# Patient Record
Sex: FEMALE | Race: WHITE | NOT HISPANIC OR LATINO | Employment: OTHER | URBAN - METROPOLITAN AREA
[De-identification: names, ages, dates, MRNs, and addresses within clinical notes are randomized per-mention and may not be internally consistent; named-entity substitution may affect disease eponyms.]

---

## 2017-02-03 ENCOUNTER — PATIENT MESSAGE (OUTPATIENT)
Dept: FAMILY MEDICINE | Facility: CLINIC | Age: 72
End: 2017-02-03

## 2017-02-03 DIAGNOSIS — D50.9 IRON DEFICIENCY ANEMIA, UNSPECIFIED IRON DEFICIENCY ANEMIA TYPE: ICD-10-CM

## 2017-02-03 DIAGNOSIS — I10 ESSENTIAL HYPERTENSION: Primary | ICD-10-CM

## 2017-02-06 ENCOUNTER — LAB VISIT (OUTPATIENT)
Dept: LAB | Facility: HOSPITAL | Age: 72
End: 2017-02-06
Attending: FAMILY MEDICINE
Payer: MEDICARE

## 2017-02-06 ENCOUNTER — OFFICE VISIT (OUTPATIENT)
Dept: FAMILY MEDICINE | Facility: CLINIC | Age: 72
End: 2017-02-06
Payer: MEDICARE

## 2017-02-06 VITALS
WEIGHT: 154.31 LBS | TEMPERATURE: 99 F | DIASTOLIC BLOOD PRESSURE: 110 MMHG | HEIGHT: 60 IN | HEART RATE: 63 BPM | BODY MASS INDEX: 30.29 KG/M2 | SYSTOLIC BLOOD PRESSURE: 201 MMHG

## 2017-02-06 DIAGNOSIS — I10 ESSENTIAL HYPERTENSION: Primary | ICD-10-CM

## 2017-02-06 DIAGNOSIS — I10 ESSENTIAL HYPERTENSION: ICD-10-CM

## 2017-02-06 LAB
ANION GAP SERPL CALC-SCNC: 7 MMOL/L
BUN SERPL-MCNC: 13 MG/DL
CALCIUM SERPL-MCNC: 9.5 MG/DL
CHLORIDE SERPL-SCNC: 97 MMOL/L
CO2 SERPL-SCNC: 31 MMOL/L
CORTIS SERPL-MCNC: 11 UG/DL
CREAT SERPL-MCNC: 0.8 MG/DL
EST. GFR  (AFRICAN AMERICAN): >60 ML/MIN/1.73 M^2
EST. GFR  (NON AFRICAN AMERICAN): >60 ML/MIN/1.73 M^2
GLUCOSE SERPL-MCNC: 79 MG/DL
POTASSIUM SERPL-SCNC: 3.8 MMOL/L
SODIUM SERPL-SCNC: 135 MMOL/L

## 2017-02-06 PROCEDURE — 1157F ADVNC CARE PLAN IN RCRD: CPT | Mod: ,,, | Performed by: FAMILY MEDICINE

## 2017-02-06 PROCEDURE — 82533 TOTAL CORTISOL: CPT

## 2017-02-06 PROCEDURE — 1126F AMNT PAIN NOTED NONE PRSNT: CPT | Mod: ,,, | Performed by: FAMILY MEDICINE

## 2017-02-06 PROCEDURE — 93010 ELECTROCARDIOGRAM REPORT: CPT | Mod: ,,, | Performed by: INTERNAL MEDICINE

## 2017-02-06 PROCEDURE — 99999 PR PBB SHADOW E&M-EST. PATIENT-LVL III: CPT | Mod: PBBFAC,,, | Performed by: FAMILY MEDICINE

## 2017-02-06 PROCEDURE — 99214 OFFICE O/P EST MOD 30 MIN: CPT | Mod: S$PBB,,, | Performed by: FAMILY MEDICINE

## 2017-02-06 PROCEDURE — 82088 ASSAY OF ALDOSTERONE: CPT

## 2017-02-06 PROCEDURE — 1159F MED LIST DOCD IN RCRD: CPT | Mod: ,,, | Performed by: FAMILY MEDICINE

## 2017-02-06 PROCEDURE — 3078F DIAST BP <80 MM HG: CPT | Mod: ,,, | Performed by: FAMILY MEDICINE

## 2017-02-06 PROCEDURE — 3077F SYST BP >= 140 MM HG: CPT | Mod: ,,, | Performed by: FAMILY MEDICINE

## 2017-02-06 PROCEDURE — 93005 ELECTROCARDIOGRAM TRACING: CPT | Mod: PBBFAC,PO | Performed by: FAMILY MEDICINE

## 2017-02-06 PROCEDURE — 36415 COLL VENOUS BLD VENIPUNCTURE: CPT | Mod: PO

## 2017-02-06 PROCEDURE — 80048 BASIC METABOLIC PNL TOTAL CA: CPT

## 2017-02-06 PROCEDURE — 99213 OFFICE O/P EST LOW 20 MIN: CPT | Mod: PBBFAC,PO | Performed by: FAMILY MEDICINE

## 2017-02-06 RX ORDER — VALSARTAN AND HYDROCHLOROTHIAZIDE 320; 25 MG/1; MG/1
1 TABLET, FILM COATED ORAL DAILY
Qty: 30 TABLET | Refills: 11 | Status: SHIPPED | OUTPATIENT
Start: 2017-02-06 | End: 2017-03-13 | Stop reason: SDUPTHER

## 2017-02-06 RX ORDER — AMLODIPINE BESYLATE 5 MG/1
5 TABLET ORAL DAILY
Qty: 30 TABLET | Refills: 11 | Status: SHIPPED | OUTPATIENT
Start: 2017-02-06 | End: 2017-03-13 | Stop reason: SDUPTHER

## 2017-02-06 NOTE — PROGRESS NOTES
Subjective:       Patient ID: Magdalena Mane is a 71 y.o. female.    Chief Complaint: Hypertension (Follow up) and Blood Pressure Check    HPI Comments: Disclaimer: This note has been generated using voice-recognition software. There may be typographical errors that have been missed during proof-reading    Patient is 71-year-old presents today for persistently elevated blood pressure readings at home.  According to the patient, blood pressure monitoring has shown systolics in the 160s to 180s, with diastolics usually ranging from 80-90.  She has been under excessive family stress at home recently.  She denies any headaches, chest discomfort or dyspnea on exertion, she has some occasional ankle edema.    Hypertension   Pertinent negatives include no chest pain, headaches, palpitations or shortness of breath.     Review of Systems   Constitutional: Negative for activity change, fatigue and unexpected weight change.   Respiratory: Negative for cough, chest tightness and shortness of breath.    Cardiovascular: Positive for leg swelling. Negative for chest pain and palpitations.   Neurological: Negative for dizziness, weakness, light-headedness and headaches.       Objective:      Physical Exam   Constitutional: She is oriented to person, place, and time. She appears well-developed and well-nourished. No distress.   Neck: Normal range of motion. No JVD present. No thyromegaly present.   Cardiovascular: Normal rate, regular rhythm and intact distal pulses.    No murmur heard.  Pulmonary/Chest: Effort normal and breath sounds normal. She has no wheezes. She has no rales.   Musculoskeletal: She exhibits no edema.   Lymphadenopathy:     She has no cervical adenopathy.   Neurological: She is alert and oriented to person, place, and time. She has normal reflexes.       Assessment:       1. Essential hypertension        Plan:       1.  Stop Lisinopril, HCT  2.  Diovan/HCT, Amlodipine  3.  EKG, cortisol, aldosterone level,  renovascular US  4.  Consult Digital Medicine  5.  F/u 2 weeks

## 2017-02-06 NOTE — MR AVS SNAPSHOT
[facility]  [address] [facility], [address]  [facility] [address]  Phone: [phone]  Fax: [phone]                  Magdalena Mane   2017 9:40 AM   Office Visit    Description:  Female : 1945   Provider:  Aaron Cardenas MD   Department:  [facility]           Reason for Visit     Hypertension     Blood Pressure Check           Diagnoses this Visit        Comments    Essential hypertension    -  Primary            To Do List           Future Appointments        Provider Department Dept Phone    3/28/2017 8:30 AM LAB, [place] - Laboratory [phone]    4/3/2017 9:00 AM Lucie Valdivia MD [facility] [phone]      Goals (5 Years of Data)     None      Follow-Up and Disposition     Return in about 2 weeks (around 2017).       These Medications        Disp Refills Start End    valsartan-hydrochlorothiazide (DIOVAN-HCT) 320-25 mg per tablet 30 tablet 11 2017    Take 1 tablet by mouth once daily. - Oral    [facility] [id number] [address] AT [facility] Ph #: [phone]       amlodipine (NORVASC) 5 MG tablet 30 tablet 11 2017 3/8/2017    Take 1 tablet (5 mg total) by mouth once daily. - Oral    Pharmacy: [facility] [id number] [facility] [place] - [address] [place] [address] AT [facility] Ph #: [phone]         [facility] On Call     [facility] On Call Nurse Care Line -  Assistance  Registered nurses in the [facility] On Call Center provide clinical advisement, health education, appointment booking, and other advisory services.  Call for this free service at [phone].             Medications           Message regarding Medications     Verify the changes and/or additions to your medication regime listed below are the same as discussed with your clinician today.  If any of these changes or additions are  incorrect, please notify your healthcare provider.        START taking these NEW medications        Refills    valsartan-hydrochlorothiazide (DIOVAN-HCT) 320-25 mg per tablet 11    Sig: Take 1 tablet by mouth once daily.    Class: Normal    Route: Oral    amlodipine (NORVASC) 5 MG tablet 11    Sig: Take 1 tablet (5 mg total) by mouth once daily.    Class: Normal    Route: Oral      STOP taking these medications     lisinopril-hydrochlorothiazide (PRINZIDE,ZESTORETIC) 20-25 mg Tab Take 1 tablet by mouth once daily.    lisinopril 10 MG tablet Take 1 tablet (10 mg total) by mouth once daily.           Verify that the below list of medications is an accurate representation of the medications you are currently taking.  If none reported, the list may be blank. If incorrect, please contact your healthcare provider. Carry this list with you in case of emergency.           Current Medications     atenolol (TENORMIN) 25 MG tablet Take 1 tablet (25 mg total) by mouth once daily.    conjugated estrogens (PREMARIN) vaginal cream Place 0.5 g vaginally once daily.    diphenhydrAMINE (BENADRYL) 25 mg capsule Take 25 mg by mouth nightly as needed for Itching.     ferrous sulfate 325 mg (65 mg iron) TbEC Take 325 mg by mouth every morning.     omega-3 fatty acids (FISH OIL) 500 mg Cap Take 1 capsule by mouth nightly. 1 Capsule Oral Every evening    tramadol (ULTRAM) 50 mg tablet TAKE 1/2 TABLET BY MOUTH NIGHTLY AS NEEDED FOR PAIN    amlodipine (NORVASC) 5 MG tablet Take 1 tablet (5 mg total) by mouth once daily.    triamcinolone acetonide 0.1% (KENALOG) 0.1 % cream Apply topically 2 (two) times daily as needed. Avoid face and groin.    valsartan-hydrochlorothiazide (DIOVAN-HCT) 320-25 mg per tablet Take 1 tablet by mouth once daily.           Clinical Reference Information           Your Vitals Were     BP Pulse Temp Height Weight BMI    201/110 (BP Location: Left arm, Patient Position: Sitting, BP Method: Automatic) 63 98.5 °F (36.9  °C) (Oral) 5' (1.524 m) 70 kg (154 lb 5.2 oz) 30.14 kg/m2      Blood Pressure          Most Recent Value    BP  (!)  201/110      Allergies as of 2/6/2017     No Known Allergies      Immunizations Administered on Date of Encounter - 2/6/2017     None      Orders Placed During Today's Visit      Normal Orders This Visit    Assign HDMP Onboarding Questionnaire Series     Hypertension Digital Medicine (HDMP)  Enrollment Order     Future Labs/Procedures Expected by Expires    Aldosterone  2/6/2017 4/7/2018    Basic metabolic panel  2/6/2017 2/6/2018    Cortisol  2/6/2017 4/7/2018    Cardiology Lab US Renal Artery Scan Bilateral  As directed 2/6/2018    EKG 12-lead  As directed 2/6/2018      Hypertension Digital Medicine Program Information              As discussed, you could benefit from enrolling in the Hypertension Digital Medicine Program. The goal of the program is to help you effectively manage your high blood pressure through an appropriate balance of medication and lifestyle changes, all from the comfort of your own home. Effectively managed blood pressure reduces your risk of having a heart attack or a stroke in the future, so you can enjoy a long and healthy life. We want to make blood pressure control your goal.        What is the Hypertension Digital Medicine Program?  Finding the right balance in managing high blood pressure is different for every person, and we will tailor our treatment approach based on your individual needs using the most current evidence-based guidelines.  As a participant, you will be able to send home blood pressure readings, on your schedule, directly into your medical record at [facility]. I, along with a team of pharmacists, will monitor this data, help you adjust your medication(s) and/or make lifestyle recommendations to better manage your hypertension.       What are the requirements of the program?   Participating in the program is as easy as 1 - 2 - 3.   1. Smartphone - You must  "have your own smartphone to participate (either an iPhone or an Android phone such as Moneysoft, retsCloud, HTC, RingCentral, Click Bus, or Matter.io).    2. [facility] account - [facility] offers a great way to connect through the online patient portal, [facility], which is free and provides you access to your [facility] medical record.  3. Digital blood pressure cuff - Using this blood pressure cuff that hooks up to your smartphone, you will be able to send in your home blood pressure readings to the Hypertension Digital Medicine Team. We have made arrangements to offer blood pressure cuffs at a discount for our programs participants.           What can I do to get started?   Complete the Hypertension Digital Medicine Patient Consent questionnaire already available in your [facility] account. To access and complete this questionnaire, either  - Use the [facility] website on a computer and select My Medical Record, then Questionnaires.  - Use the [email or web address] josiane on your smartphone and select "Questionnaires".    Once you have given consent, additional onboarding questionnaires will be assigned to you to complete prior to starting the program. You must return to the "Questionnaires" page of your [facility] account to start the additional questionnaires.     How do I purchase a digital blood pressure cuff and obtain a discount?  [facility] has negotiated a discounted price from industry leading healthcare technology companies. Patients using an Apple iPhone can purchase an iHealth Ease Blood Pressure cuff for $33 (originally $39.99). While supplies last, Android users can purchase the [employer] Blood Pressure Monitor for $45 (originally $129.95).  Purchase locations will be sent once all onboarding questionnaires have been completed (see above).    If you have any questions regarding this program or would like more information, please visit our Hypertension Digital Medicine website ([email or web address]) or call Digital " Medicine Patient Support at [phone].          Language Assistance Services     ATTENTION: Language assistance services are available, free of charge. Please call [phone].      ATENCIÓN: Si habla babar, tiene a henderson disposición servicios gratuitos de asistencia lingüística. Llame al [phone].     CHÚ Ý: N?u b?n nói Ti?ng Vi?t, có các d?ch v? h? tr? ngôn ng? mi?n phí dành cho b?n. G?i s? [phone].         [facility] complies with applicable Federal civil rights laws and does not discriminate on the basis of race, color, national origin, age, disability, or sex.

## 2017-02-08 ENCOUNTER — PATIENT MESSAGE (OUTPATIENT)
Dept: ADMINISTRATIVE | Facility: OTHER | Age: 72
End: 2017-02-08

## 2017-02-08 LAB — ALDOST SERPL-MCNC: 9.9 NG/DL

## 2017-02-09 ENCOUNTER — PATIENT MESSAGE (OUTPATIENT)
Dept: ADMINISTRATIVE | Facility: OTHER | Age: 72
End: 2017-02-09

## 2017-02-09 DIAGNOSIS — M17.0 PRIMARY OSTEOARTHRITIS OF BOTH KNEES: ICD-10-CM

## 2017-02-13 RX ORDER — TRAMADOL HYDROCHLORIDE 50 MG/1
TABLET ORAL
Qty: 30 TABLET | Refills: 0 | Status: SHIPPED | OUTPATIENT
Start: 2017-02-13 | End: 2017-03-13 | Stop reason: SDUPTHER

## 2017-02-15 ENCOUNTER — PATIENT OUTREACH (OUTPATIENT)
Dept: OTHER | Facility: OTHER | Age: 72
End: 2017-02-15
Payer: MEDICARE

## 2017-02-15 NOTE — PROGRESS NOTES
Last 5 Patient Entered Readings                                                               Current 30 Day Average: 139/73     Recent Readings 2/13/2017 2/11/2017 2/10/2017 2/9/2017 2/9/2017    Systolic BP (mmHg) 118 171 115 141 163    Diastolic BP (mmHg) 65 88 64 69 82    Pulse 60 55 67 58 62      Initial introduction completed with the pt and the role of the health  was explained.   We discussed the following information:  Exercise - Pt has bad osteoarthritis and has had two hip replacements. Her knees also give her trouble. She does walk very frequently but is interested in joining [employer] for more exercise to prevent further weight gain. Pt states she will be calling them soon to start the enrollment process.    Diet - Ms. Mane states she already eats mostly fresh, low sodium foods, mostly vegetarian diet. She states her weakness is snacking on olives daily and eating wraps w/olive spread. We set a goal to decrease the amount and frequency of the olives as they are very high in sodium. She uses only salt-free seasonings.   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 Per pt Chart, she reports great deal of family stress. She has a medication dispenser and also keeps extra doses of her medication in each car just incase she occasionally forgets to take at home.     Resources on diet and exercise were sent.     Pt aware that I am not available for emergencies and to call 911 or [facility] on call if one arises.  Pt aware of the importance of medication adherence.  Pt aware of the importance of diet and exercise.  Pt aware that his sodium intake should be no more than 2000mg per day.  Pt aware that the recommended physical activity each week should be about 30 minutes per day at least 5 times per week.   Pt aware of the importance of taking BP readings at least weekly if not more and during  different times each day.  Pt aware that the health  can be used as a resource for lifestyle modifications to help reduce or maintain a healthy BP

## 2017-02-16 ENCOUNTER — CLINICAL SUPPORT (OUTPATIENT)
Dept: CARDIOLOGY | Facility: CLINIC | Age: 72
End: 2017-02-16
Payer: MEDICARE

## 2017-02-16 DIAGNOSIS — I10 ESSENTIAL HYPERTENSION: ICD-10-CM

## 2017-02-16 PROCEDURE — 93975 VASCULAR STUDY: CPT | Mod: PBBFAC | Performed by: INTERNAL MEDICINE

## 2017-02-20 ENCOUNTER — OFFICE VISIT (OUTPATIENT)
Dept: FAMILY MEDICINE | Facility: CLINIC | Age: 72
End: 2017-02-20
Payer: MEDICARE

## 2017-02-20 VITALS
TEMPERATURE: 98 F | SYSTOLIC BLOOD PRESSURE: 136 MMHG | DIASTOLIC BLOOD PRESSURE: 70 MMHG | HEIGHT: 60 IN | WEIGHT: 154.31 LBS | BODY MASS INDEX: 30.29 KG/M2

## 2017-02-20 DIAGNOSIS — I10 ESSENTIAL HYPERTENSION: ICD-10-CM

## 2017-02-20 DIAGNOSIS — Z00.00 ROUTINE GENERAL MEDICAL EXAMINATION AT A HEALTH CARE FACILITY: Primary | ICD-10-CM

## 2017-02-20 DIAGNOSIS — M81.0 OSTEOPOROSIS: ICD-10-CM

## 2017-02-20 DIAGNOSIS — Z12.31 SCREENING MAMMOGRAM FOR HIGH-RISK PATIENT: ICD-10-CM

## 2017-02-20 DIAGNOSIS — Z13.9 SCREENING: ICD-10-CM

## 2017-02-20 PROCEDURE — 99214 OFFICE O/P EST MOD 30 MIN: CPT | Mod: S$PBB,,, | Performed by: FAMILY MEDICINE

## 2017-02-20 PROCEDURE — 1157F ADVNC CARE PLAN IN RCRD: CPT | Mod: ,,, | Performed by: FAMILY MEDICINE

## 2017-02-20 PROCEDURE — 3078F DIAST BP <80 MM HG: CPT | Mod: ,,, | Performed by: FAMILY MEDICINE

## 2017-02-20 PROCEDURE — 99213 OFFICE O/P EST LOW 20 MIN: CPT | Mod: PBBFAC,PO | Performed by: FAMILY MEDICINE

## 2017-02-20 PROCEDURE — 3075F SYST BP GE 130 - 139MM HG: CPT | Mod: ,,, | Performed by: FAMILY MEDICINE

## 2017-02-20 PROCEDURE — 1125F AMNT PAIN NOTED PAIN PRSNT: CPT | Mod: ,,, | Performed by: FAMILY MEDICINE

## 2017-02-20 PROCEDURE — 1159F MED LIST DOCD IN RCRD: CPT | Mod: ,,, | Performed by: FAMILY MEDICINE

## 2017-02-20 PROCEDURE — 99999 PR PBB SHADOW E&M-EST. PATIENT-LVL III: CPT | Mod: PBBFAC,,, | Performed by: FAMILY MEDICINE

## 2017-02-20 NOTE — MR AVS SNAPSHOT
[place]  [address] [facility], [address]  [facility] [address]  Phone: [phone]  Fax: [phone]                  Magdalena Mane   2017 10:40 AM   Office Visit    Description:  Female : 1945   Provider:  Aaron Cardenas MD   Department:  [place]           Reason for Visit     Hypertension           Diagnoses this Visit        Comments    Routine general medical examination at a health care facility    -  Primary     Essential hypertension         Screening mammogram for high-risk patient         Screening         Osteoporosis                To Do List           Future Appointments        Provider Department Dept Phone    3/28/2017 8:30 AM LAB, [place] - Laboratory [phone]    4/3/2017 9:00 AM Lucie Valdivia MD [place] [phone]      Goals (5 Years of Data)              Today    17    Blood Pressure <= 140/90   146/62  132/70  125/61    Notes - Note created  2/15/2017 10:02 AM by Eli SANDHU    It is important to consistently maintain a controlled blood pressure.      Exercise at least 150 minutes per week.           Notes - Note created  2/15/2017 10:27 AM by Eli SANDHU    Continue walking a least 30 minutes x 5 days week and look into [employer].       Maintain a low sodium diet           Notes - Note created  2/15/2017 10:27 AM by Eli SANDHU    Limit to 2,000 mg/day total.      Reads food labels           Notes - Note created  2/15/2017 10:28 AM by Eli SANDHU    Aim for <140 mg sodium/serving size.       Take at least one BP reading per week at various times of the day           Weight (lb) < 0   70 kg (154 lb 5.2 oz)          Follow-Up and Disposition     Return in about 3 months (around 2017).      [facility] On Call     [facility] On Call Nurse Care Line -  Assistance  Registered nurses in the [facility] On Call Center provide clinical  advisement, health education, appointment booking, and other advisory services.  Call for this free service at [phone].             Medications           Message regarding Medications     Verify the changes and/or additions to your medication regime listed below are the same as discussed with your clinician today.  If any of these changes or additions are incorrect, please notify your healthcare provider.             Verify that the below list of medications is an accurate representation of the medications you are currently taking.  If none reported, the list may be blank. If incorrect, please contact your healthcare provider. Carry this list with you in case of emergency.           Current Medications     amlodipine (NORVASC) 5 MG tablet Take 1 tablet (5 mg total) by mouth once daily.    atenolol (TENORMIN) 25 MG tablet Take 1 tablet (25 mg total) by mouth once daily.    conjugated estrogens (PREMARIN) vaginal cream Place 0.5 g vaginally once daily.    diphenhydrAMINE (BENADRYL) 25 mg capsule Take 25 mg by mouth nightly as needed for Itching.     ferrous sulfate 325 mg (65 mg iron) TbEC Take 325 mg by mouth every morning.     omega-3 fatty acids (FISH OIL) 500 mg Cap Take 1 capsule by mouth nightly. 1 Capsule Oral Every evening    tramadol (ULTRAM) 50 mg tablet TAKE 1/2 TABLET BY MOUTH EVERY NIGHT AT BEDTIME AS NEEDED FOR PAIN    valsartan-hydrochlorothiazide (DIOVAN-HCT) 320-25 mg per tablet Take 1 tablet by mouth once daily.    triamcinolone acetonide 0.1% (KENALOG) 0.1 % cream Apply topically 2 (two) times daily as needed. Avoid face and groin.           Clinical Reference Information           Your Vitals Were     BP Temp Height Weight BMI    146/62 98 °F (36.7 °C) 5' (1.524 m) 70 kg (154 lb 5.2 oz) 30.14 kg/m2      Blood Pressure          Most Recent Value    BP  (!)  146/62      Allergies as of 2/20/2017     No Known Allergies      Immunizations Administered on Date of Encounter - 2/20/2017     None       Orders Placed During Today's Visit     Future Labs/Procedures Expected by Expires    CBC auto differential  2/20/2017 5/21/2017    DXA Bone Density Spine And Hip_Axial Skeleton  2/20/2017 5/21/2017    Lipid panel  2/20/2017 4/21/2018    Mammo Digital Screening Bilat with CAD  2/20/2017 5/21/2017    TSH  2/20/2017 4/21/2018      Language Assistance Services     ATTENTION: Language assistance services are available, free of charge. Please call [phone].      ATENCIÓN: Si habla español, tiene a henderson disposición servicios gratuitos de asistencia lingüística. Llame al [phone].     CHÚ Ý: N?u b?n nói Ti?ng Vi?t, có các d?ch v? h? tr? ngôn ng? mi?n phí dành cho b?n. G?i s? [phone].         [facility] complies with applicable Federal civil rights laws and does not discriminate on the basis of race, color, national origin, age, disability, or sex.

## 2017-02-20 NOTE — PROGRESS NOTES
Subjective:       Patient ID: Magdalena Mane is a 71 y.o. female.    Chief Complaint: Hypertension (2 week follow up)    HPI Comments: Disclaimer: This note has been generated using voice-recognition software. There may be typographical errors that have been missed during proof-reading    70 yo presents for follow up of hypertension.  Doing well on present medications without side effects.  Has noted gradual lowering of her blood pressure    Hypertension   Pertinent negatives include no chest pain, headaches, palpitations or shortness of breath.     Review of Systems   Constitutional: Negative for activity change, fatigue and unexpected weight change.   Respiratory: Negative for chest tightness and shortness of breath.    Cardiovascular: Negative for chest pain, palpitations and leg swelling.   Neurological: Negative for dizziness, weakness, light-headedness and headaches.       Objective:      Physical Exam   Constitutional: She is oriented to person, place, and time. She appears well-developed and well-nourished. No distress.   Neck: Normal range of motion. No JVD present. No thyromegaly present.   Cardiovascular: Normal rate and regular rhythm.    No murmur heard.  Pulmonary/Chest: Effort normal and breath sounds normal. She has no wheezes. She has no rales.   Musculoskeletal: She exhibits no edema.   Neurological: She is alert and oriented to person, place, and time. She has normal reflexes.       Assessment:       Hypertension, improved control   Plan:       1.  Continue present medications  2. F/u 3 months for physical exam

## 2017-02-21 ENCOUNTER — PATIENT OUTREACH (OUTPATIENT)
Dept: OTHER | Facility: OTHER | Age: 72
End: 2017-02-21
Payer: MEDICARE

## 2017-02-21 NOTE — PROGRESS NOTES
Last 5 Patient Entered Readings                                                               Current 30 Day Average: 130/68     Recent Readings 2/20/2017 2/19/2017 2/16/2017 2/15/2017 2/13/2017    Systolic BP (mmHg) 100 132 125 132 118    Diastolic BP (mmHg) 60 70 61 68 65    Pulse 62 61 56 59 60        Hypertension Medications             amlodipine (NORVASC) 5 MG tablet Take 1 tablet (5 mg total) by mouth once daily.    atenolol (TENORMIN) 25 MG tablet Take 1 tablet (25 mg total) by mouth once daily.    valsartan-hydrochlorothiazide (DIOVAN-HCT) 320-25 mg per tablet Take 1 tablet by mouth once daily.        Called patient to introduce her into the HDM (hypertension digital medicine) clinic. LVM.

## 2017-02-21 NOTE — LETTER
"   Lidia Roberts, PharmD  [address] [place] [address]     Dear Magdalena Mane,    Welcome to the [facility] Hypertension Digital Medicine Program!         My name is Lidia Roberts and I am your dedicated clinical pharmacist.  As an expert in medication management, I will help ensure that the medications you are taking continue to provide you with the intended benefits.      This is Eli Shah and she will be your health  for the duration of the program.  Her job is to help you identify lifestyle changes to improve your blood pressure control.  You will talk about nutrition, exercise, and other ways that you may be able to adjust your current habits to better your health. Together, we will work to improve your overall health and encourage you to meet your goals for a healthier lifestyle.    What we expect from YOU:    You will need to take blood pressure readings multiple times a week and no less than one reading per week.   It is important that you take your measurements at different times during the day, when possible.     What you should expect from your Digital Medicine Care Team:   We will provide you with education about high blood pressure, including lifestyle changes that could help you to control your blood pressure.   We will review your weekly readings and provide you with monthly blood pressure progress reports after you have been in the program for more than 30 days.   We will send monthly progress reports on your blood pressure control to your physician so they can follow along with your progress as well.    You will be able to reach me by phone at [phone] or through your [facility] account by clicking "Send a message to your doctor's office" on the home screen then selecting my name in the "To the office of:" field.     I look forward to working with you to achieve your blood pressure goals!    Sincerely,    Lidia Roberts, PharmCIRA  Your personal Clinical " Pharmacist    Please visit [email or web address] to learn more about high blood pressure and what you can do lower your blood pressure.                                                                                         Magdalena Mane  [address] [address]

## 2017-03-02 NOTE — PROGRESS NOTES
Last 5 Patient Entered Readings                                                               Current 30 Day Average: 125/66     Recent Readings 2/28/2017 2/25/2017 2/23/2017 2/20/2017 2/19/2017    Systolic BP (mmHg) 127 141 110 100 132    Diastolic BP (mmHg) 66 78 58 60 70    Pulse 63 60 58 62 61        Hypertension Medications             amlodipine (NORVASC) 5 MG tablet Take 1 tablet (5 mg total) by mouth once daily.-qam, changing to qhs    atenolol (TENORMIN) 25 MG tablet Take 1 tablet (25 mg total) by mouth once daily. -qam    valsartan-hydrochlorothiazide (DIOVAN-HCT) 320-25 mg per tablet Take 1 tablet by mouth once daily. -qam        Called patient to introduce her into the HDM (hypertension digital medicine) clinic. LVM, 2nd attempt, will also message the patient in [facility]. WCB in 1 week.     Patient called back.     Verified the following information with the patient:  Drug allergies  Medication adherence- Patient states she is adherent.       Screening questionnaires:  Depression- not indicated      Sleep apnea- not indicated      Explained that we expect her to obtain several blood pressures/week at random times of day.       Explained that our goal is to get her BP to consistently below 140/90mmHg.       Patient and I agreed that the patient will take her BP daily to every other day at varying times of the day.       I will plan to follow-up with the patient in 2 weeks.      Emailed patient link to [facility] HTN webpage as well as my direct phone number in case she has in questions.

## 2017-03-13 DIAGNOSIS — M17.0 PRIMARY OSTEOARTHRITIS OF BOTH KNEES: ICD-10-CM

## 2017-03-13 RX ORDER — ATENOLOL 25 MG/1
25 TABLET ORAL DAILY
Qty: 90 TABLET | Refills: 3 | Status: SHIPPED | OUTPATIENT
Start: 2017-03-13 | End: 2018-03-29 | Stop reason: SDUPTHER

## 2017-03-13 RX ORDER — TRAMADOL HYDROCHLORIDE 50 MG/1
TABLET ORAL
Qty: 30 TABLET | Refills: 0 | Status: SHIPPED | OUTPATIENT
Start: 2017-03-13 | End: 2017-05-10 | Stop reason: SDUPTHER

## 2017-03-13 RX ORDER — VALSARTAN AND HYDROCHLOROTHIAZIDE 320; 25 MG/1; MG/1
1 TABLET, FILM COATED ORAL DAILY
Qty: 90 TABLET | Refills: 3 | Status: SHIPPED | OUTPATIENT
Start: 2017-03-13 | End: 2018-03-29 | Stop reason: SDUPTHER

## 2017-03-13 RX ORDER — AMLODIPINE BESYLATE 5 MG/1
5 TABLET ORAL DAILY
Qty: 90 TABLET | Refills: 3 | Status: SHIPPED | OUTPATIENT
Start: 2017-03-13 | End: 2018-03-29 | Stop reason: SDUPTHER

## 2017-03-15 ENCOUNTER — PATIENT OUTREACH (OUTPATIENT)
Dept: OTHER | Facility: OTHER | Age: 72
End: 2017-03-15
Payer: MEDICARE

## 2017-03-15 NOTE — PROGRESS NOTES
Last 5 Patient Entered Readings                                                               Current 30 Day Average: 129/67     Recent Readings 3/9/2017 3/7/2017 3/3/2017 2/28/2017 2/25/2017    Systolic BP (mmHg) 136 143 133 127 141    Diastolic BP (mmHg) 73 72 75 66 78    Pulse 60 54 63 63 60        Follow up with Ms. Magdalena OLAMIDE Alhaji completed. Patient is maintaining a low sodium diet and states that since last encounter she has totally eliminated items such as olives from her diet d/t sodium content. She also joined [facility] and is excited to start using their pool classes. She has been very busy w/social and family obligations lately but will take a reading soon. Patient did not have any further questions or concerns. I will follow up in a few weeks to see how she is doing and progressing.

## 2017-03-16 ENCOUNTER — PATIENT OUTREACH (OUTPATIENT)
Dept: OTHER | Facility: OTHER | Age: 72
End: 2017-03-16
Payer: MEDICARE

## 2017-03-16 NOTE — PROGRESS NOTES
Last 5 Patient Entered Readings                                                               Current 30 Day Average: 131/68     Recent Readings 3/9/2017 3/7/2017 3/3/2017 2/28/2017 2/25/2017    Systolic BP (mmHg) 136 143 133 127 141    Diastolic BP (mmHg) 73 72 75 66 78    Pulse 60 54 63 63 60        Hypertension Medications             amlodipine (NORVASC) 5 MG tablet Take 1 tablet (5 mg total) by mouth once daily.    atenolol (TENORMIN) 25 MG tablet Take 1 tablet (25 mg total) by mouth once daily.    valsartan-hydrochlorothiazide (DIOVAN-HCT) 320-25 mg per tablet Take 1 tablet by mouth once daily.        Plan:   Called patient to follow up. Per current 30 day average, BP is well controlled. Patient denies having questions or concerns. Will continue to monitor. WCB in 3 months, sooner if BP begins to trend up or down.

## 2017-03-23 ENCOUNTER — HOSPITAL ENCOUNTER (OUTPATIENT)
Dept: RADIOLOGY | Facility: HOSPITAL | Age: 72
Discharge: HOME | End: 2017-03-23
Attending: FAMILY MEDICINE
Payer: MEDICARE

## 2017-03-23 DIAGNOSIS — Z12.31 SCREENING MAMMOGRAM FOR HIGH-RISK PATIENT: ICD-10-CM

## 2017-03-23 PROCEDURE — 77063 BREAST TOMOSYNTHESIS BI: CPT | Mod: 26,,, | Performed by: RADIOLOGY

## 2017-03-23 PROCEDURE — 77067 SCR MAMMO BI INCL CAD: CPT | Mod: TC

## 2017-03-23 PROCEDURE — 77067 SCR MAMMO BI INCL CAD: CPT | Mod: 26,,, | Performed by: RADIOLOGY

## 2017-04-12 ENCOUNTER — PATIENT MESSAGE (OUTPATIENT)
Dept: FAMILY MEDICINE | Facility: CLINIC | Age: 72
End: 2017-04-12

## 2017-04-12 ENCOUNTER — PATIENT OUTREACH (OUTPATIENT)
Dept: OTHER | Facility: OTHER | Age: 72
End: 2017-04-12
Payer: MEDICARE

## 2017-04-12 NOTE — PROGRESS NOTES
Last 5 Patient Entered Readings                                                               Current 30 Day Average: 125/68     Recent Readings 4/4/2017 4/3/2017 3/31/2017 3/28/2017 3/17/2017    Systolic BP (mmHg) 129 116 141 131 112    Diastolic BP (mmHg) 75 64 70 71 63    Pulse 61 55 55 62 59      Called pt to follow up and inquire about lack of readings, LVM requesting call back.

## 2017-04-19 NOTE — PROGRESS NOTES
Last 5 Patient Entered Readings                                                               Current 30 Day Average: 126/69     Recent Readings 4/13/2017 4/12/2017 4/4/2017 4/3/2017 3/31/2017    Systolic BP (mmHg) 113 131 129 116 141    Diastolic BP (mmHg) 66 71 75 64 70    Pulse 56 63 61 55 55        Ms. Mane resumed regular readings and is well controlled w/30 day BP average of 126/69. Called pt to follow up, LVM requesting call back with any questions or concerns. Will also check in again in a few weeks.

## 2017-05-08 ENCOUNTER — OFFICE VISIT (OUTPATIENT)
Dept: INTERNAL MEDICINE | Facility: CLINIC | Age: 72
End: 2017-05-08
Payer: MEDICARE

## 2017-05-08 ENCOUNTER — LAB VISIT (OUTPATIENT)
Dept: LAB | Facility: HOSPITAL | Age: 72
End: 2017-05-08
Attending: INTERNAL MEDICINE
Payer: MEDICARE

## 2017-05-08 VITALS
BODY MASS INDEX: 28.28 KG/M2 | OXYGEN SATURATION: 98 % | DIASTOLIC BLOOD PRESSURE: 60 MMHG | WEIGHT: 153.69 LBS | HEART RATE: 60 BPM | SYSTOLIC BLOOD PRESSURE: 120 MMHG | HEIGHT: 62 IN | RESPIRATION RATE: 15 BRPM | TEMPERATURE: 99 F

## 2017-05-08 DIAGNOSIS — E66.3 OVERWEIGHT: ICD-10-CM

## 2017-05-08 DIAGNOSIS — I10 ESSENTIAL HYPERTENSION: ICD-10-CM

## 2017-05-08 DIAGNOSIS — D50.9 IRON DEFICIENCY ANEMIA, UNSPECIFIED IRON DEFICIENCY ANEMIA TYPE: ICD-10-CM

## 2017-05-08 DIAGNOSIS — N95.2 VAGINAL ATROPHY: ICD-10-CM

## 2017-05-08 DIAGNOSIS — R60.9 EDEMA, UNSPECIFIED TYPE: ICD-10-CM

## 2017-05-08 DIAGNOSIS — Z00.00 ENCOUNTER FOR PREVENTIVE HEALTH EXAMINATION: ICD-10-CM

## 2017-05-08 DIAGNOSIS — M15.9 PRIMARY OSTEOARTHRITIS INVOLVING MULTIPLE JOINTS: Primary | ICD-10-CM

## 2017-05-08 LAB
BASOPHILS # BLD AUTO: 0.07 K/UL
BASOPHILS NFR BLD: 1.1 %
CHOLEST/HDLC SERPL: 2.9 {RATIO}
DIFFERENTIAL METHOD: NORMAL
EOSINOPHIL # BLD AUTO: 0.4 K/UL
EOSINOPHIL NFR BLD: 5.9 %
ERYTHROCYTE [DISTWIDTH] IN BLOOD BY AUTOMATED COUNT: 13 %
HCT VFR BLD AUTO: 37.4 %
HDL/CHOLESTEROL RATIO: 34.8 %
HDLC SERPL-MCNC: 201 MG/DL
HDLC SERPL-MCNC: 70 MG/DL
HGB BLD-MCNC: 12.3 G/DL
IRON SERPL-MCNC: 109 UG/DL
LDLC SERPL CALC-MCNC: 111.8 MG/DL
LYMPHOCYTES # BLD AUTO: 1.3 K/UL
LYMPHOCYTES NFR BLD: 20.2 %
MCH RBC QN AUTO: 30.6 PG
MCHC RBC AUTO-ENTMCNC: 32.9 %
MCV RBC AUTO: 93 FL
MONOCYTES # BLD AUTO: 0.9 K/UL
MONOCYTES NFR BLD: 13.5 %
NEUTROPHILS # BLD AUTO: 3.8 K/UL
NEUTROPHILS NFR BLD: 59.1 %
NONHDLC SERPL-MCNC: 131 MG/DL
PLATELET # BLD AUTO: 332 K/UL
PMV BLD AUTO: 10.6 FL
RBC # BLD AUTO: 4.02 M/UL
SATURATED IRON: 38 %
TOTAL IRON BINDING CAPACITY: 290 UG/DL
TRANSFERRIN SERPL-MCNC: 196 MG/DL
TRIGL SERPL-MCNC: 96 MG/DL
TSH SERPL DL<=0.005 MIU/L-ACNC: 2.74 UIU/ML
WBC # BLD AUTO: 6.39 K/UL

## 2017-05-08 PROCEDURE — G0438 PPPS, INITIAL VISIT: HCPCS | Mod: ,,, | Performed by: NURSE PRACTITIONER

## 2017-05-08 PROCEDURE — 99214 OFFICE O/P EST MOD 30 MIN: CPT | Mod: 25,PBBFAC,PO | Performed by: NURSE PRACTITIONER

## 2017-05-08 PROCEDURE — 99999 PR PBB SHADOW E&M-EST. PATIENT-LVL IV: CPT | Mod: 25,PBBFAC,, | Performed by: NURSE PRACTITIONER

## 2017-05-08 NOTE — PATIENT INSTRUCTIONS
Counseling and Referral of Other Preventative  (Italic type indicates deductible and co-insurance are waived)    Patient Name: Magdalena Mane  Today's Date: 5/8/2017      SERVICE LIMITATIONS RECOMMENDATION    Vaccines    · Pneumococcal (once after 65)    · Influenza (annually)    · Hepatitis B (if medium/high risk)    · Prevnar 13      Hepatitis B medium/high risk factors:       - End-stage renal disease       - Hemophiliacs who received Factor VII or         IX concentrates       - Clients of institutions for the mentally             retarded       - Persons who live in the same house as          a HepB carrier       - Homosexual men       - Illicit injectable drug abusers     Pneumococcal: current     Influenza: current     Hepatitis B: current     Prevnar 13: current    Mammogram (biennial age 50-74)  Annually (age 40 or over)  last done on 3/23/17    Pap (up to age 70 and after 70 if unknown history or abnormal study last 10 years)         discuss with PCP    Colorectal cancer screening (to age 75)    · Fecal occult blood test (annual)  · Flexible sigmoidoscopy (5y)  · Screening colonoscopy (10y)  · Barium enema   due    Diabetes self-management training (no USPSTF recommendations)  Requires referral by treating physician for patient with diabetes or renal disease. 10 hours of initial DSMT sessions of no less than 30 minutes each in a continuous 12-month period. 2 hours of follow-up DSMT in subsequent years.  N/A    Bone mass measurements (age 65 & older, biennial)  Requires diagnosis related to osteoporosis or estrogen deficiency. Biennial benefit unless patient has history of long-term glucocorticoid  done today-results pending    Glaucoma screening (no USPSTF recommendation)  Diabetes mellitus, family history   , age 50 or over    American, age 65 or over  current    Medical nutrition therapy for diabetes or renal disease (no recommended schedule)  Requires referral by treating  physician for patient with diabetes or renal disease or kidney transplant within the past 3 years.  Can be provided in same year as diabetes self-management training (DSMT), and CMS recommends medical nutrition therapy take place after DSMT. Up to 3 hours for initial year and 2 hours in subsequent years.  N/A    Cardiovascular screening blood tests (every 5 years)  · Fasting lipid panel  Order as a panel if possible  current    Diabetes screening tests (at least every 3 years, Medicare covers annually or at 6-month intervals for prediabetic patients)  · Fasting blood sugar (FBS) or glucose tolerance test (GTT)  Patient must be diagnosed with one of the following:       - Hypertension       - Dyslipidemia       - Obesity (BMI 30kg/m2)       - Previous elevated impaired FBS or GTT       ... or any two of the following:       - Overweight (BMI 25 but <30)       - Family history of diabetes       - Age 65 or older       - History of gestational diabetes or birth of baby weighing more than 9 pounds  current    Abdominal aortic aneurysm screening (once)  · Sonogram   Limited to patients who meet one of the following criteria:       - Men who are 65-75 years old and have smoked more than 100 cigarette in their lifetime       - Anyone with a family history of abdominal aortic aneurysm       - Anyone recommended for screening by the USPSTF  N/A    HIV screening (annually for increased risk patients)  · HIV-1 and HIV-2 by EIA, or SAV, rapid antibody test or oral mucosa transudate  Patients must be at increased risk for HIV infection per USPSTF guidelines or pregnant. Tests covered annually for patient at increased risk or as requested by the patient. Pregnant patients may receive up to 3 tests during pregnancy.  Risks discussed, screening is declined    Smoking cessation counseling (up to 8 sessions per year)  Patients must be asymptomatic of tobacco-related conditions to receive as a preventative service.  N/A     Subsequent annual wellness visit  At least 12 months since last AWV  Return in one year     The following information is provided to all patients.  This information is to help you find resources for any of the problems found today that may be affecting your health:                Living healthy guide: www.[facility].[place]      Understanding Diabetes: www.diabetes.org      Eating healthy: www.cdc.gov/healthyweight      CDC home safety checklist: www.cdc.gov/steadi/patient.html      Agency on Aging: www.goea.[place]      Alcoholics anonymous (AA): www.aa.org      Physical Activity: www.daria.nih.gov/ea8wrlc      Tobacco use: www.quitwithusla.org

## 2017-05-08 NOTE — MR AVS SNAPSHOT
[place] [facility]   [facility]  [place] [address]  Phone: [phone]  Fax: [phone]                  Magdalena Mane   2017 10:30 AM   Office Visit    Description:  Female : 1945   Provider:  [facility], MET 1   Department:  [place] [facility]           Reason for Visit     Medicare AWV Follow Up           Diagnoses this Visit        Comments    Primary osteoarthritis involving multiple joints    -  Primary     Edema, unspecified type         Essential hypertension         Iron deficiency anemia, unspecified iron deficiency anemia type         Vaginal atrophy         Overweight         Encounter for preventive health examination                To Do List           Future Appointments        Provider Department Dept Phone    2017 10:30 AM [facility], MET 1 [place] [facility] [phone]    5/10/2017 10:40 AM Aaron Cardenas MD [facility] [phone]      Goals (5 Years of Data)              Today    17    Blood Pressure <= 140/90   120/60  120/60  120/60    Notes - Note created  2/15/2017 10:02 AM by Eli SANDHU    It is important to consistently maintain a controlled blood pressure.      Exercise at least 150 minutes per week.           Notes - Note edited  3/15/2017  9:46 AM by Eli SANDHU    Aim for at least  30 minutes 5 days a week of aquatic exercise or walking.       Maintain a low sodium diet           Notes - Note created  2/15/2017 10:27 AM by Eli SANDHU    Limit to 2,000 mg/day total.      Reads food labels           Notes - Note created  2/15/2017 10:28 AM by Eli SANDHU    Aim for <140 mg sodium/serving size.       Take at least one BP reading per week at various times of the day           Weight (lb) < 66.2 kg (146 lb)   69.7 kg (153 lb 10.6 oz)        Notes - Note created  2017 10:28 AM by Eli SANDHU    Decrease weight by 5% to improve cardiovascular outcomes.         [facility]     [facility] Care Line - 24/7 Assistance  Unless otherwise directed by your provider, please contact [facility], our nurse care line that is available for 24/7 assistance.     Registered nurses in the [facility] provide: appointment scheduling, clinical advisement, health education, and other advisory services.  Call: [phone] (toll free)               Medications           Message regarding Medications     Verify the changes and/or additions to your medication regime listed below are the same as discussed with your clinician today.  If any of these changes or additions are incorrect, please notify your healthcare provider.             Verify that the below list of medications is an accurate representation of the medications you are currently taking.  If none reported, the list may be blank. If incorrect, please contact your healthcare provider. Carry this list with you in case of emergency.           Current Medications     amlodipine (NORVASC) 5 MG tablet Take 1 tablet (5 mg total) by mouth once daily.    atenolol (TENORMIN) 25 MG tablet Take 1 tablet (25 mg total) by mouth once daily.    conjugated estrogens (PREMARIN) vaginal cream Place 0.5 g vaginally once daily.    diphenhydrAMINE (BENADRYL) 25 mg capsule Take 25 mg by mouth nightly as needed for Itching.     ferrous sulfate 325 mg (65 mg iron) TbEC Take 325 mg by mouth every morning.     omega-3 fatty acids (FISH OIL) 500 mg Cap Take 1 capsule by mouth nightly. 1 Capsule Oral Every evening    tramadol (ULTRAM) 50 mg tablet TAKE 1/2 TABLET BY MOUTH EVERY NIGHT AT BEDTIME AS NEEDED FOR PAIN    triamcinolone acetonide 0.1% (KENALOG) 0.1 % cream Apply topically 2 (two) times daily as needed. Avoid face and groin.    valsartan-hydrochlorothiazide (DIOVAN-HCT) 320-25 mg per tablet Take 1 tablet by mouth once daily.           Clinical Reference Information           Your Vitals Were     BP Pulse Temp Resp Height  "Weight    120/60 60 98.7 °F (37.1 °C) (Oral) 15 5' 1.5" (1.562 m) 69.7 kg (153 lb 10.6 oz)    SpO2 BMI             98% 28.56 kg/m2         Blood Pressure          Most Recent Value    BP  120/60      Allergies as of 5/8/2017     No Known Allergies      Immunizations Administered on Date of Encounter - 5/8/2017     None      Instructions      Counseling and Referral of Other Preventative  (Italic type indicates deductible and co-insurance are waived)    Patient Name: Magdalena Mane  Today's Date: 5/8/2017      SERVICE LIMITATIONS RECOMMENDATION    Vaccines    · Pneumococcal (once after 65)    · Influenza (annually)    · Hepatitis B (if medium/high risk)    · Prevnar 13      Hepatitis B medium/high risk factors:       - End-stage renal disease       - Hemophiliacs who received Factor VII or         IX concentrates       - Clients of institutions for the mentally             retarded       - Persons who live in the same house as          a HepB carrier       - Homosexual men       - Illicit injectable drug abusers     Pneumococcal: current     Influenza: current     Hepatitis B: current     Prevnar 13: current    Mammogram (biennial age 50-74)  Annually (age 40 or over)  last done on 3/23/17    Pap (up to age 70 and after 70 if unknown history or abnormal study last 10 years)         discuss with PCP    Colorectal cancer screening (to age 75)    · Fecal occult blood test (annual)  · Flexible sigmoidoscopy (5y)  · Screening colonoscopy (10y)  · Barium enema   due    Diabetes self-management training (no USPSTF recommendations)  Requires referral by treating physician for patient with diabetes or renal disease. 10 hours of initial DSMT sessions of no less than 30 minutes each in a continuous 12-month period. 2 hours of follow-up DSMT in subsequent years.  N/A    Bone mass measurements (age 65 & older, biennial)  Requires diagnosis related to osteoporosis or estrogen deficiency. Biennial benefit unless patient has history " of long-term glucocorticoid  done today-results pending    Glaucoma screening (no USPSTF recommendation)  Diabetes mellitus, family history   , age 50 or over    American, age 65 or over  current    Medical nutrition therapy for diabetes or renal disease (no recommended schedule)  Requires referral by treating physician for patient with diabetes or renal disease or kidney transplant within the past 3 years.  Can be provided in same year as diabetes self-management training (DSMT), and CMS recommends medical nutrition therapy take place after DSMT. Up to 3 hours for initial year and 2 hours in subsequent years.  N/A    Cardiovascular screening blood tests (every 5 years)  · Fasting lipid panel  Order as a panel if possible  current    Diabetes screening tests (at least every 3 years, Medicare covers annually or at 6-month intervals for prediabetic patients)  · Fasting blood sugar (FBS) or glucose tolerance test (GTT)  Patient must be diagnosed with one of the following:       - Hypertension       - Dyslipidemia       - Obesity (BMI 30kg/m2)       - Previous elevated impaired FBS or GTT       ... or any two of the following:       - Overweight (BMI 25 but <30)       - Family history of diabetes       - Age 65 or older       - History of gestational diabetes or birth of baby weighing more than 9 pounds  current    Abdominal aortic aneurysm screening (once)  · Sonogram   Limited to patients who meet one of the following criteria:       - Men who are 65-75 years old and have smoked more than 100 cigarette in their lifetime       - Anyone with a family history of abdominal aortic aneurysm       - Anyone recommended for screening by the USPSTF  N/A    HIV screening (annually for increased risk patients)  · HIV-1 and HIV-2 by EIA, or SAV, rapid antibody test or oral mucosa transudate  Patients must be at increased risk for HIV infection per USPSTF guidelines or pregnant. Tests covered annually for  patient at increased risk or as requested by the patient. Pregnant patients may receive up to 3 tests during pregnancy.  Risks discussed, screening is declined    Smoking cessation counseling (up to 8 sessions per year)  Patients must be asymptomatic of tobacco-related conditions to receive as a preventative service.  N/A    Subsequent annual wellness visit  At least 12 months since last AWV  Return in one year     The following information is provided to all patients.  This information is to help you find resources for any of the problems found today that may be affecting your health:                Living healthy guide: www.[facility].[place].[facility]      Understanding Diabetes: www.diabetes.org      Eating healthy: www.cdc.gov/healthyweight      [facility][note 3] home safety checklist: www.cdc.gov/steadi/patient.html      Agency on Aging: www.goea.[place].[facility]      Alcoholics anonymous (AA): www.aa.org      Physical Activity: www.daria.nih.gov/pb5filj      Tobacco use: www.quitwithusla.org          Language Assistance Services     ATTENTION: Language assistance services are available, free of charge. Please call [phone].      ATENCIÓN: Si lynnla babar, tiene a henderson disposición servicios gratuitos de asistencia lingüística. Llame al [phone].     CHÚ Ý: N?u b?n nói Ti?ng Vi?t, có các d?ch v? h? tr? ngôn ng? mi?n phí dành cho b?n. G?i s? [phone].         [facility] complies with applicable Federal civil rights laws and does not discriminate on the basis of race, color, national origin, age, disability, or sex.

## 2017-05-08 NOTE — Clinical Note
Primary Care Providers: Aaron Cardenas MD, MD (General)  Your patient was seen today for a HRA visit. Gap(s) in care (HEDIS gaps) have been identified during this visit that require additional testing and possible follow up.  Colonoscopy Pap DXA-had today   No orders of the defined types were placed in this encounter.   . I have included a copy of my visit note; please review the note and feel free to contact me with any questions.   Thank you for allowing me to participate in the care of your patients. Elvia Benitez NP

## 2017-05-08 NOTE — PROGRESS NOTES
"Magadlena Mane presented for  Medicare AW today. The following components were reviewed and updated:    · Medical history  · Family History  · Social history  · Allergies and Current Medications  · Health Risk Assessment  · Health Maintenance  · Care Team    **See Completed Assessments for Annual Wellness visit with in the encounter summary    The following assessments were completed:  · Depression Screening  · Cognitive function Screening  · Timed Get Up Test  · Whisper Test    Vitals:    05/08/17 0957   BP: 120/60   Pulse: 60   Resp: 15   Temp: 98.7 °F (37.1 °C)   TempSrc: Oral   SpO2: 98%   Weight: 69.7 kg (153 lb 10.6 oz)   Height: 5' 1.5" (1.562 m)     Body mass index is 28.56 kg/(m^2).   ]        Diagnoses and health risks identified today and associated recommendations/orders:  1. Primary osteoarthritis involving multiple joints  Stable- followed by PCP    2. Edema, unspecified type  Stable- followed by PCP    3. Essential hypertension  Stable- followed by digital medicine/PCP    4. Iron deficiency anemia, unspecified iron deficiency anemia type  Stable- followed by PCP    5. Vaginal atrophy  Stable- followed by PCP    6. Overweight  CHronic with no recent weight loss.Reviewed current BMI & ideal BMI range. Encouraged weight loss,  diet and exercise. Followed by PCP.    7. Encounter for preventive health examination  Assessments completed  Preventative health recommendations reviewed         Provided Magdalena with a 5-10 year written screening schedule and personal prevention plan. Recommendations were developed using the USPSTF age appropriate recommendations. Education, counseling, and referrals were provided as needed.  After Visit Summary printed and given to patient which includes a list of additional screenings\tests needed. She will discuss need for colonoscopy & pap smear with PCP- recommended . Dental exam every 6 mos- current. Active person. Caregiver.     No Follow-up on file.      Elvia LEAL" Emmanuel, NP

## 2017-05-10 ENCOUNTER — OFFICE VISIT (OUTPATIENT)
Dept: FAMILY MEDICINE | Facility: CLINIC | Age: 72
End: 2017-05-10
Payer: MEDICARE

## 2017-05-10 VITALS
WEIGHT: 155 LBS | DIASTOLIC BLOOD PRESSURE: 66 MMHG | HEART RATE: 60 BPM | BODY MASS INDEX: 30.43 KG/M2 | HEIGHT: 60 IN | TEMPERATURE: 98 F | SYSTOLIC BLOOD PRESSURE: 134 MMHG

## 2017-05-10 DIAGNOSIS — D50.9 IRON DEFICIENCY ANEMIA, UNSPECIFIED IRON DEFICIENCY ANEMIA TYPE: Primary | ICD-10-CM

## 2017-05-10 DIAGNOSIS — B35.1 TINEA UNGUIUM: ICD-10-CM

## 2017-05-10 DIAGNOSIS — M17.0 PRIMARY OSTEOARTHRITIS OF BOTH KNEES: ICD-10-CM

## 2017-05-10 DIAGNOSIS — B07.0 PLANTAR WART: ICD-10-CM

## 2017-05-10 DIAGNOSIS — I10 ESSENTIAL HYPERTENSION: ICD-10-CM

## 2017-05-10 PROCEDURE — 99214 OFFICE O/P EST MOD 30 MIN: CPT | Mod: S$PBB,,, | Performed by: FAMILY MEDICINE

## 2017-05-10 PROCEDURE — 99213 OFFICE O/P EST LOW 20 MIN: CPT | Mod: PBBFAC,PO | Performed by: FAMILY MEDICINE

## 2017-05-10 PROCEDURE — 99999 PR PBB SHADOW E&M-EST. PATIENT-LVL III: CPT | Mod: PBBFAC,,, | Performed by: FAMILY MEDICINE

## 2017-05-10 RX ORDER — TRAMADOL HYDROCHLORIDE 50 MG/1
TABLET ORAL
Qty: 30 TABLET | Refills: 2 | Status: SHIPPED | OUTPATIENT
Start: 2017-05-10 | End: 2017-12-10 | Stop reason: SDUPTHER

## 2017-05-10 NOTE — PROGRESS NOTES
Subjective:       Patient ID: Magdalena Mane is a 72 y.o. female.    Chief Complaint: Hypertension (3 month follow up)    HPI Comments: Disclaimer: This note has been generated using voice-recognition software. There may be typographical errors that have been missed during proof-reading    71 yo presents for follow up of hypertension, doing well on present medications.  Actively followed by Digital Medicine dept, with good range of BPS  Pt also has prior history of Iron def anemia, need f/u colonoscopy    Hypertension   Pertinent negatives include no chest pain, headaches, palpitations or shortness of breath.     Review of Systems   Constitutional: Negative for activity change, fatigue and unexpected weight change.   Respiratory: Negative for cough, chest tightness and shortness of breath.    Cardiovascular: Positive for leg swelling. Negative for chest pain and palpitations.   Skin:        Recurrent pain over first toenails, also lesion over plantar aspect foot   Neurological: Negative for dizziness, weakness, light-headedness and headaches.       Objective:      Physical Exam   Constitutional: She appears well-developed and well-nourished. No distress.   Neck: Normal range of motion. No JVD present. No thyromegaly present.   Cardiovascular: Normal rate and regular rhythm.    No murmur heard.  Pulmonary/Chest: Effort normal and breath sounds normal. She has no wheezes. She has no rales.   Musculoskeletal: She exhibits edema.   Trace edema left lower extremity   Lymphadenopathy:     She has no cervical adenopathy.   Skin:   Hyperkeratotic tinea unguium involving first toenails  Plantar wart       Assessment:         1.  Hypertension  2.  Iron def anemia  3.  Tinea unguium  4.  Plantar wart  Plan:       1.  Continue present medications  2.  Colonoscopy  3.  Consult Podiatry  4.  F/u 6 months

## 2017-05-10 NOTE — MR AVS SNAPSHOT
[facility]  [address], [address]  [facility] [address]  Phone: [phone]  Fax: [phone]                  Magdalena Mane   5/10/2017 10:40 AM   Office Visit    Description:  Female : 1945   Provider:  Aaron Cardenas MD   Department:  [facility]           Reason for Visit     Hypertension           Diagnoses this Visit        Comments    Iron deficiency anemia, unspecified iron deficiency anemia type    -  Primary     Primary osteoarthritis of both knees         Tinea unguium         Plantar wart                To Do List           Goals (5 Years of Data)              Today    17    Blood Pressure <= 140/90   134/66  134/66  134/66    Notes - Note created  2/15/2017 10:02 AM by Eli SANDHU    It is important to consistently maintain a controlled blood pressure.      Exercise at least 150 minutes per week.           Notes - Note edited  3/15/2017  9:46 AM by Eli SANDHU    Aim for at least  30 minutes 5 days a week of aquatic exercise or walking.       Maintain a low sodium diet           Notes - Note created  2/15/2017 10:27 AM by Eli SANDHU    Limit to 2,000 mg/day total.      Reads food labels           Notes - Note created  2/15/2017 10:28 AM by Eli SANDHU    Aim for <140 mg sodium/serving size.       Take at least one BP reading per week at various times of the day           Weight (lb) < 66.2 kg (146 lb)   70.3 kg (154 lb 15.7 oz)  69.7 kg (153 lb 10.6 oz)      Notes - Note created  2017 10:28 AM by Eli SANDHU    Decrease weight by 5% to improve cardiovascular outcomes.        Follow-Up and Disposition     Return in about 6 months (around 11/10/2017).       These Medications        Disp Refills Start End    tramadol (ULTRAM) 50 mg tablet 30 tablet 2 5/10/2017     TAKE 1/2 TABLET BY MOUTH EVERY NIGHT AT BEDTIME AS NEEDED FOR PAIN    Pharmacy: [facility] - [place]  [address] #: [phone]         [facility] On Call     [facility] Nurse Care Line - 24/7 Assistance  Unless otherwise directed by your provider, please contact [facility], our nurse care line that is available for 24/7 assistance.     Registered nurses in the [facility] provide: appointment scheduling, clinical advisement, health education, and other advisory services.  Call: [phone] (toll free)               Medications           Message regarding Medications     Verify the changes and/or additions to your medication regime listed below are the same as discussed with your clinician today.  If any of these changes or additions are incorrect, please notify your healthcare provider.             Verify that the below list of medications is an accurate representation of the medications you are currently taking.  If none reported, the list may be blank. If incorrect, please contact your healthcare provider. Carry this list with you in case of emergency.           Current Medications     amlodipine (NORVASC) 5 MG tablet Take 1 tablet (5 mg total) by mouth once daily.    atenolol (TENORMIN) 25 MG tablet Take 1 tablet (25 mg total) by mouth once daily.    conjugated estrogens (PREMARIN) vaginal cream Place 0.5 g vaginally once daily.    diphenhydrAMINE (BENADRYL) 25 mg capsule Take 25 mg by mouth nightly as needed for Itching.     ferrous sulfate 325 mg (65 mg iron) TbEC Take 325 mg by mouth every morning.     omega-3 fatty acids (FISH OIL) 500 mg Cap Take 1 capsule by mouth nightly. 1 Capsule Oral Every evening    tramadol (ULTRAM) 50 mg tablet TAKE 1/2 TABLET BY MOUTH EVERY NIGHT AT BEDTIME AS NEEDED FOR PAIN    valsartan-hydrochlorothiazide (DIOVAN-HCT) 320-25 mg per tablet Take 1 tablet by mouth once daily.    triamcinolone acetonide 0.1% (KENALOG) 0.1 % cream Apply topically 2 (two) times daily as needed. Avoid face and groin.           Clinical Reference Information            Your Vitals Were     BP Pulse Temp Height Weight BMI    134/66 (BP Location: Left arm, Patient Position: Sitting, BP Method: Manual) 60 97.7 °F (36.5 °C) (Oral) 5' (1.524 m) 70.3 kg (154 lb 15.7 oz) 30.27 kg/m2      Blood Pressure          Most Recent Value    BP  134/66      Allergies as of 5/10/2017     No Known Allergies      Immunizations Administered on Date of Encounter - 5/10/2017     None      Orders Placed During Today's Visit      Normal Orders This Visit    Ambulatory consult to Podiatry     Case request GI: COLONOSCOPY       Language Assistance Services     ATTENTION: Language assistance services are available, free of charge. Please call [phone].      ATENCIÓN: Si habla babar, tiene a henderson disposición servicios gratuitos de asistencia lingüística. Llame al [phone].     CHÚ Ý: N?u b?n nói Ti?ng Vi?t, có các d?ch v? h? tr? ngôn ng? mi?n phí dành cho b?n. G?i s? [phone].         [facility] complies with applicable Federal civil rights laws and does not discriminate on the basis of race, color, national origin, age, disability, or sex.

## 2017-05-16 ENCOUNTER — OFFICE VISIT (OUTPATIENT)
Dept: PODIATRY | Facility: CLINIC | Age: 72
End: 2017-05-16
Payer: MEDICARE

## 2017-05-16 VITALS
HEIGHT: 60 IN | BODY MASS INDEX: 30.23 KG/M2 | DIASTOLIC BLOOD PRESSURE: 70 MMHG | SYSTOLIC BLOOD PRESSURE: 157 MMHG | HEART RATE: 58 BPM | WEIGHT: 154 LBS

## 2017-05-16 DIAGNOSIS — L85.1 PLANTAR POROKERATOSIS, ACQUIRED: Primary | ICD-10-CM

## 2017-05-16 DIAGNOSIS — B35.1 DERMATOPHYTOSIS OF NAIL: ICD-10-CM

## 2017-05-16 PROCEDURE — 99999 PR PBB SHADOW E&M-EST. PATIENT-LVL III: CPT | Mod: PBBFAC,,, | Performed by: PODIATRIST

## 2017-05-16 PROCEDURE — 99213 OFFICE O/P EST LOW 20 MIN: CPT | Mod: PBBFAC,PO | Performed by: PODIATRIST

## 2017-05-16 PROCEDURE — 99204 OFFICE O/P NEW MOD 45 MIN: CPT | Mod: S$PBB,,, | Performed by: PODIATRIST

## 2017-05-16 NOTE — LETTER
May 16, 2017      Aaron Cardenas MD  [address]  [address]  [facility] [address]           [place] - Podiatry  [address] [address]  Phone: [phone]          Patient: Magdalena Mane   MR Number: [id number]   YOB: 1945   Date of Visit: 5/16/2017       Dear Dr. Aaron Cardenas:    Thank you for referring Magdalena Mane to me for evaluation. Attached you will find relevant portions of my assessment and plan of care.    If you have questions, please do not hesitate to call me. I look forward to following Magdalena Mane along with you.    Sincerely,    Maggie Mckeon DPM    Enclosure  CC:  No Recipients    If you would like to receive this communication electronically, please contact [email or web address] or [phone] to request more information on [employer] access.    For providers and/or their staff who would like to refer a patient to [facility], please contact us through our one-stop-shop provider referral line, [facility], at [phone].    If you feel you have received this communication in error or would no longer like to receive these types of communications, please e-mail [email or web address]

## 2017-05-16 NOTE — PROGRESS NOTES
CC:    ?plantar wart      HPI:   Magdalena Mane is a 72 y.o. female who presents to clinic with complaints of a painful skin lesion on the bottom of the left  foot.   Patient reports pain has been present for a few months.   Treatment has included moisturizing the feet at home.  Denies trauma to the feet.   Pain is better when in shoe with memory foam.   She also complains of thick bilateral hallux toenails       PMH:  Patient Active Problem List   Diagnosis    DJD (degenerative joint disease)    Essential hypertension    Edema    Iron deficiency anemia    Vaginal atrophy    Overweight         Current Outpatient Prescriptions on File Prior to Visit   Medication Sig Dispense Refill    amlodipine (NORVASC) 5 MG tablet Take 1 tablet (5 mg total) by mouth once daily. 90 tablet 3    atenolol (TENORMIN) 25 MG tablet Take 1 tablet (25 mg total) by mouth once daily. 90 tablet 3    conjugated estrogens (PREMARIN) vaginal cream Place 0.5 g vaginally once daily. (Patient taking differently: Place 0.5 g vaginally daily as needed. ) 30 g 0    diphenhydrAMINE (BENADRYL) 25 mg capsule Take 25 mg by mouth nightly as needed for Itching.       ferrous sulfate 325 mg (65 mg iron) TbEC Take 325 mg by mouth every morning.       omega-3 fatty acids (FISH OIL) 500 mg Cap Take 1 capsule by mouth nightly. 1 Capsule Oral Every evening      tramadol (ULTRAM) 50 mg tablet TAKE 1/2 TABLET BY MOUTH EVERY NIGHT AT BEDTIME AS NEEDED FOR PAIN 30 tablet 2    valsartan-hydrochlorothiazide (DIOVAN-HCT) 320-25 mg per tablet Take 1 tablet by mouth once daily. 90 tablet 3    triamcinolone acetonide 0.1% (KENALOG) 0.1 % cream Apply topically 2 (two) times daily as needed. Avoid face and groin. 80 g 3     No current facility-administered medications on file prior to visit.          ALLG:  Review of patient's allergies indicates:  No Known Allergies            ROS:  General ROS: negative for - chills, fatigue or fever  Cardiovascular ROS:  no chest pain or dyspnea on exertion  Musculoskeletal ROS: negative for - joint pain or joint stiffness   Skin: Negative for rash, negative for nail or hair changes. Positive for  Corn/callus on the foot.       EXAM:  Vitals:    05/16/17 0930   BP: (!) 157/70   Pulse: (!) 58   Weight: 69.9 kg (154 lb)   Height: 5' (1.524 m)        General: alert and orient and in no apparent distress.      Bilateral foot:  Vasc: Palpable pedal pulses, feet appropriately warm to touch. Capillary refill time within normal limits.   Neuro: Epicritic sensation intact.  No focal deficits. No Tinels.   MSK:  Thickened bilateral hallux toenails.  No paronychia.   Semi-flexible hammertoes 2-5 bilateral   Derm:   Nucleated hyperkeratosis sub 2nd metatarsal head of the left foot.   Does not appear verrucous, no pinpoint bleeding, no petechiae.   No other open lesions, macerations, or rashes noted.           ASSESSMENT / PLAN:    I counseled the patient on her conditions, their implications and medical management.        Plantar porokeratosis, acquired   - pumice stone filing   - moisturize feet daily   - never walk barefoot.  Soft insoles in shoes.      Dermatophytosis of nail   - Consider Kerasal Nail.  Available OTC.      Call or return to clinic prn if these symptoms worsen or fail to improve as anticipated.

## 2017-05-18 ENCOUNTER — PATIENT OUTREACH (OUTPATIENT)
Dept: OTHER | Facility: OTHER | Age: 72
End: 2017-05-18
Payer: MEDICARE

## 2017-05-18 NOTE — PROGRESS NOTES
Last 5 Patient Entered Readings                                                               Current 30 Day Average: 131/66     Recent Readings 5/14/2017 4/30/2017 4/27/2017 4/24/2017 4/13/2017    Systolic BP (mmHg) 112 131 141 141 113    Diastolic BP (mmHg) 58 66 66 75 66    Pulse 61 62 60 56 56      Called pt to follow up and discuss how she has been doing. LVM requesting call back. Will otherwise check in again in a few weeks.

## 2017-06-08 DIAGNOSIS — D50.9 IRON DEFICIENCY ANEMIA, UNSPECIFIED IRON DEFICIENCY ANEMIA TYPE: Primary | ICD-10-CM

## 2017-06-08 RX ORDER — POLYETHYLENE GLYCOL 3350, SODIUM SULFATE ANHYDROUS, SODIUM BICARBONATE, SODIUM CHLORIDE, POTASSIUM CHLORIDE 236; 22.74; 6.74; 5.86; 2.97 G/4L; G/4L; G/4L; G/4L; G/4L
4 POWDER, FOR SOLUTION ORAL ONCE
Qty: 4000 ML | Refills: 0 | Status: SHIPPED | OUTPATIENT
Start: 2017-06-08 | End: 2017-06-08

## 2017-06-08 NOTE — PROGRESS NOTES
Last 5 Patient Entered Readings                                                               Current 30 Day Average: 133/68     Recent Readings 5/30/2017 5/23/2017 5/23/2017 5/14/2017 4/30/2017    Systolic BP (mmHg) 133 136 152 112 131    Diastolic BP (mmHg) 68 79 77 58 66    Pulse 62 57 58 61 62      Attempted to reach Ms. Mane again to follow up w/how she is doing, LVM. Will also send [employer] message requesting call back.

## 2017-06-08 NOTE — PROGRESS NOTES
"Last 5 Patient Entered Readings                                                               Current 30 Day Average: 133/68     Recent Readings 5/30/2017 5/23/2017 5/23/2017 5/14/2017 4/30/2017    Systolic BP (mmHg) 133 136 152 112 131    Diastolic BP (mmHg) 68 79 77 58 66    Pulse 62 57 58 61 62        Follow up with MsAnastasiya Magdalena QUIÑONEZ Alhaji completed. Patient states she has gotten much better about salt intake and food tastes too salty to her now when she does go out. She admits she needs to exercise more and we set a goal of more movement and 3 mini walks throughout the day to also help w/edema. We also discussed increasing her water intake. However she mostly relates spikes in BP to grief/stress related to her daughter. We discussed self care techniques and she finds comfort in attending [employer] and [employer] regularly. States they eat later in the evening during summer so she is trying to "play' w/what time is best in the evening to take her amlodipine.  Patient did not have any further questions or concerns. I will follow up in a few weeks to see how she is doing and progressing.      "

## 2017-06-16 ENCOUNTER — PATIENT OUTREACH (OUTPATIENT)
Dept: OTHER | Facility: OTHER | Age: 72
End: 2017-06-16

## 2017-06-16 NOTE — PROGRESS NOTES
Last 5 Patient Entered Readings                                                               Current 30 Day Average: 137/71     Recent Readings 6/11/2017 6/11/2017 6/11/2017 5/30/2017 5/23/2017    Systolic BP (mmHg) 125 125 148 133 136    Diastolic BP (mmHg) 67 67 74 68 79    Pulse 56 56 56 62 57        Hypertension Medications             amlodipine (NORVASC) 5 MG tablet Take 1 tablet (5 mg total) by mouth once daily.    atenolol (TENORMIN) 25 MG tablet Take 1 tablet (25 mg total) by mouth once daily.    valsartan-hydrochlorothiazide (DIOVAN-HCT) 320-25 mg per tablet Take 1 tablet by mouth once daily.        Plan:   6/16- Called patient to follow up. Per current 30 day average, BP is well controlled. LVM.  Will continue to monitor. WCB in 2 weeks.     6/30- Plan:   Called patient to follow up. Per current 30 day average, BP is well controlled. LVM, 2nd attempt.   Will continue to monitor. WCB in 4 months, sooner if BP begins to trend up or down.       
dyspnea and orthopnea at baseline

## 2017-06-28 ENCOUNTER — HOSPITAL ENCOUNTER (OUTPATIENT)
Facility: HOSPITAL | Age: 72
Discharge: HOME | End: 2017-06-28
Attending: INTERNAL MEDICINE | Admitting: INTERNAL MEDICINE
Payer: MEDICARE

## 2017-06-28 ENCOUNTER — ANESTHESIA EVENT (OUTPATIENT)
Dept: ENDOSCOPY | Facility: HOSPITAL | Age: 72
End: 2017-06-28
Payer: MEDICARE

## 2017-06-28 ENCOUNTER — SURGERY (OUTPATIENT)
Age: 72
End: 2017-06-28

## 2017-06-28 ENCOUNTER — ANESTHESIA (OUTPATIENT)
Dept: ENDOSCOPY | Facility: HOSPITAL | Age: 72
End: 2017-06-28
Payer: MEDICARE

## 2017-06-28 VITALS
RESPIRATION RATE: 23 BRPM | DIASTOLIC BLOOD PRESSURE: 69 MMHG | BODY MASS INDEX: 28.32 KG/M2 | HEIGHT: 61 IN | SYSTOLIC BLOOD PRESSURE: 155 MMHG | WEIGHT: 150 LBS | TEMPERATURE: 98 F | OXYGEN SATURATION: 96 % | HEART RATE: 68 BPM | RESPIRATION RATE: 18 BRPM

## 2017-06-28 DIAGNOSIS — Z12.11 COLON CANCER SCREENING: ICD-10-CM

## 2017-06-28 DIAGNOSIS — D50.9 IRON DEFICIENCY ANEMIA, UNSPECIFIED IRON DEFICIENCY ANEMIA TYPE: Primary | ICD-10-CM

## 2017-06-28 PROCEDURE — G0121 COLON CA SCRN NOT HI RSK IND: HCPCS | Performed by: INTERNAL MEDICINE

## 2017-06-28 PROCEDURE — 25000003 PHARM REV CODE 250: Performed by: NURSE ANESTHETIST, CERTIFIED REGISTERED

## 2017-06-28 PROCEDURE — D9220A PRA ANESTHESIA: Mod: 33,ANES,, | Performed by: ANESTHESIOLOGY

## 2017-06-28 PROCEDURE — 37000009 HC ANESTHESIA EA ADD 15 MINS: Performed by: INTERNAL MEDICINE

## 2017-06-28 PROCEDURE — 63600175 PHARM REV CODE 636 W HCPCS: Performed by: NURSE ANESTHETIST, CERTIFIED REGISTERED

## 2017-06-28 PROCEDURE — G0121 COLON CA SCRN NOT HI RSK IND: HCPCS | Mod: ,,, | Performed by: INTERNAL MEDICINE

## 2017-06-28 PROCEDURE — 37000008 HC ANESTHESIA 1ST 15 MINUTES: Performed by: INTERNAL MEDICINE

## 2017-06-28 PROCEDURE — 25000003 PHARM REV CODE 250: Performed by: INTERNAL MEDICINE

## 2017-06-28 PROCEDURE — D9220A PRA ANESTHESIA: Mod: 33,CRNA,, | Performed by: NURSE ANESTHETIST, CERTIFIED REGISTERED

## 2017-06-28 RX ORDER — SODIUM CHLORIDE 9 MG/ML
INJECTION, SOLUTION INTRAVENOUS CONTINUOUS
Status: DISCONTINUED | OUTPATIENT
Start: 2017-06-28 | End: 2017-06-28 | Stop reason: HOSPADM

## 2017-06-28 RX ORDER — PROPOFOL 10 MG/ML
VIAL (ML) INTRAVENOUS CONTINUOUS PRN
Status: DISCONTINUED | OUTPATIENT
Start: 2017-06-28 | End: 2017-06-28

## 2017-06-28 RX ORDER — LIDOCAINE HCL/PF 100 MG/5ML
SYRINGE (ML) INTRAVENOUS
Status: DISCONTINUED | OUTPATIENT
Start: 2017-06-28 | End: 2017-06-28

## 2017-06-28 RX ORDER — PROPOFOL 10 MG/ML
VIAL (ML) INTRAVENOUS
Status: DISCONTINUED | OUTPATIENT
Start: 2017-06-28 | End: 2017-06-28

## 2017-06-28 RX ADMIN — LIDOCAINE HYDROCHLORIDE 100 MG: 20 INJECTION, SOLUTION INTRAVENOUS at 02:06

## 2017-06-28 RX ADMIN — PROPOFOL 30 MG: 10 INJECTION, EMULSION INTRAVENOUS at 02:06

## 2017-06-28 RX ADMIN — PROPOFOL 150 MCG/KG/MIN: 10 INJECTION, EMULSION INTRAVENOUS at 02:06

## 2017-06-28 RX ADMIN — PROPOFOL 50 MG: 10 INJECTION, EMULSION INTRAVENOUS at 02:06

## 2017-06-28 RX ADMIN — SODIUM CHLORIDE: 0.9 INJECTION, SOLUTION INTRAVENOUS at 01:06

## 2017-06-28 NOTE — ANESTHESIA PREPROCEDURE EVALUATION
06/28/2017  Magdalena Mane is a 72 y.o., female.    Anesthesia Evaluation         Review of Systems  Anesthesia Hx:  No problems with previous Anesthesia   Social:  Non-Smoker, Social Alcohol Use    Cardiovascular:   Exercise tolerance: good Hypertension  Denies Angina.    Pulmonary:   Denies Shortness of breath.        Physical Exam  General:  Well nourished    Airway/Jaw/Neck:  Airway Findings: Mouth Opening: Normal Tongue: Normal  General Airway Assessment: Adult  Mallampati: II  TM Distance: Normal, at least 6 cm  Jaw/Neck Findings:     Neck ROM: Normal ROM      Dental:  Dental Findings: In tact   Chest/Lungs:  Chest/Lungs Findings: Clear to auscultation, Normal Respiratory Rate     Heart/Vascular:  Heart Findings: Rate: Normal  Rhythm: Regular Rhythm  Sounds: Normal        Mental Status:  Mental Status Findings:  Cooperative, Alert and Oriented         Anesthesia Plan  Type of Anesthesia, risks & benefits discussed:  Anesthesia Type:  general  Patient's Preference:   Intra-op Monitoring Plan: standard ASA monitors  Intra-op Monitoring Plan Comments:   Post Op Pain Control Plan:   Post Op Pain Control Plan Comments:   Induction:   IV  Beta Blocker:  Patient is not currently on a Beta-Blocker (No further documentation required).       Informed Consent: Patient understands risks and agrees with Anesthesia plan.  Questions answered. Anesthesia consent signed with patient.  ASA Score: 2     Day of Surgery Review of History & Physical:            Ready For Surgery From Anesthesia Perspective.

## 2017-06-28 NOTE — PATIENT INSTRUCTIONS
Discharge Summary/Instructions after an Endoscopic Procedure  Patient Name: Magdalena Mane  Patient MRN: [id number]  Patient YOB: 1945  Wednesday, June 28, 2017  Markel Montemayor MD  RESTRICTIONS ON ACTIVITY:  - DO NOT drive a car, operate machinery, make legal/financial decisions, or   drink alcohol until the day after the procedure.    - The following day: return to full activity including work, except no heavy   lifting, straining or running for 3 days if polyps were removed.  - Diet: Eat and drink normally unless instructed otherwise.  TREATMENT FOR COMMON SIDE EFFECTS:  - Mild abdominal pain, bloating or excessive gas: rest, eat lightly and use   a heating pad.  - Sore Throat - treat with throat lozenges. Gargle with warm salt water.  SYMPTOMS TO WATCH FOR AND REPORT TO YOUR PHYSICIAN:  1. Severe abdominal pain or bloating.  2. Pain in chest.  3. Chills or fever occurring within 24 hours after a procedure.  4. A large amount of rectal bleeding, which would show as bright red,   maroon, or black stools. (A small amount of blood from the rectum is not   serious, especially if hemorrhoids are present.)  5. Because air was used during the procedure, expelling large amounts of air   from your rectum or belching is normal.  6. If a bowel prep was taken, you may not have a bowel movement for 1-3   days.  This is normal.  7. Go directly to the emergency room if you notice any of the following:   Chills and/or fever over 101 F   Persistent vomiting   Severe abdominal pain, other than gas cramps   Severe chest pain   Black, tarry stools   Any bleeding - exceeding one tablespoon  Your doctor recommends these additional instructions:  If any biopsies were performed, my office will call you in 5 to 6 business   days with any results.  You have a contact number available for emergencies.  The signs and symptoms   of potential delayed complications were discussed with you.  You may return   to normal activities tomorrow.   Written discharge instructions were   provided to you.   You are being discharged to home.   Your physician has recommended a repeat colonoscopy in 10 years for   screening purposes.   The findings and recommendations were discussed with your designated   responsible adult.  For questions, problems or results please call your physician - Markel Montemayor MD at Work:  [phone].  [facility], EMERGENCY ROOM PHONE NUMBER: [phone]  IF A COMPLICATION OR EMERGENCY SITUATION ARISES AND YOU ARE UNABLE TO REACH   YOUR PHYSICIAN - GO TO THE EMERGENCY ROOM.  Markel Montemayor MD  6/28/2017 2:21:15 PM  This report has been verified and signed electronically.

## 2017-06-28 NOTE — TRANSFER OF CARE
"Anesthesia Transfer of Care Note    Patient: Magdalena Mane    Procedure(s) Performed: Procedure(s) (LRB):  COLONOSCOPY (N/A)    Patient location: PACU    Anesthesia Type: general    Transport from OR: Transported from OR on room air with adequate spontaneous ventilation    Post pain: adequate analgesia    Post assessment: no apparent anesthetic complications and tolerated procedure well    Post vital signs: stable    Level of consciousness: sedated and responds to stimulation    Nausea/Vomiting: no nausea/vomiting    Complications: none    Transfer of care protocol was followed      Last vitals:   Visit Vitals  BP (!) 156/70   Pulse (!) 55   Temp 36.7 °C (98 °F)   Resp 15   Ht 5' 1" (1.549 m)   Wt 68 kg (150 lb)   SpO2 99%   Breastfeeding? No   BMI 28.34 kg/m²     "

## 2017-06-28 NOTE — H&P
Short Stay Endoscopy History and Physical    PCP - Aaron Cardenas MD    Procedure - Colonoscopy  ASA - per anesthesia  Mallampati - per anesthesia  History of Anesthesia problems - no  Family history Anesthesia problems - no   Plan of anesthesia - General    HPI:  This is a 72 y.o. female here for evaluation of : asymptomatic screening exam      ROS:  Constitutional: No fevers, chills, No weight loss  CV: No chest pain  Pulm: No cough, No shortness of breath  GI: see HPI  Derm: No rash    Medical History:  has a past medical history of Anemia; DJD (degenerative joint disease) (12/12/2012); Hypertension; Obesity (BMI 30-39.9) (11/27/2015); and Vaginal atrophy (4/1/2016).    Surgical History:  has a past surgical history that includes Colonoscopy (2012); Hip surgery (05/05/2014); Tonsillectomy; and Joint replacement.    Family History: family history includes Arthritis in her mother; Breast cancer in her mother; Heart disease in her father; No Known Problems in her brother; Scoliosis in her father; Tuberculosis in her father.. Otherwise no colon cancer, inflammatory bowel disease, or GI malignancies.    Social History:  reports that she has never smoked. She has never used smokeless tobacco. She reports that she drinks about 1.2 oz of alcohol per week . She reports that she does not use drugs.    Review of patient's allergies indicates:  No Known Allergies    Medications:   Prescriptions Prior to Admission   Medication Sig Dispense Refill Last Dose    atenolol (TENORMIN) 25 MG tablet Take 1 tablet (25 mg total) by mouth once daily. 90 tablet 3 6/28/2017 at 0615    conjugated estrogens (PREMARIN) vaginal cream Place 0.5 g vaginally once daily. (Patient taking differently: Place 0.5 g vaginally daily as needed. ) 30 g 0 6/27/2017 at Unknown time    diphenhydrAMINE (BENADRYL) 25 mg capsule Take 25 mg by mouth nightly as needed for Itching.    Past Month at Unknown time    ferrous sulfate 325 mg (65 mg iron) TbEC  Take 325 mg by mouth every morning.    Past Week at Unknown time    omega-3 fatty acids (FISH OIL) 500 mg Cap Take 1 capsule by mouth nightly. 1 Capsule Oral Every evening   Past Week at Unknown time    tramadol (ULTRAM) 50 mg tablet TAKE 1/2 TABLET BY MOUTH EVERY NIGHT AT BEDTIME AS NEEDED FOR PAIN 30 tablet 2 Past Week at Unknown time    valsartan-hydrochlorothiazide (DIOVAN-HCT) 320-25 mg per tablet Take 1 tablet by mouth once daily. 90 tablet 3 6/28/2017 at 0615    amlodipine (NORVASC) 5 MG tablet Take 1 tablet (5 mg total) by mouth once daily. 90 tablet 3 Taking    triamcinolone acetonide 0.1% (KENALOG) 0.1 % cream Apply topically 2 (two) times daily as needed. Avoid face and groin. 80 g 3 Taking         Physical Exam:    Vital Signs:   Vitals:    06/28/17 1337   BP: (!) 156/70   Pulse: (!) 55   Resp: 15   Temp: 98 °F (36.7 °C)       General Appearance: Well appearing in no acute distress  Eyes:    No scleral icterus  ENT: Neck supple, Lips, mucosa, and tongue normal; teeth and gums normal  Lungs: CTA bilaterally  Heart:  S1, S2 normal, no murmurs heard  Abdomen: Soft, non tender, non distended with positive bowel sounds. No hepatosplenomegaly, ascites, or mass.  Extremities: 2+ pulses, no clubbing, cyanosis or edema  Skin: No rash      Labs:  Lab Results   Component Value Date    WBC 6.39 05/08/2017    HGB 12.3 05/08/2017    HCT 37.4 05/08/2017     05/08/2017    CHOL 201 (H) 05/08/2017    TRIG 96 05/08/2017    HDL 70 05/08/2017    ALT 16 03/22/2016    AST 18 03/22/2016     (L) 02/06/2017    K 3.8 02/06/2017    CL 97 02/06/2017    CREATININE 0.8 02/06/2017    BUN 13 02/06/2017    CO2 31 (H) 02/06/2017    TSH 2.739 05/08/2017    INR 0.9 08/26/2009       I have explained the risks and benefits of endoscopy procedures to the patient including but not limited to bleeding, perforation, infection, and death.      Markel Montemayor MD

## 2017-06-29 ENCOUNTER — PATIENT OUTREACH (OUTPATIENT)
Dept: OTHER | Facility: OTHER | Age: 72
End: 2017-06-29

## 2017-06-29 NOTE — PROGRESS NOTES
Last 5 Patient Entered Readings                                                               Current 30 Day Average: 133/68     Recent Readings 6/11/2017 6/11/2017 6/11/2017 5/30/2017 5/23/2017    Systolic BP (mmHg) 125 125 148 133 136    Diastolic BP (mmHg) 67 67 74 68 79    Pulse 56 56 56 62 57        Follow up with Ms. Magdalenachristos Mane completed. Patient is maintaining a low sodium diet and has bee a lot more active lately. She states she had been out of town and then forgot to take readings. She will resume weekly readings today. Patient did not have any further questions or concerns. I will follow up in a few weeks to see how she is doing and progressing.

## 2017-06-29 NOTE — ANESTHESIA POSTPROCEDURE EVALUATION
"Anesthesia Post Evaluation    Patient: Magdalena Mane    Procedure(s) Performed: Procedure(s) (LRB):  COLONOSCOPY (N/A)    Final Anesthesia Type: general  Patient location during evaluation: PACU  Patient participation: Yes- Able to Participate  Level of consciousness: awake and alert  Post-procedure vital signs: reviewed and stable  Pain management: adequate  Airway patency: patent  PONV status at discharge: No PONV  Anesthetic complications: no      Cardiovascular status: hemodynamically stable and blood pressure returned to baseline  Respiratory status: unassisted and spontaneous ventilation  Hydration status: euvolemic  Follow-up not needed.        Visit Vitals  BP (!) 155/69 (BP Location: Left arm, Patient Position: Lying, BP Method: Automatic)   Pulse 68   Temp 36.6 °C (97.8 °F) (Axillary)   Resp 18   Ht 5' 1" (1.549 m)   Wt 68 kg (150 lb)   SpO2 96%   Breastfeeding? No   BMI 28.34 kg/m²       Pain/Cristobal Score: Pain Assessment Performed: Yes (6/28/2017  2:47 PM)  Presence of Pain: denies (6/28/2017  2:47 PM)      "

## 2017-07-05 ENCOUNTER — TELEPHONE (OUTPATIENT)
Dept: ENDOSCOPY | Facility: HOSPITAL | Age: 72
End: 2017-07-05

## 2017-07-27 ENCOUNTER — PATIENT OUTREACH (OUTPATIENT)
Dept: OTHER | Facility: OTHER | Age: 72
End: 2017-07-27

## 2017-07-27 NOTE — LETTER
Eli Shah  [address] [place] [address]     Dear Magdalena,    Thank you for enrolling in the [facility]. To participate in our program, we ask that you submit a blood pressure reading at least once weekly through your [facility] Account and maintain regular contact with your Care Team.  We have not received any data or heard from you in some time.     The Digital Medicine Care Team has attempted to reach you on multiple occasions to determine if you would like to continue participating in the program. While we encourage you to continue participating fully, we understand that circumstances may change.      To continue participating in the program, please contact me at [phone]. If we do not hear back, you will be un-enrolled and your physician will be notified of your decision.    If you have submitted blood pressure readings during the past 25 days and believe you are receiving this letter in error, please call the Digital Medicine Patient Support Line at [phone] for troubleshooting.      We look forward to hearing from you soon.    Sincerely,     Eli Shah  [facility]                                                                                                                         Magdalena Mane  [address] [facility] [address]

## 2017-07-27 NOTE — PROGRESS NOTES
Last 5 Patient Entered Redings Current 30 Day Average: 130/68     Recent Readings 7/10/2017 7/1/2017 6/11/2017 6/11/2017 6/11/2017    Systolic BP (mmHg) 134 126 125 125 148    Diastolic BP (mmHg) 70 65 67 67 74    Pulse 58 60 56 56 56        Called pt to follow up and inquire about lack of readings, LVM requesting call back.

## 2017-08-01 ENCOUNTER — PATIENT OUTREACH (OUTPATIENT)
Dept: OTHER | Facility: OTHER | Age: 72
End: 2017-08-01

## 2017-08-01 NOTE — PROGRESS NOTES
Last 5 Patient Entered Redings Current 30 Day Average: 134/70     Recent Readings 7/10/2017 7/1/2017 6/11/2017 6/11/2017 6/11/2017    Systolic BP (mmHg) 134 126 125 125 148    Diastolic BP (mmHg) 70 65 67 67 74    Pulse 58 60 56 56 56        Hypertension Medications             amlodipine (NORVASC) 5 MG tablet Take 1 tablet (5 mg total) by mouth once daily.    atenolol (TENORMIN) 25 MG tablet Take 1 tablet (25 mg total) by mouth once daily.    valsartan-hydrochlorothiazide (DIOVAN-HCT) 320-25 mg per tablet Take 1 tablet by mouth once daily.        8/1- Messaging patient regarding lack of readings.

## 2017-08-04 NOTE — PROGRESS NOTES
Last 5 Patient Entered Redings Current 30 Day Average: 134/70     Recent Readings 7/10/2017 7/1/2017 6/11/2017 6/11/2017 6/11/2017    Systolic BP (mmHg) 134 126 125 125 148    Diastolic BP (mmHg) 70 65 67 67 74    Pulse 58 60 56 56 56        Called pt again to follow up and inquire about lack of readings, LVM requesting call back. She will be sent non-compliance letter today and if no response in 2 weeks, she will be removed from program.

## 2017-08-15 NOTE — PROGRESS NOTES
Last 5 Patient Entered Redings Current 30 Day Average: 110/59     Recent Readings 8/6/2017 7/10/2017 7/1/2017 6/11/2017 6/11/2017    Systolic BP (mmHg) 110 134 126 125 125    Diastolic BP (mmHg) 59 70 65 67 67    Pulse 62 58 60 56 56        Hypertension Medications             amlodipine (NORVASC) 5 MG tablet Take 1 tablet (5 mg total) by mouth once daily.    atenolol (TENORMIN) 25 MG tablet Take 1 tablet (25 mg total) by mouth once daily.    valsartan-hydrochlorothiazide (DIOVAN-HCT) 320-25 mg per tablet Take 1 tablet by mouth once daily.        Plan:   Called patient to follow up.  Patient explains she has been out of town recently, which is why there has been a lack of readings.   Per current 30 day average, BP is well controlled. Patient denies having questions or concerns. Will continue to monitor. WCB in 4 months, sooner if BP begins to trend up or down.

## 2017-08-21 ENCOUNTER — PATIENT OUTREACH (OUTPATIENT)
Dept: OTHER | Facility: OTHER | Age: 72
End: 2017-08-21

## 2017-08-21 NOTE — PROGRESS NOTES
Last 5 Patient Entered Redings Current 30 Day Average: 112/64     Recent Readings 8/15/2017 8/6/2017 7/10/2017 7/1/2017 6/11/2017    Systolic BP (mmHg) 114 110 134 126 125    Diastolic BP (mmHg) 68 59 70 65 67    Pulse 62 62 58 60 56        Ms. Mane has resumed taking readings for the program and is well controlled w/BP average of 112/64. She spoke w/her PharmD this month and explained she had been out of town. LVM requesting call back to discuss how she is doing. Will also check in again in a few weeks.

## 2017-09-14 ENCOUNTER — PATIENT OUTREACH (OUTPATIENT)
Dept: OTHER | Facility: OTHER | Age: 72
End: 2017-09-14

## 2017-09-14 NOTE — PROGRESS NOTES
Last 5 Patient Entered Redings Current 30 Day Average: 114/61     Recent Readings 8/24/2017 8/15/2017 8/6/2017 7/10/2017 7/1/2017    Systolic BP (mmHg) 113 114 110 134 126    Diastolic BP (mmHg) 54 68 59 70 65    Pulse 52 62 62 58 60        Messaging patient regarding lack of readings.

## 2017-09-21 ENCOUNTER — PATIENT OUTREACH (OUTPATIENT)
Dept: OTHER | Facility: OTHER | Age: 72
End: 2017-09-21

## 2017-09-21 NOTE — PROGRESS NOTES
Last 5 Patient Entered Redings Current 30 Day Average: 113/54     Recent Readings 8/24/2017 8/15/2017 8/6/2017 7/10/2017 7/1/2017    Systolic BP (mmHg) 113 114 110 134 126    Diastolic BP (mmHg) 54 68 59 70 65    Pulse 52 62 62 58 60          Hypertension Digital Medicine Program (HDMP): Health  Follow Up    Lifestyle Modifications:    1.Low sodium diet: yes    2.Physical activity: yes     3.Hypotension/Hypertension symptoms: no   Frequency/Alleviating factors/Precipitating factors, etc.     4.Patient has been compliant with the medication regimen.     Follow up with Ms. Magdalena Mane completed. She explained that she has not taken readings in a long because she had out of town guests and got very busy. She states she wants to remain in program and will be resuming regular BP readings today. No further questions or concerns. I will follow up in a few weeks to assess progress.

## 2017-10-20 ENCOUNTER — PATIENT OUTREACH (OUTPATIENT)
Dept: OTHER | Facility: OTHER | Age: 72
End: 2017-10-20

## 2017-10-20 NOTE — PROGRESS NOTES
Last 5 Patient Entered Redings Current 30 Day Average: 110/61     Recent Readings 10/8/2017 9/29/2017 9/21/2017 8/24/2017 8/15/2017    Systolic BP (mmHg) 116 111 104 113 114    Diastolic BP (mmHg) 58 62 64 54 68    Pulse 61 59 58 52 62        Hypertension Digital Medicine Program (HDMP): Health  Follow Up    Lifestyle Modifications:    1.Low sodium diet: yes - she states she continues to be careful of sodium content but dined out a couple of days while being out of town and states the meals had more sodium than she would normally consume. She is coming home today and will take a reading then.     2.Physical activity: yes - still making sure to fit in activity each day.     3.Hypotension/Hypertension symptoms: no   Frequency/Alleviating factors/Precipitating factors, etc.     4.Patient has been compliant with the medication regimen.     Follow up with Ms. Magdalena Mane completed. No further questions or concerns. I will follow up in a few weeks to assess progress.

## 2017-11-22 ENCOUNTER — TELEPHONE (OUTPATIENT)
Dept: FAMILY MEDICINE | Facility: CLINIC | Age: 72
End: 2017-11-22

## 2017-11-22 ENCOUNTER — CLINICAL SUPPORT (OUTPATIENT)
Dept: FAMILY MEDICINE | Facility: CLINIC | Age: 72
End: 2017-11-22
Payer: MEDICARE

## 2017-11-22 DIAGNOSIS — Z23 NEED FOR PROPHYLACTIC VACCINATION AND INOCULATION AGAINST INFLUENZA: Primary | ICD-10-CM

## 2017-11-22 PROCEDURE — G0008 ADMIN INFLUENZA VIRUS VAC: HCPCS | Mod: PBBFAC,PO

## 2017-11-22 NOTE — TELEPHONE ENCOUNTER
----- Message from Letty Lal sent at 11/22/2017  9:55 AM CST -----  Contact: self/[phone]  Patient called in regards needing to talk to Dr Area nurse about if patient can get her flu shot today around 2:20 pm. Please call and advise.       Thank you!!!

## 2017-11-22 NOTE — PROGRESS NOTES
Flu (High dose) vaccine given as ordered. Two patient identifiers used, allergies reviewed, & vaccine verified. Administered to left deltoid. Pt tolerated injection well; no swelling, redness, or bruising noted at injection site. Pt advised to remain in clinic 15 mins following injection for observation.

## 2017-11-22 NOTE — TELEPHONE ENCOUNTER
Message left on voicemail informing patient that she will placed on the nurse scheduled for her flu vaccine as requested.

## 2017-11-29 ENCOUNTER — PATIENT MESSAGE (OUTPATIENT)
Dept: FAMILY MEDICINE | Facility: CLINIC | Age: 72
End: 2017-11-29

## 2017-11-29 ENCOUNTER — PATIENT MESSAGE (OUTPATIENT)
Dept: ADMINISTRATIVE | Facility: OTHER | Age: 72
End: 2017-11-29

## 2017-12-10 DIAGNOSIS — M17.0 PRIMARY OSTEOARTHRITIS OF BOTH KNEES: ICD-10-CM

## 2017-12-11 RX ORDER — TRAMADOL HYDROCHLORIDE 50 MG/1
TABLET ORAL
Qty: 30 TABLET | Refills: 2 | Status: SHIPPED | OUTPATIENT
Start: 2017-12-11 | End: 2018-05-29 | Stop reason: SDUPTHER

## 2017-12-13 ENCOUNTER — PATIENT OUTREACH (OUTPATIENT)
Dept: OTHER | Facility: OTHER | Age: 72
End: 2017-12-13

## 2017-12-13 NOTE — PROGRESS NOTES
"Last 5 Patient Entered Readings Current 30 Day Average: 119/63     Recent Readings 11/25/2017 11/14/2017 11/2/2017 10/22/2017 10/22/2017    Systolic BP (mmHg) 114 123 133 132 142    Diastolic BP (mmHg) 63 63 66 68 75    Pulse 70 61 60 62 61        Hypertension Digital Medicine Program (HDMP): Health  Follow Up    Lifestyle Modifications:    1.Low sodium diet: yes - states she has been very mindful around "holiday food" this month  And has to make a loot of separate dishes for herself since she is dairy-free. She states she just makes sure her meals are also low in sodium while she's at it.     2.Physical activity: yes - Walking consistently w/grandson for exercise, walking sometimes at the mall at night as well.     3.Hypotension/Hypertension symptoms: no   Frequency/Alleviating factors/Precipitating factors, etc.     4.Patient has not been any compliant with the medication regimen. She states she has forgotten to take BP and because schedule has been so hectic lately (caring for mother in law w/dementia in their home now), she also admits she forgot to take her evening meds the last 2  Days and sometimes take her morning meds later in the day. We discussed strategies to help her get back in the routine and setting reminders for herself about both her monitoring and her medication schedule. She states she will take a reading later today and hang sticky notes reminding her to take meds everyday.     Follow up with Ms. Magdalena Mane completed. No further questions or concerns. I will follow up in a few weeks to assess progress.         "

## 2018-01-25 ENCOUNTER — PATIENT OUTREACH (OUTPATIENT)
Dept: OTHER | Facility: OTHER | Age: 73
End: 2018-01-25

## 2018-01-25 NOTE — PROGRESS NOTES
Last 5 Patient Entered Readings                                      Current 30 Day Average: 131/73     Recent Readings 1/21/2018 1/13/2018 1/8/2018 1/1/2018 1/1/2018    SBP (mmHg) 136 133 125 128 140    DBP (mmHg) 72 82 71 68 80    Pulse 56 68 66 61 64          Hypertension Digital Medicine Program (HDMP): Health  Follow Up    Lifestyle Modifications:    1.Low sodium diet: yes : She stated that she does not add salt to meals but realizes that there is a good amount of salt in foods she gets from the grocery (but she does try to avoid the highly salted foods). Her and I reviewed the guidelines for sodium intake and she expressed understanding. She will continue working on this.     2.Physical activity: Deferred    3.Hypotension/Hypertension symptoms: no   Frequency/Alleviating factors/Precipitating factors, etc.     4.Patient has been compliant with the medication regimen.     She stated that she is tolerating her BP medication well and has noticed a less swelling which she is pleased about.  She informed me that she will be going out of town from Feb 23-March 5 and then April 3-17 and requested to go on hiatus during those times.   She is also working on getting her 94 year old mother in law enrolled in our program.     Follow up with Anastasiya Magdalena QUIÑONEZ Alhaji completed. No further questions or concerns. I will follow up in a few weeks to assess progress.

## 2018-01-29 ENCOUNTER — PATIENT OUTREACH (OUTPATIENT)
Dept: OTHER | Facility: OTHER | Age: 73
End: 2018-01-29

## 2018-01-29 NOTE — PROGRESS NOTES
Last 5 Patient Entered Readings                                      Current 30 Day Average: 131/73     Recent Readings 1/21/2018 1/13/2018 1/8/2018 1/1/2018 1/1/2018    SBP (mmHg) 136 133 125 128 140    DBP (mmHg) 72 82 71 68 80    Pulse 56 68 66 61 64        Hypertension Medications             amlodipine (NORVASC) 5 MG tablet Take 1 tablet (5 mg total) by mouth once daily.    atenolol (TENORMIN) 25 MG tablet Take 1 tablet (25 mg total) by mouth once daily.    valsartan-hydrochlorothiazide (DIOVAN-HCT) 320-25 mg per tablet Take 1 tablet by mouth once daily.        Assessment/ Plan:   Called patient to follow up.   Per newly released 2017 ACC/ AHA HTN guidelines (goal of BP < 130/80), current 30-day average is well controlled.  Discussed new goals with patient.   Patient attributes arthritis pain to higher readings.   Patient denies having questions or concerns. Instructed patient to call if she has any questions or concerns, patient confirms understanding.   Will continue to monitor. WCB in 6 weeks.

## 2018-02-21 ENCOUNTER — TELEPHONE (OUTPATIENT)
Dept: FAMILY MEDICINE | Facility: CLINIC | Age: 73
End: 2018-02-21

## 2018-02-21 ENCOUNTER — PATIENT OUTREACH (OUTPATIENT)
Dept: OTHER | Facility: OTHER | Age: 73
End: 2018-02-21

## 2018-02-21 ENCOUNTER — OFFICE VISIT (OUTPATIENT)
Dept: FAMILY MEDICINE | Facility: CLINIC | Age: 73
End: 2018-02-21
Payer: MEDICARE

## 2018-02-21 VITALS
HEART RATE: 68 BPM | TEMPERATURE: 99 F | HEIGHT: 60 IN | DIASTOLIC BLOOD PRESSURE: 68 MMHG | BODY MASS INDEX: 29.64 KG/M2 | SYSTOLIC BLOOD PRESSURE: 122 MMHG | WEIGHT: 151 LBS

## 2018-02-21 DIAGNOSIS — I10 ESSENTIAL HYPERTENSION: ICD-10-CM

## 2018-02-21 DIAGNOSIS — Z00.00 ANNUAL PHYSICAL EXAM: Primary | ICD-10-CM

## 2018-02-21 DIAGNOSIS — R30.0 DYSURIA: Primary | ICD-10-CM

## 2018-02-21 DIAGNOSIS — N95.2 VAGINAL ATROPHY: ICD-10-CM

## 2018-02-21 LAB
BILIRUB SERPL-MCNC: NEGATIVE MG/DL
BLOOD URINE, POC: 50
COLOR, POC UA: NORMAL
GLUCOSE UR QL STRIP: NORMAL
KETONES UR QL STRIP: NEGATIVE
LEUKOCYTE ESTERASE URINE, POC: NORMAL
NITRITE, POC UA: POSITIVE
PH, POC UA: 7
PROTEIN, POC: NEGATIVE
SPECIFIC GRAVITY, POC UA: 1.01
UROBILINOGEN, POC UA: NORMAL

## 2018-02-21 PROCEDURE — 99999 PR PBB SHADOW E&M-EST. PATIENT-LVL III: CPT | Mod: PBBFAC,,, | Performed by: FAMILY MEDICINE

## 2018-02-21 PROCEDURE — 1126F AMNT PAIN NOTED NONE PRSNT: CPT | Mod: ,,, | Performed by: FAMILY MEDICINE

## 2018-02-21 PROCEDURE — 99213 OFFICE O/P EST LOW 20 MIN: CPT | Mod: S$PBB,,, | Performed by: FAMILY MEDICINE

## 2018-02-21 PROCEDURE — 99213 OFFICE O/P EST LOW 20 MIN: CPT | Mod: PBBFAC,PO | Performed by: FAMILY MEDICINE

## 2018-02-21 PROCEDURE — 1159F MED LIST DOCD IN RCRD: CPT | Mod: ,,, | Performed by: FAMILY MEDICINE

## 2018-02-21 PROCEDURE — 87086 URINE CULTURE/COLONY COUNT: CPT

## 2018-02-21 PROCEDURE — 81002 URINALYSIS NONAUTO W/O SCOPE: CPT | Mod: PBBFAC,PO | Performed by: FAMILY MEDICINE

## 2018-02-21 RX ORDER — FLUCONAZOLE 150 MG/1
150 TABLET ORAL ONCE
Qty: 1 TABLET | Refills: 0 | Status: SHIPPED | OUTPATIENT
Start: 2018-02-21 | End: 2018-02-21

## 2018-02-21 RX ORDER — CIPROFLOXACIN 250 MG/1
250 TABLET, FILM COATED ORAL 2 TIMES DAILY
Qty: 6 TABLET | Refills: 0 | Status: SHIPPED | OUTPATIENT
Start: 2018-02-21 | End: 2018-02-24

## 2018-02-21 NOTE — PROGRESS NOTES
Subjective:      Patient ID: Magdalena Mane is a 72 y.o. female.    Chief Complaint: Urinary Urgency; Urinary Frequency; and Back Pain    Presents today with 2d days of symptoms with urinary pressure, burning, frequency, urgency. Denies blood in urine. Did not take antibiotics recently.  Denies any fever chills nausea or back pain. She has had UTI in the past. She uses premarin vaginal only prior to intercourse      Current Outpatient Prescriptions on File Prior to Visit   Medication Sig    amlodipine (NORVASC) 5 MG tablet Take 1 tablet (5 mg total) by mouth once daily.    atenolol (TENORMIN) 25 MG tablet Take 1 tablet (25 mg total) by mouth once daily.    conjugated estrogens (PREMARIN) vaginal cream Place 0.5 g vaginally once daily. (Patient taking differently: Place 0.5 g vaginally daily as needed. )    diphenhydrAMINE (BENADRYL) 25 mg capsule Take 25 mg by mouth nightly as needed for Itching.     triamcinolone acetonide 0.1% (KENALOG) 0.1 % cream Apply topically 2 (two) times daily as needed. Avoid face and groin.    ferrous sulfate 325 mg (65 mg iron) TbEC Take 325 mg by mouth every morning.     omega-3 fatty acids (FISH OIL) 500 mg Cap Take 1 capsule by mouth nightly. 1 Capsule Oral Every evening    traMADol (ULTRAM) 50 mg tablet TAKE ONE-HALF TABLET BY MOUTH AT BEDTIME AS NEEDED FOR PAIN    valsartan-hydrochlorothiazide (DIOVAN-HCT) 320-25 mg per tablet Take 1 tablet by mouth once daily.     No current facility-administered medications on file prior to visit.      Past Medical History:   Diagnosis Date    Anemia     DJD (degenerative joint disease) 12/12/2012    Hypertension     Obesity (BMI 30-39.9) 11/27/2015    Vaginal atrophy 4/1/2016     Past Surgical History:   Procedure Laterality Date    COLONOSCOPY  2012    normal    COLONOSCOPY N/A 6/28/2017    Procedure: COLONOSCOPY;  Surgeon: Markel Montemayor MD;  Location: [address];  Service: Endoscopy;  Laterality: N/A;    HIP SURGERY   "05/05/2014    bilateral    JOINT REPLACEMENT      bilateral    TONSILLECTOMY       Social History     Social History Narrative        Nurse    Likes to garden     Family History   Problem Relation Age of Onset    Breast cancer Mother     Arthritis Mother     Scoliosis Father     Heart disease Father     Tuberculosis Father       1 lung from collapse lung    No Known Problems Brother      Vitals:    02/21/18 1053   BP: 122/68   Pulse: 68   Temp: 98.6 °F (37 °C)   TempSrc: Oral   Weight: 68.5 kg (151 lb 0.2 oz)   Height: 5' (1.524 m)   PainSc: 0-No pain     Objective:   Physical Exam   Abdominal: Soft. Bowel sounds are normal. She exhibits no mass. There is no tenderness. There is no rebound, no guarding and no CVA tenderness.     URINALYSIS -     tr Leukocytes  tr Nitrite  No Protein  No Ketones  50 Blood    Assessment:     1. Dysuria    2. Vaginal atrophy      Plan:         Urine culture    Continue vaginal estrogen cream    Patient Instructions       Do not wear thongs because these push the "bugs" around the rectum to the entrance of your bladder    Drink 6-8 glasses of water per day - until your urine is clear    Avoid caffeine- including chocolate , stimulants, sudafed, antihistamines (Claritin, Zyrtec, Allegra), and irritants    Empty bladder every 3-4 hours    Urinate before and after intercourse    If you wear pads, change them every 3-4 hours to keep the area dry    Follow up  if symptoms worsen or persist                                "

## 2018-02-21 NOTE — PROGRESS NOTES
Last 5 Patient Entered Readings                                      Current 30 Day Average: 117/67     Recent Readings 2/13/2018 2/5/2018 1/29/2018 1/21/2018 1/13/2018    SBP (mmHg) 109 124 119 136 133    DBP (mmHg) 58 71 72 72 82    Pulse 63 58 65 56 68          Hypertension Digital Medicine (HDMP) Health  Follow Up    LVM to follow up with Mrs. Magdalena Mane.    Per 30 day average, blood pressure is well controlled 117/67 mmHg. Encouraged adherence to low sodium diet and physical activity guidelines. Advised patient to call or message with questions or concerns.     [She informed me that she will be going out of town from Feb 23-March 5 and then April 3-17 and requested to go on hiatus during those times.]

## 2018-02-21 NOTE — TELEPHONE ENCOUNTER
----- Message from Gabi Infante sent at 2/21/2018 11:28 AM CST -----  Doctor appointment and lab have been scheduled.  Please link lab orders to the lab appointment.  Date of doctor appointment:    Physical or EP:  5/29  Date of lab appointment:  5/22

## 2018-02-22 LAB — BACTERIA UR CULT: NORMAL

## 2018-03-12 ENCOUNTER — PATIENT OUTREACH (OUTPATIENT)
Dept: OTHER | Facility: OTHER | Age: 73
End: 2018-03-12

## 2018-03-12 NOTE — PROGRESS NOTES
Last 5 Patient Entered Readings                                      Current 30 Day Average: 107/61     Recent Readings 3/7/2018 2/13/2018 2/5/2018 1/29/2018 1/21/2018    SBP (mmHg) 104 109 124 119 136    DBP (mmHg) 63 58 71 72 72    Pulse 73 63 58 65 56        Hypertension Medications             amlodipine (NORVASC) 5 MG tablet Take 1 tablet (5 mg total) by mouth once daily.    atenolol (TENORMIN) 25 MG tablet Take 1 tablet (25 mg total) by mouth once daily.    valsartan-hydrochlorothiazide (DIOVAN-HCT) 320-25 mg per tablet Take 1 tablet by mouth once daily.        Assessment/ Plan:   Called patient to follow up.   Per newly released 2017 ACC/ AHA HTN guidelines (goal of BP < 130/80), current 30-day average is well controlled.    Requested patient take more frequent readings, patient confirms understanding.   Patient denies having questions or concerns. Instructed patient to call if she has any questions or concerns, patient confirms understanding.   Will continue to monitor. WCB in 3 months, sooner if BP begins to trend up or down.

## 2018-03-29 RX ORDER — AMLODIPINE BESYLATE 5 MG/1
TABLET ORAL
Qty: 90 TABLET | Refills: 3 | Status: SHIPPED | OUTPATIENT
Start: 2018-03-29 | End: 2018-05-29 | Stop reason: SDUPTHER

## 2018-03-29 RX ORDER — ATENOLOL 25 MG/1
TABLET ORAL
Qty: 90 TABLET | Refills: 3 | Status: SHIPPED | OUTPATIENT
Start: 2018-03-29 | End: 2018-05-29 | Stop reason: SDUPTHER

## 2018-03-29 RX ORDER — VALSARTAN AND HYDROCHLOROTHIAZIDE 320; 25 MG/1; MG/1
TABLET, FILM COATED ORAL
Qty: 90 TABLET | Refills: 3 | Status: SHIPPED | OUTPATIENT
Start: 2018-03-29 | End: 2018-05-29 | Stop reason: SDUPTHER

## 2018-04-24 ENCOUNTER — PATIENT OUTREACH (OUTPATIENT)
Dept: OTHER | Facility: OTHER | Age: 73
End: 2018-04-24

## 2018-04-24 NOTE — PROGRESS NOTES
Last 5 Patient Entered Readings                                      Current 30 Day Average: 114/62     Recent Readings 4/19/2018 3/29/2018 3/20/2018 3/20/2018 3/14/2018    SBP (mmHg) 112 116 123 130 125    DBP (mmHg) 59 65 73 69 65    Pulse 64 66 60 66 59        Hypertension Digital Medicine Program (HDMP): Health  Follow Up    Lifestyle Modifications:    1.Low sodium diet: Patient typically watches her sodium intake but admits to eating more salt while on vacation. She is thankful it did not cause her BP to go up too much and she feels like she is getting back on track.     2.Physical activity: Deferred    3.Hypotension/Hypertension symptoms: no   Frequency/Alleviating factors/Precipitating factors, etc.     4.Patient has been compliant with the medication regimen.     Patient was out of town for a few weeks and has started taking her BP readings again. I requested that she take about 2 readings each week.     Follow up with Mrs. Magdalena Mane completed. No further questions or concerns. I will follow up in a few weeks to assess progress.

## 2018-05-22 ENCOUNTER — PATIENT OUTREACH (OUTPATIENT)
Dept: OTHER | Facility: OTHER | Age: 73
End: 2018-05-22

## 2018-05-22 NOTE — PROGRESS NOTES
Last 5 Patient Entered Readings                                      Current 30 Day Average: 121/64     Recent Readings 5/21/2018 5/12/2018 5/4/2018 4/25/2018 4/25/2018    SBP (mmHg) 135 124 103 120 141    DBP (mmHg) 69 64 56 68 76    Pulse 55 59 64 61 62            Digital Medicine: Health  Follow Up    Left voicemail to follow up with Mrs. Magdalena Mane.  Current BP average 121/64 mmHg is at goal, <130/80.

## 2018-05-23 ENCOUNTER — LAB VISIT (OUTPATIENT)
Dept: LAB | Facility: HOSPITAL | Age: 73
End: 2018-05-23
Attending: FAMILY MEDICINE
Payer: MEDICARE

## 2018-05-23 DIAGNOSIS — I10 ESSENTIAL HYPERTENSION: ICD-10-CM

## 2018-05-23 DIAGNOSIS — Z00.00 ANNUAL PHYSICAL EXAM: ICD-10-CM

## 2018-05-23 LAB
ALBUMIN SERPL BCP-MCNC: 3.6 G/DL
ALP SERPL-CCNC: 82 U/L
ALT SERPL W/O P-5'-P-CCNC: 13 U/L
ANION GAP SERPL CALC-SCNC: 7 MMOL/L
AST SERPL-CCNC: 21 U/L
BASOPHILS # BLD AUTO: 0.06 K/UL
BASOPHILS NFR BLD: 1 %
BILIRUB SERPL-MCNC: 0.7 MG/DL
BUN SERPL-MCNC: 12 MG/DL
CALCIUM SERPL-MCNC: 9.2 MG/DL
CHLORIDE SERPL-SCNC: 95 MMOL/L
CHOLEST SERPL-MCNC: 186 MG/DL
CHOLEST/HDLC SERPL: 2.9 {RATIO}
CO2 SERPL-SCNC: 28 MMOL/L
CREAT SERPL-MCNC: 0.8 MG/DL
DIFFERENTIAL METHOD: ABNORMAL
EOSINOPHIL # BLD AUTO: 0.1 K/UL
EOSINOPHIL NFR BLD: 2.2 %
ERYTHROCYTE [DISTWIDTH] IN BLOOD BY AUTOMATED COUNT: 12.6 %
EST. GFR  (AFRICAN AMERICAN): >60 ML/MIN/1.73 M^2
EST. GFR  (NON AFRICAN AMERICAN): >60 ML/MIN/1.73 M^2
GLUCOSE SERPL-MCNC: 98 MG/DL
HCT VFR BLD AUTO: 35.1 %
HDLC SERPL-MCNC: 64 MG/DL
HDLC SERPL: 34.4 %
HGB BLD-MCNC: 11.7 G/DL
IMM GRANULOCYTES # BLD AUTO: 0.02 K/UL
IMM GRANULOCYTES NFR BLD AUTO: 0.3 %
LDLC SERPL CALC-MCNC: 107 MG/DL
LYMPHOCYTES # BLD AUTO: 1.1 K/UL
LYMPHOCYTES NFR BLD: 19.3 %
MCH RBC QN AUTO: 30.7 PG
MCHC RBC AUTO-ENTMCNC: 33.3 G/DL
MCV RBC AUTO: 92 FL
MONOCYTES # BLD AUTO: 0.8 K/UL
MONOCYTES NFR BLD: 14.3 %
NEUTROPHILS # BLD AUTO: 3.6 K/UL
NEUTROPHILS NFR BLD: 62.9 %
NONHDLC SERPL-MCNC: 122 MG/DL
NRBC BLD-RTO: 0 /100 WBC
PLATELET # BLD AUTO: 296 K/UL
PMV BLD AUTO: 10.9 FL
POTASSIUM SERPL-SCNC: 3.7 MMOL/L
PROT SERPL-MCNC: 6.6 G/DL
RBC # BLD AUTO: 3.81 M/UL
SODIUM SERPL-SCNC: 130 MMOL/L
TRIGL SERPL-MCNC: 75 MG/DL
TSH SERPL DL<=0.005 MIU/L-ACNC: 2.41 UIU/ML
WBC # BLD AUTO: 5.79 K/UL

## 2018-05-23 PROCEDURE — 80053 COMPREHEN METABOLIC PANEL: CPT

## 2018-05-23 PROCEDURE — 85025 COMPLETE CBC W/AUTO DIFF WBC: CPT

## 2018-05-23 PROCEDURE — 80061 LIPID PANEL: CPT

## 2018-05-23 PROCEDURE — 84443 ASSAY THYROID STIM HORMONE: CPT

## 2018-05-23 PROCEDURE — 36415 COLL VENOUS BLD VENIPUNCTURE: CPT | Mod: PO

## 2018-05-29 ENCOUNTER — OFFICE VISIT (OUTPATIENT)
Dept: FAMILY MEDICINE | Facility: CLINIC | Age: 73
End: 2018-05-29
Payer: MEDICARE

## 2018-05-29 VITALS
DIASTOLIC BLOOD PRESSURE: 68 MMHG | HEART RATE: 58 BPM | TEMPERATURE: 98 F | HEIGHT: 60 IN | BODY MASS INDEX: 29.61 KG/M2 | WEIGHT: 150.81 LBS | SYSTOLIC BLOOD PRESSURE: 140 MMHG

## 2018-05-29 DIAGNOSIS — I10 ESSENTIAL HYPERTENSION: ICD-10-CM

## 2018-05-29 DIAGNOSIS — M67.439 GANGLION OF WRIST, UNSPECIFIED LATERALITY: ICD-10-CM

## 2018-05-29 DIAGNOSIS — D50.9 IRON DEFICIENCY ANEMIA, UNSPECIFIED IRON DEFICIENCY ANEMIA TYPE: ICD-10-CM

## 2018-05-29 DIAGNOSIS — M17.0 PRIMARY OSTEOARTHRITIS OF BOTH KNEES: ICD-10-CM

## 2018-05-29 DIAGNOSIS — Z00.00 ROUTINE GENERAL MEDICAL EXAMINATION AT A HEALTH CARE FACILITY: ICD-10-CM

## 2018-05-29 DIAGNOSIS — Z12.39 SCREENING BREAST EXAMINATION: Primary | ICD-10-CM

## 2018-05-29 PROCEDURE — 99214 OFFICE O/P EST MOD 30 MIN: CPT | Mod: PBBFAC,PO | Performed by: FAMILY MEDICINE

## 2018-05-29 PROCEDURE — 99214 OFFICE O/P EST MOD 30 MIN: CPT | Mod: S$PBB,,, | Performed by: FAMILY MEDICINE

## 2018-05-29 PROCEDURE — 99999 PR PBB SHADOW E&M-EST. PATIENT-LVL IV: CPT | Mod: PBBFAC,,, | Performed by: FAMILY MEDICINE

## 2018-05-29 RX ORDER — TRIAMCINOLONE ACETONIDE 1 MG/G
CREAM TOPICAL 2 TIMES DAILY PRN
Qty: 80 G | Refills: 3 | Status: SHIPPED | OUTPATIENT
Start: 2018-05-29 | End: 2020-10-15 | Stop reason: SDUPTHER

## 2018-05-29 RX ORDER — ATENOLOL 25 MG/1
TABLET ORAL
Qty: 90 TABLET | Refills: 3 | Status: SHIPPED | OUTPATIENT
Start: 2018-05-29 | End: 2019-05-07 | Stop reason: SDUPTHER

## 2018-05-29 RX ORDER — AMLODIPINE BESYLATE 5 MG/1
TABLET ORAL
Qty: 90 TABLET | Refills: 3 | Status: SHIPPED | OUTPATIENT
Start: 2018-05-29 | End: 2019-05-07 | Stop reason: SDUPTHER

## 2018-05-29 RX ORDER — VALSARTAN AND HYDROCHLOROTHIAZIDE 320; 25 MG/1; MG/1
1 TABLET, FILM COATED ORAL DAILY
Qty: 90 TABLET | Refills: 3 | Status: SHIPPED | OUTPATIENT
Start: 2018-05-29 | End: 2018-08-01 | Stop reason: ALTCHOICE

## 2018-05-29 RX ORDER — TRAMADOL HYDROCHLORIDE 50 MG/1
TABLET ORAL
Qty: 30 TABLET | Refills: 2 | Status: SHIPPED | OUTPATIENT
Start: 2018-05-29 | End: 2018-12-26 | Stop reason: SDUPTHER

## 2018-05-29 NOTE — PROGRESS NOTES
Subjective:       Patient ID: Magdalena Mane is a 73 y.o. female.    Chief Complaint: Annual Exam    Disclaimer: This note has been generated using voice-recognition software. There may be typographical errors that have been missed during proof-reading    Pt presents for routine exam, last exam one year ago  Immunizations:  Needs Shingrix  Colonoscopy:2017      Review of Systems   Constitutional: Negative.  Negative for activity change, appetite change, chills, diaphoresis, fatigue, fever and unexpected weight change.   HENT: Negative.  Negative for congestion, ear pain, hearing loss, rhinorrhea, sinus pressure, sore throat, tinnitus, trouble swallowing and voice change.    Eyes: Negative.  Negative for photophobia, pain and visual disturbance.   Respiratory: Negative.  Negative for cough, chest tightness and shortness of breath.    Cardiovascular: Negative.  Negative for chest pain, palpitations and leg swelling.   Gastrointestinal: Negative.  Negative for abdominal distention, abdominal pain, blood in stool, constipation, diarrhea, nausea and vomiting.   Genitourinary: Negative.  Negative for difficulty urinating, dysuria, frequency, hematuria, vaginal bleeding and vaginal discharge.   Musculoskeletal: Negative.  Negative for arthralgias, back pain, joint swelling, neck pain and neck stiffness.        Noted painful swelling over base of left wrist x several months, gradual increase in size  Recurrent swelling over left popliteal space   Skin: Negative for color change, pallor and rash.   Neurological: Negative.  Negative for dizziness, tremors, speech difficulty, weakness, light-headedness, numbness and headaches.   Hematological: Negative for adenopathy. Does not bruise/bleed easily.   Psychiatric/Behavioral: Negative for agitation, confusion, dysphoric mood, hallucinations and sleep disturbance. The patient is not nervous/anxious.        Objective:      Physical Exam   Constitutional: She is oriented to  person, place, and time. She appears well-developed and well-nourished.   HENT:   Right Ear: Tympanic membrane and external ear normal.   Left Ear: Tympanic membrane and external ear normal.   Nose: Nose normal.   Mouth/Throat: Oropharynx is clear and moist.   Eyes: Conjunctivae and EOM are normal. Pupils are equal, round, and reactive to light.   Neck: Normal range of motion. Neck supple. No tracheal deviation present. No thyromegaly present.   Cardiovascular: Normal rate, regular rhythm, normal heart sounds and intact distal pulses.    Pulmonary/Chest: Effort normal. She has no wheezes. She has no rales. She exhibits no tenderness.   Abdominal: Soft. Bowel sounds are normal. She exhibits no distension and no mass. There is no rebound.   Musculoskeletal: Normal range of motion. She exhibits no edema or tenderness.   Exam of left wrist shows synovial cyst about 2cm diameter base of left thumb  Left knee:  Palpable Baker's cyst, non painful   Lymphadenopathy:     She has no cervical adenopathy.   Neurological: She is alert and oriented to person, place, and time. She has normal reflexes. No cranial nerve deficit. Coordination normal.   Skin: Skin is warm and dry. No rash noted. No erythema.   Psychiatric: She has a normal mood and affect. Her behavior is normal.       Assessment:         1.  Physical exam  2.  Hypertension 3.  Chronic iron def anemia  4.  Synovial cyst  5.  Baker's cyst  Plan:       1.  Labs reviewed with pt  2.  Continue present medications  3.  Mammogram  4.  shingrix  5.  Consult Orthopedics

## 2018-05-29 NOTE — MEDICAL/APP STUDENT
Subjective:       Patient ID: Magdalena Mane is a 73 y.o. female.    Chief Complaint: Annual Exam    Pmhx of DJD, htn, bilateral lower leg edema, iron deficiency anemia who presents for annual exam. Patient has not had a shingles vaccine, most recent mammogram was march 2017.     Patient is complaining of a lump on the dorsomedial portion of her left wrist that she first noticed about 6 months ago and has been progressively growing. She says it is also causing her some weakness and discomfort 3/10 when she uses her wrist to support herself. She had a similar lesion on her R hand ring finger over the dorsal PIP.    The patient also complains of an intermittent achy pain on the back of her left knee that she hasn't had for about a month but has her concerned. She associates the pain with increased swelling and finds that once her regular edema has subsided the pain subsides.       Review of Systems   Constitutional: Negative for fatigue and fever.   HENT: Negative for sore throat.    Eyes: Negative for visual disturbance.   Respiratory: Negative for cough and shortness of breath.    Cardiovascular: Positive for leg swelling. Negative for chest pain.   Gastrointestinal: Negative for abdominal pain, constipation, diarrhea and vomiting.   Genitourinary: Negative for dysuria and frequency.   Neurological: Negative for syncope.       Objective:      Physical Exam   HENT:   Mouth/Throat: Oropharynx is clear and moist.   Neck: Normal range of motion.   Cardiovascular: Normal rate, regular rhythm and normal heart sounds.  Exam reveals no gallop and no friction rub.    Pulmonary/Chest: Effort normal and breath sounds normal. She has no rales. She exhibits no tenderness.   Abdominal: Soft. Bowel sounds are normal. There is no tenderness.   Musculoskeletal: She exhibits edema (Bilateral in both legs from ankles to knees).   Swelling on popliteal aspect of L knee consistent with baker's cyst    1 cm mobile mass on dorsomedial  aspect of L wrist       Neurological: She displays normal reflexes.   Skin: Skin is warm and dry. She is not diaphoretic.       Assessment:       1. Screening breast examination    2. Primary osteoarthritis of both knees    3. Ganglion of wrist, unspecified laterality        Plan:             Annual Physical   - Slightly hyponatremic (130) but asymptomatic, discussed causes and advised increased sodium  intake   - Discussed lab results (CMP, CBC, Lipid panel, TSH)   - Due for shingles vaccination, patient will get the vaccination through pharmacy  Screening breast examination    - Discussed various guidelines, annual vs biannual   - Given patient's personal history, she preferred having annual mammograms  Baker's Cyst   - Pain in L knee likely caused by baker cyst, advised patient nothing to be done at this time if not  currently bothering her   - Warned of possible complications  Ganglion of wrist, unspecified laterality    - Discussed with patient the likely cause of her wrist lesion and referred her to orthopedics for  removal

## 2018-06-04 ENCOUNTER — PATIENT OUTREACH (OUTPATIENT)
Dept: OTHER | Facility: OTHER | Age: 73
End: 2018-06-04

## 2018-06-04 NOTE — PROGRESS NOTES
Last 5 Patient Entered Readings                                      Current 30 Day Average: 115/61     Recent Readings 6/2/2018 5/28/2018 5/21/2018 5/12/2018 5/4/2018    SBP (mmHg) 110 104 135 124 103    DBP (mmHg) 60 57 69 64 56    Pulse 65 67 55 59 64        Hypertension Medications             amLODIPine (NORVASC) 5 MG tablet TAKE 1 TABLET ONE TIME DAILY    atenolol (TENORMIN) 25 MG tablet TAKE 1 TABLET ONE TIME DAILY    valsartan-hydrochlorothiazide (DIOVAN-HCT) 320-25 mg per tablet Take 1 tablet by mouth once daily.        Assessment/ Plan:   Called patient to follow up.   Per newly released 2017 ACC/ AHA HTN guidelines (goal of BP < 130/80), current 30-day average is well controlled.   Patient denies having questions or concerns. Instructed patient to call if she has any questions or concerns, patient confirms understanding.   Will continue to monitor. WCB in 3 months, sooner if BP begins to trend up or down.

## 2018-06-12 ENCOUNTER — HOSPITAL ENCOUNTER (OUTPATIENT)
Dept: RADIOLOGY | Facility: HOSPITAL | Age: 73
Discharge: HOME | End: 2018-06-12
Attending: FAMILY MEDICINE
Payer: MEDICARE

## 2018-06-12 DIAGNOSIS — Z12.39 SCREENING BREAST EXAMINATION: ICD-10-CM

## 2018-06-12 PROCEDURE — 77067 SCR MAMMO BI INCL CAD: CPT | Mod: TC

## 2018-06-12 PROCEDURE — 77063 BREAST TOMOSYNTHESIS BI: CPT | Mod: 26,,, | Performed by: RADIOLOGY

## 2018-06-12 PROCEDURE — 77067 SCR MAMMO BI INCL CAD: CPT | Mod: 26,,, | Performed by: RADIOLOGY

## 2018-06-18 ENCOUNTER — HOSPITAL ENCOUNTER (OUTPATIENT)
Dept: RADIOLOGY | Facility: HOSPITAL | Age: 73
Discharge: HOME | End: 2018-06-18
Attending: ORTHOPAEDIC SURGERY
Payer: MEDICARE

## 2018-06-18 ENCOUNTER — TELEPHONE (OUTPATIENT)
Dept: ORTHOPEDICS | Facility: CLINIC | Age: 73
End: 2018-06-18

## 2018-06-18 DIAGNOSIS — M25.532 LEFT WRIST PAIN: Primary | ICD-10-CM

## 2018-06-18 DIAGNOSIS — M25.532 LEFT WRIST PAIN: ICD-10-CM

## 2018-06-18 PROCEDURE — 73110 X-RAY EXAM OF WRIST: CPT | Mod: 26,LT,, | Performed by: RADIOLOGY

## 2018-06-18 PROCEDURE — 73110 X-RAY EXAM OF WRIST: CPT | Mod: TC,PO,LT

## 2018-06-18 NOTE — TELEPHONE ENCOUNTER
Magdalena Mane reminded of appointment on 6/19/18 with Dr. KIAN Moore w/time and location. Notified of need for xray before OV w/date, time, and location of appts.    Xray scheduled for today per pt request to get it done in [place] today.

## 2018-06-18 NOTE — TELEPHONE ENCOUNTER
VM left to notify pt to call office to notify of which wrist for xray to be ordered and scheduled before OV.

## 2018-06-19 ENCOUNTER — OFFICE VISIT (OUTPATIENT)
Dept: ORTHOPEDICS | Facility: CLINIC | Age: 73
End: 2018-06-19
Payer: MEDICARE

## 2018-06-19 VITALS
DIASTOLIC BLOOD PRESSURE: 78 MMHG | SYSTOLIC BLOOD PRESSURE: 148 MMHG | HEART RATE: 56 BPM | BODY MASS INDEX: 29.45 KG/M2 | HEIGHT: 60 IN | WEIGHT: 150 LBS

## 2018-06-19 DIAGNOSIS — M67.432 GANGLION CYST OF VOLAR ASPECT OF LEFT WRIST: Primary | ICD-10-CM

## 2018-06-19 PROCEDURE — 99999 PR PBB SHADOW E&M-EST. PATIENT-LVL III: CPT | Mod: PBBFAC,,, | Performed by: ORTHOPAEDIC SURGERY

## 2018-06-19 PROCEDURE — 99214 OFFICE O/P EST MOD 30 MIN: CPT | Mod: S$PBB,,, | Performed by: ORTHOPAEDIC SURGERY

## 2018-06-19 PROCEDURE — 99213 OFFICE O/P EST LOW 20 MIN: CPT | Mod: PBBFAC | Performed by: ORTHOPAEDIC SURGERY

## 2018-06-19 RX ORDER — ACETAMINOPHEN 500 MG
500 TABLET ORAL 2 TIMES DAILY
COMMUNITY

## 2018-06-19 NOTE — PROGRESS NOTES
Subjective:      Patient ID: Magdalena Mane is a 73 y.o. female.    Chief Complaint: Pain of the Left Wrist      HPI  Magdalena Mane is a right hand dominant 73 y.o. female presenting today for left wrist ganglion cyst. Onset several months ago. It is located over the radial volar wrist. It has gradually increased in size. It becomes painful, described as aching pain, when using the wrist. It bothers her when carrying things or using the hand to get up from a chair. She chronically takes Tylenol and Ultram for arthritic pain. She denies numbness or tingling.  Pt has hx of ganglionectomy of the right ring finger by Dr. Moore 9/2016, this is doing well.          Review of patient's allergies indicates:   Allergen Reactions    Prednisone      ELEVATED B/P FOR SEVERAL DAYS/WENT TO ER         Current Outpatient Prescriptions   Medication Sig Dispense Refill    acetaminophen (TYLENOL) 500 MG tablet Take 500 mg by mouth 2 (two) times daily.      amLODIPine (NORVASC) 5 MG tablet TAKE 1 TABLET ONE TIME DAILY 90 tablet 3    atenolol (TENORMIN) 25 MG tablet TAKE 1 TABLET ONE TIME DAILY 90 tablet 3    conjugated estrogens (PREMARIN) vaginal cream Place 0.5 g vaginally once daily. 30 g 6    diphenhydrAMINE (BENADRYL) 25 mg capsule Take 25 mg by mouth nightly as needed for Itching.       traMADol (ULTRAM) 50 mg tablet TAKE ONE-HALF TABLET BY MOUTH AT BEDTIME AS NEEDED FOR PAIN 30 tablet 2    valsartan-hydrochlorothiazide (DIOVAN-HCT) 320-25 mg per tablet Take 1 tablet by mouth once daily. 90 tablet 3    triamcinolone acetonide 0.1% (KENALOG) 0.1 % cream Apply topically 2 (two) times daily as needed. Avoid face and groin. 80 g 3     No current facility-administered medications for this visit.        Past Medical History:   Diagnosis Date    Anemia     DJD (degenerative joint disease) 12/12/2012    Hypertension     Obesity (BMI 30-39.9) 11/27/2015    Vaginal atrophy 4/1/2016       Past Surgical History:    Procedure Laterality Date    BREAST BIOPSY Left 1999    Excisional bx, benign cyst    COLONOSCOPY  2012    normal    COLONOSCOPY N/A 6/28/2017    Procedure: COLONOSCOPY;  Surgeon: Markel Montemayor MD;  [facility]: [facility] ([address] [place] [address]);  Service: Endoscopy;  Laterality: N/A;    HIP SURGERY  05/05/2014    bilateral    JOINT REPLACEMENT      bilateral    TONSILLECTOMY         Review of Systems:  Constitutional: Negative for chills and fever.   Respiratory: Negative for cough and shortness of breath.    Gastrointestinal: Negative for nausea and vomiting.   Skin: Negative for rash.   Neurological: Negative for dizziness and headaches.   Psychiatric/Behavioral: Negative for depression.   MSK as in HPI       OBJECTIVE:     PHYSICAL EXAM:  BP (!) 148/78 (BP Location: Right arm, Patient Position: Sitting, BP Method: Large (Automatic))   Pulse (!) 56   Ht 5' (1.524 m)   Wt 68 kg (150 lb)   BMI 29.29 kg/m²     GEN:  NAD, well-developed, well-groomed.  NEURO: Awake, alert, and oriented. Normal attention and concentration.    PSYCH: Normal mood and affect. Behavior is normal.  HEENT: No cervical lymphadenopathy noted.  CARDIOVASCULAR: Radial pulses 2+ bilaterally. No LE edema noted.  PULMONARY: Breath sounds normal. No respiratory distress.  SKIN: Intact, no rashes.      MSK:   LUE:  Good active ROM of the wrist and fingers. There is a about 2 cm mass over the radial volar wrist, appearance c/w ganglion cyst. It is slightly firm, mobile. Non-ttp. AIN/PIN/Radial/Median/Ulnar Nerves assessed in isolation without deficit. Radial & Ulnar arteries palpated 2+. Capillary Refill <3s. Negative tinels and durkans.      RADIOGRAPHS:  Xray left wrist 6/18/18  FINDINGS:  Bones are fairly well mineralized.  There is narrowing at at the navicula and trapezium joint.  Mild degenerative change at the 1st carpometacarpal joint.  No fracture.    Comments: I have personally reviewed the imaging and I agree with the above radiologist's  report.    ASSESSMENT/PLAN:       ICD-10-CM ICD-9-CM   1. Ganglion cyst of volar aspect of left wrist M67.432 727.41        Plan:   -Treatment options discussed including conservative vs surgical options. Pt wishes to proceed with ganglion cyst excision of the left wrist. Consents reviewed and signed in clinic today. All questions answered.   -RTC post op         The patient indicates understanding of these issues and agrees to the plan.    Amanda Ashton PA-C  [facility]   [facility]  [place], [place]

## 2018-06-19 NOTE — LETTER
June 19, 2018      Aaron Cardenas MD  [address] [facility]  [address]  [facility] [address]           [facility]  [address]  [facility] [address]  Phone: [phone]          Patient: Magdalena Mane   MR Number: [id number]   YOB: 1945   Date of Visit: 6/19/2018       Dear Dr. Aaron Cardenas:    Thank you for referring Magdalena Mane to me for evaluation. Attached you will find relevant portions of my assessment and plan of care.    If you have questions, please do not hesitate to call me. I look forward to following Magdalena Mane along with you.    Sincerely,    Mary Moore MD    Enclosure  CC:  No Recipients    If you would like to receive this communication electronically, please contact [email or web address] or [phone] to request more information on [employer] access.    For providers and/or their staff who would like to refer a patient to [facility], please contact us through our one-stop-shop provider referral line, [facility], at [phone].    If you feel you have received this communication in error or would no longer like to receive these types of communications, please e-mail [email or web address]

## 2018-06-19 NOTE — PROGRESS NOTES
I have personally taken the history and examined the patient. I agree with the Hand Surgery PA's note. The plan will be surgical excision of left volar wrist mass. Risks and benefits were discussed at length with this patient for conservative treatment (watching) versus surgical excision. The patient understands the only true way for a diagnosis is surgical excision yet there is a high risk that this mass will return.

## 2018-06-19 NOTE — PROGRESS NOTES
Last 5 Patient Entered Readings                                      Current 30 Day Average: 117/63     Recent Readings 6/16/2018 6/8/2018 6/2/2018 5/28/2018 5/21/2018    SBP (mmHg) 127 108 110 104 135    DBP (mmHg) 68 59 60 57 69    Pulse 55 67 65 67 55          Digital Medicine: Health  Follow Up    Lifestyle Modifications:    1.Dietary Modifications (Sodium intake <2,000mg/day, food labels, dining out): Patient avoids fast food and highly salted meals. She cooks mostly at home and uses the fresh veggies that she grows in her garden. Her labs showed that her sodium was low and she was experiencing some cramping so her doctor told her to add a little more salt to her diet. She informed me that she did do this but added very little. So far, her symptoms have subsided and her BP readings are still well controlled.     2.Physical Activity: Patient does a lot yard work and gardening. She typically is outside in her garden for about 2 hours a week. 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     3.Medication Therapy: Patient has been compliant with the medication regimen. Patient typically takes all of her medication as prescribed. She mentioned that sometimes, when her schedule is off, she may forget and have to take her medication at a later time then usual but she typically stays on track.     4.Patient has the following medication side effects/concerns:   (Frequency/Alleviating factors/Precipitating factors, etc.)     Patient will be having surgery on her wrist soon. She will let us know if she needs anything.     Follow up with Mrs. Magdalena Mane completed. No further questions or concerns. Will continue follow up to achieve health goals.

## 2018-06-21 ENCOUNTER — PATIENT MESSAGE (OUTPATIENT)
Dept: ORTHOPEDICS | Facility: CLINIC | Age: 73
End: 2018-06-21

## 2018-06-25 DIAGNOSIS — M67.432 GANGLION CYST OF VOLAR ASPECT OF LEFT WRIST: Primary | ICD-10-CM

## 2018-06-25 NOTE — PRE-PROCEDURE INSTRUCTIONS
Preop instructions: NPO after midnight, shower instructions, directions, leave all valuables at home, medication instructions for PM prior & am of procedure explained. Patient stated an understanding.     Patient denies any side effects or issues with anesthesia or sedation.   Patient does not know arrival time. Explained that this information comes from the surgeons office and if they have not heard from them by 3pm tomorrow,  to call office. Patient stated an understanding.

## 2018-06-26 ENCOUNTER — ANESTHESIA EVENT (OUTPATIENT)
Dept: SURGERY | Facility: HOSPITAL | Age: 73
End: 2018-06-26
Payer: MEDICARE

## 2018-06-26 ENCOUNTER — TELEPHONE (OUTPATIENT)
Dept: ORTHOPEDICS | Facility: CLINIC | Age: 73
End: 2018-06-26

## 2018-06-26 NOTE — TELEPHONE ENCOUNTER
Magdalena Mane notified of arrival time 0645 for surgery on 6/27/18 with Dr. KIAN Moore. At [facility]. Post Op appointment made, slip in mail.

## 2018-06-27 ENCOUNTER — HOSPITAL ENCOUNTER (OUTPATIENT)
Facility: HOSPITAL | Age: 73
Discharge: HOME | End: 2018-06-27
Attending: ORTHOPAEDIC SURGERY | Admitting: ORTHOPAEDIC SURGERY
Payer: MEDICARE

## 2018-06-27 ENCOUNTER — ANESTHESIA (OUTPATIENT)
Dept: SURGERY | Facility: HOSPITAL | Age: 73
End: 2018-06-27
Payer: MEDICARE

## 2018-06-27 VITALS
SYSTOLIC BLOOD PRESSURE: 138 MMHG | WEIGHT: 148 LBS | DIASTOLIC BLOOD PRESSURE: 55 MMHG | TEMPERATURE: 97 F | BODY MASS INDEX: 27.94 KG/M2 | RESPIRATION RATE: 13 BRPM | HEART RATE: 50 BPM | OXYGEN SATURATION: 99 % | HEIGHT: 61 IN

## 2018-06-27 DIAGNOSIS — M67.40 GANGLION CYST: ICD-10-CM

## 2018-06-27 DIAGNOSIS — M67.432 GANGLION CYST OF VOLAR ASPECT OF LEFT WRIST: Primary | ICD-10-CM

## 2018-06-27 PROCEDURE — 37000008 HC ANESTHESIA 1ST 15 MINUTES: Performed by: ORTHOPAEDIC SURGERY

## 2018-06-27 PROCEDURE — 37000009 HC ANESTHESIA EA ADD 15 MINS: Performed by: ORTHOPAEDIC SURGERY

## 2018-06-27 PROCEDURE — 36000706: Performed by: ORTHOPAEDIC SURGERY

## 2018-06-27 PROCEDURE — 71000015 HC POSTOP RECOV 1ST HR: Performed by: ORTHOPAEDIC SURGERY

## 2018-06-27 PROCEDURE — 88304 TISSUE EXAM BY PATHOLOGIST: CPT | Mod: 26,,, | Performed by: PATHOLOGY

## 2018-06-27 PROCEDURE — 71000033 HC RECOVERY, INTIAL HOUR: Performed by: ORTHOPAEDIC SURGERY

## 2018-06-27 PROCEDURE — 63600175 PHARM REV CODE 636 W HCPCS: Performed by: NURSE ANESTHETIST, CERTIFIED REGISTERED

## 2018-06-27 PROCEDURE — 25000003 PHARM REV CODE 250: Performed by: ORTHOPAEDIC SURGERY

## 2018-06-27 PROCEDURE — 25000003 PHARM REV CODE 250: Performed by: NURSE ANESTHETIST, CERTIFIED REGISTERED

## 2018-06-27 PROCEDURE — 36000707: Performed by: ORTHOPAEDIC SURGERY

## 2018-06-27 PROCEDURE — 88304 TISSUE EXAM BY PATHOLOGIST: CPT | Performed by: PATHOLOGY

## 2018-06-27 PROCEDURE — 25000003 PHARM REV CODE 250

## 2018-06-27 PROCEDURE — D9220A PRA ANESTHESIA: Mod: ANES,,, | Performed by: ANESTHESIOLOGY

## 2018-06-27 PROCEDURE — 25111 REMOVE WRIST TENDON LESION: CPT | Mod: LT,GC,, | Performed by: ORTHOPAEDIC SURGERY

## 2018-06-27 PROCEDURE — 63600175 PHARM REV CODE 636 W HCPCS: Performed by: STUDENT IN AN ORGANIZED HEALTH CARE EDUCATION/TRAINING PROGRAM

## 2018-06-27 PROCEDURE — 63600175 PHARM REV CODE 636 W HCPCS

## 2018-06-27 PROCEDURE — D9220A PRA ANESTHESIA: Mod: CRNA,,, | Performed by: NURSE ANESTHETIST, CERTIFIED REGISTERED

## 2018-06-27 RX ORDER — OXYCODONE HYDROCHLORIDE 5 MG/1
10 TABLET ORAL EVERY 4 HOURS PRN
Status: DISCONTINUED | OUTPATIENT
Start: 2018-06-27 | End: 2018-06-27 | Stop reason: HOSPADM

## 2018-06-27 RX ORDER — OXYCODONE HYDROCHLORIDE 5 MG/1
5 TABLET ORAL EVERY 4 HOURS PRN
Status: DISCONTINUED | OUTPATIENT
Start: 2018-06-27 | End: 2018-06-27 | Stop reason: HOSPADM

## 2018-06-27 RX ORDER — PROPOFOL 10 MG/ML
INJECTION, EMULSION INTRAVENOUS
Status: DISCONTINUED | OUTPATIENT
Start: 2018-06-27 | End: 2018-06-27

## 2018-06-27 RX ORDER — BACITRACIN 500 [USP'U]/G
OINTMENT TOPICAL
Status: DISCONTINUED
Start: 2018-06-27 | End: 2018-06-27 | Stop reason: WASHOUT

## 2018-06-27 RX ORDER — CEFAZOLIN SODIUM 1 G/3ML
2 INJECTION, POWDER, FOR SOLUTION INTRAMUSCULAR; INTRAVENOUS
Status: COMPLETED | OUTPATIENT
Start: 2018-06-27 | End: 2018-06-27

## 2018-06-27 RX ORDER — ACETAMINOPHEN AND CODEINE PHOSPHATE 300; 30 MG/1; MG/1
1 TABLET ORAL EVERY 4 HOURS PRN
Qty: 20 TABLET | Refills: 0 | Status: SHIPPED | OUTPATIENT
Start: 2018-06-27 | End: 2019-01-18 | Stop reason: ALTCHOICE

## 2018-06-27 RX ORDER — EPINEPHRINE 1 MG/ML
INJECTION, SOLUTION INTRACARDIAC; INTRAMUSCULAR; INTRAVENOUS; SUBCUTANEOUS
Status: DISCONTINUED
Start: 2018-06-27 | End: 2018-06-27 | Stop reason: HOSPADM

## 2018-06-27 RX ORDER — MUPIROCIN 20 MG/G
OINTMENT TOPICAL
Status: COMPLETED
Start: 2018-06-27 | End: 2018-06-27

## 2018-06-27 RX ORDER — BUPIVACAINE HYDROCHLORIDE 2.5 MG/ML
INJECTION, SOLUTION EPIDURAL; INFILTRATION; INTRACAUDAL
Status: DISCONTINUED
Start: 2018-06-27 | End: 2018-06-27 | Stop reason: WASHOUT

## 2018-06-27 RX ORDER — LIDOCAINE HYDROCHLORIDE 10 MG/ML
INJECTION INFILTRATION; PERINEURAL
Status: DISCONTINUED
Start: 2018-06-27 | End: 2018-06-27 | Stop reason: WASHOUT

## 2018-06-27 RX ORDER — MIDAZOLAM HYDROCHLORIDE 1 MG/ML
INJECTION INTRAMUSCULAR; INTRAVENOUS
Status: COMPLETED
Start: 2018-06-27 | End: 2018-06-27

## 2018-06-27 RX ORDER — PROPOFOL 10 MG/ML
INJECTION, EMULSION INTRAVENOUS CONTINUOUS PRN
Status: DISCONTINUED | OUTPATIENT
Start: 2018-06-27 | End: 2018-06-27

## 2018-06-27 RX ORDER — FENTANYL CITRATE 50 UG/ML
INJECTION, SOLUTION INTRAMUSCULAR; INTRAVENOUS
Status: DISCONTINUED
Start: 2018-06-27 | End: 2018-06-27 | Stop reason: WASHOUT

## 2018-06-27 RX ORDER — LIDOCAINE HCL/PF 100 MG/5ML
SYRINGE (ML) INTRAVENOUS
Status: DISCONTINUED | OUTPATIENT
Start: 2018-06-27 | End: 2018-06-27

## 2018-06-27 RX ORDER — MUPIROCIN 20 MG/G
OINTMENT TOPICAL
Status: DISCONTINUED | OUTPATIENT
Start: 2018-06-27 | End: 2018-06-27 | Stop reason: HOSPADM

## 2018-06-27 RX ORDER — ONDANSETRON 8 MG/1
8 TABLET, ORALLY DISINTEGRATING ORAL EVERY 8 HOURS PRN
Status: DISCONTINUED | OUTPATIENT
Start: 2018-06-27 | End: 2018-06-27 | Stop reason: HOSPADM

## 2018-06-27 RX ORDER — LIDOCAINE HYDROCHLORIDE 10 MG/ML
1 INJECTION, SOLUTION EPIDURAL; INFILTRATION; INTRACAUDAL; PERINEURAL ONCE
Status: COMPLETED | OUTPATIENT
Start: 2018-06-27 | End: 2018-06-27

## 2018-06-27 RX ORDER — SODIUM CHLORIDE 0.9 % (FLUSH) 0.9 %
5 SYRINGE (ML) INJECTION
Status: DISCONTINUED | OUTPATIENT
Start: 2018-06-27 | End: 2018-06-27 | Stop reason: HOSPADM

## 2018-06-27 RX ORDER — SODIUM CHLORIDE 9 MG/ML
INJECTION, SOLUTION INTRAVENOUS CONTINUOUS PRN
Status: DISCONTINUED | OUTPATIENT
Start: 2018-06-27 | End: 2018-06-27

## 2018-06-27 RX ORDER — MIDAZOLAM HYDROCHLORIDE 1 MG/ML
0.5 INJECTION INTRAMUSCULAR; INTRAVENOUS
Status: DISCONTINUED | OUTPATIENT
Start: 2018-06-27 | End: 2018-06-27 | Stop reason: HOSPADM

## 2018-06-27 RX ORDER — MUPIROCIN 20 MG/G
1 OINTMENT TOPICAL 2 TIMES DAILY
Status: DISCONTINUED | OUTPATIENT
Start: 2018-06-27 | End: 2018-06-27 | Stop reason: HOSPADM

## 2018-06-27 RX ORDER — FENTANYL CITRATE 50 UG/ML
25 INJECTION, SOLUTION INTRAMUSCULAR; INTRAVENOUS EVERY 5 MIN PRN
Status: DISCONTINUED | OUTPATIENT
Start: 2018-06-27 | End: 2018-06-27 | Stop reason: HOSPADM

## 2018-06-27 RX ORDER — BUPIVACAINE HYDROCHLORIDE 5 MG/ML
INJECTION, SOLUTION EPIDURAL; INTRACAUDAL
Status: DISCONTINUED
Start: 2018-06-27 | End: 2018-06-27 | Stop reason: HOSPADM

## 2018-06-27 RX ADMIN — CEFAZOLIN 2 G: 330 INJECTION, POWDER, FOR SOLUTION INTRAMUSCULAR; INTRAVENOUS at 09:06

## 2018-06-27 RX ADMIN — LIDOCAINE HYDROCHLORIDE 1 MG: 10 INJECTION, SOLUTION EPIDURAL; INFILTRATION; INTRACAUDAL; PERINEURAL at 07:06

## 2018-06-27 RX ADMIN — PROPOFOL 30 MG: 10 INJECTION, EMULSION INTRAVENOUS at 09:06

## 2018-06-27 RX ADMIN — MUPIROCIN: 20 OINTMENT TOPICAL at 07:06

## 2018-06-27 RX ADMIN — SODIUM CHLORIDE: 0.9 INJECTION, SOLUTION INTRAVENOUS at 08:06

## 2018-06-27 RX ADMIN — LIDOCAINE HYDROCHLORIDE 50 MG: 20 INJECTION, SOLUTION INTRAVENOUS at 09:06

## 2018-06-27 RX ADMIN — PROPOFOL 150 MCG/KG/MIN: 10 INJECTION, EMULSION INTRAVENOUS at 09:06

## 2018-06-27 RX ADMIN — MIDAZOLAM HYDROCHLORIDE 1 MG: 1 INJECTION, SOLUTION INTRAMUSCULAR; INTRAVENOUS at 08:06

## 2018-06-27 NOTE — ANESTHESIA POSTPROCEDURE EVALUATION
"Anesthesia Post Evaluation    Patient: Magdalena Mane    Procedure(s) Performed: Procedure(s) (LRB):  EXCISION, GANGLION CYST, WRIST - Left Volar wrist (Left)    Final Anesthesia Type: general  Patient location during evaluation: PACU  Patient participation: Yes- Able to Participate  Level of consciousness: awake and alert and oriented  Post-procedure vital signs: reviewed and stable  Pain management: adequate  Airway patency: patent  PONV status at discharge: No PONV  Anesthetic complications: no      Cardiovascular status: hemodynamically stable  Respiratory status: unassisted, spontaneous ventilation and room air  Hydration status: euvolemic  Follow-up not needed.        Visit Vitals  BP (!) 119/56   Pulse (!) 53   Temp 36.3 °C (97.3 °F) (Temporal)   Resp 13   Ht 5' 1" (1.549 m)   Wt 67.1 kg (148 lb)   SpO2 98%   Breastfeeding? No   BMI 27.96 kg/m²       Pain/Cristobal Score: Pain Assessment Performed: Yes (6/27/2018  9:40 AM)  Presence of Pain: denies (6/27/2018  9:40 AM)  Cristobal Score: 9 (6/27/2018  9:40 AM)      "

## 2018-06-27 NOTE — ANESTHESIA PREPROCEDURE EVALUATION
06/27/2018  Magdalena Mane is a 73 y.o., female.    Anesthesia Evaluation    I have reviewed the Patient Summary Reports.    I have reviewed the Nursing Notes.   I have reviewed the Medications.     Review of Systems  Anesthesia Hx:  No problems with previous Anesthesia  History of prior surgery of interest to airway management or planning: Previous anesthesia: General Denies Family Hx of Anesthesia complications.   Denies Personal Hx of Anesthesia complications.   Social:  Former Smoker    Hematology/Oncology:     Oncology Normal    -- Anemia:   EENT/Dental:EENT/Dental Normal   Cardiovascular:   Exercise tolerance: good Hypertension    Pulmonary:  Pulmonary Normal    Renal/:  Renal/ Normal     Hepatic/GI:  Hepatic/GI Normal    Musculoskeletal:   Arthritis     Neurological:  Neurology Normal    Endocrine:  Endocrine Normal    Dermatological:  Skin Normal    Psych:  Psychiatric Normal           Physical Exam  General:  Well nourished, Obesity    Airway/Jaw/Neck:  Airway Findings: Mouth Opening: Small, but > 3cm Tongue: Normal  General Airway Assessment: Adult, Average  Mallampati: III  Improves to II with phonation.  TM Distance: 4 - 6 cm        Eyes/Ears/Nose:  EYES/EARS/NOSE FINDINGS: Normal   Dental:  Dental Findings: lower partial dentures   Chest/Lungs:  Chest/Lungs Findings: Clear to auscultation, Normal Respiratory Rate     Heart/Vascular:  Heart Findings: Rate: Normal  Rhythm: Regular Rhythm  Sounds: Normal  Heart murmur: negative Vascular Findings: Normal    Abdomen:  Abdomen Findings: Normal    Musculoskeletal:  Musculoskeletal Findings: Normal   Skin:  Skin Findings: Normal    Mental Status:  Mental Status Findings:  Cooperative, Alert and Oriented         Anesthesia Plan  Type of Anesthesia, risks & benefits discussed:  Anesthesia Type:  general, regional, MAC  Patient's Preference:    Intra-op Monitoring Plan:   Intra-op Monitoring Plan Comments:   Post Op Pain Control Plan: peripheral nerve block  Post Op Pain Control Plan Comments:   Induction:   IV  Beta Blocker:  Patient is on a Beta-Blocker and has received one dose within the past 24 hours (No further documentation required).       Informed Consent: Patient understands risks and agrees with Anesthesia plan.  Questions answered. Anesthesia consent signed with patient.  ASA Score: 2     Day of Surgery Review of History & Physical:            Ready For Surgery From Anesthesia Perspective.

## 2018-06-27 NOTE — BRIEF OP NOTE
[facility]  Brief Operative Note     SUMMARY     Surgery Date: 6/27/2018     Surgeon(s) and Role:     * Mary Moore MD - Primary    Assisting Surgeon: None    Pre-op Diagnosis:  Ganglion cyst of volar aspect of left wrist [M67.432]    Post-op Diagnosis:  Post-Op Diagnosis Codes:     * Ganglion cyst of volar aspect of left wrist [M67.432]    Procedure(s) (LRB):  EXCISION, GANGLION CYST, WRIST - Left Volar wrist (Left)    Anesthesia: Regional    Description of the findings of the procedure: excision of left wrist ganglion cyst    Findings/Key Components: excision of left wrist volar ganglion cyst    Estimated Blood Loss: 0         Specimens:   Specimen (12h ago through future)    None

## 2018-06-27 NOTE — ANESTHESIA PROCEDURE NOTES
Supraclavicular Brachial Plexus Single Injection Block    Patient location during procedure: OR    Reason for block: primary anesthetic   Diagnosis: left wrist pain    Start time: 6/27/2018 8:23 AM  Timeout: 6/27/2018 8:20 AM   End time: 6/27/2018 8:33 AM  Staffing  Anesthesiologist: LUKE MAZA  Other anesthesia staff: CAM MCDANIEL  Performed: other anesthesia staff   Preanesthetic Checklist  Completed: patient identified, site marked, surgical consent, pre-op evaluation, timeout performed, IV checked, risks and benefits discussed and monitors and equipment checked  Peripheral Block  Patient position: supine  Prep: ChloraPrep  Patient monitoring: heart rate, cardiac monitor, continuous pulse ox, continuous capnometry and frequent blood pressure checks  Block type: supraclavicular  Laterality: left  Injection technique: single shot  Needle  Needle type: Stimuplex   Needle gauge: 22 G  Needle length: 2 in  Needle localization: anatomical landmarks and ultrasound guidance   -ultrasound image captured on disc.  Assessment  Injection assessment: negative aspiration, negative parasthesia and local visualized surrounding nerve  Paresthesia pain: none  Heart rate change: no  Slow fractionated injection: yes  Medications:  Bolus administered: 30 mL of 0.5 bupivacaine  Epinephrine added: 3.75 mcg/mL (1/300,000)  Additional Notes     VSS.  [facility] nurse monitored patient throughout block.  Patient tolerated procedure well.

## 2018-06-27 NOTE — DISCHARGE INSTRUCTIONS
GENERAL POST-OPERATIVE  PATIENT INSTRUCTIONS      FOLLOW-UP:  Call your physician immediately if you have any fevers greater than 100.5, drainage from you wound that is not clear or looks infected, persistent bleeding, increasing abdominal pain, problems urinating, or persistent nausea/vomiting.      WOUND CARE INSTRUCTIONS:  Keep a dry clean dressing until your follow-up appointment.  Sutures will be removed at your follow-up appointment.  Pathology results from biopsies can take up to 10 days to result.    DIET:  You may eat any foods that you can tolerate.  It is a good idea to eat a high fiber diet and take in plenty of fluids to prevent constipation.  If you do become constipated you may want to take a mild laxative or take ducolax tablets on a daily basis until your bowel habits are regular.  Constipation can be very uncomfortable, along with straining, after recent surgery.    ACTIVITY:  Return to normal activity the following day.  No heavy lifting until cleared by your physician.    MEDICATIONS:  Try to take narcotic medications and anti-inflammatory medications, such as tylenol, ibuprofen, naprosyn, etc., with food.  This will minimize stomach upset from the medication.  Should you develop nausea and vomiting from the pain medication, or develop a rash, please discontinue the medication and contact your physician.  You should not drive, make important decisions, or operate machinery when taking narcotic pain medication.    QUESTIONS:  Please feel free to call your physician or the hospital  if you have any questions, and they will be glad to assist you.

## 2018-06-27 NOTE — TRANSFER OF CARE
"Anesthesia Transfer of Care Note    Patient: Magdalena Mane    Procedure(s) Performed: Procedure(s) (LRB):  EXCISION, GANGLION CYST, WRIST - Left Volar wrist (Left)    Patient location: [facility]    Anesthesia Type: general    Transport from OR: Transported from OR on room air with adequate spontaneous ventilation    Post pain: adequate analgesia    Post assessment: no apparent anesthetic complications and tolerated procedure well    Post vital signs: stable    Level of consciousness: awake    Nausea/Vomiting: no nausea/vomiting    Complications: none    Transfer of care protocol was followed      Last vitals:   Visit Vitals  BP (!) 141/55 (BP Location: Right arm, Patient Position: Lying)   Pulse (!) 50   Temp 36.6 °C (97.9 °F) (Oral)   Resp 13   Ht 5' 1" (1.549 m)   Wt 67.1 kg (148 lb)   SpO2 100%   Breastfeeding? No   BMI 27.96 kg/m²     "

## 2018-06-27 NOTE — DISCHARGE SUMMARY
Discharge Note    SUMMARY     Admit Date: 6/27/2018    Discharge Date and Time:  06/27/2018  Hospital Course (synopsis of major diagnoses, care, treatment, and services provided during the course of the hospital stay): Pt admitted for outpatient procedure, tolerated well.  Recovered in PACU and was discharged home on day of surgery.       Final Diagnosis: Post-Op Diagnosis Codes:     * Ganglion cyst of volar aspect of left wrist [M67.432]    Disposition: Home or Self Care    Follow Up/Patient Instructions: 2 weeks    Medications:  Reconciled Home Medications:      Medication List      START taking these medications    acetaminophen-codeine 300-30mg 300-30 mg Tab  Commonly known as:  TYLENOL #3  Take 1 tablet by mouth every 4 (four) hours as needed (Post operative pain).        CHANGE how you take these medications    amLODIPine 5 MG tablet  Commonly known as:  NORVASC  TAKE 1 TABLET ONE TIME DAILY  What changed:  · when to take this  · additional instructions        CONTINUE taking these medications    acetaminophen 500 MG tablet  Commonly known as:  TYLENOL  Take 500 mg by mouth 2 (two) times daily.     atenolol 25 MG tablet  Commonly known as:  TENORMIN  TAKE 1 TABLET ONE TIME DAILY     calcium-vitamin D 250-100 mg-unit per tablet  Take 1 tablet by mouth 2 (two) times daily.     conjugated estrogens vaginal cream  Commonly known as:  PREMARIN  Place 0.5 g vaginally once daily.     diphenhydrAMINE 25 mg capsule  Commonly known as:  BENADRYL  Take 25 mg by mouth nightly as needed for Itching.     traMADol 50 mg tablet  Commonly known as:  ULTRAM  TAKE ONE-HALF TABLET BY MOUTH AT BEDTIME AS NEEDED FOR PAIN     triamcinolone acetonide 0.1% 0.1 % cream  Commonly known as:  KENALOG  Apply topically 2 (two) times daily as needed. Avoid face and groin.     valsartan-hydrochlorothiazide 320-25 mg per tablet  Commonly known as:  DIOVAN-HCT  Take 1 tablet by mouth once daily.          No discharge procedures on  file.  Follow-up Information     Follow up In 2 weeks.

## 2018-06-27 NOTE — H&P (VIEW-ONLY)
Subjective:      Patient ID: Magdalena Mane is a 73 y.o. female.    Chief Complaint: Pain of the Left Wrist      HPI  Magdalena Mane is a right hand dominant 73 y.o. female presenting today for left wrist ganglion cyst. Onset several months ago. It is located over the radial volar wrist. It has gradually increased in size. It becomes painful, described as aching pain, when using the wrist. It bothers her when carrying things or using the hand to get up from a chair. She chronically takes Tylenol and Ultram for arthritic pain. She denies numbness or tingling.  Pt has hx of ganglionectomy of the right ring finger by Dr. Moore 9/2016, this is doing well.          Review of patient's allergies indicates:   Allergen Reactions    Prednisone      ELEVATED B/P FOR SEVERAL DAYS/WENT TO ER         Current Outpatient Prescriptions   Medication Sig Dispense Refill    acetaminophen (TYLENOL) 500 MG tablet Take 500 mg by mouth 2 (two) times daily.      amLODIPine (NORVASC) 5 MG tablet TAKE 1 TABLET ONE TIME DAILY 90 tablet 3    atenolol (TENORMIN) 25 MG tablet TAKE 1 TABLET ONE TIME DAILY 90 tablet 3    conjugated estrogens (PREMARIN) vaginal cream Place 0.5 g vaginally once daily. 30 g 6    diphenhydrAMINE (BENADRYL) 25 mg capsule Take 25 mg by mouth nightly as needed for Itching.       traMADol (ULTRAM) 50 mg tablet TAKE ONE-HALF TABLET BY MOUTH AT BEDTIME AS NEEDED FOR PAIN 30 tablet 2    valsartan-hydrochlorothiazide (DIOVAN-HCT) 320-25 mg per tablet Take 1 tablet by mouth once daily. 90 tablet 3    triamcinolone acetonide 0.1% (KENALOG) 0.1 % cream Apply topically 2 (two) times daily as needed. Avoid face and groin. 80 g 3     No current facility-administered medications for this visit.        Past Medical History:   Diagnosis Date    Anemia     DJD (degenerative joint disease) 12/12/2012    Hypertension     Obesity (BMI 30-39.9) 11/27/2015    Vaginal atrophy 4/1/2016       Past Surgical History:    Procedure Laterality Date    BREAST BIOPSY Left 1999    Excisional bx, benign cyst    COLONOSCOPY  2012    normal    COLONOSCOPY N/A 6/28/2017    Procedure: COLONOSCOPY;  Surgeon: Markel Montemayor MD;  Location: [facility] ([address] [place] [address]);  Service: Endoscopy;  Laterality: N/A;    HIP SURGERY  05/05/2014    bilateral    JOINT REPLACEMENT      bilateral    TONSILLECTOMY         Review of Systems:  Constitutional: Negative for chills and fever.   Respiratory: Negative for cough and shortness of breath.    Gastrointestinal: Negative for nausea and vomiting.   Skin: Negative for rash.   Neurological: Negative for dizziness and headaches.   Psychiatric/Behavioral: Negative for depression.   MSK as in HPI       OBJECTIVE:     PHYSICAL EXAM:  BP (!) 148/78 (BP Location: Right arm, Patient Position: Sitting, BP Method: Large (Automatic))   Pulse (!) 56   Ht 5' (1.524 m)   Wt 68 kg (150 lb)   BMI 29.29 kg/m²     GEN:  NAD, well-developed, well-groomed.  NEURO: Awake, alert, and oriented. Normal attention and concentration.    PSYCH: Normal mood and affect. Behavior is normal.  HEENT: No cervical lymphadenopathy noted.  CARDIOVASCULAR: Radial pulses 2+ bilaterally. No LE edema noted.  PULMONARY: Breath sounds normal. No respiratory distress.  SKIN: Intact, no rashes.      MSK:   LUE:  Good active ROM of the wrist and fingers. There is a about 2 cm mass over the radial volar wrist, appearance c/w ganglion cyst. It is slightly firm, mobile. Non-ttp. AIN/PIN/Radial/Median/Ulnar Nerves assessed in isolation without deficit. Radial & Ulnar arteries palpated 2+. Capillary Refill <3s. Negative tinels and durkans.      RADIOGRAPHS:  Xray left wrist 6/18/18  FINDINGS:  Bones are fairly well mineralized.  There is narrowing at at the navicula and trapezium joint.  Mild degenerative change at the 1st carpometacarpal joint.  No fracture.    Comments: I have personally reviewed the imaging and I agree with the above radiologist's  report.    ASSESSMENT/PLAN:       ICD-10-CM ICD-9-CM   1. Ganglion cyst of volar aspect of left wrist M67.432 727.41        Plan:   -Treatment options discussed including conservative vs surgical options. Pt wishes to proceed with ganglion cyst excision of the left wrist. Consents reviewed and signed in clinic today. All questions answered.   -RTC post op         The patient indicates understanding of these issues and agrees to the plan.    Amanda Ashton PA-C  [facility]   [facility]  [place], [place]

## 2018-06-27 NOTE — ANESTHESIA RELEASE NOTE
"Anesthesia Release from PACU Note    Patient: Magdalena Mane    Procedure(s) Performed: Procedure(s) (LRB):  EXCISION, GANGLION CYST, WRIST - Left Volar wrist (Left)    Anesthesia type: general    Post pain: Adequate analgesia    Post assessment: no apparent anesthetic complications, tolerated procedure well and no evidence of recall    Last Vitals:   Visit Vitals  BP (!) 119/56   Pulse (!) 53   Temp 36.3 °C (97.3 °F) (Temporal)   Resp 13   Ht 5' 1" (1.549 m)   Wt 67.1 kg (148 lb)   SpO2 98%   Breastfeeding? No   BMI 27.96 kg/m²       Post vital signs: stable    Level of consciousness: awake, alert  and oriented    Nausea/Vomiting: no nausea/no vomiting    Complications: none    Airway Patency: patent    Respiratory: unassisted, spontaneous ventilation, room air    Cardiovascular: stable and blood pressure at baseline    Hydration: euvolemic  "

## 2018-06-30 ENCOUNTER — EXTERNAL CHRONIC CARE MANAGEMENT (OUTPATIENT)
Dept: PRIMARY CARE CLINIC | Facility: CLINIC | Age: 73
End: 2018-06-30
Payer: MEDICARE

## 2018-06-30 PROCEDURE — 99490 CHRNC CARE MGMT STAFF 1ST 20: CPT | Mod: PBBFAC,PO | Performed by: FAMILY MEDICINE

## 2018-06-30 PROCEDURE — 99490 CHRNC CARE MGMT STAFF 1ST 20: CPT | Mod: S$PBB,,, | Performed by: FAMILY MEDICINE

## 2018-07-02 NOTE — OP NOTE
DATE OF PROCEDURE:  06/27/2018    SERVICE:  Orthopedics.    ATTENDING SURGEON:  Mary Moore M.D.    RESIDENT SURGEON:  Dr. Kristy Ballesteros.    PREOPERATIVE DIAGNOSIS:  Left volar wrist ganglion.    POSTOPERATIVE DIAGNOSIS:  Left volar wrist ganglion.    PROCEDURES PERFORMED:  1.  Left wrist ganglion excisional biopsy.  2.  Splint application.    ANESTHESIA:  Regional MAC.    FLUIDS:  Lactated Ringer's.    BLOOD LOSS:  None.  No blood was given.    TOURNIQUET TIME:  Less than 30 minutes.    PACKS AND DRAINS:  None.    IMPLANTS:  None.    SPECIMENS:  Ganglion cyst.    COMPLICATIONS:  None.    INDICATIONS FOR PROCEDURE:  Ms. Mane is a 73-year-old female with enlarging   volar wrist ganglion.  It is very painful to her.  After much discussion with   the patient, she elected for surgical intervention.  Risks and benefits were   explained to the patient in clinic.  Consents were performed in clinic.    PROCEDURE IN DETAIL:  After the correct site was marked with the patient's   participation in the holding area, the patient underwent regional anesthesia,   was brought to the Operating Room, placed in supine position and underwent MAC   anesthesia.  A well-padded nonsterile tourniquet was placed on the left upper   extremity.  The left upper extremity was prepped and draped in normal sterile   fashion.  A timeout was conducted for the correct site, procedure and patient to   be indicated.  IV antibiotics were given to the patient preoperatively.  After   the arm was prepped and draped, a longitudinal incision was marked out on the   volar wrist.  The arm was exsanguinated with an Esmarch.  Tourniquet was   inflated to 250 mmHg.  IV antibiotics had been given to the patient   preoperatively.  After the tourniquet was insufflated, the incision was made.    Careful dissection around the volar ganglion cyst was maintained all the while   protecting the vascular structures that were nearby.  Meticulous dissection was    made around the volar ganglion cyst.  It was removed in completion and passed   off to the back table.  It did have a small stalk tracking to the joint and this was   cauterized using Bovie cautery.  The area was then irrigated with copious   amounts of normal saline.  Vicryl, Monocryl and Dermabond closed the skin.  A   sterile dressing was applied.  Tourniquet was deflated.  Brisk capillary refill   ensued.  The patient was placed in a well-padded splint, tolerated the procedure   well and was brought to the Recovery Area in stable condition.    POSTOPERATIVE PLAN FOR THIS PATIENT:  She is to keep the dressing clean, dry and   intact.  We will see her back at 2 weeks' time, splint to be removed and wound   to be assessed at that time.            LES/IN  dd: 06/29/2018 17:18:22 (CDT)  td: 06/29/2018 21:08:33 (CDT)  Doc ID   #[id number]  Job ID #[id number]    CC:

## 2018-07-11 ENCOUNTER — OFFICE VISIT (OUTPATIENT)
Dept: ORTHOPEDICS | Facility: CLINIC | Age: 73
End: 2018-07-11
Payer: MEDICARE

## 2018-07-11 VITALS
DIASTOLIC BLOOD PRESSURE: 69 MMHG | BODY MASS INDEX: 27.94 KG/M2 | HEART RATE: 56 BPM | HEIGHT: 61 IN | SYSTOLIC BLOOD PRESSURE: 134 MMHG | WEIGHT: 148 LBS

## 2018-07-11 DIAGNOSIS — M67.40 GANGLION CYST: Primary | ICD-10-CM

## 2018-07-11 PROCEDURE — 97760 ORTHOTIC MGMT&TRAING 1ST ENC: CPT | Mod: PBBFAC | Performed by: PHYSICIAN ASSISTANT

## 2018-07-11 PROCEDURE — 99999 PR PBB SHADOW E&M-EST. PATIENT-LVL IV: CPT | Mod: PBBFAC,,, | Performed by: PHYSICIAN ASSISTANT

## 2018-07-11 PROCEDURE — 99214 OFFICE O/P EST MOD 30 MIN: CPT | Mod: PBBFAC | Performed by: PHYSICIAN ASSISTANT

## 2018-07-11 PROCEDURE — 99024 POSTOP FOLLOW-UP VISIT: CPT | Mod: POP,,, | Performed by: PHYSICIAN ASSISTANT

## 2018-07-11 NOTE — PROGRESS NOTES
"Ms. Mane is here today for a post-operative visit.  She is 14 days status post Left wrist ganglion excisional biopsy by Dr. Moore on 6/27/18. She reports that she is doing well.  Pain is mild. She denies fever, chills, and sweats since the time of the surgery.     PATHOLOGY:  FINAL PATHOLOGIC DIAGNOSIS  1. Soft tissue, submitted as "cyst from left wrist", excision:  Fibrous cyst wall with focal myxoid change compatible with ganglion cyst.    Physical exam:    Vitals:    07/11/18 0854   BP: 134/69   Pulse: (!) 56   Weight: 67.1 kg (148 lb)   Height: 5' 1" (1.549 m)   PainSc:   4   PainLoc: Wrist     Vital signs are stable, patient is afebrile.  Patient is well dressed and well groomed, no acute distress.  Alert and oriented to person, place, and time.  Post op dressing taken down.  Incision is clean, dry, and intact.  There is no erythema or exudate.  There is no sign of any infection. She is NVI. Sutures removed without difficulty.  Mild decreased ROM.    Assessment: 14 days status post Left wrist ganglion excisional biopsy    Plan:  Magdalena was seen today for post-op evaluation.    Diagnoses and all orders for this visit:    Ganglion cyst  -     Ambulatory Referral to Physical/Occupational Therapy        - PO instruction reviewed and provided to patient  - wrist brace provided (15 minutes spent preparing, fitting, and educating on brace)  - Discussed lifting limitations  - Orders placed for OT  - Follow up in 4 weeks  - Call with questions or concerns    "

## 2018-07-16 ENCOUNTER — CLINICAL SUPPORT (OUTPATIENT)
Dept: REHABILITATION | Facility: HOSPITAL | Age: 73
End: 2018-07-16
Attending: ORTHOPAEDIC SURGERY
Payer: MEDICARE

## 2018-07-16 DIAGNOSIS — M25.639 DECREASED ROM OF WRIST: ICD-10-CM

## 2018-07-16 PROCEDURE — 97165 OT EVAL LOW COMPLEX 30 MIN: CPT | Mod: PO

## 2018-07-16 PROCEDURE — 97018 PARAFFIN BATH THERAPY: CPT | Mod: PO

## 2018-07-16 PROCEDURE — G8988 SELF CARE GOAL STATUS: HCPCS | Mod: CJ,PO

## 2018-07-16 PROCEDURE — G8987 SELF CARE CURRENT STATUS: HCPCS | Mod: CK,PO

## 2018-07-16 NOTE — PLAN OF CARE
"  [facility] Occupational Therapy  Initial Evaluation     Date: 7/16/2018  Patient: Magdalena Mane  Chart Number: [id number]  Referring Physician: Mray Moore, *  Therapy Diagnosis:   1. Decreased ROM of wrist         Medical Diagnosis: S/P L ganglionectomy  Physician Orders: Eval and Treat  Evaluation Date: 7/16/2018  Plan of Care Certification Date: 8/31/18  Authorization Period: 12/31/18  Date of Return to MD: 8/8/18 per pt    Visit #: 1 of 20  Time In: 3pm  Time Out: 4pm  Total Billable Time: 60 min    Precautions:  Standard    Subjective     Involved Side: Left  Dominant Side: Right  Date of Onset: June 19, 2018  Mechanism of Injury: Excision of ligamentous ganglion cyst   History of Current Condition: Pt had the cyst removed on 6/19/18 and was then immobilized for 2 weeks following the surgery. She is now wearing a foream based splint with activity and at night. Pt states pain from the surgery and immobilization have resolved, but she now feels weak. Pt now reports slight pain with movement  Surgical Procedure: surgical removal of ganglion cyst  Imaging: "Bones are fairly well mineralized. There is narrowing at at the navicula and trapezium joint. Mild degenerative change at the 1st carpometacarpal joint. No fracture." as read by CAITLIN Moore  Previous Therapy: no prior tx    Patient's Goals for Therapy: Pt would like to increase her wrist AROM and strength    Pain:  Functional Pain Scale Rating 0-10:   n/a/10 on average  n/a/10 at best  none/10 at worst  Location: surgical site on dorsum of wrist  Description: Aching  Aggravating Factors: Bending, Extension, Flexing and Lifting  Easing Factors: pain medication, rest and elevation    Functional Limitations/Social History:    Previous functional status includes: Independent with all ADLs. independent    Current FunctionalStatus   Home/Living environment : lives with their family      Limitation of Functional Status as " follows:   ADLs/IADLs:     - Feeding: difficulty cutting or stabilizing with affected hand    - Bathing: mod I; forced use of unaffected extremteity    - Dressing/Grooming: mod I; forced use of unaffected hand    - Driving: unaffected     Leisure: Gardening    Occupation:  Nurse - Retired       Past Medical History/Physical Systems Review:   Magdalena Mane  has a past medical history of Anemia; DJD (degenerative joint disease); Hypertension; Obesity (BMI 30-39.9); and Vaginal atrophy.    Magdalena Mane  has a past surgical history that includes Colonoscopy (2012); Hip surgery (05/05/2014); Tonsillectomy; Joint replacement; Colonoscopy (N/A, 6/28/2017); Breast biopsy (Left, 1999); and excision of ganglion of wrist (Left, 6/27/2018).    Magdalena has a current medication list which includes the following prescription(s): acetaminophen, acetaminophen-codeine 300-30mg, amlodipine, atenolol, calcium-vitamin d, conjugated estrogens, diphenhydramine, tramadol, triamcinolone acetonide 0.1%, and valsartan-hydrochlorothiazide.    Review of patient's allergies indicates:   Allergen Reactions    Prednisone      ELEVATED B/P FOR SEVERAL DAYS/WENT TO ER          Objective     Mental status: oriented x3    Observation:   Skin intact; surgical adhesive still present    Sensation:  Grossly Inact     Wound Assessment:   Glue still present  Size: <2cm  Location: proximal to the dorsal surface of the wrist on the lateral side      Edema: Circumferential measurements: In centimters     Right Left   MPs 18.3 cm 19 cm   DWC (Distal Wrist Crease) 15.5 cm 17.2 cm   PWC (Proximal Wrist Crease) 16.5 cm 17.3 cm       Range of Motion:   Wrist ROM: Left  Active   7/16/2018   Flexion 45   Extension 50   Radial Deviation 28   Ulnar Deviation 34   Supination wnl   Pronation wnl      Strength: (TJ Dynamometer in lbs.) Average 3 trials, Position II:     7/16/2018 7/16/2018    Left Right   Rung II 25# 38#       Pinch Strength (Measured in  psi)     7/16/2018 7/16/2018    Left Right   Key Pinch 5 psi 11 psi   3pt Pinch 4 psi 9 psi   2pt Pinch 2 psi 5 psi       CMS Impairment/Limitation/Restriction for FOTO Survey    Therapist reviewed FOTO scores for Magdalena Mane on 7/16/2018.   FOTO documents entered into [facility] - see Media section.    Category: Self Care    Current :  CK = at least 40% but < 60% impaired, limited or restricted  Goal:  CJ = at least 20% but < 40% impaired, limited or restricted           Treatment     Treatment Time In: 3:45pm  Treatment Time Out: 4pm  Total Treatment time separate from Evaluation time:15    Magdalena received the following supervised modalities after being cleared for contradictions for 8 minutes:   -Patient received paraffin bath to left hand(s) for 8 minutes to increase blood flow, circulation, pain management and for tissue elasticity prior to therex.     Magdalena received therapeutic exercises for 7 minutes including:  -Patient and clinician reviewed HEP exercises for understand and proper positioning    Home Exercise Program/Education:  Issued HEP (see patient instructions in EMR) and educated on modality use for pain management . Exercises were reviewed and Magdalena was able to demonstrate them prior to the end of the session.   Pt received a written copy of exercises to perform at home. Magdalena demonstrated good  understanding of the education provided.  Pt was advised to perform these exercises free of pain, and to stop performing them if pain occurs.    Patient/Family Education: role of OT, goals for OT, scheduling/cancellations - pt verbalized understanding. Discussed insurance limitations with patient.    Additional Education provided: none      Assessment     Magdalena Mane is a 73 y.o. female referred to outpatient occupational/hand therapy and presents with a medical diagnosis of S/P L ganglionectomy, resulting in decreased LUE use and demonstrates limitations as described in the chart below. Following  medical record review it is determined that pt will benefit from occupational therapy services in order to maximize pain free and/or functional use of left hand. The following goals were discussed with the patient and patient is in agreement with them as to be addressed in the treatment plan. The patient's rehab potential is Excellent.     Anticipated barriers to occupational therapy: none  Pt has no cultural, educational or language barriers to learning provided.    Profile and History Assessment of Occupational Performance Level of Clinical Decision Making Complexity Score   Occupational Profile:   Magdalena Mane is a 73 y.o. female who lives with their family and is currently retired as a nurse. Magdalena Mane has difficulty with  feeding, bathing, grooming and dressing  driving/transportation management, phone/computer use and housework/household chores  affecting his/her daily functional abilities. His/her main goal for therapy is to increase her wrist AROM and strength.     Comorbidities:   none    Medical and Therapy History Review:   Brief               Performance Deficits    Physical:  Joint Mobility  Joint Stability  Muscle Power/Strength  Muscle Endurance  Skin Integrity/Scar Formation  Edema   Strength  Pinch Strength  Pain    Cognitive:  No Deficits    Psychosocial:    No Deficits     Clinical Decision Making:  low    Assessment Process:  Problem-Focused Assessments    Modification/Need for Assistance:  Minimal-Moderate Modifications/Assistance    Intervention Selection:  Multiple Treatment Options       low  Based on PMHX, co morbidities , data from assessments and functional level of assistance required with task and clinical presentation directly impacting function.         Goals:     Short Term (3 weeks on 8/10/18):  1)   Patient to be IND with HEP and modalities for pain management  2)   Increase  strength 2-5 lbs. to grasp objects during cutting tasks  3)   Increase pinch 1-3 psis  for increased LUE during gardening  4)   Decrease edema .2-.3 cm to increase joint mobility /flexibility for improved overall functional hand use.     Long Term (by discharge):    1)   Patient to score at 72% or more on FOTO to demonstrate improved perception of functional LUE use.  2)   Pt will return to near to prior level of function for ADLs and household management reporting I or Mod I with ADLs (dressing, feeding, grooming, toileting).   3)   Increase  strength 5-10 to improve functional LUE use during gardening tasks    Plan     Pt to be treated by Occupational Therapy 1 times per week for 6 weeks during the certification period from 7/16/2018 to 8/31/18 to achieve the established goals.     Treatment to include: Paraffin, Fluidotherapy, Manual therapy/joint mobilizations, Modalities for pain management, US 3 mhz, Therapeutic exercises/activities., Iontophoresis with 2.0 cc Dexamethasone, Strengthening, Edema Control, Scar Management, Wound Care and Joint Protection, as well as any other treatments deemed necessary based on the patient's needs or progress.

## 2018-07-16 NOTE — PATIENT INSTRUCTIONS
MP Flexion (Assistive)        With hand resting on little finger side, slide thumb across palm.  Repeat 15 times. Do 3-5 sessions per day.    Copyright © I. All rights reserved.          IP Flexion (Active Blocked)        Brace thumb below tip joint. Bend joint as far as possible.  Repeat 15 times. Do 3-5 sessions per day.      Radial Adduction/Abduction (Active)        Move thumb out to side. Move back alongside index finger.  Repeat 15 times. Do 3-5 sessions per day.          Palmar Adduction/Abduction (Active)        Move thumb down, away from palm. Move back to rest along palm.  Repeat 15 times. Do 3-5 sessions per day.           MP Extension (Active)        With palm on table, lift thumb up. Hold 3 seconds. Relax and lower thumb.  Repeat 15 times. Do 3-5 sessions per day.    AROM: Wrist Extension        With right palm down, bend wrist up.  Repeat 15 times per set. Do 2 sets per session. Do 5 sessions per day.      AROM: Wrist Flexion        With right palm up, bend wrist up.  Repeat 15 times per set. Do 2 sets per session. Do 5 sessions per day.              AROM: Wrist Radial / Ulnar Deviation Against Gravity        With thumb toward face, gently bend left wrist toward body, then away. Keep elbow bent and supported.  Repeat 15 times per set. Do 2 sets per session. Do 5 sessions per day.      Forearm: Pronation / Supination        Keep left elbow bent to 90°, wrist straight. Hold a towel or pen. Do not let elbow straighten or move from side.  Pronation: Slowly turn palm down.   Supination: Slowly turn palm up.   Repeat 15 times per set (1 set is up and down). Do 2 sets per session. Do 5 sessions per day.          ** ALL movements should be gentle, steady and slow.**      Edema Reduction (Pumping Exercises)        Hold hand overhead. Squeeze fingers together, making a fist. Repeat 20 times.  Spread fingers apart then press together. Repeat 20 times.  Do 3-5 sessions per day.      Edema Reduction (Retrograde  Massage)        A. Enclose tip of finger with other hand and slide toward wrist.  B. For larger areas, massage toward the body in one direction only.  Repeat for 5-10 minutes, about twice a day.

## 2018-07-19 ENCOUNTER — PATIENT OUTREACH (OUTPATIENT)
Dept: OTHER | Facility: OTHER | Age: 73
End: 2018-07-19

## 2018-07-19 NOTE — PROGRESS NOTES
Last 5 Patient Entered Readings                                      Current 30 Day Average: 122/67     Recent Readings 7/18/2018 7/11/2018 7/11/2018 7/11/2018 7/3/2018    SBP (mmHg) 133 116 89 80 114    DBP (mmHg) 70 60 50 49 63    Pulse 56 57 56 56 56            Digital Medicine: Health  Follow Up    Left voicemail to follow up with Mrs. Magdalena QUIÑONEZ Swoope.  Current BP average 122/67 mmHg is at goal, <130/80.  Want to see how she is healing from her wrist surgery.  Patient is also on Valsartan (will discuss the recall).

## 2018-07-23 ENCOUNTER — PATIENT OUTREACH (OUTPATIENT)
Dept: OTHER | Facility: OTHER | Age: 73
End: 2018-07-23

## 2018-07-23 DIAGNOSIS — I10 HYPERTENSION, UNSPECIFIED TYPE: Primary | ICD-10-CM

## 2018-07-23 NOTE — PROGRESS NOTES
Last 5 Patient Entered Readings                                      Current 30 Day Average: 120/68     Recent Readings 7/21/2018 7/18/2018 7/11/2018 7/11/2018 7/11/2018    SBP (mmHg) 116 133 116 89 80    DBP (mmHg) 70 70 60 50 49    Pulse 59 56 57 56 56        Hypertension Medications             amLODIPine (NORVASC) 5 MG tablet TAKE 1 TABLET ONE TIME DAILY    atenolol (TENORMIN) 25 MG tablet TAKE 1 TABLET ONE TIME DAILY    valsartan-hydrochlorothiazide (DIOVAN-HCT) 320-25 mg per tablet Take 1 tablet by mouth once daily.        Plan:   7/23- Called patient to follow up regarding valsartan recall. Per newly released 2017 ACC/ AHA HTN guidelines  (goal of BP < 130/80), current 30-day average is well controlled.  LVM, requested patient call back at her convenience.   Will continue to monitor. WCB in 1 week.     Patient called back. Instructed patient to call dispensing pharmacy to see if her RX is affected by the recall. If so, instructed patient to see if pharmacy can fill RX with product from unaffected . If not, instructed patient to call back and will change valsartan to irbesartan. WCB in 1 week if I do not hear back from the patient.       7/31- Called patient to follow up regarding valsartan recall. Per newly released 2017 ACC/ AHA HTN guidelines  (goal of BP < 130/80), current 30-day average is well controlled.  LVM, requested patient call back at her convenience.   Will continue to monitor. WCB in 1 week.

## 2018-07-26 ENCOUNTER — CLINICAL SUPPORT (OUTPATIENT)
Dept: REHABILITATION | Facility: HOSPITAL | Age: 73
End: 2018-07-26
Attending: ORTHOPAEDIC SURGERY
Payer: MEDICARE

## 2018-07-26 DIAGNOSIS — M25.639 DECREASED ROM OF WRIST: ICD-10-CM

## 2018-07-26 PROCEDURE — 97140 MANUAL THERAPY 1/> REGIONS: CPT | Mod: PO

## 2018-07-26 PROCEDURE — 97110 THERAPEUTIC EXERCISES: CPT | Mod: PO

## 2018-07-26 PROCEDURE — 97018 PARAFFIN BATH THERAPY: CPT | Mod: PO

## 2018-07-31 ENCOUNTER — EXTERNAL CHRONIC CARE MANAGEMENT (OUTPATIENT)
Dept: PRIMARY CARE CLINIC | Facility: CLINIC | Age: 73
End: 2018-07-31
Payer: MEDICARE

## 2018-07-31 PROCEDURE — 99490 CHRNC CARE MGMT STAFF 1ST 20: CPT | Mod: PBBFAC,PO | Performed by: FAMILY MEDICINE

## 2018-07-31 PROCEDURE — 99490 CHRNC CARE MGMT STAFF 1ST 20: CPT | Mod: S$PBB,,, | Performed by: FAMILY MEDICINE

## 2018-08-01 ENCOUNTER — CLINICAL SUPPORT (OUTPATIENT)
Dept: REHABILITATION | Facility: HOSPITAL | Age: 73
End: 2018-08-01
Attending: ORTHOPAEDIC SURGERY
Payer: MEDICARE

## 2018-08-01 DIAGNOSIS — M25.639 DECREASED ROM OF WRIST: ICD-10-CM

## 2018-08-01 PROCEDURE — 97140 MANUAL THERAPY 1/> REGIONS: CPT | Mod: PO

## 2018-08-01 PROCEDURE — 97018 PARAFFIN BATH THERAPY: CPT | Mod: PO

## 2018-08-01 PROCEDURE — 97110 THERAPEUTIC EXERCISES: CPT | Mod: PO

## 2018-08-01 RX ORDER — IRBESARTAN AND HYDROCHLOROTHIAZIDE 150; 12.5 MG/1; MG/1
2 TABLET, FILM COATED ORAL DAILY
Qty: 60 TABLET | Refills: 11 | Status: SHIPPED | OUTPATIENT
Start: 2018-08-01 | End: 2018-10-03 | Stop reason: SDUPTHER

## 2018-08-01 NOTE — PROGRESS NOTES
"                            Occupational Therapy Daily Treatment Note     Name: Magdalena QUIÑONEZ [place]  Clinic Number: [id number]    Therapy Diagnosis:   Encounter Diagnosis   Name Primary?    Decreased ROM of wrist      Physician: Mary Moore, *    Physician Orders: eval and treat  Medical Diagnosis: S/P ganglionectomy on LUE  Evaluation Date: 7/16/18  Insurance Authorization period Expiration: 12/31/18  Plan of Care Certification Period: 8/10/18  Date of Return to MD: 8/8/18    Visit # / Visits authorized: 3 / 20  Time In:1:10 pm  Time Out: 2 pm  Total Billable Time: 45 minutes    Precautions: Standard    Subjective     Pt reports: "I think its getting better "  She was compliant with home exercise program given last session.   Response to previous treatment: wound glue is gone and wound appears to be healing adequately    Pain: 2/10 with movement   Location: left wrist    Objective     Magdalena received the following supervised modalities after being cleared for contradictions for 10 minutes:   -Patient received paraffin bath to L hand for 10 minutes to increase blood flow, circulation, pain management and for tissue elasticity prior to therex.     Magdalena received the following direct contact modalities after being cleared for contraindications for 8 minutes:  -Patient received ultrasound to  L area to increase blood flow, circulation, tissue elasticity, pain management and for wound/scar management for 8 minutes @ 3.3 Mhz, Intensity .8 w/cm2 at 100% duty cycle.     Magdalena received the following manual therapy techniques for 15 minutes:   -Performed retrograde followed by scar massage to L wrist area for 10 minutes with vibrator tool to decrease adhesions and improve tensile glide  -Scar extractor x 5 min      Magdalena received therapeutic exercises for 20 minutes including:  -Patient performed wrist flex/ext, pron/sup, and RD followed by thumb composite flexion, light 3pt pinches, light squeezes, RAbd/Add, and " PAbd/Add while in fluidotherapy to L hand(s) for 20 minutes to increase blood flow, circulation, desensitization, sensory re-education and for pain management.       Home Exercises and Education Provided     Education provided:   - Proper scar massage technique  - Progress towards goals     Written Home Exercises Provided: Patient instructed to cont prior HEP.  Exercises were reviewed and Magdalena was able to demonstrate them prior to the end of the session.  Magdalena demonstrated good  understanding of the education provided.   .   See EMR under Patient Instructions for exercises provided prior visit.     Magdalena demonstrated good  understanding of the education provided.     Assessment     Pt would continue to benefit from skilled OT. AROM at wrist and thumb shows slight increases.Issued silicon scar pad today for scar remodeling. Pain is minimal and is isolated to wrist while actively moving. Magdalena is progressing well towards her goals and there are no updates to goals at this time. Pt prognosis is Good. Pt will continue to benefit from skilled outpatient occupational therapy to address the deficits listed in the problem list on initial evalution provide pt/family education and to maximize pt's level of independence in the home and community environment.     Anticipated barriers to occupational therapy: none  Pt's spiritual, cultural and educational needs considered and pt agreeable to plan of care and goals.    Goals:  *Short Term (3 weeks on 8/10/18):  1)   Patient to be IND with HEP and modalities for pain management  2)   Increase  strength 2-5 lbs. to grasp objects during cutting tasks  3)   Increase pinch 1-3 psis for increased LUE during gardening  4)   Decrease edema .2-.3 cm to increase joint mobility /flexibility for improved overall functional hand use.      Long Term (by discharge):     1)   Patient to score at 72% or more on FOTO to demonstrate improved perception of functional LUE use.  2)   Pt  will return to near to prior level of function for ADLs and household management reporting I or Mod I with ADLs (dressing, feeding, grooming, toileting).   3)   Increase  strength 5-10 to improve functional LUE use during gardening tasks**    Plan     Discussed Plan of Care with patient: Yes  Updates/Grading for next session: F/U on scar maturation    Wen Ramirez, OT

## 2018-08-01 NOTE — PROGRESS NOTES
Last 5 Patient Entered Readings                                      Current 30 Day Average: 120/66     Recent Readings 7/21/2018 7/18/2018 7/11/2018 7/11/2018 7/11/2018    SBP (mmHg) 116 133 116 89 80    DBP (mmHg) 70 70 60 50 49    Pulse 59 56 57 56 56        Hypertension Medications             amLODIPine (NORVASC) 5 MG tablet TAKE 1 TABLET ONE TIME DAILY    atenolol (TENORMIN) 25 MG tablet TAKE 1 TABLET ONE TIME DAILY    valsartan-hydrochlorothiazide (DIOVAN-HCT) 320-25 mg per tablet Take 1 tablet by mouth once daily.        Assessment/ Plan:   Patient called back regarding valsartan hctz recall. Patient states she called her pharmacy and her medication was affected by the recall. Discussed with and instructed patient to stop valsartan hctz 320/25 and to start irbesartan 300/25, patient confirms understanding.     Per newly released 2017 ACC/ AHA HTN guidelines (goal of BP < 130/80), current 30-day average is well controlled.   Patient denies having questions or concerns. Instructed patient to call if he has any questions or concerns, patient confirms understanding.   Will continue to monitor. WCB in 6 months, sooner if BP begins to trend up or down.

## 2018-08-03 ENCOUNTER — CLINICAL SUPPORT (OUTPATIENT)
Dept: REHABILITATION | Facility: HOSPITAL | Age: 73
End: 2018-08-03
Attending: ORTHOPAEDIC SURGERY
Payer: MEDICARE

## 2018-08-03 DIAGNOSIS — M25.639 DECREASED ROM OF WRIST: ICD-10-CM

## 2018-08-03 PROCEDURE — 97022 WHIRLPOOL THERAPY: CPT | Mod: 59,PO

## 2018-08-03 PROCEDURE — 97018 PARAFFIN BATH THERAPY: CPT | Mod: PO,59

## 2018-08-03 PROCEDURE — 97110 THERAPEUTIC EXERCISES: CPT | Mod: PO

## 2018-08-03 PROCEDURE — 97140 MANUAL THERAPY 1/> REGIONS: CPT | Mod: PO

## 2018-08-03 NOTE — PROGRESS NOTES
"                            Occupational Therapy Daily Treatment Note     Name: Magdalena QUIOÑNEZ [place]  Clinic Number: [id number]    Therapy Diagnosis:   Encounter Diagnosis   Name Primary?    Decreased ROM of wrist      Physician: Mary Moore, *    Physician Orders: eval and treat  Medical Diagnosis: S/P ganglionectomy on LUE  Evaluation Date: 7/16/18  Insurance Authorization period Expiration: 12/31/18  Plan of Care Certification Period: 8/10/18  Date of Return to MD: 8/8/18    Visit # / Visits authorized: 4 / 20  Time In:9am  Time Out: 10am  Total Billable Time: 45 minutes    Precautions: Standard    Subjective     Pt reports: "It is a little puffy when I wake up but my movement is so much better"  She was compliant with home exercise program given last session.   Response to previous treatment: wound glue is gone and wound appears to be healing adequately    Pain: 2/10 with movement   Location: left wrist    Objective     Magdalena received the following supervised modalities after being cleared for contradictions for 10 minutes:   -Patient received paraffin bath to L hand for 10 minutes to increase blood flow, circulation, pain management and for tissue elasticity prior to therex.     Magdalena received the following direct contact modalities after being cleared for contraindications for 8 minutes:  -Patient received ultrasound to  L area to increase blood flow, circulation, tissue elasticity, pain management and for wound/scar management for 8 minutes @ 3.3 Mhz, Intensity .8 w/cm2 at 100% duty cycle.     Magdalena received the following manual therapy techniques for 15 minutes:   -Performed retrograde followed by scar massage to L wrist area for 10 minutes with vibrator tool to decrease adhesions and improve tensile glide.   -Dycem and scar pump x 5 min for scar remodeling     Magdalena received therapeutic exercises for 20 minutes in fluidotherapy including:  -Wrist flex/ext; rd/ud; sup pro with 1# weight   -Ball " squeeze       Home Exercises and Education Provided     Education provided:   - Proper scar massage technique  - Progress towards goals     Written Home Exercises Provided: Patient instructed to cont prior HEP.  Exercises were reviewed and Magdalena was able to demonstrate them prior to the end of the session.  Magdalena demonstrated good  understanding of the education provided.   .   See EMR under Patient Instructions for exercises provided prior visit.     Magdalena demonstrated good  understanding of the education provided.     Assessment     Pt would continue to benefit from skilled OT. Pt scar doing much better after issuing scar pad and pt increased scar massaging. Very few limitations still present. Pt prognosis is Good. Pt will continue to benefit from skilled outpatient occupational therapy to address the deficits listed in the problem list on initial evalution provide pt/family education and to maximize pt's level of independence in the home and community environment.     Anticipated barriers to occupational therapy: none  Pt's spiritual, cultural and educational needs considered and pt agreeable to plan of care and goals.    Goals:  *Short Term (3 weeks on 8/10/18):  1)   Patient to be IND with HEP and modalities for pain management  2)   Increase  strength 2-5 lbs. to grasp objects during cutting tasks  3)   Increase pinch 1-3 psis for increased LUE during gardening  4)   Decrease edema .2-.3 cm to increase joint mobility /flexibility for improved overall functional hand use.      Long Term (by discharge):     1)   Patient to score at 72% or more on FOTO to demonstrate improved perception of functional LUE use.  2)   Pt will return to near to prior level of function for ADLs and household management reporting I or Mod I with ADLs (dressing, feeding, grooming, toileting).   3)   Increase  strength 5-10 to improve functional LUE use during gardening tasks**    Plan     Discussed Plan of Care with patient:  Yes  Updates/Grading for next session: F/U on scar maturation    Wen Ramirez, OT

## 2018-08-06 ENCOUNTER — CLINICAL SUPPORT (OUTPATIENT)
Dept: REHABILITATION | Facility: HOSPITAL | Age: 73
End: 2018-08-06
Attending: ORTHOPAEDIC SURGERY
Payer: MEDICARE

## 2018-08-06 DIAGNOSIS — M25.639 DECREASED ROM OF WRIST: ICD-10-CM

## 2018-08-06 PROCEDURE — G8988 SELF CARE GOAL STATUS: HCPCS | Mod: CJ,PO

## 2018-08-06 PROCEDURE — G8989 SELF CARE D/C STATUS: HCPCS | Mod: CJ,PO

## 2018-08-06 PROCEDURE — 97110 THERAPEUTIC EXERCISES: CPT | Mod: PO

## 2018-08-06 PROCEDURE — 97018 PARAFFIN BATH THERAPY: CPT | Mod: PO,59

## 2018-08-06 PROCEDURE — 97140 MANUAL THERAPY 1/> REGIONS: CPT | Mod: PO

## 2018-08-06 NOTE — PROGRESS NOTES
"                            Occupational Therapy Daily Treatment Note                                              And Discharge Summary      Name: Magdalena Mane  Clinic Number: [id number]    Therapy Diagnosis:   Encounter Diagnosis   Name Primary?    Decreased ROM of wrist      Physician: Mary Moore, *    Physician Orders: eval and treat  Medical Diagnosis: S/P ganglionectomy on LUE  Evaluation Date: 7/16/18  Insurance Authorization period Expiration: 12/31/18  Plan of Care Certification Period: 8/10/18  Date of Return to MD: 8/8/18    Visit # / Visits authorized: 5 / 20  Time In:9am  Time Out: 10am  Total Billable Time: 45 minutes    Precautions: Standard    Subjective     Pt reports: "It feels excellet"  She was compliant with home exercise program given last session.   Response to previous treatment: Pt reports she was able to lift a gallon of water with no difficulties     Pain: 2/10 with movement   Location: left wrist    Objective     Edema: Circumferential measurements: In centimters       Right Left   MPs 18.3 cm 17.8 cm (-1.2)   PWC (Proximal Wrist Crease) 16.5 cm 16.3 cm (-1.0)         Range of Motion:   Wrist ROM: Left  Active    7/16/2018 8/6/2018   Flexion 45 60 (+15)   Extension 50 60 (+10)   Radial Deviation 28 25   Ulnar Deviation 34 30    Strength: (TJ Dynamometer in lbs.) Average 3 trials, Position II:       7/16/2018 7/16/2018     Left Right   Rung II 32# (+7) 38#         Pinch Strength (Measured in psi)       7/16/2018 7/16/2018     Left Right   Key Pinch 5 psi 11 psi   3pt Pinch 4 psi 9 psi   2pt Pinch 2 psi 5 psi         CMS Impairment/Limitation/Restriction for FOTO Survey     Therapist reviewed FOTO scores for Magdalena Mane on 7/16/2018.   FOTO documents entered into [employer] - see Media section.     Category: Self Care    Current : CJ = at least 20% but < 40% impaired, limited or restricted  Goal: CJ = at least 20% but < 40% impaired, limited or restricted       "        Magdalena received the following supervised modalities after being cleared for contradictions for 10 minutes:   -Patient received paraffin bath to L hand for 10 minutes to increase blood flow, circulation, pain management and for tissue elasticity prior to therex.     Magdalena received the following direct contact modalities after being cleared for contraindications for 8 minutes:  -Patient received ultrasound to  L area to increase blood flow, circulation, tissue elasticity, pain management and for wound/scar management for 8 minutes @ 3.3 Mhz, Intensity .8 w/cm2 at 100% duty cycle.     Magdalena received the following manual therapy techniques for 15 minutes:   -Performed retrograde followed by scar massage to L wrist area for 10 minutes with vibrator tool to decrease adhesions and improve tensile glide.   -Dycem and scar pump x 5 min for scar remodeling     Magdalena received therapeutic exercises for 20 minutes in fluidotherapy including:  -Wrist flex/ext; rd/ud; sup pro with 1# weight   -Ball squeeze       Home Exercises and Education Provided     Written Home Exercises Provided: yes. Wrist ROM and putty program added to HEP.   Exercises were reviewed and Magdalena was able to demonstrate them prior to the end of the session.  Magdalena demonstrated good  understanding of the education provided.   .   See EMR under Patient Instructions for exercises provided today, 8/6/18.     Magdalena demonstrated good  understanding of the education provided.     Assessment     Pt has met all goals and is appropriate for discharge at this time.     Anticipated barriers to occupational therapy: none  Pt's spiritual, cultural and educational needs considered and pt agreeable to plan of care and goals.    Goals:  *Short Term (3 weeks on 8/10/18):  1)   Patient to be IND with HEP and modalities for pain management -----met 8/6/18  2)   Increase  strength 2-5 lbs. to grasp objects during cutting tasks-----met 8/6/18  3)   Increase pinch  1-3 psis for increased LUE during gardening -----not met 8/6/18  4)   Decrease edema .2-.3 cm to increase joint mobility /flexibility for improved overall functional hand use. -----met 8/6/18     Long Term (by discharge):     1)   Patient to score at 72% or more on FOTO to demonstrate improved perception of functional LUE use.-----met 8/6/18  2)   Pt will return to near to prior level of function for ADLs and household management reporting I or Mod I with ADLs (dressing, feeding, grooming, toileting). -----met 8/6/18  3)   Increase  strength 5-10 to improve functional LUE use during gardening tasks -----met 8/6/18    Plan     Discharge with HEP , follow up PRN if needed     Wen Ramirez, OT

## 2018-08-06 NOTE — PATIENT INSTRUCTIONS
Home Exercise Program: EVERY OTHER DAY      1. Putty :     Squeeze putty in hand trying to keep it round by rotating putty after each squeeze. Push fingers through putty to palm each time. Avoid excessive pain. 20 squeezes, 3 times per day.     2. Putty Pinch:    Roll up the putty to create a small tubular section. Next, pinch the putty and repeat down the section with just your index finger and your thumb.  Repeat with your index and middle finger with your thumb. Try to avoid hyperextension of the thumb.   Repeat each direction 15 pinches. 3 times per day.      3.  Thumb Putty Pinch:    Hold the putty at the top of your hand. Squeeze the putty between your thumb and the side of your 2nd finger as shown. Repeat 15 pinches. 3 times per day.      WRIST: Flexion (Weight)        Start with palm up, forearm resting on thigh. Holding weight, bend wrist to raise hand.  Use 2 lb weight.  15 reps per set, 2 sets per day, 3 days per week     WRIST: Extension (Weight)        Start with palm down, forearm resting on thigh. Holding weight, bend wrist to raise hand. Use 2 lb weight.  15 reps per set, 2 sets per day, 3 days per week  Copyright © [employer]. All rights reserved.   Wrist Ulnar Deviation: Resisted         With right arm at side, thumb forward, weight in hand, bend wrist forward. Use 2 lb weight.  15 reps per set, 2 sets per day, 3 days per week    Copyright © [employer]. All rights reserved.   Wrist Radial Deviation: Resisted         With right arm at side, thumb forward,  bend wrist forward. Use 2 lb weight.  15 reps per set, 2 sets per day, 3 days per week

## 2018-08-08 ENCOUNTER — OFFICE VISIT (OUTPATIENT)
Dept: ORTHOPEDICS | Facility: CLINIC | Age: 73
End: 2018-08-08
Payer: MEDICARE

## 2018-08-08 VITALS
HEART RATE: 56 BPM | SYSTOLIC BLOOD PRESSURE: 131 MMHG | WEIGHT: 148 LBS | DIASTOLIC BLOOD PRESSURE: 67 MMHG | BODY MASS INDEX: 27.94 KG/M2 | HEIGHT: 61 IN

## 2018-08-08 DIAGNOSIS — Z47.89 ORTHOPEDIC AFTERCARE: ICD-10-CM

## 2018-08-08 DIAGNOSIS — M67.40 GANGLION CYST: Primary | ICD-10-CM

## 2018-08-08 PROCEDURE — 99214 OFFICE O/P EST MOD 30 MIN: CPT | Mod: PBBFAC | Performed by: PHYSICIAN ASSISTANT

## 2018-08-08 PROCEDURE — 99999 PR PBB SHADOW E&M-EST. PATIENT-LVL IV: CPT | Mod: PBBFAC,,, | Performed by: PHYSICIAN ASSISTANT

## 2018-08-08 PROCEDURE — 99024 POSTOP FOLLOW-UP VISIT: CPT | Mod: POP,,, | Performed by: PHYSICIAN ASSISTANT

## 2018-08-08 NOTE — PROGRESS NOTES
"Ms. Mane is here today for a post-operative visit.  She is 42 days status post Left wrist ganglion excisional biopsy by Dr. Moore on 6/27/18. She reports that she is doing well.  Pain is mild and intermittent.  She has been attending OT.  She denies fever, chills, and sweats since the time of the surgery.       Physical exam:    Vitals:    08/08/18 1117   BP: 131/67   Pulse: (!) 56   Weight: 67.1 kg (148 lb)   Height: 5' 1" (1.549 m)   PainSc:   3   PainLoc: Hand     Vital signs are stable, patient is afebrile.  Patient is well dressed and well groomed, no acute distress.  Alert and oriented to person, place, and time.  Incision is healing well - clean, dry, and intact.  There is no erythema or exudate.  There is no sign of any infection. She is NVI.  Good wrist ROM.    Assessment: 42 days status post Left wrist ganglion excisional biopsy    Plan:  Magdalena was seen today for post-op evaluation.    Diagnoses and all orders for this visit:    Ganglion cyst    Orthopedic aftercare            - Gradually increase lifting to normal  - Continue HEP, OT as needed  - Follow up as needed  - Call with questions or concerns    "

## 2018-08-15 ENCOUNTER — PATIENT OUTREACH (OUTPATIENT)
Dept: OTHER | Facility: OTHER | Age: 73
End: 2018-08-15

## 2018-08-15 NOTE — PROGRESS NOTES
Last 5 Patient Entered Readings                                      Current 30 Day Average: 128/70     Recent Readings 8/12/2018 7/21/2018 7/18/2018 7/11/2018 7/11/2018    SBP (mmHg) 134 116 133 116 89    DBP (mmHg) 71 70 70 60 50    Pulse 56 59 56 57 56        Hypertension Medications             amLODIPine (NORVASC) 5 MG tablet TAKE 1 TABLET ONE TIME DAILY    atenolol (TENORMIN) 25 MG tablet TAKE 1 TABLET ONE TIME DAILY    irbesartan-hydrochlorothiazide (AVALIDE) 150-12.5 mg per tablet Take 2 tablets by mouth once daily. Stop valsartan hctz 320/25.        Plan:   Called patient to follow up since changing valsartan hctz 320/25 to irbesartan hctz 150/12.5, reviewed BP readings. Per 2017 ACC/ AHA HTN guidelines  (goal of BP < 130/80), current 30-day average is well controlled.  LVM, requested patient call back at her convenience if she has any questions or concerns.  Will continue to monitor. WCB in 3 months, sooner if BP begins to trend up or down.

## 2018-08-17 ENCOUNTER — PES CALL (OUTPATIENT)
Dept: ADMINISTRATIVE | Facility: CLINIC | Age: 73
End: 2018-08-17

## 2018-08-22 NOTE — PROGRESS NOTES
Last 5 Patient Entered Readings                                      Current 30 Day Average: 134/71     Recent Readings 8/12/2018 7/21/2018 7/18/2018 7/11/2018 7/11/2018    SBP (mmHg) 134 116 133 116 89    DBP (mmHg) 71 70 70 60 50    Pulse 56 59 56 57 56            Digital Medicine: Health  Follow Up    Left voicemail to follow up with Mrs. Magdalena Mane.  Current BP average 134/71 mmHg is not at goal, <130/80.
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
## 2018-09-05 ENCOUNTER — PATIENT MESSAGE (OUTPATIENT)
Dept: ADMINISTRATIVE | Facility: OTHER | Age: 73
End: 2018-09-05

## 2018-09-21 ENCOUNTER — PATIENT OUTREACH (OUTPATIENT)
Dept: OTHER | Facility: OTHER | Age: 73
End: 2018-09-21
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
## 2018-10-03 DIAGNOSIS — I10 HYPERTENSION, UNSPECIFIED TYPE: ICD-10-CM

## 2018-10-03 RX ORDER — IRBESARTAN AND HYDROCHLOROTHIAZIDE 150; 12.5 MG/1; MG/1
2 TABLET, FILM COATED ORAL DAILY
Qty: 180 TABLET | Refills: 3 | Status: SHIPPED | OUTPATIENT
Start: 2018-10-03 | End: 2019-06-10 | Stop reason: SDUPTHER

## 2018-10-22 ENCOUNTER — PATIENT OUTREACH (OUTPATIENT)
Dept: OTHER | Facility: OTHER | Age: 73
End: 2018-10-22

## 2018-10-22 NOTE — PROGRESS NOTES
Last 5 Patient Entered Readings                                      Current 30 Day Average: 117/70     Recent Readings 10/18/2018 9/7/2018 8/31/2018 8/22/2018 8/12/2018    SBP (mmHg) 117 127 111 122 134    DBP (mmHg) 70 71 64 63 71    Pulse 62 61 51 50 56          Digital Medicine: Health  Follow Up    Lifestyle Modifications:    1.Dietary Modifications (Sodium intake <2,000mg/day, food labels, dining out): Deferred    2.Physical Activity: Deferred    3.Medication Therapy: Patient has been compliant with the medication regimen.    4.Patient has the following medication side effects/concerns:   (Frequency/Alleviating factors/Precipitating factors, etc.)     Patient was in the middle of something when I called. She is doing well and will try to get a BP reading in today. She is going out of town from 10/24-11/6. I will place her on hiatus during that time. She will reach out in the meantime if she needs anything.     Follow up with Mrs. Magdalena Mane completed. No further questions or concerns. Will continue to follow up to achieve health goals.

## 2018-11-07 ENCOUNTER — PATIENT OUTREACH (OUTPATIENT)
Dept: OTHER | Facility: OTHER | Age: 73
End: 2018-11-07

## 2018-11-07 NOTE — PROGRESS NOTES
Last 5 Patient Entered Readings                                      Current 30 Day Average: 113/64     Recent Readings 10/24/2018 10/18/2018 9/7/2018 8/31/2018 8/22/2018    SBP (mmHg) 108 117 127 111 122    DBP (mmHg) 57 70 71 64 63    Pulse 53 62 61 51 50        Hypertension Medications             amLODIPine (NORVASC) 5 MG tablet TAKE 1 TABLET ONE TIME DAILY    atenolol (TENORMIN) 25 MG tablet TAKE 1 TABLET ONE TIME DAILY    irbesartan-hydrochlorothiazide (AVALIDE) 150-12.5 mg per tablet Take 2 tablets by mouth once daily. Stop valsartan hctz 320/25.        Plan:   Called patient to follow up, reviewed BP readings. Per 2017 ACC/ AHA HTN guidelines  (goal of BP < 130/80), current 30-day average is well controlled.  LVM, requested patient call back at her convenience if she has any questions or concerns, and to continue to take at least weekly BP readings.   Will continue to monitor. WCB in 6 months, sooner if BP begins to trend up or down.

## 2018-12-20 DIAGNOSIS — M17.0 PRIMARY OSTEOARTHRITIS OF BOTH KNEES: ICD-10-CM

## 2018-12-20 RX ORDER — TRAMADOL HYDROCHLORIDE 50 MG/1
TABLET ORAL
Qty: 15 TABLET | Refills: 5 | Status: CANCELLED | OUTPATIENT
Start: 2018-12-20

## 2018-12-26 DIAGNOSIS — M17.0 PRIMARY OSTEOARTHRITIS OF BOTH KNEES: ICD-10-CM

## 2018-12-27 RX ORDER — TRAMADOL HYDROCHLORIDE 50 MG/1
TABLET ORAL
Qty: 30 TABLET | Refills: 2 | Status: SHIPPED | OUTPATIENT
Start: 2018-12-27 | End: 2019-06-10 | Stop reason: SDUPTHER

## 2019-01-09 ENCOUNTER — PATIENT OUTREACH (OUTPATIENT)
Dept: OTHER | Facility: OTHER | Age: 74
End: 2019-01-09

## 2019-01-09 NOTE — PROGRESS NOTES
Last 5 Patient Entered Readings                                      Current 30 Day Average: 122/63     Recent Readings 1/7/2019 12/30/2018 12/29/2018 12/20/2018 12/11/2018    SBP (mmHg) 127 109 134 123 121    DBP (mmHg) 58 53 65 68 61    Pulse 54 56 50 60 56        Digital Medicine: Health  Follow Up    Lifestyle Modifications:    1.Dietary Modifications (Sodium intake <2,000mg/day, food labels, dining out): Deferred    2.Physical Activity: Patient stated that is no longer going to the gym but she stays active by walking and gardening around her home. She plans to start walking around the neighborhood more now that the weather is better.     3.Medication Therapy: Patient has been compliant with the medication regimen. Patient is doing well on her current regimen and she denies symptoms/side effects.  Patient mentioned that she mixed up her BP medication twice in the past 2 weeks (due to the holidays) and took her evening medication in the morning and the morning medication in the evening (she even thinks she may have missed dose of medication). She attributes her higher reading on 12/29 to that. She is now back on track with taking her medication as prescribed.     4.Patient has the following medication side effects/concerns:   (Frequency/Alleviating factors/Precipitating factors, etc.)     Follow up with Mrs. Magdalena Mane completed. No further questions or concerns. Will continue to follow up to achieve health goals.

## 2019-01-09 NOTE — PROGRESS NOTES
Last 5 Patient Entered Readings                                      Current 30 Day Average: 122/63     Recent Readings 1/7/2019 12/30/2018 12/29/2018 12/20/2018 12/11/2018    SBP (mmHg) 127 109 134 123 121    DBP (mmHg) 58 53 65 68 61    Pulse 54 56 50 60 56            Digital Medicine: Health  Follow Up    Left voicemail to follow up with Mrs. Magdalena Mane.  Current BP average 122/63 mmHg is at goal, <130/80.

## 2019-01-10 ENCOUNTER — PES CALL (OUTPATIENT)
Dept: ADMINISTRATIVE | Facility: CLINIC | Age: 74
End: 2019-01-10

## 2019-01-18 ENCOUNTER — OFFICE VISIT (OUTPATIENT)
Dept: INTERNAL MEDICINE | Facility: CLINIC | Age: 74
End: 2019-01-18
Payer: MEDICARE

## 2019-01-18 VITALS
WEIGHT: 156.06 LBS | DIASTOLIC BLOOD PRESSURE: 62 MMHG | HEART RATE: 64 BPM | SYSTOLIC BLOOD PRESSURE: 118 MMHG | HEIGHT: 61 IN | BODY MASS INDEX: 29.47 KG/M2

## 2019-01-18 DIAGNOSIS — D50.9 IRON DEFICIENCY ANEMIA, UNSPECIFIED IRON DEFICIENCY ANEMIA TYPE: ICD-10-CM

## 2019-01-18 DIAGNOSIS — M15.9 PRIMARY OSTEOARTHRITIS INVOLVING MULTIPLE JOINTS: ICD-10-CM

## 2019-01-18 DIAGNOSIS — I10 ESSENTIAL HYPERTENSION: ICD-10-CM

## 2019-01-18 DIAGNOSIS — Z00.00 ENCOUNTER FOR PREVENTIVE HEALTH EXAMINATION: Primary | ICD-10-CM

## 2019-01-18 PROBLEM — M25.639 DECREASED ROM OF WRIST: Status: RESOLVED | Noted: 2018-07-16 | Resolved: 2019-01-18

## 2019-01-18 PROBLEM — Z12.11 COLON CANCER SCREENING: Status: RESOLVED | Noted: 2017-06-28 | Resolved: 2019-01-18

## 2019-01-18 PROCEDURE — 99999 PR PBB SHADOW E&M-EST. PATIENT-LVL IV: CPT | Mod: PBBFAC,,, | Performed by: NURSE PRACTITIONER

## 2019-01-18 PROCEDURE — G0439 PR MEDICARE ANNUAL WELLNESS SUBSEQUENT VISIT: ICD-10-PCS | Mod: S$GLB,,, | Performed by: NURSE PRACTITIONER

## 2019-01-18 PROCEDURE — 99214 OFFICE O/P EST MOD 30 MIN: CPT | Mod: PBBFAC,PO | Performed by: NURSE PRACTITIONER

## 2019-01-18 PROCEDURE — 99999 PR PBB SHADOW E&M-EST. PATIENT-LVL IV: ICD-10-PCS | Mod: PBBFAC,,, | Performed by: NURSE PRACTITIONER

## 2019-01-18 PROCEDURE — G0439 PPPS, SUBSEQ VISIT: HCPCS | Mod: S$GLB,,, | Performed by: NURSE PRACTITIONER

## 2019-01-18 PROCEDURE — 90662 IIV NO PRSV INCREASED AG IM: CPT | Mod: PBBFAC,PO

## 2019-01-18 RX ORDER — FERROUS SULFATE 324(65)MG
325 TABLET, DELAYED RELEASE (ENTERIC COATED) ORAL
COMMUNITY
End: 2022-03-14

## 2019-01-18 NOTE — PROGRESS NOTES
"Magdalena Mane presented for a  Medicare AWV and comprehensive Health Risk Assessment today. The following components were reviewed and updated:    · Medical history  · Family History  · Social history  · Allergies and Current Medications  · Health Risk Assessment  · Health Maintenance  · Care Team     ** See Completed Assessments for Annual Wellness Visit within the encounter summary.**       The following assessments were completed:  · Living Situation  · CAGE  · Depression Screening  · Timed Get Up and Go  · Whisper Test  · Cognitive Function Screening  ·   ·   ·   · Nutrition Screening  · ADL Screening  · PAQ Screening    Vitals:    01/18/19 1144   BP: 118/62   BP Location: Left arm   Pulse: 64   Weight: 70.8 kg (156 lb 1.4 oz)   Height: 5' 1" (1.549 m)     Body mass index is 29.49 kg/m².  Physical Exam   Constitutional: She is oriented to person, place, and time. She appears well-developed and well-nourished.   HENT:   Head: Normocephalic.   Cardiovascular: Normal rate and regular rhythm.   Pulmonary/Chest: Effort normal and breath sounds normal.   Abdominal: Soft. Bowel sounds are normal.   Musculoskeletal: Normal range of motion.   Neurological: She is alert and oriented to person, place, and time.   Skin: Skin is warm and dry.   Psychiatric: She has a normal mood and affect.   Nursing note and vitals reviewed.        Diagnoses and health risks identified today and associated recommendations/orders:    1. Encounter for preventive health examination  Here for Health Risk Assessment/Annual Wellness Visit.  Health maintenance reviewed and updated. Follow up in one year.    2. Essential hypertension  Chronic, stable on current medications. Followed by PCP.    3. Iron deficiency anemia, unspecified iron deficiency anemia type  Chronic, stable on current medication. Followed by PCP.    4. Primary osteoarthritis involving multiple joints  Chronic, stable on current medications. Followed by PCP, " Orthopedics      Provided Magdalena with a 5-10 year written screening schedule and personal prevention plan. Recommendations were developed using the USPSTF age appropriate recommendations. Education, counseling, and referrals were provided as needed. After Visit Summary printed and given to patient which includes a list of additional screenings\tests needed.    Follow-up in about 4 months (around 5/30/2019). with PCP  Genoveva Abrams NP

## 2019-01-18 NOTE — PATIENT INSTRUCTIONS
Counseling and Referral of Other Preventative  (Italic type indicates deductible and co-insurance are waived)    Patient Name: Magdalena Mane  Today's Date: 1/18/2019    Health Maintenance       Date Due Completion Date    Influenza Vaccine 08/01/2018 11/22/2017 - done today    Lipid Panel 05/23/2019 5/23/2018    Mammogram 06/12/2019 6/12/2018    DEXA SCAN 05/08/2021 5/8/2017    TETANUS VACCINE 10/09/2025 10/9/2015    Colonoscopy 06/28/2027 6/28/2017        No orders of the defined types were placed in this encounter.    The following information is provided to all patients.  This information is to help you find resources for any of the problems found today that may be affecting your health:                Living healthy guide: www.[facility].[place].gov      Understanding Diabetes: www.diabetes.org      Eating healthy: www.cdc.gov/healthyweight      [facility] home safety checklist: www.cdc.gov/steadi/patient.html      Agency on Aging: www.goea.[place].gov      Alcoholics anonymous (AA): www.aa.org      Physical Activity: www.daria.nih.gov/aa5njnd      Tobacco use: www.quitwithusla.org

## 2019-01-24 ENCOUNTER — OFFICE VISIT (OUTPATIENT)
Dept: OPTOMETRY | Facility: CLINIC | Age: 74
End: 2019-01-24
Payer: MEDICARE

## 2019-01-24 DIAGNOSIS — I10 ESSENTIAL HYPERTENSION: Primary | ICD-10-CM

## 2019-01-24 DIAGNOSIS — H52.4 PRESBYOPIA: ICD-10-CM

## 2019-01-24 DIAGNOSIS — H25.13 NS (NUCLEAR SCLEROSIS), BILATERAL: ICD-10-CM

## 2019-01-24 DIAGNOSIS — H10.13 CONJUNCTIVITIS, ALLERGIC, BILATERAL: ICD-10-CM

## 2019-01-24 PROCEDURE — 92004 COMPRE OPH EXAM NEW PT 1/>: CPT | Mod: S$PBB,,, | Performed by: OPTOMETRIST

## 2019-01-24 PROCEDURE — 99212 OFFICE O/P EST SF 10 MIN: CPT | Mod: PBBFAC,PO | Performed by: OPTOMETRIST

## 2019-01-24 PROCEDURE — 99999 PR PBB SHADOW E&M-EST. PATIENT-LVL II: CPT | Mod: PBBFAC,,, | Performed by: OPTOMETRIST

## 2019-01-24 PROCEDURE — 99999 PR PBB SHADOW E&M-EST. PATIENT-LVL II: ICD-10-PCS | Mod: PBBFAC,,, | Performed by: OPTOMETRIST

## 2019-01-24 PROCEDURE — 92015 DETERMINE REFRACTIVE STATE: CPT | Mod: ,,, | Performed by: OPTOMETRIST

## 2019-01-24 PROCEDURE — 92015 PR REFRACTION: ICD-10-PCS | Mod: ,,, | Performed by: OPTOMETRIST

## 2019-01-24 PROCEDURE — 92004 PR EYE EXAM, NEW PATIENT,COMPREHESV: ICD-10-PCS | Mod: S$PBB,,, | Performed by: OPTOMETRIST

## 2019-01-24 NOTE — PROGRESS NOTES
HPI     Last eye exam 4-5 years ago   Needs new glasses using +3.75 oc reading glasses for Distance and near  No flashes or floaters   Seasonal allergies. Take allergy meds  No drops  No eye surgeries      Last edited by Miranda Mckeon on 1/24/2019  2:52 PM. (History)            Assessment /Plan     For exam results, see Encounter Report.    Essential hypertension   No retinopathy, monitor yearly    NS (nuclear sclerosis), bilateral   MIld, monitor    Conjunctivitis, allergic, bilateral   [place]away BID /PRN    Presbyopia   Rx specs    RTC 1 year, sooner PRN

## 2019-02-07 ENCOUNTER — PATIENT OUTREACH (OUTPATIENT)
Dept: OTHER | Facility: OTHER | Age: 74
End: 2019-02-07

## 2019-02-07 NOTE — PROGRESS NOTES
Last 5 Patient Entered Readings                                      Current 30 Day Average: 127/68     Recent Readings 2/5/2019 1/28/2019 1/22/2019 1/14/2019 1/14/2019    SBP (mmHg) 118 121 139 130 154    DBP (mmHg) 67 65 72 68 70    Pulse 56 56 51 60 60            Digital Medicine: Health  Follow Up    Left voicemail to follow up with Anastasiya Magdalena OLAMIDE Mane.  Current BP average 127/68 mmHg is at goal, <130/80.

## 2019-03-08 ENCOUNTER — PATIENT MESSAGE (OUTPATIENT)
Dept: FAMILY MEDICINE | Facility: CLINIC | Age: 74
End: 2019-03-08

## 2019-03-08 DIAGNOSIS — D50.9 IRON DEFICIENCY ANEMIA, UNSPECIFIED IRON DEFICIENCY ANEMIA TYPE: ICD-10-CM

## 2019-03-08 DIAGNOSIS — I10 ESSENTIAL HYPERTENSION: Primary | ICD-10-CM

## 2019-03-11 ENCOUNTER — PATIENT MESSAGE (OUTPATIENT)
Dept: FAMILY MEDICINE | Facility: CLINIC | Age: 74
End: 2019-03-11

## 2019-03-12 NOTE — PROGRESS NOTES
Last 5 Patient Entered Readings                                      Current 30 Day Average: 121/60     Recent Readings 3/7/2019 2/24/2019 2/14/2019 2/5/2019 1/28/2019    SBP (mmHg) 118 120 125 118 121    DBP (mmHg) 58 55 66 67 65    Pulse 57 59 57 56 56          Digital Medicine: Health  Follow Up    Lifestyle Modifications:    1.Dietary Modifications (Sodium intake <2,000mg/day, food labels, dining out): Patient is doing well with maintaining a low sodium diet. She stated that she cannot even stand the taste of salt. Uses low sodium or no salted versions of food (like unsalted peanut butter). Patient will continue keeping her sodium intake <2000 mg per day.    2.Physical Activity: Deferred    3.Medication Therapy: Patient has been compliant with the medication regimen. Patient is doing well on her current BP medication regimen. She denies symptoms/side effects.     4.Patient has the following medication side effects/concerns:   (Frequency/Alleviating factors/Precipitating factors, etc.)     Follow up with Mrs. Magdalena Maen completed. No further questions or concerns. Will continue to follow up to achieve health goals.

## 2019-04-08 ENCOUNTER — PATIENT MESSAGE (OUTPATIENT)
Dept: FAMILY MEDICINE | Facility: CLINIC | Age: 74
End: 2019-04-08

## 2019-04-10 ENCOUNTER — PATIENT OUTREACH (OUTPATIENT)
Dept: OTHER | Facility: OTHER | Age: 74
End: 2019-04-10

## 2019-04-10 NOTE — PROGRESS NOTES
Last 5 Patient Entered Readings                                      Current 30 Day Average: 129/63     Recent Readings 4/4/2019 3/28/2019 3/18/2019 3/17/2019 3/7/2019    SBP (mmHg) 119 118 139 139 118    DBP (mmHg) 57 61 63 72 58    Pulse 57 53 60 60 57            Digital Medicine: Health  Follow Up    Left voicemail to follow up with Anastasiya Magdalenachristos Mane.  Current BP average 129/63 mmHg is at goal, <130/80.

## 2019-04-24 ENCOUNTER — PATIENT OUTREACH (OUTPATIENT)
Dept: OTHER | Facility: OTHER | Age: 74
End: 2019-04-24

## 2019-04-24 ENCOUNTER — TELEPHONE (OUTPATIENT)
Dept: FAMILY MEDICINE | Facility: CLINIC | Age: 74
End: 2019-04-24

## 2019-04-24 DIAGNOSIS — D50.8 OTHER IRON DEFICIENCY ANEMIA: Primary | ICD-10-CM

## 2019-04-24 NOTE — TELEPHONE ENCOUNTER
----- Message from Valentina Shepherd sent at 4/24/2019  4:10 PM CDT -----  Contact: pt/ [phone]  Patient has annual labs scheduled on 6/3 and would like to make iron is added to blood work. Please advise.

## 2019-04-24 NOTE — PROGRESS NOTES
Last 5 Patient Entered Readings                                      Current 30 Day Average: 120/59     Recent Readings 4/22/2019 4/14/2019 4/4/2019 3/28/2019 3/18/2019    SBP (mmHg) 120 123 119 118 139    DBP (mmHg) 58 59 57 61 63    Pulse 55 57 57 53 60        Hypertension Medications             amLODIPine (NORVASC) 5 MG tablet TAKE 1 TABLET ONE TIME DAILY    atenolol (TENORMIN) 25 MG tablet TAKE 1 TABLET ONE TIME DAILY    irbesartan-hydrochlorothiazide (AVALIDE) 150-12.5 mg per tablet Take 2 tablets by mouth once daily. Stop valsartan hctz 320/25.        Plan:   Called patient to follow up, reviewed BP readings. Per 2017 ACC/ AHA HTN guidelines  (goal of BP < 130/80), current 30-day average is well controlled.  LVM, requested patient call back at her convenience if she has any questions or concerns.  Will continue to monitor. WCB in 6 months, sooner if BP begins to trend up or down.

## 2019-05-07 RX ORDER — ATENOLOL 25 MG/1
TABLET ORAL
Qty: 90 TABLET | Refills: 3 | Status: SHIPPED | OUTPATIENT
Start: 2019-05-07 | End: 2020-04-08 | Stop reason: SDUPTHER

## 2019-05-07 RX ORDER — AMLODIPINE BESYLATE 5 MG/1
TABLET ORAL
Qty: 90 TABLET | Refills: 3 | Status: SHIPPED | OUTPATIENT
Start: 2019-05-07 | End: 2020-03-20 | Stop reason: SDUPTHER

## 2019-05-08 NOTE — PROGRESS NOTES
Last 5 Patient Entered Readings                                      Current 30 Day Average: 123/59     Recent Readings 5/6/2019 4/22/2019 4/14/2019 4/4/2019 3/28/2019    SBP (mmHg) 126 120 123 119 118    DBP (mmHg) 61 58 59 57 61    Pulse 56 55 57 57 53          Digital Medicine: Health  Follow Up    Lifestyle Modifications:    1.Dietary Modifications (Sodium intake <2,000mg/day, food labels, dining out): Patient is doing well with maintaining a low sodium diet. She even brought her own food to 20x200 in order to avoid the highly salted foods.     2.Physical Activity: Patient has started walking more and doing more work in her garden. This helps her pain quite a bit.     3.Medication Therapy: Patient has been compliant with the medication regimen. Patient is doing well on her current BP medication regimen. She denies symptoms/side effects.     4.Patient has the following medication side effects/concerns: None  (Frequency/Alleviating factors/Precipitating factors, etc.)     Follow up with Mrs. Magdalena Mane completed. No further questions or concerns. Will continue to follow up to achieve health goals.

## 2019-05-27 ENCOUNTER — PATIENT OUTREACH (OUTPATIENT)
Dept: ADMINISTRATIVE | Facility: HOSPITAL | Age: 74
End: 2019-05-27

## 2019-05-27 DIAGNOSIS — Z12.31 ENCOUNTER FOR SCREENING MAMMOGRAM FOR BREAST CANCER: Primary | ICD-10-CM

## 2019-05-27 DIAGNOSIS — Z12.31 BREAST CANCER SCREENING BY MAMMOGRAM: ICD-10-CM

## 2019-05-27 DIAGNOSIS — Z12.39 SCREENING FOR BREAST CANCER: ICD-10-CM

## 2019-05-27 NOTE — PROGRESS NOTES
Pre-visit chart review completed. Pt eligible/ due for lipid panel and mammogram. Order pended for PCP review.

## 2019-06-03 ENCOUNTER — LAB VISIT (OUTPATIENT)
Dept: LAB | Facility: HOSPITAL | Age: 74
End: 2019-06-03
Attending: FAMILY MEDICINE
Payer: MEDICARE

## 2019-06-03 DIAGNOSIS — D50.9 IRON DEFICIENCY ANEMIA, UNSPECIFIED IRON DEFICIENCY ANEMIA TYPE: ICD-10-CM

## 2019-06-03 DIAGNOSIS — D50.8 OTHER IRON DEFICIENCY ANEMIA: ICD-10-CM

## 2019-06-03 DIAGNOSIS — I10 ESSENTIAL HYPERTENSION: ICD-10-CM

## 2019-06-03 LAB
ALBUMIN SERPL BCP-MCNC: 3.4 G/DL (ref 3.5–5.2)
ALP SERPL-CCNC: 81 U/L (ref 55–135)
ALT SERPL W/O P-5'-P-CCNC: 13 U/L (ref 10–44)
ANION GAP SERPL CALC-SCNC: 7 MMOL/L (ref 8–16)
AST SERPL-CCNC: 19 U/L (ref 10–40)
BASOPHILS # BLD AUTO: 0.06 K/UL (ref 0–0.2)
BASOPHILS NFR BLD: 0.9 % (ref 0–1.9)
BILIRUB SERPL-MCNC: 0.8 MG/DL (ref 0.1–1)
BUN SERPL-MCNC: 13 MG/DL (ref 8–23)
CALCIUM SERPL-MCNC: 9.5 MG/DL (ref 8.7–10.5)
CHLORIDE SERPL-SCNC: 98 MMOL/L (ref 95–110)
CHOLEST SERPL-MCNC: 157 MG/DL (ref 120–199)
CHOLEST/HDLC SERPL: 2.5 {RATIO} (ref 2–5)
CO2 SERPL-SCNC: 27 MMOL/L (ref 23–29)
CREAT SERPL-MCNC: 0.8 MG/DL (ref 0.5–1.4)
DIFFERENTIAL METHOD: ABNORMAL
EOSINOPHIL # BLD AUTO: 0.1 K/UL (ref 0–0.5)
EOSINOPHIL NFR BLD: 1.2 % (ref 0–8)
ERYTHROCYTE [DISTWIDTH] IN BLOOD BY AUTOMATED COUNT: 13.2 % (ref 11.5–14.5)
EST. GFR  (AFRICAN AMERICAN): >60 ML/MIN/1.73 M^2
EST. GFR  (NON AFRICAN AMERICAN): >60 ML/MIN/1.73 M^2
GLUCOSE SERPL-MCNC: 100 MG/DL (ref 70–110)
HCT VFR BLD AUTO: 35 % (ref 37–48.5)
HDLC SERPL-MCNC: 64 MG/DL (ref 40–75)
HDLC SERPL: 40.8 % (ref 20–50)
HGB BLD-MCNC: 11.3 G/DL (ref 12–16)
IMM GRANULOCYTES # BLD AUTO: 0.02 K/UL (ref 0–0.04)
IMM GRANULOCYTES NFR BLD AUTO: 0.3 % (ref 0–0.5)
IRON SERPL-MCNC: 91 UG/DL (ref 30–160)
LDLC SERPL CALC-MCNC: 80.4 MG/DL (ref 63–159)
LYMPHOCYTES # BLD AUTO: 1.1 K/UL (ref 1–4.8)
LYMPHOCYTES NFR BLD: 16.8 % (ref 18–48)
MCH RBC QN AUTO: 30.8 PG (ref 27–31)
MCHC RBC AUTO-ENTMCNC: 32.3 G/DL (ref 32–36)
MCV RBC AUTO: 95 FL (ref 82–98)
MONOCYTES # BLD AUTO: 0.7 K/UL (ref 0.3–1)
MONOCYTES NFR BLD: 11.3 % (ref 4–15)
NEUTROPHILS # BLD AUTO: 4.5 K/UL (ref 1.8–7.7)
NEUTROPHILS NFR BLD: 69.5 % (ref 38–73)
NONHDLC SERPL-MCNC: 93 MG/DL
NRBC BLD-RTO: 0 /100 WBC
PLATELET # BLD AUTO: 292 K/UL (ref 150–350)
PMV BLD AUTO: 10.9 FL (ref 9.2–12.9)
POTASSIUM SERPL-SCNC: 3.9 MMOL/L (ref 3.5–5.1)
PROT SERPL-MCNC: 6.2 G/DL (ref 6–8.4)
RBC # BLD AUTO: 3.67 M/UL (ref 4–5.4)
SATURATED IRON: 28 % (ref 20–50)
SODIUM SERPL-SCNC: 132 MMOL/L (ref 136–145)
TOTAL IRON BINDING CAPACITY: 324 UG/DL (ref 250–450)
TRANSFERRIN SERPL-MCNC: 219 MG/DL (ref 200–375)
TRIGL SERPL-MCNC: 63 MG/DL (ref 30–150)
TSH SERPL DL<=0.005 MIU/L-ACNC: 1.75 UIU/ML (ref 0.4–4)
WBC # BLD AUTO: 6.53 K/UL (ref 3.9–12.7)

## 2019-06-03 PROCEDURE — 36415 COLL VENOUS BLD VENIPUNCTURE: CPT | Mod: PO

## 2019-06-03 PROCEDURE — 80053 COMPREHEN METABOLIC PANEL: CPT

## 2019-06-03 PROCEDURE — 85025 COMPLETE CBC W/AUTO DIFF WBC: CPT

## 2019-06-03 PROCEDURE — 80061 LIPID PANEL: CPT

## 2019-06-03 PROCEDURE — 84443 ASSAY THYROID STIM HORMONE: CPT

## 2019-06-03 PROCEDURE — 83540 ASSAY OF IRON: CPT

## 2019-06-10 ENCOUNTER — OFFICE VISIT (OUTPATIENT)
Dept: PRIMARY CARE CLINIC | Facility: CLINIC | Age: 74
End: 2019-06-10
Payer: MEDICARE

## 2019-06-10 VITALS
HEIGHT: 61 IN | DIASTOLIC BLOOD PRESSURE: 64 MMHG | WEIGHT: 154.75 LBS | BODY MASS INDEX: 29.22 KG/M2 | TEMPERATURE: 99 F | SYSTOLIC BLOOD PRESSURE: 150 MMHG | RESPIRATION RATE: 16 BRPM

## 2019-06-10 DIAGNOSIS — I10 HYPERTENSION, UNSPECIFIED TYPE: ICD-10-CM

## 2019-06-10 DIAGNOSIS — M17.0 PRIMARY OSTEOARTHRITIS OF BOTH KNEES: ICD-10-CM

## 2019-06-10 DIAGNOSIS — Z00.00 ROUTINE GENERAL MEDICAL EXAMINATION AT A HEALTH CARE FACILITY: ICD-10-CM

## 2019-06-10 DIAGNOSIS — D50.9 IRON DEFICIENCY ANEMIA, UNSPECIFIED IRON DEFICIENCY ANEMIA TYPE: ICD-10-CM

## 2019-06-10 DIAGNOSIS — I10 ESSENTIAL HYPERTENSION: ICD-10-CM

## 2019-06-10 DIAGNOSIS — M81.0 OSTEOPOROSIS, UNSPECIFIED OSTEOPOROSIS TYPE, UNSPECIFIED PATHOLOGICAL FRACTURE PRESENCE: ICD-10-CM

## 2019-06-10 DIAGNOSIS — M15.9 PRIMARY OSTEOARTHRITIS INVOLVING MULTIPLE JOINTS: ICD-10-CM

## 2019-06-10 DIAGNOSIS — Z12.39 SCREENING BREAST EXAMINATION: Primary | ICD-10-CM

## 2019-06-10 PROCEDURE — 99999 PR PBB SHADOW E&M-EST. PATIENT-LVL IV: ICD-10-PCS | Mod: PBBFAC,,, | Performed by: FAMILY MEDICINE

## 2019-06-10 PROCEDURE — 99397 PER PM REEVAL EST PAT 65+ YR: CPT | Mod: S$PBB,,, | Performed by: FAMILY MEDICINE

## 2019-06-10 PROCEDURE — 99999 PR PBB SHADOW E&M-EST. PATIENT-LVL IV: CPT | Mod: PBBFAC,,, | Performed by: FAMILY MEDICINE

## 2019-06-10 PROCEDURE — 99214 OFFICE O/P EST MOD 30 MIN: CPT | Mod: PBBFAC,PN | Performed by: FAMILY MEDICINE

## 2019-06-10 PROCEDURE — 99397 PR PREVENTIVE VISIT,EST,65 & OVER: ICD-10-PCS | Mod: S$PBB,,, | Performed by: FAMILY MEDICINE

## 2019-06-10 RX ORDER — IRBESARTAN AND HYDROCHLOROTHIAZIDE 150; 12.5 MG/1; MG/1
2 TABLET, FILM COATED ORAL DAILY
Qty: 180 TABLET | Refills: 3 | Status: SHIPPED | OUTPATIENT
Start: 2019-06-10 | End: 2020-03-20 | Stop reason: SDUPTHER

## 2019-06-10 RX ORDER — TRAMADOL HYDROCHLORIDE 50 MG/1
TABLET ORAL
Qty: 30 TABLET | Refills: 2 | Status: SHIPPED | OUTPATIENT
Start: 2019-06-10 | End: 2020-01-10

## 2019-06-10 NOTE — PROGRESS NOTES
Subjective:       Patient ID: Magdalena Mane is a 74 y.o. female.    Chief Complaint: Annual Exam    73 yo presents for routine exam, last exam one year ago  Immunizations:  Needs Shingrix  Colonoscopy:  2017    Review of Systems   Constitutional: Negative.  Negative for activity change, appetite change, chills, diaphoresis, fatigue, fever and unexpected weight change.   HENT: Negative.  Negative for congestion, ear pain, hearing loss, rhinorrhea, sinus pressure, sore throat, tinnitus, trouble swallowing and voice change.    Eyes: Negative.  Negative for photophobia, pain and visual disturbance.   Respiratory: Negative.  Negative for cough, chest tightness and shortness of breath.    Cardiovascular: Negative.  Negative for chest pain, palpitations and leg swelling.   Gastrointestinal: Negative.  Negative for abdominal distention, abdominal pain, blood in stool, constipation, diarrhea, nausea and vomiting.   Genitourinary: Negative.  Negative for difficulty urinating, dysuria, frequency and hematuria.   Musculoskeletal: Negative.  Negative for arthralgias, back pain, joint swelling, neck pain and neck stiffness.   Skin: Negative for color change, pallor and rash.   Neurological: Negative.  Negative for dizziness, tremors, speech difficulty, weakness, light-headedness, numbness and headaches.   Hematological: Negative for adenopathy. Does not bruise/bleed easily.   Psychiatric/Behavioral: Negative for agitation, confusion, dysphoric mood, hallucinations and sleep disturbance. The patient is not nervous/anxious.        Objective:      Physical Exam   Constitutional: She is oriented to person, place, and time. She appears well-developed and well-nourished.   HENT:   Right Ear: Tympanic membrane, external ear and ear canal normal.   Left Ear: Tympanic membrane, external ear and ear canal normal.   Nose: Nose normal.   Mouth/Throat: Oropharynx is clear and moist. No posterior oropharyngeal erythema.   Eyes: Pupils are  equal, round, and reactive to light. Conjunctivae and EOM are normal.   Neck: Normal range of motion. Neck supple. No tracheal deviation present. No thyromegaly present.   Cardiovascular: Normal rate, regular rhythm, normal heart sounds and intact distal pulses.   Pulmonary/Chest: Effort normal. She has no wheezes. She has no rales. She exhibits no tenderness.   Abdominal: Soft. Bowel sounds are normal. She exhibits no distension and no mass. There is no rebound.   Musculoskeletal: Normal range of motion. She exhibits no edema or tenderness.   Lymphadenopathy:     She has no cervical adenopathy.   Neurological: She is alert and oriented to person, place, and time. She has normal reflexes. No cranial nerve deficit. Coordination normal.   Skin: Skin is warm and dry. No rash noted. No erythema.   Psychiatric: She has a normal mood and affect. Her behavior is normal.       Assessment:         1.  Physical exam  2.  Hypertension  3.  Vaginal atrophy  4.  Iron def anemia  5.  osteoporosis  Plan:       1.  Labs reviewed with pt  2.  Continue present medications  3.  BMD  4.  mammogram

## 2019-06-13 ENCOUNTER — PATIENT OUTREACH (OUTPATIENT)
Dept: OTHER | Facility: OTHER | Age: 74
End: 2019-06-13

## 2019-06-13 NOTE — PROGRESS NOTES
Last 5 Patient Entered Readings                                      Current 30 Day Average: 118/60     Recent Readings 6/10/2019 5/30/2019 5/23/2019 5/14/2019 5/6/2019    SBP (mmHg) 106 120 119 125 126    DBP (mmHg) 53 56 60 71 61    Pulse 51 57 57 90 56            Digital Medicine: Health  Follow Up    Left voicemail to follow up with Mrs. Magdalena Mane.  Current BP average 118/60 mmHg is at goal, <130/80.

## 2019-07-04 ENCOUNTER — PATIENT MESSAGE (OUTPATIENT)
Dept: PRIMARY CARE CLINIC | Facility: CLINIC | Age: 74
End: 2019-07-04

## 2019-07-25 ENCOUNTER — HOSPITAL ENCOUNTER (OUTPATIENT)
Dept: RADIOLOGY | Facility: HOSPITAL | Age: 74
Discharge: HOME | End: 2019-07-25
Attending: FAMILY MEDICINE
Payer: MEDICARE

## 2019-07-25 ENCOUNTER — HOSPITAL ENCOUNTER (OUTPATIENT)
Dept: RADIOLOGY | Facility: CLINIC | Age: 74
Discharge: HOME | End: 2019-07-25
Attending: FAMILY MEDICINE
Payer: MEDICARE

## 2019-07-25 DIAGNOSIS — Z12.39 SCREENING BREAST EXAMINATION: ICD-10-CM

## 2019-07-25 DIAGNOSIS — M81.0 OSTEOPOROSIS, UNSPECIFIED OSTEOPOROSIS TYPE, UNSPECIFIED PATHOLOGICAL FRACTURE PRESENCE: ICD-10-CM

## 2019-07-25 PROCEDURE — 77063 MAMMO DIGITAL SCREENING BILAT WITH TOMOSYNTHESIS_CAD: ICD-10-PCS | Mod: 26,,, | Performed by: RADIOLOGY

## 2019-07-25 PROCEDURE — 77067 MAMMO DIGITAL SCREENING BILAT WITH TOMOSYNTHESIS_CAD: ICD-10-PCS | Mod: 26,,, | Performed by: RADIOLOGY

## 2019-07-25 PROCEDURE — 77080 DXA BONE DENSITY AXIAL: CPT | Mod: 26,,, | Performed by: INTERNAL MEDICINE

## 2019-07-25 PROCEDURE — 77063 BREAST TOMOSYNTHESIS BI: CPT | Mod: 26,,, | Performed by: RADIOLOGY

## 2019-07-25 PROCEDURE — 77080 DEXA BONE DENSITY SPINE HIP: ICD-10-PCS | Mod: 26,,, | Performed by: INTERNAL MEDICINE

## 2019-07-25 PROCEDURE — 77067 SCR MAMMO BI INCL CAD: CPT | Mod: TC

## 2019-07-25 PROCEDURE — 77080 DXA BONE DENSITY AXIAL: CPT | Mod: TC

## 2019-07-25 PROCEDURE — 77067 SCR MAMMO BI INCL CAD: CPT | Mod: 26,,, | Performed by: RADIOLOGY

## 2019-09-25 ENCOUNTER — PATIENT OUTREACH (OUTPATIENT)
Dept: OTHER | Facility: OTHER | Age: 74
End: 2019-09-25

## 2019-09-25 NOTE — PROGRESS NOTES
Digital Medicine: Health  Follow-Up    The history is provided by the patient.     Follow Up  Follow-up reason(s): reading review      Readings are trending down Patient is unsure why her readings were elevated on 9/18 (in the 150 range). She stated that she took the cuff to the [facility], they looked at it, retook the reading and it went down to normal range. She has had her [place] cuff for 2 years so the [facility] suggested that she could continue using it for a few more months before having to replace it. If she continues to notice issues with her readings, she will go back to the [facility] to have them replace it.       Assessment/Plan    SDOH    Intervention/Plan    There are no preventive care reminders to display for this patient.    Last 5 Patient Entered Readings                                      Current 30 Day Average: 126/67     Recent Readings 9/18/2019 9/18/2019 9/18/2019 9/12/2019 9/7/2019    SBP (mmHg) 133 154 153 122 119    DBP (mmHg) 60 73 79 58 66    Pulse 56 57 51 55 93

## 2019-10-09 ENCOUNTER — PATIENT OUTREACH (OUTPATIENT)
Dept: OTHER | Facility: OTHER | Age: 74
End: 2019-10-09

## 2019-10-09 NOTE — PROGRESS NOTES
Digital Medicine: Clinician Follow-Up    The history is provided by the patient.     Follow Up  Follow-up reason(s): reading review         HPI:  Called patient to follow up. Patient reports adherence to medication regimen daily and denies missed doses. Patient denies hypotensive s/sx (lightheadedness, dizziness, nausea, fatigue); patient denies hypertensive s/sx (SOB, CP, severe headaches, changes in vision, dizziness, fatigue, confusion, anxiety, nosebleeds).     Last 5 Patient Entered Readings                                      Current 30 Day Average: 122/64     Recent Readings 10/5/2019 9/26/2019 9/18/2019 9/18/2019 9/18/2019    SBP (mmHg) 114 120 133 154 153    DBP (mmHg) 53 60 60 73 79    Pulse 55 55 56 57 51        Assessment:  Reviewed recent readings. Per 2017 ACC/ AHA HTN guidelines (goal of BP < 130/80), current 30-day average is well controlled.     Plan:  Continue current medication regimen.   Patients health , Sho Klein, will be following up as scheduled.   I will continue to monitor regularly and will follow-up in 6 months, sooner if blood pressure begins to trend upward or downward.     Current medication regimen:  Hypertension Medications             amLODIPine (NORVASC) 5 MG tablet TAKE 1 TABLET EVERY DAY    atenolol (TENORMIN) 25 MG tablet TAKE 1 TABLET EVERY DAY    irbesartan-hydrochlorothiazide (AVALIDE) 150-12.5 mg per tablet Take 2 tablets by mouth once daily. Stop valsartan hctz 320/25.         Patient denies having questions or concerns. Patient has my contact information and knows to call with any concerns or clinical changes. Instructed patient to call if BP remains > 130/80.

## 2019-11-04 ENCOUNTER — PATIENT OUTREACH (OUTPATIENT)
Dept: OTHER | Facility: OTHER | Age: 74
End: 2019-11-04

## 2020-01-02 ENCOUNTER — PATIENT OUTREACH (OUTPATIENT)
Dept: OTHER | Facility: OTHER | Age: 75
End: 2020-01-02

## 2020-01-10 ENCOUNTER — PATIENT MESSAGE (OUTPATIENT)
Dept: PRIMARY CARE CLINIC | Facility: CLINIC | Age: 75
End: 2020-01-10

## 2020-01-10 DIAGNOSIS — M17.0 PRIMARY OSTEOARTHRITIS OF BOTH KNEES: ICD-10-CM

## 2020-01-10 RX ORDER — TRAMADOL HYDROCHLORIDE 50 MG/1
TABLET ORAL
Qty: 30 TABLET | Refills: 0 | Status: SHIPPED | OUTPATIENT
Start: 2020-01-10 | End: 2020-01-21 | Stop reason: SDUPTHER

## 2020-01-13 ENCOUNTER — PES CALL (OUTPATIENT)
Dept: ADMINISTRATIVE | Facility: CLINIC | Age: 75
End: 2020-01-13

## 2020-01-21 ENCOUNTER — PATIENT MESSAGE (OUTPATIENT)
Dept: PRIMARY CARE CLINIC | Facility: CLINIC | Age: 75
End: 2020-01-21

## 2020-01-21 ENCOUNTER — PATIENT MESSAGE (OUTPATIENT)
Dept: OTHER | Facility: OTHER | Age: 75
End: 2020-01-21

## 2020-01-21 DIAGNOSIS — M17.0 PRIMARY OSTEOARTHRITIS OF BOTH KNEES: ICD-10-CM

## 2020-01-22 RX ORDER — TRAMADOL HYDROCHLORIDE 50 MG/1
TABLET ORAL
Qty: 30 TABLET | Refills: 0 | Status: SHIPPED | OUTPATIENT
Start: 2020-01-22 | End: 2020-07-28

## 2020-01-22 NOTE — PROGRESS NOTES
"On 1/21 @ 4:57pm, patient sent message to me: "Sorry I missed you call. Thank you for alerting me you are now my health counselor."     I replied 1/22 @ 7:33am: " Brandan Mane,   No worries on missing my call. Is there anything I can be helping you with? Your blood pressure has been looking good lately. How are you feeling? I am looking forward to working with you, please don't hesitate to reach out and let me know if there is any way that I can be helping you. My direct phone number is [phone]."    "

## 2020-01-25 ENCOUNTER — PATIENT MESSAGE (OUTPATIENT)
Dept: ADMINISTRATIVE | Facility: OTHER | Age: 75
End: 2020-01-25

## 2020-01-27 ENCOUNTER — PATIENT OUTREACH (OUTPATIENT)
Dept: ADMINISTRATIVE | Facility: HOSPITAL | Age: 75
End: 2020-01-27

## 2020-02-07 ENCOUNTER — PATIENT MESSAGE (OUTPATIENT)
Dept: ADMINISTRATIVE | Facility: OTHER | Age: 75
End: 2020-02-07

## 2020-02-10 ENCOUNTER — OFFICE VISIT (OUTPATIENT)
Dept: PRIMARY CARE CLINIC | Facility: CLINIC | Age: 75
End: 2020-02-10
Payer: MEDICARE

## 2020-02-10 ENCOUNTER — IMMUNIZATION (OUTPATIENT)
Dept: PHARMACY | Facility: CLINIC | Age: 75
End: 2020-02-10
Payer: MEDICARE

## 2020-02-10 VITALS
OXYGEN SATURATION: 97 % | SYSTOLIC BLOOD PRESSURE: 120 MMHG | WEIGHT: 150.81 LBS | BODY MASS INDEX: 28.47 KG/M2 | HEART RATE: 80 BPM | HEIGHT: 61 IN | TEMPERATURE: 98 F | DIASTOLIC BLOOD PRESSURE: 60 MMHG | RESPIRATION RATE: 19 BRPM

## 2020-02-10 DIAGNOSIS — R26.9 ABNORMALITY OF GAIT AND MOBILITY: ICD-10-CM

## 2020-02-10 DIAGNOSIS — I10 ESSENTIAL HYPERTENSION: ICD-10-CM

## 2020-02-10 DIAGNOSIS — Z99.89 DEPENDENCE ON OTHER ENABLING MACHINES AND DEVICES: ICD-10-CM

## 2020-02-10 DIAGNOSIS — Z23 NEED FOR IMMUNIZATION AGAINST INFLUENZA: ICD-10-CM

## 2020-02-10 DIAGNOSIS — M15.9 PRIMARY OSTEOARTHRITIS INVOLVING MULTIPLE JOINTS: ICD-10-CM

## 2020-02-10 DIAGNOSIS — Z00.00 ENCOUNTER FOR PREVENTIVE HEALTH EXAMINATION: Primary | ICD-10-CM

## 2020-02-10 DIAGNOSIS — N95.2 VAGINAL ATROPHY: ICD-10-CM

## 2020-02-10 DIAGNOSIS — E66.3 OVERWEIGHT: ICD-10-CM

## 2020-02-10 DIAGNOSIS — Z91.81 AT HIGH RISK FOR FALLS: ICD-10-CM

## 2020-02-10 PROCEDURE — 99999 PR PBB SHADOW E&M-EST. PATIENT-LVL V: CPT | Mod: PBBFAC,,, | Performed by: NURSE PRACTITIONER

## 2020-02-10 PROCEDURE — G0439 PPPS, SUBSEQ VISIT: HCPCS | Mod: S$GLB,,, | Performed by: NURSE PRACTITIONER

## 2020-02-10 PROCEDURE — 99499 UNLISTED E&M SERVICE: CPT | Mod: S$GLB,,, | Performed by: NURSE PRACTITIONER

## 2020-02-10 PROCEDURE — G0008 ADMIN INFLUENZA VIRUS VAC: HCPCS | Mod: S$GLB,,, | Performed by: NURSE PRACTITIONER

## 2020-02-10 PROCEDURE — 3074F SYST BP LT 130 MM HG: CPT | Mod: CPTII,S$GLB,, | Performed by: NURSE PRACTITIONER

## 2020-02-10 PROCEDURE — 90662 IIV NO PRSV INCREASED AG IM: CPT | Mod: S$GLB,,, | Performed by: NURSE PRACTITIONER

## 2020-02-10 PROCEDURE — 99499 RISK ADDL DX/OHS AUDIT: ICD-10-PCS | Mod: S$GLB,,, | Performed by: NURSE PRACTITIONER

## 2020-02-10 PROCEDURE — 3078F DIAST BP <80 MM HG: CPT | Mod: CPTII,S$GLB,, | Performed by: NURSE PRACTITIONER

## 2020-02-10 PROCEDURE — 3074F PR MOST RECENT SYSTOLIC BLOOD PRESSURE < 130 MM HG: ICD-10-PCS | Mod: CPTII,S$GLB,, | Performed by: NURSE PRACTITIONER

## 2020-02-10 PROCEDURE — 90662 FLU VACCINE - HIGH DOSE (65+) PRESERVATIVE FREE IM: ICD-10-PCS | Mod: S$GLB,,, | Performed by: NURSE PRACTITIONER

## 2020-02-10 PROCEDURE — 99999 PR PBB SHADOW E&M-EST. PATIENT-LVL V: ICD-10-PCS | Mod: PBBFAC,,, | Performed by: NURSE PRACTITIONER

## 2020-02-10 PROCEDURE — 3078F PR MOST RECENT DIASTOLIC BLOOD PRESSURE < 80 MM HG: ICD-10-PCS | Mod: CPTII,S$GLB,, | Performed by: NURSE PRACTITIONER

## 2020-02-10 PROCEDURE — G0008 FLU VACCINE - HIGH DOSE (65+) PRESERVATIVE FREE IM: ICD-10-PCS | Mod: S$GLB,,, | Performed by: NURSE PRACTITIONER

## 2020-02-10 PROCEDURE — G0439 PR MEDICARE ANNUAL WELLNESS SUBSEQUENT VISIT: ICD-10-PCS | Mod: S$GLB,,, | Performed by: NURSE PRACTITIONER

## 2020-02-10 RX ORDER — MELATONIN 3 MG
CAPSULE ORAL
COMMUNITY
End: 2020-07-28

## 2020-02-10 NOTE — PATIENT INSTRUCTIONS
Counseling and Referral of Other Preventative  (Italic type indicates deductible and co-insurance are waived)    Patient Name: Magdalena Mane  Today's Date: 2/10/2020    Health Maintenance       Date Due Completion Date    Influenza Vaccine (1) 09/01/2019 1/18/2019    Shingles Vaccine (1 of 2) 06/10/2020 (Originally 3/12/1995) ---    Lipid Panel 06/03/2020 6/3/2019    Mammogram 07/25/2020 7/25/2019    DEXA SCAN 07/25/2023 7/25/2019    TETANUS VACCINE 10/09/2025 10/9/2015    Colonoscopy 06/28/2027 6/28/2017        No orders of the defined types were placed in this encounter.    The following information is provided to all patients.  This information is to help you find resources for any of the problems found today that may be affecting your health:                Living healthy guide: www.[facility].[place].gov      Understanding Diabetes: www.diabetes.org      Eating healthy: www.cdc.gov/healthyweight      [facility] home safety checklist: www.cdc.gov/steadi/patient.html      Agency on Aging: www.goea.[place].[facility]      Alcoholics anonymous (AA): www.aa.org      Physical Activity: www.daria.nih.gov/wk7vjtu      Tobacco use: www.quitwithusla.org

## 2020-02-10 NOTE — PROGRESS NOTES
Two patient identifiers verified.  Allergies reviewed.  Flu HD 0.5 ml IM administered to Right Deltoid per MD order.  Patient tolerated injection well; no redness, bleeding, or bruising noted to injection site.  Patient instructed to remain in clinic setting for 15 minutes.  Verbalizes understanding.

## 2020-02-10 NOTE — PROGRESS NOTES
"Magdalena Mane presented for a  Medicare AWV and comprehensive Health Risk Assessment today. The following components were reviewed and updated:    · Medical history  · Family History  · Social history  · Allergies and Current Medications  · Health Risk Assessment  · Health Maintenance  · Care Team     ** See Completed Assessments for Annual Wellness Visit within the encounter summary.**       The following assessments were completed:  · Living Situation  · CAGE  · Depression Screening  · Timed Get Up and Go  · Whisper Test  · Cognitive Function Screening  · Nutrition Screening  · ADL Screening  · PAQ Screening    Vitals:    02/10/20 0920   BP: 120/60   BP Location: Right arm   Patient Position: Sitting   BP Method: Large (Manual)   Pulse: 80   Resp: 19   Temp: 98.1 °F (36.7 °C)   TempSrc: Oral   SpO2: 97%   Weight: 68.4 kg (150 lb 12.7 oz)   Height: 5' 1" (1.549 m)     Body mass index is 28.49 kg/m².  Physical Exam   Constitutional: She is oriented to person, place, and time. She appears well-developed and well-nourished. No distress.   HENT:   Head: Atraumatic.   Eyes: No scleral icterus.   Neck: Neck supple.   Cardiovascular: Normal rate, regular rhythm and normal heart sounds.   Pulmonary/Chest: Effort normal and breath sounds normal. No stridor. No respiratory distress. She has no wheezes. She has no rales.   Abdominal: Bowel sounds are normal. She exhibits no distension.   Neurological: She is alert and oriented to person, place, and time.   Skin: Skin is warm and dry. She is not diaphoretic.   Psychiatric: She has a normal mood and affect. Her behavior is normal.   Nursing note and vitals reviewed.        Diagnoses and health risks identified today and associated recommendations/orders:    1. Encounter for preventive health examination  - Screenings performed, as noted above. Personal preventative testing needs reviewed.     2. Essential hypertension  - Ambulatory referral/consult to Outpatient Case " Management; Future    3. Primary osteoarthritis involving multiple joints  - Ambulatory referral/consult to Outpatient Case Management; Future    4. Vaginal atrophy  - Stable, unable to afford premarin cream.     5. At high risk for falls  - Ambulatory referral/consult to Physical/Occupational Therapy; Future  - Ambulatory referral/consult to Outpatient Case Management; Future    6. Abnormality of gait and mobility  - Ambulatory referral/consult to Physical/Occupational Therapy; Future  - Ambulatory referral/consult to Outpatient Case Management; Future    7. Overweight  - 4# weight loss since last visit, intentional    8. Dependence on other enabling machines and devices  - Continue PRN cane use.     9. Need for immunization against influenza  - Influenza - High Dose (65+) (PF) (IM)      Provided Magdalena with a 5-10 year written screening schedule and personal prevention plan. Recommendations were developed using the USPSTF age appropriate recommendations. Education, counseling, and referrals were provided as needed. After Visit Summary printed and given to patient which includes a list of additional screenings\tests needed.    Follow up in about 1 year (around 2/10/2021) for your next annual wellness visit.    Leah Corado, DONA  I offered to discuss end of life issues, including information on how to make advance directives that the patient could use to name someone who would make medical decisions on their behalf if they became too ill to make themselves.    ___Patient declined  _X_Patient is interested, I provided paper work and offered to discuss.

## 2020-02-18 ENCOUNTER — SSC ENCOUNTER (OUTPATIENT)
Dept: ADMINISTRATIVE | Facility: OTHER | Age: 75
End: 2020-02-18

## 2020-02-18 NOTE — PROGRESS NOTES
Please note the following patient's information was forwarded to [facility] (N) for case management and/or  on 02/18/2020.    Please contact Ext. [phone] with any questions.    Thank you,    Leelee Adam, [facility]  [place] Mg[facility]nt  [phone]

## 2020-02-19 ENCOUNTER — CLINICAL SUPPORT (OUTPATIENT)
Dept: REHABILITATION | Facility: HOSPITAL | Age: 75
End: 2020-02-19
Payer: MEDICARE

## 2020-02-19 DIAGNOSIS — R26.9 ABNORMALITY OF GAIT AND MOBILITY: ICD-10-CM

## 2020-02-19 DIAGNOSIS — Z91.81 AT HIGH RISK FOR FALLS: ICD-10-CM

## 2020-02-19 DIAGNOSIS — R26.81 UNSTEADY GAIT: ICD-10-CM

## 2020-02-19 DIAGNOSIS — R26.89 BALANCE PROBLEMS: ICD-10-CM

## 2020-02-19 PROCEDURE — 97162 PT EVAL MOD COMPLEX 30 MIN: CPT | Mod: PN

## 2020-02-19 NOTE — PLAN OF CARE
[facility]  Physical Therapy Neurological Rehabilitation Initial Evaluation    Name: Magdalena Mane  Clinic Number: [id number]    Therapy Diagnosis:   Encounter Diagnoses   Name Primary?    Abnormality of gait and mobility     At high risk for falls     Balance problems     Unsteady gait      Physician: Leah Corado NP    Physician Orders: PT Eval and Treat   Medical Diagnosis from Referral:   R26.9 (ICD-10-CM) - Abnormality of gait and mobility   Z91.81 (ICD-10-CM) - At high risk for falls   I10 (ICD-10-CM) - Essential hypertension   M15.0 (ICD-10-CM) - Primary osteoarthritis involving multiple joints     Evaluation Date: 2/19/2020  Authorization Period Expiration: 4/19/2020  Plan of Care Expiration: 4/17/2020  Visit # / Visits authorized: 1/ 12    Time In: 0845  Time Out: 0930  Total Billable Time: 45 minutes    Precautions: Fall    Subjective   Date of onset: ongoing for at least 2 years  History of current condition - Magdalena reports: she falls with no injury especially when outdoors or working in her garden.  She has a h/o B hip replacements and is at risk of severe injury with falls.  She is on yoga 1x/wk.  She feels that she is falling due to the problems with attention and motor tasks a the same time.      Medical History:   Past Medical History:   Diagnosis Date    Anemia     DJD (degenerative joint disease) 12/12/2012    Hypertension     Obesity (BMI 30-39.9) 11/27/2015    Vaginal atrophy 4/1/2016     Surgical History:   Magdalena Mane  has a past surgical history that includes Colonoscopy (2012); Hip surgery (05/05/2014); Tonsillectomy; Colonoscopy (N/A, 6/28/2017); Breast biopsy (Left, 1999); Excision of ganglion of wrist (Left, 6/27/2018); and Joint replacement (Bilateral).    Medications:   Magdalena has a current medication list which includes the following prescription(s): acetaminophen, amlodipine, atenolol, calcium-vitamin d, conjugated estrogens,  diphenhydramine, ferrous sulfate, irbesartan-hydrochlorothiazide, melatonin, tramadol, and triamcinolone acetonide 0.1%.    Allergies:   Review of patient's allergies indicates:   Allergen Reactions    Prednisone      ELEVATED B/P FOR SEVERAL DAYS/WENT TO ER      Imaging,     Impression       Elevated BMD of the lumbar spine and distal forearm.    RECOMMENDATIONS of [facility]:    1.  Calcium 7506-5250 mg daily and vitamin D 800-1000 units daily, adequate exercise.    2.  Repeat BMD in 5 years or earlier if clinically necessary.    EXPLANATION OF RESULTS:    The T-score compares these results to the average bone density of a 20 year-old of the same gender. The Z-score compares this result to the average bone density to people of the same age, gender, and race. The amounts indicate the number of standard deviations above or below the mean.    * Osteoporosis is generally defined as having a T-score between less than or equal to -2.5.    * Osteopenia is generally defined as having a T-score between -1.0 and -2.5.    * The normal range is generally defined as having a t-score greater than or equal to -1.0.    * Calculated FRAX scores for fracture risk prediction may not be accurate in the setting of certain clinical factors such as pharmacologic therapy for osteoporosis, prior fragility fractures, high dose glucocorticoid use etc.       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  Prior Level of Function: indep with falls  Current Level of Function: indep with  falls    Pain:  Current 0/10, worst 8/10, best 0/10   Location: bilateral knees and low back  Description: aching in B knees and radiating in low back  Aggravating Factors: Night Time and walking alot  Easing Factors: rubs, CBD, patches, injections, moving around    Pts goals: to have better balance and fall less    Objective     Gait  Speed: 0.7 m/s   Pattern: WFL   Device: none   Assistance: none    Posture: WFL    All major ms groups in B LE: 4/5 or greater    Balance/Proprioception:  Single leg stance: See mini-best  Tandem stance: R leading 20 secs, L leading 20 secs    MINI-BEST TEST    Anticipatory    1. Sit to Stand  (2) Normal: Comes to stand without use of hands and stabilizes independently    2. Rise to Toes  (2) Normal: Stable for 3 seconds with maximum height    3. Stand on One Leg (Use the side with the lowest score to calculate sub-score and total score)  (1) Moderate: < 20 seconds (Right Leg) and (1) Moderate: < 20 seconds (Left Leg)  18 secs B    Sub Score: 5/6    Reactive Postural Control    4. Compensatory Stepping Correction - Forward  (2) Normal: Recovers independently with a single, large step    5. Compensatory Stepping Correction - Backward  (1) Moderate: More than one step used to recover equilibrium    6. Compensatory Stepping Correction - Lateral (Use the side with the lowest score to calculate sub-score and total score)  (2) Normal: Recovers independently with 1 step (crossover or lateral Ok) (Right Leg) and (2) Normal: Recovers independently with 1 step (crossover or lateral Ok) (Left Leg)    Sub Score: 5/6    Sensory Orientation  7. Stance (Feet Together); Eyes Open, Firm Surface  (2) Normal: 30 sec    8. Stance (Feet Together); Eyes Closed; Foam Surface  (1) Moderate: < 30 sec   3 secs and LOB    9. Incline - Eyes Closed  (2) Normal: Stands independently 30 sec and aligns with gravity    Sub Score: 5/6    Dynamic Gait  10. Change in Gait Speed  (2) Normal: Significantly changes  walking speed without imbalance    11. Walk with Head Turns - Horizontal  (1) Moderate: Performs head turns with reduction in gait speed    12. Walk with Pivot Turns  (2) Normal: Turns with feet close FAST (</ 3 steps) with good balance    13. Step Over Obstacles  (2) Normal: Able to step over box with minimal change of gait speed and with good balance    14. Timed Up & Go with Dual Task [3 meter walk]  TU.7 seconds; Dual Task TU.8 seconds  (1) Moderate: Dual Task affects either counting OR walking (>10%) when compared to the TUG without Dual Task    Total Score: 23/28  (weaknesses: SLS, eyes closed, head turns, dual tasks)    CMS Impairment/Limitation/Restriction for FOTO Unspecified Complications Survey    Therapist reviewed FOTO scores for Magdalena Mane on 2020.   FOTO documents entered into [employer] - see Media section.    Limitation Score: 26%         TREATMENT   Therapeutic exercise to be initiated at follow-up visit:  Foam and eyes closed, SLS, tandem, dual task balance, treadmill     Patient Education Provided    Education provided:   - benefits of balance/stability and dual task training    Pt was encouraged to continue yoga exercise 2-3/wk learned from yoga classes.    Assessment   Magdalena is a 74 y.o. female referred to outpatient Physical Therapy with a medical diagnosis of Abnormal gait and imbalance. Pt presents with fall risk, instability in uneven/compliant surfaces and problems balancing with eyes closed indicating vestibular hypofunction/impairment.  She gardens, renovates and does a lot of outdoor activities and would benefit from PT to reduce risk of falls while staying active as she ages.    Pt prognosis is Good.   Pt will benefit from skilled outpatient Physical Therapy to address the deficits stated above and in the chart below, provide pt/family education, and to maximize pt's level of independence.     Plan of care discussed with patient: Yes  Pt's spiritual, cultural and  educational needs considered and patient is agreeable to the plan of care and goals as stated below:     Anticipated Barriers for therapy: problems with dual tasks, h/o falls, decreased bone density    Medical Necessity is demonstrated by the following  History  Co-morbidities and personal factors that may impact the plan of care Co-morbidities:   Anemia   DJD (degenerative joint disease)   Hypertension   Obesity (BMI 30-39.9)   Vaginal atrophy   B THR  Personal Factors:   lifestyle  Done density     high   Examination  Body Structures and Functions, activity limitations and participation restrictions that may impact the plan of care Body Regions:   back  lower extremities  trunk    Body Systems:    gross symmetry  ROM  strength  gross coordinated movement  balance  gait  transfers  transitions  motor control  motor learning    Participation Restrictions:   Gardening, festivals    Activity limitations:   Learning and applying knowledge  no deficits    General Tasks and Commands  undertaking multiple tasks    Communication  word finding problems (subjective)    Mobility  lifting and carrying objects  walking  task x 2    Self care  no deficits    Domestic Life  doing house work (cleaning house, washing dishes, laundry)    Interactions/Relationships  no deficits    Life Areas  hobbies    Community and Social Life  community life  recreation and leisure         high   Clinical Presentation evolving clinical presentation with changing clinical characteristics moderate   Decision Making/ Complexity Score: moderate     Long Term Goals (8 Weeks):   1.  Independent with initial HEP of Yoga practice 2-3x/wk.  2.  Pt will score greater than or equal to 26/28 on the Mini-best test/assessment without use of AD demonstrating overall improved functional mobility and balance and reduce fall risk.   3.  Pt will be able to safely perform and tolerate high level ADL's without LOB.   4. Pt will have 0 falls from start of PT  sessions.  5. Pt will ambulate on TM x 10 minutes without use of UE support with 0 LOB at 1.0 m/s.  6. Pt will perform floor to stand f/b stand to floor transfer B UE support with stand by assist and no cues for improved dynamic transfer, core stability and fall safety in home and community..    Plan   Plan of care Certification: 2/19/2020 to 4/17/2020.    Outpatient Physical Therapy 12 visits over 8 weeks to include the following interventions: Gait Training, Manual Therapy, Moist Heat/ Ice, Neuromuscular Re-ed, Patient Education, Self Care, Therapeutic Activites and Therapeutic Exercise.     Sofi Moctezuma, PT

## 2020-02-27 ENCOUNTER — CLINICAL SUPPORT (OUTPATIENT)
Dept: REHABILITATION | Facility: HOSPITAL | Age: 75
End: 2020-02-27
Payer: MEDICARE

## 2020-02-27 DIAGNOSIS — R26.89 BALANCE PROBLEMS: ICD-10-CM

## 2020-02-27 DIAGNOSIS — R26.81 UNSTEADY GAIT: ICD-10-CM

## 2020-02-27 PROCEDURE — 97112 NEUROMUSCULAR REEDUCATION: CPT | Mod: PN

## 2020-02-27 NOTE — PROGRESS NOTES
Physical Therapy Daily Treatment Note     Name: Magdalena QUIÑONEZ [place]  Clinic Number: [id number]    Therapy Diagnosis:   Encounter Diagnoses   Name Primary?    Balance problems     Unsteady gait      Physician: Leah Corado NP    Visit Date: 2/27/2020    Physician Orders: PT Eval and Treat   Medical Diagnosis from Referral:   R26.9 (ICD-10-CM) - Abnormality of gait and mobility   Z91.81 (ICD-10-CM) - At high risk for falls   I10 (ICD-10-CM) - Essential hypertension   M15.0 (ICD-10-CM) - Primary osteoarthritis involving multiple joints      Evaluation Date: 2/19/2020  Authorization Period Expiration: 4/19/2020  Plan of Care Expiration: 4/17/2020  Visit # / Visits authorized: 2/ 12     Time In: 0933  Time Out: 1015  Total Billable Time: 42 minutes     Precautions: Fall    Subjective     Pt reports: she went to the [facility] and walked 10,000 on Tuesday.  She was instructed to do yoga weekly for HEP but yoga was cancelled due to valencia pollack.  Response to previous treatment: assess next session  Functional change: more stability in dual task reported today    Pain: 2/10  Location: left knee      Objective     Magdalena participated in neuromuscular re-education activities to improve: Balance, Coordination, Proprioception and Posture for 42 minutes. The following activities were included:    -- stepper with B UE/LE extremities for reciprocal motion of all limbs on sci-fit x 8 min at level 3 at > or equal to 50 spm w/o rest. 800 steps    -- alt PBall dribbling   2 laps on orange floor  -- upward VBall toss   2 laps on orange floor   -- vertical reading   6 laps on orange floor (colors card)  -- rebounder    feet apart on airex 1kg toss 1'       feet together on airex 1kg toss 1'       1/2 tandem on airex 1kg toss 1'  -- Step ups      from airex onto 2 blue steps stacked with 1 UE support  -- Wide stance on airex   with eyes closed x 30''  --Narrow stance on airex   with eyes closed x 30''  ---Feet together on airex    with eyes closed x 30''   --SLS      on solid 2 x 20''    --Sit to stand     2 x 10 B on airex from mat  --4 square stepping    x 90'     Patient Education Provided     Education provided:   - get back to yoga    Assessment     Pt has problems with dual task and eyes closed more than any other training but she performed well with all training.   Magdalena is progressing well towards her goals.   Pt prognosis is Good.     Pt will continue to benefit from skilled outpatient physical therapy to address the deficits listed in the problem list box on initial evaluation, provide pt/family education and to maximize pt's level of independence in the home and community environment.     Pt's spiritual, cultural and educational needs considered and pt agreeable to plan of care and goals.     Anticipated barriers to physical therapy: problems with dual tasks, h/o falls, decreased bone density    Long Term Goals (8 Weeks):   1.  Independent with initial HEP of Yoga practice 2-3x/wk.  (PROGRESSING, NOT MET)  2.  Pt will score greater than or equal to 26/28 on the Mini-best test/assessment without use of AD demonstrating overall improved functional mobility and balance and reduce fall risk. (PROGRESSING, NOT MET)  3.  Pt will be able to safely perform and tolerate high level ADL's without LOB. (PROGRESSING, NOT MET)  4. Pt will have 0 falls from start of PT sessions.(PROGRESSING, NOT MET)  5. Pt will ambulate on TM x 10 minutes without use of UE support with 0 LOB at 1.0 m/s.(PROGRESSING, NOT MET)  6. Pt will perform floor to stand f/b stand to floor transfer B UE support with stand by assist and no cues for improved dynamic transfer, core stability and fall safety in home and community.(PROGRESSING, NOT MET)    Plan     Progress balance and dual task training    Sofi Moctezuma, PT

## 2020-03-02 ENCOUNTER — CLINICAL SUPPORT (OUTPATIENT)
Dept: REHABILITATION | Facility: HOSPITAL | Age: 75
End: 2020-03-02
Payer: MEDICARE

## 2020-03-02 DIAGNOSIS — R26.89 BALANCE PROBLEMS: ICD-10-CM

## 2020-03-02 DIAGNOSIS — R26.81 UNSTEADY GAIT: ICD-10-CM

## 2020-03-02 PROCEDURE — 97112 NEUROMUSCULAR REEDUCATION: CPT | Mod: PN,CQ

## 2020-03-02 NOTE — PROGRESS NOTES
"  Physical Therapy Daily Treatment Note     Name: Magdalena QUIÑONEZ [place]  Clinic Number: [id number]    Therapy Diagnosis:   Encounter Diagnoses   Name Primary?    Balance problems     Unsteady gait      Physician: Leah Corado NP    Visit Date: 3/2/2020    Physician Orders: PT Eval and Treat   Medical Diagnosis from Referral:   R26.9 (ICD-10-CM) - Abnormality of gait and mobility   Z91.81 (ICD-10-CM) - At high risk for falls   I10 (ICD-10-CM) - Essential hypertension   M15.0 (ICD-10-CM) - Primary osteoarthritis involving multiple joints      Evaluation Date: 2/19/2020  Authorization Period Expiration: 4/19/2020  Plan of Care Expiration: 4/17/2020  Visit # / Visits authorized: 3/ 12   FOTO: 3/5  Time In: 0930 AM   Time Out: 1015 AM   Total Billable Time: 45 minutes 3NMR     Precautions: Fall    Subjective     Pt reports: pt agreeable to PT session. "Nicole had a busy week"   She was instructed to resume HEP/ YOGA as discussed pervious session   Response to previous treatment: no reports of pain   Functional change: more stability in dual task reported today    Pain: 2/10  Location: left knee      Objective     Magdalena participated in neuromuscular re-education activities to improve: Balance, Coordination, Proprioception and Posture for 42 minutes. The following activities were included:    -- stepper with B UE/LE extremities for reciprocal motion of all limbs on sci-fit x 8 min at level 3 at > or equal to 50 spm w/o rest. 800 steps  - hurdles    stepover fwd w/ tandem gait in //bars x 4 trials       Lateral stepover in // bars 4 trials B  -- alt PBall dribbling   6 laps on orange floor (dual task naming cities, fruits, animal)  -- upward VBall toss   2 laps on orange floor   -- vertical reading   6 laps on orange floor (colors card)  -- ball rolling    Soccer ball (No UE) cross pattern 30"x2 trails B  -- Step ups      from airex onto 2 blue steps stacked with 1 UE support NOT PERFORMED TODAY   -- Wide stance on airex   with " "eyes closed x 30'' x 2  -- Narrow stance on airex   with eyes closed x 30'' x 2  -- tandem stance on foam beam  30"x2 B with no UE assist.   -- Feet together on airex   with eyes closed x 30''   -- SLS      on solid 2 x 20''    -- Sit to stand     2 x 10 B on airex from mat NOT PERFORMED TODAY   -- 4 square stepping    x 90'     Patient Education Provided     Education provided:   - resume yoga    Assessment     Pt completed session with no reports of pain, experienced minor loss of balance performing hurdles but recovered with use of // bars.   Magdalena is progressing well towards her goals.   Pt prognosis is Good.     Pt will continue to benefit from skilled outpatient physical therapy to address the deficits listed in the problem list box on initial evaluation, provide pt/family education and to maximize pt's level of independence in the home and community environment.     Pt's spiritual, cultural and educational needs considered and pt agreeable to plan of care and goals.     Anticipated barriers to physical therapy: problems with dual tasks, h/o falls, decreased bone density    Long Term Goals (8 Weeks):   1.  Independent with initial HEP of Yoga practice 2-3x/wk.  (PROGRESSING, NOT MET)  2.  Pt will score greater than or equal to 26/28 on the Mini-best test/assessment without use of AD demonstrating overall improved functional mobility and balance and reduce fall risk. (PROGRESSING, NOT MET)  3.  Pt will be able to safely perform and tolerate high level ADL's without LOB. (PROGRESSING, NOT MET)  4. Pt will have 0 falls from start of PT sessions.(PROGRESSING, NOT MET)  5. Pt will ambulate on TM x 10 minutes without use of UE support with 0 LOB at 1.0 m/s.(PROGRESSING, NOT MET)  6. Pt will perform floor to stand f/b stand to floor transfer B UE support with stand by assist and no cues for improved dynamic transfer, core stability and fall safety in home and community.(PROGRESSING, NOT MET)    Plan   Advance Pt as " per POC  Progress balance and dual task training    Jimmy Callahan, KELL

## 2020-03-04 NOTE — PROGRESS NOTES
Digital Medicine: Health  Follow-Up    Called to introduce myself to the patient as her new health , current BP average 110/62 is at goal <130/<80. Patient is doing well and reports no complaints/concerns at this time. Patient is trying to find some more breathable compression socks for the summer time as these have helped her a lot in reducing her edema.             INTERVENTION(S)  encouragement/support, denied further coaching, denied resources and denied questions    PLAN  patient verbalizes understanding and continue monitoring    Patient denied health coaching, goal setting, and resources. Patient will save my number as her new health  and knows to reach out with questions/concerns in the future. Patient agreed to continue to take regular readings, [facility] care team will continue to monitor and I will follow up in 10 weeks.             There are no preventive care reminders to display for this patient.    Last 5 Patient Entered Readings                                      Current 30 Day Average: 110/62     Recent Readings 3/2/2020 3/2/2020 2/18/2020 2/18/2020 2/14/2020    SBP (mmHg) 99 87 121 133 107    DBP (mmHg) 57 55 70 73 52    Pulse 71 86 99 82 56                      Diet Screening   No change to diet.      Physical Activity Screening   No change to exercise routine.          SDOH

## 2020-03-09 ENCOUNTER — CLINICAL SUPPORT (OUTPATIENT)
Dept: REHABILITATION | Facility: HOSPITAL | Age: 75
End: 2020-03-09
Payer: MEDICARE

## 2020-03-09 DIAGNOSIS — R26.81 UNSTEADY GAIT: ICD-10-CM

## 2020-03-09 DIAGNOSIS — R26.89 BALANCE PROBLEMS: ICD-10-CM

## 2020-03-09 PROCEDURE — 97112 NEUROMUSCULAR REEDUCATION: CPT | Mod: PN,CQ

## 2020-03-09 NOTE — PROGRESS NOTES
"  Physical Therapy Daily Treatment Note     Name: Magdalena QUIÑONEZ Weldon  Clinic Number: [id number]    Therapy Diagnosis:   Encounter Diagnoses   Name Primary?    Balance problems     Unsteady gait      Physician: Leah Corado NP    Visit Date: 3/9/2020    Physician Orders: PT Eval and Treat   Medical Diagnosis from Referral:   R26.9 (ICD-10-CM) - Abnormality of gait and mobility   Z91.81 (ICD-10-CM) - At high risk for falls   I10 (ICD-10-CM) - Essential hypertension   M15.0 (ICD-10-CM) - Primary osteoarthritis involving multiple joints      Evaluation Date: 2/19/2020  Authorization Period Expiration: 4/19/2020  Plan of Care Expiration: 4/17/2020  Visit # / Visits authorized: 4/ 12   FOTO: 4/5 NEXT!!  Time In: 0930 AM   Time Out: 1015 AM   Total Billable Time: 45 minutes 3NMR     Precautions: Fall    Subjective     Pt reports:  No complaints of pain. Pt reports she had moved furniture and boxes over the weekend. Pt is agreeable to PT.  She was instructed to resume HEP/ YOGA as discussed pervious session   Response to previous treatment: no reports of pain   Functional change: Pt reports increased activity over weekend    Pain: 0/10  Location: left knee      Objective     Magdalena participated in neuromuscular re-education activities to improve: Balance, Coordination, Proprioception and Posture for 45 minutes. The following activities were included:    -- stepper with B UE/LE extremities for reciprocal motion of all limbs on sci-fit x 8 min at level 5 at > or equal to 50 spm w/o rest. 800 steps    -- ball rolling    Soccer ball (No UE) cross pattern 30"x2 trails B  -- Step ups      from airex onto 2 blue steps stacked with 1 UE support NOT PERFORMED TODAY   -- Wide stance on airex   with eyes closed x 30'' x 2  -- Narrow stance on airex   with eyes closed x 30'' x 2  -- Feet together on airex   with eyes closed x 30''   -- Beep board    30 sec Chest press, over head press, static w/ 2 Kg med ball  -- SLS      on solid 2 x " "20''    -- Sequential Deveop   1/2 to stand on 6" step (no UE) over airex foam block 2x10 B  -- Cybex Leg press    3x10 B 4.5 pl       2x10 Uni    NOT PERFORMED TODAY:   -- tandem stance on foam beam  30"x2 B with no UE assist.   - hurdles    stepover fwd w/ tandem gait in //bars x 4 trials       Lateral stepover in // bars 4 trials B  -- alt PBall dribbling   6 laps on orange floor (dual task naming cities, fruits, animal)  -- upward VBall toss   2 laps on orange floor   -- vertical reading   6 laps on orange floor (colors card)  -- Sit to stand     2 x 10 B on airex from mat NOT PERFORMED TODAY   -- 4 square stepping    x 90'     Patient Education Provided     Education provided:   - standing hamstring stretches to HEP.       Assessment      Pt performed therex with no c/o of pain. pt was able to perform balance exercises on the airex, with minimal postural sway (SBA) with very few UE contacts to bars for recovery of balance. Balancing on the beep board was added to the exercise program with a weighted ball to improve core balance and stability.  Pt was taken into treatment room to practice sequential development for quadraped to half kneeling (on bariatric table). Noted weakness in B hip flexors and adductors resulting in unsuccessful 1/2 kneeling transitions without assistance. Added leg press with sequential development to improve strength due to her quad weakness.    Magdalena is progressing well towards her goals.   Pt prognosis is Good.     Pt will continue to benefit from skilled outpatient physical therapy to address the deficits listed in the problem list box on initial evaluation, provide pt/family education and to maximize pt's level of independence in the home and community environment.     Pt's spiritual, cultural and educational needs considered and pt agreeable to plan of care and goals.     Anticipated barriers to physical therapy: problems with dual tasks, h/o falls, decreased bone density    Long " Term Goals (8 Weeks):   1.  Independent with initial HEP of Yoga practice 2-3x/wk.  (PROGRESSING, NOT MET)  2.  Pt will score greater than or equal to 26/28 on the Mini-best test/assessment without use of AD demonstrating overall improved functional mobility and balance and reduce fall risk. (PROGRESSING, NOT MET)  3.  Pt will be able to safely perform and tolerate high level ADL's without LOB. (PROGRESSING, NOT MET)  4. Pt will have 0 falls from start of PT sessions.(PROGRESSING, NOT MET)  5. Pt will ambulate on TM x 10 minutes without use of UE support with 0 LOB at 1.0 m/s.(PROGRESSING, NOT MET)  6. Pt will perform floor to stand f/b stand to floor transfer B UE support with stand by assist and no cues for improved dynamic transfer, core stability and fall safety in home and community.(PROGRESSING, NOT MET)    Plan   Advance Pt as per POC  Progress balance   Continuing Sequential Development activities   Jimmy Callahan, PTA

## 2020-03-12 ENCOUNTER — CLINICAL SUPPORT (OUTPATIENT)
Dept: REHABILITATION | Facility: HOSPITAL | Age: 75
End: 2020-03-12
Payer: MEDICARE

## 2020-03-12 DIAGNOSIS — R26.81 UNSTEADY GAIT: ICD-10-CM

## 2020-03-12 DIAGNOSIS — R26.89 BALANCE PROBLEMS: ICD-10-CM

## 2020-03-12 PROCEDURE — 97112 NEUROMUSCULAR REEDUCATION: CPT | Mod: PN,CQ

## 2020-03-12 NOTE — PROGRESS NOTES
"  Physical Therapy Daily Treatment Note     Name: Magdalena QUIÑONEZ Julian  Clinic Number: [id number]    Therapy Diagnosis:   Encounter Diagnoses   Name Primary?    Balance problems     Unsteady gait      Physician: Leah Corado NP    Visit Date: 3/12/2020    Physician Orders: PT Eval and Treat   Medical Diagnosis from Referral:   R26.9 (ICD-10-CM) - Abnormality of gait and mobility   Z91.81 (ICD-10-CM) - At high risk for falls   I10 (ICD-10-CM) - Essential hypertension   M15.0 (ICD-10-CM) - Primary osteoarthritis involving multiple joints      Evaluation Date: 2/19/2020  Authorization Period Expiration: 4/19/2020  Plan of Care Expiration: 4/17/2020  Visit # / Visits authorized: 5/ 12   FOTO: 5/5 NEXT!!  Time In: 0930 AM   Time Out: 1015 AM   Total Billable Time: 45 minutes 3NMR     Precautions: Fall    Subjective     Pt reports:  Agreeable to PT session. She states she "Feels ok".   She was instructed to resume HEP/ YOGA as discussed pervious session   Response to previous treatment: no reports of pain   Functional change: Pt reports increased activity over weekend    Pain: 0/10  Location: left knee      Objective     Magdalena participated in neuromuscular re-education activities to improve: Balance, Coordination, Proprioception and Posture for 45 minutes. The following activities were included:    -- stepper with B UE/LE extremities for reciprocal motion of all limbs on sci-fit x 8 min at level 5 at > or equal to 50 spm w/o rest. 800 steps    -- ball rolling    Soccer ball (No UE) cross pattern 30"x2 trails B  -- Step ups      from airex onto 2 blue steps stacked with 1 UE support NOT PERFORMED TODAY   -- Wide stance on airex   with eyes closed x 30'' x 2  -- Narrow stance on airex   with eyes closed x 30'' x 2  -- Feet together on airex   with eyes closed x 30''   -- Beep board    30 sec Chest press, over head press, static w/ 2 Kg med ball  -- SLS      on solid 2 x 20''    -- Sequential Deveop   1/2 to stand on 6" step " "(no UE) over airex foam block 2x10 B  -- Cybex Leg press    3x10 B 4.5 pl       2x10 Uni    NOT PERFORMED TODAY:   -- tandem stance on foam beam  30"x2 B with no UE assist.   - hurdles    stepover fwd w/ tandem gait in //bars x 4 trials       Lateral stepover in // bars 4 trials B  -- alt PBall dribbling   6 laps on orange floor (dual task naming cities, fruits, animal)  -- upward VBall toss   2 laps on orange floor   -- vertical reading   6 laps on orange floor (colors card)  -- Sit to stand     2 x 10 B on airex from mat NOT PERFORMED TODAY   -- 4 square stepping    x 90'     Patient Education Provided     Education provided:   - standing hamstring stretches to HEP.       Assessment      Pt completed today's session with no adverse reactions. She was able to follow verbal instructions with standing safety and technique in // bars/. She demonstrated no episodes of loss of balance while completed dynamic balance challenging activities in //bars.    Magdalena is progressing well towards her goals.   Pt prognosis is Good.     Pt will continue to benefit from skilled outpatient physical therapy to address the deficits listed in the problem list box on initial evaluation, provide pt/family education and to maximize pt's level of independence in the home and community environment.     Pt's spiritual, cultural and educational needs considered and pt agreeable to plan of care and goals.     Anticipated barriers to physical therapy: problems with dual tasks, h/o falls, decreased bone density    Long Term Goals (8 Weeks):   1.  Independent with initial HEP of Yoga practice 2-3x/wk.  (PROGRESSING, NOT MET)  2.  Pt will score greater than or equal to 26/28 on the Mini-best test/assessment without use of AD demonstrating overall improved functional mobility and balance and reduce fall risk. (PROGRESSING, NOT MET)  3.  Pt will be able to safely perform and tolerate high level ADL's without LOB. (PROGRESSING, NOT MET)  4. Pt will " have 0 falls from start of PT sessions.(PROGRESSING, NOT MET)  5. Pt will ambulate on TM x 10 minutes without use of UE support with 0 LOB at 1.0 m/s.(PROGRESSING, NOT MET)  6. Pt will perform floor to stand f/b stand to floor transfer B UE support with stand by assist and no cues for improved dynamic transfer, core stability and fall safety in home and community.(PROGRESSING, NOT MET)    Plan   Advance Pt as per POC  Progress balance   Continuing Sequential Development activities NEXT  Jimmy Callahan, KELL

## 2020-03-16 ENCOUNTER — TELEPHONE (OUTPATIENT)
Dept: REHABILITATION | Facility: HOSPITAL | Age: 75
End: 2020-03-16

## 2020-03-16 NOTE — TELEPHONE ENCOUNTER
PT agreed to update her HEP through email this week so she can continue her balance training safely a home.    Sofi NETTLEST.

## 2020-03-20 DIAGNOSIS — I10 HYPERTENSION, UNSPECIFIED TYPE: ICD-10-CM

## 2020-03-20 RX ORDER — AMLODIPINE BESYLATE 5 MG/1
TABLET ORAL
Qty: 90 TABLET | Refills: 3 | Status: SHIPPED | OUTPATIENT
Start: 2020-03-20 | End: 2020-04-08 | Stop reason: SDUPTHER

## 2020-03-20 RX ORDER — IRBESARTAN AND HYDROCHLOROTHIAZIDE 150; 12.5 MG/1; MG/1
2 TABLET, FILM COATED ORAL DAILY
Qty: 180 TABLET | Refills: 3 | Status: SHIPPED | OUTPATIENT
Start: 2020-03-20 | End: 2020-03-25

## 2020-03-25 ENCOUNTER — PATIENT OUTREACH (OUTPATIENT)
Dept: OTHER | Facility: OTHER | Age: 75
End: 2020-03-25

## 2020-03-25 DIAGNOSIS — I10 HYPERTENSION, UNSPECIFIED TYPE: ICD-10-CM

## 2020-03-25 RX ORDER — IRBESARTAN AND HYDROCHLOROTHIAZIDE 150; 12.5 MG/1; MG/1
1 TABLET, FILM COATED ORAL DAILY
Qty: 90 TABLET | Refills: 3
Start: 2020-03-25 | End: 2020-04-08 | Stop reason: SDUPTHER

## 2020-03-25 NOTE — PROGRESS NOTES
Digital Medicine: Clinician Follow-Up    The history is provided by the patient.     Follow Up  Follow-up reason(s): reading review and medication change      Medication Change: dose decrease      HPI:  Called patient to follow up. Patient reports adherence to medication regimen daily and denies missed doses. Patient denies hypotensive s/sx (lightheadedness, dizziness, nausea, fatigue); patient denies hypertensive s/sx (SOB, CP, severe headaches, changes in vision, dizziness, fatigue, confusion, anxiety, nosebleeds).     Last 5 Patient Entered Readings                                      Current 30 Day Average: 108/60     Recent Readings 3/13/2020 3/2/2020 3/2/2020 2/18/2020 2/18/2020    SBP (mmHg) 116 99 87 121 133    DBP (mmHg) 69 57 55 70 73    Pulse 87 71 86 99 82        Assessment:  Reviewed recent readings and labs (last CMP drawn on 6/3/19). Per 2017 ACC/ AHA HTN guidelines (goal of BP < 130/80), current 30-day average is well controlled. Within the past month, SBPs have ranged 55-69 and DBPs have ranged .    Plan:  Discussed with and instructed patient to decrease irbesartan hctz to 150/12.5, patient confirms understanding.   Instructed patient to increase frequency of monitoring to 1-2x per week.   Instructed patient to call if BP remains > 130/80.   Patients health  will be following up as scheduled.   I will continue to monitor regularly and will follow-up in 2 weeks, sooner if blood pressure begins to trend upward or downward.     Current medication regimen:  Hypertension Medications             amLODIPine (NORVASC) 5 MG tablet TAKE 1 TABLET EVERY DAY    atenolol (TENORMIN) 25 MG tablet TAKE 1 TABLET EVERY DAY    irbesartan-hydrochlorothiazide (AVALIDE) 150-12.5 mg per tablet Take 1 tablets by mouth once daily. Stop valsartan hctz 320/25.         Patient denies having questions or concerns. Patient has my contact information and knows to call with any concerns or clinical changes.

## 2020-04-02 ENCOUNTER — TELEPHONE (OUTPATIENT)
Dept: REHABILITATION | Facility: HOSPITAL | Age: 75
End: 2020-04-02

## 2020-04-02 NOTE — TELEPHONE ENCOUNTER
Postponed Appointments    Patient: Magdalena Mane  Date: 4/2/2020  Diagnosis: No diagnosis found.  MRN: [id number]    Left message to informed patient of extension of therapy hold through the end of April. Inquired if she is interested in Telehealth visits. Provided clinic number for patient to return call.    Sofi Moctezuma  DPT,PT     ecchymosis to left flank and left buttock

## 2020-04-08 ENCOUNTER — PATIENT OUTREACH (OUTPATIENT)
Dept: OTHER | Facility: OTHER | Age: 75
End: 2020-04-08

## 2020-04-08 DIAGNOSIS — I10 HYPERTENSION, UNSPECIFIED TYPE: ICD-10-CM

## 2020-04-08 RX ORDER — AMLODIPINE BESYLATE 5 MG/1
TABLET ORAL
Qty: 90 TABLET | Refills: 3 | Status: SHIPPED | OUTPATIENT
Start: 2020-04-08 | End: 2020-07-27

## 2020-04-08 RX ORDER — IRBESARTAN AND HYDROCHLOROTHIAZIDE 150; 12.5 MG/1; MG/1
1 TABLET, FILM COATED ORAL DAILY
Qty: 90 TABLET | Refills: 3 | Status: SHIPPED | OUTPATIENT
Start: 2020-04-08 | End: 2020-09-16 | Stop reason: SDUPTHER

## 2020-04-08 RX ORDER — ATENOLOL 25 MG/1
TABLET ORAL
Qty: 90 TABLET | Refills: 3 | Status: SHIPPED | OUTPATIENT
Start: 2020-04-08 | End: 2020-07-28 | Stop reason: ALTCHOICE

## 2020-04-08 NOTE — PROGRESS NOTES
Digital Medicine: Clinician Follow-Up    The history is provided by the patient.     Follow Up  Follow-up reason(s): reading review and medication change follow-up      Is patient tolerating med change?:  Yes    HPI:  Called patient to follow up since decreasing irbesartan hctz to 150/12.5, patient confirms she is doing well. Patient reports adherence to medication regimen daily and denies missed doses. Patient denies hypotensive s/sx (lightheadedness, dizziness, nausea, fatigue); patient denies hypertensive s/sx (SOB, CP, severe headaches, changes in vision, dizziness, fatigue, confusion, anxiety, nosebleeds).     Last 5 Patient Entered Readings                                      Current 30 Day Average: 121/60     Recent Readings 4/5/2020 3/26/2020 3/25/2020 3/13/2020 3/2/2020    SBP (mmHg) 103 133 131 116 99    DBP (mmHg) 47 62 63 69 57    Pulse 52 51 56 87 71        Assessment:  Reviewed recent readings and labs (last CMP drawn on 6/3/19). Per 2017 ACC/ AHA HTN guidelines (goal of BP < 130/80), current 30-day average is well controlled. Within the past month, SBPs have ranged 103-133 and DBPs have ranged 47-69.    Plan:  Continue current medication regimen.   Instructed patient to increase frequency of monitoring to 1-2x per week.   Patients health  will be following up as scheduled.   I will continue to monitor regularly and will follow-up in 4 weeks, sooner if blood pressure begins to trend upward or downward.     Current medication regimen:  Hypertension Medications             amLODIPine (NORVASC) 5 MG tablet TAKE 1 TABLET EVERY DAY    atenolol (TENORMIN) 25 MG tablet TAKE 1 TABLET EVERY DAY    irbesartan-hydrochlorothiazide (AVALIDE) 150-12.5 mg per tablet Take 1 tablet by mouth once daily. Stop valsartan hctz 320/25.         Patient denies having questions or concerns. Patient has my contact information and knows to call with any concerns or clinical changes.

## 2020-04-13 ENCOUNTER — CLINICAL SUPPORT (OUTPATIENT)
Dept: REHABILITATION | Facility: HOSPITAL | Age: 75
End: 2020-04-13
Payer: MEDICARE

## 2020-04-13 DIAGNOSIS — R26.81 UNSTEADY GAIT: ICD-10-CM

## 2020-04-13 DIAGNOSIS — R26.89 BALANCE PROBLEMS: ICD-10-CM

## 2020-04-13 PROCEDURE — 97112 NEUROMUSCULAR REEDUCATION: CPT | Mod: 95,PN

## 2020-04-13 NOTE — PROGRESS NOTES
Physical Therapy Daily Treatment Note     [id number]    Patient unsafe for in-person office visit due to high risk co-morbidities, probability of infection, to minimize exposure, due to pt expressing symptoms, and/or due to government mandated quarantine.  This patient: (1) was informed that telehealth visits are now available congruent with guidelines set by state practice acts & CMS; (2) initiated scheduling of this type of visit; and (3) gave verbal consent to participate in such.  The patient & provider used Epic Jaden using both video & audio synchronous services to complete the visit.    Therapy Diagnosis:   Encounter Diagnoses   Name Primary?    Balance problems     Unsteady gait      Physician: Leah Corado NP    Visit Date: 4/13/2020  Patient Location:  Visit type: Virtual visit with synchronous audio and video through [employer]    Physician Orders: PT Eval and Treat   Medical Diagnosis from Referral:   R26.9 (ICD-10-CM) - Abnormality of gait and mobility   Z91.81 (ICD-10-CM) - At high risk for falls   I10 (ICD-10-CM) - Essential hypertension   M15.0 (ICD-10-CM) - Primary osteoarthritis involving multiple joints      Evaluation Date: 2/19/2020  Authorization Period Expiration: 4/19/2020  Plan of Care Expiration: 4/17/2020  Visit # / Visits authorized: 6/ 12   FOTO: 6/10 NEXT!   Time In: 1400 AM   Time Out: 1429 AM   Total Billable Time: 29 minutes 2NMR     Precautions: Fall    Update POC and d/c next visit!    Subjective     Pt reports: she has been doing a [employer] yoga and balance class for a total of 2 classes/wk from a  as well as working in her garden and spring cleaning as often as she can.    She was compliant with home exercise program.  Response to previous treatment: no adverse response   Functional change: no falls in her garden or during her home repairs, SLS improving in yoga class    Pain: 0/10  Location: N/A    Objective   Pt  performed session in her living area with a chair as a prop to check balance for safely with testing and training.      Pt was instructed to perform the following components of her mini-BEST assessment (several components PT was unable to assess due to safety and space)    MINI-BEST TEST x 15' total     Anticipatory     1. Sit to Stand  (2) Normal: Comes to stand without use of hands and stabilizes independently     2. Rise to Toes  (2) Normal: Stable for 3 seconds with maximum height with arms up in high guard      3. Stand on One Leg (Use the side with the lowest score to calculate sub-score and total score)  (1) Moderate:  (1) Moderate: < 20 seconds (Left Leg)  16 secs L  25 secs R     Sub Score: 5/6     Reactive Postural Control     Unable to assess     Sensory Orientation  7. Stance (Feet Together); Eyes Open, Firm Surface  (2) Normal: 30 sec     8. Stance (Feet Together); Eyes Closed; Foam Surface  (2) Normal: >30 secs     9. Incline - Eyes Closed  Unable to assess     Sub Score: 4/4     Dynamic Gait  Unable to assess     Magdalena participate in neuromuscular re-education activities to improve: Balance, Coordination, Proprioception and Posture for 10 minutes. The following activities were included:  -- Pallof press x 10 each way in tandem stance with GTB (pt instructed to place band in doorway at chest level)  -- reverse chops x 10 each way in tandem stance with 8 ounce can of soup.  (minimal sway with cues to engage core)    Home Exercises Provided and Patient Education Provided     Education provided:   - new HEP will be send through my [facility] patient instructions  - perform balance exercises in safe and open environments with a chair or counter to check balance and prevent falls.     Written Home Exercises Provided: yes.  Exercises were reviewed and Magdalena was able to demonstrate them prior to the end of the session.  Magdalena demonstrated good  understanding of the education provided.     See EMR under  Patient Instructions for exercises provided 4/13/2020.    Assessment     Pt has improved in R SLS and with eyes closed on foam.  She will now benefit from updated training to focus on other areas of deficit in balance and dual tasking and core stability.      Magdalena is progressing well towards her goals.   Pt prognosis is Good.     Pt will continue to benefit from skilled outpatient physical therapy to address the deficits listed in the problem list box on initial evaluation, provide pt/family education and to maximize pt's level of independence in the home and community environment.     Pt's spiritual, cultural and educational needs considered and pt agreeable to plan of care and goals.     Anticipated barriers to physical therapy: none    Goals:     Long Term Goals (8 Weeks):   1.  Independent with initial HEP of Yoga practice 2-3x/wk. MET  2.  Pt will score greater than or equal to 26/28 on the Mini-best test/assessment without use of AD demonstrating overall improved functional mobility and balance and reduce fall risk.   (PROGRESSING, NOT MET)  3.  Pt will be able to safely perform and tolerate high level ADL's without LOB.  MET   4. Pt will have 0 falls from start of PT sessions.(PROGRESSING, NOT MET)  5. Pt will ambulate on TM x 10 minutes without use of UE support with 0 LOB at 1.0 m/s. (PROGRESSING, NOT MET)  6. Pt will perform floor to stand f/b stand to floor transfer B UE support with stand by assist and no cues for improved dynamic transfer, core stability and fall safety in home and community.MET     Plan   Sofi Moctezuma, PT

## 2020-04-14 ENCOUNTER — TELEPHONE (OUTPATIENT)
Dept: REHABILITATION | Facility: HOSPITAL | Age: 75
End: 2020-04-14

## 2020-04-27 ENCOUNTER — CLINICAL SUPPORT (OUTPATIENT)
Dept: REHABILITATION | Facility: HOSPITAL | Age: 75
End: 2020-04-27
Payer: MEDICARE

## 2020-04-27 DIAGNOSIS — R26.81 UNSTEADY GAIT: ICD-10-CM

## 2020-04-27 DIAGNOSIS — R26.89 BALANCE PROBLEMS: ICD-10-CM

## 2020-04-27 PROCEDURE — 97112 NEUROMUSCULAR REEDUCATION: CPT | Mod: PN

## 2020-04-27 NOTE — PROGRESS NOTES
Physical Therapy Daily Treatment Note/ Discharge Summary     Name: Magdalena Katzoln  Clinic Number: [id number]    Therapy Diagnosis:   Encounter Diagnoses   Name Primary?    Balance problems     Unsteady gait      Physician: Leah Corado NP    Visit Date: 4/27/2020  Patient Location:  Visit type: Virtual visit with synchronous audio and video through [employer] [facility]    Physician Orders: PT Eval and Treat   Medical Diagnosis from Referral:   R26.9 (ICD-10-CM) - Abnormality of gait and mobility   Z91.81 (ICD-10-CM) - At high risk for falls   I10 (ICD-10-CM) - Essential hypertension   M15.0 (ICD-10-CM) - Primary osteoarthritis involving multiple joints      Evaluation Date: 2/19/2020  Authorization Period Expiration: 4/19/2020  Plan of Care Expiration: 4/17/2020  Visit # / Visits authorized: 7/ 12   FOTO: 7/10 (over the phone)  Time In: 1402 AM   Time Out: 1429 AM   Total Billable Time: 27 minutes 2NMR     Precautions: Fall    Subjective     Pt reports: she has been doing a her updated HEP based on memory b/c she was not able to find it in my chart .She is still doing her [employer] yoga and balance class for a total of 2 classes/wk from a  as well as working in her garden and spring cleaning as often as she can.  She is in agreement to d/c today until restrictions are lifted and she feels comfortable with returning to in clinic community activities.  She reports she tripped over a large object in her garden and fell with no injury but no balance related falls.   She was compliant with home exercise program.  Response to previous treatment: no adverse response   Functional change: no falls in her garden or during her home repairs, SLS improving in yoga class    Pain: 0/10  Location: N/A    Objective   Pt performed session in her living area with a chair as a prop to check balance for safely with testing and training.       Magdalena participate in neuromuscular re-education activities to improve: Balance,  Coordination, Proprioception and Posture for 25 minutes. The following activities were included and reviewed from HEP:  -- hip height lifts/lowers with two 2# dumbells:  With pt instructed to start by standing close to the object with feet spread apart. Bend at the knees and hips and NOT at your spine, as you use your legs to stand back up while lifting the object. Pivot over to the surface you want to set the object on to and set it down. Be sure to NOT twist your spine but to pivot your feet so that your feet are pointed forward to where you want to set the object. Place object back to starting point (pt did as directed) x 10 reps    - SLS reaching:  Pt instructed to stand on one leg and maintain your balance.  Reach forward both arms as far as you can without losing your balance. Then return to original position. Maintain a slightly bent knee on the stance side. (pt did as directed) x 10 with chair in front of her.    - Wall falls (single leg) laterally and forward x 5 on each legs: pt shown with PT demo and cued to stand on one leg facing a wall, a couple feet away from the wall.  lowly and controlled, lean forward towards the wall. Try and control your balance to prevent falling forward however, keep leaning more and more forward until eventually you do lose your balance and fall. Use your arms to catch the fall and then push yourself back upright and perform it again. Keep your abdominals, buttocks and thighs (quads) tight during the exercise.    -- reverse chops x 10 each way in  Feet together, tandem stance and lung stance with 2# dumbell x 5 each stance each way with chair in front of pt.  (minimal sway with cues to engage core)    Home Exercises Provided and Patient Education Provided     Education provided:   - new HEP will be send through my [facility] patient instructions  - perform balance exercises in safe and open environments with a chair or counter to check balance and prevent falls.   - HEP will be  resent through email   - continue HEP in safe environment with shows with good   - how to get up; from kneeling in garden (with image from HEP)  - reason for d/c    Written Home Exercises Provided: yes.  Exercises were reviewed and Magdalena was able to demonstrate them prior to the end of the session.  Magdalena demonstrated good  understanding of the education provided.     See EMR under Patient Instructions for exercises provided 4/13/2020.    Assessment     Pt is doing well with balance training from HEP and [employer].  She has had one fall since eval that was due to hazard rather than balance (tripped over a large object in her garden that was leaning over).  She is safe to perform HEP but would continue from more dual task balance training in the future once willing to return to in-clinic therapy.      Magdalena is progressing well towards her goals.   Pt prognosis is Good.     Pt will continue to benefit from skilled outpatient physical therapy to address the deficits listed in the problem list box on initial evaluation, provide pt/family education and to maximize pt's level of independence in the home and community environment.     Pt's spiritual, cultural and educational needs considered and pt agreeable to plan of care and goals.     Anticipated barriers to physical therapy: none    Goals:     Long Term Goals (8 Weeks):   1.  Independent with initial HEP of Yoga practice 2-3x/wk. MET  2.  Pt will score greater than or equal to 26/28 on the Mini-best test/assessment without use of AD demonstrating overall improved functional mobility and balance and reduce fall risk.   (PROGRESSING, NOT MET)  3.  Pt will be able to safely perform and tolerate high level ADL's without LOB.  MET   4. Pt will have 0 falls from start of PT sessions.one trip and fall last week  5. Pt will ambulate on TM x 10 minutes without use of UE support with 0 LOB at 1.0 m/s. Unable to assess  6. Pt will perform floor to stand f/b stand to  floor transfer B UE support with stand by assist and no cues for improved dynamic transfer, core stability and fall safety in home and community.MET     Plan   D/c today    Sofi Moctezuma  DPT, PT

## 2020-05-07 ENCOUNTER — PATIENT OUTREACH (OUTPATIENT)
Dept: OTHER | Facility: OTHER | Age: 75
End: 2020-05-07

## 2020-05-07 NOTE — PROGRESS NOTES
Digital Medicine: Clinician Follow-Up    The history is provided by the patient.     Follow Up  Follow-up reason(s): reading review        HPI:  Called patient to follow up. Patient reports adherence to medication regimen daily and denies missed doses. Patient denies hypotensive s/sx (lightheadedness, dizziness, nausea, fatigue); patient denies hypertensive s/sx (SOB, CP, severe headaches, changes in vision, dizziness, fatigue, confusion, anxiety, nosebleeds).     Patient attributes dietary indiscretions (oysters) to elevated readings.      Last 5 Patient Entered Readings                                      Current 30 Day Average: 122/61     Recent Readings 5/2/2020 4/26/2020 4/18/2020 4/18/2020 4/14/2020    SBP (mmHg) 116 126 133 141 120    DBP (mmHg) 53 66 66 65 61    Pulse 58 53 56 55 65        Assessment:  Reviewed recent readings and labs (last CMP drawn on 6/3/19). Per 2017 ACC/ AHA HTN guidelines (goal of BP < 130/80), current 30-day average is well controlled. Within the past month, SBPs have ranged 113-141 and DBPs have ranged 53-66.    Plan:  Continue current medication regimen.   Instructed patient to call if BP remains > 130/80.   Patients health  will be following up as scheduled.   I will continue to monitor regularly and will follow-up in 8-12 weeks, sooner if blood pressure begins to trend upward or downward.     Current medication regimen:  Hypertension Medications             amLODIPine (NORVASC) 5 MG tablet TAKE 1 TABLET EVERY DAY    atenoloL (TENORMIN) 25 MG tablet TAKE 1 TABLET EVERY DAY    irbesartan-hydrochlorothiazide (AVALIDE) 150-12.5 mg per tablet Take 1 tablet by mouth once daily.         Patient denies having questions or concerns. Patient has my contact information and knows to call with any concerns or clinical changes.

## 2020-06-25 ENCOUNTER — PATIENT OUTREACH (OUTPATIENT)
Dept: OTHER | Facility: OTHER | Age: 75
End: 2020-06-25

## 2020-07-27 ENCOUNTER — HOSPITAL ENCOUNTER (OUTPATIENT)
Dept: RADIOLOGY | Facility: HOSPITAL | Age: 75
Discharge: HOME | End: 2020-07-27
Attending: FAMILY MEDICINE
Payer: MEDICARE

## 2020-07-27 ENCOUNTER — IMMUNIZATION (OUTPATIENT)
Dept: PHARMACY | Facility: CLINIC | Age: 75
End: 2020-07-27
Payer: MEDICARE

## 2020-07-27 ENCOUNTER — OFFICE VISIT (OUTPATIENT)
Dept: PRIMARY CARE CLINIC | Facility: CLINIC | Age: 75
End: 2020-07-27
Payer: MEDICARE

## 2020-07-27 VITALS
SYSTOLIC BLOOD PRESSURE: 130 MMHG | BODY MASS INDEX: 29.59 KG/M2 | HEIGHT: 61 IN | HEART RATE: 68 BPM | WEIGHT: 156.75 LBS | TEMPERATURE: 98 F | DIASTOLIC BLOOD PRESSURE: 80 MMHG

## 2020-07-27 DIAGNOSIS — L98.9 LESION OF SKIN OF NOSE: ICD-10-CM

## 2020-07-27 DIAGNOSIS — Z00.00 ROUTINE GENERAL MEDICAL EXAMINATION AT A HEALTH CARE FACILITY: ICD-10-CM

## 2020-07-27 DIAGNOSIS — I48.91 ATRIAL FIBRILLATION, UNSPECIFIED TYPE: ICD-10-CM

## 2020-07-27 DIAGNOSIS — Z12.31 SCREENING MAMMOGRAM FOR HIGH-RISK PATIENT: Primary | ICD-10-CM

## 2020-07-27 DIAGNOSIS — B34.9 VIRAL SYNDROME: ICD-10-CM

## 2020-07-27 DIAGNOSIS — I10 ESSENTIAL HYPERTENSION: ICD-10-CM

## 2020-07-27 DIAGNOSIS — E78.00 PURE HYPERCHOLESTEROLEMIA: ICD-10-CM

## 2020-07-27 DIAGNOSIS — R06.09 DYSPNEA ON EXERTION: ICD-10-CM

## 2020-07-27 DIAGNOSIS — I10 HYPERTENSION, UNSPECIFIED TYPE: ICD-10-CM

## 2020-07-27 PROCEDURE — 3075F SYST BP GE 130 - 139MM HG: CPT | Mod: CPTII,S$GLB,, | Performed by: FAMILY MEDICINE

## 2020-07-27 PROCEDURE — 93005 EKG 12-LEAD: ICD-10-PCS | Mod: S$GLB,,, | Performed by: FAMILY MEDICINE

## 2020-07-27 PROCEDURE — 99397 PR PREVENTIVE VISIT,EST,65 & OVER: ICD-10-PCS | Mod: S$GLB,,, | Performed by: FAMILY MEDICINE

## 2020-07-27 PROCEDURE — 3079F PR MOST RECENT DIASTOLIC BLOOD PRESSURE 80-89 MM HG: ICD-10-PCS | Mod: CPTII,S$GLB,, | Performed by: FAMILY MEDICINE

## 2020-07-27 PROCEDURE — 3075F PR MOST RECENT SYSTOLIC BLOOD PRESS GE 130-139MM HG: ICD-10-PCS | Mod: CPTII,S$GLB,, | Performed by: FAMILY MEDICINE

## 2020-07-27 PROCEDURE — 71046 X-RAY EXAM CHEST 2 VIEWS: CPT | Mod: 26,,, | Performed by: INTERNAL MEDICINE

## 2020-07-27 PROCEDURE — 3079F DIAST BP 80-89 MM HG: CPT | Mod: CPTII,S$GLB,, | Performed by: FAMILY MEDICINE

## 2020-07-27 PROCEDURE — 93010 ELECTROCARDIOGRAM REPORT: CPT | Mod: S$GLB,,, | Performed by: INTERNAL MEDICINE

## 2020-07-27 PROCEDURE — 71046 X-RAY EXAM CHEST 2 VIEWS: CPT | Mod: TC,PN

## 2020-07-27 PROCEDURE — 93010 EKG 12-LEAD: ICD-10-PCS | Mod: S$GLB,,, | Performed by: INTERNAL MEDICINE

## 2020-07-27 PROCEDURE — 99999 PR PBB SHADOW E&M-EST. PATIENT-LVL V: ICD-10-PCS | Mod: PBBFAC,,, | Performed by: FAMILY MEDICINE

## 2020-07-27 PROCEDURE — 99397 PER PM REEVAL EST PAT 65+ YR: CPT | Mod: S$GLB,,, | Performed by: FAMILY MEDICINE

## 2020-07-27 PROCEDURE — 93005 ELECTROCARDIOGRAM TRACING: CPT | Mod: S$GLB,,, | Performed by: FAMILY MEDICINE

## 2020-07-27 PROCEDURE — 71046 XR CHEST PA AND LATERAL: ICD-10-PCS | Mod: 26,,, | Performed by: INTERNAL MEDICINE

## 2020-07-27 PROCEDURE — 99999 PR PBB SHADOW E&M-EST. PATIENT-LVL V: CPT | Mod: PBBFAC,,, | Performed by: FAMILY MEDICINE

## 2020-07-27 RX ORDER — AMLODIPINE BESYLATE 5 MG/1
TABLET ORAL
Qty: 30 TABLET | Refills: 3 | Status: SHIPPED | OUTPATIENT
Start: 2020-07-27 | End: 2020-09-16 | Stop reason: SDUPTHER

## 2020-07-27 NOTE — PROGRESS NOTES
Subjective:       Patient ID: Magdalena Mane is a 75 y.o. female.    Chief Complaint: Annual Exam    74 yo presents today for routine exam and follow up of hypertension and hyperlipidemia.    Scheduled for second Shingrix today    Review of Systems   Constitutional: Negative.  Negative for activity change, appetite change, chills, diaphoresis, fatigue, fever and unexpected weight change.   HENT: Negative.  Negative for nasal congestion, ear pain, hearing loss, rhinorrhea, sinus pressure/congestion, sore throat, tinnitus, trouble swallowing and voice change.    Eyes: Negative.  Negative for photophobia, pain and visual disturbance.   Respiratory: Positive for shortness of breath. Negative for cough and chest tightness.         Recurrent dyspnea on exertion x several weeks, no associated chest pain.     Cardiovascular: Negative.  Negative for chest pain, palpitations and leg swelling.   Gastrointestinal: Negative.  Negative for abdominal distention, abdominal pain, blood in stool, constipation, diarrhea, nausea and vomiting.   Genitourinary: Negative.  Negative for difficulty urinating, dysuria, frequency, hematuria, pelvic pain and vaginal bleeding.   Musculoskeletal: Negative.  Negative for arthralgias, back pain, joint swelling, neck pain and neck stiffness.   Integumentary:  Positive for mole/lesion. Negative for color change, pallor and rash.        Lesion over tip of nose x several months, gradual increase in size   Neurological: Negative.  Negative for dizziness, tremors, speech difficulty, weakness, light-headedness, numbness and headaches.   Hematological: Negative for adenopathy. Does not bruise/bleed easily.   Psychiatric/Behavioral: Negative for agitation, confusion, dysphoric mood, hallucinations and sleep disturbance. The patient is not nervous/anxious.          Objective:      Physical Exam  Constitutional:       Appearance: She is well-developed.   HENT:      Right Ear: Tympanic membrane, ear canal  and external ear normal.      Left Ear: Tympanic membrane, ear canal and external ear normal.      Nose: Nose normal.      Mouth/Throat:      Pharynx: No posterior oropharyngeal erythema.   Eyes:      Conjunctiva/sclera: Conjunctivae normal.      Pupils: Pupils are equal, round, and reactive to light.   Neck:      Musculoskeletal: Normal range of motion and neck supple.      Thyroid: No thyromegaly.      Trachea: No tracheal deviation.   Cardiovascular:      Rate and Rhythm: Normal rate. Rhythm irregular.      Heart sounds: Normal heart sounds.      Comments: Bilateral superficial varicosities, lower extremities  Irregular rhythm, rate about 70  Pulmonary:      Effort: Pulmonary effort is normal.      Breath sounds: No wheezing or rales.   Chest:      Chest wall: No tenderness.   Abdominal:      General: Bowel sounds are normal. There is no distension.      Palpations: Abdomen is soft. There is no mass.      Tenderness: There is no rebound.   Genitourinary:     Vagina: Normal.   Musculoskeletal: Normal range of motion.         General: No tenderness.   Lymphadenopathy:      Cervical: No cervical adenopathy.   Skin:     General: Skin is warm and dry.      Findings: Lesion present. No erythema or rash.      Comments: Hyperkeratotic lesion, tip of nose about 3-4mm diameter   Neurological:      Mental Status: She is alert and oriented to person, place, and time.      Cranial Nerves: No cranial nerve deficit.      Coordination: Coordination normal.      Deep Tendon Reflexes: Reflexes are normal and symmetric.   Psychiatric:         Behavior: Behavior normal.         Assessment:     1.  Physical exam  2.  Hypertension  3.  Hyperlipidemia  4.  New onset A Fib  5.  Dyspnea on exertion, ? Secondary to #4  6.  Nasal lesion  Plan:       1.  Fasting labs  2.  Mammogram  3.  Continue Atenolol, consult Cardiology.  Discussed starting Eliquis with pt  4.  Consult Dermatology

## 2020-07-28 ENCOUNTER — PATIENT OUTREACH (OUTPATIENT)
Dept: ADMINISTRATIVE | Facility: OTHER | Age: 75
End: 2020-07-28

## 2020-07-28 ENCOUNTER — OFFICE VISIT (OUTPATIENT)
Dept: CARDIOLOGY | Facility: CLINIC | Age: 75
End: 2020-07-28
Payer: MEDICARE

## 2020-07-28 VITALS
BODY MASS INDEX: 29.7 KG/M2 | HEART RATE: 106 BPM | DIASTOLIC BLOOD PRESSURE: 79 MMHG | SYSTOLIC BLOOD PRESSURE: 143 MMHG | HEIGHT: 61 IN | WEIGHT: 157.31 LBS

## 2020-07-28 DIAGNOSIS — I48.91 ATRIAL FIBRILLATION, UNSPECIFIED TYPE: ICD-10-CM

## 2020-07-28 DIAGNOSIS — I10 ESSENTIAL HYPERTENSION: Primary | ICD-10-CM

## 2020-07-28 DIAGNOSIS — I47.9 PAROXYSMAL TACHYCARDIA: ICD-10-CM

## 2020-07-28 PROCEDURE — 3078F DIAST BP <80 MM HG: CPT | Mod: CPTII,S$GLB,, | Performed by: INTERNAL MEDICINE

## 2020-07-28 PROCEDURE — 99204 PR OFFICE/OUTPT VISIT, NEW, LEVL IV, 45-59 MIN: ICD-10-PCS | Mod: S$GLB,,, | Performed by: INTERNAL MEDICINE

## 2020-07-28 PROCEDURE — 1101F PT FALLS ASSESS-DOCD LE1/YR: CPT | Mod: CPTII,S$GLB,, | Performed by: INTERNAL MEDICINE

## 2020-07-28 PROCEDURE — 3077F SYST BP >= 140 MM HG: CPT | Mod: CPTII,S$GLB,, | Performed by: INTERNAL MEDICINE

## 2020-07-28 PROCEDURE — 1101F PR PT FALLS ASSESS DOC 0-1 FALLS W/OUT INJ PAST YR: ICD-10-PCS | Mod: CPTII,S$GLB,, | Performed by: INTERNAL MEDICINE

## 2020-07-28 PROCEDURE — 3078F PR MOST RECENT DIASTOLIC BLOOD PRESSURE < 80 MM HG: ICD-10-PCS | Mod: CPTII,S$GLB,, | Performed by: INTERNAL MEDICINE

## 2020-07-28 PROCEDURE — 3077F PR MOST RECENT SYSTOLIC BLOOD PRESSURE >= 140 MM HG: ICD-10-PCS | Mod: CPTII,S$GLB,, | Performed by: INTERNAL MEDICINE

## 2020-07-28 PROCEDURE — 99204 OFFICE O/P NEW MOD 45 MIN: CPT | Mod: S$GLB,,, | Performed by: INTERNAL MEDICINE

## 2020-07-28 PROCEDURE — 1126F AMNT PAIN NOTED NONE PRSNT: CPT | Mod: S$GLB,,, | Performed by: INTERNAL MEDICINE

## 2020-07-28 PROCEDURE — 99999 PR PBB SHADOW E&M-EST. PATIENT-LVL IV: ICD-10-PCS | Mod: PBBFAC,,, | Performed by: INTERNAL MEDICINE

## 2020-07-28 PROCEDURE — 99999 PR PBB SHADOW E&M-EST. PATIENT-LVL IV: CPT | Mod: PBBFAC,,, | Performed by: INTERNAL MEDICINE

## 2020-07-28 PROCEDURE — 1159F MED LIST DOCD IN RCRD: CPT | Mod: S$GLB,,, | Performed by: INTERNAL MEDICINE

## 2020-07-28 PROCEDURE — 1126F PR PAIN SEVERITY QUANTIFIED, NO PAIN PRESENT: ICD-10-PCS | Mod: S$GLB,,, | Performed by: INTERNAL MEDICINE

## 2020-07-28 PROCEDURE — 3008F BODY MASS INDEX DOCD: CPT | Mod: CPTII,S$GLB,, | Performed by: INTERNAL MEDICINE

## 2020-07-28 PROCEDURE — 1159F PR MEDICATION LIST DOCUMENTED IN MEDICAL RECORD: ICD-10-PCS | Mod: S$GLB,,, | Performed by: INTERNAL MEDICINE

## 2020-07-28 PROCEDURE — 3008F PR BODY MASS INDEX (BMI) DOCUMENTED: ICD-10-PCS | Mod: CPTII,S$GLB,, | Performed by: INTERNAL MEDICINE

## 2020-07-28 RX ORDER — METOPROLOL SUCCINATE 50 MG/1
50 TABLET, EXTENDED RELEASE ORAL DAILY
Qty: 30 TABLET | Refills: 6 | Status: SHIPPED | OUTPATIENT
Start: 2020-07-28 | End: 2020-09-16 | Stop reason: SDUPTHER

## 2020-07-28 RX ORDER — METOPROLOL SUCCINATE 50 MG/1
50 TABLET, EXTENDED RELEASE ORAL DAILY
COMMUNITY
End: 2020-07-28 | Stop reason: SDUPTHER
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
## 2020-07-28 NOTE — PROGRESS NOTES
Care Everywhere: updated  Immunization:   Health Maintenance:   Media Review:   Legacy Review:   Order placed:   Upcoming appts:  Mammogram scheduling ticket sent to patient's portal on 7.27.2020

## 2020-07-28 NOTE — LETTER
July 28, 2020      Aaron Cardenas MD  [address] [facility] [address]           [facility]iology  [address]  [place] [address]  Phone: [phone]          Patient: Magdalena Mane   MR Number: [id number]   YOB: 1945   Date of Visit: 7/28/2020       Dear Dr. Aaron Cardenas:    Thank you for referring Magdalena Mane to me for evaluation. Attached you will find relevant portions of my assessment and plan of care.    If you have questions, please do not hesitate to call me. I look forward to following Magdalena Mane along with you.    Sincerely,    Maki Pratt MD    Enclosure  CC:  No Recipients    If you would like to receive this communication electronically, please contact [email or web address] or [phone] to request more information on [facility] Link access.    For providers and/or their staff who would like to refer a patient to [facility], please contact us through our one-stop-shop provider referral line, [facility], at [phone].    If you feel you have received this communication in error or would no longer like to receive these types of communications, please e-mail [email or web address]

## 2020-07-29 ENCOUNTER — PATIENT OUTREACH (OUTPATIENT)
Dept: OTHER | Facility: OTHER | Age: 75
End: 2020-07-29

## 2020-07-29 NOTE — PROGRESS NOTES
Digital Medicine: Clinician Follow-Up    Patient was seen by PCP on 7/27/20 and by Cardiology on 7/28/20.   BP Readings from Last 3 Encounters:  07/28/20 : (!) 143/79  07/27/20 : 130/80  02/10/20 : 120/60    Patient reports she was diagnosed with AF yesterday, atenolol was changed to metoprolol. Patient c/o SOB, fatigue, tachycardia due to AF. Patient is scheduled to follow up with Cardiology on 9/16/20.      The history is provided by the patient.   Follow-up reason(s): routine follow up.     Patient readings are stable     Patient is not experiencing signs/symptoms of hypotension.  Patient is not experiencing signs/symptoms of hypertension.      Last 5 Patient Entered Readings                                      Current 30 Day Average: 122/69     Recent Readings 7/15/2020 7/8/2020 7/2/2020 6/24/2020 6/18/2020    SBP (mmHg) 128 109 128 102 147    DBP (mmHg) 69 66 71 60 67    Pulse 71 77 52 83 53          Depression Screening  Did not address depression screening.    Sleep Apnea Screening    Did not address sleep apnea screening.     Medication Affordability Screening  Did not address medication affordability screening.     Medication Adherence-Medication adherence was assessed.        ASSESSMENT(S)  Patients BP average is 122/69 mmHg, which is at goal. Patient's BP goal is less than or equal to 130/80 per 2017 ACC/AHA Hypertension Guidelines.      PLAN  Additional monitoring needed: encouraged patient to increase frequency of monitoring to 1-2x per week.  Continue current therapy: see medication list below.    Patient verbalizes understanding. Patient did not express questions or concerns and patient has contact information if needed.        Hypertension Medications             amLODIPine (NORVASC) 5 MG tablet TAKE 1 TABLET EVERY DAY    irbesartan-hydrochlorothiazide (AVALIDE) 150-12.5 mg per tablet Take 1 tablet by mouth once daily.    metoprolol succinate (TOPROL-XL) 50 MG 24 hr tablet Take 1 tablet (50 mg  total) by mouth once daily.

## 2020-07-31 ENCOUNTER — CLINICAL SUPPORT (OUTPATIENT)
Dept: CARDIOLOGY | Facility: CLINIC | Age: 75
End: 2020-07-31
Attending: INTERNAL MEDICINE
Payer: MEDICARE

## 2020-07-31 ENCOUNTER — TELEPHONE (OUTPATIENT)
Dept: CARDIOLOGY | Facility: CLINIC | Age: 75
End: 2020-07-31

## 2020-07-31 VITALS
SYSTOLIC BLOOD PRESSURE: 136 MMHG | BODY MASS INDEX: 29.64 KG/M2 | HEIGHT: 61 IN | WEIGHT: 157 LBS | HEART RATE: 86 BPM | DIASTOLIC BLOOD PRESSURE: 70 MMHG

## 2020-07-31 LAB
ASCENDING AORTA: 3.16 CM
AV INDEX (PROSTH): 0.63
AV MEAN GRADIENT: 5 MMHG
AV PEAK GRADIENT: 8 MMHG
AV VALVE AREA: 1.8 CM2
AV VELOCITY RATIO: 0.68
BSA FOR ECHO PROCEDURE: 1.75 M2
CV ECHO LV RWT: 0.36 CM
DOP CALC AO PEAK VEL: 1.42 M/S
DOP CALC AO VTI: 32.49 CM
DOP CALC LVOT AREA: 2.8 CM2
DOP CALC LVOT DIAMETER: 1.9 CM
DOP CALC LVOT PEAK VEL: 0.96 M/S
DOP CALC LVOT STROKE VOLUME: 58.32 CM3
DOP CALCLVOT PEAK VEL VTI: 20.58 CM
E WAVE DECELERATION TIME: 143.4 MSEC
E/A RATIO: 3.35
E/E' RATIO: 9.91 M/S
ECHO LV POSTERIOR WALL: 0.81 CM (ref 0.6–1.1)
FRACTIONAL SHORTENING: 30 % (ref 28–44)
INTERVENTRICULAR SEPTUM: 0.66 CM (ref 0.6–1.1)
IVRT: 79.92 MSEC
LA MAJOR: 6.19 CM
LA MINOR: 5.99 CM
LA WIDTH: 4.11 CM
LEFT ATRIUM SIZE: 3.8 CM
LEFT ATRIUM VOLUME INDEX: 47.4 ML/M2
LEFT ATRIUM VOLUME: 80.82 CM3
LEFT INTERNAL DIMENSION IN SYSTOLE: 3.09 CM (ref 2.1–4)
LEFT VENTRICLE DIASTOLIC VOLUME INDEX: 52.63 ML/M2
LEFT VENTRICLE DIASTOLIC VOLUME: 89.71 ML
LEFT VENTRICLE MASS INDEX: 58 G/M2
LEFT VENTRICLE SYSTOLIC VOLUME INDEX: 22.1 ML/M2
LEFT VENTRICLE SYSTOLIC VOLUME: 37.72 ML
LEFT VENTRICULAR INTERNAL DIMENSION IN DIASTOLE: 4.44 CM (ref 3.5–6)
LEFT VENTRICULAR MASS: 99.52 G
LV LATERAL E/E' RATIO: 9.5 M/S
LV SEPTAL E/E' RATIO: 10.36 M/S
MV PEAK A VEL: 0.34 M/S
MV PEAK E VEL: 1.14 M/S
MV STENOSIS PRESSURE HALF TIME: 41.59 MS
MV VALVE AREA P 1/2 METHOD: 5.29 CM2
PISA TR MAX VEL: 2.51 M/S
PULM VEIN S/D RATIO: 0.85
PV PEAK D VEL: 0.65 M/S
PV PEAK S VEL: 0.55 M/S
RA MAJOR: 5.05 CM
RA PRESSURE: 3 MMHG
RA WIDTH: 2.87 CM
RIGHT VENTRICULAR END-DIASTOLIC DIMENSION: 3.39 CM
SINUS: 3.08 CM
STJ: 2.74 CM
TDI LATERAL: 0.12 M/S
TDI SEPTAL: 0.11 M/S
TDI: 0.12 M/S
TR MAX PG: 25 MMHG
TRICUSPID ANNULAR PLANE SYSTOLIC EXCURSION: 2 CM
TV REST PULMONARY ARTERY PRESSURE: 28 MMHG

## 2020-07-31 PROCEDURE — 99999 PR PBB SHADOW E&M-EST. PATIENT-LVL II: CPT | Mod: PBBFAC,,,

## 2020-07-31 PROCEDURE — 99999 PR PBB SHADOW E&M-EST. PATIENT-LVL II: ICD-10-PCS | Mod: PBBFAC,,,

## 2020-07-31 NOTE — TELEPHONE ENCOUNTER
----- Message from Maki Pratt MD sent at 7/31/2020 12:34 PM CDT -----  Please inform the patient that her heart echo showed normal heart function and mild aortic valve stenosis. We do not need to do anything about it yet, just follow it with periodic echoes. The smaller chambers of the heart are a little enlarged and that is common in patients with the irregular heart beating she was recently diagnosed.

## 2020-07-31 NOTE — TELEPHONE ENCOUNTER
Pt notified, verbalized understanding. Pt stated that the echo tech that performed her test told her that her hr was 130 at the beginning of the echo. Pt stated that she has been sob w/extertion.

## 2020-08-06 ENCOUNTER — HOSPITAL ENCOUNTER (OUTPATIENT)
Dept: RADIOLOGY | Facility: HOSPITAL | Age: 75
Discharge: HOME | End: 2020-08-06
Attending: FAMILY MEDICINE
Payer: MEDICARE

## 2020-08-06 DIAGNOSIS — Z12.31 SCREENING MAMMOGRAM FOR HIGH-RISK PATIENT: ICD-10-CM

## 2020-08-06 PROCEDURE — 77063 BREAST TOMOSYNTHESIS BI: CPT | Mod: 26,,, | Performed by: RADIOLOGY

## 2020-08-06 PROCEDURE — 77067 SCR MAMMO BI INCL CAD: CPT | Mod: TC,PO

## 2020-08-06 PROCEDURE — 77067 SCR MAMMO BI INCL CAD: CPT | Mod: 26,,, | Performed by: RADIOLOGY

## 2020-08-06 PROCEDURE — 77063 MAMMO DIGITAL SCREENING BILAT WITH TOMOSYNTHESIS_CAD: ICD-10-PCS | Mod: 26,,, | Performed by: RADIOLOGY

## 2020-08-06 PROCEDURE — 77067 MAMMO DIGITAL SCREENING BILAT WITH TOMOSYNTHESIS_CAD: ICD-10-PCS | Mod: 26,,, | Performed by: RADIOLOGY

## 2020-08-26 ENCOUNTER — PATIENT OUTREACH (OUTPATIENT)
Dept: OTHER | Facility: OTHER | Age: 75
End: 2020-08-26

## 2020-08-26 NOTE — PROGRESS NOTES
Attempted to reach patient for routine follow up. Reviewed available blood pressure readings and labs. Per 2017 ACC/AHA Hypertension Guidelines, current 30-day average is at goal.     Last 5 Patient Entered Readings                                      Current 30 Day Average: 111/64     Recent Readings 8/24/2020 8/19/2020 8/13/2020 8/10/2020 8/7/2020    SBP (mmHg) 111 112 102 123 99    DBP (mmHg) 66 59 61 78 61    Pulse 72 87 76 92 87        Hypertension Medications             amLODIPine (NORVASC) 5 MG tablet TAKE 1 TABLET EVERY DAY    irbesartan-hydrochlorothiazide (AVALIDE) 150-12.5 mg per tablet Take 1 tablet by mouth once daily.    metoprolol succinate (TOPROL-XL) 50 MG 24 hr tablet Take 1 tablet (50 mg total) by mouth once daily.        Left a voicemail and requested patient call back if she has any questions or concerns.     Will continue to monitor regularly. Will follow up in 3 months, sooner if blood pressure begins to trend up.

## 2020-08-27 ENCOUNTER — OFFICE VISIT (OUTPATIENT)
Dept: DERMATOLOGY | Facility: CLINIC | Age: 75
End: 2020-08-27
Payer: MEDICARE

## 2020-08-27 VITALS — WEIGHT: 157 LBS | BODY MASS INDEX: 29.66 KG/M2

## 2020-08-27 DIAGNOSIS — L98.9 LESION OF SKIN OF NOSE: ICD-10-CM

## 2020-08-27 DIAGNOSIS — L82.1 SEBORRHEIC KERATOSES: ICD-10-CM

## 2020-08-27 DIAGNOSIS — Z12.83 SKIN EXAM, SCREENING FOR CANCER: ICD-10-CM

## 2020-08-27 DIAGNOSIS — L81.4 LENTIGINES: ICD-10-CM

## 2020-08-27 DIAGNOSIS — L57.0 ACTINIC KERATOSIS: Primary | ICD-10-CM

## 2020-08-27 DIAGNOSIS — I87.2 STASIS DERMATITIS OF BOTH LEGS: ICD-10-CM

## 2020-08-27 PROCEDURE — 3008F PR BODY MASS INDEX (BMI) DOCUMENTED: ICD-10-PCS | Mod: CPTII,S$GLB,, | Performed by: DERMATOLOGY

## 2020-08-27 PROCEDURE — 1159F MED LIST DOCD IN RCRD: CPT | Mod: S$GLB,,, | Performed by: DERMATOLOGY

## 2020-08-27 PROCEDURE — 99999 PR PBB SHADOW E&M-EST. PATIENT-LVL III: ICD-10-PCS | Mod: PBBFAC,,, | Performed by: DERMATOLOGY

## 2020-08-27 PROCEDURE — 99202 PR OFFICE/OUTPT VISIT, NEW, LEVL II, 15-29 MIN: ICD-10-PCS | Mod: S$GLB,,, | Performed by: DERMATOLOGY

## 2020-08-27 PROCEDURE — 1159F PR MEDICATION LIST DOCUMENTED IN MEDICAL RECORD: ICD-10-PCS | Mod: S$GLB,,, | Performed by: DERMATOLOGY

## 2020-08-27 PROCEDURE — 1126F PR PAIN SEVERITY QUANTIFIED, NO PAIN PRESENT: ICD-10-PCS | Mod: S$GLB,,, | Performed by: DERMATOLOGY

## 2020-08-27 PROCEDURE — 1101F PR PT FALLS ASSESS DOC 0-1 FALLS W/OUT INJ PAST YR: ICD-10-PCS | Mod: CPTII,S$GLB,, | Performed by: DERMATOLOGY

## 2020-08-27 PROCEDURE — 99202 OFFICE O/P NEW SF 15 MIN: CPT | Mod: S$GLB,,, | Performed by: DERMATOLOGY

## 2020-08-27 PROCEDURE — 3008F BODY MASS INDEX DOCD: CPT | Mod: CPTII,S$GLB,, | Performed by: DERMATOLOGY

## 2020-08-27 PROCEDURE — 1101F PT FALLS ASSESS-DOCD LE1/YR: CPT | Mod: CPTII,S$GLB,, | Performed by: DERMATOLOGY

## 2020-08-27 PROCEDURE — 99999 PR PBB SHADOW E&M-EST. PATIENT-LVL III: CPT | Mod: PBBFAC,,, | Performed by: DERMATOLOGY

## 2020-08-27 PROCEDURE — 1126F AMNT PAIN NOTED NONE PRSNT: CPT | Mod: S$GLB,,, | Performed by: DERMATOLOGY

## 2020-08-27 RX ORDER — FLUOROURACIL 50 MG/G
CREAM TOPICAL
Qty: 40 G | Refills: 3 | Status: SHIPPED | OUTPATIENT
Start: 2020-08-27 | End: 2021-02-15

## 2020-08-27 NOTE — LETTER
August 27, 2020      Aaron Cardenas MD  [address] [facility] [address]           [facility]  [address] [facility].  [place] [address]  Phone: [phone]  Fax: [phone]          Patient: Magdalena Mane   MR Number: [id number]   YOB: 1945   Date of Visit: 8/27/2020       Dear Dr. Aaron Cardenas:    Thank you for referring Magdalena Mane to me for evaluation. Attached you will find relevant portions of my assessment and plan of care.    If you have questions, please do not hesitate to call me. I look forward to following Magdalena Mane along with you.    Sincerely,    Adriana Montemayor MD    Enclosure  CC:  No Recipients    If you would like to receive this communication electronically, please contact [email or web address] or [phone] to request more information on [employer] Link access.    For providers and/or their staff who would like to refer a patient to [facility], please contact us through our one-stop-shop provider referral line, [facility] [place], at [phone].    If you feel you have received this communication in error or would no longer like to receive these types of communications, please e-mail [email or web address]

## 2020-08-27 NOTE — PROGRESS NOTES
Subjective:       Patient ID:  Magdalena Mane is a 75 y.o. female who presents for   Chief Complaint   Patient presents with    Lesion     nose, scaly, itching on back     Skin Check     UBSE     Spot on nose which had been treated with cryo several years ago has recurred not painful no tx.  Also spot on lower back which itches and recurrent stasis dermatitis uses tac cream prn.     Lesion - Initial  Affected locations: nose, back, abdomen, chest, torso, left arm, right arm and face  Signs / symptoms: asymptomatic  Aggravated by: nothing  Relieving factors/Treatments tried: nothing        Review of Systems   Constitutional: Negative for fever, chills, weight loss, weight gain, fatigue, night sweats and malaise.   Skin: Positive for rash, daily sunscreen use and wears hat.   Hematologic/Lymphatic: Does not bruise/bleed easily.        Objective:    Physical Exam   Constitutional: She appears well-developed and well-nourished. No distress.   Neurological: She is alert and oriented to person, place, and time. She is not disoriented.   Psychiatric: She has a normal mood and affect.   Skin:   Areas Examined (abnormalities noted in diagram):   Head / Face Inspection Performed  Neck Inspection Performed  Chest / Axilla Inspection Performed  Abdomen Inspection Performed  Back Inspection Performed  RUE Inspected  LUE Inspection Performed  RLE Inspected  LLE Inspection Performed                   Diagram Legend     Erythematous scaling macule/papule c/w actinic keratosis       Vascular papule c/w angioma      Pigmented verrucoid papule/plaque c/w seborrheic keratosis      Yellow umbilicated papule c/w sebaceous hyperplasia      Irregularly shaped tan macule c/w lentigo     1-2 mm smooth white papules consistent with Milia      Movable subcutaneous cyst with punctum c/w epidermal inclusion cyst      Subcutaneous movable cyst c/w pilar cyst      Firm pink to brown papule c/w dermatofibroma      Pedunculated fleshy  "papule(s) c/w skin tag(s)      Evenly pigmented macule c/w junctional nevus     Mildly variegated pigmented, slightly irregular-bordered macule c/w mildly atypical nevus      Flesh colored to evenly pigmented papule c/w intradermal nevus       Pink pearly papule/plaque c/w basal cell carcinoma      Erythematous hyperkeratotic cursted plaque c/w SCC      Surgical scar with no sign of skin cancer recurrence      Open and closed comedones      Inflammatory papules and pustules      Verrucoid papule consistent consistent with wart     Erythematous eczematous patches and plaques     Dystrophic onycholytic nail with subungual debris c/w onychomycosis     Umbilicated papule    Erythematous-base heme-crusted tan verrucoid plaque consistent with inflamed seborrheic keratosis     Erythematous Silvery Scaling Plaque c/w Psoriasis     See annotation      Assessment / Plan:        Actinic keratosis nose  -     fluorouraciL (EFUDEX) 5 % cream; Use hs for 2 weeks  Dispense: 40 g; Refill: 3  Recheck 3 months    Lesion of skin of nose  -     Ambulatory referral/consult to Dermatology    Lentigines  The "ABCD" rules to observe pigmented lesions were reviewed.        Seborrheic keratoses  reassurance  Brochure provided      Skin exam, screening for cancer    Upper body skin examination performed today including at least 6 points as noted in physical examination. No lesions suspicious for malignancy noted.      Stasis dermatitis of both legs  Support socks  Tac cream prn flares              Follow up in about 3 months (around 11/27/2020).  "

## 2020-09-15 ENCOUNTER — PATIENT OUTREACH (OUTPATIENT)
Dept: ADMINISTRATIVE | Facility: OTHER | Age: 75
End: 2020-09-15

## 2020-09-16 ENCOUNTER — OFFICE VISIT (OUTPATIENT)
Dept: CARDIOLOGY | Facility: CLINIC | Age: 75
End: 2020-09-16
Payer: MEDICARE

## 2020-09-16 ENCOUNTER — TELEPHONE (OUTPATIENT)
Dept: ELECTROPHYSIOLOGY | Facility: CLINIC | Age: 75
End: 2020-09-16

## 2020-09-16 VITALS
BODY MASS INDEX: 31.23 KG/M2 | HEART RATE: 79 BPM | WEIGHT: 159.06 LBS | SYSTOLIC BLOOD PRESSURE: 124 MMHG | DIASTOLIC BLOOD PRESSURE: 62 MMHG | HEIGHT: 60 IN

## 2020-09-16 DIAGNOSIS — R69 DIAGNOSIS DEFERRED: ICD-10-CM

## 2020-09-16 DIAGNOSIS — I48.91 ATRIAL FIBRILLATION, UNSPECIFIED TYPE: ICD-10-CM

## 2020-09-16 DIAGNOSIS — I49.8 OTHER SPECIFIED CARDIAC ARRHYTHMIAS: Primary | ICD-10-CM

## 2020-09-16 DIAGNOSIS — I10 ESSENTIAL HYPERTENSION: ICD-10-CM

## 2020-09-16 PROCEDURE — 1126F PR PAIN SEVERITY QUANTIFIED, NO PAIN PRESENT: ICD-10-PCS | Mod: S$GLB,,, | Performed by: INTERNAL MEDICINE

## 2020-09-16 PROCEDURE — 3078F DIAST BP <80 MM HG: CPT | Mod: CPTII,S$GLB,, | Performed by: INTERNAL MEDICINE

## 2020-09-16 PROCEDURE — 3288F FALL RISK ASSESSMENT DOCD: CPT | Mod: CPTII,S$GLB,, | Performed by: INTERNAL MEDICINE

## 2020-09-16 PROCEDURE — 99999 PR PBB SHADOW E&M-EST. PATIENT-LVL IV: ICD-10-PCS | Mod: PBBFAC,,, | Performed by: INTERNAL MEDICINE

## 2020-09-16 PROCEDURE — 93000 EKG 12-LEAD: ICD-10-PCS | Mod: S$GLB,,, | Performed by: INTERNAL MEDICINE

## 2020-09-16 PROCEDURE — 99999 PR PBB SHADOW E&M-EST. PATIENT-LVL IV: CPT | Mod: PBBFAC,,, | Performed by: INTERNAL MEDICINE

## 2020-09-16 PROCEDURE — 99213 PR OFFICE/OUTPT VISIT, EST, LEVL III, 20-29 MIN: ICD-10-PCS | Mod: S$GLB,,, | Performed by: INTERNAL MEDICINE

## 2020-09-16 PROCEDURE — 1159F MED LIST DOCD IN RCRD: CPT | Mod: S$GLB,,, | Performed by: INTERNAL MEDICINE

## 2020-09-16 PROCEDURE — 1100F PR PT FALLS ASSESS DOC 2+ FALLS/FALL W/INJURY/YR: ICD-10-PCS | Mod: CPTII,S$GLB,, | Performed by: INTERNAL MEDICINE

## 2020-09-16 PROCEDURE — 93000 ELECTROCARDIOGRAM COMPLETE: CPT | Mod: S$GLB,,, | Performed by: INTERNAL MEDICINE

## 2020-09-16 PROCEDURE — 1100F PTFALLS ASSESS-DOCD GE2>/YR: CPT | Mod: CPTII,S$GLB,, | Performed by: INTERNAL MEDICINE

## 2020-09-16 PROCEDURE — 3008F PR BODY MASS INDEX (BMI) DOCUMENTED: ICD-10-PCS | Mod: CPTII,S$GLB,, | Performed by: INTERNAL MEDICINE

## 2020-09-16 PROCEDURE — 1159F PR MEDICATION LIST DOCUMENTED IN MEDICAL RECORD: ICD-10-PCS | Mod: S$GLB,,, | Performed by: INTERNAL MEDICINE

## 2020-09-16 PROCEDURE — 3008F BODY MASS INDEX DOCD: CPT | Mod: CPTII,S$GLB,, | Performed by: INTERNAL MEDICINE

## 2020-09-16 PROCEDURE — 3288F PR FALLS RISK ASSESSMENT DOCUMENTED: ICD-10-PCS | Mod: CPTII,S$GLB,, | Performed by: INTERNAL MEDICINE

## 2020-09-16 PROCEDURE — 3074F SYST BP LT 130 MM HG: CPT | Mod: CPTII,S$GLB,, | Performed by: INTERNAL MEDICINE

## 2020-09-16 PROCEDURE — 1126F AMNT PAIN NOTED NONE PRSNT: CPT | Mod: S$GLB,,, | Performed by: INTERNAL MEDICINE

## 2020-09-16 PROCEDURE — 3078F PR MOST RECENT DIASTOLIC BLOOD PRESSURE < 80 MM HG: ICD-10-PCS | Mod: CPTII,S$GLB,, | Performed by: INTERNAL MEDICINE

## 2020-09-16 PROCEDURE — 3074F PR MOST RECENT SYSTOLIC BLOOD PRESSURE < 130 MM HG: ICD-10-PCS | Mod: CPTII,S$GLB,, | Performed by: INTERNAL MEDICINE

## 2020-09-16 PROCEDURE — 99213 OFFICE O/P EST LOW 20 MIN: CPT | Mod: S$GLB,,, | Performed by: INTERNAL MEDICINE

## 2020-09-16 RX ORDER — IRBESARTAN AND HYDROCHLOROTHIAZIDE 150; 12.5 MG/1; MG/1
1 TABLET, FILM COATED ORAL DAILY
Qty: 90 TABLET | Refills: 3 | Status: SHIPPED | OUTPATIENT
Start: 2020-09-16 | End: 2021-08-02 | Stop reason: SDUPTHER

## 2020-09-16 RX ORDER — AMLODIPINE BESYLATE 5 MG/1
TABLET ORAL
Qty: 90 TABLET | Refills: 3 | Status: SHIPPED | OUTPATIENT
Start: 2020-09-16 | End: 2021-08-02 | Stop reason: SDUPTHER

## 2020-09-16 RX ORDER — METOPROLOL SUCCINATE 50 MG/1
50 TABLET, EXTENDED RELEASE ORAL DAILY
Qty: 90 TABLET | Refills: 3 | Status: SHIPPED | OUTPATIENT
Start: 2020-09-16 | End: 2020-11-13

## 2020-09-16 NOTE — TELEPHONE ENCOUNTER
Spoke with AnastasiyaAlhaji to confirm upcoming appointment with  office . Pt denies any outside Records/implanted devices. Pt was informed that a EKG is usually done prior to he appt to see what rhythm your heart is in . Pt will use the EKG from  office on 09/16. Pt verbalized understanding.

## 2020-09-16 NOTE — PROGRESS NOTES
Subjective:   Patient ID:  Magdalena Mane is a 75 y.o. female who presents for follow-up of Hypertension      HPI: Returns feeling a lot better. Symptoms of palpitations and shortness of breath have resolved.  Tolerates medications well.    ECG today still A.fib with heart rate in the low 80 ties.    CFD results Conclusion    · Atrial fibrillation observed.  · Local segmental wall motion abnormalities.  · Normal left ventricular systolic function. The estimated ejection fraction is 58%.  · Normal right ventricular systolic function.  · Moderate left atrial enlargement.  · Mild right atrial enlargement.  · Trivial -mild aortic valve stenosis and trivial AR.  · Aortic valve area is 1.80 cm2; peak velocity is 1.42 m/s; mean gradient is 5 mmHg.  · Mild tricuspid regurgitation.  · Normal central venous pressure (3 mmHg).  · The estimated PA systolic pressure is 28 mmHg.         Lab Results   Component Value Date     07/27/2020    K 4.2 07/27/2020     07/27/2020    CO2 28 07/27/2020    BUN 13 07/27/2020    CREATININE 0.8 07/27/2020    GLU 87 07/27/2020    MG 1.9 05/13/2014    AST 22 07/27/2020    ALT 17 07/27/2020    ALBUMIN 3.8 07/27/2020    PROT 7.1 07/27/2020    BILITOT 0.4 07/27/2020    WBC 7.96 07/27/2020    HGB 12.4 07/27/2020    HCT 39.5 07/27/2020    MCV 98 07/27/2020     07/27/2020    INR 0.9 08/26/2009    TSH 2.554 07/27/2020    CHOL 169 07/27/2020    HDL 67 07/27/2020    LDLCALC 85.8 07/27/2020    TRIG 81 07/27/2020       Review of Systems   Constitution: Negative.   HENT: Negative.    Eyes: Negative.    Cardiovascular: Negative.  Negative for chest pain, claudication, dyspnea on exertion, irregular heartbeat, leg swelling, near-syncope, palpitations and syncope.   Respiratory: Negative.  Negative for cough, shortness of breath, snoring and wheezing.    Endocrine: Negative.  Negative for cold intolerance, heat intolerance, polydipsia, polyphagia and polyuria.   Skin: Negative.     Musculoskeletal: Positive for arthritis, back pain and joint pain.   Gastrointestinal: Negative.    Genitourinary: Negative.    Neurological: Positive for loss of balance.   Psychiatric/Behavioral: The patient is nervous/anxious.        Objective:   Physical Exam   Nursing note and vitals reviewed.    /62, pulse 79 irreg, weight 159 lbs    Assessment and Plan:     Problem List Items Addressed This Visit        Cardiology Problems    Essential hypertension    Relevant Medications    metoprolol succinate (TOPROL-XL) 50 MG 24 hr tablet    irbesartan-hydrochlorothiazide (AVALIDE) 150-12.5 mg per tablet    apixaban (ELIQUIS) 5 mg Tab    amLODIPine (NORVASC) 5 MG tablet    Other Relevant Orders    IN OFFICE EKG 12-LEAD (to [place])    Ambulatory referral/consult to Electrophysiology    Atrial fibrillation    Relevant Medications    metoprolol succinate (TOPROL-XL) 50 MG 24 hr tablet    irbesartan-hydrochlorothiazide (AVALIDE) 150-12.5 mg per tablet    apixaban (ELIQUIS) 5 mg Tab    amLODIPine (NORVASC) 5 MG tablet    Other Relevant Orders    IN OFFICE EKG 12-LEAD (to [place])    Ambulatory referral/consult to Electrophysiology      Other Visit Diagnoses     Diagnosis deferred        Relevant Orders    IN OFFICE EKG 12-LEAD (to [place])    Ambulatory referral/consult to Electrophysiology          Patient's Medications   New Prescriptions    No medications on file   Previous Medications    ACETAMINOPHEN (TYLENOL) 500 MG TABLET    Take 500 mg by mouth 2 (two) times daily.    CALCIUM-VITAMIN D 250-100 MG-UNIT PER TABLET    Take 1 tablet by mouth 2 (two) times daily.    DIPHENHYDRAMINE (BENADRYL) 25 MG CAPSULE    Take 25 mg by mouth nightly as needed for Itching or Insomnia.     FERROUS SULFATE 324 MG (65 MG IRON) TBEC    Take 325 mg by mouth twice a week.    FLUOROURACIL (EFUDEX) 5 % CREAM    Use hs for 2 weeks    TRIAMCINOLONE ACETONIDE 0.1% (KENALOG) 0.1 % CREAM    Apply topically 2 (two) times daily as needed. Avoid face  and groin.   Modified Medications    Modified Medication Previous Medication    AMLODIPINE (NORVASC) 5 MG TABLET amLODIPine (NORVASC) 5 MG tablet       TAKE 1 TABLET EVERY DAY    TAKE 1 TABLET EVERY DAY    APIXABAN (ELIQUIS) 5 MG TAB apixaban (ELIQUIS) 5 mg Tab       Take 1 tablet (5 mg total) by mouth 2 (two) times daily.    Take 1 tablet (5 mg total) by mouth 2 (two) times daily.    IRBESARTAN-HYDROCHLOROTHIAZIDE (AVALIDE) 150-12.5 MG PER TABLET irbesartan-hydrochlorothiazide (AVALIDE) 150-12.5 mg per tablet       Take 1 tablet by mouth once daily.    Take 1 tablet by mouth once daily.    METOPROLOL SUCCINATE (TOPROL-XL) 50 MG 24 HR TABLET metoprolol succinate (TOPROL-XL) 50 MG 24 hr tablet       Take 1 tablet (50 mg total) by mouth once daily.    Take 1 tablet (50 mg total) by mouth once daily.   Discontinued Medications    No medications on file     Refer to EP service for LIGIA guided DCCV and any further recommendations.  Follow up in 6 months and then annually or a follow up with EP only.  Follow up in about 6 months (around 3/16/2021).

## 2020-09-17 ENCOUNTER — OFFICE VISIT (OUTPATIENT)
Dept: ELECTROPHYSIOLOGY | Facility: CLINIC | Age: 75
End: 2020-09-17
Payer: MEDICARE

## 2020-09-17 VITALS
HEART RATE: 90 BPM | SYSTOLIC BLOOD PRESSURE: 134 MMHG | WEIGHT: 158.06 LBS | DIASTOLIC BLOOD PRESSURE: 82 MMHG | BODY MASS INDEX: 29.84 KG/M2 | HEIGHT: 61 IN

## 2020-09-17 DIAGNOSIS — I10 ESSENTIAL HYPERTENSION: ICD-10-CM

## 2020-09-17 DIAGNOSIS — I48.91 ATRIAL FIBRILLATION, UNSPECIFIED TYPE: ICD-10-CM

## 2020-09-17 DIAGNOSIS — R69 DIAGNOSIS DEFERRED: ICD-10-CM

## 2020-09-17 DIAGNOSIS — I48.19 OTHER PERSISTENT ATRIAL FIBRILLATION: Primary | ICD-10-CM

## 2020-09-17 PROCEDURE — 3075F SYST BP GE 130 - 139MM HG: CPT | Mod: CPTII,S$GLB,, | Performed by: INTERNAL MEDICINE

## 2020-09-17 PROCEDURE — 99204 OFFICE O/P NEW MOD 45 MIN: CPT | Mod: S$GLB,,, | Performed by: INTERNAL MEDICINE

## 2020-09-17 PROCEDURE — 1100F PTFALLS ASSESS-DOCD GE2>/YR: CPT | Mod: CPTII,S$GLB,, | Performed by: INTERNAL MEDICINE

## 2020-09-17 PROCEDURE — 99204 PR OFFICE/OUTPT VISIT, NEW, LEVL IV, 45-59 MIN: ICD-10-PCS | Mod: S$GLB,,, | Performed by: INTERNAL MEDICINE

## 2020-09-17 PROCEDURE — 3079F DIAST BP 80-89 MM HG: CPT | Mod: CPTII,S$GLB,, | Performed by: INTERNAL MEDICINE

## 2020-09-17 PROCEDURE — 3075F PR MOST RECENT SYSTOLIC BLOOD PRESS GE 130-139MM HG: ICD-10-PCS | Mod: CPTII,S$GLB,, | Performed by: INTERNAL MEDICINE

## 2020-09-17 PROCEDURE — 99999 PR PBB SHADOW E&M-EST. PATIENT-LVL IV: CPT | Mod: PBBFAC,,, | Performed by: INTERNAL MEDICINE

## 2020-09-17 PROCEDURE — 1126F AMNT PAIN NOTED NONE PRSNT: CPT | Mod: S$GLB,,, | Performed by: INTERNAL MEDICINE

## 2020-09-17 PROCEDURE — 3008F BODY MASS INDEX DOCD: CPT | Mod: CPTII,S$GLB,, | Performed by: INTERNAL MEDICINE

## 2020-09-17 PROCEDURE — 1100F PR PT FALLS ASSESS DOC 2+ FALLS/FALL W/INJURY/YR: ICD-10-PCS | Mod: CPTII,S$GLB,, | Performed by: INTERNAL MEDICINE

## 2020-09-17 PROCEDURE — 99999 PR PBB SHADOW E&M-EST. PATIENT-LVL IV: ICD-10-PCS | Mod: PBBFAC,,, | Performed by: INTERNAL MEDICINE

## 2020-09-17 PROCEDURE — 3008F PR BODY MASS INDEX (BMI) DOCUMENTED: ICD-10-PCS | Mod: CPTII,S$GLB,, | Performed by: INTERNAL MEDICINE

## 2020-09-17 PROCEDURE — 3288F FALL RISK ASSESSMENT DOCD: CPT | Mod: CPTII,S$GLB,, | Performed by: INTERNAL MEDICINE

## 2020-09-17 PROCEDURE — 1159F MED LIST DOCD IN RCRD: CPT | Mod: S$GLB,,, | Performed by: INTERNAL MEDICINE

## 2020-09-17 PROCEDURE — 3288F PR FALLS RISK ASSESSMENT DOCUMENTED: ICD-10-PCS | Mod: CPTII,S$GLB,, | Performed by: INTERNAL MEDICINE

## 2020-09-17 PROCEDURE — 1126F PR PAIN SEVERITY QUANTIFIED, NO PAIN PRESENT: ICD-10-PCS | Mod: S$GLB,,, | Performed by: INTERNAL MEDICINE

## 2020-09-17 PROCEDURE — 1159F PR MEDICATION LIST DOCUMENTED IN MEDICAL RECORD: ICD-10-PCS | Mod: S$GLB,,, | Performed by: INTERNAL MEDICINE

## 2020-09-17 PROCEDURE — 3079F PR MOST RECENT DIASTOLIC BLOOD PRESSURE 80-89 MM HG: ICD-10-PCS | Mod: CPTII,S$GLB,, | Performed by: INTERNAL MEDICINE

## 2020-09-17 NOTE — LETTER
September 17, 2020      Maki Pratt MD  [address] [facility]  [address]  [place] [address]           [facility]  [address]  [place] [address]  Phone: [phone]  Fax: [phone]          Patient: Magdalena Mane   MR Number: [id number]   YOB: 1945   Date of Visit: 9/17/2020       Dear Dr. Maki Pratt:    Thank you for referring Magdalena Mane to me for evaluation. Attached you will find relevant portions of my assessment and plan of care.    If you have questions, please do not hesitate to call me. I look forward to following Magdalena Mane along with you.    Sincerely,    Nigel García MD    Enclosure  CC:  No Recipients    If you would like to receive this communication electronically, please contact [email or web address] or [phone] to request more information on [employer] Link access.    For providers and/or their staff who would like to refer a patient to [facility], please contact us through our one-stop-shop provider referral line, [facility], at [phone].    If you feel you have received this communication in error or would no longer like to receive these types of communications, please e-mail [email or web address]

## 2020-09-17 NOTE — PATIENT INSTRUCTIONS
Understanding Atrial Fibrillation    An arrhythmia is any problem with the speed or pattern of the heartbeat. Atrial fibrillation (AFib) is a common type of arrhythmia. It causes fast, chaotic electrical signals in the atria. This leads to poor functioning of the heart. It also affects how much blood your heart can pump out to the body.  Afib may occur once in a while and go away on its own. Or it may continue for longer periods and need treatment.  AFib can lead to serious problems, such as stroke. Your healthcare provider will need to monitor and manage it.  What happens during atrial fibrillation?   The heart has an electrical system that sends signals to control the heartbeat. As signals move through the heart, they tell the hearts upper chambers (atria) and lower chambers (ventricles) when to squeeze (contract) and relax. This lets blood move through the heart and out to the body and lungs.  With AFib, the atria receive abnormal signals. This causes them to contract in a fast and irregular way, and out of sync with the ventricles. When this happens, the atria also have a harder time moving blood into the ventricles. Blood may then pool in the atria, which increases the risk for blood clots and stroke. The ventricles also may contract too quickly and irregularly. As a result, they may not pump blood to the body and lungs as well as they should. This can weaken the heart muscle over time and cause heart failure.  What causes atrial fibrillation?  AFib is more common in older adults. It has many possible causes including:  · Coronary artery disease  · Heart valve disease  · Heart attack  · Heart surgery  · High blood pressure  · Thyroid disease  · Diabetes  · Lung disease  · Sleep apnea  · Heavy alcohol use  In some cases of AFib, doctors do not know the cause.  What are the symptoms of atrial fibrillation?  AFib may or may not cause symptoms. If symptoms do occur, they may include:  · A fast, pounding,  irregular heartbeat  · Shortness of breath  · Tiredness  · Dizziness or fainting  · Chest pain  How is atrial fibrillation treated?  Treatments for AFib can include any of the options below.  · Medicines. You may be prescribed:  ¨ Heart rate medicines to help slow down the heartbeat  ¨ Heart rhythm medicines to help the heart beat more regularly  ¨ Anti-clotting medicines to help reduce the risk for blood clots and stroke  · Electrical cardioversion. Your healthcare provider uses special pads or paddles to send one or more brief electrical shocks to the heart. This can help reset the heartbeat to normal.  · Ablation. Long, thin tubes called catheters are threaded through a blood vessel to the heart. There, the catheters send out hot or cold energy to the areas causing the abnormal signals. This energy destroys the problem tissue or cells. This improves the chances that your heart will stay in normal rhythm without using medicines. If your heart rate and rhythm cant be controlled, you may need ablation and a pacemaker. These will help control the heart rate and regularity of the heartbeat.  · Surgery. During surgery, your healthcare provider may use different methods to create scar tissue in the areas of the heart causing the abnormal signals. The scar tissue disrupts the abnormal signals and may stop AFib from occurring.  What are the complications of atrial fibrillation?  These can include:  · Blood clots  · Stroke  · Heart failure. This problem occurs when the heart muscle weakens so much that it can no longer pump blood well.  When should I call my healthcare provider?  Call your healthcare provider right away if you have any of these:  · Symptoms that dont get better with treatment, or get worse  · New symptoms   Date Last Reviewed: 5/1/2016  © 2454-6357 [employer]. [address] [place] [address], [place] [address]. All rights reserved. This information is not intended as a substitute for professional  medical care. Always follow your healthcare professional's instructions.        Understanding Electrical Cardioversion  Cardioversion is a procedure that is done to return your heartbeat to a normal rhythm. Its done when the heart is beating very fast or irregular (arrhythmia). During the procedure, an energy shock is sent to the heart to reset it to a normal rhythm. This is done with a small machine that sends electric shocks to electrode pads on your chest. Cardioversion is not the same as defibrillation. Both use shocks to reset the heart. But defibrillation is used to treat ventricular fibrillation, a heart rhythm that can cause cardiac arrest  Cardioversion is most often a scheduled procedure. But in some cases, it may be done as an emergency treatment. This is done if symptoms are severe. You will be given medicine to make you sleep through the procedure.  Why electrical cardioversion is done  An arrhythmia can cause problems such as fainting, stroke, heart attack, and even sudden cardiac death. Electrical cardioversion can help treat several kinds of arrhythmias.  It is most often used to treat atrial fibrillation (AFib). With this condition, the top chambers of the heart (atria) quiver instead of beating normally. When this happens, blood can sit in these chambers without moving. It can form clots that can travel to the brain and other parts of the body.   Symptoms of AFib may include shortness of breath, fatigue, chest pain, and a very fast heartbeat. It can also increase risk for stroke.  But your healthcare provider may not want you to have cardioversion if you:  · Have minor symptoms from Afib  · Are an older adult who has limited activity  · Have had AFib a long time  · Have other major health problems and cant take medicines such as blood thinners  Other treatments may be better for you, such as heart rate control with medicine. A procedure called an ablation may also be considered if electrical  cardioversion is not successful.  Electrical cardioversion is also used for treating other arrhythmias such as:  · Atrial flutter  · Supraventricular tachycardia  · Some kinds of atrial tachycardia  · Some kinds of ventricular tachycardia  These arrhythmias can cause heart rates that are too fast. This can prevent the heart from pumping enough blood.  How electrical cardioversion is done  The procedure takes only a few minutes. The healthcare provider will stick soft electrode pads on your chest and back. The provider may shave these areas of skin first. This is to help the electrode pads stick. The provider will attach wires to the electrodes. The wires connect to a cardioversion machine. You will get medicine to make you fall asleep. The machine sends a programmed energy shock to your heart. This should convert your heart back to a normal rhythm. You should not feel any pain.  Risks of electrical cardioversion  Every procedure has risks. The risks of electrical cardioversion include:  · Creating other kinds of arrhythmias that may be more or less dangerous  · Short-term (temporary) low blood pressure or slow heart rate  · Heat damage to the skin  · Muscle soreness  · Heart damage (often temporary and with no symptoms)  · Heart failure  · Dislodged blood clot that can cause stroke, pulmonary embolism, or other problems  In some cases, healthcare providers lower the risk for blood clots by giving medicine called a blood thinner. You may be given this medicine for 3 to 4 weeks before the procedure. And you may take it for at least 4 weeks after the procedure. Or your healthcare provider may recommend a procedure called transesophageal echocardiography (LIGIA). This is done just before the cardioversion to screen for an existing blood clot in your heart.  Your own risks may vary based on your age, the type of arrhythmia you have, and your overall health. Ask your healthcare provider which risks apply to you.  After a  cardioversion you will need someone to drive you home and around for the next 24 hours. You should not operate heavy machinery for at least 1 day after receiving sedation for a cardioversion.  You should follow up with your healthcare provider to make sure the cardioversion has successfully converted your rhythm. Some people can go back into Afib and may not even be aware their heart rhythm as has changed. You may have a 12 lead ECG at your follow up office appointment to confirm your heart rhythm is still normal.  Date Last Reviewed: 3/1/2017  © 7990-8059 [employer]. [address] [place] [address]. All rights reserved. This information is not intended as a substitute for professional medical care. Always follow your healthcare professional's instructions.

## 2020-09-17 NOTE — PROGRESS NOTES
Subjective:    Patient ID:  Magdalena Mane is a 75 y.o. female who presents for evaluation of No chief complaint on file.    Referring Cardiologist: Maki Pratt MD  Primary Care Physician: Aaron Cardenas MD    HPI  I had the pleasure of seeing Mrs. Mane in our electrophysiology clinic in consultation for her atrial arrhythmia. As you are aware she is a pleasant 75 year-old woman with hypertension and recently diagnosed persistent atrial fibrillation. She was in her usual state of health until February of 2020 when she had a respiratory illness (COVID antibody test later was negative) and since that time has had dyspnea on exertion. Also notes her pulse runs in the 50s-60s however in February her heart rate began running in the 80s (participates in the [facility] clinic). In early July 2020 she developed palpitations. She saw Dr. Area 7/27/2020 and was diagnosed with atrial fibrillation and was referred to Dr. Pratt who started eliquis for stroke risk reduction ([id number] score 3). She followed up with Dr. Pratt on 9/16/2020 and remained in atrial fibrillation for which she presents for further evaluation. She notes her dyspnea on exertion and palpitations have largely resolved despite AF persistence however still has exercise intolerance.    She reports an episode of pAF post hip surgery in 2014 at [facility].    An echocardiogram from July of 2020 notes preserved LV function with moderate LA enlargement.     I reviewed available electrocardiograms in Epic which show sinus rhythm until 7/27/2020 which showed rate controlled atrial fibrillation.    An electrocardiogram yesterday shows AF with average ventricular rate of 83 bpm.  Review of Systems   Constitution: Negative for fever and malaise/fatigue.   HENT: Negative for congestion and sore throat.    Eyes: Negative for blurred vision and visual disturbance.   Cardiovascular: Negative for chest pain, dyspnea on exertion, irregular heartbeat,  near-syncope, palpitations and syncope.   Respiratory: Negative for cough and shortness of breath.    Hematologic/Lymphatic: Negative for bleeding problem. Does not bruise/bleed easily.   Skin: Negative.    Musculoskeletal: Negative.    Gastrointestinal: Negative for bloating, abdominal pain, hematochezia and melena.   Neurological: Negative for focal weakness and weakness.   Psychiatric/Behavioral: Negative.         Objective:    Physical Exam   Constitutional: She is oriented to person, place, and time. She appears well-developed and well-nourished. No distress.   HENT:   Head: Normocephalic and atraumatic.   Eyes: Conjunctivae are normal. Right eye exhibits no discharge. Left eye exhibits no discharge.   Neck: Neck supple.   Cardiovascular: Normal rate. An irregularly irregular rhythm present. Exam reveals no gallop and no friction rub.   No murmur heard.  Pulmonary/Chest: Effort normal and breath sounds normal. No respiratory distress. She has no wheezes. She has no rales.   Abdominal: Soft. Bowel sounds are normal. She exhibits no distension. There is no abdominal tenderness.   Musculoskeletal:         General: No edema.   Neurological: She is alert and oriented to person, place, and time.   Skin: Skin is warm and dry. She is not diaphoretic.   Psychiatric: She has a normal mood and affect. Her behavior is normal. Judgment and thought content normal.   Vitals reviewed.        Assessment:       1. Other persistent atrial fibrillation    2. Essential hypertension         Plan:       In summary, Mrs. Mane is a pleasant 75 year-old woman with hypertension and recently diagnosed persistent atrial fibrillation. I had a long discussion with the patient about the pathophysiology and risks of atrial fibrillation and its basic pathophysiology, including its health implications and treatment options. Specifically, I addressed the need for CVA (stroke) prophylaxis with aspirin versus oral anticoagulation (warfarin vs  DOACs, discussed bleeding risks, and need to come to the ER for any head trauma for CT scanning even if asymptomatic). Her [id number] score is 3 and indefinite anticoagulation is recommended. She elects to stay on eliquis. I also discussed the goal to reduce symptomatic arrhythmic episodes by pharmacologic and/or procedural methods and utilizing a rhythm versus a rate control strategy. Since she was symptomatic and this is her first documented episode of AF it is reasonable to try an attempt at restoration of sinus rhythm to which she is agreeable.    Plan  LIGIA/DCCV  Will hold off on anti-arrhythmic therapy unless she has early recurrence preventing assessment of symptomatic benefit.    Thank you for allowing me to participate in the care of this patient. Please do not hesitate to call me with any questions or concerns.    Nigel García MD, PhD  Cardiac Electrophysiology

## 2020-09-18 DIAGNOSIS — I48.19 OTHER PERSISTENT ATRIAL FIBRILLATION: Primary | ICD-10-CM

## 2020-09-22 ENCOUNTER — LAB VISIT (OUTPATIENT)
Dept: LAB | Facility: HOSPITAL | Age: 75
End: 2020-09-22
Attending: INTERNAL MEDICINE
Payer: MEDICARE

## 2020-09-22 DIAGNOSIS — I48.19 OTHER PERSISTENT ATRIAL FIBRILLATION: ICD-10-CM

## 2020-09-22 LAB
ANION GAP SERPL CALC-SCNC: 9 MMOL/L (ref 8–16)
APTT BLDCRRT: 31.3 SEC (ref 21–32)
BASOPHILS # BLD AUTO: 0.08 K/UL (ref 0–0.2)
BASOPHILS NFR BLD: 1.1 % (ref 0–1.9)
BUN SERPL-MCNC: 18 MG/DL (ref 8–23)
CALCIUM SERPL-MCNC: 8.9 MG/DL (ref 8.7–10.5)
CHLORIDE SERPL-SCNC: 101 MMOL/L (ref 95–110)
CO2 SERPL-SCNC: 27 MMOL/L (ref 23–29)
CREAT SERPL-MCNC: 0.9 MG/DL (ref 0.5–1.4)
DIFFERENTIAL METHOD: ABNORMAL
EOSINOPHIL # BLD AUTO: 0.1 K/UL (ref 0–0.5)
EOSINOPHIL NFR BLD: 1.8 % (ref 0–8)
ERYTHROCYTE [DISTWIDTH] IN BLOOD BY AUTOMATED COUNT: 13.3 % (ref 11.5–14.5)
EST. GFR  (AFRICAN AMERICAN): >60 ML/MIN/1.73 M^2
EST. GFR  (NON AFRICAN AMERICAN): >60 ML/MIN/1.73 M^2
GLUCOSE SERPL-MCNC: 63 MG/DL (ref 70–110)
HCT VFR BLD AUTO: 39.1 % (ref 37–48.5)
HGB BLD-MCNC: 12.1 G/DL (ref 12–16)
IMM GRANULOCYTES # BLD AUTO: 0.03 K/UL (ref 0–0.04)
IMM GRANULOCYTES NFR BLD AUTO: 0.4 % (ref 0–0.5)
INR PPP: 1 (ref 0.8–1.2)
LYMPHOCYTES # BLD AUTO: 1.2 K/UL (ref 1–4.8)
LYMPHOCYTES NFR BLD: 17.2 % (ref 18–48)
MCH RBC QN AUTO: 30.8 PG (ref 27–31)
MCHC RBC AUTO-ENTMCNC: 30.9 G/DL (ref 32–36)
MCV RBC AUTO: 100 FL (ref 82–98)
MONOCYTES # BLD AUTO: 0.8 K/UL (ref 0.3–1)
MONOCYTES NFR BLD: 10.6 % (ref 4–15)
NEUTROPHILS # BLD AUTO: 5 K/UL (ref 1.8–7.7)
NEUTROPHILS NFR BLD: 68.9 % (ref 38–73)
NRBC BLD-RTO: 0 /100 WBC
PLATELET # BLD AUTO: 293 K/UL (ref 150–350)
PMV BLD AUTO: 11.1 FL (ref 9.2–12.9)
POTASSIUM SERPL-SCNC: 4 MMOL/L (ref 3.5–5.1)
PROTHROMBIN TIME: 10.7 SEC (ref 9–12.5)
RBC # BLD AUTO: 3.93 M/UL (ref 4–5.4)
SODIUM SERPL-SCNC: 137 MMOL/L (ref 136–145)
WBC # BLD AUTO: 7.2 K/UL (ref 3.9–12.7)

## 2020-09-22 PROCEDURE — 80048 BASIC METABOLIC PNL TOTAL CA: CPT

## 2020-09-22 PROCEDURE — 85730 THROMBOPLASTIN TIME PARTIAL: CPT

## 2020-09-22 PROCEDURE — 85025 COMPLETE CBC W/AUTO DIFF WBC: CPT

## 2020-09-22 PROCEDURE — 85610 PROTHROMBIN TIME: CPT

## 2020-09-22 PROCEDURE — 36415 COLL VENOUS BLD VENIPUNCTURE: CPT | Mod: PO

## 2020-09-26 ENCOUNTER — LAB VISIT (OUTPATIENT)
Dept: URGENT CARE | Facility: CLINIC | Age: 75
End: 2020-09-26
Payer: MEDICARE

## 2020-09-26 VITALS — HEART RATE: 95 BPM | TEMPERATURE: 98 F

## 2020-09-26 DIAGNOSIS — I48.19 OTHER PERSISTENT ATRIAL FIBRILLATION: ICD-10-CM

## 2020-09-26 PROCEDURE — U0003 INFECTIOUS AGENT DETECTION BY NUCLEIC ACID (DNA OR RNA); SEVERE ACUTE RESPIRATORY SYNDROME CORONAVIRUS 2 (SARS-COV-2) (CORONAVIRUS DISEASE [COVID-19]), AMPLIFIED PROBE TECHNIQUE, MAKING USE OF HIGH THROUGHPUT TECHNOLOGIES AS DESCRIBED BY CMS-2020-01-R: HCPCS

## 2020-09-27 LAB — SARS-COV-2 RNA RESP QL NAA+PROBE: NOT DETECTED

## 2020-09-28 ENCOUNTER — TELEPHONE (OUTPATIENT)
Dept: ELECTROPHYSIOLOGY | Facility: CLINIC | Age: 75
End: 2020-09-28

## 2020-09-28 NOTE — TELEPHONE ENCOUNTER
Spoke to Ms. Mane    CONFIRMED procedure arrival time of 8AM    Reiterated instructions including:  -Directions to check in desk  -NPO after midnight night prior to procedure  -Confirmed compliance of Eliquis  -Pre-procedure LABS done, checked  -COVID test negative  -Confirmed no fever, cough, or shortness of breath in the past 30 days  -Reviewed current visitor policy    Pt verbalizes understanding of above and appreciates call.

## 2020-09-29 ENCOUNTER — HOSPITAL ENCOUNTER (OUTPATIENT)
Dept: CARDIOLOGY | Facility: HOSPITAL | Age: 75
Discharge: HOME | End: 2020-09-29
Attending: INTERNAL MEDICINE | Admitting: INTERNAL MEDICINE
Payer: MEDICARE

## 2020-09-29 ENCOUNTER — HOSPITAL ENCOUNTER (OUTPATIENT)
Facility: HOSPITAL | Age: 75
Discharge: HOME | End: 2020-09-29
Attending: INTERNAL MEDICINE | Admitting: INTERNAL MEDICINE
Payer: MEDICARE

## 2020-09-29 ENCOUNTER — ANESTHESIA (OUTPATIENT)
Dept: MEDSURG UNIT | Facility: HOSPITAL | Age: 75
End: 2020-09-29
Payer: MEDICARE

## 2020-09-29 ENCOUNTER — ANESTHESIA EVENT (OUTPATIENT)
Dept: MEDSURG UNIT | Facility: HOSPITAL | Age: 75
End: 2020-09-29
Payer: MEDICARE

## 2020-09-29 VITALS
WEIGHT: 153 LBS | RESPIRATION RATE: 16 BRPM | BODY MASS INDEX: 28.89 KG/M2 | HEART RATE: 50 BPM | OXYGEN SATURATION: 96 % | TEMPERATURE: 98 F | HEIGHT: 61 IN | DIASTOLIC BLOOD PRESSURE: 68 MMHG | SYSTOLIC BLOOD PRESSURE: 148 MMHG

## 2020-09-29 VITALS
HEIGHT: 61 IN | WEIGHT: 158 LBS | DIASTOLIC BLOOD PRESSURE: 77 MMHG | BODY MASS INDEX: 29.83 KG/M2 | SYSTOLIC BLOOD PRESSURE: 159 MMHG

## 2020-09-29 DIAGNOSIS — I48.19 OTHER PERSISTENT ATRIAL FIBRILLATION: ICD-10-CM

## 2020-09-29 DIAGNOSIS — I48.91 ATRIAL FIBRILLATION: ICD-10-CM

## 2020-09-29 LAB — BSA FOR ECHO PROCEDURE: 1.76 M2

## 2020-09-29 PROCEDURE — 93005 ELECTROCARDIOGRAM TRACING: CPT

## 2020-09-29 PROCEDURE — 93010 EKG 12-LEAD: ICD-10-PCS | Mod: 76,,, | Performed by: INTERNAL MEDICINE

## 2020-09-29 PROCEDURE — 92960 CARDIOVERSION ELECTRIC EXT: CPT | Performed by: INTERNAL MEDICINE

## 2020-09-29 PROCEDURE — 92960 PR CARDIOVERSION, ELECTIVE;EXTERN: ICD-10-PCS | Mod: ,,, | Performed by: INTERNAL MEDICINE

## 2020-09-29 PROCEDURE — 63600175 PHARM REV CODE 636 W HCPCS: Performed by: NURSE ANESTHETIST, CERTIFIED REGISTERED

## 2020-09-29 PROCEDURE — 93010 ELECTROCARDIOGRAM REPORT: CPT | Mod: 76,,, | Performed by: INTERNAL MEDICINE

## 2020-09-29 PROCEDURE — 93010 ELECTROCARDIOGRAM REPORT: CPT | Mod: ,,, | Performed by: INTERNAL MEDICINE

## 2020-09-29 PROCEDURE — 37000008 HC ANESTHESIA 1ST 15 MINUTES: Performed by: INTERNAL MEDICINE

## 2020-09-29 PROCEDURE — D9220A PRA ANESTHESIA: Mod: ANES,,, | Performed by: ANESTHESIOLOGY

## 2020-09-29 PROCEDURE — 93325 TRANSESOPHAGEAL ECHO (TEE) (CUPID ONLY): ICD-10-PCS | Mod: 26,,, | Performed by: INTERNAL MEDICINE

## 2020-09-29 PROCEDURE — 93005 ELECTROCARDIOGRAM TRACING: CPT | Mod: 59

## 2020-09-29 PROCEDURE — D9220A PRA ANESTHESIA: ICD-10-PCS | Mod: CRNA,,, | Performed by: NURSE ANESTHETIST, CERTIFIED REGISTERED

## 2020-09-29 PROCEDURE — 93320 DOPPLER ECHO COMPLETE: CPT | Mod: 26,,, | Performed by: INTERNAL MEDICINE

## 2020-09-29 PROCEDURE — 93312 ECHO TRANSESOPHAGEAL: CPT | Mod: 26,,, | Performed by: INTERNAL MEDICINE

## 2020-09-29 PROCEDURE — 93312 TRANSESOPHAGEAL ECHO (TEE) (CUPID ONLY): ICD-10-PCS | Mod: 26,,, | Performed by: INTERNAL MEDICINE

## 2020-09-29 PROCEDURE — 25000003 PHARM REV CODE 250: Performed by: NURSE ANESTHETIST, CERTIFIED REGISTERED

## 2020-09-29 PROCEDURE — 93325 DOPPLER ECHO COLOR FLOW MAPG: CPT | Mod: 26,,, | Performed by: INTERNAL MEDICINE

## 2020-09-29 PROCEDURE — D9220A PRA ANESTHESIA: Mod: CRNA,,, | Performed by: NURSE ANESTHETIST, CERTIFIED REGISTERED

## 2020-09-29 PROCEDURE — 37000009 HC ANESTHESIA EA ADD 15 MINS: Performed by: INTERNAL MEDICINE

## 2020-09-29 PROCEDURE — 93325 DOPPLER ECHO COLOR FLOW MAPG: CPT

## 2020-09-29 PROCEDURE — 93320 TRANSESOPHAGEAL ECHO (TEE) (CUPID ONLY): ICD-10-PCS | Mod: 26,,, | Performed by: INTERNAL MEDICINE

## 2020-09-29 PROCEDURE — 92960 CARDIOVERSION ELECTRIC EXT: CPT | Mod: ,,, | Performed by: INTERNAL MEDICINE

## 2020-09-29 PROCEDURE — D9220A PRA ANESTHESIA: ICD-10-PCS | Mod: ANES,,, | Performed by: ANESTHESIOLOGY

## 2020-09-29 RX ORDER — SODIUM CHLORIDE 9 MG/ML
INJECTION, SOLUTION INTRAVENOUS CONTINUOUS PRN
Status: DISCONTINUED | OUTPATIENT
Start: 2020-09-29 | End: 2020-09-29

## 2020-09-29 RX ORDER — FENTANYL CITRATE 50 UG/ML
25 INJECTION, SOLUTION INTRAMUSCULAR; INTRAVENOUS EVERY 5 MIN PRN
Status: DISCONTINUED | OUTPATIENT
Start: 2020-09-29 | End: 2020-09-29 | Stop reason: HOSPADM

## 2020-09-29 RX ORDER — DIPHENHYDRAMINE HYDROCHLORIDE 50 MG/ML
25 INJECTION INTRAMUSCULAR; INTRAVENOUS EVERY 6 HOURS PRN
Status: DISCONTINUED | OUTPATIENT
Start: 2020-09-29 | End: 2020-09-29 | Stop reason: HOSPADM

## 2020-09-29 RX ORDER — PROPOFOL 10 MG/ML
VIAL (ML) INTRAVENOUS CONTINUOUS PRN
Status: DISCONTINUED | OUTPATIENT
Start: 2020-09-29 | End: 2020-09-29

## 2020-09-29 RX ORDER — LIDOCAINE HYDROCHLORIDE 20 MG/ML
INJECTION INTRAVENOUS
Status: DISCONTINUED | OUTPATIENT
Start: 2020-09-29 | End: 2020-09-29

## 2020-09-29 RX ORDER — PROPOFOL 10 MG/ML
VIAL (ML) INTRAVENOUS
Status: DISCONTINUED | OUTPATIENT
Start: 2020-09-29 | End: 2020-09-29

## 2020-09-29 RX ORDER — ONDANSETRON 2 MG/ML
4 INJECTION INTRAMUSCULAR; INTRAVENOUS ONCE AS NEEDED
Status: DISCONTINUED | OUTPATIENT
Start: 2020-09-29 | End: 2020-09-29 | Stop reason: HOSPADM

## 2020-09-29 RX ORDER — HYDROMORPHONE HYDROCHLORIDE 1 MG/ML
0.2 INJECTION, SOLUTION INTRAMUSCULAR; INTRAVENOUS; SUBCUTANEOUS EVERY 5 MIN PRN
Status: DISCONTINUED | OUTPATIENT
Start: 2020-09-29 | End: 2020-09-29 | Stop reason: HOSPADM

## 2020-09-29 RX ORDER — KETAMINE HCL IN 0.9 % NACL 50 MG/5 ML
SYRINGE (ML) INTRAVENOUS
Status: DISCONTINUED | OUTPATIENT
Start: 2020-09-29 | End: 2020-09-29

## 2020-09-29 RX ORDER — LORATADINE 10 MG/1
10 TABLET ORAL EVERY MORNING
COMMUNITY

## 2020-09-29 RX ADMIN — Medication 10 MG: at 10:09

## 2020-09-29 RX ADMIN — PROPOFOL 150 MCG/KG/MIN: 10 INJECTION, EMULSION INTRAVENOUS at 10:09

## 2020-09-29 RX ADMIN — Medication 30 MG: at 10:09

## 2020-09-29 RX ADMIN — PROPOFOL 70 MG: 10 INJECTION, EMULSION INTRAVENOUS at 10:09

## 2020-09-29 RX ADMIN — LIDOCAINE HYDROCHLORIDE 80 MG: 20 INJECTION, SOLUTION INTRAVENOUS at 10:09

## 2020-09-29 RX ADMIN — SODIUM CHLORIDE: 0.9 INJECTION, SOLUTION INTRAVENOUS at 10:09

## 2020-09-29 NOTE — TRANSFER OF CARE
"Anesthesia Transfer of Care Note    Patient: Magdalena Mane    Procedure(s) Performed: Procedure(s) (LRB):  CARDIOVERSION (N/A)  Transesophageal echo (LIGIA) intra-procedure log documentation (N/A)    Patient location: PACU    Anesthesia Type: general    Transport from OR: Transported from OR on 2-3 L/min O2 by NC with adequate spontaneous ventilation    Post pain: adequate analgesia    Post assessment: no apparent anesthetic complications and tolerated procedure well    Post vital signs: stable    Level of consciousness: awake and responds to stimulation    Nausea/Vomiting: no nausea/vomiting    Complications: none    Transfer of care protocol was followed      Last vitals:   Visit Vitals  BP (!) 141/80 (BP Location: Left arm, Patient Position: Lying)   Pulse 80   Temp 36.3 °C (97.4 °F) (Oral)   Resp 18   Ht 5' 1" (1.549 m)   Wt 69.4 kg (153 lb)   SpO2 99%   BMI 28.91 kg/m²     "

## 2020-09-29 NOTE — SUBJECTIVE & OBJECTIVE
Past Medical History:   Diagnosis Date    Anemia     DJD (degenerative joint disease) 12/12/2012    Hypertension     Obesity (BMI 30-39.9) 11/27/2015    Vaginal atrophy 4/1/2016       Past Surgical History:   Procedure Laterality Date    BREAST BIOPSY Left 1999    Excisional bx, benign cyst    COLONOSCOPY  2012    normal    COLONOSCOPY N/A 6/28/2017    Procedure: COLONOSCOPY;  Surgeon: Marekl Montemayor MD;  Location: [facility] (4TH FLR);  Service: Endoscopy;  Laterality: N/A;    EXCISION OF GANGLION OF WRIST Left 6/27/2018    Procedure: EXCISION, GANGLION CYST, WRIST - Left Volar wrist;  Surgeon: Mary Moore MD;  [facility];  Service: Orthopedics;  Laterality: Left;    HIP SURGERY  05/05/2014    bilateral    JOINT REPLACEMENT Bilateral     MOLLY    TONSILLECTOMY         Review of patient's allergies indicates:   Allergen Reactions    Prednisone      ELEVATED B/P FOR SEVERAL DAYS/WENT TO ER       No current facility-administered medications on file prior to encounter.      Current Outpatient Medications on File Prior to Encounter   Medication Sig    acetaminophen (TYLENOL) 500 MG tablet Take 500 mg by mouth 2 (two) times daily.    amLODIPine (NORVASC) 5 MG tablet TAKE 1 TABLET EVERY DAY    apixaban (ELIQUIS) 5 mg Tab Take 1 tablet (5 mg total) by mouth 2 (two) times daily.    calcium-vitamin D 250-100 mg-unit per tablet Take 1 tablet by mouth 2 (two) times daily.    diphenhydrAMINE (BENADRYL) 25 mg capsule Take 25 mg by mouth nightly as needed for Itching or Insomnia.     ferrous sulfate 324 mg (65 mg iron) TbEC Take 325 mg by mouth twice a week.    irbesartan-hydrochlorothiazide (AVALIDE) 150-12.5 mg per tablet Take 1 tablet by mouth once daily.    loratadine (CLARITIN) 10 mg tablet Take 10 mg by mouth once daily.    metoprolol succinate (TOPROL-XL) 50 MG 24 hr tablet Take 1 tablet (50 mg total) by mouth once daily.    fluorouraciL (EFUDEX) 5 % cream Use hs for 2 weeks     triamcinolone acetonide 0.1% (KENALOG) 0.1 % cream Apply topically 2 (two) times daily as needed. Avoid face and groin.     Family History     Problem Relation (Age of Onset)    Arthritis Mother    Breast cancer Mother    Heart attack Father    Heart disease Father, Paternal Grandmother    Heart failure Father    Hyperlipidemia Mother, Father    Hypertension Father    No Known Problems Brother    Scoliosis Father    Stroke Maternal Grandmother    Tuberculosis Father        Tobacco Use    Smoking status: Former Smoker     Quit date: 1988     Years since quittin.7    Smokeless tobacco: Never Used   Substance and Sexual Activity    Alcohol use: Yes     Alcohol/week: 3.0 standard drinks     Types: 3 Glasses of wine per week     Comment: glass wine 3 times weekly    Drug use: No    Sexual activity: Yes     Partners: Male     Review of Systems   Constitution: Negative. Negative for chills and fever.   HENT: Negative.    Eyes: Negative.    Cardiovascular: Negative for chest pain, claudication and paroxysmal nocturnal dyspnea.   Respiratory: Negative for cough, shortness of breath and wheezing.    Endocrine: Negative.    Hematologic/Lymphatic: Does not bruise/bleed easily.   Skin: Negative for nail changes and rash.   Musculoskeletal: Negative.  Negative for back pain.   Gastrointestinal: Negative for abdominal pain, melena, nausea and vomiting.   Neurological: Negative for dizziness and headaches.   Psychiatric/Behavioral: Negative for altered mental status, depression and substance abuse.   Allergic/Immunologic: Negative.      Objective:     Vital Signs (Most Recent):  Temp: 97.4 °F (36.3 °C) (20)  Pulse: 80 (20)  Resp: 18 (20)  BP: (!) 141/80 (20)  SpO2: 99 % (20) Vital Signs (24h Range):  Temp:  [97.4 °F (36.3 °C)] 97.4 °F (36.3 °C)  Pulse:  [80] 80  Resp:  [18] 18  SpO2:  [99 %] 99 %  BP: (141)/(77-80) 141/80     Weight: 69.4 kg (153 lb)  Body mass  index is 28.91 kg/m².    SpO2: 99 %       No intake or output data in the 24 hours ending 09/29/20 1021    Lines/Drains/Airways     Epidural Line                 Perineural Analgesia/Anesthesia Assessment (using dermatomes) Epidural 06/27/18 0823 825 days          Peripheral Intravenous Line                 Peripheral IV - Single Lumen 09/29/20 0821 20 G Right Antecubital less than 1 day                Physical Exam   Constitutional: She is oriented to person, place, and time. She appears well-developed and well-nourished.   HENT:   Head: Normocephalic and atraumatic.   Eyes: Pupils are equal, round, and reactive to light. Conjunctivae and EOM are normal.   Neck: No JVD present.   Cardiovascular: Normal rate, normal heart sounds and intact distal pulses. Exam reveals no gallop and no friction rub.   No murmur heard.  irregular   Pulmonary/Chest: Effort normal. No stridor. She has no wheezes. She has no rales. She exhibits no tenderness.   Abdominal: Soft. Bowel sounds are normal. She exhibits no distension and no mass. There is no abdominal tenderness. There is no guarding.   Musculoskeletal:         General: No tenderness, deformity or edema.   Neurological: She is alert and oriented to person, place, and time.   Skin: Skin is warm and dry. No rash noted. No erythema.   Psychiatric: She has a normal mood and affect.       Significant Labs: CMP No results for input(s): NA, K, CL, CO2, GLU, BUN, CREATININE, CALCIUM, PROT, ALBUMIN, BILITOT, ALKPHOS, AST, ALT, ANIONGAP, ESTGFRAFRICA, EGFRNONAA in the last 48 hours. and CBC No results for input(s): WBC, HGB, HCT, PLT in the last 48 hours.    Significant Imaging: Echocardiogram:   Transthoracic echo (TTE) complete (Cupid Only):   Results for orders placed or performed in visit on 07/28/20   Echo Color Flow Doppler? Yes   Result Value Ref Range    Ascending aorta 3.16 cm    STJ 2.74 cm    AV mean gradient 5 mmHg    Ao peak rosemary 1.42 m/s    Ao VTI 32.49 cm    IVRT 79.92  msec    IVS 0.66 0.6 - 1.1 cm    LA size 3.80 cm    Left Atrium Major Axis 6.19 cm    Left Atrium Minor Axis 5.99 cm    LVIDd 4.44 3.5 - 6.0 cm    LVIDs 3.09 2.1 - 4.0 cm    LVOT diameter 1.90 cm    LVOT peak VTI 20.58 cm    Posterior Wall 0.81 0.6 - 1.1 cm    MV Peak A Kory 0.34 m/s    E wave decelartion time 143.40 msec    MV Peak E Kory 1.14 m/s    PV Peak D Kory 0.65 m/s    PV Peak S Kory 0.55 m/s    RA Major Axis 5.05 cm    RA Width 2.87 cm    RVDD 3.39 cm    Sinus 3.08 cm    TAPSE 2.00 cm    TR Max Kory 2.51 m/s    TDI LATERAL 0.12 m/s    TDI SEPTAL 0.11 m/s    LA WIDTH 4.11 cm    MV stenosis pressure 1/2 time 41.59 ms    LV Diastolic Volume 89.71 mL    LV Systolic Volume 37.72 mL    LVOT peak kory 0.96 m/s    LV LATERAL E/E' RATIO 9.50 m/s    LV SEPTAL E/E' RATIO 10.36 m/s    FS 30 %    LA volume 80.82 cm3    LV mass 99.52 g    Left Ventricle Relative Wall Thickness 0.36 cm    AV valve area 1.80 cm2    AV Velocity Ratio 0.68     AV index (prosthetic) 0.63     MV valve area p 1/2 method 5.29 cm2    E/A ratio 3.35     Mean e' 0.12 m/s    Pulm vein S/D ratio 0.85     LVOT area 2.8 cm2    LVOT stroke volume 58.32 cm3    AV peak gradient 8 mmHg    E/E' ratio 9.91 m/s    Triscuspid Valve Regurgitation Peak Gradient 25 mmHg    BSA 1.75 m2    LV Systolic Volume Index 22.1 mL/m2    LV Diastolic Volume Index 52.63 mL/m2    LA Volume Index 47.4 mL/m2    LV Mass Index 58 g/m2    Right Atrial Pressure (from IVC) 3 mmHg    TV rest pulmonary artery pressure 28 mmHg    Narrative    · Atrial fibrillation observed.  · Local segmental wall motion abnormalities.  · Normal left ventricular systolic function. The estimated ejection   fraction is 58%.  · Normal right ventricular systolic function.  · Moderate left atrial enlargement.  · Mild right atrial enlargement.  · Trivial -mild aortic valve stenosis and trivial AR.  · Aortic valve area is 1.80 cm2; peak velocity is 1.42 m/s; mean gradient   is 5 mmHg.  · Mild tricuspid  regurgitation.  · Normal central venous pressure (3 mmHg).  · The estimated PA systolic pressure is 28 mmHg.

## 2020-09-29 NOTE — H&P
[facility] - Short Stay Cardiac Unit  Cardiology  History and Physical     Patient Name: Magdalena Mane  MRN: [id number]  Admission Date: 9/29/2020  Code Status: No Order   Attending Provider: Nigel García MD   Primary Care Physician: Aaron Cardenas MD  Principal Problem:<principal problem not specified>    Patient information was obtained from patient, past medical records and ER records.     Subjective:     Chief Complaint:  Afib     HPI:  75 year-old woman with hypertension and recently diagnosed persistent atrial fibrillation (CHADSVASC 3) here for LIGIA/DCCV. Continued on Eliquis for CVA prophylaxis.  Planned to hold off on anti-arrhythmic therapy unless she has early recurrence preventing assessment of symptomatic benefit per Dr. García last clinic note. Hx of gastritis will avoid transgastric views.    TTE 7/31/2020:  · Atrial fibrillation observed.  · Local segmental wall motion abnormalities.  · Normal left ventricular systolic function. The estimated ejection fraction is 58%.  · Normal right ventricular systolic function.  · Moderate left atrial enlargement.  · Mild right atrial enlargement.  · Trivial -mild aortic valve stenosis and trivial AR.  · Aortic valve area is 1.80 cm2; peak velocity is 1.42 m/s; mean gradient is 5 mmHg.  · Mild tricuspid regurgitation.  · Normal central venous pressure (3 mmHg).  · The estimated PA systolic pressure is 28 mmHg.      Past Medical History:   Diagnosis Date    Anemia     DJD (degenerative joint disease) 12/12/2012    Hypertension     Obesity (BMI 30-39.9) 11/27/2015    Vaginal atrophy 4/1/2016       Past Surgical History:   Procedure Laterality Date    BREAST BIOPSY Left 1999    Excisional bx, benign cyst    COLONOSCOPY  2012    normal    COLONOSCOPY N/A 6/28/2017    Procedure: COLONOSCOPY;  Surgeon: Markel Montemayor MD;  Location: [address]);  Service: Endoscopy;  Laterality: N/A;    EXCISION OF GANGLION OF WRIST Left 6/27/2018    Procedure:  EXCISION, GANGLION CYST, WRIST - Left Volar wrist;  Surgeon: Mary Moore MD;  Location: [facility] OR [address] [place] [address];  Service: Orthopedics;  Laterality: Left;    HIP SURGERY  05/05/2014    bilateral    JOINT REPLACEMENT Bilateral     MOLLY    TONSILLECTOMY         Review of patient's allergies indicates:   Allergen Reactions    Prednisone      ELEVATED B/P FOR SEVERAL DAYS/WENT TO ER       No current facility-administered medications on file prior to encounter.      Current Outpatient Medications on File Prior to Encounter   Medication Sig    acetaminophen (TYLENOL) 500 MG tablet Take 500 mg by mouth 2 (two) times daily.    amLODIPine (NORVASC) 5 MG tablet TAKE 1 TABLET EVERY DAY    apixaban (ELIQUIS) 5 mg Tab Take 1 tablet (5 mg total) by mouth 2 (two) times daily.    calcium-vitamin D 250-100 mg-unit per tablet Take 1 tablet by mouth 2 (two) times daily.    diphenhydrAMINE (BENADRYL) 25 mg capsule Take 25 mg by mouth nightly as needed for Itching or Insomnia.     ferrous sulfate 324 mg (65 mg iron) TbEC Take 325 mg by mouth twice a week.    irbesartan-hydrochlorothiazide (AVALIDE) 150-12.5 mg per tablet Take 1 tablet by mouth once daily.    loratadine (CLARITIN) 10 mg tablet Take 10 mg by mouth once daily.    metoprolol succinate (TOPROL-XL) 50 MG 24 hr tablet Take 1 tablet (50 mg total) by mouth once daily.    fluorouraciL (EFUDEX) 5 % cream Use hs for 2 weeks    triamcinolone acetonide 0.1% (KENALOG) 0.1 % cream Apply topically 2 (two) times daily as needed. Avoid face and groin.     Family History     Problem Relation (Age of Onset)    Arthritis Mother    Breast cancer Mother    Heart attack Father    Heart disease Father, Paternal Grandmother    Heart failure Father    Hyperlipidemia Mother, Father    Hypertension Father    No Known Problems Brother    Scoliosis Father    Stroke Maternal Grandmother    Tuberculosis Father        Tobacco Use    Smoking status: Former Smoker     Quit date: 1/18/1988      Years since quittin.7    Smokeless tobacco: Never Used   Substance and Sexual Activity    Alcohol use: Yes     Alcohol/week: 3.0 standard drinks     Types: 3 Glasses of wine per week     Comment: glass wine 3 times weekly    Drug use: No    Sexual activity: Yes     Partners: Male     Review of Systems   Constitution: Negative. Negative for chills and fever.   HENT: Negative.    Eyes: Negative.    Cardiovascular: Negative for chest pain, claudication and paroxysmal nocturnal dyspnea.   Respiratory: Negative for cough, shortness of breath and wheezing.    Endocrine: Negative.    Hematologic/Lymphatic: Does not bruise/bleed easily.   Skin: Negative for nail changes and rash.   Musculoskeletal: Negative.  Negative for back pain.   Gastrointestinal: Negative for abdominal pain, melena, nausea and vomiting.   Neurological: Negative for dizziness and headaches.   Psychiatric/Behavioral: Negative for altered mental status, depression and substance abuse.   Allergic/Immunologic: Negative.      Objective:     Vital Signs (Most Recent):  Temp: 97.4 °F (36.3 °C) (20)  Pulse: 80 (20)  Resp: 18 (20)  BP: (!) 141/80 (20)  SpO2: 99 % (20) Vital Signs (24h Range):  Temp:  [97.4 °F (36.3 °C)] 97.4 °F (36.3 °C)  Pulse:  [80] 80  Resp:  [18] 18  SpO2:  [99 %] 99 %  BP: (141)/(77-80) 141/80     Weight: 69.4 kg (153 lb)  Body mass index is 28.91 kg/m².    SpO2: 99 %       No intake or output data in the 24 hours ending 20 1021    Lines/Drains/Airways     Epidural Line                 Perineural Analgesia/Anesthesia Assessment (using dermatomes) Epidural 18 0823 825 days          Peripheral Intravenous Line                 Peripheral IV - Single Lumen 20 0821 20 G Right Antecubital less than 1 day                Physical Exam   Constitutional: She is oriented to person, place, and time. She appears well-developed and well-nourished.   HENT:   Head:  Normocephalic and atraumatic.   Eyes: Pupils are equal, round, and reactive to light. Conjunctivae and EOM are normal.   Neck: No JVD present.   Cardiovascular: Normal rate, normal heart sounds and intact distal pulses. Exam reveals no gallop and no friction rub.   No murmur heard.  irregular   Pulmonary/Chest: Effort normal. No stridor. She has no wheezes. She has no rales. She exhibits no tenderness.   Abdominal: Soft. Bowel sounds are normal. She exhibits no distension and no mass. There is no abdominal tenderness. There is no guarding.   Musculoskeletal:         General: No tenderness, deformity or edema.   Neurological: She is alert and oriented to person, place, and time.   Skin: Skin is warm and dry. No rash noted. No erythema.   Psychiatric: She has a normal mood and affect.       Significant Labs: CMP No results for input(s): NA, K, CL, CO2, GLU, BUN, CREATININE, CALCIUM, PROT, ALBUMIN, BILITOT, ALKPHOS, AST, ALT, ANIONGAP, ESTGFRAFRICA, EGFRNONAA in the last 48 hours. and CBC No results for input(s): WBC, HGB, HCT, PLT in the last 48 hours.    Significant Imaging: Echocardiogram:   Transthoracic echo (TTE) complete (Cupid Only):   Results for orders placed or performed in visit on 07/28/20   Echo Color Flow Doppler? Yes   Result Value Ref Range    Ascending aorta 3.16 cm    STJ 2.74 cm    AV mean gradient 5 mmHg    Ao peak kory 1.42 m/s    Ao VTI 32.49 cm    IVRT 79.92 msec    IVS 0.66 0.6 - 1.1 cm    LA size 3.80 cm    Left Atrium Major Axis 6.19 cm    Left Atrium Minor Axis 5.99 cm    LVIDd 4.44 3.5 - 6.0 cm    LVIDs 3.09 2.1 - 4.0 cm    LVOT diameter 1.90 cm    LVOT peak VTI 20.58 cm    Posterior Wall 0.81 0.6 - 1.1 cm    MV Peak A Kory 0.34 m/s    E wave decelartion time 143.40 msec    MV Peak E Kory 1.14 m/s    PV Peak D Kory 0.65 m/s    PV Peak S Kory 0.55 m/s    RA Major Axis 5.05 cm    RA Width 2.87 cm    RVDD 3.39 cm    Sinus 3.08 cm    TAPSE 2.00 cm    TR Max Kory 2.51 m/s    TDI LATERAL 0.12 m/s     TDI SEPTAL 0.11 m/s    LA WIDTH 4.11 cm    MV stenosis pressure 1/2 time 41.59 ms    LV Diastolic Volume 89.71 mL    LV Systolic Volume 37.72 mL    LVOT peak rosemary 0.96 m/s    LV LATERAL E/E' RATIO 9.50 m/s    LV SEPTAL E/E' RATIO 10.36 m/s    FS 30 %    LA volume 80.82 cm3    LV mass 99.52 g    Left Ventricle Relative Wall Thickness 0.36 cm    AV valve area 1.80 cm2    AV Velocity Ratio 0.68     AV index (prosthetic) 0.63     MV valve area p 1/2 method 5.29 cm2    E/A ratio 3.35     Mean e' 0.12 m/s    Pulm vein S/D ratio 0.85     LVOT area 2.8 cm2    LVOT stroke volume 58.32 cm3    AV peak gradient 8 mmHg    E/E' ratio 9.91 m/s    Triscuspid Valve Regurgitation Peak Gradient 25 mmHg    BSA 1.75 m2    LV Systolic Volume Index 22.1 mL/m2    LV Diastolic Volume Index 52.63 mL/m2    LA Volume Index 47.4 mL/m2    LV Mass Index 58 g/m2    Right Atrial Pressure (from IVC) 3 mmHg    TV rest pulmonary artery pressure 28 mmHg    Narrative    · Atrial fibrillation observed.  · Local segmental wall motion abnormalities.  · Normal left ventricular systolic function. The estimated ejection   fraction is 58%.  · Normal right ventricular systolic function.  · Moderate left atrial enlargement.  · Mild right atrial enlargement.  · Trivial -mild aortic valve stenosis and trivial AR.  · Aortic valve area is 1.80 cm2; peak velocity is 1.42 m/s; mean gradient   is 5 mmHg.  · Mild tricuspid regurgitation.  · Normal central venous pressure (3 mmHg).  · The estimated PA systolic pressure is 28 mmHg.        Assessment and Plan:     Atrial fibrillation  1. LIGIA for evaluation of left atrial appendage thrombus    -No absolute contraindications of esophageal stricture, tumor, perforation, laceration,or diverticulum and/or active GI bleed  -The risks, benefits & alternatives of the procedure were explained to the patient.   -The risks of transesophageal echo include but are not limited to:  Dental trauma, esophageal trauma/perforation, bleeding,  laryngospasm/brochospasm, aspiration, sore throat/hoarseness, & dislodgement of the endotracheal tube/nasogastric tube (where applicable).    -The risks of moderate sedation include hypotension, respiratory depression, arrhythmias, bronchospasm, & death.    -Informed consent was obtained. The patient is agreeable to proceed with the procedure and all questions and concerns addressed.    Case discussed with an attending in echocardiography lab.     Further recommendations per attending addendum          VTE Risk Mitigation (From admission, onward)    None          Moreno Wilkinson MD  Cardiology   [facility] Short Stay Cardiac Unit

## 2020-09-29 NOTE — ASSESSMENT & PLAN NOTE
1. LIGIA for evaluation of left atrial appendage thrombus    -No absolute contraindications of esophageal stricture, tumor, perforation, laceration,or diverticulum and/or active GI bleed  -The risks, benefits & alternatives of the procedure were explained to the patient.   -The risks of transesophageal echo include but are not limited to:  Dental trauma, esophageal trauma/perforation, bleeding, laryngospasm/brochospasm, aspiration, sore throat/hoarseness, & dislodgement of the endotracheal tube/nasogastric tube (where applicable).    -The risks of moderate sedation include hypotension, respiratory depression, arrhythmias, bronchospasm, & death.    -Informed consent was obtained. The patient is agreeable to proceed with the procedure and all questions and concerns addressed.    Case discussed with an attending in echocardiography lab.     Further recommendations per attending addendum

## 2020-09-29 NOTE — HPI
Magdalena Mane presents to short stay cardiac unit on 09/29/2020 for planned LIGIA guided DCCV with Dr. García.     Ms. Mane is a 75 y.o. female with hypertension and recently diagnosed persistent atrial fibrillation. She was in her usual state of health until February of 2020 when she had a respiratory illness (COVID antibody test later was negative) and since that time has had dyspnea on exertion. Also notes her pulse runs in the 50s-60s, however in February her heart rate began running in the 80s (participates in the Digital HTN clinic). In early July 2020 she developed palpitations. She saw Dr. Area 7/27/2020 and was diagnosed with atrial fibrillation and was referred to Dr. Pratt who started eliquis for stroke risk reduction (FAEMW2HGIz score 3). She followed up with Dr. Pratt on 9/16/2020 and remained in atrial fibrillation.  She notes her dyspnea on exertion and palpitations have largely resolved despite AF persistence however still has exercise intolerance.   Of note, Ms. Mane reports an episode of pAF post hip surgery in 2014 at [facility].     Echo from 07/2020 revealed preserved LV function with moderate LA enlargement.     Today, Ms. Mane reports feeling well. She denies any bleeding, overt shortness of breath, chest pains,fevers, chills, or any other acute symptoms.  She reports 100% compliance with Eliquis.  Her last dose was this morning. Review of most recent labs demonstrate normal renal function, Cr 0.9. Stable Hgb at 12.1.         EKG:   My independent visualization of most recent EKG is atrial fibrillation 85 bpm

## 2020-09-29 NOTE — ANESTHESIA PREPROCEDURE EVALUATION
09/29/2020  Magdalena Mane is a 75 y.o., female.  Patient Active Problem List   Diagnosis    DJD (degenerative joint disease)    Essential hypertension    Iron deficiency anemia    Vaginal atrophy    Overweight    Ganglion cyst    Balance problems    Unsteady gait    Paroxysmal tachycardia    Atrial fibrillation         Anesthesia Evaluation          Review of Systems      Physical Exam   Airway/Jaw/Neck:  Airway Findings: Mouth Opening: Normal Tongue: Normal  General Airway Assessment: Adult  Mallampati: II  Improves to II with phonation.  TM Distance: Normal, at least 6 cm       Chest/Lungs:  Chest/Lungs Findings: Clear to auscultation     Heart/Vascular:  Heart Findings: Rate: Normal  Rhythm: Irregularly Irregular  Sounds: Normal        Mental Status:  Mental Status Findings:  Cooperative, Alert and Oriented         Anesthesia Plan  Type of Anesthesia, risks & benefits discussed:  Anesthesia Type:  general  Patient's Preference:   Intra-op Monitoring Plan: standard ASA monitors  Intra-op Monitoring Plan Comments:   Post Op Pain Control Plan: per primary service following discharge from PACU  Post Op Pain Control Plan Comments:   Induction:   IV  Beta Blocker:         Informed Consent: Patient understands risks and agrees with Anesthesia plan.  Questions answered. Anesthesia consent signed with patient.  ASA Score: 3     Day of Surgery Review of History & Physical:        Anesthesia Plan Notes: Chart reviewed, patient interviewed and examined.  The plan for anesthesia was explained.  Questions were answered and the consent was signed.  Idris TUCKER         Ready For Surgery From Anesthesia Perspective.

## 2020-09-29 NOTE — HPI
75 year-old woman with hypertension and recently diagnosed persistent atrial fibrillation (CHADSVASC 3) here for LIGIA/DCCV. Continued on Eliquis for CVA prophylaxis.  Planned to hold off on anti-arrhythmic therapy unless she has early recurrence preventing assessment of symptomatic benefit per Dr. García last clinic note. Hx of gastritis will avoid transgastric views.    TTE 7/31/2020:  · Atrial fibrillation observed.  · Local segmental wall motion abnormalities.  · Normal left ventricular systolic function. The estimated ejection fraction is 58%.  · Normal right ventricular systolic function.  · Moderate left atrial enlargement.  · Mild right atrial enlargement.  · Trivial -mild aortic valve stenosis and trivial AR.  · Aortic valve area is 1.80 cm2; peak velocity is 1.42 m/s; mean gradient is 5 mmHg.  · Mild tricuspid regurgitation.  · Normal central venous pressure (3 mmHg).  · The estimated PA systolic pressure is 28 mmHg.

## 2020-09-29 NOTE — ANESTHESIA POSTPROCEDURE EVALUATION
Anesthesia Post Evaluation    Patient: Magdalena Mane    Procedure(s) Performed: Procedure(s) (LRB):  CARDIOVERSION (N/A)  Transesophageal echo (LIGIA) intra-procedure log documentation (N/A)    Final Anesthesia Type: general    Patient location during evaluation: PACU  Patient participation: Yes- Able to Participate  Level of consciousness: awake and alert  Post-procedure vital signs: reviewed and stable  Pain management: adequate  Airway patency: patent    PONV status at discharge: No PONV  Anesthetic complications: no      Cardiovascular status: hemodynamically stable  Respiratory status: unassisted  Hydration status: euvolemic  Follow-up not needed.          Vitals Value Taken Time   /68 09/29/20 1234   Temp 36.6 °C (97.8 °F) 09/29/20 1215   Pulse 50 09/29/20 1234   Resp 16 09/29/20 1234   SpO2 96 % 09/29/20 1234         No case tracking events are documented in the log.      Pain/Cristobal Score: Cristobal Score: 10 (9/29/2020 12:34 PM)

## 2020-09-29 NOTE — DISCHARGE SUMMARY
[facility]  Cardiac Electrophysiology  Discharge Summary      Patient Name: Magdalena Mane  MRN: [id number]  Admission Date: 9/29/2020  Hospital Length of Stay: 0 days  Discharge Date and Time:  09/29/2020 12:00 PM  Attending Physician: Nigel García MD    Discharging Provider: Leonora Rico NP  Primary Care Physician: Aaron Cardenas MD    HPI:   Magdalena Mane presents to short stay cardiac unit on 09/29/2020 for planned LIGIA guided DCCV with Dr. García.     Ms. Mane is a 75 y.o. female with hypertension and recently diagnosed persistent atrial fibrillation. She was in her usual state of health until February of 2020 when she had a respiratory illness (COVID antibody test later was negative) and since that time has had dyspnea on exertion. Also notes her pulse runs in the 50s-60s, however in February her heart rate began running in the 80s (participates in the Digital HTN clinic). In early July 2020 she developed palpitations. She saw Dr. Area 7/27/2020 and was diagnosed with atrial fibrillation and was referred to Dr. Pratt who started eliquis for stroke risk reduction (ZDUBT2OAFt score 3). She followed up with Dr. Pratt on 9/16/2020 and remained in atrial fibrillation.  She notes her dyspnea on exertion and palpitations have largely resolved despite AF persistence however still has exercise intolerance.   Of note, Ms. Mane reports an episode of pAF post hip surgery in 2014 at [facility].     Echo from 07/2020 revealed preserved LV function with moderate LA enlargement.     Today, Ms. Mane reports feeling well. She denies any bleeding, overt shortness of breath, chest pains,fevers, chills, or any other acute symptoms.  She reports 100% compliance with Eliquis.  Her last dose was this morning. Review of most recent labs demonstrate normal renal function, Cr 0.9. Stable Hgb at 12.1.         EKG:   My independent visualization of most recent EKG is atrial fibrillation 85 bpm      Procedure(s) (LRB):  CARDIOVERSION (N/A)  Transesophageal echo (LIGIA) intra-procedure log documentation (N/A)     Indwelling Lines/Drains at time of discharge:  Lines/Drains/Airways     Epidural Line                 Perineural Analgesia/Anesthesia Assessment (using dermatomes) Epidural 06/27/18 0823 825 days            Hospital Course:  Ms. Mane underwent LIGIA without evidence of DILIP thrombus.  Proceeded with DCCV 200 J x 1 shock, converting from AF to sinus rhythm. She tolerated the procedure without any acute complications. Post-DCCV ECG revealing of sinus bradycardia at 49 bpm.  Plan of care and discharge instructions discussed with Ms. Mane and her  prior to discharge. She was discharged home in stable condition.   Consults: Anesthesia     Pending Diagnostic Studies:     None          Final Active Diagnoses:    Diagnosis Date Noted POA    Atrial fibrillation [I48.91] 09/16/2020 Yes      Problems Resolved During this Admission:     Atrial fibrillation  s/p LIGIA guided DCCV with restoration of sinus rhythm     Plan:  - Continue PTA medications   - No plans for AAD at this point in time. May consider if she has early recurrence of AF   - Follow up with ECG in 1 week (10/6/20)   - Follow up with Lucie Wilson NP in 4 weeks   - Discharge plans/instructions discussed with patient who verbalized understanding and agreement of plans of care. No further questions or concerns  voiced at this time.       Discharged Condition: good    Disposition: Home     Follow Up:  Follow-up Information     Lucie Wilson NP In 4 weeks.    Specialty: Cardiology  Why: s/p LIGIA/DCCV   [facility] information:  [facility] [id number] [phone]                 Patient Instructions:      Diet Adult Regular     Other restrictions (specify):   Order Comments: Because you have received sedation for this procedure:  -Limit activity for the remainder of the day.  -Do not smoke for at least 6 hours and until  you are fully awake and alert.  -Do not drink alcoholic beverage for 24 hours.  -Do not drive for 24 hours.  -Defer important decision making until the following day.     Go to the Emergency Department if you develop:   -Bleeding  -Weakness or numbness  -Visual, gait or speech disturbance  -New chest pain, palpitations, shortness of breath, rapid heart beat, or fainting  -Fever     Notify your health care provider if you experience any of the following:  temperature >100.4     Notify your health care provider if you experience any of the following:  difficulty breathing or increased cough     Notify your health care provider if you experience any of the following:  persistent dizziness, light-headedness, or visual disturbances     Activity as tolerated     Medications:  Reconciled Home Medications:      Medication List      CONTINUE taking these medications    acetaminophen 500 MG tablet  Commonly known as: TYLENOL  Take 500 mg by mouth 2 (two) times daily.     amLODIPine 5 MG tablet  Commonly known as: NORVASC  TAKE 1 TABLET EVERY DAY     apixaban 5 mg Tab  Commonly known as: ELIQUIS  Take 1 tablet (5 mg total) by mouth 2 (two) times daily.     calcium-vitamin D 250-100 mg-unit per tablet  Take 1 tablet by mouth 2 (two) times daily.     CLARITIN 10 mg tablet  Generic drug: loratadine  Take 10 mg by mouth once daily.     diphenhydrAMINE 25 mg capsule  Commonly known as: BENADRYL  Take 25 mg by mouth nightly as needed for Itching or Insomnia.     ferrous sulfate 324 mg (65 mg iron) Tbec  Take 325 mg by mouth twice a week.     fluorouraciL 5 % cream  Commonly known as: EFUDEX  Use hs for 2 weeks     irbesartan-hydrochlorothiazide 150-12.5 mg per tablet  Commonly known as: AVALIDE  Take 1 tablet by mouth once daily.     metoprolol succinate 50 MG 24 hr tablet  Commonly known as: TOPROL-XL  Take 1 tablet (50 mg total) by mouth once daily.     triamcinolone acetonide 0.1% 0.1 % cream  Commonly known as: KENALOG  Apply  topically 2 (two) times daily as needed. Avoid face and groin.            Time spent on the discharge of patient: 20 minutes    Leonora Rico NP  Cardiac Electrophysiology  [facility]

## 2020-09-29 NOTE — PLAN OF CARE
Received report from MAURIZIO Ramirez. Patient s/p LIGIA/Cardioversion, AAOx4. VSS, no c/o pain or discomfort at this time, resp even and unlabored. Silvadene cream noted to chest and back. Post procedure protocol reviewed with patient and patient's . Understanding verbalized.  at bedside. Nurse call bell within reach. Will continue to monitor per post procedure protocol.

## 2020-09-29 NOTE — ASSESSMENT & PLAN NOTE
s/p LIGIA guided DCCV with restoration of sinus rhythm     Plan:  - Continue PTA medications   - No plans for AAD at this point in time. May consider if she has early recurrence of AF   - Follow up with ECG in 1 week (10/6/20)   - Follow up with Dr. García in 4 weeks   - Discharge plans/instructions discussed with patient who verbalized understanding and agreement of plans of care. No further questions or concerns  voiced at this time.

## 2020-09-29 NOTE — PLAN OF CARE
Patient discharged per MD orders. Instructions given on medications, skin care, activity, when to call MD, and follow up appointments. Pt verbalized understanding.  Patient AAOx4, VSS, no c/o pain or discomfort at this time. Telemetry and PIV removed. Patient left unit via wheelchair with PCT and spouse.

## 2020-10-02 ENCOUNTER — TELEPHONE (OUTPATIENT)
Dept: ELECTROPHYSIOLOGY | Facility: CLINIC | Age: 75
End: 2020-10-02

## 2020-10-02 NOTE — TELEPHONE ENCOUNTER
Phoned  to schedule a post procedure follow with  office. Pt is now scheduled to see Lucie CARCAMO on Thursday 11/12 with EKG prior. Pt verbalized understanding.

## 2020-10-06 ENCOUNTER — HOSPITAL ENCOUNTER (OUTPATIENT)
Dept: CARDIOLOGY | Facility: CLINIC | Age: 75
Discharge: HOME | End: 2020-10-06
Payer: MEDICARE

## 2020-10-06 DIAGNOSIS — I49.8 OTHER SPECIFIED CARDIAC ARRHYTHMIAS: ICD-10-CM

## 2020-10-06 PROCEDURE — 93005 RHYTHM STRIP: ICD-10-PCS | Mod: S$GLB,,, | Performed by: INTERNAL MEDICINE

## 2020-10-06 PROCEDURE — 93005 ELECTROCARDIOGRAM TRACING: CPT | Mod: S$GLB,,, | Performed by: INTERNAL MEDICINE

## 2020-10-06 PROCEDURE — 93010 RHYTHM STRIP: ICD-10-PCS | Mod: S$GLB,,, | Performed by: INTERNAL MEDICINE

## 2020-10-06 PROCEDURE — 93010 ELECTROCARDIOGRAM REPORT: CPT | Mod: S$GLB,,, | Performed by: INTERNAL MEDICINE

## 2020-10-15 RX ORDER — TRIAMCINOLONE ACETONIDE 1 MG/G
CREAM TOPICAL 2 TIMES DAILY PRN
Qty: 80 G | Refills: 3 | Status: SHIPPED | OUTPATIENT
Start: 2020-10-15 | End: 2021-10-14

## 2020-10-28 ENCOUNTER — PATIENT OUTREACH (OUTPATIENT)
Dept: ADMINISTRATIVE | Facility: OTHER | Age: 75
End: 2020-10-28

## 2020-11-02 ENCOUNTER — RESEARCH ENCOUNTER (OUTPATIENT)
Dept: RESEARCH | Facility: HOSPITAL | Age: 75
End: 2020-11-02

## 2020-11-02 NOTE — PROGRESS NOTES
Date of Introduction: 11/2/2020    Sponsor: American [employer]     Study Title/IRB Number: 2019.348    Principle Investigator: Dr Yosef Snow    Present for Discussion: Conrad Thornton [facility]     Patient was contacted regarding the ENHANCE-AF trial.  The trial was briefly explained, all questions were answered to the satisfaction of the patient, and inquiry was made if the patient would be interested in participating.  Patient states he/she is interested in participating in the trial.  The patient will arrive 1.5 hours prior to his next appointment with Lucie Wilson NP to go through the consent process and the screening/baseline visit.

## 2020-11-11 ENCOUNTER — TELEPHONE (OUTPATIENT)
Dept: ELECTROPHYSIOLOGY | Facility: CLINIC | Age: 75
End: 2020-11-11

## 2020-11-12 ENCOUNTER — HOSPITAL ENCOUNTER (OUTPATIENT)
Dept: CARDIOLOGY | Facility: CLINIC | Age: 75
Discharge: HOME | End: 2020-11-12
Payer: MEDICARE

## 2020-11-12 ENCOUNTER — RESEARCH ENCOUNTER (OUTPATIENT)
Dept: RESEARCH | Facility: HOSPITAL | Age: 75
End: 2020-11-12

## 2020-11-12 ENCOUNTER — OFFICE VISIT (OUTPATIENT)
Dept: ELECTROPHYSIOLOGY | Facility: CLINIC | Age: 75
End: 2020-11-12
Payer: MEDICARE

## 2020-11-12 VITALS
DIASTOLIC BLOOD PRESSURE: 82 MMHG | WEIGHT: 157.44 LBS | SYSTOLIC BLOOD PRESSURE: 126 MMHG | BODY MASS INDEX: 30.91 KG/M2 | HEART RATE: 87 BPM | HEIGHT: 60 IN

## 2020-11-12 DIAGNOSIS — I48.19 PERSISTENT ATRIAL FIBRILLATION: Primary | ICD-10-CM

## 2020-11-12 DIAGNOSIS — I10 ESSENTIAL HYPERTENSION: ICD-10-CM

## 2020-11-12 DIAGNOSIS — I48.91 ATRIAL FIBRILLATION, UNSPECIFIED TYPE: ICD-10-CM

## 2020-11-12 DIAGNOSIS — I47.9 PAROXYSMAL TACHYCARDIA: ICD-10-CM

## 2020-11-12 DIAGNOSIS — I49.8 OTHER SPECIFIED CARDIAC ARRHYTHMIAS: ICD-10-CM

## 2020-11-12 DIAGNOSIS — Z92.89 HISTORY OF CARDIOVERSION: ICD-10-CM

## 2020-11-12 PROCEDURE — 93005 ELECTROCARDIOGRAM TRACING: CPT | Mod: S$GLB,,, | Performed by: INTERNAL MEDICINE

## 2020-11-12 PROCEDURE — 3288F PR FALLS RISK ASSESSMENT DOCUMENTED: ICD-10-PCS | Mod: CPTII,S$GLB,, | Performed by: NURSE PRACTITIONER

## 2020-11-12 PROCEDURE — 93005 RHYTHM STRIP: ICD-10-PCS | Mod: S$GLB,,, | Performed by: INTERNAL MEDICINE

## 2020-11-12 PROCEDURE — 1159F MED LIST DOCD IN RCRD: CPT | Mod: S$GLB,,, | Performed by: NURSE PRACTITIONER

## 2020-11-12 PROCEDURE — 1157F ADVNC CARE PLAN IN RCRD: CPT | Mod: S$GLB,,, | Performed by: NURSE PRACTITIONER

## 2020-11-12 PROCEDURE — 1157F PR ADVANCE CARE PLAN OR EQUIV PRESENT IN MEDICAL RECORD: ICD-10-PCS | Mod: S$GLB,,, | Performed by: NURSE PRACTITIONER

## 2020-11-12 PROCEDURE — 1100F PTFALLS ASSESS-DOCD GE2>/YR: CPT | Mod: CPTII,S$GLB,, | Performed by: NURSE PRACTITIONER

## 2020-11-12 PROCEDURE — 3079F PR MOST RECENT DIASTOLIC BLOOD PRESSURE 80-89 MM HG: ICD-10-PCS | Mod: CPTII,S$GLB,, | Performed by: NURSE PRACTITIONER

## 2020-11-12 PROCEDURE — 1126F PR PAIN SEVERITY QUANTIFIED, NO PAIN PRESENT: ICD-10-PCS | Mod: S$GLB,,, | Performed by: NURSE PRACTITIONER

## 2020-11-12 PROCEDURE — 99214 OFFICE O/P EST MOD 30 MIN: CPT | Mod: S$GLB,,, | Performed by: NURSE PRACTITIONER

## 2020-11-12 PROCEDURE — 1100F PR PT FALLS ASSESS DOC 2+ FALLS/FALL W/INJURY/YR: ICD-10-PCS | Mod: CPTII,S$GLB,, | Performed by: NURSE PRACTITIONER

## 2020-11-12 PROCEDURE — 93010 RHYTHM STRIP: ICD-10-PCS | Mod: S$GLB,,, | Performed by: INTERNAL MEDICINE

## 2020-11-12 PROCEDURE — 3288F FALL RISK ASSESSMENT DOCD: CPT | Mod: CPTII,S$GLB,, | Performed by: NURSE PRACTITIONER

## 2020-11-12 PROCEDURE — 1159F PR MEDICATION LIST DOCUMENTED IN MEDICAL RECORD: ICD-10-PCS | Mod: S$GLB,,, | Performed by: NURSE PRACTITIONER

## 2020-11-12 PROCEDURE — 3074F SYST BP LT 130 MM HG: CPT | Mod: CPTII,S$GLB,, | Performed by: NURSE PRACTITIONER

## 2020-11-12 PROCEDURE — 93010 ELECTROCARDIOGRAM REPORT: CPT | Mod: S$GLB,,, | Performed by: INTERNAL MEDICINE

## 2020-11-12 PROCEDURE — 99499 RISK ADDL DX/OHS AUDIT: ICD-10-PCS | Mod: S$GLB,,, | Performed by: NURSE PRACTITIONER

## 2020-11-12 PROCEDURE — 3008F BODY MASS INDEX DOCD: CPT | Mod: CPTII,S$GLB,, | Performed by: NURSE PRACTITIONER

## 2020-11-12 PROCEDURE — 99214 PR OFFICE/OUTPT VISIT, EST, LEVL IV, 30-39 MIN: ICD-10-PCS | Mod: S$GLB,,, | Performed by: NURSE PRACTITIONER

## 2020-11-12 PROCEDURE — 1126F AMNT PAIN NOTED NONE PRSNT: CPT | Mod: S$GLB,,, | Performed by: NURSE PRACTITIONER

## 2020-11-12 PROCEDURE — 99999 PR PBB SHADOW E&M-EST. PATIENT-LVL IV: CPT | Mod: PBBFAC,,, | Performed by: NURSE PRACTITIONER

## 2020-11-12 PROCEDURE — 3008F PR BODY MASS INDEX (BMI) DOCUMENTED: ICD-10-PCS | Mod: CPTII,S$GLB,, | Performed by: NURSE PRACTITIONER

## 2020-11-12 PROCEDURE — 3074F PR MOST RECENT SYSTOLIC BLOOD PRESSURE < 130 MM HG: ICD-10-PCS | Mod: CPTII,S$GLB,, | Performed by: NURSE PRACTITIONER

## 2020-11-12 PROCEDURE — 99999 PR PBB SHADOW E&M-EST. PATIENT-LVL IV: ICD-10-PCS | Mod: PBBFAC,,, | Performed by: NURSE PRACTITIONER

## 2020-11-12 PROCEDURE — 99499 UNLISTED E&M SERVICE: CPT | Mod: S$GLB,,, | Performed by: NURSE PRACTITIONER

## 2020-11-12 PROCEDURE — 3079F DIAST BP 80-89 MM HG: CPT | Mod: CPTII,S$GLB,, | Performed by: NURSE PRACTITIONER

## 2020-11-12 RX ORDER — A/SINGAPORE/GP1908/2015 IVR-180 (AN A/MICHIGAN/45/2015 (H1N1)PDM09-LIKE VIRUS, A/HONG KONG/4801/2014, NYMC X-263B (H3N2) (AN A/HONG KONG/4801/2014-LIKE VIRUS), AND B/BRISBANE/60/2008, WILD TYPE (A B/BRISBANE/60/2008-LIKE VIRUS) 15; 15; 15 UG/.5ML; UG/.5ML; UG/.5ML
INJECTION, SUSPENSION INTRAMUSCULAR
COMMUNITY
Start: 2020-10-08 | End: 2021-10-14

## 2020-11-12 NOTE — PROGRESS NOTES
Date of Visit: 11/12/2020    Sponsor: American [employer]     Study Title/IRB Number: 2019.348    Principle Investigator: Dr Yosef Snow    Present for Visit: Conrad Thornton [facility]     Patient arrived one and a half hours prior to his/her appointment with Lucie Wilson NP.  Once the consent process was complete the patient was randomized to usual arm.  The patient completed the necessary surveys.  The patient then continued with their appointment with Lucie Wilson NP.  The patient concluded their visit finishing the required surveys.  All questions were answered to the patient's satisfaction.  The patient was given contact information and scheduled a time with the CRC that would be best for the one month follow up call.

## 2020-11-12 NOTE — Clinical Note
Can we schedule her for LIGIA/DCCV? Please also tell her to cut her metoprolol to 1/2 tablet (25mg total) now and I sent a new prescription of 25mg to mail order and when she gets those take only 1/2 tablet (12.5mg) daily. We will start flecainide 50mg BID after the cardioversion. Thanks!

## 2020-11-12 NOTE — PROGRESS NOTES
Ms. Mane is a patient of Dr. García and was last seen in clinic 9/17/2020.      Subjective:   Patient ID:  Magdalena Mane is a 75 y.o. female who presents for follow-up of No chief complaint on file.  .     HPI:    Ms. Mane is a 75 y.o. female with HTN, AF here for follow up after cardioversion.     Background:    Referring Cardiologist: Maki Pratt MD  Primary Care Physician: Aaron Cardenas MD    Mrs. Mane has a history of hypertension and recently diagnosed persistent atrial fibrillation. She was in her usual state of health until February of 2020 when she had a respiratory illness (COVID antibody test later was negative) and since that time has had dyspnea on exertion. Also notes her pulse runs in the 50s-60s however in February her heart rate began running in the 80s (participates in the Digital HTN clinic). In early July 2020 she developed palpitations. She saw Dr. Area 7/27/2020 and was diagnosed with atrial fibrillation and was referred to Dr. Pratt who started eliquis for stroke risk reduction ([id number] score 3). She followed up with Dr. Pratt on 9/16/2020 and remained in atrial fibrillation for which she presents for further evaluation. She notes her dyspnea on exertion and palpitations have largely resolved despite AF persistence however still has exercise intolerance.    She reports an episode of pAF post hip surgery in 2014 at [facility].    An echocardiogram from July of 2020 notes preserved LV function with moderate LA enlargement.     I reviewed available electrocardiograms in Epic which show sinus rhythm until 7/27/2020 which showed rate controlled atrial fibrillation.    An electrocardiogram yesterday shows AF with average ventricular rate of 83 bpm.  Her JOANO4AIHp score is 3 and indefinite anticoagulation is recommended. She elects to stay on eliquis. I also discussed the goal to reduce symptomatic arrhythmic episodes by pharmacologic and/or procedural methods and utilizing a  rhythm versus a rate control strategy. Since she was symptomatic and this is her first documented episode of AF it is reasonable to try an attempt at restoration of sinus rhythm to which she is agreeable.    Plan: LIGIA/DCCV. Will hold off on anti-arrhythmic therapy unless she has early recurrence preventing assessment of symptomatic benefit.    Update (11/12/2020):    9/29/2020: DCCV. Back in AF at one week EKG.    Today she says she woke up from her cardioversion feeling better. She did have chest pain twice, subtle, in evening after eating. She also had some LH with standing. Now her VIEYRA/SOB has worsened. Denies exertional CP or syncope.     She is currently taking eliquis 5mg BID for stroke prophylaxis and denies significant bleeding episodes. She is currently being treated with metoprolol succinate 50mg daily for HR control.  Kidney function is stable, with a creatinine of 0.9 on 9/22/2020.    I have personally reviewed the patient's EKG today, which shows AF at 87bpm. QRS is 78. QTc is 423.    Relevant Cardiac Test Results:    LIGIA (9/29/2020):  · Normal appearing left atrial appendage. No thrombus is present in the appendage. Normal appendage velocities.  · The left ventricle is normal in size with normal systolic function. The estimated ejection fraction is 55%.  · Normal right ventricular systolic function.  · Mild tricuspid regurgitation.  · Grade 1 plaque present in the transverse aorta and descending aorta.  · Atrial fibrillation observed.    Current Outpatient Medications   Medication Sig    acetaminophen (TYLENOL) 500 MG tablet Take 500 mg by mouth 2 (two) times daily.    amLODIPine (NORVASC) 5 MG tablet TAKE 1 TABLET EVERY DAY    apixaban (ELIQUIS) 5 mg Tab Take 1 tablet (5 mg total) by mouth 2 (two) times daily.    calcium-vitamin D 250-100 mg-unit per tablet Take 1 tablet by mouth 2 (two) times daily.    diphenhydrAMINE (BENADRYL) 25 mg capsule Take 25 mg by mouth nightly as needed for Itching or  Insomnia.     ferrous sulfate 324 mg (65 mg iron) TbEC Take 325 mg by mouth twice a week.    fluorouraciL (EFUDEX) 5 % cream Use hs for 2 weeks    irbesartan-hydrochlorothiazide (AVALIDE) 150-12.5 mg per tablet Take 1 tablet by mouth once daily.    loratadine (CLARITIN) 10 mg tablet Take 10 mg by mouth once daily.    metoprolol succinate (TOPROL-XL) 50 MG 24 hr tablet Take 1 tablet (50 mg total) by mouth once daily.    triamcinolone acetonide 0.1% (KENALOG) 0.1 % cream Apply topically 2 (two) times daily as needed. Avoid face and groin.     No current facility-administered medications for this visit.        Review of Systems   Constitution: Negative for malaise/fatigue.   Cardiovascular: Positive for chest pain (at rest) and dyspnea on exertion. Negative for irregular heartbeat, leg swelling and palpitations.   Respiratory: Positive for shortness of breath.    Hematologic/Lymphatic: Negative for bleeding problem.   Skin: Negative for rash.   Musculoskeletal: Negative for myalgias.   Gastrointestinal: Negative for hematemesis, hematochezia and nausea.   Genitourinary: Negative for hematuria.   Neurological: Positive for light-headedness.   Psychiatric/Behavioral: Negative for altered mental status.   Allergic/Immunologic: Negative for persistent infections.     Objective:        /82   Pulse 87   Ht 5' (1.524 m)   Wt 71.4 kg (157 lb 6.5 oz)   BMI 30.74 kg/m²     Physical Exam   Constitutional: She is oriented to person, place, and time. She appears well-developed and well-nourished.   HENT:   Head: Normocephalic.   Nose: Nose normal.   Eyes: Pupils are equal, round, and reactive to light.   Cardiovascular: Normal rate. An irregularly irregular rhythm present.   Pulmonary/Chest: Breath sounds normal. No respiratory distress.   Abdominal: Normal appearance.   Musculoskeletal: Normal range of motion.         General: No edema.   Neurological: She is alert and oriented to person, place, and time.   Skin:  Skin is warm and dry. No erythema.   Psychiatric: She has a normal mood and affect. Her speech is normal and behavior is normal.   Nursing note and vitals reviewed.    Lab Results   Component Value Date     09/22/2020    K 4.0 09/22/2020    MG 1.9 05/13/2014    BUN 18 09/22/2020    CREATININE 0.9 09/22/2020    ALT 17 07/27/2020    AST 22 07/27/2020    HGB 12.1 09/22/2020    HCT 39.1 09/22/2020    TSH 2.554 07/27/2020    LDLCALC 85.8 07/27/2020       Recent Labs   Lab 09/22/20  1144   INR 1.0         Assessment:     1. Persistent atrial fibrillation    2. Essential hypertension    3. Paroxysmal tachycardia    4. History of cardioversion        Plan:     In summary, Ms. Mane is a 75 y.o. female with HTN, AF here for follow up after cardioversion.   She is 1 mo s/p DCCV. Back in AF one week later. However, this was enough time to identify significant symptom improvement back in sinus rhythm.  Plan will likely be to repeat DCCV and add AAD. Normal EF. No CAD hx. Likely start 1C. Confirm with Dr. García.  She has missed one dose of eliquis (doesn't remember when- - approx 2 weeks ago). Will need LIGIA.    DCCV/LIGIA  Start AAD after - plan to cut metoprolol to 12.5mg daily and start flecainide 50mg BID  RTC as scheduled following procedure.    *A copy of this note has been sent to Dr. García*    Follow up as scheduled following procedure.    ------------------------------------------------------------------    CRYS Barahona, NP-C  Cardiac Electrophysiology

## 2020-11-12 NOTE — PROGRESS NOTES
Date Consent signed: 11/12/2020    Sponsor: American Heart Association     Study Title/IRB Number: 2019.348    Principle Investigator: Dr Yosef Snow    Present for Discussion: Conrad Thornton [facility]     Is LAR Consenting for Subject: NA    Prior to the Informed Consent (IC) being signed, or any study protocol required data collection, testing, procedure, or intervention being performed, the following was done and/or discussed:   Patient was given a copy of the IC for review    Purpose of the study and qualifications to participate    Study design, Follow up schedule, and tests or procedures done at each visit   Confidentiality and HIPAA Authorization for Release of Medical Records for the research trial/ subject's rights/research related injury   Risk, Benefits, Alternative Treatments, Compensation and Costs   Participation in the research trial is voluntary and patient may withdraw at anytime   Contact information for study related questions    Patient verbalizes understanding of the above: Yes  Contact information for CRC and PI given to patient: Yes  Patient able to adequately summarize: the purpose of the study, the risks associated with the study, and all procedures, testing, and follow-ups associated with the study: Yes    Magdalena Mane signed the informed consent form for the ENHANCE-AF research study with an IRB approval date of May 8 2020.  Each page of the consent form was reviewed with the patient (and pt's family) and all questions answered satisfactorily. Magdalena Mane signed the consent form and received a copy of same. The original consent was scanned into electronic medical records (EPIC) and filed into the subject's research study binder.    Patient Eligibility was confirmed by Lucie Wilson NP.

## 2020-11-12 NOTE — Clinical Note
Back in AF 1 week after but she experienced significant symptom improvement. Normal EF. No CAD. Did you want to cardiovert again and start 1C? (She did miss eliquis dose so probably needs another LIGIA 1st). Thx.

## 2020-11-13 RX ORDER — METOPROLOL SUCCINATE 25 MG/1
12.5 TABLET, EXTENDED RELEASE ORAL DAILY
Qty: 45 TABLET | Refills: 3 | Status: SHIPPED | OUTPATIENT
Start: 2020-11-13 | End: 2021-06-30

## 2020-11-16 NOTE — PROGRESS NOTES
Digital Medicine: Health  Follow-Up    Called to follow up with patient, current BP average 114/67 is at goal <130/<80. Patient is doing well and reports no complaints/concerns at this time.     Having a cardioversion done for atrialfib treatment (second time it will be done) waiting to hear date, but suspect before Xmas. Patient is hopeful this will be the last treatment she will undergo.     The history is provided by the patient.             Reason for review: Blood pressure at goal        Topics Covered on Call: physical activity            Diet-no change to diet    No change to diet.        Physical Activity-Change      She exercises for 60 minutes per day 2 day(s) a week.     She added doing zoom exercise classes twice a week to Her physical activity routine.    She removed n/a from Her physical activity.    She identified the following barriers to physical activity: n/a      Medication Adherence-Medication adherence was assessed.      Substance, Sleep, Stress-Not assessed      Continue current diet/physical activity routine.       Addressed patient questions and patient has my contact information if needed prior to next outreach. Patient verbalizes understanding.      Explained the importance of self-monitoring and medication adherence. Encouraged the patient to communicate with their health  for lifestyle modifications to help improve or maintain a healthy lifestyle.               There are no preventive care reminders to display for this patient.      Last 5 Patient Entered Readings                                      Current 30 Day Average: 114/67     Recent Readings 11/10/2020 11/3/2020 10/26/2020 10/22/2020 10/17/2020    SBP (mmHg) 117 121 128 102 102    DBP (mmHg) 66 77 71 62 59    Pulse 90 83 78 78 70

## 2020-11-18 ENCOUNTER — PATIENT OUTREACH (OUTPATIENT)
Dept: OTHER | Facility: OTHER | Age: 75
End: 2020-11-18

## 2020-11-18 NOTE — PROGRESS NOTES
Attempted to reach patient for routine follow up. Reviewed available blood pressure readings and labs. Per 2017 ACC/AHA Hypertension Guidelines, current 30-day average is at goal.     Last 5 Patient Entered Readings                                      Current 30 Day Average: 117/69     Recent Readings 11/10/2020 11/3/2020 10/26/2020 10/22/2020 10/17/2020    SBP (mmHg) 117 121 128 102 102    DBP (mmHg) 66 77 71 62 59    Pulse 90 83 78 78 70        Hypertension Medications             amLODIPine (NORVASC) 5 MG tablet TAKE 1 TABLET EVERY DAY    irbesartan-hydrochlorothiazide (AVALIDE) 150-12.5 mg per tablet Take 1 tablet by mouth once daily.    metoprolol succinate (TOPROL-XL) 25 MG 24 hr tablet Take 0.5 tablets (12.5 mg total) by mouth once daily.        Left a voicemail and requested patient call back.    Will continue to monitor regularly. Will follow up in 1 month.

## 2020-11-30 ENCOUNTER — PATIENT MESSAGE (OUTPATIENT)
Dept: ELECTROPHYSIOLOGY | Facility: CLINIC | Age: 75
End: 2020-11-30

## 2020-11-30 ENCOUNTER — TELEPHONE (OUTPATIENT)
Dept: ELECTROPHYSIOLOGY | Facility: CLINIC | Age: 75
End: 2020-11-30

## 2020-11-30 DIAGNOSIS — I48.19 PERSISTENT ATRIAL FIBRILLATION: Primary | ICD-10-CM

## 2020-11-30 NOTE — TELEPHONE ENCOUNTER
----- Message from Nori Cantor sent at 11/30/2020 11:28 AM CST -----  Regarding: procedure  Pt is returning your call to schedule her procedure and can be reached at [phone].    Thank you

## 2020-11-30 NOTE — TELEPHONE ENCOUNTER
Spoke with patient and scheduled LIGIA/DCCV for 12/8/2020. All instructions reviewed and written instructions sent to patient portal, as requested.

## 2020-12-01 ENCOUNTER — LAB VISIT (OUTPATIENT)
Dept: LAB | Facility: HOSPITAL | Age: 75
End: 2020-12-01
Attending: INTERNAL MEDICINE
Payer: MEDICARE

## 2020-12-01 DIAGNOSIS — I48.19 PERSISTENT ATRIAL FIBRILLATION: ICD-10-CM

## 2020-12-01 LAB
ANION GAP SERPL CALC-SCNC: 8 MMOL/L (ref 8–16)
APTT BLDCRRT: 33.1 SEC (ref 21–32)
BASOPHILS # BLD AUTO: 0.08 K/UL (ref 0–0.2)
BASOPHILS NFR BLD: 1.3 % (ref 0–1.9)
BUN SERPL-MCNC: 17 MG/DL (ref 8–23)
CALCIUM SERPL-MCNC: 8.9 MG/DL (ref 8.7–10.5)
CHLORIDE SERPL-SCNC: 99 MMOL/L (ref 95–110)
CO2 SERPL-SCNC: 31 MMOL/L (ref 23–29)
CREAT SERPL-MCNC: 0.8 MG/DL (ref 0.5–1.4)
DIFFERENTIAL METHOD: ABNORMAL
EOSINOPHIL # BLD AUTO: 0.1 K/UL (ref 0–0.5)
EOSINOPHIL NFR BLD: 2 % (ref 0–8)
ERYTHROCYTE [DISTWIDTH] IN BLOOD BY AUTOMATED COUNT: 13.2 % (ref 11.5–14.5)
EST. GFR  (AFRICAN AMERICAN): >60 ML/MIN/1.73 M^2
EST. GFR  (NON AFRICAN AMERICAN): >60 ML/MIN/1.73 M^2
GLUCOSE SERPL-MCNC: 80 MG/DL (ref 70–110)
HCT VFR BLD AUTO: 37.7 % (ref 37–48.5)
HGB BLD-MCNC: 11.8 G/DL (ref 12–16)
IMM GRANULOCYTES # BLD AUTO: 0.01 K/UL (ref 0–0.04)
IMM GRANULOCYTES NFR BLD AUTO: 0.2 % (ref 0–0.5)
INR PPP: 1 (ref 0.8–1.2)
LYMPHOCYTES # BLD AUTO: 1.3 K/UL (ref 1–4.8)
LYMPHOCYTES NFR BLD: 21 % (ref 18–48)
MCH RBC QN AUTO: 30.4 PG (ref 27–31)
MCHC RBC AUTO-ENTMCNC: 31.3 G/DL (ref 32–36)
MCV RBC AUTO: 97 FL (ref 82–98)
MONOCYTES # BLD AUTO: 0.7 K/UL (ref 0.3–1)
MONOCYTES NFR BLD: 12.3 % (ref 4–15)
NEUTROPHILS # BLD AUTO: 3.8 K/UL (ref 1.8–7.7)
NEUTROPHILS NFR BLD: 63.2 % (ref 38–73)
NRBC BLD-RTO: 0 /100 WBC
PLATELET # BLD AUTO: 294 K/UL (ref 150–350)
PMV BLD AUTO: 11.2 FL (ref 9.2–12.9)
POTASSIUM SERPL-SCNC: 3.9 MMOL/L (ref 3.5–5.1)
PROTHROMBIN TIME: 10.9 SEC (ref 9–12.5)
RBC # BLD AUTO: 3.88 M/UL (ref 4–5.4)
SODIUM SERPL-SCNC: 138 MMOL/L (ref 136–145)
WBC # BLD AUTO: 6.04 K/UL (ref 3.9–12.7)

## 2020-12-01 PROCEDURE — 85025 COMPLETE CBC W/AUTO DIFF WBC: CPT

## 2020-12-01 PROCEDURE — 85610 PROTHROMBIN TIME: CPT

## 2020-12-01 PROCEDURE — 85730 THROMBOPLASTIN TIME PARTIAL: CPT

## 2020-12-01 PROCEDURE — 80048 BASIC METABOLIC PNL TOTAL CA: CPT

## 2020-12-01 PROCEDURE — 36415 COLL VENOUS BLD VENIPUNCTURE: CPT | Mod: PO

## 2020-12-05 ENCOUNTER — LAB VISIT (OUTPATIENT)
Dept: INTERNAL MEDICINE | Facility: CLINIC | Age: 75
End: 2020-12-05
Payer: MEDICARE

## 2020-12-05 DIAGNOSIS — I48.19 PERSISTENT ATRIAL FIBRILLATION: ICD-10-CM

## 2020-12-05 PROCEDURE — U0003 INFECTIOUS AGENT DETECTION BY NUCLEIC ACID (DNA OR RNA); SEVERE ACUTE RESPIRATORY SYNDROME CORONAVIRUS 2 (SARS-COV-2) (CORONAVIRUS DISEASE [COVID-19]), AMPLIFIED PROBE TECHNIQUE, MAKING USE OF HIGH THROUGHPUT TECHNOLOGIES AS DESCRIBED BY CMS-2020-01-R: HCPCS

## 2020-12-06 LAB — SARS-COV-2 RNA RESP QL NAA+PROBE: NOT DETECTED

## 2020-12-07 ENCOUNTER — TELEPHONE (OUTPATIENT)
Dept: ELECTROPHYSIOLOGY | Facility: CLINIC | Age: 75
End: 2020-12-07

## 2020-12-07 ENCOUNTER — RESEARCH ENCOUNTER (OUTPATIENT)
Dept: RESEARCH | Facility: HOSPITAL | Age: 75
End: 2020-12-07

## 2020-12-07 NOTE — TELEPHONE ENCOUNTER
Spoke to Ms. Mane    CONFIRMED procedure arrival time of 8AM    Reiterated instructions including:  -Directions to check in desk  -NPO after midnight night prior to procedure  -Pre-procedure LABS done, no alerts  -COVID test negative  -Confirmed no fever, cough, or shortness of breath in the past 30 days  -Reviewed current visitor policy    Patient verbalizes understanding of above and appreciates call.

## 2020-12-07 NOTE — PROGRESS NOTES
Date of Call: 12/7/2020    Sponsor: American Heart Association     Study Title/IRB Number: 2019.348    Principle Investigator: Dr Yosef Snow    Call made by: Conrad maharaj [facility]     Spoke with patient for his/her one month/six month follow up.  All required surveys were filled out.  The patient reported she is doing well and continuing to take her medication as prescribed, she. No AE's to report.

## 2020-12-08 ENCOUNTER — HOSPITAL ENCOUNTER (OUTPATIENT)
Facility: HOSPITAL | Age: 75
Discharge: HOME | End: 2020-12-08
Attending: INTERNAL MEDICINE | Admitting: INTERNAL MEDICINE
Payer: MEDICARE

## 2020-12-08 ENCOUNTER — HOSPITAL ENCOUNTER (OUTPATIENT)
Dept: CARDIOLOGY | Facility: HOSPITAL | Age: 75
Discharge: HOME | End: 2020-12-08
Attending: INTERNAL MEDICINE
Payer: MEDICARE

## 2020-12-08 ENCOUNTER — ANESTHESIA (OUTPATIENT)
Dept: MEDSURG UNIT | Facility: HOSPITAL | Age: 75
End: 2020-12-08
Payer: MEDICARE

## 2020-12-08 ENCOUNTER — ANESTHESIA EVENT (OUTPATIENT)
Dept: MEDSURG UNIT | Facility: HOSPITAL | Age: 75
End: 2020-12-08
Payer: MEDICARE

## 2020-12-08 VITALS
WEIGHT: 150 LBS | HEART RATE: 53 BPM | OXYGEN SATURATION: 98 % | RESPIRATION RATE: 18 BRPM | SYSTOLIC BLOOD PRESSURE: 114 MMHG | DIASTOLIC BLOOD PRESSURE: 62 MMHG | BODY MASS INDEX: 28.32 KG/M2 | HEIGHT: 61 IN | TEMPERATURE: 98 F

## 2020-12-08 VITALS
BODY MASS INDEX: 28.32 KG/M2 | DIASTOLIC BLOOD PRESSURE: 72 MMHG | WEIGHT: 150 LBS | SYSTOLIC BLOOD PRESSURE: 133 MMHG | HEIGHT: 61 IN

## 2020-12-08 DIAGNOSIS — I48.19 PERSISTENT ATRIAL FIBRILLATION: ICD-10-CM

## 2020-12-08 DIAGNOSIS — I48.91 ATRIAL FIBRILLATION: ICD-10-CM

## 2020-12-08 LAB
AORTIC ROOT ANNULUS: 3.4 CM
BSA FOR ECHO PROCEDURE: 1.71 M2

## 2020-12-08 PROCEDURE — 92960 PR CARDIOVERSION, ELECTIVE;EXTERN: ICD-10-PCS | Mod: ,,, | Performed by: INTERNAL MEDICINE

## 2020-12-08 PROCEDURE — 92960 CARDIOVERSION ELECTRIC EXT: CPT | Performed by: INTERNAL MEDICINE

## 2020-12-08 PROCEDURE — 93005 ELECTROCARDIOGRAM TRACING: CPT

## 2020-12-08 PROCEDURE — 37000008 HC ANESTHESIA 1ST 15 MINUTES: Performed by: INTERNAL MEDICINE

## 2020-12-08 PROCEDURE — 93010 EKG 12-LEAD: ICD-10-PCS | Mod: ,,, | Performed by: INTERNAL MEDICINE

## 2020-12-08 PROCEDURE — D9220A PRA ANESTHESIA: Mod: ANES,,, | Performed by: ANESTHESIOLOGY

## 2020-12-08 PROCEDURE — D9220A PRA ANESTHESIA: ICD-10-PCS | Mod: ANES,,, | Performed by: ANESTHESIOLOGY

## 2020-12-08 PROCEDURE — 93325 TRANSESOPHAGEAL ECHO (TEE) (CUPID ONLY): ICD-10-PCS | Mod: 26,,, | Performed by: INTERNAL MEDICINE

## 2020-12-08 PROCEDURE — 92960 CARDIOVERSION ELECTRIC EXT: CPT | Mod: ,,, | Performed by: INTERNAL MEDICINE

## 2020-12-08 PROCEDURE — 93320 DOPPLER ECHO COMPLETE: CPT | Mod: 26,,, | Performed by: INTERNAL MEDICINE

## 2020-12-08 PROCEDURE — D9220A PRA ANESTHESIA: Mod: CRNA,,, | Performed by: NURSE ANESTHETIST, CERTIFIED REGISTERED

## 2020-12-08 PROCEDURE — 93325 DOPPLER ECHO COLOR FLOW MAPG: CPT

## 2020-12-08 PROCEDURE — 25000003 PHARM REV CODE 250: Performed by: INTERNAL MEDICINE

## 2020-12-08 PROCEDURE — 93320 TRANSESOPHAGEAL ECHO (TEE) (CUPID ONLY): ICD-10-PCS | Mod: 26,,, | Performed by: INTERNAL MEDICINE

## 2020-12-08 PROCEDURE — 37000009 HC ANESTHESIA EA ADD 15 MINS: Performed by: INTERNAL MEDICINE

## 2020-12-08 PROCEDURE — 93312 ECHO TRANSESOPHAGEAL: CPT | Mod: 26,,, | Performed by: INTERNAL MEDICINE

## 2020-12-08 PROCEDURE — 25000003 PHARM REV CODE 250: Performed by: NURSE ANESTHETIST, CERTIFIED REGISTERED

## 2020-12-08 PROCEDURE — 63600175 PHARM REV CODE 636 W HCPCS: Performed by: NURSE ANESTHETIST, CERTIFIED REGISTERED

## 2020-12-08 PROCEDURE — 93325 DOPPLER ECHO COLOR FLOW MAPG: CPT | Mod: 26,,, | Performed by: INTERNAL MEDICINE

## 2020-12-08 PROCEDURE — D9220A PRA ANESTHESIA: ICD-10-PCS | Mod: CRNA,,, | Performed by: NURSE ANESTHETIST, CERTIFIED REGISTERED

## 2020-12-08 PROCEDURE — 93312 TRANSESOPHAGEAL ECHO (TEE) (CUPID ONLY): ICD-10-PCS | Mod: 26,,, | Performed by: INTERNAL MEDICINE

## 2020-12-08 PROCEDURE — 93010 ELECTROCARDIOGRAM REPORT: CPT | Mod: 76,,, | Performed by: INTERNAL MEDICINE

## 2020-12-08 RX ORDER — LIDOCAINE HYDROCHLORIDE 20 MG/ML
INJECTION, SOLUTION EPIDURAL; INFILTRATION; INTRACAUDAL; PERINEURAL
Status: DISCONTINUED | OUTPATIENT
Start: 2020-12-08 | End: 2020-12-08

## 2020-12-08 RX ORDER — PROPOFOL 10 MG/ML
VIAL (ML) INTRAVENOUS
Status: DISCONTINUED | OUTPATIENT
Start: 2020-12-08 | End: 2020-12-08

## 2020-12-08 RX ORDER — FLECAINIDE ACETATE 50 MG/1
50 TABLET ORAL EVERY 12 HOURS
Qty: 60 TABLET | Refills: 11 | Status: SHIPPED | OUTPATIENT
Start: 2020-12-08 | End: 2021-11-23 | Stop reason: SDUPTHER

## 2020-12-08 RX ORDER — FLECAINIDE ACETATE 50 MG/1
50 TABLET ORAL EVERY 12 HOURS
Qty: 60 TABLET | Refills: 11 | Status: SHIPPED | OUTPATIENT
Start: 2020-12-08 | End: 2020-12-08 | Stop reason: HOSPADM

## 2020-12-08 RX ORDER — SODIUM CHLORIDE 0.9 % (FLUSH) 0.9 %
3 SYRINGE (ML) INJECTION
Status: DISCONTINUED | OUTPATIENT
Start: 2020-12-08 | End: 2020-12-08 | Stop reason: HOSPADM

## 2020-12-08 RX ORDER — DIPHENHYDRAMINE HYDROCHLORIDE 50 MG/ML
25 INJECTION INTRAMUSCULAR; INTRAVENOUS EVERY 6 HOURS PRN
Status: DISCONTINUED | OUTPATIENT
Start: 2020-12-08 | End: 2020-12-08 | Stop reason: HOSPADM

## 2020-12-08 RX ORDER — HYDROMORPHONE HYDROCHLORIDE 1 MG/ML
0.2 INJECTION, SOLUTION INTRAMUSCULAR; INTRAVENOUS; SUBCUTANEOUS EVERY 5 MIN PRN
Status: DISCONTINUED | OUTPATIENT
Start: 2020-12-08 | End: 2020-12-08 | Stop reason: HOSPADM

## 2020-12-08 RX ORDER — PROPOFOL 10 MG/ML
VIAL (ML) INTRAVENOUS CONTINUOUS PRN
Status: DISCONTINUED | OUTPATIENT
Start: 2020-12-08 | End: 2020-12-08

## 2020-12-08 RX ORDER — SODIUM CHLORIDE 9 MG/ML
INJECTION, SOLUTION INTRAVENOUS CONTINUOUS PRN
Status: DISCONTINUED | OUTPATIENT
Start: 2020-12-08 | End: 2020-12-08

## 2020-12-08 RX ORDER — SILVER SULFADIAZINE 10 G/1000G
CREAM TOPICAL
Status: DISCONTINUED | OUTPATIENT
Start: 2020-12-08 | End: 2020-12-08 | Stop reason: HOSPADM

## 2020-12-08 RX ORDER — ONDANSETRON 2 MG/ML
4 INJECTION INTRAMUSCULAR; INTRAVENOUS ONCE AS NEEDED
Status: DISCONTINUED | OUTPATIENT
Start: 2020-12-08 | End: 2020-12-08 | Stop reason: HOSPADM

## 2020-12-08 RX ORDER — FENTANYL CITRATE 50 UG/ML
25 INJECTION, SOLUTION INTRAMUSCULAR; INTRAVENOUS EVERY 5 MIN PRN
Status: DISCONTINUED | OUTPATIENT
Start: 2020-12-08 | End: 2020-12-08 | Stop reason: HOSPADM

## 2020-12-08 RX ADMIN — SODIUM CHLORIDE: 9 INJECTION, SOLUTION INTRAVENOUS at 09:12

## 2020-12-08 RX ADMIN — PROPOFOL 100 MCG/KG/MIN: 10 INJECTION, EMULSION INTRAVENOUS at 10:12

## 2020-12-08 RX ADMIN — LIDOCAINE HYDROCHLORIDE 100 MG: 20 INJECTION, SOLUTION EPIDURAL; INFILTRATION; INTRACAUDAL at 10:12

## 2020-12-08 RX ADMIN — PROPOFOL 50 MG: 10 INJECTION, EMULSION INTRAVENOUS at 10:12

## 2020-12-08 NOTE — HOSPITAL COURSE
Ms. Mane underwent LIGIA without evidence of DILIP thrombus.  Proceeded with DCCV 200 J x 1 shock, converting from AF to sinus rhythm. She tolerated the procedure without any acute complications.  Post-DCCV ECG revealing of sinus bradycardia.   Plan to start Flecainide 50 mg bid and continue metoprolol XL 12.5 mg daily and Eliquis. Instructed to return in 1 week for ECG and in 4 weeks for clinic follow up.  Discharge instructions discussed and all questions answered. Ms. Mane was discharged home in stable condition.

## 2020-12-08 NOTE — DISCHARGE SUMMARY
[facility] - Short Stay Cardiac Unit  Cardiac Electrophysiology  Discharge Summary      Patient Name: Magdalena Mane  MRN: [id number]  Admission Date: 12/8/2020  Hospital Length of Stay: 0 days  Discharge Date and Time:  12/08/2020 11:31 AM  Attending Physician: Nigel García MD    Discharging Provider: Leonora Rico NP  Primary Care Physician: Aaron Cardenas MD    HPI:   Magdalena Mane presents to short stay cardiac unit on 12/08/2020 for planned LIGIA guided DCCV with Dr. García.     Ms. Mane is a 75 y.o. female with hypertension and recently diagnosed persistent atrial fibrillation. She was in her usual state of health until February of 2020 when she had a respiratory illness (COVID antibody test later was negative) and since that time has had dyspnea on exertion. Also notes her pulse runs in the 50s-60s, however in February her heart rate began running in the 80s (participates in the Digital HTN clinic). In early July 2020 she developed palpitations. She saw Dr. Area 7/27/2020 and was diagnosed with atrial fibrillation and was referred to Dr. Pratt who started eliquis for stroke risk reduction ([id number] score 3). She followed up with Dr. Pratt on 9/16/2020 and remained in atrial fibrillation.  Echo from 07/2020 revealed preserved LV function with moderate LA enlargement.     Ms. Mane underwent LIGIA/DCCV on 09/29/20. She had brief restoration of sinus rhythm, lasting approximately 1 week.  During that week she noted significant improvement in fatigue and dyspnea.  Plan to repeat LIGIA/DCCV and start ADD.       EKG:   My independent visualization of most recent EKG is AF    Procedure(s) (LRB):  Cardioversion or Defibrillation (N/A)  Transesophageal echo (LIGIA) intra-procedure log documentation (N/A)     Indwelling Lines/Drains at time of discharge:  Lines/Drains/Airways     Epidural Line                 Perineural Analgesia/Anesthesia Assessment (using dermatomes) Epidural 06/27/18 2781  895 days            Hospital Course:  Ms. Mane underwent LIGIA without evidence of DILIP thrombus.  Proceeded with DCCV 200 J x 1 shock, converting from AF to sinus rhythm. She tolerated the procedure without any acute complications.  Post-DCCV ECG revealing of sinus bradycardia.   Plan to start Flecainide 50 mg bid and continue metoprolol XL 12.5 mg daily and Eliquis. Instructed to return in 1 week for ECG and in 4 weeks for clinic follow up.  Discharge instructions discussed and all questions answered. Ms. Mane was discharged home in stable condition.         Consults: Anesthesia     Pending Diagnostic Studies:     Procedure Component Value Units Date/Time    EKG 12-LEAD [[id number]] Collected: 12/08/20 1049    Order Status: Sent Lab Status: In process Updated: 12/08/20 1058    Narrative:      Test Reason : I48.91,    Vent. Rate : 055 BPM     Atrial Rate : 055 BPM     P-R Int : 204 ms          QRS Dur : 070 ms      QT Int : 448 ms       P-R-T Axes : 059 053 060 degrees     QTc Int : 428 ms    Sinus bradycardia with Premature atrial complexes  Septal infarct (cited on or before 08-DEC-2020)  Abnormal ECG  When compared with ECG of 08-DEC-2020 08:17,  Sinus rhythm has replaced Atrial fibrillation    Referred By: HONG GARCÍA           Confirmed By:           Final Active Diagnoses:    Diagnosis Date Noted POA    Atrial fibrillation [I48.91] 09/16/2020 Yes      Problems Resolved During this Admission:     Atrial fibrillation  s/p LIGIA guided DCCV with restoration of sinus rhythm    Plan:  - Continue PTA medications  - AAD: Start Flecainide 50 mg bid  - Follow up with ECG in 1 week (12/15/20)  - Follow up with Dr. García or Lucie Wilson NP in 4-6 weeks  - Discharge plans/instructions discussed with patient who verbalized understanding and agreement of plans of care. No further questions or concerns  voiced at this time.             Discharged Condition: good    Disposition: Home or Self Care    Follow  Up:  Follow-up Information     Lucie Wilson NP In 4 weeks.    Specialty: Cardiology  Why: s/p LIGIA/DCCV  Contact information:  [address]  [facility] [id number]  [phone]                 Patient Instructions:      Other restrictions (specify):   Order Comments: Because you have received sedation for this procedure:  -Limit activity for the remainder of the day.  -Do not smoke for at least 6 hours and until you are fully awake and alert.  -Do not drink alcoholic beverage for 24 hours.  -Do not drive for 24 hours.  -Defer important decision making until the following day.     Go to the Emergency Department if you develop:   -Bleeding  -Weakness or numbness  -Visual, gait or speech disturbance  -New chest pain, palpitations, shortness of breath, rapid heart beat, or fainting  -Fever      Any need to reschedule or cancel procedures, or any questions regarding your procedures should be addressed directly with the Arrhythmia Department Nurses at the following phone number: [phone].     Notify your health care provider if you experience any of the following:  difficulty breathing or increased cough     Notify your health care provider if you experience any of the following:  persistent dizziness, light-headedness, or visual disturbances     Activity as tolerated     Medications:  Reconciled Home Medications:      Medication List      START taking these medications    flecainide 50 MG Tab  Commonly known as: TAMBOCOR  Take 1 tablet (50 mg total) by mouth every 12 (twelve) hours.        CONTINUE taking these medications    acetaminophen 500 MG tablet  Commonly known as: TYLENOL  Take 500 mg by mouth 2 (two) times daily.     amLODIPine 5 MG tablet  Commonly known as: NORVASC  TAKE 1 TABLET EVERY DAY     apixaban 5 mg Tab  Commonly known as: ELIQUIS  Take 1 tablet (5 mg total) by mouth 2 (two) times daily.     calcium-vitamin D 250-100 mg-unit per tablet  Take 1 tablet by mouth 2 (two) times daily.      CLARITIN 10 mg tablet  Generic drug: loratadine  Take 10 mg by mouth once daily.     diphenhydrAMINE 25 mg capsule  Commonly known as: BENADRYL  Take 25 mg by mouth nightly as needed for Itching or Insomnia.     ferrous sulfate 324 mg (65 mg iron) Tbec  Take 325 mg by mouth twice a week.     FLUAD QUAD 2020-21(65Y UP)(PF) 60 mcg (15 mcg x 4)/0.5 mL Syrg  Generic drug: flu vac 2020 [id number]-[id number](PF)  ADM 0.5ML IM UTD     fluorouraciL 5 % cream  Commonly known as: EFUDEX  Use hs for 2 weeks     irbesartan-hydrochlorothiazide 150-12.5 mg per tablet  Commonly known as: AVALIDE  Take 1 tablet by mouth once daily.     metoprolol succinate 25 MG 24 hr tablet  Commonly known as: TOPROL-XL  Take 0.5 tablets (12.5 mg total) by mouth once daily.     triamcinolone acetonide 0.1% 0.1 % cream  Commonly known as: KENALOG  Apply topically 2 (two) times daily as needed. Avoid face and groin.            Time spent on the discharge of patient: 30 minutes    Leonora Rico NP  Cardiac Electrophysiology  [facility] - Short Stay Cardiac Unit

## 2020-12-08 NOTE — HPI
Magdalena Mane presents to short stay cardiac unit on 12/08/2020 for planned LIGIA guided DCCV with Dr. García.     Ms. Mane is a 75 y.o. female with hypertension and recently diagnosed persistent atrial fibrillation. She was in her usual state of health until February of 2020 when she had a respiratory illness (COVID antibody test later was negative) and since that time has had dyspnea on exertion. Also notes her pulse runs in the 50s-60s, however in February her heart rate began running in the 80s (participates in the Digital HT[facility] clinic). In early July 2020 she developed palpitations. She saw Dr. Area 7/27/2020 and was diagnosed with atrial fibrillation and was referred to Dr. Pratt who started eliquis for stroke risk reduction ([id number] score 3). She followed up with Dr. Pratt on 9/16/2020 and remained in atrial fibrillation.  Echo from 07/2020 revealed preserved LV function with moderate LA enlargement.     Ms. Mane underwent LIGIA/DCCV on 09/29/20. She had brief restoration of sinus rhythm, lasting approximately 1 week.  During that week she noted significant improvement in fatigue and dyspnea.  Plan to repeat LIGIA/DCCV and start ADD.       EKG:   My independent visualization of most recent EKG is AF

## 2020-12-08 NOTE — PROGRESS NOTES
Received report from Sugey YUAN RN. Patient s/p LIGIA/DCCV, AAOx4. VSS, no c/o pain or discomfort at this time, resp even and unlabored. Silvadene applied to chest and back. Post procedure protocol reviewed with patient. Understanding verbalized. Nurse call bell within reach. Will continue to monitor per post procedure protocol.

## 2020-12-08 NOTE — TRANSFER OF CARE
"Anesthesia Transfer of Care Note    Patient: Magdalena Mane    Procedure(s) Performed: Procedure(s) (LRB):  Cardioversion or Defibrillation (N/A)  Transesophageal echo (LIGIA) intra-procedure log documentation (N/A)    Patient location: Cath Lab    Anesthesia Type: general    Transport from OR: Transported from OR on 2-3 L/min O2 by NC with adequate spontaneous ventilation    Post pain: adequate analgesia    Post assessment: no apparent anesthetic complications    Post vital signs: stable    Level of consciousness: awake and alert    Nausea/Vomiting: no nausea/vomiting    Complications: none    Transfer of care protocol was followed      Last vitals:   Visit Vitals  /72 (BP Location: Left arm, Patient Position: Lying)   Pulse 87   Temp 36.5 °C (97.7 °F) (Oral)   Resp 18   Ht 5' 1" (1.549 m)   Wt 68 kg (150 lb)   SpO2 98%   Breastfeeding No   BMI 28.34 kg/m²     "

## 2020-12-08 NOTE — ANESTHESIA POSTPROCEDURE EVALUATION
Anesthesia Post Evaluation    Patient: Magdalena Mane    Procedure(s) Performed: Procedure(s) (LRB):  Cardioversion or Defibrillation (N/A)  Transesophageal echo (LIGIA) intra-procedure log documentation (N/A)    Final Anesthesia Type: general    Patient location during evaluation: PACU  Patient participation: Yes- Able to Participate  Level of consciousness: awake and alert  Post-procedure vital signs: reviewed and stable  Pain management: adequate  Airway patency: patent    PONV status at discharge: No PONV  Anesthetic complications: no      Cardiovascular status: blood pressure returned to baseline  Respiratory status: unassisted  Follow-up not needed.          Vitals Value Taken Time   /62 12/08/20 1117   Temp 36.7 °C (98.1 °F) 12/08/20 1115   Pulse 54 12/08/20 1134   Resp 42 12/08/20 1133   SpO2 99 % 12/08/20 1134   Vitals shown include unvalidated device data.      Event Time   Out of Recovery 11:09:00         Pain/Cristobla Score: Cristobal Score: 10 (12/8/2020 11:00 AM)

## 2020-12-08 NOTE — ANESTHESIA PREPROCEDURE EVALUATION
12/08/2020  Pre-operative evaluation for Procedure(s) (LRB):  Cardioversion or Defibrillation (N/A)  Transesophageal echo (LIGIA) intra-procedure log documentation (N/A)    Magdalena Mane is a 75 y.o. female here for LIGIA, cardioversion. Last echo reviewed: normal biventricular systolic function    Patient Active Problem List   Diagnosis    DJD (degenerative joint disease)    Essential hypertension    Iron deficiency anemia    Vaginal atrophy    Overweight    Ganglion cyst    Balance problems    Unsteady gait    Paroxysmal tachycardia    Atrial fibrillation    History of cardioversion       Review of patient's allergies indicates:   Allergen Reactions    Prednisone      ELEVATED B/P FOR SEVERAL DAYS/WENT TO ER       No current facility-administered medications on file prior to encounter.      Current Outpatient Medications on File Prior to Encounter   Medication Sig Dispense Refill    acetaminophen (TYLENOL) 500 MG tablet Take 500 mg by mouth 2 (two) times daily.      amLODIPine (NORVASC) 5 MG tablet TAKE 1 TABLET EVERY DAY 90 tablet 3    apixaban (ELIQUIS) 5 mg Tab Take 1 tablet (5 mg total) by mouth 2 (two) times daily. 180 tablet 3    calcium-vitamin D 250-100 mg-unit per tablet Take 1 tablet by mouth 2 (two) times daily.      diphenhydrAMINE (BENADRYL) 25 mg capsule Take 25 mg by mouth nightly as needed for Itching or Insomnia.       ferrous sulfate 324 mg (65 mg iron) TbEC Take 325 mg by mouth twice a week.      irbesartan-hydrochlorothiazide (AVALIDE) 150-12.5 mg per tablet Take 1 tablet by mouth once daily. 90 tablet 3    loratadine (CLARITIN) 10 mg tablet Take 10 mg by mouth once daily.      metoprolol succinate (TOPROL-XL) 25 MG 24 hr tablet Take 0.5 tablets (12.5 mg total) by mouth once daily. 45 tablet 3    FLUAD QUAD 2020-21,65Y UP,,PF, 60 mcg (15 mcg x 4)/0.5 mL Syrg ADM  0.5ML IM UTD      fluorouraciL (EFUDEX) 5 % cream Use hs for 2 weeks 40 g 3    triamcinolone acetonide 0.1% (KENALOG) 0.1 % cream Apply topically 2 (two) times daily as needed. Avoid face and groin. 80 g 3       Past Surgical History:   Procedure Laterality Date    BREAST BIOPSY Left     Excisional bx, benign cyst    COLONOSCOPY      normal    COLONOSCOPY N/A 2017    Procedure: COLONOSCOPY;  Surgeon: Markel Montemayor MD;  Location: [facility] ENDO (4TH FLR);  Service: Endoscopy;  Laterality: N/A;    EXCISION OF GANGLION OF WRIST Left 2018    Procedure: EXCISION, GANGLION CYST, WRIST - Left Volar wrist;  Surgeon: Mary Moore MD;  [facility];  Service: Orthopedics;  Laterality: Left;    HIP SURGERY  2014    bilateral    JOINT REPLACEMENT Bilateral     MOLLY    TONSILLECTOMY      TREATMENT OF CARDIAC ARRHYTHMIA N/A 2020    Procedure: CARDIOVERSION;  Surgeon: Nigel Garíca MD;  Location: [facility] EP LAB;  Service: Cardiology;  Laterality: N/A;  AF, LIGIA, DCCV, MAC, [place], 3 Prep       Social History     Socioeconomic History    Marital status:      Spouse name: Not on file    Number of children: Not on file    Years of education: Not on file    Highest education level: Not on file   Occupational History    Not on file   Social Needs    Financial resource strain: Not on file    Food insecurity     Worry: Not on file     Inability: Not on file    Transportation needs     Medical: Not on file     Non-medical: Not on file   Tobacco Use    Smoking status: Former Smoker     Quit date: 1988     Years since quittin.9    Smokeless tobacco: Never Used   Substance and Sexual Activity    Alcohol use: Not Currently     Alcohol/week: 3.0 standard drinks     Types: 3 Glasses of wine per week     Comment: glass wine 3 times weekly    Drug use: No    Sexual activity: Yes     Partners: Male   Lifestyle    Physical activity     Days per week: Not on file     Minutes  per session: Not on file    Stress: Not on file   Relationships    Social connections     Talks on phone: Not on file     Gets together: Not on file     Attends Orthodoxy service: Not on file     Active member of club or organization: Not on file     Attends meetings of clubs or organizations: Not on file     Relationship status: Not on file   Other Topics Concern    Not on file   Social History Narrative     to HEATH Montemayor    Nurse    Likes to garden           Anesthesia Evaluation    I have reviewed the Patient Summary Reports.    I have reviewed the Nursing Notes. I have reviewed the NPO Status.      Review of Systems  Anesthesia Hx:  No problems with previous Anesthesia    Cardiovascular:   Hypertension    Pulmonary:  Pulmonary Normal    Renal/:  Renal/ Normal     Hepatic/GI:  Hepatic/GI Normal    Musculoskeletal:   Arthritis     Endocrine:  Endocrine Normal        Physical Exam  General:  Well nourished    Airway/Jaw/Neck:  Airway Findings: Mouth Opening: Normal Tongue: Normal  Mallampati: II       Chest/Lungs:  Chest/Lungs Findings: Clear to auscultation     Heart/Vascular:  Heart Findings: Rate: Normal             Anesthesia Plan  Type of Anesthesia, risks & benefits discussed:  Anesthesia Type:  general, MAC  Patient's Preference:   Intra-op Monitoring Plan: standard ASA monitors  Intra-op Monitoring Plan Comments:   Post Op Pain Control Plan: multimodal analgesia and IV/PO Opioids PRN  Post Op Pain Control Plan Comments:   Induction:   IV  Beta Blocker:         Informed Consent: Patient understands risks and agrees with Anesthesia plan.  Questions answered. Anesthesia consent signed with patient.  ASA Score: 3     Day of Surgery Review of History & Physical:            Ready For Surgery From Anesthesia Perspective.

## 2020-12-08 NOTE — PROGRESS NOTES
Leonora np  Here speaking with patient and she looked at ekg that was done and it was placed in patient's chart.

## 2020-12-08 NOTE — PROGRESS NOTES
Patient admitted to recovery see [facility] for complete assessment pacu bcg's maintained safety measures verified patient instructed on pain scale and patient verbalized understanding. ekg done and placed in patient's chart. Also called patient's  and updated on patient location.

## 2020-12-08 NOTE — ASSESSMENT & PLAN NOTE
74 y/o F with HTN and atrial fibrillation. Long history of afib. Underwent LIGIA/DCCV on 9/29/20 but was noted to be back in afib on her 1 week ECG.  Presented today for repeat LIGIA/DCCV with a plan to initiate an antiarrhythmic drug. Missed one dose of apixaban.     Cardiac meds - amlodipine 5 mg, apixaban 5 mg BID, irbesartan/HCTZ 150/12.5 mg daily, metoprolol XL 50 mg daily    Dysphagia or odynophagia:  No  Liver Disease, esophageal disease, or known varices:  No  Upper GI Bleeding: No  Snoring:  Yes  Sleep Apnea:  Yes  Prior neck surgery or radiation:  No  History of anesthetic difficulties:  No  Family history of anesthetic difficulties:  No  Last oral intake:  12 hours ago  Able to move neck in all directions:  Yes    LIGIA for evaluation of DILIP prior DCCV for AFib   -No absolute contraindications of esophageal stricture, tumor, perforation, laceration,or diverticulum and/or active GI bleed  -The risks, benefits & alternatives of the procedure were explained to the patient.   -The risks of transesophageal echo include but are not limited to:  Dental trauma, esophageal trauma/perforation, bleeding, laryngospasm/brochospasm, aspiration, sore throat/hoarseness, & dislodgement of the endotracheal tube/nasogastric tube (where applicable).    -The risks of moderate sedation include hypotension, respiratory depression, arrhythmias, bronchospasm, & death.    -Informed consent was obtained. The patient is agreeable to proceed with the procedure and all questions and concerns addressed.

## 2020-12-08 NOTE — PROGRESS NOTES
Patient discharged per MD orders. Instructions given on medications, wound care, activity, when to call MD, and follow up appointments. Pt verbalized understanding.  Patient AAOx4, VSS, no c/o pain or discomfort at this time. Telemetry and PIV removed. Patient left unit via wheelchair with transport and family.

## 2020-12-08 NOTE — H&P
[facility] - Short Stay Cardiac Unit  Cardiology  History and Physical     Patient Name: Magdalena Mane  MRN: [id number]  Admission Date: 12/8/2020  Code Status: No Order   Attending Provider: Nigel García MD   Primary Care Physician: Aaron Cardenas MD  Principal Problem:<principal problem not specified>    Patient information was obtained from patient, past medical records and ER records.     Subjective:     Chief Complaint:  atrial fibrillation      HPI:  74 y/o F with HTN and atrial fibrillation. Long history of afib. Underwent LIGIA/DCCV on 9/29/20 but was noted to be back in afib on her 1 week ECG.  Presented today for repeat LIGIA/DCCV with a plan to initiate an antiarrhythmic drug. Missed one dose of apixaban.     Cardiac meds - amlodipine 5 mg, apixaban 5 mg BID, irbesartan/HCTZ 150/12.5 mg daily, metoprolol XL 50 mg daily    LIGIA 9/29/20  · Normal appearing left atrial appendage. No thrombus is present in the appendage. Normal appendage velocities.  · The left ventricle is normal in size with normal systolic function. The estimated ejection fraction is 55%.  · Normal right ventricular systolic function.  · Mild tricuspid regurgitation.  · Grade 1 plaque present in the transverse aorta and descending aorta.  · Atrial fibrillation observed.    TTE 7/31/20  · Atrial fibrillation observed.  · Local segmental wall motion abnormalities.  · Normal left ventricular systolic function. The estimated ejection fraction is 58%.  · Normal right ventricular systolic function.  · Moderate left atrial enlargement.  · Mild right atrial enlargement.  · Trivial -mild aortic valve stenosis and trivial AR.  · Aortic valve area is 1.80 cm2; peak velocity is 1.42 m/s; mean gradient is 5 mmHg.  · Mild tricuspid regurgitation.  · Normal central venous pressure (3 mmHg).  · The estimated PA systolic pressure is 28 mmHg.      Past Medical History:   Diagnosis Date    Anemia     DJD (degenerative joint disease) 12/12/2012     Hypertension     Obesity (BMI 30-39.9) 11/27/2015    Vaginal atrophy 4/1/2016       Past Surgical History:   Procedure Laterality Date    BREAST BIOPSY Left 1999    Excisional bx, benign cyst    COLONOSCOPY  2012    normal    COLONOSCOPY N/A 6/28/2017    Procedure: COLONOSCOPY;  Surgeon: Markel Montemayor MD;  Location: [facility] (4TH FLR);  Service: Endoscopy;  Laterality: N/A;    EXCISION OF GANGLION OF WRIST Left 6/27/2018    Procedure: EXCISION, GANGLION CYST, WRIST - Left Volar wrist;  Surgeon: Mary Moore MD;  Location: [facility] OR 1ST FLR;  Service: Orthopedics;  Laterality: Left;    HIP SURGERY  05/05/2014    bilateral    JOINT REPLACEMENT Bilateral     MOLLY    TONSILLECTOMY      TREATMENT OF CARDIAC ARRHYTHMIA N/A 9/29/2020    Procedure: CARDIOVERSION;  Surgeon: Nigel García MD;  Location: [facility] EP LAB;  Service: Cardiology;  Laterality: N/A;  AF, LIGIA, DCCV, MAC, [place], 3 Prep       Review of patient's allergies indicates:   Allergen Reactions    Prednisone      ELEVATED B/P FOR SEVERAL DAYS/WENT TO ER       No current facility-administered medications on file prior to encounter.      Current Outpatient Medications on File Prior to Encounter   Medication Sig    acetaminophen (TYLENOL) 500 MG tablet Take 500 mg by mouth 2 (two) times daily.    amLODIPine (NORVASC) 5 MG tablet TAKE 1 TABLET EVERY DAY    apixaban (ELIQUIS) 5 mg Tab Take 1 tablet (5 mg total) by mouth 2 (two) times daily.    calcium-vitamin D 250-100 mg-unit per tablet Take 1 tablet by mouth 2 (two) times daily.    diphenhydrAMINE (BENADRYL) 25 mg capsule Take 25 mg by mouth nightly as needed for Itching or Insomnia.     ferrous sulfate 324 mg (65 mg iron) TbEC Take 325 mg by mouth twice a week.    FLUAD QUAD 2020-21,65Y UP,,PF, 60 mcg (15 mcg x 4)/0.5 mL Syrg ADM 0.5ML IM UTD    fluorouraciL (EFUDEX) 5 % cream Use hs for 2 weeks    irbesartan-hydrochlorothiazide (AVALIDE) 150-12.5 mg per tablet Take 1 tablet by mouth once  daily.    loratadine (CLARITIN) 10 mg tablet Take 10 mg by mouth once daily.    metoprolol succinate (TOPROL-XL) 25 MG 24 hr tablet Take 0.5 tablets (12.5 mg total) by mouth once daily.    triamcinolone acetonide 0.1% (KENALOG) 0.1 % cream Apply topically 2 (two) times daily as needed. Avoid face and groin.     Family History     Problem Relation (Age of Onset)    Arthritis Mother    Breast cancer Mother    Heart attack Father    Heart disease Father, Paternal Grandmother    Heart failure Father    Hyperlipidemia Mother, Father    Hypertension Father    No Known Problems Brother    Scoliosis Father    Stroke Maternal Grandmother    Tuberculosis Father        Tobacco Use    Smoking status: Former Smoker     Quit date: 1988     Years since quittin.9    Smokeless tobacco: Never Used   Substance and Sexual Activity    Alcohol use: Yes     Alcohol/week: 3.0 standard drinks     Types: 3 Glasses of wine per week     Comment: glass wine 3 times weekly    Drug use: No    Sexual activity: Yes     Partners: Male     Review of Systems   Constitution: Positive for malaise/fatigue. Negative for chills and decreased appetite.   HENT: Negative for congestion.    Cardiovascular: Negative for chest pain, irregular heartbeat and leg swelling.   Respiratory: Negative for cough and shortness of breath.    Endocrine: Negative for cold intolerance and heat intolerance.   Skin: Negative for rash.   Musculoskeletal: Negative for arthritis and back pain.   Gastrointestinal: Negative for abdominal pain, constipation and diarrhea.   Genitourinary: Negative for dysuria and hematuria.   Neurological: Negative for dizziness and headaches.   Psychiatric/Behavioral: Negative for altered mental status.     Objective:     Vital Signs (Most Recent):    Vital Signs (24h Range):           There is no height or weight on file to calculate BMI.            No intake or output data in the 24 hours ending 20  0811    Lines/Drains/Airways     Epidural Line                 Perineural Analgesia/Anesthesia Assessment (using dermatomes) Epidural 06/27/18 0823 895 days                Physical Exam   Constitutional: She is oriented to person, place, and time. She appears well-nourished. No distress.   HENT:   Head: Normocephalic.   Eyes: Pupils are equal, round, and reactive to light.   Neck: No JVD present. No thyromegaly present.   Cardiovascular: Normal rate. Exam reveals no friction rub.   Murmur heard.  Pulmonary/Chest: Effort normal. No respiratory distress. She has no wheezes. She has no rales.   Abdominal: She exhibits no distension. There is no abdominal tenderness.   Musculoskeletal:         General: No edema.   Neurological: She is alert and oriented to person, place, and time.   Vitals reviewed.    Significant Imaging: Echocardiogram:   2D echo with color flow doppler: No results found for this or any previous visit. and Transthoracic echo (TTE) complete (Cupid Only):   Results for orders placed or performed in visit on 07/28/20   Echo Color Flow Doppler? Yes   Result Value Ref Range    Ascending aorta 3.16 cm    STJ 2.74 cm    AV mean gradient 5 mmHg    Ao peak kory 1.42 m/s    Ao VTI 32.49 cm    IVRT 79.92 msec    IVS 0.66 0.6 - 1.1 cm    LA size 3.80 cm    Left Atrium Major Axis 6.19 cm    Left Atrium Minor Axis 5.99 cm    LVIDd 4.44 3.5 - 6.0 cm    LVIDs 3.09 2.1 - 4.0 cm    LVOT diameter 1.90 cm    LVOT peak VTI 20.58 cm    Posterior Wall 0.81 0.6 - 1.1 cm    MV Peak A Kory 0.34 m/s    E wave decelartion time 143.40 msec    MV Peak E Kory 1.14 m/s    PV Peak D Kory 0.65 m/s    PV Peak S Kory 0.55 m/s    RA Major Axis 5.05 cm    RA Width 2.87 cm    RVDD 3.39 cm    Sinus 3.08 cm    TAPSE 2.00 cm    TR Max Kory 2.51 m/s    TDI LATERAL 0.12 m/s    TDI SEPTAL 0.11 m/s    LA WIDTH 4.11 cm    MV stenosis pressure 1/2 time 41.59 ms    LV Diastolic Volume 89.71 mL    LV Systolic Volume 37.72 mL    LVOT peak kory 0.96 m/s    LV  LATERAL E/E' RATIO 9.50 m/s    LV SEPTAL E/E' RATIO 10.36 m/s    FS 30 %    LA volume 80.82 cm3    LV mass 99.52 g    Left Ventricle Relative Wall Thickness 0.36 cm    AV valve area 1.80 cm2    AV Velocity Ratio 0.68     AV index (prosthetic) 0.63     MV valve area p 1/2 method 5.29 cm2    E/A ratio 3.35     Mean e' 0.12 m/s    Pulm vein S/D ratio 0.85     LVOT area 2.8 cm2    LVOT stroke volume 58.32 cm3    AV peak gradient 8 mmHg    E/E' ratio 9.91 m/s    Triscuspid Valve Regurgitation Peak Gradient 25 mmHg    BSA 1.75 m2    LV Systolic Volume Index 22.1 mL/m2    LV Diastolic Volume Index 52.63 mL/m2    LA Volume Index 47.4 mL/m2    LV Mass Index 58 g/m2    Right Atrial Pressure (from IVC) 3 mmHg    TV rest pulmonary artery pressure 28 mmHg    Narrative    · Atrial fibrillation observed.  · Local segmental wall motion abnormalities.  · Normal left ventricular systolic function. The estimated ejection   fraction is 58%.  · Normal right ventricular systolic function.  · Moderate left atrial enlargement.  · Mild right atrial enlargement.  · Trivial -mild aortic valve stenosis and trivial AR.  · Aortic valve area is 1.80 cm2; peak velocity is 1.42 m/s; mean gradient   is 5 mmHg.  · Mild tricuspid regurgitation.  · Normal central venous pressure (3 mmHg).  · The estimated PA systolic pressure is 28 mmHg.        Assessment and Plan:     Atrial fibrillation  76 y/o F with HTN and atrial fibrillation. Long history of afib. Underwent LIGIA/DCCV on 9/29/20 but was noted to be back in afib on her 1 week ECG.  Presented today for repeat LIGIA/DCCV with a plan to initiate an antiarrhythmic drug. Missed one dose of apixaban.     Cardiac meds - amlodipine 5 mg, apixaban 5 mg BID, irbesartan/HCTZ 150/12.5 mg daily, metoprolol XL 50 mg daily    Dysphagia or odynophagia:  No  Liver Disease, esophageal disease, or known varices:  No  Upper GI Bleeding: No  Snoring:  Yes  Sleep Apnea:  Yes  Prior neck surgery or radiation:  No  History  of anesthetic difficulties:  No  Family history of anesthetic difficulties:  No  Last oral intake:  12 hours ago  Able to move neck in all directions:  Yes    LIGIA for evaluation of DILIP prior DCCV for AFib   -No absolute contraindications of esophageal stricture, tumor, perforation, laceration,or diverticulum and/or active GI bleed  -The risks, benefits & alternatives of the procedure were explained to the patient.   -The risks of transesophageal echo include but are not limited to:  Dental trauma, esophageal trauma/perforation, bleeding, laryngospasm/brochospasm, aspiration, sore throat/hoarseness, & dislodgement of the endotracheal tube/nasogastric tube (where applicable).    -The risks of moderate sedation include hypotension, respiratory depression, arrhythmias, bronchospasm, & death.    -Informed consent was obtained. The patient is agreeable to proceed with the procedure and all questions and concerns addressed.        VTE Risk Mitigation (From admission, onward)    None          Nolan Gay MD  Cardiology   [facility] - Short Stay Cardiac Unit

## 2020-12-08 NOTE — HPI
76 y/o F with HTN and atrial fibrillation. Long history of afib. Underwent LIGIA/DCCV on 9/29/20 but was noted to be back in afib on her 1 week ECG.  Presented today for repeat LIGIA/DCCV with a plan to initiate an antiarrhythmic drug. Missed one dose of apixaban.     Cardiac meds - amlodipine 5 mg, apixaban 5 mg BID, irbesartan/HCTZ 150/12.5 mg daily, metoprolol XL 50 mg daily    LIGIA 9/29/20  · Normal appearing left atrial appendage. No thrombus is present in the appendage. Normal appendage velocities.  · The left ventricle is normal in size with normal systolic function. The estimated ejection fraction is 55%.  · Normal right ventricular systolic function.  · Mild tricuspid regurgitation.  · Grade 1 plaque present in the transverse aorta and descending aorta.  · Atrial fibrillation observed.    TTE 7/31/20  · Atrial fibrillation observed.  · Local segmental wall motion abnormalities.  · Normal left ventricular systolic function. The estimated ejection fraction is 58%.  · Normal right ventricular systolic function.  · Moderate left atrial enlargement.  · Mild right atrial enlargement.  · Trivial -mild aortic valve stenosis and trivial AR.  · Aortic valve area is 1.80 cm2; peak velocity is 1.42 m/s; mean gradient is 5 mmHg.  · Mild tricuspid regurgitation.  · Normal central venous pressure (3 mmHg).  · The estimated PA systolic pressure is 28 mmHg.

## 2020-12-08 NOTE — NURSING TRANSFER
Nursing Transfer Note      12/8/2020     Transfer To: cath lab holding 9    Transfer via stretcher    Transfer with reported to yi angelo    Transported by tess angelo and yi angelo    Medicines sent: silvadene cream    Chart send with patient: Yes    Notified: reported to yi angelo    Patient reassessed at: see epic (date, time)    Upon arrival to floor: to room no complaints no distress noted.

## 2020-12-08 NOTE — ASSESSMENT & PLAN NOTE
s/p LIGIA guided DCCV with restoration of sinus rhythm    Plan:  - Continue PTA medications  - AAD: Start Flecainide 50 mg bid  - Follow up with ECG in 1 week (12/15/20)  - Follow up with Dr. García or Lucie Wlison NP in 4-6 weeks  - Discharge plans/instructions discussed with patient who verbalized understanding and agreement of plans of care. No further questions or concerns  voiced at this time.

## 2020-12-08 NOTE — SUBJECTIVE & OBJECTIVE
Past Medical History:   Diagnosis Date    Anemia     DJD (degenerative joint disease) 12/12/2012    Hypertension     Obesity (BMI 30-39.9) 11/27/2015    Vaginal atrophy 4/1/2016       Past Surgical History:   Procedure Laterality Date    BREAST BIOPSY Left 1999    Excisional bx, benign cyst    COLONOSCOPY  2012    normal    COLONOSCOPY N/A 6/28/2017    Procedure: COLONOSCOPY;  Surgeon: Markel Montemayor MD;  Location: [facility] ENDO (4TH FLR);  Service: Endoscopy;  Laterality: N/A;    EXCISION OF GANGLION OF WRIST Left 6/27/2018    Procedure: EXCISION, GANGLION CYST, WRIST - Left Volar wrist;  Surgeon: Mary Moore MD;  Location: [facility] OR 1ST FLR;  Service: Orthopedics;  Laterality: Left;    HIP SURGERY  05/05/2014    bilateral    JOINT REPLACEMENT Bilateral     MOLLY    TONSILLECTOMY      TREATMENT OF CARDIAC ARRHYTHMIA N/A 9/29/2020    Procedure: CARDIOVERSION;  Surgeon: Nigel García MD;  Location: [facility] EP LAB;  Service: Cardiology;  Laterality: N/A;  AF, LIGIA, DCCV, MAC, PA, 3 Prep       Review of patient's allergies indicates:   Allergen Reactions    Prednisone      ELEVATED B/P FOR SEVERAL DAYS/WENT TO ER       No current facility-administered medications on file prior to encounter.      Current Outpatient Medications on File Prior to Encounter   Medication Sig    acetaminophen (TYLENOL) 500 MG tablet Take 500 mg by mouth 2 (two) times daily.    amLODIPine (NORVASC) 5 MG tablet TAKE 1 TABLET EVERY DAY    apixaban (ELIQUIS) 5 mg Tab Take 1 tablet (5 mg total) by mouth 2 (two) times daily.    calcium-vitamin D 250-100 mg-unit per tablet Take 1 tablet by mouth 2 (two) times daily.    diphenhydrAMINE (BENADRYL) 25 mg capsule Take 25 mg by mouth nightly as needed for Itching or Insomnia.     ferrous sulfate 324 mg (65 mg iron) TbEC Take 325 mg by mouth twice a week.    FLUAD QUAD 2020-21,65Y UP,,PF, 60 mcg (15 mcg x 4)/0.5 mL Syrg ADM 0.5ML IM UTD    fluorouraciL (EFUDEX) 5 % cream Use hs for 2  weeks    irbesartan-hydrochlorothiazide (AVALIDE) 150-12.5 mg per tablet Take 1 tablet by mouth once daily.    loratadine (CLARITIN) 10 mg tablet Take 10 mg by mouth once daily.    metoprolol succinate (TOPROL-XL) 25 MG 24 hr tablet Take 0.5 tablets (12.5 mg total) by mouth once daily.    triamcinolone acetonide 0.1% (KENALOG) 0.1 % cream Apply topically 2 (two) times daily as needed. Avoid face and groin.     Family History     Problem Relation (Age of Onset)    Arthritis Mother    Breast cancer Mother    Heart attack Father    Heart disease Father, Paternal Grandmother    Heart failure Father    Hyperlipidemia Mother, Father    Hypertension Father    No Known Problems Brother    Scoliosis Father    Stroke Maternal Grandmother    Tuberculosis Father        Tobacco Use    Smoking status: Former Smoker     Quit date: 1988     Years since quittin.9    Smokeless tobacco: Never Used   Substance and Sexual Activity    Alcohol use: Yes     Alcohol/week: 3.0 standard drinks     Types: 3 Glasses of wine per week     Comment: glass wine 3 times weekly    Drug use: No    Sexual activity: Yes     Partners: Male     Review of Systems   Constitution: Positive for malaise/fatigue. Negative for chills and decreased appetite.   HENT: Negative for congestion.    Cardiovascular: Negative for chest pain, irregular heartbeat and leg swelling.   Respiratory: Negative for cough and shortness of breath.    Endocrine: Negative for cold intolerance and heat intolerance.   Skin: Negative for rash.   Musculoskeletal: Negative for arthritis and back pain.   Gastrointestinal: Negative for abdominal pain, constipation and diarrhea.   Genitourinary: Negative for dysuria and hematuria.   Neurological: Negative for dizziness and headaches.   Psychiatric/Behavioral: Negative for altered mental status.     Objective:     Vital Signs (Most Recent):    Vital Signs (24h Range):           There is no height or weight on file to  calculate BMI.            No intake or output data in the 24 hours ending 12/08/20 0811    Lines/Drains/Airways     Epidural Line                 Perineural Analgesia/Anesthesia Assessment (using dermatomes) Epidural 06/27/18 0823 895 days                Physical Exam   Constitutional: She is oriented to person, place, and time. She appears well-nourished. No distress.   HENT:   Head: Normocephalic.   Eyes: Pupils are equal, round, and reactive to light.   Neck: No JVD present. No thyromegaly present.   Cardiovascular: Normal rate. Exam reveals no friction rub.   Murmur heard.  Pulmonary/Chest: Effort normal. No respiratory distress. She has no wheezes. She has no rales.   Abdominal: She exhibits no distension. There is no abdominal tenderness.   Musculoskeletal:         General: No edema.   Neurological: She is alert and oriented to person, place, and time.   Vitals reviewed.    Significant Imaging: Echocardiogram:   2D echo with color flow doppler: No results found for this or any previous visit. and Transthoracic echo (TTE) complete (Cupid Only):   Results for orders placed or performed in visit on 07/28/20   Echo Color Flow Doppler? Yes   Result Value Ref Range    Ascending aorta 3.16 cm    STJ 2.74 cm    AV mean gradient 5 mmHg    Ao peak kory 1.42 m/s    Ao VTI 32.49 cm    IVRT 79.92 msec    IVS 0.66 0.6 - 1.1 cm    LA size 3.80 cm    Left Atrium Major Axis 6.19 cm    Left Atrium Minor Axis 5.99 cm    LVIDd 4.44 3.5 - 6.0 cm    LVIDs 3.09 2.1 - 4.0 cm    LVOT diameter 1.90 cm    LVOT peak VTI 20.58 cm    Posterior Wall 0.81 0.6 - 1.1 cm    MV Peak A Kory 0.34 m/s    E wave decelartion time 143.40 msec    MV Peak E Kory 1.14 m/s    PV Peak D Kory 0.65 m/s    PV Peak S Kory 0.55 m/s    RA Major Axis 5.05 cm    RA Width 2.87 cm    RVDD 3.39 cm    Sinus 3.08 cm    TAPSE 2.00 cm    TR Max Kory 2.51 m/s    TDI LATERAL 0.12 m/s    TDI SEPTAL 0.11 m/s    LA WIDTH 4.11 cm    MV stenosis pressure 1/2 time 41.59 ms    LV  Diastolic Volume 89.71 mL    LV Systolic Volume 37.72 mL    LVOT peak rosemary 0.96 m/s    LV LATERAL E/E' RATIO 9.50 m/s    LV SEPTAL E/E' RATIO 10.36 m/s    FS 30 %    LA volume 80.82 cm3    LV mass 99.52 g    Left Ventricle Relative Wall Thickness 0.36 cm    AV valve area 1.80 cm2    AV Velocity Ratio 0.68     AV index (prosthetic) 0.63     MV valve area p 1/2 method 5.29 cm2    E/A ratio 3.35     Mean e' 0.12 m/s    Pulm vein S/D ratio 0.85     LVOT area 2.8 cm2    LVOT stroke volume 58.32 cm3    AV peak gradient 8 mmHg    E/E' ratio 9.91 m/s    Triscuspid Valve Regurgitation Peak Gradient 25 mmHg    BSA 1.75 m2    LV Systolic Volume Index 22.1 mL/m2    LV Diastolic Volume Index 52.63 mL/m2    LA Volume Index 47.4 mL/m2    LV Mass Index 58 g/m2    Right Atrial Pressure (from IVC) 3 mmHg    TV rest pulmonary artery pressure 28 mmHg    Narrative    · Atrial fibrillation observed.  · Local segmental wall motion abnormalities.  · Normal left ventricular systolic function. The estimated ejection   fraction is 58%.  · Normal right ventricular systolic function.  · Moderate left atrial enlargement.  · Mild right atrial enlargement.  · Trivial -mild aortic valve stenosis and trivial AR.  · Aortic valve area is 1.80 cm2; peak velocity is 1.42 m/s; mean gradient   is 5 mmHg.  · Mild tricuspid regurgitation.  · Normal central venous pressure (3 mmHg).  · The estimated PA systolic pressure is 28 mmHg.

## 2020-12-15 ENCOUNTER — HOSPITAL ENCOUNTER (OUTPATIENT)
Dept: CARDIOLOGY | Facility: CLINIC | Age: 75
Discharge: HOME | End: 2020-12-15
Payer: MEDICARE

## 2020-12-15 DIAGNOSIS — I49.8 OTHER SPECIFIED CARDIAC ARRHYTHMIAS: ICD-10-CM

## 2020-12-15 PROCEDURE — 93010 ELECTROCARDIOGRAM REPORT: CPT | Mod: S$GLB,,, | Performed by: INTERNAL MEDICINE

## 2020-12-15 PROCEDURE — 93010 RHYTHM STRIP: ICD-10-PCS | Mod: S$GLB,,, | Performed by: INTERNAL MEDICINE

## 2020-12-15 PROCEDURE — 93005 RHYTHM STRIP: ICD-10-PCS | Mod: S$GLB,,, | Performed by: INTERNAL MEDICINE

## 2020-12-15 PROCEDURE — 93005 ELECTROCARDIOGRAM TRACING: CPT | Mod: S$GLB,,, | Performed by: INTERNAL MEDICINE

## 2020-12-16 ENCOUNTER — TELEPHONE (OUTPATIENT)
Dept: ELECTROPHYSIOLOGY | Facility: CLINIC | Age: 75
End: 2020-12-16

## 2020-12-16 NOTE — PROGRESS NOTES
Digital Medicine: Clinician Follow-Up    Called patient to follow up regarding HTN digital readings. Patient reports she is in AF.    Patient confirms she is using her original cuff from when she enrolled in the [facility] in 2/2017.         The history is provided by the patient.      Review of patient's allergies indicates:   -- Prednisone     --  ELEVATED B/P FOR SEVERAL DAYS/WENT TO ER  Follow-up reason(s): routine follow up.     Hypertension    Patient's blood pressure is stable.   Patient is not experiencing signs/symptoms of hypotension.  Patient is not experiencing signs/symptoms of hypertension.            Last 5 Patient Entered Readings                                      Current 30 Day Average: 124/70     Recent Readings 12/14/2020 12/13/2020 12/10/2020 12/5/2020 11/29/2020    SBP (mmHg) 113 130 132 112 134    DBP (mmHg) 71 63 61 79 71    Pulse 99 51 55 92 79                 Depression Screening  Did not address depression screening.    Sleep Apnea Screening    Did not address sleep apnea screening.     Medication Affordability Screening  Did not address medication affordability screening.     Medication Adherence-Medication adherence was asssessed.  Patient continue taking medication as prescribed.            ASSESSMENT(S)  Patients BP average is 124/70 mmHg, which is at goal. Patient's BP goal is less than or equal to 130/80.    Hypertension Plan  Continue current therapy.  Encouraged patient to purchase a new digital cuff, current cuff is from when she enrolled in the program in 2/2017.    Instructed patient to call if BP remains > 130/80 or if she begins to experience s/s of hypotension.     Will continue to monitor, WCB in 12-16 weeks.          Addressed patient questions and patient has my contact information if needed prior to next outreach. Patient verbalizes understanding.                 Hypertension Medications             amLODIPine (NORVASC) 5 MG tablet TAKE 1 TABLET EVERY DAY     irbesartan-hydrochlorothiazide (AVALIDE) 150-12.5 mg per tablet Take 1 tablet by mouth once daily.    metoprolol succinate (TOPROL-XL) 25 MG 24 hr tablet Take 0.5 tablets (12.5 mg total) by mouth once daily.

## 2020-12-16 NOTE — TELEPHONE ENCOUNTER
----- Message from Kyra Ann MA sent at 12/16/2020 10:49 AM CST -----  Good Morning Mary Carmen         Spoke with  to schedule a post procedure follow. While on the phone  expressed some concerns regarding the next steps since she was back into [facility]. Pt is scheduled on 01/21 but wants to come in earlier because she has questions .  wants to speak with someone regarding medication and the next steps.

## 2020-12-16 NOTE — TELEPHONE ENCOUNTER
Spoke with patient. She is back in AF following Cardioversion. She does not need to wait 4 weeks for followup. She needs to come in sooner since she is symptomatic and needs to discuss ablation. Can you please move her appt up?  Thanks

## 2020-12-17 ENCOUNTER — TELEPHONE (OUTPATIENT)
Dept: ELECTROPHYSIOLOGY | Facility: CLINIC | Age: 75
End: 2020-12-17

## 2020-12-17 NOTE — TELEPHONE ENCOUNTER
Patient is calling to move her appt up. I sent a staff message yesterday.   Can you please call patient?  Thanks

## 2020-12-17 NOTE — TELEPHONE ENCOUNTER
----- Message from Dilia Gomez MA sent at 12/17/2020  8:35 AM CST -----  Regarding: FW: returning a call  Contact: patient    ----- Message -----  From: Constance Jones  Sent: 12/17/2020   8:33 AM CST  To: Steve Faulkner Staff  Subject: returning a call                                 The pt is returning a call. Please call her back  @ [phone]. Thanks, Constance

## 2020-12-17 NOTE — TELEPHONE ENCOUNTER
Spoke with Ms.Alhaji to move up her 4 week f/u with . pt is now scheduled on Thursday 12/31 @ 11:20. Pt verbalized understanding.

## 2020-12-30 NOTE — PROGRESS NOTES
Subjective:    Patient ID:  Magdalena Mane is a 75 y.o. female who presents for evaluation of Atrial Fibrillation    Referring Cardiologist: Maki Pratt MD  Primary Care Physician: Aaron Cardenas MD    HPI  Prior Hx:  I had the pleasure of seeing Mrs. Mane in our electrophysiology clinic in consultation for her atrial arrhythmia. As you are aware she is a pleasant 75 year-old woman with hypertension and recently diagnosed persistent atrial fibrillation. She was in her usual state of health until February of 2020 when she had a respiratory illness (COVID antibody test later was negative) and since that time has had dyspnea on exertion. Also notes her pulse runs in the 50s-60s however in February her heart rate began running in the 80s (participates in the Digital HTN clinic). In early July 2020 she developed palpitations. She saw Dr. Area 7/27/2020 and was diagnosed with atrial fibrillation and was referred to Dr. Pratt who started eliquis for stroke risk reduction ([id number] score 3). She followed up with Dr. Pratt on 9/16/2020 and remained in atrial fibrillation for which she presents for further evaluation. She notes her dyspnea on exertion and palpitations have largely resolved despite AF persistence however still has exercise intolerance.    She reports an episode of pAF post hip surgery in 2014 at [facility].    An echocardiogram from July of 2020 notes preserved LV function with moderate LA enlargement.     Interim Hx:  Mrs. Mane underwent DCCV on 9/29/2020 with NSR with symptomatic benefit but then had AF recurrence. She underwent repeat DCCV on 12/8/2020 and started on low dose flecainide (has resting sinus bradycardia). Her 1 week post-DCCV ECG noted AF recurrence. Today she is in sinus rhythm and notes she is in and out of AF. She reports her breathing improves when she is in normal rhythm but feels fatigued when in normal rhythm correlating with a pulse rate in the 40s.    My  interpretation of today's in clinic ECG is sinus bradycardia at a rate of 53 bpm.      Review of Systems   Constitution: Positive for malaise/fatigue. Negative for fever.   HENT: Negative for congestion and sore throat.    Eyes: Negative for blurred vision and visual disturbance.   Cardiovascular: Positive for dyspnea on exertion and irregular heartbeat. Negative for chest pain, near-syncope, palpitations and syncope.   Respiratory: Negative for cough and shortness of breath.    Hematologic/Lymphatic: Negative for bleeding problem. Does not bruise/bleed easily.   Skin: Negative.    Musculoskeletal: Negative.    Gastrointestinal: Negative for bloating, abdominal pain, hematochezia and melena.   Neurological: Negative for focal weakness and weakness.   Psychiatric/Behavioral: Negative.         Objective:    Physical Exam   Constitutional: She is oriented to person, place, and time. She appears well-developed and well-nourished. No distress.   HENT:   Head: Normocephalic and atraumatic.   Eyes: Conjunctivae are normal. Right eye exhibits no discharge. Left eye exhibits no discharge.   Neck: Neck supple.   Cardiovascular: Regular rhythm. Bradycardia present. Exam reveals no gallop and no friction rub.   No murmur heard.  Pulmonary/Chest: Effort normal and breath sounds normal. No respiratory distress. She has no wheezes. She has no rales.   Abdominal: Soft. Bowel sounds are normal. She exhibits no distension. There is no abdominal tenderness.   Musculoskeletal:         General: No edema.   Neurological: She is alert and oriented to person, place, and time.   Skin: Skin is warm and dry. She is not diaphoretic.   Psychiatric: She has a normal mood and affect. Her behavior is normal. Judgment and thought content normal.   Vitals reviewed.        Assessment:       1. Persistent atrial fibrillation    2. Essential hypertension    3. Sinus bradycardia         Plan:       In summary, Mrs. Mane is a pleasant 75 year-old  woman with hypertension and recently diagnosed persistent atrial fibrillation who is now paroxysmal on low dose flecainide. She has shortness of breath with AF and then fatigue in sinus bradycardia. Discussed options of dofetilide (if affordable, would need hospital stay to initiate) versus PPM and increased flecainide dosage versus PVI. I spent about a half hour discussing the nature of PVI including transseptal puncture. We discussed risks and benefits at length. Our discussion included, but was not limited to the risk of death, infection, bleeding, stroke, MI, cardiac perforation, embolism, cardiac tamponade, vascular injury, AE fistula, injury to phrenic nerve, pulmonary vein stenosis and other organic injury including the possibility for need for surgery or pacemaker implantation.     I checked her insurance and dofetilide would be $33 a month. She is agreeable.    Plan  Stop metoprolol/flecainide 3 days prior to admit  Admit for dofetilide initiation 500mcg PO Q12h with ECG 2 hours after each dose  If she presents in AF then would perform a DCCV after 3rd dose (no LIGIA if has not missed any anticoagulation)       Thank you for allowing me to participate in the care of this patient. Please do not hesitate to call me with any questions or concerns.    Nigel García MD, PhD  Cardiac Electrophysiology

## 2020-12-31 ENCOUNTER — OFFICE VISIT (OUTPATIENT)
Dept: ELECTROPHYSIOLOGY | Facility: CLINIC | Age: 75
End: 2020-12-31
Payer: MEDICARE

## 2020-12-31 ENCOUNTER — HOSPITAL ENCOUNTER (OUTPATIENT)
Dept: CARDIOLOGY | Facility: CLINIC | Age: 75
Discharge: HOME | End: 2020-12-31
Payer: MEDICARE

## 2020-12-31 ENCOUNTER — TELEPHONE (OUTPATIENT)
Dept: ELECTROPHYSIOLOGY | Facility: CLINIC | Age: 75
End: 2020-12-31

## 2020-12-31 VITALS
DIASTOLIC BLOOD PRESSURE: 74 MMHG | WEIGHT: 155 LBS | BODY MASS INDEX: 30.43 KG/M2 | HEIGHT: 60 IN | SYSTOLIC BLOOD PRESSURE: 154 MMHG | HEART RATE: 53 BPM

## 2020-12-31 DIAGNOSIS — R00.1 SINUS BRADYCARDIA: ICD-10-CM

## 2020-12-31 DIAGNOSIS — I48.19 PERSISTENT ATRIAL FIBRILLATION: Primary | ICD-10-CM

## 2020-12-31 DIAGNOSIS — I10 ESSENTIAL HYPERTENSION: ICD-10-CM

## 2020-12-31 DIAGNOSIS — I49.8 OTHER SPECIFIED CARDIAC ARRHYTHMIAS: ICD-10-CM

## 2020-12-31 PROCEDURE — 93010 ELECTROCARDIOGRAM REPORT: CPT | Mod: S$GLB,,, | Performed by: INTERNAL MEDICINE

## 2020-12-31 PROCEDURE — 99999 PR PBB SHADOW E&M-EST. PATIENT-LVL III: CPT | Mod: PBBFAC,,, | Performed by: INTERNAL MEDICINE

## 2020-12-31 PROCEDURE — 93005 ELECTROCARDIOGRAM TRACING: CPT | Mod: S$GLB,,, | Performed by: INTERNAL MEDICINE

## 2020-12-31 PROCEDURE — 99214 PR OFFICE/OUTPT VISIT, EST, LEVL IV, 30-39 MIN: ICD-10-PCS | Mod: S$GLB,,, | Performed by: INTERNAL MEDICINE

## 2020-12-31 PROCEDURE — 3008F BODY MASS INDEX DOCD: CPT | Mod: CPTII,S$GLB,, | Performed by: INTERNAL MEDICINE

## 2020-12-31 PROCEDURE — 3288F PR FALLS RISK ASSESSMENT DOCUMENTED: ICD-10-PCS | Mod: CPTII,S$GLB,, | Performed by: INTERNAL MEDICINE

## 2020-12-31 PROCEDURE — 3077F PR MOST RECENT SYSTOLIC BLOOD PRESSURE >= 140 MM HG: ICD-10-PCS | Mod: CPTII,S$GLB,, | Performed by: INTERNAL MEDICINE

## 2020-12-31 PROCEDURE — 1101F PT FALLS ASSESS-DOCD LE1/YR: CPT | Mod: CPTII,S$GLB,, | Performed by: INTERNAL MEDICINE

## 2020-12-31 PROCEDURE — 99214 OFFICE O/P EST MOD 30 MIN: CPT | Mod: S$GLB,,, | Performed by: INTERNAL MEDICINE

## 2020-12-31 PROCEDURE — 1159F PR MEDICATION LIST DOCUMENTED IN MEDICAL RECORD: ICD-10-PCS | Mod: S$GLB,,, | Performed by: INTERNAL MEDICINE

## 2020-12-31 PROCEDURE — 1101F PR PT FALLS ASSESS DOC 0-1 FALLS W/OUT INJ PAST YR: ICD-10-PCS | Mod: CPTII,S$GLB,, | Performed by: INTERNAL MEDICINE

## 2020-12-31 PROCEDURE — 93005 RHYTHM STRIP: ICD-10-PCS | Mod: S$GLB,,, | Performed by: INTERNAL MEDICINE

## 2020-12-31 PROCEDURE — 99999 PR PBB SHADOW E&M-EST. PATIENT-LVL III: ICD-10-PCS | Mod: PBBFAC,,, | Performed by: INTERNAL MEDICINE

## 2020-12-31 PROCEDURE — 3078F PR MOST RECENT DIASTOLIC BLOOD PRESSURE < 80 MM HG: ICD-10-PCS | Mod: CPTII,S$GLB,, | Performed by: INTERNAL MEDICINE

## 2020-12-31 PROCEDURE — 93010 RHYTHM STRIP: ICD-10-PCS | Mod: S$GLB,,, | Performed by: INTERNAL MEDICINE

## 2020-12-31 PROCEDURE — 1157F PR ADVANCE CARE PLAN OR EQUIV PRESENT IN MEDICAL RECORD: ICD-10-PCS | Mod: S$GLB,,, | Performed by: INTERNAL MEDICINE

## 2020-12-31 PROCEDURE — 3077F SYST BP >= 140 MM HG: CPT | Mod: CPTII,S$GLB,, | Performed by: INTERNAL MEDICINE

## 2020-12-31 PROCEDURE — 3078F DIAST BP <80 MM HG: CPT | Mod: CPTII,S$GLB,, | Performed by: INTERNAL MEDICINE

## 2020-12-31 PROCEDURE — 1157F ADVNC CARE PLAN IN RCRD: CPT | Mod: S$GLB,,, | Performed by: INTERNAL MEDICINE

## 2020-12-31 PROCEDURE — 3288F FALL RISK ASSESSMENT DOCD: CPT | Mod: CPTII,S$GLB,, | Performed by: INTERNAL MEDICINE

## 2020-12-31 PROCEDURE — 1126F AMNT PAIN NOTED NONE PRSNT: CPT | Mod: S$GLB,,, | Performed by: INTERNAL MEDICINE

## 2020-12-31 PROCEDURE — 1159F MED LIST DOCD IN RCRD: CPT | Mod: S$GLB,,, | Performed by: INTERNAL MEDICINE

## 2020-12-31 PROCEDURE — 1126F PR PAIN SEVERITY QUANTIFIED, NO PAIN PRESENT: ICD-10-PCS | Mod: S$GLB,,, | Performed by: INTERNAL MEDICINE

## 2020-12-31 PROCEDURE — 3008F PR BODY MASS INDEX (BMI) DOCUMENTED: ICD-10-PCS | Mod: CPTII,S$GLB,, | Performed by: INTERNAL MEDICINE

## 2020-12-31 NOTE — LETTER
December 31, 2020      Maki Pratt MD  [address] [facility] [facility] [facility]  [address]  [place] [address]           [id number]  [address]  [place] [address]  Phone: [phone]  Fax: [phone]          Patient: Magdalena Mane   MR Number: [id number]   YOB: 1945   Date of Visit: 12/31/2020       Dear Dr. Maki Pratt:    Thank you for referring Magdalena Mane to me for evaluation. Attached you will find relevant portions of my assessment and plan of care.    If you have questions, please do not hesitate to call me. I look forward to following Magdalena Mane along with you.    Sincerely,    Nigel García MD    Enclosure  CC:  No Recipients    If you would like to receive this communication electronically, please contact [email or web address] or [phone] to request more information on [employer] Link access.    For providers and/or their staff who would like to refer a patient to [facility], please contact us through our one-stop-shop provider referral line, [facility], at [phone].    If you feel you have received this communication in error or would no longer like to receive these types of communications, please e-mail [email or web address]

## 2020-12-31 NOTE — TELEPHONE ENCOUNTER
----- Message from Pedro Carroll MA sent at 12/31/2020  1:41 PM CST -----  Regarding: FW: reschedule procedure  Contact: patient  Good afternoon Mary Carmen  See below please advise.  Thanks  ----- Message -----  From: Constance Jones  Sent: 12/31/2020   1:11 PM CST  To: Ricky QUIÑONEZ Staff  Subject: reschedule procedure                             The pt is calling to reschedule procedure on the 1/11/2021 and wants to do it 1/18/2021. Please call her back @ [phone]. Thanks, Constance

## 2021-01-03 ENCOUNTER — PATIENT MESSAGE (OUTPATIENT)
Dept: ELECTROPHYSIOLOGY | Facility: CLINIC | Age: 76
End: 2021-01-03

## 2021-01-05 ENCOUNTER — PATIENT MESSAGE (OUTPATIENT)
Dept: CARDIOLOGY | Facility: CLINIC | Age: 76
End: 2021-01-05

## 2021-01-05 DIAGNOSIS — R00.1 SINUS BRADYCARDIA: Primary | ICD-10-CM

## 2021-01-05 DIAGNOSIS — I48.19 PERSISTENT ATRIAL FIBRILLATION: ICD-10-CM

## 2021-01-07 ENCOUNTER — HOSPITAL ENCOUNTER (OUTPATIENT)
Dept: CARDIOLOGY | Facility: HOSPITAL | Age: 76
Discharge: HOME | End: 2021-01-07
Attending: INTERNAL MEDICINE
Payer: MEDICARE

## 2021-01-07 DIAGNOSIS — R00.1 SINUS BRADYCARDIA: ICD-10-CM

## 2021-01-07 DIAGNOSIS — I48.19 PERSISTENT ATRIAL FIBRILLATION: ICD-10-CM

## 2021-01-07 PROCEDURE — 93225 XTRNL ECG REC<48 HRS REC: CPT

## 2021-01-07 PROCEDURE — 93227 XTRNL ECG REC<48 HR R&I: CPT | Mod: ,,, | Performed by: INTERNAL MEDICINE

## 2021-01-07 PROCEDURE — 93227 HOLTER MONITOR - 24 HOUR (CUPID ONLY): ICD-10-PCS | Mod: ,,, | Performed by: INTERNAL MEDICINE

## 2021-01-11 ENCOUNTER — PATIENT MESSAGE (OUTPATIENT)
Dept: ELECTROPHYSIOLOGY | Facility: CLINIC | Age: 76
End: 2021-01-11

## 2021-01-11 LAB
OHS CV EVENT MONITOR DAY: 0
OHS CV HOLTER LENGTH DECIMAL HOURS: 24
OHS CV HOLTER LENGTH HOURS: 24
OHS CV HOLTER LENGTH MINUTES: 0

## 2021-01-19 ENCOUNTER — PATIENT MESSAGE (OUTPATIENT)
Dept: ADMINISTRATIVE | Facility: OTHER | Age: 76
End: 2021-01-19

## 2021-02-15 ENCOUNTER — OFFICE VISIT (OUTPATIENT)
Dept: CARDIOLOGY | Facility: CLINIC | Age: 76
End: 2021-02-15
Payer: MEDICARE

## 2021-02-15 VITALS
HEIGHT: 61 IN | HEART RATE: 54 BPM | DIASTOLIC BLOOD PRESSURE: 63 MMHG | SYSTOLIC BLOOD PRESSURE: 152 MMHG | WEIGHT: 158.06 LBS | BODY MASS INDEX: 29.84 KG/M2

## 2021-02-15 DIAGNOSIS — Z92.89 HISTORY OF CARDIOVERSION: ICD-10-CM

## 2021-02-15 DIAGNOSIS — R26.89 BALANCE PROBLEMS: ICD-10-CM

## 2021-02-15 DIAGNOSIS — I48.0 PAROXYSMAL ATRIAL FIBRILLATION: ICD-10-CM

## 2021-02-15 DIAGNOSIS — I47.9 PAROXYSMAL TACHYCARDIA: ICD-10-CM

## 2021-02-15 DIAGNOSIS — R00.1 SINUS BRADYCARDIA: ICD-10-CM

## 2021-02-15 DIAGNOSIS — I10 ESSENTIAL HYPERTENSION: Primary | ICD-10-CM

## 2021-02-15 PROCEDURE — 3078F DIAST BP <80 MM HG: CPT | Mod: CPTII,S$GLB,, | Performed by: INTERNAL MEDICINE

## 2021-02-15 PROCEDURE — 3288F FALL RISK ASSESSMENT DOCD: CPT | Mod: CPTII,S$GLB,, | Performed by: INTERNAL MEDICINE

## 2021-02-15 PROCEDURE — 1126F AMNT PAIN NOTED NONE PRSNT: CPT | Mod: S$GLB,,, | Performed by: INTERNAL MEDICINE

## 2021-02-15 PROCEDURE — 3077F SYST BP >= 140 MM HG: CPT | Mod: CPTII,S$GLB,, | Performed by: INTERNAL MEDICINE

## 2021-02-15 PROCEDURE — 1159F PR MEDICATION LIST DOCUMENTED IN MEDICAL RECORD: ICD-10-PCS | Mod: S$GLB,,, | Performed by: INTERNAL MEDICINE

## 2021-02-15 PROCEDURE — 3077F PR MOST RECENT SYSTOLIC BLOOD PRESSURE >= 140 MM HG: ICD-10-PCS | Mod: CPTII,S$GLB,, | Performed by: INTERNAL MEDICINE

## 2021-02-15 PROCEDURE — 1101F PT FALLS ASSESS-DOCD LE1/YR: CPT | Mod: CPTII,S$GLB,, | Performed by: INTERNAL MEDICINE

## 2021-02-15 PROCEDURE — 99499 UNLISTED E&M SERVICE: CPT | Mod: S$GLB,,, | Performed by: INTERNAL MEDICINE

## 2021-02-15 PROCEDURE — 99999 PR PBB SHADOW E&M-EST. PATIENT-LVL III: ICD-10-PCS | Mod: PBBFAC,,, | Performed by: INTERNAL MEDICINE

## 2021-02-15 PROCEDURE — 1157F ADVNC CARE PLAN IN RCRD: CPT | Mod: S$GLB,,, | Performed by: INTERNAL MEDICINE

## 2021-02-15 PROCEDURE — 3078F PR MOST RECENT DIASTOLIC BLOOD PRESSURE < 80 MM HG: ICD-10-PCS | Mod: CPTII,S$GLB,, | Performed by: INTERNAL MEDICINE

## 2021-02-15 PROCEDURE — 3288F PR FALLS RISK ASSESSMENT DOCUMENTED: ICD-10-PCS | Mod: CPTII,S$GLB,, | Performed by: INTERNAL MEDICINE

## 2021-02-15 PROCEDURE — 1157F PR ADVANCE CARE PLAN OR EQUIV PRESENT IN MEDICAL RECORD: ICD-10-PCS | Mod: S$GLB,,, | Performed by: INTERNAL MEDICINE

## 2021-02-15 PROCEDURE — 1159F MED LIST DOCD IN RCRD: CPT | Mod: S$GLB,,, | Performed by: INTERNAL MEDICINE

## 2021-02-15 PROCEDURE — 3008F BODY MASS INDEX DOCD: CPT | Mod: CPTII,S$GLB,, | Performed by: INTERNAL MEDICINE

## 2021-02-15 PROCEDURE — 99999 PR PBB SHADOW E&M-EST. PATIENT-LVL III: CPT | Mod: PBBFAC,,, | Performed by: INTERNAL MEDICINE

## 2021-02-15 PROCEDURE — 1101F PR PT FALLS ASSESS DOC 0-1 FALLS W/OUT INJ PAST YR: ICD-10-PCS | Mod: CPTII,S$GLB,, | Performed by: INTERNAL MEDICINE

## 2021-02-15 PROCEDURE — 99499 RISK ADDL DX/OHS AUDIT: ICD-10-PCS | Mod: S$GLB,,, | Performed by: INTERNAL MEDICINE

## 2021-02-15 PROCEDURE — 99213 OFFICE O/P EST LOW 20 MIN: CPT | Mod: S$GLB,,, | Performed by: INTERNAL MEDICINE

## 2021-02-15 PROCEDURE — 99213 PR OFFICE/OUTPT VISIT, EST, LEVL III, 20-29 MIN: ICD-10-PCS | Mod: S$GLB,,, | Performed by: INTERNAL MEDICINE

## 2021-02-15 PROCEDURE — 1126F PR PAIN SEVERITY QUANTIFIED, NO PAIN PRESENT: ICD-10-PCS | Mod: S$GLB,,, | Performed by: INTERNAL MEDICINE

## 2021-02-15 PROCEDURE — 3008F PR BODY MASS INDEX (BMI) DOCUMENTED: ICD-10-PCS | Mod: CPTII,S$GLB,, | Performed by: INTERNAL MEDICINE

## 2021-03-01 ENCOUNTER — TELEPHONE (OUTPATIENT)
Dept: CARDIOLOGY | Facility: CLINIC | Age: 76
End: 2021-03-01

## 2021-03-01 DIAGNOSIS — R26.89 BALANCE PROBLEMS: Primary | ICD-10-CM

## 2021-03-01 DIAGNOSIS — I10 ESSENTIAL HYPERTENSION: ICD-10-CM

## 2021-03-01 DIAGNOSIS — I49.8 OTHER SPECIFIED CARDIAC ARRHYTHMIAS: ICD-10-CM

## 2021-03-19 ENCOUNTER — OFFICE VISIT (OUTPATIENT)
Dept: INTERNAL MEDICINE | Facility: CLINIC | Age: 76
End: 2021-03-19
Payer: MEDICARE

## 2021-03-19 ENCOUNTER — LAB VISIT (OUTPATIENT)
Dept: LAB | Facility: HOSPITAL | Age: 76
End: 2021-03-19
Attending: NURSE PRACTITIONER
Payer: MEDICARE

## 2021-03-19 VITALS
TEMPERATURE: 98 F | HEIGHT: 61 IN | OXYGEN SATURATION: 99 % | HEART RATE: 52 BPM | RESPIRATION RATE: 14 BRPM | DIASTOLIC BLOOD PRESSURE: 62 MMHG | WEIGHT: 158.31 LBS | BODY MASS INDEX: 29.89 KG/M2 | SYSTOLIC BLOOD PRESSURE: 148 MMHG

## 2021-03-19 DIAGNOSIS — D50.9 IRON DEFICIENCY ANEMIA, UNSPECIFIED IRON DEFICIENCY ANEMIA TYPE: ICD-10-CM

## 2021-03-19 DIAGNOSIS — D50.9 IRON DEFICIENCY ANEMIA, UNSPECIFIED IRON DEFICIENCY ANEMIA TYPE: Primary | ICD-10-CM

## 2021-03-19 DIAGNOSIS — M79.673 PAIN OF FOOT, UNSPECIFIED LATERALITY: ICD-10-CM

## 2021-03-19 DIAGNOSIS — I10 ESSENTIAL HYPERTENSION: ICD-10-CM

## 2021-03-19 DIAGNOSIS — Z12.31 ENCOUNTER FOR SCREENING MAMMOGRAM FOR MALIGNANT NEOPLASM OF BREAST: ICD-10-CM

## 2021-03-19 DIAGNOSIS — M20.41 HAMMER TOE OF RIGHT FOOT: ICD-10-CM

## 2021-03-19 DIAGNOSIS — Z76.89 ENCOUNTER TO ESTABLISH CARE: ICD-10-CM

## 2021-03-19 LAB
BASOPHILS # BLD AUTO: 0.11 K/UL (ref 0–0.2)
BASOPHILS NFR BLD: 2 % (ref 0–1.9)
DIFFERENTIAL METHOD: ABNORMAL
EOSINOPHIL # BLD AUTO: 0.3 K/UL (ref 0–0.5)
EOSINOPHIL NFR BLD: 5.6 % (ref 0–8)
ERYTHROCYTE [DISTWIDTH] IN BLOOD BY AUTOMATED COUNT: 12.8 % (ref 11.5–14.5)
FERRITIN SERPL-MCNC: 188 NG/ML (ref 20–300)
HCT VFR BLD AUTO: 36 % (ref 37–48.5)
HGB BLD-MCNC: 13.3 G/DL (ref 12–16)
IMM GRANULOCYTES # BLD AUTO: 0.02 K/UL (ref 0–0.04)
IMM GRANULOCYTES NFR BLD AUTO: 0.4 % (ref 0–0.5)
IRON SERPL-MCNC: 81 UG/DL (ref 30–160)
LYMPHOCYTES # BLD AUTO: 1 K/UL (ref 1–4.8)
LYMPHOCYTES NFR BLD: 18 % (ref 18–48)
MCH RBC QN AUTO: 35.8 PG (ref 27–31)
MCHC RBC AUTO-ENTMCNC: 36.9 G/DL (ref 32–36)
MCV RBC AUTO: 97 FL (ref 82–98)
MONOCYTES # BLD AUTO: 1 K/UL (ref 0.3–1)
MONOCYTES NFR BLD: 17.6 % (ref 4–15)
NEUTROPHILS # BLD AUTO: 3.1 K/UL (ref 1.8–7.7)
NEUTROPHILS NFR BLD: 56.4 % (ref 38–73)
NRBC BLD-RTO: 0 /100 WBC
PLATELET # BLD AUTO: 357 K/UL (ref 150–350)
PMV BLD AUTO: 11.4 FL (ref 9.2–12.9)
RBC # BLD AUTO: 3.72 M/UL (ref 4–5.4)
SATURATED IRON: 23 % (ref 20–50)
TOTAL IRON BINDING CAPACITY: 349 UG/DL (ref 250–450)
TRANSFERRIN SERPL-MCNC: 236 MG/DL (ref 200–375)
WBC # BLD AUTO: 5.51 K/UL (ref 3.9–12.7)

## 2021-03-19 PROCEDURE — 99999 PR PBB SHADOW E&M-EST. PATIENT-LVL IV: ICD-10-PCS | Mod: PBBFAC,,, | Performed by: NURSE PRACTITIONER

## 2021-03-19 PROCEDURE — 99999 PR PBB SHADOW E&M-EST. PATIENT-LVL IV: CPT | Mod: PBBFAC,,, | Performed by: NURSE PRACTITIONER

## 2021-03-19 PROCEDURE — 3288F PR FALLS RISK ASSESSMENT DOCUMENTED: ICD-10-PCS | Mod: CPTII,S$GLB,, | Performed by: NURSE PRACTITIONER

## 2021-03-19 PROCEDURE — 1157F PR ADVANCE CARE PLAN OR EQUIV PRESENT IN MEDICAL RECORD: ICD-10-PCS | Mod: S$GLB,,, | Performed by: NURSE PRACTITIONER

## 2021-03-19 PROCEDURE — 3008F PR BODY MASS INDEX (BMI) DOCUMENTED: ICD-10-PCS | Mod: CPTII,S$GLB,, | Performed by: NURSE PRACTITIONER

## 2021-03-19 PROCEDURE — 82728 ASSAY OF FERRITIN: CPT | Performed by: NURSE PRACTITIONER

## 2021-03-19 PROCEDURE — 85025 COMPLETE CBC W/AUTO DIFF WBC: CPT | Performed by: NURSE PRACTITIONER

## 2021-03-19 PROCEDURE — 83540 ASSAY OF IRON: CPT | Performed by: NURSE PRACTITIONER

## 2021-03-19 PROCEDURE — 3008F BODY MASS INDEX DOCD: CPT | Mod: CPTII,S$GLB,, | Performed by: NURSE PRACTITIONER

## 2021-03-19 PROCEDURE — 99214 OFFICE O/P EST MOD 30 MIN: CPT | Mod: S$GLB,,, | Performed by: NURSE PRACTITIONER

## 2021-03-19 PROCEDURE — 99214 PR OFFICE/OUTPT VISIT, EST, LEVL IV, 30-39 MIN: ICD-10-PCS | Mod: S$GLB,,, | Performed by: NURSE PRACTITIONER

## 2021-03-19 PROCEDURE — 1101F PT FALLS ASSESS-DOCD LE1/YR: CPT | Mod: CPTII,S$GLB,, | Performed by: NURSE PRACTITIONER

## 2021-03-19 PROCEDURE — 3288F FALL RISK ASSESSMENT DOCD: CPT | Mod: CPTII,S$GLB,, | Performed by: NURSE PRACTITIONER

## 2021-03-19 PROCEDURE — 1126F PR PAIN SEVERITY QUANTIFIED, NO PAIN PRESENT: ICD-10-PCS | Mod: S$GLB,,, | Performed by: NURSE PRACTITIONER

## 2021-03-19 PROCEDURE — 36415 COLL VENOUS BLD VENIPUNCTURE: CPT | Mod: PO | Performed by: NURSE PRACTITIONER

## 2021-03-19 PROCEDURE — 1101F PR PT FALLS ASSESS DOC 0-1 FALLS W/OUT INJ PAST YR: ICD-10-PCS | Mod: CPTII,S$GLB,, | Performed by: NURSE PRACTITIONER

## 2021-03-19 PROCEDURE — 1157F ADVNC CARE PLAN IN RCRD: CPT | Mod: S$GLB,,, | Performed by: NURSE PRACTITIONER

## 2021-03-19 PROCEDURE — 1126F AMNT PAIN NOTED NONE PRSNT: CPT | Mod: S$GLB,,, | Performed by: NURSE PRACTITIONER

## 2021-03-24 ENCOUNTER — PATIENT MESSAGE (OUTPATIENT)
Dept: CARDIOLOGY | Facility: CLINIC | Age: 76
End: 2021-03-24

## 2021-03-24 ENCOUNTER — PATIENT MESSAGE (OUTPATIENT)
Dept: ELECTROPHYSIOLOGY | Facility: CLINIC | Age: 76
End: 2021-03-24

## 2021-03-24 DIAGNOSIS — I10 ESSENTIAL HYPERTENSION: ICD-10-CM

## 2021-03-24 DIAGNOSIS — I48.91 ATRIAL FIBRILLATION, UNSPECIFIED TYPE: ICD-10-CM

## 2021-03-24 RX ORDER — METOPROLOL SUCCINATE 25 MG/1
12.5 TABLET, EXTENDED RELEASE ORAL DAILY
Qty: 45 TABLET | Refills: 3 | OUTPATIENT
Start: 2021-03-24

## 2021-03-26 ENCOUNTER — PATIENT MESSAGE (OUTPATIENT)
Dept: CARDIOLOGY | Facility: CLINIC | Age: 76
End: 2021-03-26

## 2021-03-26 DIAGNOSIS — I10 ESSENTIAL HYPERTENSION: ICD-10-CM

## 2021-03-26 DIAGNOSIS — I48.91 ATRIAL FIBRILLATION, UNSPECIFIED TYPE: ICD-10-CM

## 2021-04-09 DIAGNOSIS — J45.30 MILD PERSISTENT ASTHMA, UNSPECIFIED WHETHER COMPLICATED: Primary | ICD-10-CM

## 2021-04-09 RX ORDER — IPRATROPIUM BROMIDE AND ALBUTEROL SULFATE 2.5; .5 MG/3ML; MG/3ML
3 SOLUTION RESPIRATORY (INHALATION) EVERY 6 HOURS PRN
Qty: 1 BOX | Refills: 0 | Status: SHIPPED | OUTPATIENT
Start: 2021-04-09 | End: 2021-06-24

## 2021-04-12 ENCOUNTER — TELEPHONE (OUTPATIENT)
Dept: RESEARCH | Facility: HOSPITAL | Age: 76
End: 2021-04-12

## 2021-04-15 ENCOUNTER — PATIENT MESSAGE (OUTPATIENT)
Dept: RESEARCH | Facility: HOSPITAL | Age: 76
End: 2021-04-15

## 2021-04-16 ENCOUNTER — TELEPHONE (OUTPATIENT)
Dept: RESEARCH | Facility: HOSPITAL | Age: 76
End: 2021-04-16

## 2021-04-16 ENCOUNTER — RESEARCH ENCOUNTER (OUTPATIENT)
Dept: RESEARCH | Facility: HOSPITAL | Age: 76
End: 2021-04-16

## 2021-04-20 ENCOUNTER — PATIENT MESSAGE (OUTPATIENT)
Dept: INTERNAL MEDICINE | Facility: CLINIC | Age: 76
End: 2021-04-20

## 2021-04-20 DIAGNOSIS — J45.30 MILD PERSISTENT ASTHMA, UNSPECIFIED WHETHER COMPLICATED: Primary | ICD-10-CM

## 2021-04-21 RX ORDER — ALBUTEROL SULFATE 90 UG/1
2 AEROSOL, METERED RESPIRATORY (INHALATION) EVERY 6 HOURS PRN
Qty: 18 G | Refills: 3 | Status: SHIPPED | OUTPATIENT
Start: 2021-04-21 | End: 2021-10-14

## 2021-05-18 ENCOUNTER — OFFICE VISIT (OUTPATIENT)
Dept: PODIATRY | Facility: CLINIC | Age: 76
End: 2021-05-18
Payer: MEDICARE

## 2021-05-18 VITALS
WEIGHT: 158 LBS | DIASTOLIC BLOOD PRESSURE: 54 MMHG | HEART RATE: 74 BPM | BODY MASS INDEX: 29.83 KG/M2 | SYSTOLIC BLOOD PRESSURE: 189 MMHG | HEIGHT: 61 IN

## 2021-05-18 DIAGNOSIS — M79.673 PAIN OF FOOT, UNSPECIFIED LATERALITY: ICD-10-CM

## 2021-05-18 DIAGNOSIS — L84 CORN OR CALLUS: Primary | ICD-10-CM

## 2021-05-18 DIAGNOSIS — M20.41 HAMMER TOE OF RIGHT FOOT: ICD-10-CM

## 2021-05-18 DIAGNOSIS — Z79.01 LONG TERM (CURRENT) USE OF ANTICOAGULANTS: ICD-10-CM

## 2021-05-18 PROCEDURE — 1159F PR MEDICATION LIST DOCUMENTED IN MEDICAL RECORD: ICD-10-PCS | Mod: S$GLB,,, | Performed by: PODIATRIST

## 2021-05-18 PROCEDURE — 99203 OFFICE O/P NEW LOW 30 MIN: CPT | Mod: S$GLB,,, | Performed by: PODIATRIST

## 2021-05-18 PROCEDURE — 3008F PR BODY MASS INDEX (BMI) DOCUMENTED: ICD-10-PCS | Mod: CPTII,S$GLB,, | Performed by: PODIATRIST

## 2021-05-18 PROCEDURE — 99203 PR OFFICE/OUTPT VISIT, NEW, LEVL III, 30-44 MIN: ICD-10-PCS | Mod: S$GLB,,, | Performed by: PODIATRIST

## 2021-05-18 PROCEDURE — 1126F AMNT PAIN NOTED NONE PRSNT: CPT | Mod: S$GLB,,, | Performed by: PODIATRIST

## 2021-05-18 PROCEDURE — 1157F PR ADVANCE CARE PLAN OR EQUIV PRESENT IN MEDICAL RECORD: ICD-10-PCS | Mod: S$GLB,,, | Performed by: PODIATRIST

## 2021-05-18 PROCEDURE — 3288F FALL RISK ASSESSMENT DOCD: CPT | Mod: CPTII,S$GLB,, | Performed by: PODIATRIST

## 2021-05-18 PROCEDURE — 3288F PR FALLS RISK ASSESSMENT DOCUMENTED: ICD-10-PCS | Mod: CPTII,S$GLB,, | Performed by: PODIATRIST

## 2021-05-18 PROCEDURE — 1101F PT FALLS ASSESS-DOCD LE1/YR: CPT | Mod: CPTII,S$GLB,, | Performed by: PODIATRIST

## 2021-05-18 PROCEDURE — 1157F ADVNC CARE PLAN IN RCRD: CPT | Mod: S$GLB,,, | Performed by: PODIATRIST

## 2021-05-18 PROCEDURE — 99999 PR PBB SHADOW E&M-EST. PATIENT-LVL IV: ICD-10-PCS | Mod: PBBFAC,,, | Performed by: PODIATRIST

## 2021-05-18 PROCEDURE — 3008F BODY MASS INDEX DOCD: CPT | Mod: CPTII,S$GLB,, | Performed by: PODIATRIST

## 2021-05-18 PROCEDURE — 99999 PR PBB SHADOW E&M-EST. PATIENT-LVL IV: CPT | Mod: PBBFAC,,, | Performed by: PODIATRIST

## 2021-05-18 PROCEDURE — 1159F MED LIST DOCD IN RCRD: CPT | Mod: S$GLB,,, | Performed by: PODIATRIST

## 2021-05-18 PROCEDURE — 1126F PR PAIN SEVERITY QUANTIFIED, NO PAIN PRESENT: ICD-10-PCS | Mod: S$GLB,,, | Performed by: PODIATRIST

## 2021-05-18 PROCEDURE — 1101F PR PT FALLS ASSESS DOC 0-1 FALLS W/OUT INJ PAST YR: ICD-10-PCS | Mod: CPTII,S$GLB,, | Performed by: PODIATRIST

## 2021-06-07 ENCOUNTER — CLINICAL SUPPORT (OUTPATIENT)
Dept: URGENT CARE | Facility: CLINIC | Age: 76
End: 2021-06-07
Payer: MEDICARE

## 2021-06-07 DIAGNOSIS — Z03.818 ENCNTR FOR OBS FOR SUSP EXPSR TO OTH BIOLG AGENTS RULED OUT: Primary | ICD-10-CM

## 2021-06-07 LAB
CTP QC/QA: YES
SARS-COV-2 RDRP RESP QL NAA+PROBE: NEGATIVE

## 2021-06-07 PROCEDURE — U0002: ICD-10-PCS | Mod: QW,S$GLB,, | Performed by: NURSE PRACTITIONER

## 2021-06-07 PROCEDURE — U0002 COVID-19 LAB TEST NON-CDC: HCPCS | Mod: QW,S$GLB,, | Performed by: NURSE PRACTITIONER

## 2021-06-07 PROCEDURE — 99211 PR OFFICE/OUTPT VISIT, EST, LEVL I: ICD-10-PCS | Mod: S$GLB,,, | Performed by: NURSE PRACTITIONER

## 2021-06-07 PROCEDURE — 99211 OFF/OP EST MAY X REQ PHY/QHP: CPT | Mod: S$GLB,,, | Performed by: NURSE PRACTITIONER

## 2021-06-18 ENCOUNTER — OFFICE VISIT (OUTPATIENT)
Dept: URGENT CARE | Facility: CLINIC | Age: 76
End: 2021-06-18
Payer: MEDICARE

## 2021-06-18 VITALS
WEIGHT: 148 LBS | HEART RATE: 51 BPM | BODY MASS INDEX: 27.23 KG/M2 | HEIGHT: 62 IN | TEMPERATURE: 98 F | OXYGEN SATURATION: 98 % | DIASTOLIC BLOOD PRESSURE: 78 MMHG | SYSTOLIC BLOOD PRESSURE: 158 MMHG

## 2021-06-18 DIAGNOSIS — R42 DIZZINESS: Primary | ICD-10-CM

## 2021-06-18 LAB
BILIRUB UR QL STRIP: NEGATIVE
GLUCOSE UR QL STRIP: NEGATIVE
KETONES UR QL STRIP: NEGATIVE
LEUKOCYTE ESTERASE UR QL STRIP: NEGATIVE
PH, POC UA: 5 (ref 5–8)
POC BLOOD, URINE: POSITIVE
POC NITRATES, URINE: NEGATIVE
PROT UR QL STRIP: NEGATIVE
SP GR UR STRIP: 1 (ref 1–1.03)
UROBILINOGEN UR STRIP-ACNC: NORMAL (ref 0.1–1.1)

## 2021-06-18 PROCEDURE — 99214 OFFICE O/P EST MOD 30 MIN: CPT | Mod: 25,S$GLB,, | Performed by: NURSE PRACTITIONER

## 2021-06-18 PROCEDURE — 99214 PR OFFICE/OUTPT VISIT, EST, LEVL IV, 30-39 MIN: ICD-10-PCS | Mod: 25,S$GLB,, | Performed by: NURSE PRACTITIONER

## 2021-06-18 PROCEDURE — 81003 URINALYSIS AUTO W/O SCOPE: CPT | Mod: QW,S$GLB,, | Performed by: NURSE PRACTITIONER

## 2021-06-18 PROCEDURE — 1157F ADVNC CARE PLAN IN RCRD: CPT | Mod: S$GLB,,, | Performed by: NURSE PRACTITIONER

## 2021-06-18 PROCEDURE — 1157F PR ADVANCE CARE PLAN OR EQUIV PRESENT IN MEDICAL RECORD: ICD-10-PCS | Mod: S$GLB,,, | Performed by: NURSE PRACTITIONER

## 2021-06-18 PROCEDURE — 3008F BODY MASS INDEX DOCD: CPT | Mod: CPTII,S$GLB,, | Performed by: NURSE PRACTITIONER

## 2021-06-18 PROCEDURE — 81003 POCT URINALYSIS, DIPSTICK, AUTOMATED, W/O SCOPE: ICD-10-PCS | Mod: QW,S$GLB,, | Performed by: NURSE PRACTITIONER

## 2021-06-18 PROCEDURE — 3008F PR BODY MASS INDEX (BMI) DOCUMENTED: ICD-10-PCS | Mod: CPTII,S$GLB,, | Performed by: NURSE PRACTITIONER

## 2021-06-24 ENCOUNTER — OFFICE VISIT (OUTPATIENT)
Dept: CARDIOLOGY | Facility: CLINIC | Age: 76
End: 2021-06-24
Payer: MEDICARE

## 2021-06-24 VITALS
HEIGHT: 61 IN | BODY MASS INDEX: 29.05 KG/M2 | HEART RATE: 56 BPM | SYSTOLIC BLOOD PRESSURE: 130 MMHG | WEIGHT: 153.88 LBS | DIASTOLIC BLOOD PRESSURE: 57 MMHG

## 2021-06-24 DIAGNOSIS — I48.0 PAROXYSMAL ATRIAL FIBRILLATION: Chronic | ICD-10-CM

## 2021-06-24 DIAGNOSIS — R00.1 SINUS BRADYCARDIA: ICD-10-CM

## 2021-06-24 DIAGNOSIS — I10 ESSENTIAL HYPERTENSION: Chronic | ICD-10-CM

## 2021-06-24 DIAGNOSIS — R55 SYNCOPE AND COLLAPSE: Primary | ICD-10-CM

## 2021-06-24 PROCEDURE — 1159F PR MEDICATION LIST DOCUMENTED IN MEDICAL RECORD: ICD-10-PCS | Mod: S$GLB,,, | Performed by: INTERNAL MEDICINE

## 2021-06-24 PROCEDURE — 99999 PR PBB SHADOW E&M-EST. PATIENT-LVL IV: CPT | Mod: PBBFAC,,, | Performed by: INTERNAL MEDICINE

## 2021-06-24 PROCEDURE — 1157F ADVNC CARE PLAN IN RCRD: CPT | Mod: S$GLB,,, | Performed by: INTERNAL MEDICINE

## 2021-06-24 PROCEDURE — 3288F FALL RISK ASSESSMENT DOCD: CPT | Mod: CPTII,S$GLB,, | Performed by: INTERNAL MEDICINE

## 2021-06-24 PROCEDURE — 99215 OFFICE O/P EST HI 40 MIN: CPT | Mod: 25,S$GLB,, | Performed by: INTERNAL MEDICINE

## 2021-06-24 PROCEDURE — 1157F PR ADVANCE CARE PLAN OR EQUIV PRESENT IN MEDICAL RECORD: ICD-10-PCS | Mod: S$GLB,,, | Performed by: INTERNAL MEDICINE

## 2021-06-24 PROCEDURE — 99215 PR OFFICE/OUTPT VISIT, EST, LEVL V, 40-54 MIN: ICD-10-PCS | Mod: 25,S$GLB,, | Performed by: INTERNAL MEDICINE

## 2021-06-24 PROCEDURE — 3008F BODY MASS INDEX DOCD: CPT | Mod: CPTII,S$GLB,, | Performed by: INTERNAL MEDICINE

## 2021-06-24 PROCEDURE — 3288F PR FALLS RISK ASSESSMENT DOCUMENTED: ICD-10-PCS | Mod: CPTII,S$GLB,, | Performed by: INTERNAL MEDICINE

## 2021-06-24 PROCEDURE — 1101F PR PT FALLS ASSESS DOC 0-1 FALLS W/OUT INJ PAST YR: ICD-10-PCS | Mod: CPTII,S$GLB,, | Performed by: INTERNAL MEDICINE

## 2021-06-24 PROCEDURE — 93000 ELECTROCARDIOGRAM COMPLETE: CPT | Mod: S$GLB,,, | Performed by: INTERNAL MEDICINE

## 2021-06-24 PROCEDURE — 1126F PR PAIN SEVERITY QUANTIFIED, NO PAIN PRESENT: ICD-10-PCS | Mod: S$GLB,,, | Performed by: INTERNAL MEDICINE

## 2021-06-24 PROCEDURE — 1101F PT FALLS ASSESS-DOCD LE1/YR: CPT | Mod: CPTII,S$GLB,, | Performed by: INTERNAL MEDICINE

## 2021-06-24 PROCEDURE — 3008F PR BODY MASS INDEX (BMI) DOCUMENTED: ICD-10-PCS | Mod: CPTII,S$GLB,, | Performed by: INTERNAL MEDICINE

## 2021-06-24 PROCEDURE — 93000 EKG 12-LEAD: ICD-10-PCS | Mod: S$GLB,,, | Performed by: INTERNAL MEDICINE

## 2021-06-24 PROCEDURE — 99999 PR PBB SHADOW E&M-EST. PATIENT-LVL IV: ICD-10-PCS | Mod: PBBFAC,,, | Performed by: INTERNAL MEDICINE

## 2021-06-24 PROCEDURE — 1159F MED LIST DOCD IN RCRD: CPT | Mod: S$GLB,,, | Performed by: INTERNAL MEDICINE

## 2021-06-24 PROCEDURE — 1126F AMNT PAIN NOTED NONE PRSNT: CPT | Mod: S$GLB,,, | Performed by: INTERNAL MEDICINE

## 2021-07-12 ENCOUNTER — OFFICE VISIT (OUTPATIENT)
Dept: INTERNAL MEDICINE | Facility: CLINIC | Age: 76
End: 2021-07-12
Payer: MEDICARE

## 2021-07-12 VITALS
HEIGHT: 61 IN | TEMPERATURE: 98 F | HEART RATE: 63 BPM | RESPIRATION RATE: 18 BRPM | WEIGHT: 153.44 LBS | SYSTOLIC BLOOD PRESSURE: 154 MMHG | DIASTOLIC BLOOD PRESSURE: 72 MMHG | OXYGEN SATURATION: 96 % | BODY MASS INDEX: 28.97 KG/M2

## 2021-07-12 DIAGNOSIS — D50.9 IRON DEFICIENCY ANEMIA, UNSPECIFIED IRON DEFICIENCY ANEMIA TYPE: ICD-10-CM

## 2021-07-12 DIAGNOSIS — Z00.00 ANNUAL PHYSICAL EXAM: Primary | ICD-10-CM

## 2021-07-12 DIAGNOSIS — Z79.899 OTHER LONG TERM (CURRENT) DRUG THERAPY: ICD-10-CM

## 2021-07-12 DIAGNOSIS — I48.0 PAROXYSMAL ATRIAL FIBRILLATION: ICD-10-CM

## 2021-07-12 DIAGNOSIS — Z13.6 SCREENING FOR CARDIOVASCULAR CONDITION: ICD-10-CM

## 2021-07-12 DIAGNOSIS — I10 ESSENTIAL HYPERTENSION: ICD-10-CM

## 2021-07-12 DIAGNOSIS — M79.89 LEG SWELLING: ICD-10-CM

## 2021-07-12 PROCEDURE — 99214 OFFICE O/P EST MOD 30 MIN: CPT | Mod: S$GLB,,, | Performed by: INTERNAL MEDICINE

## 2021-07-12 PROCEDURE — 99999 PR PBB SHADOW E&M-EST. PATIENT-LVL IV: ICD-10-PCS | Mod: PBBFAC,,, | Performed by: INTERNAL MEDICINE

## 2021-07-12 PROCEDURE — 1157F PR ADVANCE CARE PLAN OR EQUIV PRESENT IN MEDICAL RECORD: ICD-10-PCS | Mod: S$GLB,,, | Performed by: INTERNAL MEDICINE

## 2021-07-12 PROCEDURE — 99499 RISK ADDL DX/OHS AUDIT: ICD-10-PCS | Mod: S$GLB,,, | Performed by: INTERNAL MEDICINE

## 2021-07-12 PROCEDURE — 3288F PR FALLS RISK ASSESSMENT DOCUMENTED: ICD-10-PCS | Mod: CPTII,S$GLB,, | Performed by: INTERNAL MEDICINE

## 2021-07-12 PROCEDURE — 1157F ADVNC CARE PLAN IN RCRD: CPT | Mod: S$GLB,,, | Performed by: INTERNAL MEDICINE

## 2021-07-12 PROCEDURE — 99499 UNLISTED E&M SERVICE: CPT | Mod: S$GLB,,, | Performed by: INTERNAL MEDICINE

## 2021-07-12 PROCEDURE — 1126F PR PAIN SEVERITY QUANTIFIED, NO PAIN PRESENT: ICD-10-PCS | Mod: S$GLB,,, | Performed by: INTERNAL MEDICINE

## 2021-07-12 PROCEDURE — 1126F AMNT PAIN NOTED NONE PRSNT: CPT | Mod: S$GLB,,, | Performed by: INTERNAL MEDICINE

## 2021-07-12 PROCEDURE — 99999 PR PBB SHADOW E&M-EST. PATIENT-LVL IV: CPT | Mod: PBBFAC,,, | Performed by: INTERNAL MEDICINE

## 2021-07-12 PROCEDURE — 3008F BODY MASS INDEX DOCD: CPT | Mod: CPTII,S$GLB,, | Performed by: INTERNAL MEDICINE

## 2021-07-12 PROCEDURE — 1100F PR PT FALLS ASSESS DOC 2+ FALLS/FALL W/INJURY/YR: ICD-10-PCS | Mod: CPTII,S$GLB,, | Performed by: INTERNAL MEDICINE

## 2021-07-12 PROCEDURE — 3288F FALL RISK ASSESSMENT DOCD: CPT | Mod: CPTII,S$GLB,, | Performed by: INTERNAL MEDICINE

## 2021-07-12 PROCEDURE — 99214 PR OFFICE/OUTPT VISIT, EST, LEVL IV, 30-39 MIN: ICD-10-PCS | Mod: S$GLB,,, | Performed by: INTERNAL MEDICINE

## 2021-07-12 PROCEDURE — 1159F MED LIST DOCD IN RCRD: CPT | Mod: S$GLB,,, | Performed by: INTERNAL MEDICINE

## 2021-07-12 PROCEDURE — 1100F PTFALLS ASSESS-DOCD GE2>/YR: CPT | Mod: CPTII,S$GLB,, | Performed by: INTERNAL MEDICINE

## 2021-07-12 PROCEDURE — 3008F PR BODY MASS INDEX (BMI) DOCUMENTED: ICD-10-PCS | Mod: CPTII,S$GLB,, | Performed by: INTERNAL MEDICINE

## 2021-07-12 PROCEDURE — 1159F PR MEDICATION LIST DOCUMENTED IN MEDICAL RECORD: ICD-10-PCS | Mod: S$GLB,,, | Performed by: INTERNAL MEDICINE

## 2021-07-15 ENCOUNTER — PATIENT MESSAGE (OUTPATIENT)
Dept: INTERNAL MEDICINE | Facility: CLINIC | Age: 76
End: 2021-07-15

## 2021-07-16 ENCOUNTER — LAB VISIT (OUTPATIENT)
Dept: LAB | Facility: HOSPITAL | Age: 76
End: 2021-07-16
Attending: INTERNAL MEDICINE
Payer: MEDICARE

## 2021-07-16 DIAGNOSIS — Z00.00 ANNUAL PHYSICAL EXAM: ICD-10-CM

## 2021-07-16 DIAGNOSIS — I10 ESSENTIAL HYPERTENSION: ICD-10-CM

## 2021-07-16 DIAGNOSIS — Z79.899 OTHER LONG TERM (CURRENT) DRUG THERAPY: ICD-10-CM

## 2021-07-16 DIAGNOSIS — Z13.6 SCREENING FOR CARDIOVASCULAR CONDITION: ICD-10-CM

## 2021-07-16 DIAGNOSIS — D50.9 IRON DEFICIENCY ANEMIA, UNSPECIFIED IRON DEFICIENCY ANEMIA TYPE: ICD-10-CM

## 2021-07-16 LAB
25(OH)D3+25(OH)D2 SERPL-MCNC: 20 NG/ML (ref 30–96)
ALBUMIN SERPL BCP-MCNC: 3.4 G/DL (ref 3.5–5.2)
ALP SERPL-CCNC: 120 U/L (ref 55–135)
ALT SERPL W/O P-5'-P-CCNC: 12 U/L (ref 10–44)
ANION GAP SERPL CALC-SCNC: 8 MMOL/L (ref 8–16)
AST SERPL-CCNC: 19 U/L (ref 10–40)
BASOPHILS # BLD AUTO: 0.06 K/UL (ref 0–0.2)
BASOPHILS NFR BLD: 1 % (ref 0–1.9)
BILIRUB SERPL-MCNC: 0.6 MG/DL (ref 0.1–1)
BUN SERPL-MCNC: 13 MG/DL (ref 8–23)
CALCIUM SERPL-MCNC: 9.3 MG/DL (ref 8.7–10.5)
CHLORIDE SERPL-SCNC: 100 MMOL/L (ref 95–110)
CHOLEST SERPL-MCNC: 174 MG/DL (ref 120–199)
CHOLEST/HDLC SERPL: 2.5 {RATIO} (ref 2–5)
CO2 SERPL-SCNC: 27 MMOL/L (ref 23–29)
CREAT SERPL-MCNC: 0.8 MG/DL (ref 0.5–1.4)
DIFFERENTIAL METHOD: ABNORMAL
EOSINOPHIL # BLD AUTO: 0.2 K/UL (ref 0–0.5)
EOSINOPHIL NFR BLD: 3.3 % (ref 0–8)
ERYTHROCYTE [DISTWIDTH] IN BLOOD BY AUTOMATED COUNT: 12.7 % (ref 11.5–14.5)
EST. GFR  (AFRICAN AMERICAN): >60 ML/MIN/1.73 M^2
EST. GFR  (NON AFRICAN AMERICAN): >60 ML/MIN/1.73 M^2
FERRITIN SERPL-MCNC: 200 NG/ML (ref 20–300)
GLUCOSE SERPL-MCNC: 97 MG/DL (ref 70–110)
HCT VFR BLD AUTO: 35.2 % (ref 37–48.5)
HDLC SERPL-MCNC: 69 MG/DL (ref 40–75)
HDLC SERPL: 39.7 % (ref 20–50)
HGB BLD-MCNC: 11.6 G/DL (ref 12–16)
IMM GRANULOCYTES # BLD AUTO: 0.02 K/UL (ref 0–0.04)
IMM GRANULOCYTES NFR BLD AUTO: 0.3 % (ref 0–0.5)
IRON SERPL-MCNC: 102 UG/DL (ref 30–160)
LDLC SERPL CALC-MCNC: 87 MG/DL (ref 63–159)
LYMPHOCYTES # BLD AUTO: 1.1 K/UL (ref 1–4.8)
LYMPHOCYTES NFR BLD: 18.5 % (ref 18–48)
MCH RBC QN AUTO: 31.8 PG (ref 27–31)
MCHC RBC AUTO-ENTMCNC: 33 G/DL (ref 32–36)
MCV RBC AUTO: 96 FL (ref 82–98)
MONOCYTES # BLD AUTO: 0.8 K/UL (ref 0.3–1)
MONOCYTES NFR BLD: 14.2 % (ref 4–15)
NEUTROPHILS # BLD AUTO: 3.6 K/UL (ref 1.8–7.7)
NEUTROPHILS NFR BLD: 62.7 % (ref 38–73)
NONHDLC SERPL-MCNC: 105 MG/DL
NRBC BLD-RTO: 0 /100 WBC
PLATELET # BLD AUTO: 300 K/UL (ref 150–450)
PMV BLD AUTO: 11.1 FL (ref 9.2–12.9)
POTASSIUM SERPL-SCNC: 3.9 MMOL/L (ref 3.5–5.1)
PROT SERPL-MCNC: 6.7 G/DL (ref 6–8.4)
RBC # BLD AUTO: 3.65 M/UL (ref 4–5.4)
SATURATED IRON: 32 % (ref 20–50)
SODIUM SERPL-SCNC: 135 MMOL/L (ref 136–145)
TOTAL IRON BINDING CAPACITY: 320 UG/DL (ref 250–450)
TRANSFERRIN SERPL-MCNC: 216 MG/DL (ref 200–375)
TRIGL SERPL-MCNC: 90 MG/DL (ref 30–150)
TSH SERPL DL<=0.005 MIU/L-ACNC: 2.39 UIU/ML (ref 0.4–4)
WBC # BLD AUTO: 5.77 K/UL (ref 3.9–12.7)

## 2021-07-16 PROCEDURE — 36415 COLL VENOUS BLD VENIPUNCTURE: CPT | Mod: PO | Performed by: INTERNAL MEDICINE

## 2021-07-16 PROCEDURE — 83540 ASSAY OF IRON: CPT | Performed by: INTERNAL MEDICINE

## 2021-07-16 PROCEDURE — 80061 LIPID PANEL: CPT | Performed by: INTERNAL MEDICINE

## 2021-07-16 PROCEDURE — 82728 ASSAY OF FERRITIN: CPT | Performed by: INTERNAL MEDICINE

## 2021-07-16 PROCEDURE — 82306 VITAMIN D 25 HYDROXY: CPT | Performed by: INTERNAL MEDICINE

## 2021-07-16 PROCEDURE — 84443 ASSAY THYROID STIM HORMONE: CPT | Performed by: INTERNAL MEDICINE

## 2021-07-16 PROCEDURE — 85025 COMPLETE CBC W/AUTO DIFF WBC: CPT | Performed by: INTERNAL MEDICINE

## 2021-07-16 PROCEDURE — 80053 COMPREHEN METABOLIC PANEL: CPT | Performed by: INTERNAL MEDICINE

## 2021-07-19 ENCOUNTER — TELEPHONE (OUTPATIENT)
Dept: INTERNAL MEDICINE | Facility: CLINIC | Age: 76
End: 2021-07-19

## 2021-07-31 ENCOUNTER — PATIENT MESSAGE (OUTPATIENT)
Dept: INTERNAL MEDICINE | Facility: CLINIC | Age: 76
End: 2021-07-31

## 2021-07-31 DIAGNOSIS — I48.91 ATRIAL FIBRILLATION, UNSPECIFIED TYPE: ICD-10-CM

## 2021-07-31 DIAGNOSIS — I10 ESSENTIAL HYPERTENSION: ICD-10-CM

## 2021-08-02 RX ORDER — IRBESARTAN AND HYDROCHLOROTHIAZIDE 150; 12.5 MG/1; MG/1
1 TABLET, FILM COATED ORAL DAILY
Qty: 90 TABLET | Refills: 1 | Status: SHIPPED | OUTPATIENT
Start: 2021-08-02 | End: 2021-11-15 | Stop reason: ALTCHOICE

## 2021-08-02 RX ORDER — AMLODIPINE BESYLATE 5 MG/1
TABLET ORAL
Qty: 90 TABLET | Refills: 1 | Status: SHIPPED | OUTPATIENT
Start: 2021-08-02 | End: 2021-08-24 | Stop reason: ALTCHOICE

## 2021-08-03 ENCOUNTER — CLINICAL SUPPORT (OUTPATIENT)
Dept: URGENT CARE | Facility: CLINIC | Age: 76
End: 2021-08-03
Payer: MEDICARE

## 2021-08-03 DIAGNOSIS — Z20.822 CONTACT WITH AND (SUSPECTED) EXPOSURE TO COVID-19: Primary | ICD-10-CM

## 2021-08-03 LAB
CTP QC/QA: YES
SARS-COV-2 RDRP RESP QL NAA+PROBE: NEGATIVE

## 2021-08-03 PROCEDURE — U0002 COVID-19 LAB TEST NON-CDC: HCPCS | Mod: QW,S$GLB,, | Performed by: NURSE PRACTITIONER

## 2021-08-03 PROCEDURE — 99211 PR OFFICE/OUTPT VISIT, EST, LEVL I: ICD-10-PCS | Mod: S$GLB,CS,, | Performed by: NURSE PRACTITIONER

## 2021-08-03 PROCEDURE — 99211 OFF/OP EST MAY X REQ PHY/QHP: CPT | Mod: S$GLB,CS,, | Performed by: NURSE PRACTITIONER

## 2021-08-03 PROCEDURE — U0002: ICD-10-PCS | Mod: QW,S$GLB,, | Performed by: NURSE PRACTITIONER

## 2021-08-04 ENCOUNTER — PATIENT OUTREACH (OUTPATIENT)
Dept: ADMINISTRATIVE | Facility: OTHER | Age: 76
End: 2021-08-04

## 2021-08-05 ENCOUNTER — OFFICE VISIT (OUTPATIENT)
Dept: DERMATOLOGY | Facility: CLINIC | Age: 76
End: 2021-08-05
Payer: MEDICARE

## 2021-08-05 VITALS — BODY MASS INDEX: 28.91 KG/M2 | WEIGHT: 153 LBS

## 2021-08-05 DIAGNOSIS — D48.5 NEOPLASM OF UNCERTAIN BEHAVIOR OF SKIN: Primary | ICD-10-CM

## 2021-08-05 DIAGNOSIS — Z12.83 SKIN EXAM, SCREENING FOR CANCER: ICD-10-CM

## 2021-08-05 DIAGNOSIS — L81.4 LENTIGINES: ICD-10-CM

## 2021-08-05 PROCEDURE — 1126F PR PAIN SEVERITY QUANTIFIED, NO PAIN PRESENT: ICD-10-PCS | Mod: CPTII,S$GLB,, | Performed by: DERMATOLOGY

## 2021-08-05 PROCEDURE — 3008F PR BODY MASS INDEX (BMI) DOCUMENTED: ICD-10-PCS | Mod: CPTII,S$GLB,, | Performed by: DERMATOLOGY

## 2021-08-05 PROCEDURE — 1101F PT FALLS ASSESS-DOCD LE1/YR: CPT | Mod: CPTII,S$GLB,, | Performed by: DERMATOLOGY

## 2021-08-05 PROCEDURE — 1160F RVW MEDS BY RX/DR IN RCRD: CPT | Mod: CPTII,S$GLB,, | Performed by: DERMATOLOGY

## 2021-08-05 PROCEDURE — 3008F BODY MASS INDEX DOCD: CPT | Mod: CPTII,S$GLB,, | Performed by: DERMATOLOGY

## 2021-08-05 PROCEDURE — 88305 TISSUE EXAM BY PATHOLOGIST: CPT | Performed by: PATHOLOGY

## 2021-08-05 PROCEDURE — 1157F PR ADVANCE CARE PLAN OR EQUIV PRESENT IN MEDICAL RECORD: ICD-10-PCS | Mod: CPTII,S$GLB,, | Performed by: DERMATOLOGY

## 2021-08-05 PROCEDURE — 1159F MED LIST DOCD IN RCRD: CPT | Mod: CPTII,S$GLB,, | Performed by: DERMATOLOGY

## 2021-08-05 PROCEDURE — 3288F FALL RISK ASSESSMENT DOCD: CPT | Mod: CPTII,S$GLB,, | Performed by: DERMATOLOGY

## 2021-08-05 PROCEDURE — 99213 PR OFFICE/OUTPT VISIT, EST, LEVL III, 20-29 MIN: ICD-10-PCS | Mod: 25,S$GLB,, | Performed by: DERMATOLOGY

## 2021-08-05 PROCEDURE — 1157F ADVNC CARE PLAN IN RCRD: CPT | Mod: CPTII,S$GLB,, | Performed by: DERMATOLOGY

## 2021-08-05 PROCEDURE — 88305 TISSUE EXAM BY PATHOLOGIST: CPT | Mod: 26,,, | Performed by: PATHOLOGY

## 2021-08-05 PROCEDURE — 1126F AMNT PAIN NOTED NONE PRSNT: CPT | Mod: CPTII,S$GLB,, | Performed by: DERMATOLOGY

## 2021-08-05 PROCEDURE — 11102 PR TANGENTIAL BIOPSY, SKIN, SINGLE LESION: ICD-10-PCS | Mod: S$GLB,,, | Performed by: DERMATOLOGY

## 2021-08-05 PROCEDURE — 1159F PR MEDICATION LIST DOCUMENTED IN MEDICAL RECORD: ICD-10-PCS | Mod: CPTII,S$GLB,, | Performed by: DERMATOLOGY

## 2021-08-05 PROCEDURE — 3288F PR FALLS RISK ASSESSMENT DOCUMENTED: ICD-10-PCS | Mod: CPTII,S$GLB,, | Performed by: DERMATOLOGY

## 2021-08-05 PROCEDURE — 99999 PR PBB SHADOW E&M-EST. PATIENT-LVL III: ICD-10-PCS | Mod: PBBFAC,,, | Performed by: DERMATOLOGY

## 2021-08-05 PROCEDURE — 88305 TISSUE EXAM BY PATHOLOGIST: ICD-10-PCS | Mod: 26,,, | Performed by: PATHOLOGY

## 2021-08-05 PROCEDURE — 1160F PR REVIEW ALL MEDS BY PRESCRIBER/CLIN PHARMACIST DOCUMENTED: ICD-10-PCS | Mod: CPTII,S$GLB,, | Performed by: DERMATOLOGY

## 2021-08-05 PROCEDURE — 1101F PR PT FALLS ASSESS DOC 0-1 FALLS W/OUT INJ PAST YR: ICD-10-PCS | Mod: CPTII,S$GLB,, | Performed by: DERMATOLOGY

## 2021-08-05 PROCEDURE — 99999 PR PBB SHADOW E&M-EST. PATIENT-LVL III: CPT | Mod: PBBFAC,,, | Performed by: DERMATOLOGY

## 2021-08-05 PROCEDURE — 11102 TANGNTL BX SKIN SINGLE LES: CPT | Mod: S$GLB,,, | Performed by: DERMATOLOGY

## 2021-08-05 PROCEDURE — 99213 OFFICE O/P EST LOW 20 MIN: CPT | Mod: 25,S$GLB,, | Performed by: DERMATOLOGY

## 2021-08-09 ENCOUNTER — HOSPITAL ENCOUNTER (OUTPATIENT)
Dept: RADIOLOGY | Facility: HOSPITAL | Age: 76
Discharge: HOME | End: 2021-08-09
Attending: NURSE PRACTITIONER
Payer: MEDICARE

## 2021-08-09 DIAGNOSIS — Z12.31 ENCOUNTER FOR SCREENING MAMMOGRAM FOR MALIGNANT NEOPLASM OF BREAST: ICD-10-CM

## 2021-08-09 LAB
FINAL PATHOLOGIC DIAGNOSIS: NORMAL
GROSS: NORMAL
Lab: NORMAL
MICROSCOPIC EXAM: NORMAL

## 2021-08-09 PROCEDURE — 77067 SCR MAMMO BI INCL CAD: CPT | Mod: TC

## 2021-08-09 PROCEDURE — 77063 MAMMO DIGITAL SCREENING BILAT WITH TOMO: ICD-10-PCS | Mod: 26,,, | Performed by: RADIOLOGY

## 2021-08-09 PROCEDURE — 77067 MAMMO DIGITAL SCREENING BILAT WITH TOMO: ICD-10-PCS | Mod: 26,,, | Performed by: RADIOLOGY

## 2021-08-09 PROCEDURE — 77063 BREAST TOMOSYNTHESIS BI: CPT | Mod: 26,,, | Performed by: RADIOLOGY

## 2021-08-09 PROCEDURE — 77067 SCR MAMMO BI INCL CAD: CPT | Mod: 26,,, | Performed by: RADIOLOGY

## 2021-08-24 ENCOUNTER — OFFICE VISIT (OUTPATIENT)
Dept: ELECTROPHYSIOLOGY | Facility: CLINIC | Age: 76
End: 2021-08-24
Payer: MEDICARE

## 2021-08-24 ENCOUNTER — HOSPITAL ENCOUNTER (OUTPATIENT)
Dept: CARDIOLOGY | Facility: CLINIC | Age: 76
Discharge: HOME | End: 2021-08-24
Payer: MEDICARE

## 2021-08-24 VITALS
DIASTOLIC BLOOD PRESSURE: 59 MMHG | WEIGHT: 152.56 LBS | HEART RATE: 62 BPM | BODY MASS INDEX: 28.8 KG/M2 | HEIGHT: 61 IN | SYSTOLIC BLOOD PRESSURE: 145 MMHG

## 2021-08-24 DIAGNOSIS — I10 ESSENTIAL HYPERTENSION: ICD-10-CM

## 2021-08-24 DIAGNOSIS — I49.8 OTHER SPECIFIED CARDIAC ARRHYTHMIAS: ICD-10-CM

## 2021-08-24 DIAGNOSIS — I48.0 PAROXYSMAL ATRIAL FIBRILLATION: Primary | ICD-10-CM

## 2021-08-24 DIAGNOSIS — R00.1 SINUS BRADYCARDIA: ICD-10-CM

## 2021-08-24 PROCEDURE — 1101F PT FALLS ASSESS-DOCD LE1/YR: CPT | Mod: CPTII,S$GLB,, | Performed by: INTERNAL MEDICINE

## 2021-08-24 PROCEDURE — 3288F FALL RISK ASSESSMENT DOCD: CPT | Mod: CPTII,S$GLB,, | Performed by: INTERNAL MEDICINE

## 2021-08-24 PROCEDURE — 3008F PR BODY MASS INDEX (BMI) DOCUMENTED: ICD-10-PCS | Mod: CPTII,S$GLB,, | Performed by: INTERNAL MEDICINE

## 2021-08-24 PROCEDURE — 3077F PR MOST RECENT SYSTOLIC BLOOD PRESSURE >= 140 MM HG: ICD-10-PCS | Mod: CPTII,S$GLB,, | Performed by: INTERNAL MEDICINE

## 2021-08-24 PROCEDURE — 99999 PR PBB SHADOW E&M-EST. PATIENT-LVL III: CPT | Mod: PBBFAC,,, | Performed by: INTERNAL MEDICINE

## 2021-08-24 PROCEDURE — 93010 RHYTHM STRIP: ICD-10-PCS | Mod: S$GLB,,, | Performed by: INTERNAL MEDICINE

## 2021-08-24 PROCEDURE — 3078F PR MOST RECENT DIASTOLIC BLOOD PRESSURE < 80 MM HG: ICD-10-PCS | Mod: CPTII,S$GLB,, | Performed by: INTERNAL MEDICINE

## 2021-08-24 PROCEDURE — 93005 ELECTROCARDIOGRAM TRACING: CPT | Mod: S$GLB,,, | Performed by: INTERNAL MEDICINE

## 2021-08-24 PROCEDURE — 3288F PR FALLS RISK ASSESSMENT DOCUMENTED: ICD-10-PCS | Mod: CPTII,S$GLB,, | Performed by: INTERNAL MEDICINE

## 2021-08-24 PROCEDURE — 3078F DIAST BP <80 MM HG: CPT | Mod: CPTII,S$GLB,, | Performed by: INTERNAL MEDICINE

## 2021-08-24 PROCEDURE — 3077F SYST BP >= 140 MM HG: CPT | Mod: CPTII,S$GLB,, | Performed by: INTERNAL MEDICINE

## 2021-08-24 PROCEDURE — 1159F MED LIST DOCD IN RCRD: CPT | Mod: CPTII,S$GLB,, | Performed by: INTERNAL MEDICINE

## 2021-08-24 PROCEDURE — 1160F RVW MEDS BY RX/DR IN RCRD: CPT | Mod: CPTII,S$GLB,, | Performed by: INTERNAL MEDICINE

## 2021-08-24 PROCEDURE — 1160F PR REVIEW ALL MEDS BY PRESCRIBER/CLIN PHARMACIST DOCUMENTED: ICD-10-PCS | Mod: CPTII,S$GLB,, | Performed by: INTERNAL MEDICINE

## 2021-08-24 PROCEDURE — 99214 OFFICE O/P EST MOD 30 MIN: CPT | Mod: S$GLB,,, | Performed by: INTERNAL MEDICINE

## 2021-08-24 PROCEDURE — 99214 PR OFFICE/OUTPT VISIT, EST, LEVL IV, 30-39 MIN: ICD-10-PCS | Mod: S$GLB,,, | Performed by: INTERNAL MEDICINE

## 2021-08-24 PROCEDURE — 1101F PR PT FALLS ASSESS DOC 0-1 FALLS W/OUT INJ PAST YR: ICD-10-PCS | Mod: CPTII,S$GLB,, | Performed by: INTERNAL MEDICINE

## 2021-08-24 PROCEDURE — 1157F PR ADVANCE CARE PLAN OR EQUIV PRESENT IN MEDICAL RECORD: ICD-10-PCS | Mod: CPTII,S$GLB,, | Performed by: INTERNAL MEDICINE

## 2021-08-24 PROCEDURE — 93010 ELECTROCARDIOGRAM REPORT: CPT | Mod: S$GLB,,, | Performed by: INTERNAL MEDICINE

## 2021-08-24 PROCEDURE — 93005 RHYTHM STRIP: ICD-10-PCS | Mod: S$GLB,,, | Performed by: INTERNAL MEDICINE

## 2021-08-24 PROCEDURE — 99999 PR PBB SHADOW E&M-EST. PATIENT-LVL III: ICD-10-PCS | Mod: PBBFAC,,, | Performed by: INTERNAL MEDICINE

## 2021-08-24 PROCEDURE — 1159F PR MEDICATION LIST DOCUMENTED IN MEDICAL RECORD: ICD-10-PCS | Mod: CPTII,S$GLB,, | Performed by: INTERNAL MEDICINE

## 2021-08-24 PROCEDURE — 3008F BODY MASS INDEX DOCD: CPT | Mod: CPTII,S$GLB,, | Performed by: INTERNAL MEDICINE

## 2021-08-24 PROCEDURE — 1157F ADVNC CARE PLAN IN RCRD: CPT | Mod: CPTII,S$GLB,, | Performed by: INTERNAL MEDICINE

## 2021-08-24 RX ORDER — VERAPAMIL HYDROCHLORIDE 120 MG/1
120 CAPSULE, EXTENDED RELEASE ORAL NIGHTLY
Qty: 30 CAPSULE | Refills: 11 | Status: SHIPPED | OUTPATIENT
Start: 2021-08-24 | End: 2021-10-14 | Stop reason: SDUPTHER

## 2021-09-09 DIAGNOSIS — C44.311 BASAL CELL CARCINOMA OF NOSE: Primary | ICD-10-CM

## 2021-09-13 ENCOUNTER — TELEPHONE (OUTPATIENT)
Dept: INTERNAL MEDICINE | Facility: CLINIC | Age: 76
End: 2021-09-13

## 2021-09-13 ENCOUNTER — HOSPITAL ENCOUNTER (OUTPATIENT)
Dept: CARDIOLOGY | Facility: HOSPITAL | Age: 76
Discharge: HOME | End: 2021-09-13
Attending: INTERNAL MEDICINE
Payer: MEDICARE

## 2021-09-13 DIAGNOSIS — I48.0 PAROXYSMAL ATRIAL FIBRILLATION: ICD-10-CM

## 2021-09-16 ENCOUNTER — PATIENT MESSAGE (OUTPATIENT)
Dept: INTERNAL MEDICINE | Facility: CLINIC | Age: 76
End: 2021-09-16

## 2021-09-20 ENCOUNTER — PROCEDURE VISIT (OUTPATIENT)
Dept: DERMATOLOGY | Facility: CLINIC | Age: 76
End: 2021-09-20
Payer: MEDICARE

## 2021-09-20 VITALS
WEIGHT: 152 LBS | HEART RATE: 53 BPM | HEIGHT: 61 IN | DIASTOLIC BLOOD PRESSURE: 79 MMHG | BODY MASS INDEX: 28.7 KG/M2 | SYSTOLIC BLOOD PRESSURE: 190 MMHG

## 2021-09-20 DIAGNOSIS — C44.311 BASAL CELL CARCINOMA OF NASAL TIP: Primary | ICD-10-CM

## 2021-09-20 PROCEDURE — 13151 PR RECMPL WND LID,NOS,EAR 1.1-2.5 CM: ICD-10-PCS | Mod: 51,S$GLB,, | Performed by: DERMATOLOGY

## 2021-09-20 PROCEDURE — 17311: ICD-10-PCS | Mod: S$GLB,,, | Performed by: DERMATOLOGY

## 2021-09-20 PROCEDURE — 99499 UNLISTED E&M SERVICE: CPT | Mod: S$GLB,,, | Performed by: DERMATOLOGY

## 2021-09-20 PROCEDURE — 13151 CMPLX RPR E/N/E/L 1.1-2.5 CM: CPT | Mod: 51,S$GLB,, | Performed by: DERMATOLOGY

## 2021-09-20 PROCEDURE — 17311 MOHS 1 STAGE H/N/HF/G: CPT | Mod: S$GLB,,, | Performed by: DERMATOLOGY

## 2021-09-20 PROCEDURE — 99499 NO LOS: ICD-10-PCS | Mod: S$GLB,,, | Performed by: DERMATOLOGY

## 2021-09-20 RX ORDER — HYDROCODONE BITARTRATE AND ACETAMINOPHEN 5; 325 MG/1; MG/1
1 TABLET ORAL EVERY 6 HOURS PRN
Qty: 10 TABLET | Refills: 0 | Status: SHIPPED | OUTPATIENT
Start: 2021-09-20 | End: 2021-10-14

## 2021-09-22 ENCOUNTER — HOSPITAL ENCOUNTER (OUTPATIENT)
Dept: CARDIOLOGY | Facility: HOSPITAL | Age: 76
Discharge: HOME | End: 2021-09-22
Attending: INTERNAL MEDICINE
Payer: MEDICARE

## 2021-09-22 ENCOUNTER — PATIENT MESSAGE (OUTPATIENT)
Dept: ELECTROPHYSIOLOGY | Facility: CLINIC | Age: 76
End: 2021-09-22

## 2021-09-22 PROCEDURE — 93227 HOLTER MONITOR - 24 HOUR (CUPID ONLY): ICD-10-PCS | Mod: ,,, | Performed by: INTERNAL MEDICINE

## 2021-09-22 PROCEDURE — 93225 XTRNL ECG REC<48 HRS REC: CPT

## 2021-09-22 PROCEDURE — 93227 XTRNL ECG REC<48 HR R&I: CPT | Mod: ,,, | Performed by: INTERNAL MEDICINE

## 2021-09-24 ENCOUNTER — OFFICE VISIT (OUTPATIENT)
Dept: ELECTROPHYSIOLOGY | Facility: CLINIC | Age: 76
End: 2021-09-24
Payer: MEDICARE

## 2021-09-24 ENCOUNTER — HOSPITAL ENCOUNTER (OUTPATIENT)
Dept: CARDIOLOGY | Facility: CLINIC | Age: 76
Discharge: HOME | End: 2021-09-24
Payer: MEDICARE

## 2021-09-24 VITALS
HEART RATE: 54 BPM | BODY MASS INDEX: 29.48 KG/M2 | HEIGHT: 60 IN | DIASTOLIC BLOOD PRESSURE: 64 MMHG | WEIGHT: 150.13 LBS | SYSTOLIC BLOOD PRESSURE: 150 MMHG

## 2021-09-24 DIAGNOSIS — I10 ESSENTIAL HYPERTENSION: ICD-10-CM

## 2021-09-24 DIAGNOSIS — Z92.89 HISTORY OF CARDIOVERSION: ICD-10-CM

## 2021-09-24 DIAGNOSIS — R00.1 SINUS BRADYCARDIA: ICD-10-CM

## 2021-09-24 DIAGNOSIS — I49.8 OTHER SPECIFIED CARDIAC ARRHYTHMIAS: ICD-10-CM

## 2021-09-24 DIAGNOSIS — I48.0 PAROXYSMAL ATRIAL FIBRILLATION: Primary | ICD-10-CM

## 2021-09-24 DIAGNOSIS — I47.9 PAROXYSMAL TACHYCARDIA: ICD-10-CM

## 2021-09-24 LAB
OHS CV EVENT MONITOR DAY: 0
OHS CV HOLTER LENGTH DECIMAL HOURS: 48
OHS CV HOLTER LENGTH HOURS: 48
OHS CV HOLTER LENGTH MINUTES: 0
OHS CV HOLTER SINUS AVERAGE HR: 56
OHS CV HOLTER SINUS MAX HR: 79
OHS CV HOLTER SINUS MIN HR: 41

## 2021-09-24 PROCEDURE — 1126F PR PAIN SEVERITY QUANTIFIED, NO PAIN PRESENT: ICD-10-PCS | Mod: CPTII,S$GLB,, | Performed by: NURSE PRACTITIONER

## 2021-09-24 PROCEDURE — 1101F PR PT FALLS ASSESS DOC 0-1 FALLS W/OUT INJ PAST YR: ICD-10-PCS | Mod: CPTII,S$GLB,, | Performed by: NURSE PRACTITIONER

## 2021-09-24 PROCEDURE — 93010 RHYTHM STRIP: ICD-10-PCS | Mod: S$GLB,,, | Performed by: INTERNAL MEDICINE

## 2021-09-24 PROCEDURE — 93005 ELECTROCARDIOGRAM TRACING: CPT | Mod: S$GLB,,, | Performed by: INTERNAL MEDICINE

## 2021-09-24 PROCEDURE — 99214 OFFICE O/P EST MOD 30 MIN: CPT | Mod: S$GLB,,, | Performed by: NURSE PRACTITIONER

## 2021-09-24 PROCEDURE — 1157F ADVNC CARE PLAN IN RCRD: CPT | Mod: CPTII,S$GLB,, | Performed by: NURSE PRACTITIONER

## 2021-09-24 PROCEDURE — 3078F DIAST BP <80 MM HG: CPT | Mod: CPTII,S$GLB,, | Performed by: NURSE PRACTITIONER

## 2021-09-24 PROCEDURE — 3077F SYST BP >= 140 MM HG: CPT | Mod: CPTII,S$GLB,, | Performed by: NURSE PRACTITIONER

## 2021-09-24 PROCEDURE — 3078F PR MOST RECENT DIASTOLIC BLOOD PRESSURE < 80 MM HG: ICD-10-PCS | Mod: CPTII,S$GLB,, | Performed by: NURSE PRACTITIONER

## 2021-09-24 PROCEDURE — 99214 PR OFFICE/OUTPT VISIT, EST, LEVL IV, 30-39 MIN: ICD-10-PCS | Mod: S$GLB,,, | Performed by: NURSE PRACTITIONER

## 2021-09-24 PROCEDURE — 1126F AMNT PAIN NOTED NONE PRSNT: CPT | Mod: CPTII,S$GLB,, | Performed by: NURSE PRACTITIONER

## 2021-09-24 PROCEDURE — 3077F PR MOST RECENT SYSTOLIC BLOOD PRESSURE >= 140 MM HG: ICD-10-PCS | Mod: CPTII,S$GLB,, | Performed by: NURSE PRACTITIONER

## 2021-09-24 PROCEDURE — 93010 ELECTROCARDIOGRAM REPORT: CPT | Mod: S$GLB,,, | Performed by: INTERNAL MEDICINE

## 2021-09-24 PROCEDURE — 3008F BODY MASS INDEX DOCD: CPT | Mod: CPTII,S$GLB,, | Performed by: NURSE PRACTITIONER

## 2021-09-24 PROCEDURE — 99999 PR PBB SHADOW E&M-EST. PATIENT-LVL III: ICD-10-PCS | Mod: PBBFAC,,, | Performed by: NURSE PRACTITIONER

## 2021-09-24 PROCEDURE — 1159F PR MEDICATION LIST DOCUMENTED IN MEDICAL RECORD: ICD-10-PCS | Mod: CPTII,S$GLB,, | Performed by: NURSE PRACTITIONER

## 2021-09-24 PROCEDURE — 3288F FALL RISK ASSESSMENT DOCD: CPT | Mod: CPTII,S$GLB,, | Performed by: NURSE PRACTITIONER

## 2021-09-24 PROCEDURE — 1160F RVW MEDS BY RX/DR IN RCRD: CPT | Mod: CPTII,S$GLB,, | Performed by: NURSE PRACTITIONER

## 2021-09-24 PROCEDURE — 1159F MED LIST DOCD IN RCRD: CPT | Mod: CPTII,S$GLB,, | Performed by: NURSE PRACTITIONER

## 2021-09-24 PROCEDURE — 3288F PR FALLS RISK ASSESSMENT DOCUMENTED: ICD-10-PCS | Mod: CPTII,S$GLB,, | Performed by: NURSE PRACTITIONER

## 2021-09-24 PROCEDURE — 3008F PR BODY MASS INDEX (BMI) DOCUMENTED: ICD-10-PCS | Mod: CPTII,S$GLB,, | Performed by: NURSE PRACTITIONER

## 2021-09-24 PROCEDURE — 1101F PT FALLS ASSESS-DOCD LE1/YR: CPT | Mod: CPTII,S$GLB,, | Performed by: NURSE PRACTITIONER

## 2021-09-24 PROCEDURE — 1157F PR ADVANCE CARE PLAN OR EQUIV PRESENT IN MEDICAL RECORD: ICD-10-PCS | Mod: CPTII,S$GLB,, | Performed by: NURSE PRACTITIONER

## 2021-09-24 PROCEDURE — 93005 RHYTHM STRIP: ICD-10-PCS | Mod: S$GLB,,, | Performed by: INTERNAL MEDICINE

## 2021-09-24 PROCEDURE — 99999 PR PBB SHADOW E&M-EST. PATIENT-LVL III: CPT | Mod: PBBFAC,,, | Performed by: NURSE PRACTITIONER

## 2021-09-24 PROCEDURE — 1160F PR REVIEW ALL MEDS BY PRESCRIBER/CLIN PHARMACIST DOCUMENTED: ICD-10-PCS | Mod: CPTII,S$GLB,, | Performed by: NURSE PRACTITIONER

## 2021-09-27 ENCOUNTER — OFFICE VISIT (OUTPATIENT)
Dept: DERMATOLOGY | Facility: CLINIC | Age: 76
End: 2021-09-27
Payer: MEDICARE

## 2021-09-27 DIAGNOSIS — Z09 POSTOP CHECK: Primary | ICD-10-CM

## 2021-09-27 PROCEDURE — 3288F PR FALLS RISK ASSESSMENT DOCUMENTED: ICD-10-PCS | Mod: CPTII,S$GLB,, | Performed by: DERMATOLOGY

## 2021-09-27 PROCEDURE — 1101F PT FALLS ASSESS-DOCD LE1/YR: CPT | Mod: CPTII,S$GLB,, | Performed by: DERMATOLOGY

## 2021-09-27 PROCEDURE — 99024 POSTOP FOLLOW-UP VISIT: CPT | Mod: S$GLB,,, | Performed by: DERMATOLOGY

## 2021-09-27 PROCEDURE — 3288F FALL RISK ASSESSMENT DOCD: CPT | Mod: CPTII,S$GLB,, | Performed by: DERMATOLOGY

## 2021-09-27 PROCEDURE — 1126F PR PAIN SEVERITY QUANTIFIED, NO PAIN PRESENT: ICD-10-PCS | Mod: CPTII,S$GLB,, | Performed by: DERMATOLOGY

## 2021-09-27 PROCEDURE — 99999 PR PBB SHADOW E&M-EST. PATIENT-LVL II: CPT | Mod: PBBFAC,,, | Performed by: DERMATOLOGY

## 2021-09-27 PROCEDURE — 1157F PR ADVANCE CARE PLAN OR EQUIV PRESENT IN MEDICAL RECORD: ICD-10-PCS | Mod: CPTII,S$GLB,, | Performed by: DERMATOLOGY

## 2021-09-27 PROCEDURE — 99999 PR PBB SHADOW E&M-EST. PATIENT-LVL II: ICD-10-PCS | Mod: PBBFAC,,, | Performed by: DERMATOLOGY

## 2021-09-27 PROCEDURE — 1157F ADVNC CARE PLAN IN RCRD: CPT | Mod: CPTII,S$GLB,, | Performed by: DERMATOLOGY

## 2021-09-27 PROCEDURE — 1126F AMNT PAIN NOTED NONE PRSNT: CPT | Mod: CPTII,S$GLB,, | Performed by: DERMATOLOGY

## 2021-09-27 PROCEDURE — 99024 PR POST-OP FOLLOW-UP VISIT: ICD-10-PCS | Mod: S$GLB,,, | Performed by: DERMATOLOGY

## 2021-09-27 PROCEDURE — 1101F PR PT FALLS ASSESS DOC 0-1 FALLS W/OUT INJ PAST YR: ICD-10-PCS | Mod: CPTII,S$GLB,, | Performed by: DERMATOLOGY

## 2021-09-29 ENCOUNTER — TELEPHONE (OUTPATIENT)
Dept: ELECTROPHYSIOLOGY | Facility: CLINIC | Age: 76
End: 2021-09-29

## 2021-10-01 ENCOUNTER — TELEPHONE (OUTPATIENT)
Dept: INTERNAL MEDICINE | Facility: CLINIC | Age: 76
End: 2021-10-01

## 2021-10-08 ENCOUNTER — PES CALL (OUTPATIENT)
Dept: ADMINISTRATIVE | Facility: CLINIC | Age: 76
End: 2021-10-08

## 2021-10-14 ENCOUNTER — PES CALL (OUTPATIENT)
Dept: ADMINISTRATIVE | Facility: CLINIC | Age: 76
End: 2021-10-14

## 2021-10-14 ENCOUNTER — OFFICE VISIT (OUTPATIENT)
Dept: INTERNAL MEDICINE | Facility: CLINIC | Age: 76
End: 2021-10-14
Payer: MEDICARE

## 2021-10-14 ENCOUNTER — HOSPITAL ENCOUNTER (OUTPATIENT)
Dept: RADIOLOGY | Facility: HOSPITAL | Age: 76
Discharge: HOME | End: 2021-10-14
Attending: INTERNAL MEDICINE
Payer: MEDICARE

## 2021-10-14 VITALS
RESPIRATION RATE: 16 BRPM | TEMPERATURE: 98 F | DIASTOLIC BLOOD PRESSURE: 68 MMHG | WEIGHT: 149.25 LBS | HEART RATE: 72 BPM | SYSTOLIC BLOOD PRESSURE: 192 MMHG | HEIGHT: 61 IN | BODY MASS INDEX: 28.18 KG/M2 | OXYGEN SATURATION: 96 %

## 2021-10-14 DIAGNOSIS — I47.9 PAROXYSMAL TACHYCARDIA: ICD-10-CM

## 2021-10-14 DIAGNOSIS — I48.0 PAROXYSMAL ATRIAL FIBRILLATION: ICD-10-CM

## 2021-10-14 DIAGNOSIS — R00.1 SINUS BRADYCARDIA: Primary | ICD-10-CM

## 2021-10-14 DIAGNOSIS — M67.40 GANGLION CYST: ICD-10-CM

## 2021-10-14 DIAGNOSIS — M15.9 PRIMARY OSTEOARTHRITIS INVOLVING MULTIPLE JOINTS: ICD-10-CM

## 2021-10-14 DIAGNOSIS — T67.1XXD HEAT SYNCOPE, SUBSEQUENT ENCOUNTER: ICD-10-CM

## 2021-10-14 DIAGNOSIS — D50.9 IRON DEFICIENCY ANEMIA, UNSPECIFIED IRON DEFICIENCY ANEMIA TYPE: ICD-10-CM

## 2021-10-14 DIAGNOSIS — Z00.00 ENCOUNTER FOR PREVENTIVE HEALTH EXAMINATION: ICD-10-CM

## 2021-10-14 DIAGNOSIS — E66.3 OVERWEIGHT: ICD-10-CM

## 2021-10-14 DIAGNOSIS — R26.89 BALANCE PROBLEMS: ICD-10-CM

## 2021-10-14 DIAGNOSIS — M54.6 ACUTE MIDLINE THORACIC BACK PAIN: ICD-10-CM

## 2021-10-14 DIAGNOSIS — N95.2 VAGINAL ATROPHY: ICD-10-CM

## 2021-10-14 DIAGNOSIS — I10 ESSENTIAL HYPERTENSION: ICD-10-CM

## 2021-10-14 DIAGNOSIS — R26.81 UNSTEADY GAIT: ICD-10-CM

## 2021-10-14 DIAGNOSIS — Z92.89 HISTORY OF CARDIOVERSION: ICD-10-CM

## 2021-10-14 PROBLEM — T67.1XXA HEAT SYNCOPE: Status: ACTIVE | Noted: 2021-10-14

## 2021-10-14 PROCEDURE — 3288F FALL RISK ASSESSMENT DOCD: CPT | Mod: CPTII,S$GLB,, | Performed by: INTERNAL MEDICINE

## 2021-10-14 PROCEDURE — 1159F PR MEDICATION LIST DOCUMENTED IN MEDICAL RECORD: ICD-10-PCS | Mod: CPTII,S$GLB,, | Performed by: INTERNAL MEDICINE

## 2021-10-14 PROCEDURE — 1157F ADVNC CARE PLAN IN RCRD: CPT | Mod: CPTII,S$GLB,, | Performed by: INTERNAL MEDICINE

## 2021-10-14 PROCEDURE — 1157F PR ADVANCE CARE PLAN OR EQUIV PRESENT IN MEDICAL RECORD: ICD-10-PCS | Mod: CPTII,S$GLB,, | Performed by: INTERNAL MEDICINE

## 2021-10-14 PROCEDURE — 3008F BODY MASS INDEX DOCD: CPT | Mod: CPTII,S$GLB,, | Performed by: INTERNAL MEDICINE

## 2021-10-14 PROCEDURE — 3078F DIAST BP <80 MM HG: CPT | Mod: CPTII,S$GLB,, | Performed by: INTERNAL MEDICINE

## 2021-10-14 PROCEDURE — 3008F PR BODY MASS INDEX (BMI) DOCUMENTED: ICD-10-PCS | Mod: CPTII,S$GLB,, | Performed by: INTERNAL MEDICINE

## 2021-10-14 PROCEDURE — 1159F MED LIST DOCD IN RCRD: CPT | Mod: CPTII,S$GLB,, | Performed by: INTERNAL MEDICINE

## 2021-10-14 PROCEDURE — 72070 XR THORACIC SPINE AP LATERAL: ICD-10-PCS | Mod: 26,,, | Performed by: RADIOLOGY

## 2021-10-14 PROCEDURE — 90694 FLU VACCINE - QUADRIVALENT - ADJUVANTED: ICD-10-PCS | Mod: S$GLB,,, | Performed by: INTERNAL MEDICINE

## 2021-10-14 PROCEDURE — 99999 PR PBB SHADOW E&M-EST. PATIENT-LVL IV: ICD-10-PCS | Mod: PBBFAC,,, | Performed by: INTERNAL MEDICINE

## 2021-10-14 PROCEDURE — 1125F AMNT PAIN NOTED PAIN PRSNT: CPT | Mod: CPTII,S$GLB,, | Performed by: INTERNAL MEDICINE

## 2021-10-14 PROCEDURE — G0439 PR MEDICARE ANNUAL WELLNESS SUBSEQUENT VISIT: ICD-10-PCS | Mod: S$GLB,,, | Performed by: INTERNAL MEDICINE

## 2021-10-14 PROCEDURE — 3077F PR MOST RECENT SYSTOLIC BLOOD PRESSURE >= 140 MM HG: ICD-10-PCS | Mod: CPTII,S$GLB,, | Performed by: INTERNAL MEDICINE

## 2021-10-14 PROCEDURE — 1125F PR PAIN SEVERITY QUANTIFIED, PAIN PRESENT: ICD-10-PCS | Mod: CPTII,S$GLB,, | Performed by: INTERNAL MEDICINE

## 2021-10-14 PROCEDURE — 99999 PR PBB SHADOW E&M-EST. PATIENT-LVL IV: CPT | Mod: PBBFAC,,, | Performed by: INTERNAL MEDICINE

## 2021-10-14 PROCEDURE — 72070 X-RAY EXAM THORAC SPINE 2VWS: CPT | Mod: TC,PO

## 2021-10-14 PROCEDURE — 1101F PR PT FALLS ASSESS DOC 0-1 FALLS W/OUT INJ PAST YR: ICD-10-PCS | Mod: CPTII,S$GLB,, | Performed by: INTERNAL MEDICINE

## 2021-10-14 PROCEDURE — G0008 FLU VACCINE - QUADRIVALENT - ADJUVANTED: ICD-10-PCS | Mod: S$GLB,,, | Performed by: INTERNAL MEDICINE

## 2021-10-14 PROCEDURE — 90694 VACC AIIV4 NO PRSRV 0.5ML IM: CPT | Mod: S$GLB,,, | Performed by: INTERNAL MEDICINE

## 2021-10-14 PROCEDURE — 99499 RISK ADDL DX/OHS AUDIT: ICD-10-PCS | Mod: S$GLB,,, | Performed by: INTERNAL MEDICINE

## 2021-10-14 PROCEDURE — 72070 X-RAY EXAM THORAC SPINE 2VWS: CPT | Mod: 26,,, | Performed by: RADIOLOGY

## 2021-10-14 PROCEDURE — 1101F PT FALLS ASSESS-DOCD LE1/YR: CPT | Mod: CPTII,S$GLB,, | Performed by: INTERNAL MEDICINE

## 2021-10-14 PROCEDURE — G0008 ADMIN INFLUENZA VIRUS VAC: HCPCS | Mod: S$GLB,,, | Performed by: INTERNAL MEDICINE

## 2021-10-14 PROCEDURE — 99499 UNLISTED E&M SERVICE: CPT | Mod: S$GLB,,, | Performed by: INTERNAL MEDICINE

## 2021-10-14 PROCEDURE — 3078F PR MOST RECENT DIASTOLIC BLOOD PRESSURE < 80 MM HG: ICD-10-PCS | Mod: CPTII,S$GLB,, | Performed by: INTERNAL MEDICINE

## 2021-10-14 PROCEDURE — G0439 PPPS, SUBSEQ VISIT: HCPCS | Mod: S$GLB,,, | Performed by: INTERNAL MEDICINE

## 2021-10-14 PROCEDURE — 3077F SYST BP >= 140 MM HG: CPT | Mod: CPTII,S$GLB,, | Performed by: INTERNAL MEDICINE

## 2021-10-14 PROCEDURE — 3288F PR FALLS RISK ASSESSMENT DOCUMENTED: ICD-10-PCS | Mod: CPTII,S$GLB,, | Performed by: INTERNAL MEDICINE

## 2021-10-14 RX ORDER — VERAPAMIL HYDROCHLORIDE 120 MG/1
120 CAPSULE, EXTENDED RELEASE ORAL NIGHTLY
Qty: 90 CAPSULE | Refills: 3 | Status: SHIPPED | OUTPATIENT
Start: 2021-10-14 | End: 2022-03-07 | Stop reason: SDUPTHER

## 2021-10-14 RX ORDER — CHOLECALCIFEROL (VITAMIN D3) 125 MCG
1 CAPSULE ORAL 3 TIMES DAILY PRN
COMMUNITY

## 2021-10-18 ENCOUNTER — TELEPHONE (OUTPATIENT)
Dept: INTERNAL MEDICINE | Facility: CLINIC | Age: 76
End: 2021-10-18

## 2021-10-22 ENCOUNTER — TELEPHONE (OUTPATIENT)
Dept: INTERNAL MEDICINE | Facility: CLINIC | Age: 76
End: 2021-10-22

## 2021-10-22 DIAGNOSIS — S22.000A COMPRESSION FRACTURE OF BODY OF THORACIC VERTEBRA: Primary | ICD-10-CM

## 2021-11-18 ENCOUNTER — TELEPHONE (OUTPATIENT)
Dept: ELECTROPHYSIOLOGY | Facility: CLINIC | Age: 76
End: 2021-11-18
Payer: MEDICARE

## 2021-11-18 ENCOUNTER — PATIENT MESSAGE (OUTPATIENT)
Dept: INTERNAL MEDICINE | Facility: CLINIC | Age: 76
End: 2021-11-18
Payer: MEDICARE

## 2021-11-19 RX ORDER — FLECAINIDE ACETATE 50 MG/1
50 TABLET ORAL EVERY 12 HOURS
Qty: 60 TABLET | Refills: 11 | OUTPATIENT
Start: 2021-11-19 | End: 2022-11-19

## 2021-11-23 DIAGNOSIS — I48.91 ATRIAL FIBRILLATION, UNSPECIFIED TYPE: ICD-10-CM

## 2021-11-23 DIAGNOSIS — I48.91 ATRIAL FIBRILLATION, UNSPECIFIED TYPE: Primary | ICD-10-CM

## 2021-11-23 RX ORDER — FLECAINIDE ACETATE 50 MG/1
50 TABLET ORAL EVERY 12 HOURS
Qty: 60 TABLET | Refills: 3 | Status: SHIPPED | OUTPATIENT
Start: 2021-11-23 | End: 2021-11-23 | Stop reason: SDUPTHER

## 2021-11-23 RX ORDER — FLECAINIDE ACETATE 50 MG/1
50 TABLET ORAL EVERY 12 HOURS
Qty: 60 TABLET | Refills: 3 | Status: SHIPPED | OUTPATIENT
Start: 2021-11-23 | End: 2022-03-07 | Stop reason: SDUPTHER

## 2021-12-02 ENCOUNTER — TELEPHONE (OUTPATIENT)
Dept: INTERNAL MEDICINE | Facility: CLINIC | Age: 76
End: 2021-12-02
Payer: MEDICARE

## 2022-01-04 ENCOUNTER — PATIENT MESSAGE (OUTPATIENT)
Dept: CARDIOLOGY | Facility: CLINIC | Age: 77
End: 2022-01-04
Payer: MEDICARE

## 2022-01-06 ENCOUNTER — PATIENT MESSAGE (OUTPATIENT)
Dept: ADMINISTRATIVE | Facility: OTHER | Age: 77
End: 2022-01-06
Payer: MEDICARE

## 2022-01-06 ENCOUNTER — TELEPHONE (OUTPATIENT)
Dept: CARDIOLOGY | Facility: CLINIC | Age: 77
End: 2022-01-06
Payer: MEDICARE

## 2022-01-06 ENCOUNTER — LAB VISIT (OUTPATIENT)
Dept: PRIMARY CARE CLINIC | Facility: CLINIC | Age: 77
End: 2022-01-06
Payer: MEDICARE

## 2022-01-06 DIAGNOSIS — Z20.822 CONTACT WITH AND (SUSPECTED) EXPOSURE TO COVID-19: ICD-10-CM

## 2022-01-06 DIAGNOSIS — I10 ESSENTIAL HYPERTENSION: Primary | ICD-10-CM

## 2022-01-06 DIAGNOSIS — I48.0 PAROXYSMAL ATRIAL FIBRILLATION: ICD-10-CM

## 2022-01-06 LAB
CTP QC/QA: YES
SARS-COV-2 AG RESP QL IA.RAPID: NEGATIVE

## 2022-01-06 PROCEDURE — 87811 SARS-COV-2 COVID19 W/OPTIC: CPT

## 2022-01-11 NOTE — PROGRESS NOTES
Ms. Mane is a patient of Dr. García and was last seen in clinic 9/24/2021.      Subjective:   Patient ID:  Magdalena Mane is a 76 y.o. female who presents for follow-up of Atrial Fibrillation  .     HPI:    Ms. Mane is a 76 y.o. female with HTN, AF here for follow up.    Background:    Referring Cardiologist: Maki Pratt MD  Primary Care Physician: Aaron Cardenas MD    Mrs. Mane has a history of hypertension and recently diagnosed persistent atrial fibrillation. She was in her usual state of health until February of 2020 when she had a respiratory illness (COVID antibody test later was negative) and since that time has had dyspnea on exertion. Also notes her pulse runs in the 50s-60s however in February her heart rate began running in the 80s (participates in the Digital HTN clinic). In early July 2020 she developed palpitations. She saw Dr. Area 7/27/2020 and was diagnosed with atrial fibrillation and was referred to Dr. Pratt who started eliquis for stroke risk reduction ([id number] score 3). She followed up with Dr. Pratt on 9/16/2020 and remained in atrial fibrillation for which she presents for further evaluation. She notes her dyspnea on exertion and palpitations have largely resolved despite AF persistence however still has exercise intolerance.    She reports an episode of pAF post hip surgery in 2014 at [facility].    An echocardiogram from July of 2020 notes preserved LV function with moderate LA enlargement.     Mrs. Mane underwent DCCV on 9/29/2020 with NSR with symptomatic benefit but then had AF recurrence. She underwent repeat DCCV on 12/8/2020 and started on low dose flecainide (has resting sinus bradycardia). Her 1 week post-DCCV ECG noted AF recurrence. At her follow-up visit in December of 2020 she was in sinus rhythm and noted she is in and out of AF. She reported her breathing improved when she was in normal rhythm but feels fatigued when in normal rhythm correlating with a  pulse rate in the 40s. We discussed option of PVI versus PPM + AAD therapy versus dofetilide and stopping AV delma agents. She elected initially for dofetilide however had a change of mind and elected to stagger her metoprolol and flecainide and felt better.    A holter monitor 1/2021 noted no AF and an average sinus rate of 56 bpm.    8/24/2021: Mrs. Mane is now paroxysmal on low dose flecainide. She has shortness of breath with AF and then fatigue in sinus bradycardia. She is intolerant to flecainide and metoprolol together. She feels better off metoprolol. Discussed she should not be on flecainide without an AV delma blocking agent. Unable to do dofetilide inpatient loading right now due to COVID surge and would rather avoid a PPM. She may consider PVI at some point. Low dose sotalol (40mg bid) alone may be an option although she is concerned about it affecting her libido. Can try verapamil 120mg daily in lieu of amlodipine with the hope it has less SA delma effect than a beta-blocker.     9/24/2021: She is feeling much better off beta blocker and on verapamil. Remains on low dose flecainide. Holter results not yet available, but given her improvement in symptoms, can likely continue this regimen for now. We discussed next steps should she need to change med: tikosyn vs PVI vs PPM. She would likely proceed with tikosyn. CHADSVASc 3 on eliquis.    Update (01/24/2022):    Holter showed no AF. Average HR 56bpm.    Today she reports she is feeling well. Edema improved, sleeping better. No new cardiac complaints. No CP, worsening SOB, palps, LH, syncope. No HA.    She is currently taking eliquis 5mg BID for stroke prophylaxis and denies significant bleeding episodes. She is currently being treated with flecainide 50mg BID for rhythm control and verapamil 120mg daily for HR control.  Kidney function is stable, with a creatinine of 0.8 on 7/16/2021.    I have personally reviewed the patient's EKG today, which shows  sinus bradycardia with 1st deg AVB at 52bpm. OK interval is 206. QRS is 94. QT is 446.    Relevant Cardiac Test Results:    LIGIA (12/8/2020):  · LIGIA for evaluation of DILIP prior DCCV.  · No thrombus is present in the appendage. Abnormal appendage velocities 0.4 m/s.  · The left ventricle is normal in size with normal systolic function. The estimated ejection fraction is 55%.  · Normal right ventricular size with normal right ventricular systolic function.  · Mild mitral regurgitation.    Current Outpatient Medications   Medication Sig    acetaminophen (TYLENOL) 500 MG tablet Take 500 mg by mouth 2 (two) times daily.    apixaban (ELIQUIS) 5 mg Tab Take 1 tablet (5 mg total) by mouth 2 (two) times daily.    calcium-vitamin D 250-100 mg-unit per tablet Take 1 tablet by mouth 2 (two) times daily.    cholecalciferol, vitamin D3, (VITAMIN D3) 100 mcg (4,000 unit) Cap Take by mouth once daily.    ferrous sulfate 324 mg (65 mg iron) TbEC Take 325 mg by mouth. Every other day    flecainide (TAMBOCOR) 50 MG Tab Take 1 tablet (50 mg total) by mouth every 12 (twelve) hours.    hydroCHLOROthiazide (HYDRODIURIL) 12.5 MG Tab Take 1 tablet (12.5 mg total) by mouth once daily.    irbesartan (AVAPRO) 300 MG tablet Take 1 tablet (300 mg total) by mouth once daily.    lactase (LACTAID) 3,000 unit tablet Take 1 tablet by mouth 3 (three) times daily as needed.    loratadine (CLARITIN) 10 mg tablet Take 10 mg by mouth once daily.    spironolactone (ALDACTONE) 25 MG tablet Take 1 tablet (25 mg total) by mouth once daily.    verapamiL (VERELAN) 120 MG C24P Take 1 capsule (120 mg total) by mouth every evening.    amLODIPine (NORVASC) 5 MG tablet Take 5 mg by mouth once daily.     No current facility-administered medications for this visit.       Review of Systems   Constitutional: Negative for malaise/fatigue.   Cardiovascular: Negative for chest pain, dyspnea on exertion, irregular heartbeat, leg swelling and palpitations.    Respiratory: Negative for shortness of breath.    Hematologic/Lymphatic: Negative for bleeding problem.   Skin: Negative for rash.   Musculoskeletal: Negative for myalgias.   Gastrointestinal: Negative for hematemesis, hematochezia and nausea.   Genitourinary: Negative for hematuria.   Neurological: Negative for light-headedness.   Psychiatric/Behavioral: Negative for altered mental status.   Allergic/Immunologic: Negative for persistent infections.       Objective:          BP (!) 198/95   Pulse (!) 52   Ht 5' (1.524 m)   Wt 66 kg (145 lb 8.1 oz)   BMI 28.42 kg/m²     Physical Exam  Vitals and nursing note reviewed.   Constitutional:       Appearance: Normal appearance. She is well-developed and well-nourished.   HENT:      Head: Normocephalic.      Nose: Nose normal.   Eyes:      Pupils: Pupils are equal, round, and reactive to light.   Cardiovascular:      Rate and Rhythm: Regular rhythm. Bradycardia present.   Pulmonary:      Effort: No respiratory distress.      Breath sounds: Normal breath sounds.   Musculoskeletal:         General: No edema. Normal range of motion.   Skin:     General: Skin is warm and dry.      Findings: No erythema.   Neurological:      Mental Status: She is alert and oriented to person, place, and time.   Psychiatric:         Mood and Affect: Mood and affect normal.         Speech: Speech normal.         Behavior: Behavior normal.       Lab Results   Component Value Date     (L) 07/16/2021    K 3.9 07/16/2021    MG 1.9 05/13/2014    BUN 13 07/16/2021    CREATININE 0.8 07/16/2021    ALT 12 07/16/2021    AST 19 07/16/2021    HGB 11.6 (L) 07/16/2021    HCT 35.2 (L) 07/16/2021    TSH 2.391 07/16/2021    LDLCALC 87.0 07/16/2021       Recent Labs   Lab 09/22/20  1144 12/01/20  1024   INR 1.0 1.0         Assessment:     1. Paroxysmal atrial fibrillation    2. Essential hypertension    3. Sinus bradycardia    4. History of cardioversion      Plan:     In summary, Ms. Mane is a 76  y.o. female with HTN, AF here for follow up.  She is doing well from a rhythm standpoint, maintaining SR on low dose flecainide. EKG with stable intervals. Bradycardic but asymptomatic. She is feeling well.  BPs significantly elevated in clinic but she says she is 150s systolic at home. She is on the digital HTN program. She has been prescribed spironolactone but has not picked it up yet. I urged her to pick it up and start 1st dose today.  CHADSVAsc 2 on eliquis.     Start spironolactone as prescribed today - follow up with digital medicine  Continue other medications  RTC 6 mo, sooner if needed.    *A copy of this note has been sent to Dr. García*    Follow up in about 6 months (around 7/24/2022).    ------------------------------------------------------------------    CRYS Barahona, NP-C  Cardiac Electrophysiology

## 2022-01-23 ENCOUNTER — PATIENT MESSAGE (OUTPATIENT)
Dept: ADMINISTRATIVE | Facility: OTHER | Age: 77
End: 2022-01-23
Payer: MEDICARE

## 2022-01-24 ENCOUNTER — OFFICE VISIT (OUTPATIENT)
Dept: ELECTROPHYSIOLOGY | Facility: CLINIC | Age: 77
End: 2022-01-24
Payer: MEDICARE

## 2022-01-24 ENCOUNTER — HOSPITAL ENCOUNTER (OUTPATIENT)
Dept: CARDIOLOGY | Facility: CLINIC | Age: 77
Discharge: HOME | End: 2022-01-24
Payer: MEDICARE

## 2022-01-24 VITALS
WEIGHT: 145.5 LBS | HEART RATE: 52 BPM | DIASTOLIC BLOOD PRESSURE: 95 MMHG | SYSTOLIC BLOOD PRESSURE: 198 MMHG | BODY MASS INDEX: 28.57 KG/M2 | HEIGHT: 60 IN

## 2022-01-24 DIAGNOSIS — Z92.89 HISTORY OF CARDIOVERSION: ICD-10-CM

## 2022-01-24 DIAGNOSIS — I48.0 PAROXYSMAL ATRIAL FIBRILLATION: Primary | ICD-10-CM

## 2022-01-24 DIAGNOSIS — I49.8 OTHER SPECIFIED CARDIAC ARRHYTHMIAS: ICD-10-CM

## 2022-01-24 DIAGNOSIS — R00.1 SINUS BRADYCARDIA: ICD-10-CM

## 2022-01-24 DIAGNOSIS — I10 ESSENTIAL HYPERTENSION: ICD-10-CM

## 2022-01-24 PROCEDURE — 1159F MED LIST DOCD IN RCRD: CPT | Mod: CPTII,S$GLB,, | Performed by: NURSE PRACTITIONER

## 2022-01-24 PROCEDURE — 3077F SYST BP >= 140 MM HG: CPT | Mod: CPTII,S$GLB,, | Performed by: NURSE PRACTITIONER

## 2022-01-24 PROCEDURE — 1126F PR PAIN SEVERITY QUANTIFIED, NO PAIN PRESENT: ICD-10-PCS | Mod: CPTII,S$GLB,, | Performed by: NURSE PRACTITIONER

## 2022-01-24 PROCEDURE — 3080F DIAST BP >= 90 MM HG: CPT | Mod: CPTII,S$GLB,, | Performed by: NURSE PRACTITIONER

## 2022-01-24 PROCEDURE — 1126F AMNT PAIN NOTED NONE PRSNT: CPT | Mod: CPTII,S$GLB,, | Performed by: NURSE PRACTITIONER

## 2022-01-24 PROCEDURE — 1101F PT FALLS ASSESS-DOCD LE1/YR: CPT | Mod: CPTII,S$GLB,, | Performed by: NURSE PRACTITIONER

## 2022-01-24 PROCEDURE — 1157F PR ADVANCE CARE PLAN OR EQUIV PRESENT IN MEDICAL RECORD: ICD-10-PCS | Mod: CPTII,S$GLB,, | Performed by: NURSE PRACTITIONER

## 2022-01-24 PROCEDURE — 93005 ELECTROCARDIOGRAM TRACING: CPT | Mod: S$GLB,,, | Performed by: INTERNAL MEDICINE

## 2022-01-24 PROCEDURE — 3080F PR MOST RECENT DIASTOLIC BLOOD PRESSURE >= 90 MM HG: ICD-10-PCS | Mod: CPTII,S$GLB,, | Performed by: NURSE PRACTITIONER

## 2022-01-24 PROCEDURE — 99999 PR PBB SHADOW E&M-EST. PATIENT-LVL IV: ICD-10-PCS | Mod: PBBFAC,,, | Performed by: NURSE PRACTITIONER

## 2022-01-24 PROCEDURE — 1160F PR REVIEW ALL MEDS BY PRESCRIBER/CLIN PHARMACIST DOCUMENTED: ICD-10-PCS | Mod: CPTII,S$GLB,, | Performed by: NURSE PRACTITIONER

## 2022-01-24 PROCEDURE — 93010 ELECTROCARDIOGRAM REPORT: CPT | Mod: S$GLB,,, | Performed by: INTERNAL MEDICINE

## 2022-01-24 PROCEDURE — 93010 RHYTHM STRIP: ICD-10-PCS | Mod: S$GLB,,, | Performed by: INTERNAL MEDICINE

## 2022-01-24 PROCEDURE — 3008F PR BODY MASS INDEX (BMI) DOCUMENTED: ICD-10-PCS | Mod: CPTII,S$GLB,, | Performed by: NURSE PRACTITIONER

## 2022-01-24 PROCEDURE — 1159F PR MEDICATION LIST DOCUMENTED IN MEDICAL RECORD: ICD-10-PCS | Mod: CPTII,S$GLB,, | Performed by: NURSE PRACTITIONER

## 2022-01-24 PROCEDURE — 99214 OFFICE O/P EST MOD 30 MIN: CPT | Mod: S$GLB,,, | Performed by: NURSE PRACTITIONER

## 2022-01-24 PROCEDURE — 99999 PR PBB SHADOW E&M-EST. PATIENT-LVL IV: CPT | Mod: PBBFAC,,, | Performed by: NURSE PRACTITIONER

## 2022-01-24 PROCEDURE — 3008F BODY MASS INDEX DOCD: CPT | Mod: CPTII,S$GLB,, | Performed by: NURSE PRACTITIONER

## 2022-01-24 PROCEDURE — 1101F PR PT FALLS ASSESS DOC 0-1 FALLS W/OUT INJ PAST YR: ICD-10-PCS | Mod: CPTII,S$GLB,, | Performed by: NURSE PRACTITIONER

## 2022-01-24 PROCEDURE — 3288F FALL RISK ASSESSMENT DOCD: CPT | Mod: CPTII,S$GLB,, | Performed by: NURSE PRACTITIONER

## 2022-01-24 PROCEDURE — 3288F PR FALLS RISK ASSESSMENT DOCUMENTED: ICD-10-PCS | Mod: CPTII,S$GLB,, | Performed by: NURSE PRACTITIONER

## 2022-01-24 PROCEDURE — 1157F ADVNC CARE PLAN IN RCRD: CPT | Mod: CPTII,S$GLB,, | Performed by: NURSE PRACTITIONER

## 2022-01-24 PROCEDURE — 1160F RVW MEDS BY RX/DR IN RCRD: CPT | Mod: CPTII,S$GLB,, | Performed by: NURSE PRACTITIONER

## 2022-01-24 PROCEDURE — 93005 RHYTHM STRIP: ICD-10-PCS | Mod: S$GLB,,, | Performed by: INTERNAL MEDICINE

## 2022-01-24 PROCEDURE — 99214 PR OFFICE/OUTPT VISIT, EST, LEVL IV, 30-39 MIN: ICD-10-PCS | Mod: S$GLB,,, | Performed by: NURSE PRACTITIONER

## 2022-01-24 PROCEDURE — 3077F PR MOST RECENT SYSTOLIC BLOOD PRESSURE >= 140 MM HG: ICD-10-PCS | Mod: CPTII,S$GLB,, | Performed by: NURSE PRACTITIONER

## 2022-01-24 RX ORDER — AMLODIPINE BESYLATE 5 MG/1
5 TABLET ORAL DAILY
COMMUNITY
Start: 2021-09-04 | End: 2022-03-14

## 2022-01-25 ENCOUNTER — PATIENT MESSAGE (OUTPATIENT)
Dept: ADMINISTRATIVE | Facility: OTHER | Age: 77
End: 2022-01-25
Payer: MEDICARE

## 2022-01-26 ENCOUNTER — TELEPHONE (OUTPATIENT)
Dept: INTERNAL MEDICINE | Facility: CLINIC | Age: 77
End: 2022-01-26
Payer: MEDICARE

## 2022-02-13 ENCOUNTER — PATIENT MESSAGE (OUTPATIENT)
Dept: ELECTROPHYSIOLOGY | Facility: CLINIC | Age: 77
End: 2022-02-13
Payer: MEDICARE

## 2022-02-18 NOTE — PROGRESS NOTES
Last 5 Patient Entered Readings                                      Current 30 Day Average: 115/54     Recent Readings 6/29/2019 6/19/2019 6/10/2019 5/30/2019 5/23/2019    SBP (mmHg) 115 97 106 120 119    DBP (mmHg) 54 70 53 56 60    Pulse 58 82 51 57 57          Digital Medicine: Health  Follow Up    Lifestyle Modifications:    1.Dietary Modifications (Sodium intake <2,000mg/day, food labels, dining out): Deferred    2.Physical Activity: Deferred    3.Medication Therapy: Patient has been compliant with the medication regimen. Patient is doing well on her current BP medication regimen. She denies symptoms/side effects.     4.Patient has the following medication side effects/concerns: None  (Frequency/Alleviating factors/Precipitating factors, etc.)     Patient informed me that she has been out of town for a few weeks but that she will be returning home tomorrow evening. She plans on going to get a new [employer] cuff on Thursday when she goes for her Mammogram because she is still using the [employer] cuff which gives her issues every once and a while. She will reach out if she experiences any issues.     Follow up with Mrs. Magdalena QUIÑONEZ Alhaji completed. No further questions or concerns. Will continue to follow up to achieve health goals.   92.1

## 2022-03-03 ENCOUNTER — LAB VISIT (OUTPATIENT)
Dept: LAB | Facility: HOSPITAL | Age: 77
End: 2022-03-03
Attending: INTERNAL MEDICINE
Payer: MEDICARE

## 2022-03-03 ENCOUNTER — TELEPHONE (OUTPATIENT)
Dept: CARDIOLOGY | Facility: CLINIC | Age: 77
End: 2022-03-03
Payer: MEDICARE

## 2022-03-03 DIAGNOSIS — I10 ESSENTIAL HYPERTENSION: ICD-10-CM

## 2022-03-03 DIAGNOSIS — I48.0 PAROXYSMAL ATRIAL FIBRILLATION: ICD-10-CM

## 2022-03-03 LAB
ALBUMIN SERPL BCP-MCNC: 3.6 G/DL (ref 3.5–5.2)
ALP SERPL-CCNC: 97 U/L (ref 55–135)
ALT SERPL W/O P-5'-P-CCNC: 12 U/L (ref 10–44)
ANION GAP SERPL CALC-SCNC: 8 MMOL/L (ref 8–16)
AST SERPL-CCNC: 20 U/L (ref 10–40)
BILIRUB SERPL-MCNC: 0.6 MG/DL (ref 0.1–1)
BUN SERPL-MCNC: 16 MG/DL (ref 8–23)
CALCIUM SERPL-MCNC: 9.6 MG/DL (ref 8.7–10.5)
CHLORIDE SERPL-SCNC: 91 MMOL/L (ref 95–110)
CHOLEST SERPL-MCNC: 176 MG/DL (ref 120–199)
CHOLEST/HDLC SERPL: 2.4 {RATIO} (ref 2–5)
CO2 SERPL-SCNC: 29 MMOL/L (ref 23–29)
CREAT SERPL-MCNC: 0.8 MG/DL (ref 0.5–1.4)
EST. GFR  (AFRICAN AMERICAN): >60 ML/MIN/1.73 M^2
EST. GFR  (NON AFRICAN AMERICAN): >60 ML/MIN/1.73 M^2
GLUCOSE SERPL-MCNC: 85 MG/DL (ref 70–110)
HDLC SERPL-MCNC: 73 MG/DL (ref 40–75)
HDLC SERPL: 41.5 % (ref 20–50)
LDLC SERPL CALC-MCNC: 93.2 MG/DL (ref 63–159)
NONHDLC SERPL-MCNC: 103 MG/DL
POTASSIUM SERPL-SCNC: 4.6 MMOL/L (ref 3.5–5.1)
PROT SERPL-MCNC: 6.7 G/DL (ref 6–8.4)
SODIUM SERPL-SCNC: 128 MMOL/L (ref 136–145)
TRIGL SERPL-MCNC: 49 MG/DL (ref 30–150)
TSH SERPL DL<=0.005 MIU/L-ACNC: 2.8 UIU/ML (ref 0.4–4)

## 2022-03-03 PROCEDURE — 84443 ASSAY THYROID STIM HORMONE: CPT | Performed by: INTERNAL MEDICINE

## 2022-03-03 PROCEDURE — 36415 COLL VENOUS BLD VENIPUNCTURE: CPT | Mod: PO | Performed by: INTERNAL MEDICINE

## 2022-03-03 PROCEDURE — 80061 LIPID PANEL: CPT | Performed by: INTERNAL MEDICINE

## 2022-03-03 PROCEDURE — 80053 COMPREHEN METABOLIC PANEL: CPT | Performed by: INTERNAL MEDICINE

## 2022-03-03 NOTE — TELEPHONE ENCOUNTER
----- Message from Maki Pratt MD sent at 3/3/2022  3:01 PM CST -----  Please review.  We will discuss the results during your upcoming visit with me. The results look good.

## 2022-03-03 NOTE — PROGRESS NOTES
Please review.  We will discuss the results during your upcoming visit with me. The results look good.

## 2022-03-04 ENCOUNTER — PATIENT MESSAGE (OUTPATIENT)
Dept: ELECTROPHYSIOLOGY | Facility: CLINIC | Age: 77
End: 2022-03-04
Payer: MEDICARE

## 2022-03-07 DIAGNOSIS — I48.0 PAROXYSMAL ATRIAL FIBRILLATION: ICD-10-CM

## 2022-03-07 DIAGNOSIS — I48.91 ATRIAL FIBRILLATION, UNSPECIFIED TYPE: ICD-10-CM

## 2022-03-07 RX ORDER — VERAPAMIL HYDROCHLORIDE 120 MG/1
120 CAPSULE, EXTENDED RELEASE ORAL NIGHTLY
Qty: 90 CAPSULE | Refills: 3 | Status: SHIPPED | OUTPATIENT
Start: 2022-03-07 | End: 2022-12-02 | Stop reason: SDUPTHER

## 2022-03-07 RX ORDER — FLECAINIDE ACETATE 50 MG/1
50 TABLET ORAL EVERY 12 HOURS
Qty: 180 TABLET | Refills: 3 | Status: SHIPPED | OUTPATIENT
Start: 2022-03-07 | End: 2023-01-13 | Stop reason: SDUPTHER

## 2022-03-14 ENCOUNTER — OFFICE VISIT (OUTPATIENT)
Dept: CARDIOLOGY | Facility: CLINIC | Age: 77
End: 2022-03-14
Payer: MEDICARE

## 2022-03-14 VITALS
WEIGHT: 145.31 LBS | HEIGHT: 60 IN | DIASTOLIC BLOOD PRESSURE: 71 MMHG | SYSTOLIC BLOOD PRESSURE: 163 MMHG | BODY MASS INDEX: 28.53 KG/M2 | HEART RATE: 54 BPM

## 2022-03-14 DIAGNOSIS — I10 ESSENTIAL HYPERTENSION: Primary | ICD-10-CM

## 2022-03-14 DIAGNOSIS — Z92.89 HISTORY OF CARDIOVERSION: ICD-10-CM

## 2022-03-14 DIAGNOSIS — I47.9 PAROXYSMAL TACHYCARDIA: ICD-10-CM

## 2022-03-14 DIAGNOSIS — I48.0 PAROXYSMAL ATRIAL FIBRILLATION: ICD-10-CM

## 2022-03-14 DIAGNOSIS — R09.89 BILATERAL CAROTID BRUITS: ICD-10-CM

## 2022-03-14 PROCEDURE — 3078F PR MOST RECENT DIASTOLIC BLOOD PRESSURE < 80 MM HG: ICD-10-PCS | Mod: CPTII,S$GLB,, | Performed by: INTERNAL MEDICINE

## 2022-03-14 PROCEDURE — 1100F PTFALLS ASSESS-DOCD GE2>/YR: CPT | Mod: CPTII,S$GLB,, | Performed by: INTERNAL MEDICINE

## 2022-03-14 PROCEDURE — 3077F PR MOST RECENT SYSTOLIC BLOOD PRESSURE >= 140 MM HG: ICD-10-PCS | Mod: CPTII,S$GLB,, | Performed by: INTERNAL MEDICINE

## 2022-03-14 PROCEDURE — 1157F PR ADVANCE CARE PLAN OR EQUIV PRESENT IN MEDICAL RECORD: ICD-10-PCS | Mod: CPTII,S$GLB,, | Performed by: INTERNAL MEDICINE

## 2022-03-14 PROCEDURE — 1157F ADVNC CARE PLAN IN RCRD: CPT | Mod: CPTII,S$GLB,, | Performed by: INTERNAL MEDICINE

## 2022-03-14 PROCEDURE — 4010F ACE/ARB THERAPY RXD/TAKEN: CPT | Mod: CPTII,S$GLB,, | Performed by: INTERNAL MEDICINE

## 2022-03-14 PROCEDURE — 3288F PR FALLS RISK ASSESSMENT DOCUMENTED: ICD-10-PCS | Mod: CPTII,S$GLB,, | Performed by: INTERNAL MEDICINE

## 2022-03-14 PROCEDURE — 4010F PR ACE/ARB THEARPY RXD/TAKEN: ICD-10-PCS | Mod: CPTII,S$GLB,, | Performed by: INTERNAL MEDICINE

## 2022-03-14 PROCEDURE — 99999 PR PBB SHADOW E&M-EST. PATIENT-LVL III: CPT | Mod: PBBFAC,,, | Performed by: INTERNAL MEDICINE

## 2022-03-14 PROCEDURE — 1159F MED LIST DOCD IN RCRD: CPT | Mod: CPTII,S$GLB,, | Performed by: INTERNAL MEDICINE

## 2022-03-14 PROCEDURE — 1100F PR PT FALLS ASSESS DOC 2+ FALLS/FALL W/INJURY/YR: ICD-10-PCS | Mod: CPTII,S$GLB,, | Performed by: INTERNAL MEDICINE

## 2022-03-14 PROCEDURE — 3078F DIAST BP <80 MM HG: CPT | Mod: CPTII,S$GLB,, | Performed by: INTERNAL MEDICINE

## 2022-03-14 PROCEDURE — 99214 OFFICE O/P EST MOD 30 MIN: CPT | Mod: S$GLB,,, | Performed by: INTERNAL MEDICINE

## 2022-03-14 PROCEDURE — 3077F SYST BP >= 140 MM HG: CPT | Mod: CPTII,S$GLB,, | Performed by: INTERNAL MEDICINE

## 2022-03-14 PROCEDURE — 1159F PR MEDICATION LIST DOCUMENTED IN MEDICAL RECORD: ICD-10-PCS | Mod: CPTII,S$GLB,, | Performed by: INTERNAL MEDICINE

## 2022-03-14 PROCEDURE — 1126F PR PAIN SEVERITY QUANTIFIED, NO PAIN PRESENT: ICD-10-PCS | Mod: CPTII,S$GLB,, | Performed by: INTERNAL MEDICINE

## 2022-03-14 PROCEDURE — 99214 PR OFFICE/OUTPT VISIT, EST, LEVL IV, 30-39 MIN: ICD-10-PCS | Mod: S$GLB,,, | Performed by: INTERNAL MEDICINE

## 2022-03-14 PROCEDURE — 3288F FALL RISK ASSESSMENT DOCD: CPT | Mod: CPTII,S$GLB,, | Performed by: INTERNAL MEDICINE

## 2022-03-14 PROCEDURE — 1126F AMNT PAIN NOTED NONE PRSNT: CPT | Mod: CPTII,S$GLB,, | Performed by: INTERNAL MEDICINE

## 2022-03-14 PROCEDURE — 99999 PR PBB SHADOW E&M-EST. PATIENT-LVL III: ICD-10-PCS | Mod: PBBFAC,,, | Performed by: INTERNAL MEDICINE

## 2022-03-14 NOTE — PROGRESS NOTES
Subjective:   Patient ID:  Magdalena Mane is a 77 y.o. female who presents for follow-up of Hypertension      HPI: Patient follows with digital program. Recently her diuretic regimen was adjusted due to low sodium. Patient also liberalized sodium intake.  She hasm ore energy and is overall doing well. Denies palpitations or shortness of breath out of proportion to activity.      Today BP is elevated.    Blood work is fine, except for hyponatremia.    Lab Results   Component Value Date     (L) 03/03/2022    K 4.6 03/03/2022    CL 91 (L) 03/03/2022    CO2 29 03/03/2022    BUN 16 03/03/2022    CREATININE 0.8 03/03/2022    GLU 85 03/03/2022    MG 1.9 05/13/2014    AST 20 03/03/2022    ALT 12 03/03/2022    ALBUMIN 3.6 03/03/2022    PROT 6.7 03/03/2022    BILITOT 0.6 03/03/2022    WBC 5.77 07/16/2021    HGB 11.6 (L) 07/16/2021    HCT 35.2 (L) 07/16/2021    MCV 96 07/16/2021     07/16/2021    INR 1.0 12/01/2020    TSH 2.798 03/03/2022    CHOL 176 03/03/2022    HDL 73 03/03/2022    LDLCALC 93.2 03/03/2022    TRIG 49 03/03/2022       No results found in the last 24 hours.     Review of Systems   Constitutional: Positive for weight loss.   HENT: Positive for hearing loss.    Eyes: Negative.    Cardiovascular: Negative.  Negative for chest pain, claudication, dyspnea on exertion, irregular heartbeat, leg swelling, near-syncope, palpitations and syncope.   Respiratory: Positive for cough and wheezing. Negative for shortness of breath and snoring.    Endocrine: Negative.  Negative for cold intolerance, heat intolerance, polydipsia, polyphagia and polyuria.   Skin: Negative.    Musculoskeletal: Positive for arthritis, back pain, falls and joint pain.   Gastrointestinal: Negative.    Genitourinary: Negative.    Neurological: Positive for excessive daytime sleepiness, headaches and loss of balance.   Psychiatric/Behavioral: The patient is nervous/anxious.        Objective:   Physical Exam  Vitals and nursing note  reviewed.   Constitutional:       Appearance: She is well-developed.      Comments: BP (!) 163/71   Pulse (!) 54   Ht 5' (1.524 m)   Wt 65.9 kg (145 lb 4.5 oz)   BMI 28.37 kg/m²      HENT:      Head: Normocephalic.   Eyes:      Pupils: Pupils are equal, round, and reactive to light.   Neck:      Thyroid: No thyromegaly.      Vascular: No carotid bruit.   Cardiovascular:      Rate and Rhythm: Normal rate and regular rhythm.      Pulses: Intact distal pulses.           Carotid pulses are 2+ on the right side with bruit and 2+ on the left side with bruit.       Radial pulses are 2+ on the right side and 2+ on the left side.        Femoral pulses are 2+ on the right side and 2+ on the left side.       Popliteal pulses are 2+ on the right side and 2+ on the left side.        Dorsalis pedis pulses are 2+ on the right side and 2+ on the left side.        Posterior tibial pulses are 2+ on the right side and 2+ on the left side.      Heart sounds: Normal heart sounds. No murmur heard.    No friction rub. No gallop.   Pulmonary:      Effort: Pulmonary effort is normal. No respiratory distress.      Breath sounds: Normal breath sounds. No wheezing or rales.   Chest:      Chest wall: No tenderness.   Abdominal:      General: Bowel sounds are normal.      Palpations: Abdomen is soft.   Musculoskeletal:         General: Normal range of motion.      Cervical back: Normal range of motion and neck supple.      Right lower leg: Edema present.      Left lower leg: Edema present.   Skin:     General: Skin is warm and dry.   Neurological:      Mental Status: She is alert and oriented to person, place, and time.           Assessment and Plan:     Problem List Items Addressed This Visit        Cardiology Problems    Essential hypertension - Primary    Paroxysmal tachycardia    Atrial fibrillation       Other    History of cardioversion          Patient's Medications   New Prescriptions    No medications on file   Previous Medications     ACETAMINOPHEN (TYLENOL) 500 MG TABLET    Take 500 mg by mouth 2 (two) times daily.    CALCIUM-VITAMIN D 250-100 MG-UNIT PER TABLET    Take 1 tablet by mouth 2 (two) times daily.    CHOLECALCIFEROL, VITAMIN D3, (VITAMIN D3 ORAL)    Take 3,000 Units by mouth once daily.    ELIQUIS 5 MG TAB    TAKE 1 TABLET BY MOUTH  TWICE DAILY    FLECAINIDE (TAMBOCOR) 50 MG TAB    Take 1 tablet (50 mg total) by mouth every 12 (twelve) hours.    HYDROCHLOROTHIAZIDE (HYDRODIURIL) 12.5 MG TAB    Take 1 (12.5 mg) tablet by mouth every MWF.    IRBESARTAN (AVAPRO) 300 MG TABLET    Take 1 tablet (300 mg total) by mouth once daily.    LACTASE (LACTAID) 3,000 UNIT TABLET    Take 1 tablet by mouth 3 (three) times daily as needed.    LORATADINE (CLARITIN) 10 MG TABLET    Take 10 mg by mouth once daily.    SPIRONOLACTONE (ALDACTONE) 25 MG TABLET    Take 0.5 tablets (12.5 mg total) by mouth once daily.    VERAPAMIL (VERELAN) 120 MG C24P    Take 1 capsule (120 mg total) by mouth every evening.   Modified Medications    No medications on file   Discontinued Medications    AMLODIPINE (NORVASC) 5 MG TABLET    Take 5 mg by mouth once daily.    FERROUS SULFATE 324 MG (65 MG IRON) TBEC    Take 325 mg by mouth. Every other day     Patient will continue with digital medicine to further adjust her medical regimen.  Advised to limit sodium intake. Instead to limit free water to 1.5quart a day.  Repeat BMP is pending.  Follow up in about 1 year (around 3/14/2023).

## 2022-03-22 ENCOUNTER — TELEPHONE (OUTPATIENT)
Dept: CARDIOLOGY | Facility: CLINIC | Age: 77
End: 2022-03-22
Payer: MEDICARE

## 2022-03-22 ENCOUNTER — HOSPITAL ENCOUNTER (OUTPATIENT)
Dept: CARDIOLOGY | Facility: HOSPITAL | Age: 77
Discharge: HOME | End: 2022-03-22
Attending: INTERNAL MEDICINE
Payer: MEDICARE

## 2022-03-22 ENCOUNTER — TELEPHONE (OUTPATIENT)
Dept: VASCULAR SURGERY | Facility: CLINIC | Age: 77
End: 2022-03-22
Payer: MEDICARE

## 2022-03-22 DIAGNOSIS — I47.9 PAROXYSMAL TACHYCARDIA: ICD-10-CM

## 2022-03-22 DIAGNOSIS — I48.0 PAROXYSMAL ATRIAL FIBRILLATION: ICD-10-CM

## 2022-03-22 DIAGNOSIS — R09.89 BILATERAL CAROTID BRUITS: ICD-10-CM

## 2022-03-22 DIAGNOSIS — Z92.89 HISTORY OF CARDIOVERSION: ICD-10-CM

## 2022-03-22 DIAGNOSIS — I10 ESSENTIAL HYPERTENSION: ICD-10-CM

## 2022-03-22 LAB
LEFT ARM DIASTOLIC BLOOD PRESSURE: 70 MMHG
LEFT ARM SYSTOLIC BLOOD PRESSURE: 180 MMHG
LEFT CBA DIAS: 9 CM/S
LEFT CBA SYS: 59 CM/S
LEFT CCA DIST DIAS: 17 CM/S
LEFT CCA DIST SYS: 85 CM/S
LEFT CCA MID DIAS: 16 CM/S
LEFT CCA MID SYS: 71 CM/S
LEFT CCA PROX DIAS: 23 CM/S
LEFT CCA PROX SYS: 88 CM/S
LEFT ECA DIAS: 19 CM/S
LEFT ECA SYS: 78 CM/S
LEFT ICA DIST DIAS: 46 CM/S
LEFT ICA DIST SYS: 232 CM/S
LEFT ICA MID DIAS: 43 CM/S
LEFT ICA MID SYS: 175 CM/S
LEFT ICA PROX DIAS: 18 CM/S
LEFT ICA PROX SYS: 86 CM/S
LEFT VERTEBRAL DIAS: 20 CM/S
LEFT VERTEBRAL SYS: 82 CM/S
OHS CV CAROTID RIGHT ICA EDV HIGHEST: 202
OHS CV CAROTID ULTRASOUND LEFT ICA/CCA RATIO: 2.73
OHS CV CAROTID ULTRASOUND RIGHT ICA/CCA RATIO: 15.73
OHS CV PV CAROTID LEFT HIGHEST CCA: 88
OHS CV PV CAROTID LEFT HIGHEST ICA: 232
OHS CV PV CAROTID RIGHT HIGHEST CCA: 58
OHS CV PV CAROTID RIGHT HIGHEST ICA: 771
OHS CV US CAROTID LEFT HIGHEST EDV: 46
RIGHT ARM DIASTOLIC BLOOD PRESSURE: 70 MMHG
RIGHT ARM SYSTOLIC BLOOD PRESSURE: 190 MMHG
RIGHT CBA DIAS: 9 CM/S
RIGHT CBA SYS: 50 CM/S
RIGHT CCA DIST DIAS: 9 CM/S
RIGHT CCA DIST SYS: 49 CM/S
RIGHT CCA MID DIAS: 11 CM/S
RIGHT CCA MID SYS: 58 CM/S
RIGHT CCA PROX DIAS: 7 CM/S
RIGHT CCA PROX SYS: 56 CM/S
RIGHT ECA DIAS: 14 CM/S
RIGHT ECA SYS: 65 CM/S
RIGHT ICA DIST DIAS: 9 CM/S
RIGHT ICA DIST SYS: 81 CM/S
RIGHT ICA MID DIAS: 202 CM/S
RIGHT ICA MID SYS: 771 CM/S
RIGHT ICA PROX DIAS: 7 CM/S
RIGHT ICA PROX SYS: 41 CM/S
RIGHT VERTEBRAL DIAS: 8 CM/S
RIGHT VERTEBRAL SYS: 72 CM/S

## 2022-03-22 PROCEDURE — 93880 EXTRACRANIAL BILAT STUDY: CPT | Mod: 26,,, | Performed by: INTERNAL MEDICINE

## 2022-03-22 PROCEDURE — 93880 CV US DOPPLER CAROTID (CUPID ONLY): ICD-10-PCS | Mod: 26,,, | Performed by: INTERNAL MEDICINE

## 2022-03-22 PROCEDURE — 93880 EXTRACRANIAL BILAT STUDY: CPT

## 2022-03-22 NOTE — PROGRESS NOTES
Please inform the patient that her carotid duplex showed severe rigth ICA blockage and moderate left ICA blockage. .Please set up an appointment with vascular surgery ASAP.

## 2022-03-22 NOTE — TELEPHONE ENCOUNTER
Pt notified, verbalized understanding. This message routed to Dr. Brantley and his staff to schedule pt ASAP.

## 2022-03-22 NOTE — TELEPHONE ENCOUNTER
----- Message from Maki Pratt MD sent at 3/22/2022 11:39 AM CDT -----  Please inform the patient that her carotid duplex showed severe rigth ICA blockage and moderate left ICA blockage. .Please set up an appointment with vascular surgery ASAP.

## 2022-03-22 NOTE — TELEPHONE ENCOUNTER
Contacted patient to schedule appt with Dr. Brantley from referral by Dr. Pratt for ICA stenosis. Appointment scheduled, pt verified.

## 2022-03-25 ENCOUNTER — INITIAL CONSULT (OUTPATIENT)
Dept: VASCULAR SURGERY | Facility: CLINIC | Age: 77
End: 2022-03-25
Payer: MEDICARE

## 2022-03-25 VITALS
WEIGHT: 141.13 LBS | BODY MASS INDEX: 27.71 KG/M2 | TEMPERATURE: 99 F | HEIGHT: 60 IN | DIASTOLIC BLOOD PRESSURE: 77 MMHG | HEART RATE: 54 BPM | SYSTOLIC BLOOD PRESSURE: 180 MMHG

## 2022-03-25 DIAGNOSIS — I65.21 ASYMPTOMATIC CAROTID ARTERY STENOSIS WITHOUT INFARCTION, RIGHT: Primary | ICD-10-CM

## 2022-03-25 DIAGNOSIS — Z01.818 PRE-OP EVALUATION: ICD-10-CM

## 2022-03-25 DIAGNOSIS — I65.23 BILATERAL CAROTID ARTERY STENOSIS: ICD-10-CM

## 2022-03-25 PROCEDURE — 99999 PR PBB SHADOW E&M-EST. PATIENT-LVL III: CPT | Mod: PBBFAC,,, | Performed by: SURGERY

## 2022-03-25 PROCEDURE — 99205 PR OFFICE/OUTPT VISIT, NEW, LEVL V, 60-74 MIN: ICD-10-PCS | Mod: S$GLB,,, | Performed by: SURGERY

## 2022-03-25 PROCEDURE — 99999 PR PBB SHADOW E&M-EST. PATIENT-LVL III: ICD-10-PCS | Mod: PBBFAC,,, | Performed by: SURGERY

## 2022-03-25 PROCEDURE — 99205 OFFICE O/P NEW HI 60 MIN: CPT | Mod: S$GLB,,, | Performed by: SURGERY

## 2022-03-25 RX ORDER — ATORVASTATIN CALCIUM 20 MG/1
20 TABLET, FILM COATED ORAL DAILY
Qty: 30 TABLET | Refills: 11 | Status: SHIPPED | OUTPATIENT
Start: 2022-03-25 | End: 2023-04-26 | Stop reason: SDUPTHER

## 2022-03-25 NOTE — H&P (VIEW-ONLY)
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  She has no history of stroke TIA or amaurosis.  She has no history of coronary artery disease chest pain or shortness of breath.    She is not on antiplatelet agent or statin    Past Medical History:   Diagnosis Date    Anemia     Atrial fibrillation     Basal cell carcinoma     DJD (degenerative joint disease) 12/12/2012    Obesity (BMI 30-39.9) 11/27/2015    Vaginal atrophy 4/1/2016       Past Surgical History:   Procedure Laterality Date    BREAST BIOPSY Left 1999    Excisional bx, benign cyst    CARDIOVERSION  02/2021    COLONOSCOPY  2012    normal    COLONOSCOPY N/A 6/28/2017    Procedure: COLONOSCOPY;  Surgeon: Markel Montemayor MD;  Location: [facility] (4TH FLR);  Service: Endoscopy;  Laterality: N/A;    EXCISION OF GANGLION OF WRIST Left 6/27/2018    Procedure: EXCISION, GANGLION CYST, WRIST - Left Volar wrist;  Surgeon: Mary Mooer MD;  [facility];  Service: Orthopedics;  Laterality: Left;    HIP SURGERY  05/05/2014    bilateral    JOINT REPLACEMENT Bilateral     MOLLY    TONSILLECTOMY      TREATMENT OF CARDIAC ARRHYTHMIA N/A 9/29/2020    Procedure: CARDIOVERSION;  Surgeon: Nigel García MD;  Location: [facility] EP LAB;  Service: Cardiology;  Laterality: N/A;  AF, LIGIA, DCCV, MAC, WI, 3 Prep    TREATMENT OF CARDIAC ARRHYTHMIA N/A 12/8/2020    Procedure: Cardioversion or Defibrillation;  Surgeon: Nigel García MD;  Location: [facility] EP LAB;  Service: Cardiology;  Laterality: N/A;  AF, LIGIA, DCCV, MAC, WI, 3 Prep         Current Outpatient Medications:     acetaminophen (TYLENOL) 500 MG tablet, Take 500 mg by mouth 2 (two) times daily., Disp: , Rfl:     calcium-vitamin D 250-100 mg-unit per tablet, Take 1  tablet by mouth 2 (two) times daily., Disp: , Rfl:     cholecalciferol, vitamin D3, (VITAMIN D3 ORAL), Take 3,000 Units by mouth once daily., Disp: , Rfl:     ELIQUIS 5 mg Tab, TAKE 1 TABLET BY MOUTH  TWICE DAILY, Disp: 180 tablet, Rfl: 3    flecainide (TAMBOCOR) 50 MG Tab, Take 1 tablet (50 mg total) by mouth every 12 (twelve) hours., Disp: 180 tablet, Rfl: 3    hydroCHLOROthiazide (HYDRODIURIL) 12.5 MG Tab, Take 1 (12.5 mg) tablet by mouth every MWF., Disp: 30 tablet, Rfl: 5    irbesartan (AVAPRO) 300 MG tablet, Take 1 tablet (300 mg total) by mouth once daily., Disp: 30 tablet, Rfl: 5    lactase (LACTAID) 3,000 unit tablet, Take 1 tablet by mouth 3 (three) times daily as needed., Disp: , Rfl:     loratadine (CLARITIN) 10 mg tablet, Take 10 mg by mouth once daily., Disp: , Rfl:     spironolactone (ALDACTONE) 25 MG tablet, Take 0.5 tablets (12.5 mg total) by mouth once daily., Disp: 30 tablet, Rfl: 5    verapamiL (VERELAN) 120 MG C24P, Take 1 capsule (120 mg total) by mouth every evening., Disp: 90 capsule, Rfl: 3    atorvastatin (LIPITOR) 20 MG tablet, Take 1 tablet (20 mg total) by mouth once daily., Disp: 30 tablet, Rfl: 11    Review of patient's allergies indicates:   Allergen Reactions    Prednisone      ELEVATED B/P FOR SEVERAL DAYS/WENT TO ER    Lactose        Family History   Problem Relation Age of Onset    Breast cancer Mother     Arthritis Mother     Hyperlipidemia Mother     Scoliosis Father     Heart disease Father     Tuberculosis Father          1 lung from collapse lung    Heart attack Father     Heart failure Father     Hyperlipidemia Father     Hypertension Father     No Known Problems Brother     Stroke Maternal Grandmother     Heart disease Paternal Grandmother        Social History     Tobacco Use    Smoking status: Former Smoker     Quit date: 1988     Years since quittin.2    Smokeless tobacco: Never Used   Substance Use Topics    Alcohol use: Never     Drug use: No       REVIEW OF SYSTEMS:  General: No chills, fever, malaise, changes in weight  HEENT: No visual changes, difficulty hearing  Cardiovascular: No chest pain, palpitations, claudication  Pulmonary: No dyspnea, cough, wheezing  Gastrointestinal: No nausea, vomiting, diarrhea, constipation  Genitourinary: No dysuria, low urine output, hematuria  Endocrine: No polydipsia, polyphagia  Hematologic: No fatigue with exertion, pica, pallor  Musculoskeletal: No extremity or joint pain, no back pain, no difficulty with ambulation  Neurologic: no seizures, no headaches, no weakness  Psychiatric: no mood disturbance      PHYSICAL EXAM:   BP (!) 180/77 (BP Location: Right arm, Patient Position: Sitting, BP Method: Large (Automatic))   Pulse (!) 54   Temp 98.5 °F (36.9 °C) (Oral)   Ht 5' (1.524 m)   Wt 64 kg (141 lb 1.5 oz)   BMI 27.56 kg/m²   Constitutional:  Alert,   Well-appearing  In no distress.   Neurological: Normal speech  no focal findings  CN II - XII grossly intact.    Psychiatric: Mood and affect appropriate and symmetric.   HEENT: Normocephalic / atraumatic  PERRLA  Midline trachea  No scars across the neck.  Neck is supple   Cardiac: Regular rate and rhythm.   Pulmonary: Normal pulmonary effort.   Abdomen: Soft, not distended.     Skin: Warm and well perfused.    Vascular:  Radial pulses 2+   Extremities/  Musculoskeletal: No edema.        IMAGING:  Carotid duplex (cardiology) demonstrates greater than 95% right carotid stenosis peak velocity of 771, end-diastolic of 202.  There is a moderate left carotid stenosis 50-69%    IMPRESSION:   1. Greater than 95% right carotid stenosis, neurologically asymptomatic.   She is not a candidate for the crest 2 trial because of her atrial fibrillation.   This degree of stenosis, definitive therapy is warranted.  We discussed carotid endarterectomy, transfemoral carotid stenting and TCAR.       PLAN:    1.  Right carotid endarterectomy  04/07/2022  2. GETA  3.   Begin aspirin 81 mg daily, and Lipitor 20 mg daily now  4.  Hold Eliquis for 2 days prior to surgery    I have explained the risks, benefits and alternatives for this procedure in detail.  The patient voices understanding and all questions have be answered, and agrees to proceed with the procedure.    MECCA Brantley III, MD, FACS  Professor and Chief, Vascular and Endovascular Surgery     CC: Dr Pratt

## 2022-03-25 NOTE — PROGRESS NOTES
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  She has no history of stroke TIA or amaurosis.  She has no history of coronary artery disease chest pain or shortness of breath.    She is not on antiplatelet agent or statin    Past Medical History:   Diagnosis Date    Anemia     Atrial fibrillation     Basal cell carcinoma     DJD (degenerative joint disease) 12/12/2012    Obesity (BMI 30-39.9) 11/27/2015    Vaginal atrophy 4/1/2016       Past Surgical History:   Procedure Laterality Date    BREAST BIOPSY Left 1999    Excisional bx, benign cyst    CARDIOVERSION  02/2021    COLONOSCOPY  2012    normal    COLONOSCOPY N/A 6/28/2017    Procedure: COLONOSCOPY;  Surgeon: Markel Montemayor MD;  Location: [facility] (4TH FLR);  Service: Endoscopy;  Laterality: N/A;    EXCISION OF GANGLION OF WRIST Left 6/27/2018    Procedure: EXCISION, GANGLION CYST, WRIST - Left Volar wrist;  Surgeon: Mary Moore MD;  [facility];  Service: Orthopedics;  Laterality: Left;    HIP SURGERY  05/05/2014    bilateral    JOINT REPLACEMENT Bilateral     MOLLY    TONSILLECTOMY      TREATMENT OF CARDIAC ARRHYTHMIA N/A 9/29/2020    Procedure: CARDIOVERSION;  Surgeon: Nigel García MD;  Location: [facility] EP LAB;  Service: Cardiology;  Laterality: N/A;  AF, LIGIA, DCCV, MAC, WA, 3 Prep    TREATMENT OF CARDIAC ARRHYTHMIA N/A 12/8/2020    Procedure: Cardioversion or Defibrillation;  Surgeon: Nigel García MD;  Location: [facility] EP LAB;  Service: Cardiology;  Laterality: N/A;  AF, LIGIA, DCCV, MAC, WA, 3 Prep         Current Outpatient Medications:     acetaminophen (TYLENOL) 500 MG tablet, Take 500 mg by mouth 2 (two) times daily., Disp: , Rfl:     calcium-vitamin D 250-100 mg-unit per tablet, Take 1  tablet by mouth 2 (two) times daily., Disp: , Rfl:     cholecalciferol, vitamin D3, (VITAMIN D3 ORAL), Take 3,000 Units by mouth once daily., Disp: , Rfl:     ELIQUIS 5 mg Tab, TAKE 1 TABLET BY MOUTH  TWICE DAILY, Disp: 180 tablet, Rfl: 3    flecainide (TAMBOCOR) 50 MG Tab, Take 1 tablet (50 mg total) by mouth every 12 (twelve) hours., Disp: 180 tablet, Rfl: 3    hydroCHLOROthiazide (HYDRODIURIL) 12.5 MG Tab, Take 1 (12.5 mg) tablet by mouth every MWF., Disp: 30 tablet, Rfl: 5    irbesartan (AVAPRO) 300 MG tablet, Take 1 tablet (300 mg total) by mouth once daily., Disp: 30 tablet, Rfl: 5    lactase (LACTAID) 3,000 unit tablet, Take 1 tablet by mouth 3 (three) times daily as needed., Disp: , Rfl:     loratadine (CLARITIN) 10 mg tablet, Take 10 mg by mouth once daily., Disp: , Rfl:     spironolactone (ALDACTONE) 25 MG tablet, Take 0.5 tablets (12.5 mg total) by mouth once daily., Disp: 30 tablet, Rfl: 5    verapamiL (VERELAN) 120 MG C24P, Take 1 capsule (120 mg total) by mouth every evening., Disp: 90 capsule, Rfl: 3    atorvastatin (LIPITOR) 20 MG tablet, Take 1 tablet (20 mg total) by mouth once daily., Disp: 30 tablet, Rfl: 11    Review of patient's allergies indicates:   Allergen Reactions    Prednisone      ELEVATED B/P FOR SEVERAL DAYS/WENT TO ER    Lactose        Family History   Problem Relation Age of Onset    Breast cancer Mother     Arthritis Mother     Hyperlipidemia Mother     Scoliosis Father     Heart disease Father     Tuberculosis Father          1 lung from collapse lung    Heart attack Father     Heart failure Father     Hyperlipidemia Father     Hypertension Father     No Known Problems Brother     Stroke Maternal Grandmother     Heart disease Paternal Grandmother        Social History     Tobacco Use    Smoking status: Former Smoker     Quit date: 1988     Years since quittin.2    Smokeless tobacco: Never Used   Substance Use Topics    Alcohol use: Never     Drug use: No       REVIEW OF SYSTEMS:  General: No chills, fever, malaise, changes in weight  HEENT: No visual changes, difficulty hearing  Cardiovascular: No chest pain, palpitations, claudication  Pulmonary: No dyspnea, cough, wheezing  Gastrointestinal: No nausea, vomiting, diarrhea, constipation  Genitourinary: No dysuria, low urine output, hematuria  Endocrine: No polydipsia, polyphagia  Hematologic: No fatigue with exertion, pica, pallor  Musculoskeletal: No extremity or joint pain, no back pain, no difficulty with ambulation  Neurologic: no seizures, no headaches, no weakness  Psychiatric: no mood disturbance      PHYSICAL EXAM:   BP (!) 180/77 (BP Location: Right arm, Patient Position: Sitting, BP Method: Large (Automatic))   Pulse (!) 54   Temp 98.5 °F (36.9 °C) (Oral)   Ht 5' (1.524 m)   Wt 64 kg (141 lb 1.5 oz)   BMI 27.56 kg/m²   Constitutional:  Alert,   Well-appearing  In no distress.   Neurological: Normal speech  no focal findings  CN II - XII grossly intact.    Psychiatric: Mood and affect appropriate and symmetric.   HEENT: Normocephalic / atraumatic  PERRLA  Midline trachea  No scars across the neck.  Neck is supple   Cardiac: Regular rate and rhythm.   Pulmonary: Normal pulmonary effort.   Abdomen: Soft, not distended.     Skin: Warm and well perfused.    Vascular:  Radial pulses 2+   Extremities/  Musculoskeletal: No edema.        IMAGING:  Carotid duplex (cardiology) demonstrates greater than 95% right carotid stenosis peak velocity of 771, end-diastolic of 202.  There is a moderate left carotid stenosis 50-69%    IMPRESSION:   1. Greater than 95% right carotid stenosis, neurologically asymptomatic.   She is not a candidate for the crest 2 trial because of her atrial fibrillation.   This degree of stenosis, definitive therapy is warranted.  We discussed carotid endarterectomy, transfemoral carotid stenting and TCAR.       PLAN:    1.  Right carotid endarterectomy  04/07/2022  2. GETA  3.   Begin aspirin 81 mg daily, and Lipitor 20 mg daily now  4.  Hold Eliquis for 2 days prior to surgery    I have explained the risks, benefits and alternatives for this procedure in detail.  The patient voices understanding and all questions have be answered, and agrees to proceed with the procedure.    MECCA Brantley III, MD, FACS  Professor and Chief, Vascular and Endovascular Surgery     CC: Dr Pratt

## 2022-03-25 NOTE — LETTER
[facility] - Vascular Surg 5th Fl  [address]  [facility] [address]  Phone: [phone]  Fax: [phone] April 14, 2022      Maki Pratt MD  [address] [facility]  8th [address]  [place] [address]    Patient: Magdalena Mane   MR Number: [id number]   YOB: 1945   Date of Visit: 3/25/2022     Dear Dr. Pratt:    Thank you for referring Magdalena Mane to me for evaluation. Attached are the relevant portions of my assessment and plan of care.    If you have questions, please do not hesitate to call me. I look forward to following Magdalena along with you.    Sincerely,        TUCKER Brantley III, MD   Professor and Chief  Vascular and Endovascular Surgery  Vice-Chair, Department of Surgery  [facility]    CC  Ruby Garner MD

## 2022-03-31 ENCOUNTER — PATIENT MESSAGE (OUTPATIENT)
Dept: SURGERY | Facility: HOSPITAL | Age: 77
End: 2022-03-31
Payer: MEDICARE

## 2022-04-01 ENCOUNTER — PES CALL (OUTPATIENT)
Dept: ADMINISTRATIVE | Facility: CLINIC | Age: 77
End: 2022-04-01
Payer: MEDICARE

## 2022-04-04 ENCOUNTER — HOSPITAL ENCOUNTER (OUTPATIENT)
Dept: CARDIOLOGY | Facility: CLINIC | Age: 77
Discharge: HOME | DRG: 038 | End: 2022-04-04
Payer: MEDICARE

## 2022-04-04 ENCOUNTER — HOSPITAL ENCOUNTER (OUTPATIENT)
Dept: RADIOLOGY | Facility: HOSPITAL | Age: 77
Discharge: HOME | DRG: 038 | End: 2022-04-04
Attending: SURGERY
Payer: MEDICARE

## 2022-04-04 DIAGNOSIS — Z01.818 PRE-OP EVALUATION: ICD-10-CM

## 2022-04-04 PROCEDURE — 93005 ELECTROCARDIOGRAM TRACING: CPT | Mod: S$GLB,,, | Performed by: SURGERY

## 2022-04-04 PROCEDURE — 71046 X-RAY EXAM CHEST 2 VIEWS: CPT | Mod: TC,FY

## 2022-04-04 PROCEDURE — 93010 ELECTROCARDIOGRAM REPORT: CPT | Mod: S$GLB,,, | Performed by: INTERNAL MEDICINE

## 2022-04-04 PROCEDURE — 93010 EKG 12-LEAD: ICD-10-PCS | Mod: S$GLB,,, | Performed by: INTERNAL MEDICINE

## 2022-04-04 PROCEDURE — 93005 EKG 12-LEAD: ICD-10-PCS | Mod: S$GLB,,, | Performed by: SURGERY

## 2022-04-04 PROCEDURE — 71046 X-RAY EXAM CHEST 2 VIEWS: CPT | Mod: 26,,, | Performed by: RADIOLOGY

## 2022-04-04 PROCEDURE — 71046 XR CHEST PA AND LATERAL: ICD-10-PCS | Mod: 26,,, | Performed by: RADIOLOGY

## 2022-04-05 ENCOUNTER — PES CALL (OUTPATIENT)
Dept: ADMINISTRATIVE | Facility: CLINIC | Age: 77
End: 2022-04-05
Payer: MEDICARE

## 2022-04-06 ENCOUNTER — TELEPHONE (OUTPATIENT)
Dept: VASCULAR SURGERY | Facility: CLINIC | Age: 77
End: 2022-04-06
Payer: MEDICARE

## 2022-04-06 ENCOUNTER — ANESTHESIA EVENT (OUTPATIENT)
Dept: SURGERY | Facility: HOSPITAL | Age: 77
DRG: 038 | End: 2022-04-06
Payer: MEDICARE

## 2022-04-06 RX ORDER — ASPIRIN 81 MG/1
81 TABLET ORAL NIGHTLY
COMMUNITY

## 2022-04-06 RX ORDER — LANOLIN ALCOHOL/MO/W.PET/CERES
1 CREAM (GRAM) TOPICAL NIGHTLY
COMMUNITY
End: 2023-11-29 | Stop reason: ALTCHOICE

## 2022-04-06 NOTE — ANESTHESIA PREPROCEDURE EVALUATION
[facility]  Anesthesia Pre-Operative Evaluation        Patient Name: Magdalena Mane  YOB: 1945  MRN: [id number]    SUBJECTIVE:     Pre-operative Evaluation for Procedure(s) (LRB):  ENDARTERECTOMY-CAROTID (Right)     04/06/2022    Magdalena Mane is a 77 y.o. female with a PMHx significant for HTN and AFib with high-grade right carotid stenosis.     She now presents for the above procedure(s) with Vascular Surgery - Dr. Brantley.    Previous Airway: None documented.    Patient Active Problem List   Diagnosis    DJD (degenerative joint disease)    Essential hypertension    Iron deficiency anemia    Vaginal atrophy    Overweight    Ganglion cyst    Balance problems    Unsteady gait    Paroxysmal tachycardia    Atrial fibrillation    History of cardioversion    Sinus bradycardia    Heat syncope    Bilateral carotid bruits    Bilateral carotid artery stenosis       Review of patient's allergies indicates:   Allergen Reactions    Prednisone      ELEVATED B/P FOR SEVERAL DAYS/WENT TO ER    Lactose        Current Outpatient Medications   Medication Instructions    acetaminophen (TYLENOL) 500 mg, Oral, 2 times daily    aspirin (ECOTRIN) 81 mg, Oral, Nightly    atorvastatin (LIPITOR) 20 mg, Oral, Daily    calcium-vitamin D 250-100 mg-unit per tablet 1 tablet, Oral, 2 times daily    cholecalciferol, vitamin D3, (VITAMIN D3 ORAL) 3,000 Units, Oral, Daily    ELIQUIS 5 mg Tab TAKE 1 TABLET BY MOUTH  TWICE DAILY    ferrous sulfate (FEOSOL) Tab tablet 1 tablet, Oral, Nightly, PATIENT TAKING 3 X WEEKLY IN THE EVENING (M-W-F)    flecainide (TAMBOCOR) 50 mg, Oral, Every 12 hours    hydroCHLOROthiazide (HYDRODIURIL) 12.5 MG Tab Take 1 (12.5 mg) tablet by mouth every MWF.    irbesartan (AVAPRO) 300 mg, Oral, Daily    lactase (LACTAID) 3,000 unit tablet 1 tablet, Oral, 3 times daily PRN    loratadine (CLARITIN) 10 mg, Oral, Every morning    spironolactone (ALDACTONE) 12.5  mg, Oral, Daily    verapamiL (VERELAN) 120 mg, Oral, Nightly       Past Surgical History:   Procedure Laterality Date    BREAST BIOPSY Left 1999    Excisional bx, benign cyst    CARDIOVERSION  02/2021    COLONOSCOPY  2012    normal    COLONOSCOPY N/A 6/28/2017    Procedure: COLONOSCOPY;  Surgeon: Markel Montemayor MD;  Location: [facility] (4TH FLR);  Service: Endoscopy;  Laterality: N/A;    EXCISION OF GANGLION OF WRIST Left 6/27/2018    Procedure: EXCISION, GANGLION CYST, WRIST - Left Volar wrist;  Surgeon: Mary Moore MD;  [facility];  Service: Orthopedics;  Laterality: Left;    HIP SURGERY  05/05/2014    bilateral    JOINT REPLACEMENT Bilateral     MOLLY    TONSILLECTOMY      TREATMENT OF CARDIAC ARRHYTHMIA N/A 9/29/2020    Procedure: CARDIOVERSION;  Surgeon: Nigel García MD;  Location: [facility] EP LAB;  Service: Cardiology;  Laterality: N/A;  AF, LIGIA, DCCV, MAC, ID, 3 Prep    TREATMENT OF CARDIAC ARRHYTHMIA N/A 12/8/2020    Procedure: Cardioversion or Defibrillation;  Surgeon: Nigel García MD;  Location: [facility] EP LAB;  Service: Cardiology;  Laterality: N/A;  AF, LIGIA, DCCV, MAC, ID, 3 Prep       Social History     Substance and Sexual Activity   Drug Use No     Alcohol Use: Not At Risk    Frequency of Alcohol Consumption: Never    Average Number of Drinks: Not on file    Frequency of Binge Drinking: Never     Tobacco Use: Medium Risk    Smoking Tobacco Use: Former Smoker    Smokeless Tobacco Use: Never Used       OBJECTIVE:     Vital Signs Range (Last 24H):         Significant Labs    Heme Profile  Lab Results   Component Value Date    WBC 8.87 04/04/2022    HGB 11.3 (L) 04/04/2022    HCT 34.4 (L) 04/04/2022     04/04/2022       Coagulation Studies  Lab Results   Component Value Date    LABPROT 10.9 12/01/2020    INR 1.0 12/01/2020    APTT 33.1 (H) 12/01/2020       BMP  Lab Results   Component Value Date     (L) 03/22/2022    K 4.5 03/22/2022    CL 99 03/22/2022    CO2  27 03/22/2022    BUN 17 03/22/2022    CREATININE 0.8 03/22/2022    MG 1.9 05/13/2014       Liver Function Tests  Lab Results   Component Value Date    AST 20 03/03/2022    ALT 12 03/03/2022    ALKPHOS 97 03/03/2022    BILITOT 0.6 03/03/2022    PROT 6.7 03/03/2022    ALBUMIN 3.6 03/03/2022       Lipid Profile  Lab Results   Component Value Date    CHOL 176 03/03/2022    HDL 73 03/03/2022    TRIG 49 03/03/2022       Endocrine Profile  Lab Results   Component Value Date    TSH 2.798 03/03/2022       Diagnostic Studies    XR Chest (04/04/2022):  The lungs are clear, with normal appearance of pulmonary vasculature and no pleural effusion or pneumothorax.     The cardiac silhouette is normal in size.  Atherosclerosis of the aortic arch.  The hilar and mediastinal contours are unremarkable.     Bones are intact. Scoliosis of the thoracolumbar spine.    Cardiac Studies    EKG:   Results for orders placed or performed during the hospital encounter of 04/04/22   EKG 12-lead    Collection Time: 04/04/22 10:00 AM    Narrative    Test Reason : Z01.818,    Vent. Rate : 054 BPM     Atrial Rate : 054 BPM     P-R Int : 214 ms          QRS Dur : 092 ms      QT Int : 438 ms       P-R-T Axes : 068 077 065 degrees     QTc Int : 415 ms    Sinus bradycardia with 1st degree A-V block  Otherwise normal ECG  When compared with ECG of 24-JAN-2022 11:30,  No significant change was found  Confirmed by DAHLIA PALUMBO MD ([id number]) on 4/4/2022 11:41:15 AM    Referred By: TUCKRE POWELL           Confirmed By:DAHLIA PALUMBO MD       TTE (07/28/2020):  Interpretation Summary  · Atrial fibrillation observed.  · Local segmental wall motion abnormalities.  · Normal left ventricular systolic function. The estimated ejection fraction is 58%.  · Normal right ventricular systolic function.  · Moderate left atrial enlargement.  · Mild right atrial enlargement.  · Trivial -mild aortic valve stenosis and trivial AR.  · Aortic valve area is 1.80 cm2; peak velocity  is 1.42 m/s; mean gradient is 5 mmHg.  · Mild tricuspid regurgitation.  · Normal central venous pressure (3 mmHg).  · The estimated PA systolic pressure is 28 mmHg.      LIGIA (12/08/2020):  Interpretation Summary  · LIGIA for evaluation of DILIP prior DCCV.  · No thrombus is present in the appendage. Abnormal appendage velocities 0.4 m/s.  · The left ventricle is normal in size with normal systolic function. The estimated ejection fraction is 55%.  · Normal right ventricular size with normal right ventricular systolic function.  · Mild mitral regurgitation.      ASSESSMENT/PLAN:     Magdalena Mane is a 77 y.o. female presenting for right carotid endarterectomy.    Pre-op Assessment    I have reviewed the Patient Summary Reports.     I have reviewed the Nursing Notes. I have reviewed the NPO Status.   I have reviewed the Medications.     Review of Systems  Anesthesia Hx:  No problems with previous Anesthesia  History of prior surgery of interest to airway management or planning: Previous anesthesia: General Personal Hx of Anesthesia complications Slow To Awaken/Delayed Emergence   Cardiovascular:  Functional Capacity good / => 4 METS  Denies Congestive Heart Failure (CHF)  Carotoid Artery Disease, bilateral  Hypertension  Disorder of Cardiac Rhythm, Atrial Fibrillation    Pulmonary:  Denies Asthma.  Denies Obstructive Sleep Apnea (PHYLICIA)   Endocrine:  Denies Thyroid Disease        Physical Exam  General: Well nourished, Cooperative, Alert and Oriented    Airway:  Mallampati: I   Mouth Opening: Normal  TM Distance: Normal  Tongue: Normal  Neck ROM: Normal ROM    Dental:  Intact    Chest/Lungs:  Clear to auscultation    Heart:  Rate: Normal  Rhythm: Regular Rhythm  Sounds: Normal    Abdomen:  Normal, Soft, Nontender        Anesthesia Plan  Type of Anesthesia, risks & benefits discussed:    Anesthesia Type: Gen ETT  Intra-op Monitoring Plan: Standard ASA Monitors and Art Line  Post Op Pain Control Plan: multimodal analgesia  and IV/PO Opioids PRN  Induction:  IV  Airway Plan: Direct, Post-Induction  Informed Consent: Informed consent signed with the Patient and all parties understand the risks and agree with anesthesia plan.  All questions answered. Patient consented to blood products? Yes  ASA Score: 3  Day of Surgery Review of History & Physical: H&P Update referred to the surgeon/provider.    Ready For Surgery From Anesthesia Perspective.     .

## 2022-04-06 NOTE — PRE-PROCEDURE INSTRUCTIONS
PREOP INSTRUCTIONS:No food,milk or milk products for 8 hours before surgery.  Clear liquids like water,gatorade,apple juice are allowed up until 2 hours before surgery.  Instructed to follow the surgeon's instructions if they differ from these.  Shower instructions as well as directions to the Surgery Center were given.  Encouraged to wear loose fitting,comfortable clothing.  Medication instructions for pm prior to and am of procedure reviewed.  Instructed to avoid taking vitamins,supplements and ibuprofen the morning of surgery.     Patient does not know arrival time.Explained that this information comes from the surgeon's office and if they haven't heard from them by 2 or 3 pm to call the office.Patient stated an understanding.

## 2022-04-07 ENCOUNTER — HOSPITAL ENCOUNTER (INPATIENT)
Facility: HOSPITAL | Age: 77
LOS: 1 days | Discharge: HOME | DRG: 038 | End: 2022-04-08
Attending: SURGERY | Admitting: SURGERY
Payer: MEDICARE

## 2022-04-07 ENCOUNTER — ANESTHESIA (OUTPATIENT)
Dept: SURGERY | Facility: HOSPITAL | Age: 77
DRG: 038 | End: 2022-04-07
Payer: MEDICARE

## 2022-04-07 DIAGNOSIS — I65.29 CAROTID ARTERY STENOSIS: Primary | ICD-10-CM

## 2022-04-07 LAB
ALBUMIN SERPL BCP-MCNC: 3.4 G/DL (ref 3.5–5.2)
ALP SERPL-CCNC: 79 U/L (ref 55–135)
ALT SERPL W/O P-5'-P-CCNC: 13 U/L (ref 10–44)
ANION GAP SERPL CALC-SCNC: 9 MMOL/L (ref 8–16)
APTT BLDCRRT: 30 SEC (ref 21–32)
AST SERPL-CCNC: 20 U/L (ref 10–40)
BASOPHILS # BLD AUTO: 0.04 K/UL (ref 0–0.2)
BASOPHILS NFR BLD: 0.4 % (ref 0–1.9)
BILIRUB SERPL-MCNC: 0.6 MG/DL (ref 0.1–1)
BUN SERPL-MCNC: 17 MG/DL (ref 8–23)
CALCIUM SERPL-MCNC: 8.6 MG/DL (ref 8.7–10.5)
CHLORIDE SERPL-SCNC: 101 MMOL/L (ref 95–110)
CO2 SERPL-SCNC: 23 MMOL/L (ref 23–29)
CREAT SERPL-MCNC: 0.6 MG/DL (ref 0.5–1.4)
DIFFERENTIAL METHOD: ABNORMAL
EOSINOPHIL # BLD AUTO: 0 K/UL (ref 0–0.5)
EOSINOPHIL NFR BLD: 0.3 % (ref 0–8)
ERYTHROCYTE [DISTWIDTH] IN BLOOD BY AUTOMATED COUNT: 13 % (ref 11.5–14.5)
EST. GFR  (AFRICAN AMERICAN): >60 ML/MIN/1.73 M^2
EST. GFR  (NON AFRICAN AMERICAN): >60 ML/MIN/1.73 M^2
GLUCOSE SERPL-MCNC: 116 MG/DL (ref 70–110)
HCT VFR BLD AUTO: 32.8 % (ref 37–48.5)
HGB BLD-MCNC: 11.2 G/DL (ref 12–16)
IMM GRANULOCYTES # BLD AUTO: 0.05 K/UL (ref 0–0.04)
IMM GRANULOCYTES NFR BLD AUTO: 0.5 % (ref 0–0.5)
INR PPP: 1.1 (ref 0.8–1.2)
LYMPHOCYTES # BLD AUTO: 0.5 K/UL (ref 1–4.8)
LYMPHOCYTES NFR BLD: 4.8 % (ref 18–48)
MAGNESIUM SERPL-MCNC: 1.7 MG/DL (ref 1.6–2.6)
MCH RBC QN AUTO: 31.7 PG (ref 27–31)
MCHC RBC AUTO-ENTMCNC: 34.1 G/DL (ref 32–36)
MCV RBC AUTO: 93 FL (ref 82–98)
MONOCYTES # BLD AUTO: 0.3 K/UL (ref 0.3–1)
MONOCYTES NFR BLD: 2.4 % (ref 4–15)
NEUTROPHILS # BLD AUTO: 10.1 K/UL (ref 1.8–7.7)
NEUTROPHILS NFR BLD: 91.6 % (ref 38–73)
NRBC BLD-RTO: 0 /100 WBC
PHOSPHATE SERPL-MCNC: 2.3 MG/DL (ref 2.7–4.5)
PLATELET # BLD AUTO: 258 K/UL (ref 150–450)
PMV BLD AUTO: 10.4 FL (ref 9.2–12.9)
POC ACTIVATED CLOTTING TIME K: 267 SEC (ref 74–137)
POCT GLUCOSE: 108 MG/DL (ref 70–110)
POCT GLUCOSE: 45 MG/DL (ref 70–110)
POTASSIUM SERPL-SCNC: 3.8 MMOL/L (ref 3.5–5.1)
PROT SERPL-MCNC: 6.2 G/DL (ref 6–8.4)
PROTHROMBIN TIME: 10.9 SEC (ref 9–12.5)
RBC # BLD AUTO: 3.53 M/UL (ref 4–5.4)
SAMPLE: ABNORMAL
SODIUM SERPL-SCNC: 133 MMOL/L (ref 136–145)
WBC # BLD AUTO: 11 K/UL (ref 3.9–12.7)

## 2022-04-07 PROCEDURE — D9220A PRA ANESTHESIA: ICD-10-PCS | Mod: ,,, | Performed by: ANESTHESIOLOGY

## 2022-04-07 PROCEDURE — 83735 ASSAY OF MAGNESIUM: CPT | Performed by: SURGERY

## 2022-04-07 PROCEDURE — 37000008 HC ANESTHESIA 1ST 15 MINUTES: Performed by: SURGERY

## 2022-04-07 PROCEDURE — 85025 COMPLETE CBC W/AUTO DIFF WBC: CPT | Performed by: SURGERY

## 2022-04-07 PROCEDURE — 63600175 PHARM REV CODE 636 W HCPCS: Performed by: STUDENT IN AN ORGANIZED HEALTH CARE EDUCATION/TRAINING PROGRAM

## 2022-04-07 PROCEDURE — 25000003 PHARM REV CODE 250: Performed by: STUDENT IN AN ORGANIZED HEALTH CARE EDUCATION/TRAINING PROGRAM

## 2022-04-07 PROCEDURE — 99291 CRITICAL CARE FIRST HOUR: CPT | Mod: ,,, | Performed by: ANESTHESIOLOGY

## 2022-04-07 PROCEDURE — 36000707: Performed by: SURGERY

## 2022-04-07 PROCEDURE — 36620 INSERTION CATHETER ARTERY: CPT | Mod: 59,,, | Performed by: ANESTHESIOLOGY

## 2022-04-07 PROCEDURE — 99291 PR CRITICAL CARE, E/M 30-74 MINUTES: ICD-10-PCS | Mod: ,,, | Performed by: ANESTHESIOLOGY

## 2022-04-07 PROCEDURE — C1729 CATH, DRAINAGE: HCPCS | Performed by: SURGERY

## 2022-04-07 PROCEDURE — C1751 CATH, INF, PER/CENT/MIDLINE: HCPCS | Performed by: ANESTHESIOLOGY

## 2022-04-07 PROCEDURE — 37000009 HC ANESTHESIA EA ADD 15 MINS: Performed by: SURGERY

## 2022-04-07 PROCEDURE — 27201423 OPTIME MED/SURG SUP & DEVICES STERILE SUPPLY: Performed by: SURGERY

## 2022-04-07 PROCEDURE — 84100 ASSAY OF PHOSPHORUS: CPT | Performed by: SURGERY

## 2022-04-07 PROCEDURE — 36620 ARTERIAL LINE: ICD-10-PCS | Mod: 59,,, | Performed by: ANESTHESIOLOGY

## 2022-04-07 PROCEDURE — 36000706: Performed by: SURGERY

## 2022-04-07 PROCEDURE — C1768 GRAFT, VASCULAR: HCPCS | Performed by: SURGERY

## 2022-04-07 PROCEDURE — 85730 THROMBOPLASTIN TIME PARTIAL: CPT | Performed by: SURGERY

## 2022-04-07 PROCEDURE — 27201037 HC PRESSURE MONITORING SET UP

## 2022-04-07 PROCEDURE — 35301 RECHANNELING OF ARTERY: CPT | Mod: RT,,, | Performed by: SURGERY

## 2022-04-07 PROCEDURE — 80053 COMPREHEN METABOLIC PANEL: CPT | Performed by: SURGERY

## 2022-04-07 PROCEDURE — 85610 PROTHROMBIN TIME: CPT | Performed by: SURGERY

## 2022-04-07 PROCEDURE — 20000000 HC ICU ROOM

## 2022-04-07 PROCEDURE — 35301 PR THROMBOENDARTECTMY NECK,NECK INCIS: ICD-10-PCS | Mod: RT,,, | Performed by: SURGERY

## 2022-04-07 PROCEDURE — D9220A PRA ANESTHESIA: Mod: ,,, | Performed by: ANESTHESIOLOGY

## 2022-04-07 PROCEDURE — 63600175 PHARM REV CODE 636 W HCPCS: Performed by: SURGERY

## 2022-04-07 DEVICE — GRAFT VAS GUARD 0.8X8CM BOVINE: Type: IMPLANTABLE DEVICE | Site: CAROTID | Status: FUNCTIONAL

## 2022-04-07 RX ORDER — CEFAZOLIN SODIUM/WATER 2 G/20 ML
SYRINGE (ML) INTRAVENOUS
Status: DISCONTINUED | OUTPATIENT
Start: 2022-04-07 | End: 2022-04-07

## 2022-04-07 RX ORDER — DEXAMETHASONE SODIUM PHOSPHATE 4 MG/ML
INJECTION, SOLUTION INTRA-ARTICULAR; INTRALESIONAL; INTRAMUSCULAR; INTRAVENOUS; SOFT TISSUE
Status: DISCONTINUED | OUTPATIENT
Start: 2022-04-07 | End: 2022-04-07

## 2022-04-07 RX ORDER — LANOLIN ALCOHOL/MO/W.PET/CERES
800 CREAM (GRAM) TOPICAL
Status: DISCONTINUED | OUTPATIENT
Start: 2022-04-07 | End: 2022-04-08 | Stop reason: HOSPADM

## 2022-04-07 RX ORDER — ATORVASTATIN CALCIUM 10 MG/1
40 TABLET, FILM COATED ORAL DAILY
Status: DISCONTINUED | OUTPATIENT
Start: 2022-04-08 | End: 2022-04-08 | Stop reason: HOSPADM

## 2022-04-07 RX ORDER — PHENYLEPHRINE HCL IN 0.9% NACL 1 MG/10 ML
SYRINGE (ML) INTRAVENOUS
Status: DISCONTINUED | OUTPATIENT
Start: 2022-04-07 | End: 2022-04-07

## 2022-04-07 RX ORDER — SODIUM CHLORIDE, SODIUM LACTATE, POTASSIUM CHLORIDE, CALCIUM CHLORIDE 600; 310; 30; 20 MG/100ML; MG/100ML; MG/100ML; MG/100ML
INJECTION, SOLUTION INTRAVENOUS CONTINUOUS
Status: DISCONTINUED | OUTPATIENT
Start: 2022-04-07 | End: 2022-04-07

## 2022-04-07 RX ORDER — LIDOCAINE HYDROCHLORIDE 20 MG/ML
INJECTION, SOLUTION EPIDURAL; INFILTRATION; INTRACAUDAL; PERINEURAL
Status: DISCONTINUED | OUTPATIENT
Start: 2022-04-07 | End: 2022-04-07

## 2022-04-07 RX ORDER — SODIUM,POTASSIUM PHOSPHATES 280-250MG
2 POWDER IN PACKET (EA) ORAL
Status: DISCONTINUED | OUTPATIENT
Start: 2022-04-07 | End: 2022-04-08 | Stop reason: HOSPADM

## 2022-04-07 RX ORDER — NICARDIPINE HYDROCHLORIDE 0.2 MG/ML
INJECTION INTRAVENOUS CONTINUOUS PRN
Status: DISCONTINUED | OUTPATIENT
Start: 2022-04-07 | End: 2022-04-07

## 2022-04-07 RX ORDER — FENTANYL CITRATE 50 UG/ML
INJECTION, SOLUTION INTRAMUSCULAR; INTRAVENOUS
Status: DISCONTINUED | OUTPATIENT
Start: 2022-04-07 | End: 2022-04-07

## 2022-04-07 RX ORDER — SODIUM CHLORIDE 0.9 % (FLUSH) 0.9 %
10 SYRINGE (ML) INJECTION
Status: DISCONTINUED | OUTPATIENT
Start: 2022-04-07 | End: 2022-04-07 | Stop reason: HOSPADM

## 2022-04-07 RX ORDER — HEPARIN SODIUM 1000 [USP'U]/ML
INJECTION, SOLUTION INTRAVENOUS; SUBCUTANEOUS
Status: DISCONTINUED | OUTPATIENT
Start: 2022-04-07 | End: 2022-04-07

## 2022-04-07 RX ORDER — NITROGLYCERIN 20 MG/100ML
INJECTION INTRAVENOUS CONTINUOUS PRN
Status: DISCONTINUED | OUTPATIENT
Start: 2022-04-07 | End: 2022-04-07

## 2022-04-07 RX ORDER — MUPIROCIN 20 MG/G
OINTMENT TOPICAL
Status: DISCONTINUED | OUTPATIENT
Start: 2022-04-07 | End: 2022-04-07 | Stop reason: HOSPADM

## 2022-04-07 RX ORDER — ASPIRIN 81 MG/1
81 TABLET ORAL DAILY
Status: DISCONTINUED | OUTPATIENT
Start: 2022-04-08 | End: 2022-04-08 | Stop reason: HOSPADM

## 2022-04-07 RX ORDER — ASPIRIN 81 MG/1
81 TABLET ORAL NIGHTLY
Status: CANCELLED | OUTPATIENT
Start: 2022-04-07

## 2022-04-07 RX ORDER — SUCCINYLCHOLINE CHLORIDE 20 MG/ML
INJECTION INTRAMUSCULAR; INTRAVENOUS
Status: DISCONTINUED | OUTPATIENT
Start: 2022-04-07 | End: 2022-04-07

## 2022-04-07 RX ORDER — HYDROMORPHONE HYDROCHLORIDE 1 MG/ML
0.5 INJECTION, SOLUTION INTRAMUSCULAR; INTRAVENOUS; SUBCUTANEOUS
Status: CANCELLED | OUTPATIENT
Start: 2022-04-07

## 2022-04-07 RX ORDER — ONDANSETRON 2 MG/ML
INJECTION INTRAMUSCULAR; INTRAVENOUS
Status: DISCONTINUED | OUTPATIENT
Start: 2022-04-07 | End: 2022-04-07

## 2022-04-07 RX ORDER — HEPARIN SODIUM 1000 [USP'U]/ML
INJECTION, SOLUTION INTRAVENOUS; SUBCUTANEOUS
Status: DISCONTINUED | OUTPATIENT
Start: 2022-04-07 | End: 2022-04-07 | Stop reason: HOSPADM

## 2022-04-07 RX ORDER — SODIUM CHLORIDE 0.9 % (FLUSH) 0.9 %
10 SYRINGE (ML) INJECTION
Status: CANCELLED | OUTPATIENT
Start: 2022-04-07

## 2022-04-07 RX ORDER — HYDROCODONE BITARTRATE AND ACETAMINOPHEN 5; 325 MG/1; MG/1
1 TABLET ORAL EVERY 4 HOURS PRN
Status: CANCELLED | OUTPATIENT
Start: 2022-04-07

## 2022-04-07 RX ORDER — DIPHENHYDRAMINE HYDROCHLORIDE 50 MG/ML
25 INJECTION INTRAMUSCULAR; INTRAVENOUS EVERY 6 HOURS PRN
Status: CANCELLED | OUTPATIENT
Start: 2022-04-07

## 2022-04-07 RX ORDER — OXYCODONE HYDROCHLORIDE 10 MG/1
10 TABLET ORAL EVERY 6 HOURS PRN
Status: DISCONTINUED | OUTPATIENT
Start: 2022-04-07 | End: 2022-04-08 | Stop reason: HOSPADM

## 2022-04-07 RX ORDER — LIDOCAINE HYDROCHLORIDE 10 MG/ML
1 INJECTION, SOLUTION EPIDURAL; INFILTRATION; INTRACAUDAL; PERINEURAL ONCE
Status: DISCONTINUED | OUTPATIENT
Start: 2022-04-07 | End: 2022-04-07 | Stop reason: HOSPADM

## 2022-04-07 RX ORDER — DIPHENHYDRAMINE HCL 25 MG
25 CAPSULE ORAL EVERY 6 HOURS PRN
COMMUNITY

## 2022-04-07 RX ORDER — FENTANYL CITRATE 50 UG/ML
25 INJECTION, SOLUTION INTRAMUSCULAR; INTRAVENOUS EVERY 5 MIN PRN
Status: CANCELLED | OUTPATIENT
Start: 2022-04-07

## 2022-04-07 RX ORDER — PROCHLORPERAZINE EDISYLATE 5 MG/ML
5 INJECTION INTRAMUSCULAR; INTRAVENOUS EVERY 30 MIN PRN
Status: CANCELLED | OUTPATIENT
Start: 2022-04-07

## 2022-04-07 RX ORDER — NICARDIPINE HYDROCHLORIDE 0.2 MG/ML
0-15 INJECTION INTRAVENOUS CONTINUOUS
Status: DISCONTINUED | OUTPATIENT
Start: 2022-04-07 | End: 2022-04-08 | Stop reason: HOSPADM

## 2022-04-07 RX ORDER — MIDAZOLAM HYDROCHLORIDE 1 MG/ML
INJECTION, SOLUTION INTRAMUSCULAR; INTRAVENOUS
Status: DISCONTINUED | OUTPATIENT
Start: 2022-04-07 | End: 2022-04-07

## 2022-04-07 RX ORDER — PROTAMINE SULFATE 10 MG/ML
INJECTION, SOLUTION INTRAVENOUS
Status: DISCONTINUED | OUTPATIENT
Start: 2022-04-07 | End: 2022-04-07

## 2022-04-07 RX ORDER — OXYCODONE HYDROCHLORIDE 5 MG/1
5 TABLET ORAL EVERY 6 HOURS PRN
Status: DISCONTINUED | OUTPATIENT
Start: 2022-04-07 | End: 2022-04-08 | Stop reason: HOSPADM

## 2022-04-07 RX ORDER — PROPOFOL 10 MG/ML
VIAL (ML) INTRAVENOUS
Status: DISCONTINUED | OUTPATIENT
Start: 2022-04-07 | End: 2022-04-07

## 2022-04-07 RX ORDER — CEFAZOLIN SODIUM/WATER 2 G/20 ML
2 SYRINGE (ML) INTRAVENOUS
Status: DISCONTINUED | OUTPATIENT
Start: 2022-04-07 | End: 2022-04-07 | Stop reason: HOSPADM

## 2022-04-07 RX ADMIN — SODIUM CHLORIDE 0.2 MCG/KG/MIN: 9 INJECTION, SOLUTION INTRAVENOUS at 01:04

## 2022-04-07 RX ADMIN — FENTANYL CITRATE 50 MCG: 50 INJECTION INTRAMUSCULAR; INTRAVENOUS at 01:04

## 2022-04-07 RX ADMIN — ONDANSETRON 4 MG: 2 INJECTION INTRAMUSCULAR; INTRAVENOUS at 02:04

## 2022-04-07 RX ADMIN — NICARDIPINE HYDROCHLORIDE 5 MG/HR: 0.2 INJECTION INTRAVENOUS at 03:04

## 2022-04-07 RX ADMIN — PROTAMINE SULFATE 40 MG: 10 INJECTION, SOLUTION INTRAVENOUS at 02:04

## 2022-04-07 RX ADMIN — Medication 800 MG: at 08:04

## 2022-04-07 RX ADMIN — POTASSIUM BICARBONATE 50 MEQ: 978 TABLET, EFFERVESCENT ORAL at 08:04

## 2022-04-07 RX ADMIN — MIDAZOLAM 2 MG: 1 INJECTION INTRAMUSCULAR; INTRAVENOUS at 12:04

## 2022-04-07 RX ADMIN — DEXAMETHASONE SODIUM PHOSPHATE 4 MG: 4 INJECTION INTRA-ARTICULAR; INTRALESIONAL; INTRAMUSCULAR; INTRAVENOUS; SOFT TISSUE at 12:04

## 2022-04-07 RX ADMIN — PROPOFOL 100 MG: 10 INJECTION, EMULSION INTRAVENOUS at 12:04

## 2022-04-07 RX ADMIN — SODIUM CHLORIDE: 0.9 INJECTION, SOLUTION INTRAVENOUS at 12:04

## 2022-04-07 RX ADMIN — NICARDIPINE HYDROCHLORIDE 1 MG/HR: 0.2 INJECTION INTRAVENOUS at 04:04

## 2022-04-07 RX ADMIN — PROTAMINE SULFATE 5 MG: 10 INJECTION, SOLUTION INTRAVENOUS at 02:04

## 2022-04-07 RX ADMIN — Medication 100 MCG: at 01:04

## 2022-04-07 RX ADMIN — SODIUM CHLORIDE, SODIUM LACTATE, POTASSIUM CHLORIDE, AND CALCIUM CHLORIDE: .6; .31; .03; .02 INJECTION, SOLUTION INTRAVENOUS at 06:04

## 2022-04-07 RX ADMIN — NITROGLYCERIN 0.4 MCG/KG/MIN: 20 INJECTION INTRAVENOUS at 02:04

## 2022-04-07 RX ADMIN — Medication 2 G: at 01:04

## 2022-04-07 RX ADMIN — LIDOCAINE HYDROCHLORIDE 60 MG: 20 INJECTION, SOLUTION EPIDURAL; INFILTRATION; INTRACAUDAL at 12:04

## 2022-04-07 RX ADMIN — MUPIROCIN: 20 OINTMENT TOPICAL at 10:04

## 2022-04-07 RX ADMIN — SUCCINYLCHOLINE CHLORIDE 120 MG: 20 INJECTION, SOLUTION INTRAMUSCULAR; INTRAVENOUS; PARENTERAL at 12:04

## 2022-04-07 RX ADMIN — POTASSIUM & SODIUM PHOSPHATES POWDER PACK 280-160-250 MG 2 PACKET: 280-160-250 PACK at 08:04

## 2022-04-07 RX ADMIN — FENTANYL CITRATE 50 MCG: 50 INJECTION INTRAMUSCULAR; INTRAVENOUS at 12:04

## 2022-04-07 RX ADMIN — HEPARIN SODIUM 6000 UNITS: 1000 INJECTION, SOLUTION INTRAVENOUS; SUBCUTANEOUS at 01:04

## 2022-04-07 RX ADMIN — GLYCOPYRROLATE 0.1 MG: 0.2 INJECTION INTRAMUSCULAR; INTRAVENOUS at 02:04

## 2022-04-07 RX ADMIN — SODIUM CHLORIDE, SODIUM GLUCONATE, SODIUM ACETATE, POTASSIUM CHLORIDE, MAGNESIUM CHLORIDE, SODIUM PHOSPHATE, DIBASIC, AND POTASSIUM PHOSPHATE: .53; .5; .37; .037; .03; .012; .00082 INJECTION, SOLUTION INTRAVENOUS at 01:04

## 2022-04-07 NOTE — HPI
Mrs. Mane is a 77 y.o. female with a PMHx of HTN and AFib with high-grade right carotid stenosis who presented to SICU sp R carotid endarterectomy. Pt tolerated surgery well without complications. SICU consulted for postoperative monitoring. Pt presented off and vasoactive medications with stable hemodynamics. Pt states that she currently feels well with no complaints at this time.

## 2022-04-07 NOTE — ANESTHESIA PROCEDURE NOTES
Intubation    Date/Time: 4/7/2022 12:52 PM  Performed by: Kwame Regalado  Authorized by: Rhonda G Leopold, MD     Intubation:     Induction:  Intravenous    Intubated:  Postinduction    Mask Ventilation:  Easy mask    Attempts:  1    Attempted By:  Student    Method of Intubation:  Direct    Blade:  Miky 3    Laryngeal View Grade: Grade IIA - cords partially seen      Difficult Airway Encountered?: No      Complications:  None    Airway Device:  Oral endotracheal tube    Airway Device Size:  7.0    Style/Cuff Inflation:  Cuffed (inflated to minimal occlusive pressure)    Tube secured:  22    Secured at:  The lips    Placement Verified By:  Capnometry    Complicating Factors:  None    Findings Post-Intubation:  BS equal bilateral and atraumatic/condition of teeth unchanged

## 2022-04-07 NOTE — BRIEF OP NOTE
[facility] - Surgery ([facility])  Brief Operative Note    Surgery Date: 4/7/2022     Surgeon(s) and Role:     * TUCKER Brantley III, MD - Primary     * Eleazar Prajapati MD - Fellow    Assisting Surgeon: None    Pre-op Diagnosis:  Asymptomatic carotid artery stenosis without infarction, right [I65.21], >95%    Post-op Diagnosis:  Post-Op Diagnosis Codes:     * Asymptomatic carotid artery stenosis without infarction, right [I65.21]    Procedure(s) (LRB):  ENDARTERECTOMY-CAROTID (Right) with bovine patch    Anesthesia: General    Operative Findings: Focal >95% stenosis    Estimated Blood Loss: 50cc         Specimens:   Specimen (24h ago, onward)            None

## 2022-04-07 NOTE — HOSPITAL COURSE
Patient admitted for q2h neurovascular checks following CEA. Hemodynamically stable without complaints or issues overnight. Discharged on POD1 with close follow up.

## 2022-04-07 NOTE — ASSESSMENT & PLAN NOTE
Mrs. Mane is a 77 y.o. female with a PMHx of HTN and AFib with high-grade right carotid stenosis who presented to SICU sp R carotid endarterectomy. Pt tolerated surgery well without complications. SICU consulted for postoperative monitoring      Neuro/Psych:   - pt AAOx4  - pain: oxy prn  -q2h neuro [facility]             Cards:   -  radial A line  - currently HDS, cardene gtt if needed for hypertension  - maintain SBP<140  - q2h neuro checks  - currently NSR  - asa 81 and lipitor daily  - consider resuming home verapamil and flecainide  - hold home eliquis until 4/9  - holding home hctz and spironolactone      Pulm:   - no acute issues  - Maintain SpO2>90%      Renal:  - olguin for I/O      FEN / GI:   - replete lytes as needed  - nurse instructed to limit PO water intake ain setting of mild hyponateremia      ID:   - Ancef for surgical ppx  -no acute issues      Heme/Onc:   -- Daily CBC      Endo:  - No hx of DM   -- Gluc goal 140-180      PPx:   Feeding: cardiac diet  Analgesia/Sedation: PRN oxy  Thromboembolic prevention: holding eliquis   HOB >30  Stress Ulcer ppx: n/a  Glucose control: Critical care goal 140-180 g/dl     Lines/Drains/Airway:left arterial A line, Olguin, 2 PIV      Dispo/Code Status/Palliative:   -- SICU / Full Code

## 2022-04-07 NOTE — ANESTHESIA POSTPROCEDURE EVALUATION
Anesthesia Post Evaluation    Patient: Magdalena Mane    Procedure(s) Performed: Procedure(s) (LRB):  ENDARTERECTOMY-CAROTID (Right)    Final Anesthesia Type: general      Patient location during evaluation: ICU  Patient participation: Yes- Able to Participate  Level of consciousness: awake and alert and oriented  Post-procedure vital signs: reviewed and stable  Pain management: adequate  Airway patency: patent    PONV status at discharge: No PONV  Anesthetic complications: no      Cardiovascular status: blood pressure returned to baseline  Respiratory status: unassisted, spontaneous ventilation and room air  Hydration status: euvolemic  Follow-up not needed.          Vitals Value Taken Time   /53 04/07/22 1601   Temp 36.4 °C (97.6 °F) 04/07/22 1600   Pulse 65 04/07/22 1603   Resp 12 04/07/22 1603   SpO2 96 % 04/07/22 1603   Vitals shown include unvalidated device data.      No case tracking events are documented in the log.      Pain/Cristobal Score: No data recorded

## 2022-04-07 NOTE — TRANSFER OF CARE
Anesthesia Transfer of Care Note    Patient: Magdalena Mane    Procedure(s) Performed: Procedure(s) (LRB):  ENDARTERECTOMY-CAROTID (Right)    Patient location: ICU    Anesthesia Type: general    Transport from OR: Transported from OR on 6-10 L/min O2 by face mask with adequate spontaneous ventilation. Continuous SpO2 monitoring in transport. Continuos invasive BP monitoring in transport    Post pain: adequate analgesia    Post assessment: no apparent anesthetic complications and tolerated procedure well    Post vital signs: stable    Level of consciousness: awake, alert, oriented and responds to stimulation    Nausea/Vomiting: no nausea/vomiting    Complications: none    Transfer of care protocol was followed      Last vitals:   Visit Vitals  BP (!) 167/72   Pulse (!) 55   Temp 36.6 °C (97.8 °F) (Oral)   Resp 20   Ht 5' (1.524 m)   Wt 64 kg (141 lb)   SpO2 100%   Breastfeeding No   BMI 27.54 kg/m²

## 2022-04-07 NOTE — SUBJECTIVE & OBJECTIVE
Follow-up For: Procedure(s) (LRB):  ENDARTERECTOMY-CAROTID (Right)    Post-Operative Day: Day of Surgery     Past Medical History:   Diagnosis Date    Anemia     Atrial fibrillation     Basal cell carcinoma     DJD (degenerative joint disease) 12/12/2012    Obesity (BMI 30-39.9) 11/27/2015    Vaginal atrophy 4/1/2016       Past Surgical History:   Procedure Laterality Date    BREAST BIOPSY Left 1999    Excisional bx, benign cyst    CARDIOVERSION  02/2021    COLONOSCOPY  2012    normal    COLONOSCOPY N/A 6/28/2017    Procedure: COLONOSCOPY;  Surgeon: Markel Montemayor MD;  Location: [facility] ENDO (4TH FLR);  Service: Endoscopy;  Laterality: N/A;    EXCISION OF GANGLION OF WRIST Left 6/27/2018    Procedure: EXCISION, GANGLION CYST, WRIST - Left Volar wrist;  Surgeon: Mary Moore MD;  Location: [facility] OR [facility] FLR;  Service: Orthopedics;  Laterality: Left;    HIP SURGERY  05/05/2014    bilateral    JOINT REPLACEMENT Bilateral     MOLLY    TONSILLECTOMY      TREATMENT OF CARDIAC ARRHYTHMIA N/A 9/29/2020    Procedure: CARDIOVERSION;  Surgeon: Nigel García MD;  Location: [facility] EP LAB;  Service: Cardiology;  Laterality: N/A;  AF, LIGIA, DCCV, MAC, VA, 3 Prep    TREATMENT OF CARDIAC ARRHYTHMIA N/A 12/8/2020    Procedure: Cardioversion or Defibrillation;  Surgeon: Nigel García MD;  Location: [facility] EP LAB;  Service: Cardiology;  Laterality: N/A;  AF, LIGIA, DCCV, MAC, VA, 3 Prep       Review of patient's allergies indicates:   Allergen Reactions    Prednisone      ELEVATED B/P FOR SEVERAL DAYS/WENT TO ER    Lactose        Family History       Problem Relation (Age of Onset)    Arthritis Mother    Breast cancer Mother    Heart attack Father    Heart disease Father, Paternal Grandmother    Heart failure Father    Hyperlipidemia Mother, Father    Hypertension Father    No Known Problems Brother    Scoliosis Father    Stroke Maternal Grandmother    Tuberculosis Father          Tobacco Use    Smoking status: Former Smoker     Quit date:  1988     Years since quittin.2    Smokeless tobacco: Never Used   Substance and Sexual Activity    Alcohol use: Never    Drug use: No    Sexual activity: Yes     Partners: Male      Review of Systems   Constitutional:  Negative for chills, diaphoresis and fatigue.   HENT:  Negative for facial swelling, hearing loss, sore throat, trouble swallowing and voice change.    Eyes:  Negative for pain and visual disturbance.   Respiratory:  Negative for cough, chest tightness and shortness of breath.    Cardiovascular:  Negative for chest pain and palpitations.   Gastrointestinal:  Negative for abdominal pain, nausea and vomiting.   Skin:         Surgical incision site mildly painful   Neurological:  Negative for weakness, numbness and headaches.   Psychiatric/Behavioral:  Negative for confusion.    All other systems reviewed and are negative.  Objective:     Vital Signs (Most Recent):  Temp: 97.6 °F (36.4 °C) (22 1600)  Pulse: 64 (22 1600)  Resp: (!) 21 (22 1600)  BP: (!) 110/53 (22 1600)  SpO2: 96 % (22 1600)   Vital Signs (24h Range):  Temp:  [97.6 °F (36.4 °C)-97.8 °F (36.6 °C)] 97.6 °F (36.4 °C)  Pulse:  [55-64] 64  Resp:  [20-21] 21  SpO2:  [96 %-100 %] 96 %  BP: (110-167)/(53-72) 110/53  Arterial Line BP: (113)/(34) 113/34     Weight: 64 kg (141 lb)  Body mass index is 27.54 kg/m².      Intake/Output Summary (Last 24 hours) at 2022 1655  Last data filed at 2022 1530  Gross per 24 hour   Intake 1651 ml   Output --   Net 1651 ml       Physical Exam  Constitutional:       General: She is not in acute distress.     Appearance: Normal appearance. She is normal weight. She is not ill-appearing, toxic-appearing or diaphoretic.   HENT:      Head: Normocephalic.      Nose: Nose normal.      Mouth/Throat:      Mouth: Mucous membranes are moist.      Pharynx: Oropharynx is clear.   Eyes:      Extraocular Movements: Extraocular movements intact.      Pupils: Pupils are equal, round,  and reactive to light.   Neck:      Comments: R. Neck surgical incision dressed. Clean and dry.  Cardiovascular:      Rate and Rhythm: Normal rate and regular rhythm.      Heart sounds: Normal heart sounds. No murmur heard.  Pulmonary:      Effort: Pulmonary effort is normal. No respiratory distress.      Breath sounds: Normal breath sounds.   Abdominal:      General: Abdomen is flat.      Palpations: Abdomen is soft.      Tenderness: There is no abdominal tenderness.   Musculoskeletal:         General: No swelling or signs of injury. Normal range of motion.      Cervical back: Normal range of motion.   Skin:     General: Skin is warm and dry.      Findings: No bruising.   Neurological:      General: No focal deficit present.      Mental Status: She is alert and oriented to person, place, and time. Mental status is at baseline.      Motor: No weakness.   Psychiatric:         Mood and Affect: Mood normal.         Behavior: Behavior normal.       Vents:       Lines/Drains/Airways       Epidural Line  Duration                  Perineural Analgesia/Anesthesia Assessment (using dermatomes) Epidural 06/27/18 0823 1380 days              Arterial Line  Duration             Arterial Line 04/07/22 1256 Right Radial <1 day              Peripheral Intravenous Line  Duration                  Peripheral IV - Single Lumen 04/07/22 1030 20 G Anterior;Distal;Left Forearm <1 day         Peripheral IV - Single Lumen 04/07/22 1303 16 G Right;Lateral Forearm <1 day                    Significant Labs:    CBC/Anemia Profile:  No results for input(s): WBC, HGB, HCT, PLT, MCV, RDW, IRON, FERRITIN, RETIC, FOLATE, [id number], OCCULTBLOOD in the last 48 hours.     Chemistries:  No results for input(s): NA, K, CL, CO2, BUN, CREATININE, CALCIUM, ALBUMIN, PROT, BILITOT, ALKPHOS, ALT, AST, GLUCOSE, MG, PHOS in the last 48 hours.    All pertinent labs within the past 24 hours have been reviewed.    Significant Imaging: I have reviewed all  pertinent imaging results/findings within the past 24 hours.

## 2022-04-07 NOTE — ANESTHESIA PROCEDURE NOTES
Arterial Line    Diagnosis: Asymptomatic carotid artery stenosis without infarction, right  Doctor requesting consult: MECCA Brantley III, MD    Patient location during procedure: done in OR  Procedure start time: 4/7/2022 12:56 PM  Timeout: 4/7/2022 12:55 PM  Procedure end time: 4/7/2022 12:59 PM    Staffing  Authorizing Provider: Rhonda G Leopold, MD  Performing Provider: Sharif Curiel MD    Anesthesiologist was present at the time of the procedure.    Preanesthetic Checklist  Completed: patient identified, IV checked, site marked, risks and benefits discussed, surgical consent, monitors and equipment checked, pre-op evaluation, timeout performed and anesthesia consent givenArterial Line  Skin Prep: chlorhexidine gluconate and isopropyl alcohol  Orientation: right  Location: radial    Catheter Size: 20 G  Catheter placement by Ultrasound guidance. Heme positive aspiration all ports.   Vessel Caliber: small, patent  Needle advanced into vessel with real time Ultrasound guidance.Insertion Attempts: 1  Assessment  Dressing: secured with tape and tegaderm  Patient: Tolerated well

## 2022-04-07 NOTE — INTERVAL H&P NOTE
The patient has been examined and the H&P has been reviewed:    I concur with the findings and no changes have occurred since H&P was written. Last dose of Eliquis on Monday. Site marked. No questions.     Surgery risks, benefits and alternative options discussed and understood by patient/family.        There are no hospital problems to display for this patient.

## 2022-04-08 VITALS
BODY MASS INDEX: 26.84 KG/M2 | RESPIRATION RATE: 20 BRPM | TEMPERATURE: 97 F | HEART RATE: 60 BPM | WEIGHT: 136.69 LBS | HEIGHT: 60 IN | OXYGEN SATURATION: 98 % | SYSTOLIC BLOOD PRESSURE: 113 MMHG | DIASTOLIC BLOOD PRESSURE: 68 MMHG

## 2022-04-08 LAB
ALBUMIN SERPL BCP-MCNC: 3.1 G/DL (ref 3.5–5.2)
ALP SERPL-CCNC: 68 U/L (ref 55–135)
ALT SERPL W/O P-5'-P-CCNC: 12 U/L (ref 10–44)
ANION GAP SERPL CALC-SCNC: 8 MMOL/L (ref 8–16)
AST SERPL-CCNC: 21 U/L (ref 10–40)
BASOPHILS # BLD AUTO: 0.01 K/UL (ref 0–0.2)
BASOPHILS NFR BLD: 0.1 % (ref 0–1.9)
BILIRUB SERPL-MCNC: 0.5 MG/DL (ref 0.1–1)
BUN SERPL-MCNC: 14 MG/DL (ref 8–23)
CALCIUM SERPL-MCNC: 8.5 MG/DL (ref 8.7–10.5)
CHLORIDE SERPL-SCNC: 98 MMOL/L (ref 95–110)
CO2 SERPL-SCNC: 23 MMOL/L (ref 23–29)
CREAT SERPL-MCNC: 0.7 MG/DL (ref 0.5–1.4)
DIFFERENTIAL METHOD: ABNORMAL
EOSINOPHIL # BLD AUTO: 0 K/UL (ref 0–0.5)
EOSINOPHIL NFR BLD: 0 % (ref 0–8)
ERYTHROCYTE [DISTWIDTH] IN BLOOD BY AUTOMATED COUNT: 12.5 % (ref 11.5–14.5)
EST. GFR  (AFRICAN AMERICAN): >60 ML/MIN/1.73 M^2
EST. GFR  (NON AFRICAN AMERICAN): >60 ML/MIN/1.73 M^2
GLUCOSE SERPL-MCNC: 133 MG/DL (ref 70–110)
HCT VFR BLD AUTO: 31 % (ref 37–48.5)
HGB BLD-MCNC: 10.5 G/DL (ref 12–16)
IMM GRANULOCYTES # BLD AUTO: 0.02 K/UL (ref 0–0.04)
IMM GRANULOCYTES NFR BLD AUTO: 0.2 % (ref 0–0.5)
LYMPHOCYTES # BLD AUTO: 0.5 K/UL (ref 1–4.8)
LYMPHOCYTES NFR BLD: 5.2 % (ref 18–48)
MAGNESIUM SERPL-MCNC: 1.7 MG/DL (ref 1.6–2.6)
MCH RBC QN AUTO: 32.2 PG (ref 27–31)
MCHC RBC AUTO-ENTMCNC: 33.9 G/DL (ref 32–36)
MCV RBC AUTO: 95 FL (ref 82–98)
MONOCYTES # BLD AUTO: 0.5 K/UL (ref 0.3–1)
MONOCYTES NFR BLD: 5.6 % (ref 4–15)
NEUTROPHILS # BLD AUTO: 8.2 K/UL (ref 1.8–7.7)
NEUTROPHILS NFR BLD: 88.9 % (ref 38–73)
NRBC BLD-RTO: 0 /100 WBC
PHOSPHATE SERPL-MCNC: 3.4 MG/DL (ref 2.7–4.5)
PLATELET # BLD AUTO: 260 K/UL (ref 150–450)
PMV BLD AUTO: 10.6 FL (ref 9.2–12.9)
POTASSIUM SERPL-SCNC: 4.8 MMOL/L (ref 3.5–5.1)
PROT SERPL-MCNC: 5.8 G/DL (ref 6–8.4)
RBC # BLD AUTO: 3.26 M/UL (ref 4–5.4)
SODIUM SERPL-SCNC: 129 MMOL/L (ref 136–145)
WBC # BLD AUTO: 9.26 K/UL (ref 3.9–12.7)

## 2022-04-08 PROCEDURE — 99233 PR SUBSEQUENT HOSPITAL CARE,LEVL III: ICD-10-PCS | Mod: ,,, | Performed by: ANESTHESIOLOGY

## 2022-04-08 PROCEDURE — 80053 COMPREHEN METABOLIC PANEL: CPT | Performed by: STUDENT IN AN ORGANIZED HEALTH CARE EDUCATION/TRAINING PROGRAM

## 2022-04-08 PROCEDURE — 84100 ASSAY OF PHOSPHORUS: CPT | Performed by: STUDENT IN AN ORGANIZED HEALTH CARE EDUCATION/TRAINING PROGRAM

## 2022-04-08 PROCEDURE — 25000003 PHARM REV CODE 250: Performed by: STUDENT IN AN ORGANIZED HEALTH CARE EDUCATION/TRAINING PROGRAM

## 2022-04-08 PROCEDURE — 83735 ASSAY OF MAGNESIUM: CPT | Performed by: STUDENT IN AN ORGANIZED HEALTH CARE EDUCATION/TRAINING PROGRAM

## 2022-04-08 PROCEDURE — 85025 COMPLETE CBC W/AUTO DIFF WBC: CPT | Performed by: STUDENT IN AN ORGANIZED HEALTH CARE EDUCATION/TRAINING PROGRAM

## 2022-04-08 PROCEDURE — 99233 SBSQ HOSP IP/OBS HIGH 50: CPT | Mod: ,,, | Performed by: ANESTHESIOLOGY

## 2022-04-08 RX ORDER — HYDROCODONE BITARTRATE AND ACETAMINOPHEN 5; 325 MG/1; MG/1
1 TABLET ORAL EVERY 6 HOURS PRN
Qty: 15 TABLET | Refills: 0 | Status: SHIPPED | OUTPATIENT
Start: 2022-04-08 | End: 2022-06-07

## 2022-04-08 RX ORDER — METHOCARBAMOL 500 MG/1
500 TABLET, FILM COATED ORAL 3 TIMES DAILY PRN
Status: DISCONTINUED | OUTPATIENT
Start: 2022-04-08 | End: 2022-04-08 | Stop reason: HOSPADM

## 2022-04-08 RX ORDER — TALC
6 POWDER (GRAM) TOPICAL NIGHTLY PRN
Status: DISCONTINUED | OUTPATIENT
Start: 2022-04-08 | End: 2022-04-08 | Stop reason: HOSPADM

## 2022-04-08 RX ORDER — ACETAMINOPHEN 325 MG/1
650 TABLET ORAL EVERY 6 HOURS PRN
Status: CANCELLED | OUTPATIENT
Start: 2022-04-08

## 2022-04-08 RX ORDER — MUPIROCIN 20 MG/G
OINTMENT TOPICAL 2 TIMES DAILY
Status: DISCONTINUED | OUTPATIENT
Start: 2022-04-08 | End: 2022-04-08 | Stop reason: HOSPADM

## 2022-04-08 RX ORDER — POLYETHYLENE GLYCOL 3350 17 G/17G
17 POWDER, FOR SOLUTION ORAL DAILY
Status: CANCELLED | OUTPATIENT
Start: 2022-04-08

## 2022-04-08 RX ORDER — NICARDIPINE HYDROCHLORIDE 0.2 MG/ML
0-15 INJECTION INTRAVENOUS CONTINUOUS
Status: DISCONTINUED | OUTPATIENT
Start: 2022-04-08 | End: 2022-04-08

## 2022-04-08 RX ORDER — ATORVASTATIN CALCIUM 10 MG/1
20 TABLET, FILM COATED ORAL DAILY
Status: DISCONTINUED | OUTPATIENT
Start: 2022-04-08 | End: 2022-04-08

## 2022-04-08 RX ORDER — MORPHINE SULFATE 4 MG/ML
3 INJECTION, SOLUTION INTRAMUSCULAR; INTRAVENOUS
Status: DISCONTINUED | OUTPATIENT
Start: 2022-04-08 | End: 2022-04-08

## 2022-04-08 RX ADMIN — POTASSIUM & SODIUM PHOSPHATES POWDER PACK 280-160-250 MG 2 PACKET: 280-160-250 PACK at 12:04

## 2022-04-08 RX ADMIN — Medication 800 MG: at 12:04

## 2022-04-08 RX ADMIN — ASPIRIN 81 MG: 81 TABLET, COATED ORAL at 09:04

## 2022-04-08 RX ADMIN — MUPIROCIN: 20 OINTMENT TOPICAL at 09:04

## 2022-04-08 RX ADMIN — ATORVASTATIN CALCIUM 40 MG: 10 TABLET, FILM COATED ORAL at 09:04

## 2022-04-08 RX ADMIN — Medication 800 MG: at 05:04

## 2022-04-08 RX ADMIN — OXYCODONE 5 MG: 5 TABLET ORAL at 02:04

## 2022-04-08 NOTE — ASSESSMENT & PLAN NOTE
Mrs. Mane is a 77 y.o. female with a PMHx of HTN and AFib with high-grade carotid stenosis who presented to SICU sp bilateral carotid endarterectomy. Pt tolerated surgery well without complications. Admitted to SICU for close postoperative monitoring. Doing well POD1. Will discharge with close PCP and vascular surgery follow up.      Neuro/Psych:   - no focal neuro signs or symptoms, physical exam neurologically intact  - q2h neuro checks to monitor for signs of embolic CVA or hyperperfusion syndrome  - pain: oxy prn             Cards:   - arterial line in place   - maintain SBP<140 with prn cardene gtt   - resume daily ASA and statin  - pmhx paroxysmal afib, currently rate controlled, resume verapamil and flecainide for afib, managed by EP outpatient. Hold eliquis until POD3 per vascular.  - on home hctz and spironolactone. Will hold these for hyponatremia and hyperkalemia, respectively. Patient counseled and instructed to f/u with PCP for further mgmt      Pulm:   - on room air  - supplemental o2 prn to maintain SpO2>90%      Renal:  - olguin for I/O      FEN / GI:   - replete lytes as needed  - on home hctz and spironolactone. Will hold these for hyponatremia and hyperkalemia, respectively. Patient counseled and instructed to f/u with PCP for further mgmt      ID:   - afebrile with no leukocytosis  - Ancef for surgical ppx      Heme/Onc:   -- Daily CBC  - hold home eliquis until POD3, per vascular     Endo:  - No hx of DM   -- Gluc goal 140-180      PPx:   Feeding: cardiac diet  Analgesia/Sedation: PRN oxy  Thromboembolic prevention: holding eliquis   HOB >30  Stress Ulcer ppx: n/a  Glucose control: Critical care goal 140-180 g/dl     Lines/Drains/Airway:left arterial A line, Olguin, 2 PIV      Dispo/Code Status/Palliative:   -- SICU / Full Code

## 2022-04-08 NOTE — HOSPITAL COURSE
On 4/7/22, the patient arrived to the [facility] for proper pre-operative management.  Upon completion of pre-operative preparation, the patient was taken back to the operative theatre. R carotid endarterectomy was performed without complication and the patient was transported to the post anesthesia care unit in stable condition.  After appropriate recovery from the anaesthetic agents used during the surgery, the patient was then transported to the hospital inpatient floor.  The interim of the hospital stay from arrival on the floor up to discharge has been uncomplicated. The patient has tolerated regular diet.  The patient's pain has been controlled using a multimodal approach. Currently, the patient's pain is well controlled on an oral regimen.  The patient has been voiding without difficulty.  Currently, the patient's progress is sufficient to allow the them to be discharged to home safely.  The patient agrees with this assessment and desires a discharge today.

## 2022-04-08 NOTE — PROGRESS NOTES
[facility]  Vascular Surgery  Progress Note    Patient Name: Magdalena Mane  MRN: [id number]  Admission Date: 4/7/2022  Primary Care Provider: Ruby Garner MD    Subjective:     Interval History: No acute events overnight, BP controlled. Pain well controlled.    Post-Op Info:  Procedure(s) (LRB):  ENDARTERECTOMY-CAROTID (Right)   1 Day Post-Op       Medications:  Continuous Infusions:   nicardipine Stopped (04/07/22 2228)     Scheduled Meds:   [START ON 4/9/2022] apixaban  5 mg Oral BID    aspirin  81 mg Oral Daily    atorvastatin  40 mg Oral Daily    mupirocin   Nasal BID     PRN Meds:magnesium oxide, magnesium oxide, melatonin, methocarbamoL, morphine, oxyCODONE, oxyCODONE, potassium bicarbonate, potassium bicarbonate, potassium bicarbonate, potassium, sodium phosphates, potassium, sodium phosphates, potassium, sodium phosphates     Objective:     Vital Signs (Most Recent):  Temp: 98.2 °F (36.8 °C) (04/08/22 0700)  Pulse: (!) 55 (04/08/22 0830)  Resp: 15 (04/08/22 0830)  BP: 135/62 (04/08/22 0830)  SpO2: 97 % (04/08/22 0830)   Vital Signs (24h Range):  Temp:  [97.6 °F (36.4 °C)-98.3 °F (36.8 °C)] 98.2 °F (36.8 °C)  Pulse:  [55-69] 55  Resp:  [9-37] 15  SpO2:  [91 %-100 %] 97 %  BP: (104-167)/(51-72) 135/62  Arterial Line BP: (107-157)/(34-60) 135/47         Physical Exam  Vitals and nursing note reviewed.   Constitutional:       General: She is not in acute distress.  Cardiovascular:      Rate and Rhythm: Normal rate.      Pulses: Normal pulses.   Pulmonary:      Effort: Pulmonary effort is normal. No respiratory distress.   Abdominal:      General: There is no distension.      Palpations: Abdomen is soft.      Tenderness: There is no abdominal tenderness.   Skin:     Comments: Incision clean/dry with overlying incision, no edema or induration   Neurological:      General: No focal deficit present.      Mental Status: She is alert and oriented to person, place, and time.        Significant Labs:  BMP:   Recent Labs   Lab 04/08/22  0253   *   *   K 4.8   CL 98   CO2 23   BUN 14   CREATININE 0.7   CALCIUM 8.5*   MG 1.7     CBC:   Recent Labs   Lab 04/08/22  0253   WBC 9.26   RBC 3.26*   HGB 10.5*   HCT 31.0*      MCV 95   MCH 32.2*   MCHC 33.9     CMP:   Recent Labs   Lab 04/08/22  0253   *   CALCIUM 8.5*   ALBUMIN 3.1*   PROT 5.8*   *   K 4.8   CO2 23   CL 98   BUN 14   CREATININE 0.7   ALKPHOS 68   ALT 12   AST 21   BILITOT 0.5       Significant Diagnostics:  CXR: No results found in the last 24 hours.    Assessment/Plan:     * Bilateral carotid artery stenosis  POD#1 CEA, recovering clinically in the ICU. BP well controlled. Incision clean/dry, pain well controlled.     - Regular diet  - ASA/eliquis  - statin  - PO pain medications    Discharge today. Will follow-up with post-operative visit in 4 weeks with a bilateral carotid artery ultrasound        Lauren Milan MD  Vascular Surgery  [facility]

## 2022-04-08 NOTE — NURSING
After Visit Summary reviewed with patient and , Luac. All questions and concerns answered and emotional support provided. Patient transferred to parking garage via wheelchair with RN.   41.3

## 2022-04-08 NOTE — PROGRESS NOTES
Patient aaox4. Neuro checks q2h- MIRTHA, no changes in mental status overnight. Pulses palpable. Pain managed w/ PRN meds.  cc/shift.    Gtts:  -Cardene titrated off for SBP<140. LR gtt d/c'ed.     Skin:  No new breakdown noted on heels or sacrum. Sacral foam applied. R carotid site w/ gauze and transparent film intact, no bleeding or hematoma noted. Linen changed. Pt repositions independently, turned PRN.    Labs:   - Lytes replaced w/ PRN orders. Na 129 this AM, pt educated on drinking less fluids and the importance. MD Iglesias aware, no new orders.

## 2022-04-08 NOTE — PLAN OF CARE
Patient on cardene to maintain SBP less than 140. Patient AAO X4. Patient with  at bedside. Patient updated on plan of care for the day per Dr. Iglesias, and cardiovascular team. Will continue to monitor patient.

## 2022-04-08 NOTE — SUBJECTIVE & OBJECTIVE
Interval History/Significant Events: Patient seen and examined. Denies complaints. Pain controlled on pain regimen. Denies weakness, numbness, vision problems, dizziness, neck swelling, chest pain, fever, or any further symptoms at this time.    Follow-up For: Procedure(s) (LRB):  ENDARTERECTOMY-CAROTID (Right)    Post-Operative Day: 1 Day Post-Op    Objective:     Vital Signs (Most Recent):  Temp: 98.2 °F (36.8 °C) (04/08/22 0700)  Pulse: (!) 58 (04/08/22 1000)  Resp: 14 (04/08/22 1000)  BP: (!) 151/64 (04/08/22 0900)  SpO2: 98 % (04/08/22 1000)   Vital Signs (24h Range):  Temp:  [97.6 °F (36.4 °C)-98.3 °F (36.8 °C)] 98.2 °F (36.8 °C)  Pulse:  [55-69] 58  Resp:  [9-37] 14  SpO2:  [91 %-100 %] 98 %  BP: (104-167)/(51-72) 151/64  Arterial Line BP: (107-157)/(34-60) 144/51     Weight: 62 kg (136 lb 11 oz)  Body mass index is 26.69 kg/m².      Intake/Output Summary (Last 24 hours) at 4/8/2022 1020  Last data filed at 4/8/2022 1000  Gross per 24 hour   Intake 2898.93 ml   Output 1850 ml   Net 1048.93 ml       Physical Exam  Vitals and nursing note reviewed.   Constitutional:       Appearance: Normal appearance.   HENT:      Head:      Comments: Bilateral neck incisions c/d/i     Mouth/Throat:      Mouth: Mucous membranes are moist.      Pharynx: Oropharynx is clear. No oropharyngeal exudate.   Eyes:      Conjunctiva/sclera: Conjunctivae normal.      Pupils: Pupils are equal, round, and reactive to light.   Cardiovascular:      Rate and Rhythm: Normal rate and regular rhythm.      Pulses: Normal pulses.      Heart sounds: Normal heart sounds. No murmur heard.  Pulmonary:      Effort: Pulmonary effort is normal.      Breath sounds: Normal breath sounds.   Abdominal:      General: Abdomen is flat. There is no distension.      Palpations: Abdomen is soft.      Tenderness: There is no abdominal tenderness.   Skin:     General: Skin is warm and dry.      Coloration: Skin is not pale.   Neurological:      General: No focal  deficit present.      Mental Status: She is alert and oriented to person, place, and time.   Psychiatric:         Mood and Affect: Mood normal.         Behavior: Behavior normal.       Vents:       Lines/Drains/Airways       Epidural Line  Duration                  Perineural Analgesia/Anesthesia Assessment (using dermatomes) Epidural 06/27/18 0823 1381 days                    Significant Labs:    CBC/Anemia Profile:  Recent Labs   Lab 04/07/22 1819 04/08/22  0253   WBC 11.00 9.26   HGB 11.2* 10.5*   HCT 32.8* 31.0*    260   MCV 93 95   RDW 13.0 12.5        Chemistries:  Recent Labs   Lab 04/07/22 1819 04/08/22  0253   * 129*   K 3.8 4.8    98   CO2 23 23   BUN 17 14   CREATININE 0.6 0.7   CALCIUM 8.6* 8.5*   ALBUMIN 3.4* 3.1*   PROT 6.2 5.8*   BILITOT 0.6 0.5   ALKPHOS 79 68   ALT 13 12   AST 20 21   MG 1.7 1.7   PHOS 2.3* 3.4       Recent Lab Results  (Last 5 results in the past 24 hours)        04/08/22  0253   04/07/22  1819   04/07/22  1818   04/07/22  1813   04/07/22  1341        Albumin 3.1   3.4             Alkaline Phosphatase 68   79             ALT 12   13             Anion Gap 8   9             aPTT   30.0  Comment: aPTT therapeutic range = 39-69 seconds             AST 21   20             Baso # 0.01   0.04             Basophil % 0.1   0.4             BILIRUBIN TOTAL 0.5  Comment: For infants and newborns, interpretation of results should be based  on gestational age, weight and in agreement with clinical  observations.    Premature Infant recommended reference ranges:  Up to 24 hours.............<8.0 mg/dL  Up to 48 hours............<12.0 mg/dL  3-5 days..................<15.0 mg/dL  6-29 days.................<15.0 mg/dL     0.6  Comment: For infants and newborns, interpretation of results should be based  on gestational age, weight and in agreement with clinical  observations.    Premature Infant recommended reference ranges:  Up to 24 hours.............<8.0 mg/dL  Up to 48  hours............<12.0 mg/dL  3-5 days..................<15.0 mg/dL  6-29 days.................<15.0 mg/dL               BUN 14   17             Calcium 8.5   8.6             Chloride 98   101             CO2 23   23             Creatinine 0.7   0.6             Differential Method Automated   Automated             eGFR if  >60.0   >60.0             eGFR if non  >60.0  Comment: Calculation used to obtain the estimated glomerular filtration  rate (eGFR) is the CKD-EPI equation.      >60.0  Comment: Calculation used to obtain the estimated glomerular filtration  rate (eGFR) is the CKD-EPI equation.                Eos # 0.0   0.0             Eosinophil % 0.0   0.3             Glucose 133   116             Gran # (ANC) 8.2   10.1             Gran % 88.9   91.6             Hematocrit 31.0   32.8             Hemoglobin 10.5   11.2             Immature Grans (Abs) 0.02  Comment: Mild elevation in immature granulocytes is non specific and   can be seen in a variety of conditions including stress response,   acute inflammation, trauma and pregnancy. Correlation with other   laboratory and clinical findings is essential.     0.05  Comment: Mild elevation in immature granulocytes is non specific and   can be seen in a variety of conditions including stress response,   acute inflammation, trauma and pregnancy. Correlation with other   laboratory and clinical findings is essential.               Immature Granulocytes 0.2   0.5             INR   1.1  Comment: Coumadin Therapy:  2.0 - 3.0 for INR for all indicators except mechanical heart valves  and antiphospholipid syndromes which should use 2.5 - 3.5.               Lymph # 0.5   0.5             Lymph % 5.2   4.8             Magnesium 1.7   1.7             MCH 32.2   31.7             MCHC 33.9   34.1             MCV 95   93             Mono # 0.5   0.3             Mono % 5.6   2.4             MPV 10.6   10.4             nRBC 0   0              Phosphorus 3.4   2.3             Platelets 260   258             POC ACTIVATED CLOTTING TIME K         267       POCT Glucose     108   45         Potassium 4.8   3.8             PROTEIN TOTAL 5.8   6.2             Protime   10.9             RBC 3.26   3.53             RDW 12.5   13.0             Sample         ARTERIAL       Sodium 129   133             WBC 9.26   11.00                                  All pertinent labs within the past 24 hours have been reviewed.    Significant Imaging:  I have reviewed all pertinent imaging results/findings within the past 24 hours.

## 2022-04-08 NOTE — PROGRESS NOTES
[facility]  Critical Care - Surgery  Progress Note    Patient Name: Magdalena Mane  MRN: [id number]  Admission Date: 4/7/2022  Hospital Length of Stay: 1 days  Code Status: Prior  Attending Provider: TUCKER Brantley III, MD  Primary Care Provider: Ruby Garner MD   Principal Problem: Bilateral carotid artery stenosis    Subjective:     Hospital/ICU Course:  Patient admitted for q2h neurovascular checks following CEA. Hemodynamically stable without complaints or issues overnight. Discharged on POD1 with close follow up.        Interval History/Significant Events: Patient seen and examined. Denies complaints. Pain controlled on pain regimen. Denies weakness, numbness, vision problems, dizziness, neck swelling, chest pain, fever, or any further symptoms at this time.    Follow-up For: Procedure(s) (LRB):  ENDARTERECTOMY-CAROTID (Right)    Post-Operative Day: 1 Day Post-Op    Objective:     Vital Signs (Most Recent):  Temp: 98.2 °F (36.8 °C) (04/08/22 0700)  Pulse: (!) 58 (04/08/22 1000)  Resp: 14 (04/08/22 1000)  BP: (!) 151/64 (04/08/22 0900)  SpO2: 98 % (04/08/22 1000)   Vital Signs (24h Range):  Temp:  [97.6 °F (36.4 °C)-98.3 °F (36.8 °C)] 98.2 °F (36.8 °C)  Pulse:  [55-69] 58  Resp:  [9-37] 14  SpO2:  [91 %-100 %] 98 %  BP: (104-167)/(51-72) 151/64  Arterial Line BP: (107-157)/(34-60) 144/51     Weight: 62 kg (136 lb 11 oz)  Body mass index is 26.69 kg/m².      Intake/Output Summary (Last 24 hours) at 4/8/2022 1020  Last data filed at 4/8/2022 1000  Gross per 24 hour   Intake 2898.93 ml   Output 1850 ml   Net 1048.93 ml       Physical Exam  Vitals and nursing note reviewed.   Constitutional:       Appearance: Normal appearance.   HENT:      Head:      Comments: Bilateral neck incisions c/d/i     Mouth/Throat:      Mouth: Mucous membranes are moist.      Pharynx: Oropharynx is clear. No oropharyngeal exudate.   Eyes:      Conjunctiva/sclera: Conjunctivae normal.      Pupils: Pupils are  equal, round, and reactive to light.   Cardiovascular:      Rate and Rhythm: Normal rate and regular rhythm.      Pulses: Normal pulses.      Heart sounds: Normal heart sounds. No murmur heard.  Pulmonary:      Effort: Pulmonary effort is normal.      Breath sounds: Normal breath sounds.   Abdominal:      General: Abdomen is flat. There is no distension.      Palpations: Abdomen is soft.      Tenderness: There is no abdominal tenderness.   Skin:     General: Skin is warm and dry.      Coloration: Skin is not pale.   Neurological:      General: No focal deficit present.      Mental Status: She is alert and oriented to person, place, and time.   Psychiatric:         Mood and Affect: Mood normal.         Behavior: Behavior normal.       Vents:       Lines/Drains/Airways       Epidural Line  Duration                  Perineural Analgesia/Anesthesia Assessment (using dermatomes) Epidural 06/27/18 0823 1381 days                    Significant Labs:    CBC/Anemia Profile:  Recent Labs   Lab 04/07/22 1819 04/08/22  0253   WBC 11.00 9.26   HGB 11.2* 10.5*   HCT 32.8* 31.0*    260   MCV 93 95   RDW 13.0 12.5        Chemistries:  Recent Labs   Lab 04/07/22  1819 04/08/22  0253   * 129*   K 3.8 4.8    98   CO2 23 23   BUN 17 14   CREATININE 0.6 0.7   CALCIUM 8.6* 8.5*   ALBUMIN 3.4* 3.1*   PROT 6.2 5.8*   BILITOT 0.6 0.5   ALKPHOS 79 68   ALT 13 12   AST 20 21   MG 1.7 1.7   PHOS 2.3* 3.4       Recent Lab Results  (Last 5 results in the past 24 hours)        04/08/22  0253   04/07/22 1819 04/07/22  1818 04/07/22  1813   04/07/22  1341        Albumin 3.1   3.4             Alkaline Phosphatase 68   79             ALT 12   13             Anion Gap 8   9             aPTT   30.0  Comment: aPTT therapeutic range = 39-69 seconds             AST 21   20             Baso # 0.01   0.04             Basophil % 0.1   0.4             BILIRUBIN TOTAL 0.5  Comment: For infants and newborns, interpretation of results  should be based  on gestational age, weight and in agreement with clinical  observations.    Premature Infant recommended reference ranges:  Up to 24 hours.............<8.0 mg/dL  Up to 48 hours............<12.0 mg/dL  3-5 days..................<15.0 mg/dL  6-29 days.................<15.0 mg/dL     0.6  Comment: For infants and newborns, interpretation of results should be based  on gestational age, weight and in agreement with clinical  observations.    Premature Infant recommended reference ranges:  Up to 24 hours.............<8.0 mg/dL  Up to 48 hours............<12.0 mg/dL  3-5 days..................<15.0 mg/dL  6-29 days.................<15.0 mg/dL               BUN 14   17             Calcium 8.5   8.6             Chloride 98   101             CO2 23   23             Creatinine 0.7   0.6             Differential Method Automated   Automated             eGFR if  >60.0   >60.0             eGFR if non  >60.0  Comment: Calculation used to obtain the estimated glomerular filtration  rate (eGFR) is the CKD-EPI equation.      >60.0  Comment: Calculation used to obtain the estimated glomerular filtration  rate (eGFR) is the CKD-EPI equation.                Eos # 0.0   0.0             Eosinophil % 0.0   0.3             Glucose 133   116             Gran # (ANC) 8.2   10.1             Gran % 88.9   91.6             Hematocrit 31.0   32.8             Hemoglobin 10.5   11.2             Immature Grans (Abs) 0.02  Comment: Mild elevation in immature granulocytes is non specific and   can be seen in a variety of conditions including stress response,   acute inflammation, trauma and pregnancy. Correlation with other   laboratory and clinical findings is essential.     0.05  Comment: Mild elevation in immature granulocytes is non specific and   can be seen in a variety of conditions including stress response,   acute inflammation, trauma and pregnancy. Correlation with other   laboratory and  clinical findings is essential.               Immature Granulocytes 0.2   0.5             INR   1.1  Comment: Coumadin Therapy:  2.0 - 3.0 for INR for all indicators except mechanical heart valves  and antiphospholipid syndromes which should use 2.5 - 3.5.               Lymph # 0.5   0.5             Lymph % 5.2   4.8             Magnesium 1.7   1.7             MCH 32.2   31.7             MCHC 33.9   34.1             MCV 95   93             Mono # 0.5   0.3             Mono % 5.6   2.4             MPV 10.6   10.4             nRBC 0   0             Phosphorus 3.4   2.3             Platelets 260   258             POC ACTIVATED CLOTTING TIME K         267       POCT Glucose     108   45         Potassium 4.8   3.8             PROTEIN TOTAL 5.8   6.2             Protime   10.9             RBC 3.26   3.53             RDW 12.5   13.0             Sample         ARTERIAL       Sodium 129   133             WBC 9.26   11.00                                  All pertinent labs within the past 24 hours have been reviewed.    Significant Imaging:  I have reviewed all pertinent imaging results/findings within the past 24 hours.    Assessment/Plan:     * Bilateral carotid artery stenosis  Mrs. Mane is a 77 y.o. female with a PMHx of HTN and AFib with high-grade carotid stenosis who presented to SICU sp bilateral carotid endarterectomy. Pt tolerated surgery well without complications. Admitted to SICU for close postoperative monitoring. Doing well POD1. Will discharge with close PCP and vascular surgery follow up.      Neuro/Psych:   - no focal neuro signs or symptoms, physical exam neurologically intact  - q2h neuro checks to monitor for signs of embolic CVA or hyperperfusion syndrome  - pain: oxy prn             Cards:   - arterial line in place   - maintain SBP<140 with prn cardene gtt   - resume daily ASA and statin  - pmhx paroxysmal afib, currently rate controlled, resume verapamil and flecainide for afib, managed by EP  outpatient. Hold eliquis until POD3 per vascular.  - on home hctz and spironolactone. Will hold these for hyponatremia and hyperkalemia, respectively. Patient counseled and instructed to f/u with PCP for further mgmt      Pulm:   - on room air  - supplemental o2 prn to maintain SpO2>90%      Renal:  - olguin for I/O      FEN / GI:   - replete lytes as needed  - on home hctz and spironolactone. Will hold these for hyponatremia and hyperkalemia, respectively. Patient counseled and instructed to f/u with PCP for further mgmt      ID:   - afebrile with no leukocytosis  - Ancef for surgical ppx      Heme/Onc:   -- Daily CBC  - hold home eliquis until POD3, per vascular     Endo:  - No hx of DM   -- Gluc goal 140-180      PPx:   Feeding: cardiac diet  Analgesia/Sedation: PRN oxy  Thromboembolic prevention: holding eliquis   HOB >30  Stress Ulcer ppx: n/a  Glucose control: Critical care goal 140-180 g/dl     Lines/Drains/Airway:left arterial A line, Olguin, 2 PIV      Dispo/Code Status/Palliative:   -- SICU / Full Code          Critical Care Daily Checklist:    A: Awake: RASS Goal/Actual Goal: RASS Goal: 0-->alert and calm  Actual: Hernandez Agitation Sedation Scale (RASS): Alert and calm   B: Spontaneous Breathing Trial Performed?     C: SAT & SBT Coordinated?  n/a                      D: Delirium: CAM-ICU     E: Early Mobility Performed? Yes   F: Feeding Goal:    Status:     Current Diet Order   Procedures    Diet Cardiac    Diet general      AS: Analgesia/Sedation Oxy prn   T: Thromboembolic Prophylaxis Holding eliquis   H: HOB > 300 Yes   U: Stress Ulcer Prophylaxis (if needed) n/a   G: Glucose Control ssi   B: Bowel Function     I: Indwelling Catheter (Lines & Olguin) Necessi ty Askov, piv, olguin   D: De-escalation of Antimicrobials/Pharmacotherapies ancef    Plan for the day/ETD Discharge with f/u    Code Status:  Family/Goals of Care: Prior  Full code       Critical secondary to Patient has a condition that  poses threat to life and bodily function: post-CEA neurovascular checks     Critical care was time spent personally by me on the following activities: development of treatment plan with patient or surrogate and bedside caregivers, discussions with consultants, evaluation of patient's response to treatment, examination of patient, ordering and performing treatments and interventions, ordering and review of laboratory studies, ordering and review of radiographic studies, pulse oximetry, re-evaluation of patient's condition.  This critical care time did not overlap with that of any other provider or involve time for any procedures.     Brooke Olivares MD   PGY2  Critical Care - Surgery  [facility]

## 2022-04-08 NOTE — SUBJECTIVE & OBJECTIVE
Medications:  Continuous Infusions:   nicardipine Stopped (04/07/22 2228)     Scheduled Meds:   [START ON 4/9/2022] apixaban  5 mg Oral BID    aspirin  81 mg Oral Daily    atorvastatin  40 mg Oral Daily    mupirocin   Nasal BID     PRN Meds:magnesium oxide, magnesium oxide, melatonin, methocarbamoL, morphine, oxyCODONE, oxyCODONE, potassium bicarbonate, potassium bicarbonate, potassium bicarbonate, potassium, sodium phosphates, potassium, sodium phosphates, potassium, sodium phosphates     Objective:     Vital Signs (Most Recent):  Temp: 98.2 °F (36.8 °C) (04/08/22 0700)  Pulse: (!) 55 (04/08/22 0830)  Resp: 15 (04/08/22 0830)  BP: 135/62 (04/08/22 0830)  SpO2: 97 % (04/08/22 0830)   Vital Signs (24h Range):  Temp:  [97.6 °F (36.4 °C)-98.3 °F (36.8 °C)] 98.2 °F (36.8 °C)  Pulse:  [55-69] 55  Resp:  [9-37] 15  SpO2:  [91 %-100 %] 97 %  BP: (104-167)/(51-72) 135/62  Arterial Line BP: (107-157)/(34-60) 135/47         Physical Exam  Vitals and nursing note reviewed.   Constitutional:       General: She is not in acute distress.  Cardiovascular:      Rate and Rhythm: Normal rate.      Pulses: Normal pulses.   Pulmonary:      Effort: Pulmonary effort is normal. No respiratory distress.   Abdominal:      General: There is no distension.      Palpations: Abdomen is soft.      Tenderness: There is no abdominal tenderness.   Skin:     Comments: Incision clean/dry with overlying incision, no edema or induration   Neurological:      General: No focal deficit present.      Mental Status: She is alert and oriented to person, place, and time.       Significant Labs:  BMP:   Recent Labs   Lab 04/08/22  0253   *   *   K 4.8   CL 98   CO2 23   BUN 14   CREATININE 0.7   CALCIUM 8.5*   MG 1.7     CBC:   Recent Labs   Lab 04/08/22  0253   WBC 9.26   RBC 3.26*   HGB 10.5*   HCT 31.0*      MCV 95   MCH 32.2*   MCHC 33.9     CMP:   Recent Labs   Lab 04/08/22  0253   *   CALCIUM 8.5*   ALBUMIN 3.1*   PROT 5.8*    *   K 4.8   CO2 23   CL 98   BUN 14   CREATININE 0.7   ALKPHOS 68   ALT 12   AST 21   BILITOT 0.5       Significant Diagnostics:  CXR: No results found in the last 24 hours.

## 2022-04-08 NOTE — H&P
[facility]  Critical Care - Surgery  History & Physical    Patient Name: Magdalena Mane  MRN: [id number]  Admission Date: 4/7/2022  Code Status: Prior  Attending Physician: Dr. Brantley  Primary Care Provider: Ruby Garner MD   Principal Problem: <principal problem not specified>    Subjective:     HPI:  Mrs. Mane is a 77 y.o. female with a PMHx of HTN and AFib with high-grade right carotid stenosis who presented to SICU sp R carotid endarterectomy. Pt tolerated surgery well without complications. SICU consulted for postoperative monitoring. Pt presented off and vasoactive medications with stable hemodynamics. Pt states that she currently feels well with no complaints at this time.      Hospital/ICU Course:  4/7: POD0 Carotid endarterectomy. HDS      Follow-up For: Procedure(s) (LRB):  ENDARTERECTOMY-CAROTID (Right)    Post-Operative Day: Day of Surgery     Past Medical History:   Diagnosis Date    Anemia     Atrial fibrillation     Basal cell carcinoma     DJD (degenerative joint disease) 12/12/2012    Obesity (BMI 30-39.9) 11/27/2015    Vaginal atrophy 4/1/2016       Past Surgical History:   Procedure Laterality Date    BREAST BIOPSY Left 1999    Excisional bx, benign cyst    CARDIOVERSION  02/2021    COLONOSCOPY  2012    normal    COLONOSCOPY N/A 6/28/2017    Procedure: COLONOSCOPY;  Surgeon: Markel Montemayor MD;  Location: [facility] (4TH FLR);  Service: Endoscopy;  Laterality: N/A;    EXCISION OF GANGLION OF WRIST Left 6/27/2018    Procedure: EXCISION, GANGLION CYST, WRIST - Left Volar wrist;  Surgeon: Mary Moore MD;  [facility];  Service: Orthopedics;  Laterality: Left;    HIP SURGERY  05/05/2014    bilateral    JOINT REPLACEMENT Bilateral     MOLLY    TONSILLECTOMY      TREATMENT OF CARDIAC ARRHYTHMIA N/A 9/29/2020    Procedure: CARDIOVERSION;  Surgeon: Nigel García MD;  Location: [facility] EP LAB;  Service: Cardiology;  Laterality: N/A;  AF, LIGIA,  DCCV, MAC, [place], 3 Prep    TREATMENT OF CARDIAC ARRHYTHMIA N/A 2020    Procedure: Cardioversion or Defibrillation;  Surgeon: Nigel García MD;  [facility]: [facility] LAB;  Service: Cardiology;  Laterality: N/A;  AF, LIGIA, DCCV, [place], [place], 3 Prep       Review of patient's allergies indicates:   Allergen Reactions    Prednisone      ELEVATED B/P FOR SEVERAL DAYS/WENT TO ER    Lactose        Family History       Problem Relation (Age of Onset)    Arthritis Mother    Breast cancer Mother    Heart attack Father    Heart disease Father, Paternal Grandmother    Heart failure Father    Hyperlipidemia Mother, Father    Hypertension Father    No Known Problems Brother    Scoliosis Father    Stroke Maternal Grandmother    Tuberculosis Father          Tobacco Use    Smoking status: Former Smoker     Quit date: 1988     Years since quittin.2    Smokeless tobacco: Never Used   Substance and Sexual Activity    Alcohol use: Never    Drug use: No    Sexual activity: Yes     Partners: Male      Review of Systems   Constitutional:  Negative for chills, diaphoresis and fatigue.   HENT:  Negative for facial swelling, hearing loss, sore throat, trouble swallowing and voice change.    Eyes:  Negative for pain and visual disturbance.   Respiratory:  Negative for cough, chest tightness and shortness of breath.    Cardiovascular:  Negative for chest pain and palpitations.   Gastrointestinal:  Negative for abdominal pain, nausea and vomiting.   Skin:         Surgical incision site mildly painful   Neurological:  Negative for weakness, numbness and headaches.   Psychiatric/Behavioral:  Negative for confusion.    All other systems reviewed and are negative.  Objective:     Vital Signs (Most Recent):  Temp: 97.6 °F (36.4 °C) (22 1600)  Pulse: 64 (22 1600)  Resp: (!) 21 (22 1600)  BP: (!) 110/53 (22 1600)  SpO2: 96 % (22 1600)   Vital Signs (24h Range):  Temp:  [97.6 °F (36.4 °C)-97.8 °F (36.6 °C)]  97.6 °F (36.4 °C)  Pulse:  [55-64] 64  Resp:  [20-21] 21  SpO2:  [96 %-100 %] 96 %  BP: (110-167)/(53-72) 110/53  Arterial Line BP: (113)/(34) 113/34     Weight: 64 kg (141 lb)  Body mass index is 27.54 kg/m².      Intake/Output Summary (Last 24 hours) at 4/7/2022 1655  Last data filed at 4/7/2022 1530  Gross per 24 hour   Intake 1651 ml   Output --   Net 1651 ml       Physical Exam  Constitutional:       General: She is not in acute distress.     Appearance: Normal appearance. She is normal weight. She is not ill-appearing, toxic-appearing or diaphoretic.   HENT:      Head: Normocephalic.      Nose: Nose normal.      Mouth/Throat:      Mouth: Mucous membranes are moist.      Pharynx: Oropharynx is clear.   Eyes:      Extraocular Movements: Extraocular movements intact.      Pupils: Pupils are equal, round, and reactive to light.   Neck:      Comments: R. Neck surgical incision dressed. Clean and dry.  Cardiovascular:      Rate and Rhythm: Normal rate and regular rhythm.      Heart sounds: Normal heart sounds. No murmur heard.  Pulmonary:      Effort: Pulmonary effort is normal. No respiratory distress.      Breath sounds: Normal breath sounds.   Abdominal:      General: Abdomen is flat.      Palpations: Abdomen is soft.      Tenderness: There is no abdominal tenderness.   Musculoskeletal:         General: No swelling or signs of injury. Normal range of motion.      Cervical back: Normal range of motion.   Skin:     General: Skin is warm and dry.      Findings: No bruising.   Neurological:      General: No focal deficit present.      Mental Status: She is alert and oriented to person, place, and time. Mental status is at baseline.      Motor: No weakness.   Psychiatric:         Mood and Affect: Mood normal.         Behavior: Behavior normal.       Vents:       Lines/Drains/Airways       Epidural Line  Duration                  Perineural Analgesia/Anesthesia Assessment (using dermatomes) Epidural 06/27/18 7421 2871  days              Arterial Line  Duration             Arterial Line 04/07/22 1256 Right Radial <1 day              Peripheral Intravenous Line  Duration                  Peripheral IV - Single Lumen 04/07/22 1030 20 G Anterior;Distal;Left Forearm <1 day         Peripheral IV - Single Lumen 04/07/22 1303 16 G Right;Lateral Forearm <1 day                    Significant Labs:    CBC/Anemia Profile:  No results for input(s): WBC, HGB, HCT, PLT, MCV, RDW, IRON, FERRITIN, RETIC, FOLATE, [id number], OCCULTBLOOD in the last 48 hours.     Chemistries:  No results for input(s): NA, K, CL, CO2, BUN, CREATININE, CALCIUM, ALBUMIN, PROT, BILITOT, ALKPHOS, ALT, AST, GLUCOSE, MG, PHOS in the last 48 hours.    All pertinent labs within the past 24 hours have been reviewed.    Significant Imaging: I have reviewed all pertinent imaging results/findings within the past 24 hours.    Assessment/Plan:     Bilateral carotid artery stenosis  Mrs. Mane is a 77 y.o. female with a PMHx of HTN and AFib with high-grade right carotid stenosis who presented to SICU sp R carotid endarterectomy. Pt tolerated surgery well without complications. SICU consulted for postoperative monitoring      Neuro/Psych:   - pt AAOx4  - pain: oxy prn  -q2h neuro chekcs             Cards:   -  radial A line  - currently HDS, cardene gtt if needed for hypertension  - maintain SBP<140  - q2h neuro checks  - currently NSR  - asa 81 and lipitor daily  - consider resuming home verapamil and flecainide  - hold home eliquis until 4/9  - holding home hctz and spironolactone      Pulm:   - no acute issues  - Maintain SpO2>90%      Renal:  - olguin for I/O      FEN / GI:   - replete lytes as needed  - nurse instructed to limit PO water intake ain setting of mild hyponateremia      ID:   - Ancef for surgical ppx  -no acute issues      Heme/Onc:   -- Daily CBC      Endo:  - No hx of DM   -- Gluc goal 140-180      PPx:   Feeding: cardiac diet  Analgesia/Sedation: PRN  oxy  Thromboembolic prevention: holding eliquis   HOB >30  Stress Ulcer ppx: n/a  Glucose control: Critical care goal 140-180 g/dl     Lines/Drains/Airway:left arterial A line, Nunez, 2 PIV      Dispo/Code Status/Palliative:   -- SICU / Full Code     Critical secondary to Patient has a condition that poses threat to life and bodily function: postop monitoring     Critical care was time spent personally by me on the following activities: development of treatment plan with patient or surrogate and bedside caregivers, discussions with consultants, evaluation of patient's response to treatment, examination of patient, ordering and performing treatments and interventions, ordering and review of laboratory studies, ordering and review of radiographic studies, pulse oximetry, re-evaluation of patient's condition.  This critical care time did not overlap with that of any other provider or involve time for any procedures.     William Iglesias III, MD  Critical Care - Surgery  [facility]

## 2022-04-08 NOTE — DISCHARGE SUMMARY
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  She has no history of stroke TIA or amaurosis.  She has no history of coronary artery disease chest pain or shortness of breath.     She was not on antiplatelet agent or statin    Procedure(s) (LRB):  ENDARTERECTOMY-CAROTID (Right)     Hospital Course: On 4/7/22, the patient arrived to the [facility] for proper pre-operative management.  Upon completion of pre-operative preparation, the patient was taken back to the operative theatre. R carotid endarterectomy was performed without complication and the patient was transported to the post anesthesia care unit in stable condition.  After appropriate recovery from the anaesthetic agents used during the surgery, the patient was then transported to the hospital inpatient floor.  The interim of the hospital stay from arrival on the floor up to discharge has been uncomplicated. The patient has tolerated regular diet.  The patient's pain has been controlled using a multimodal approach. Currently, the patient's pain is well controlled on an oral regimen.  The patient has been voiding without difficulty.  Currently, the patient's progress is sufficient to allow the them to be discharged to home safely.  The patient agrees with this assessment and desires a discharge today.        Goals of Care Treatment Preferences:  Code Status: Full Code    Living Will: Yes              Consults:         Pending Diagnostic Studies:     Procedure Component Value Units  Date/Time    APTT [[id number]] Collected: 04/07/22 1812    Order Status: Sent Lab Status: In process Updated: 04/07/22 1813    Specimen: Blood     CBC auto differential [[id number]] Collected: 04/07/22 1803    Order Status: Sent Lab Status: In process Updated: 04/07/22 1804    Specimen: Blood     Comprehensive metabolic panel [[id number]] Collected: 04/07/22 1803    Order Status: Sent Lab Status: In process Updated: 04/07/22 1804    Specimen: Blood     Magnesium [[id number]] Collected: 04/07/22 1803    Order Status: Sent Lab Status: In process Updated: 04/07/22 1804    Specimen: Blood     Phosphorus [[id number]] Collected: 04/07/22 1803    Order Status: Sent Lab Status: In process Updated: 04/07/22 1804    Specimen: Blood     Protime-INR [[id number]] Collected: 04/07/22 1812    Order Status: Sent Lab Status: In process Updated: 04/07/22 1813    Specimen: Blood         Final Active Diagnoses:    Diagnosis Date Noted POA    PRINCIPAL PROBLEM:  Bilateral carotid artery stenosis [I65.23] 03/25/2022 Yes      Problems Resolved During this Admission:      Discharged Condition: good    Disposition: Home or Self Care    Follow Up:   Follow-up Information     TUCKER Brantley Iii, MD Follow up in 4 week(s).    Specialty: Vascular Surgery  Why: Post-op visit with bilateral carotid artery ultrasound  Contact information:  [address] [facility] [address]  [phone]                         Patient Instructions:      Diet general     Call MD for:  temperature >100.4     Call MD for:  persistent nausea and vomiting     Call MD for:  severe uncontrolled pain     Call MD for:  redness, tenderness, or signs of infection (pain, swelling, redness, odor or green/yellow discharge around incision site)     Remove dressing in 24 hours   Order Comments: Ok to remove dressing and shower tomorrow. No heavy lifting for 4-6 weeks, activity as tolerated. Patient can shower, allow water to run over incisions. No soaking in bath or pools  for 2 weeks.     Activity as tolerated     Medications:  Reconciled Home Medications:      Medication List      START taking these medications    HYDROcodone-acetaminophen 5-325 mg per tablet  Commonly known as: NORCO  Take 1 tablet by mouth every 6 (six) hours as needed for Pain.        CHANGE how you take these medications    atorvastatin 20 MG tablet  Commonly known as: LIPITOR  Take 1 tablet (20 mg total) by mouth once daily.  What changed: when to take this     irbesartan 300 MG tablet  Commonly known as: AVAPRO  Take 1 tablet (300 mg total) by mouth once daily.  What changed: when to take this     spironolactone 25 MG tablet  Commonly known as: ALDACTONE  Take 0.5 tablets (12.5 mg total) by mouth once daily.  What changed: how much to take        CONTINUE taking these medications    acetaminophen 500 MG tablet  Commonly known as: TYLENOL  Take 500 mg by mouth 2 (two) times daily.     aspirin 81 MG EC tablet  Commonly known as: ECOTRIN  Take 81 mg by mouth nightly.     calcium-vitamin D 250 mg-2.5 mcg (100 unit) per tablet  Take 1 tablet by mouth 2 (two) times daily.     diphenhydrAMINE 25 mg capsule  Commonly known as: BENADRYL  Take 25 mg by mouth every 6 (six) hours as needed for Itching.     ELIQUIS 5 mg Tab  Generic drug: apixaban  TAKE 1 TABLET BY MOUTH  TWICE DAILY     ferrous sulfate Tab tablet  Commonly known as: FEOSOL  Take 1 tablet by mouth every evening. PATIENT TAKING 3 X WEEKLY IN THE EVENING (M-W-F)     flecainide 50 MG Tab  Commonly known as: TAMBOCOR  Take 1 tablet (50 mg total) by mouth every 12 (twelve) hours.          lactase 3,000 unit tablet  Commonly known as: LACTAID  Take 1 tablet by mouth 3 (three) times daily as needed.     loratadine 10 mg tablet  Commonly known as: CLARITIN  Take 10 mg by mouth every morning.     verapamiL 120 MG C24p  Commonly known as: VERELAN  Take 1 capsule (120 mg total) by mouth every evening.     VITAMIN D3 ORAL  Take 3,000 Units by mouth once daily.             Jesus Alberto Gauthier MD  Vascular Surgery  [facility]

## 2022-04-09 ENCOUNTER — NURSE TRIAGE (OUTPATIENT)
Dept: ADMINISTRATIVE | Facility: CLINIC | Age: 77
End: 2022-04-09
Payer: MEDICARE

## 2022-04-09 ENCOUNTER — HOSPITAL ENCOUNTER (EMERGENCY)
Facility: HOSPITAL | Age: 77
Discharge: HOME | End: 2022-04-09
Attending: EMERGENCY MEDICINE
Payer: MEDICARE

## 2022-04-09 VITALS
TEMPERATURE: 98 F | DIASTOLIC BLOOD PRESSURE: 79 MMHG | HEART RATE: 60 BPM | RESPIRATION RATE: 18 BRPM | WEIGHT: 140 LBS | OXYGEN SATURATION: 100 % | SYSTOLIC BLOOD PRESSURE: 172 MMHG | HEIGHT: 60 IN | BODY MASS INDEX: 27.48 KG/M2

## 2022-04-09 DIAGNOSIS — I10 HTN (HYPERTENSION): ICD-10-CM

## 2022-04-09 DIAGNOSIS — Z98.890 STATUS POST CAROTID ENDARTERECTOMY: Primary | ICD-10-CM

## 2022-04-09 LAB
ALBUMIN SERPL BCP-MCNC: 3.5 G/DL (ref 3.5–5.2)
ALP SERPL-CCNC: 75 U/L (ref 55–135)
ALT SERPL W/O P-5'-P-CCNC: 14 U/L (ref 10–44)
ANION GAP SERPL CALC-SCNC: 11 MMOL/L (ref 8–16)
AST SERPL-CCNC: 20 U/L (ref 10–40)
BASOPHILS # BLD AUTO: 0.07 K/UL (ref 0–0.2)
BASOPHILS NFR BLD: 0.8 % (ref 0–1.9)
BILIRUB SERPL-MCNC: 0.6 MG/DL (ref 0.1–1)
BUN SERPL-MCNC: 16 MG/DL (ref 8–23)
CALCIUM SERPL-MCNC: 9.4 MG/DL (ref 8.7–10.5)
CHLORIDE SERPL-SCNC: 99 MMOL/L (ref 95–110)
CO2 SERPL-SCNC: 24 MMOL/L (ref 23–29)
CREAT SERPL-MCNC: 0.7 MG/DL (ref 0.5–1.4)
DIFFERENTIAL METHOD: ABNORMAL
EOSINOPHIL # BLD AUTO: 0.2 K/UL (ref 0–0.5)
EOSINOPHIL NFR BLD: 2 % (ref 0–8)
ERYTHROCYTE [DISTWIDTH] IN BLOOD BY AUTOMATED COUNT: 13 % (ref 11.5–14.5)
EST. GFR  (AFRICAN AMERICAN): >60 ML/MIN/1.73 M^2
EST. GFR  (NON AFRICAN AMERICAN): >60 ML/MIN/1.73 M^2
GLUCOSE SERPL-MCNC: 101 MG/DL (ref 70–110)
HCT VFR BLD AUTO: 38 % (ref 37–48.5)
HGB BLD-MCNC: 12.5 G/DL (ref 12–16)
IMM GRANULOCYTES # BLD AUTO: 0.03 K/UL (ref 0–0.04)
IMM GRANULOCYTES NFR BLD AUTO: 0.3 % (ref 0–0.5)
LYMPHOCYTES # BLD AUTO: 2.2 K/UL (ref 1–4.8)
LYMPHOCYTES NFR BLD: 23.9 % (ref 18–48)
MAGNESIUM SERPL-MCNC: 1.8 MG/DL (ref 1.6–2.6)
MCH RBC QN AUTO: 32.2 PG (ref 27–31)
MCHC RBC AUTO-ENTMCNC: 32.9 G/DL (ref 32–36)
MCV RBC AUTO: 98 FL (ref 82–98)
MONOCYTES # BLD AUTO: 1.3 K/UL (ref 0.3–1)
MONOCYTES NFR BLD: 13.7 % (ref 4–15)
NEUTROPHILS # BLD AUTO: 5.4 K/UL (ref 1.8–7.7)
NEUTROPHILS NFR BLD: 59.3 % (ref 38–73)
NRBC BLD-RTO: 0 /100 WBC
PLATELET # BLD AUTO: 289 K/UL (ref 150–450)
PMV BLD AUTO: 10.5 FL (ref 9.2–12.9)
POTASSIUM SERPL-SCNC: 4 MMOL/L (ref 3.5–5.1)
PROT SERPL-MCNC: 6.6 G/DL (ref 6–8.4)
RBC # BLD AUTO: 3.88 M/UL (ref 4–5.4)
SODIUM SERPL-SCNC: 134 MMOL/L (ref 136–145)
WBC # BLD AUTO: 9.12 K/UL (ref 3.9–12.7)

## 2022-04-09 PROCEDURE — 80053 COMPREHEN METABOLIC PANEL: CPT | Performed by: EMERGENCY MEDICINE

## 2022-04-09 PROCEDURE — 85025 COMPLETE CBC W/AUTO DIFF WBC: CPT | Performed by: EMERGENCY MEDICINE

## 2022-04-09 PROCEDURE — 93010 EKG 12-LEAD: ICD-10-PCS | Mod: ,,, | Performed by: INTERNAL MEDICINE

## 2022-04-09 PROCEDURE — 93010 ELECTROCARDIOGRAM REPORT: CPT | Mod: ,,, | Performed by: INTERNAL MEDICINE

## 2022-04-09 PROCEDURE — 96360 HYDRATION IV INFUSION INIT: CPT

## 2022-04-09 PROCEDURE — 99285 PR EMERGENCY DEPT VISIT,LEVEL V: ICD-10-PCS | Mod: ,,, | Performed by: EMERGENCY MEDICINE

## 2022-04-09 PROCEDURE — 93005 ELECTROCARDIOGRAM TRACING: CPT

## 2022-04-09 PROCEDURE — 99284 EMERGENCY DEPT VISIT MOD MDM: CPT | Mod: 25

## 2022-04-09 PROCEDURE — 25000003 PHARM REV CODE 250: Performed by: EMERGENCY MEDICINE

## 2022-04-09 PROCEDURE — 99285 EMERGENCY DEPT VISIT HI MDM: CPT | Mod: ,,, | Performed by: EMERGENCY MEDICINE

## 2022-04-09 PROCEDURE — 83735 ASSAY OF MAGNESIUM: CPT | Performed by: EMERGENCY MEDICINE

## 2022-04-09 RX ORDER — METHOCARBAMOL 500 MG/1
1000 TABLET, FILM COATED ORAL ONCE
Status: DISCONTINUED | OUTPATIENT
Start: 2022-04-09 | End: 2022-04-09 | Stop reason: HOSPADM

## 2022-04-09 RX ORDER — METHOCARBAMOL 500 MG/1
500 TABLET, FILM COATED ORAL 4 TIMES DAILY
Qty: 30 TABLET | Refills: 0 | Status: SHIPPED | OUTPATIENT
Start: 2022-04-09 | End: 2022-04-09 | Stop reason: SDUPTHER

## 2022-04-09 RX ORDER — METHOCARBAMOL 500 MG/1
500 TABLET, FILM COATED ORAL 4 TIMES DAILY
Status: DISCONTINUED | OUTPATIENT
Start: 2022-04-09 | End: 2022-04-09 | Stop reason: HOSPADM

## 2022-04-09 RX ORDER — METHOCARBAMOL 500 MG/1
500 TABLET, FILM COATED ORAL 4 TIMES DAILY
Qty: 30 TABLET | Refills: 0 | Status: SHIPPED | OUTPATIENT
Start: 2022-04-09 | End: 2022-04-19

## 2022-04-09 RX ADMIN — SODIUM CHLORIDE 250 ML: 0.9 INJECTION, SOLUTION INTRAVENOUS at 09:04

## 2022-04-09 NOTE — CONSULTS
[facility] - Emergency Dept  Vascular Surgery  Consult Note    Consults  Subjective:     Chief Complaint/Reason for Admission: hypertension    History of Present Illness: Ms Mane is a 77 year old female with a pmh of htn, afibb , anemia who is s/p Right CEA on 4/7/22 who presents to the ER with hypertension. Patients states that she missed a dose of her ibersartan last night and forgot to take it. She also had some tension in her right shoulder and neck. She noticed that her blood pressure was elevated and she decided to come to the ER. She denies any headaches, she denies any muscle weakness, numbness, tingling, dysarthria.       (Not in a hospital admission)      Review of patient's allergies indicates:   Allergen Reactions    Prednisone      ELEVATED B/P FOR SEVERAL DAYS/WENT TO ER    Lactose        Past Medical History:   Diagnosis Date    Anemia     Atrial fibrillation     Basal cell carcinoma     DJD (degenerative joint disease) 12/12/2012    Obesity (BMI 30-39.9) 11/27/2015    Vaginal atrophy 4/1/2016     Past Surgical History:   Procedure Laterality Date    BREAST BIOPSY Left 1999    Excisional bx, benign cyst    CARDIOVERSION  02/2021    CAROTID ENDARTERECTOMY Right 4/7/2022    Procedure: ENDARTERECTOMY-CAROTID;  Surgeon: TUCKER Brantley III, MD;  Location: [facility] OR 2ND FLR;  Service: Peripheral Vascular;  Laterality: Right;    COLONOSCOPY  2012    normal    COLONOSCOPY N/A 6/28/2017    Procedure: COLONOSCOPY;  Surgeon: Markel Montemayor MD;  Location: [facility] (4TH FLR);  Service: Endoscopy;  Laterality: N/A;    EXCISION OF GANGLION OF WRIST Left 6/27/2018    Procedure: EXCISION, GANGLION CYST, WRIST - Left Volar wrist;  Surgeon: Mary Moore MD;  [facility];  Service: Orthopedics;  Laterality: Left;    HIP SURGERY  05/05/2014    bilateral    JOINT REPLACEMENT Bilateral     MOLLY    TONSILLECTOMY      TREATMENT OF CARDIAC ARRHYTHMIA N/A 9/29/2020    Procedure:  CARDIOVERSION;  Surgeon: Nigel García MD;  Location: [facility] EP LAB;  Service: Cardiology;  Laterality: N/A;  AF, LIGIA, DCCV, MAC, MI, 3 Prep    TREATMENT OF CARDIAC ARRHYTHMIA N/A 2020    Procedure: Cardioversion or Defibrillation;  Surgeon: Nigel García MD;  Location: [facility] EP LAB;  Service: Cardiology;  Laterality: N/A;  AF, LIGIA, DCCV, MAC, MI, 3 Prep     Family History       Problem Relation (Age of Onset)    Arthritis Mother    Breast cancer Mother    Heart attack Father    Heart disease Father, Paternal Grandmother    Heart failure Father    Hyperlipidemia Mother, Father    Hypertension Father    No Known Problems Brother    Scoliosis Father    Stroke Maternal Grandmother    Tuberculosis Father          Tobacco Use    Smoking status: Former Smoker     Quit date: 1988     Years since quittin.2    Smokeless tobacco: Never Used   Substance and Sexual Activity    Alcohol use: Never    Drug use: No    Sexual activity: Yes     Partners: Male     Review of Systems   HENT:  Negative for facial swelling.    Musculoskeletal:  Positive for neck pain.   Skin: Negative.    Neurological:  Negative for speech difficulty, weakness, light-headedness, numbness and headaches.   All other systems reviewed and are negative.  Objective:     Vital Signs (Most Recent):  Temp: 97.8 °F (36.6 °C) (22)  Pulse: 60 (22)  Resp: 18 (22)  BP: (!) 172/79 (22)  SpO2: 100 % (22)   Vital Signs (24h Range):  Temp:  [97.3 °F (36.3 °C)-97.8 °F (36.6 °C)] 97.8 °F (36.6 °C)  Pulse:  [56-75] 60  Resp:  [12-20] 18  SpO2:  [95 %-100 %] 100 %  BP: (113-200)/(56-84) 172/79     Weight: 63.5 kg (140 lb)  Body mass index is 27.34 kg/m².    Physical Exam  Vitals and nursing note reviewed.   Constitutional:       Appearance: Normal appearance. She is normal weight.   Cardiovascular:      Rate and Rhythm: Normal rate.      Pulses: Normal pulses.   Pulmonary:      Effort: Pulmonary effort  is normal.   Musculoskeletal:         General: Normal range of motion.      Cervical back: Normal range of motion and neck supple. No rigidity or tenderness.   Skin:     General: Skin is warm and dry.      Capillary Refill: Capillary refill takes less than 2 seconds.   Neurological:      General: No focal deficit present.      Mental Status: She is alert and oriented to person, place, and time.      Sensory: No sensory deficit.      Motor: No weakness.      Coordination: Coordination normal.       Significant Labs:  CBC:   Recent Labs   Lab 04/08/22  0253   WBC 9.26   RBC 3.26*   HGB 10.5*   HCT 31.0*      MCV 95   MCH 32.2*   MCHC 33.9     CMP:   Recent Labs   Lab 04/08/22  0253   *   CALCIUM 8.5*   ALBUMIN 3.1*   PROT 5.8*   *   K 4.8   CO2 23   CL 98   BUN 14   CREATININE 0.7   ALKPHOS 68   ALT 12   AST 21   BILITOT 0.5       Significant Diagnostics:  I have reviewed all pertinent imaging results/findings within the past 24 hours.    Assessment/Plan:     Ms Mane is a 77 year old female who is s/p Right CEA      -- Can be discharged at ER physician discretion  -- F/U in clinic at scheduled time with repeat US carotid Duplex  -- Take home BP meds at normal times   -- will prescribe robaxin for right shoulder right neck tightness    Thank you for your consult. I will sign off. Please contact us if you have any additional questions.    Eleazar Prajapati MD  Vascular Surgery  [facility] - [facility]

## 2022-04-09 NOTE — OP NOTE
Surgery Date: 4/7/2022      Surgeon(s) and Role:     * TUCKER Brantley III, MD - Primary     * Eleazar Prajapati MD - Fellow     Assisting Surgeon: None     Pre-op Diagnosis:  Asymptomatic carotid artery stenosis without infarction, right [I65.21], >95%     Post-op Diagnosis:  Post-Op Diagnosis Codes:     * Asymptomatic carotid artery stenosis without infarction, right [I65.21]     Procedure(s) (LRB):  ENDARTERECTOMY-CAROTID (Right) with bovine patch     Anesthesia: General     Operative Findings: Focal >95% stenosis     Estimated Blood Loss: 50cc         Specimens:       Specimen (24h ago, onward)                 None         Indications:  This 77-year-old female who presents with a high grade right carotid artery stenosis. She has been asymptomatic. Duplex ultrasound showed approximately > 95 % stenosis of the right internal carotid artery.  The risks and benefits of the procedure were informed to the patient and the patient elected to proceed with surgical intervention.     Description of procedure:  The patient was placed in the supine huang chair position.  Time-outs were performed using both pre induction and pre incision safety checklist to verify correct patient, procedure, site, and additional critical information prior to beginning the procedure.  The procedure was performed under general endotracheal anesthesia.  The right neck was prepped and draped in standard sterile fashion.  A longitudinal incision was made over the anterior border of the sternocleidomastoid muscle with a 15 blade scalpel.  The incision was deepened through the platysma with electrocautery.  The sternocleidomastoid was retracted laterally.  Small crossing veins were ligated with 3-0 silk suture as were lymphatics.  The internal jugular vein was identified.  Dissection along the medial and superior surface of the internal jugular vein led to the facial vein.  The facial vein was ligated with 2-0 silk suture and medium clips.  The common  carotid, internal carotid, external carotid, and superior thyroid arteries were exposed and dissected.  These arteries were controlled with blue silastic vessel loops.  The common carotid artery was controlled with umbilical tape.  The vagus and hypoglossal nerves were clearly identified and preserved.  Extreme traction the mandible was avoided in order to protect the marginal mandibular nerve.  The ansa cervicalis nerve was transected to provide better exposure to the carotid artery. The patient was administered 8000 units of IV heparin and an activated clotting time ACT was checked.  3 minutes after the heparin was administered the internal carotid artery was clamped with angled small blue titanium clamp.  The common carotid was then clamped with an angled DeBakey clamp.  An arteriotomy was performed on the anterior surface of the common carotid artery with an 11 blade scalpel.  The incision was extended anterior laterally over the internal carotid artery with Alfred scissors.  It was also extended proximally along the common carotid artery with Alfred scissors.  A in lying vascular shunt 10 English was positioned into the internal carotid and common carotid artery.  Flow through the inlying shunt was confirmed with Doppler.  Next the endarterectomy plane was performed using a Miller elevator.  The plaque was transected proximally in the common carotid artery with Alfred scissors. The plaque was extensive in the common carotid artery and well distal in the ICA.  Next an eversion technique was performed for the external carotid artery and it was flushed with heparinized saline.  In the distal internal carotid artery plaque was feathered off achieving a smooth endpoint.  Two small tacking sutures using 6-0 Prolene for the distal endpoint in the internal carotid artery were performed.  The endarterectomized artery was inspected using a peanut and small portions of plaque were dissected off using Jennifer's forceps.  The  endarterectomized surface was gently irrigating using heparinized saline.  All remaining debris was removed with fine Jennifer's forceps.  The arteriotomy was closed using a patch angioplasty with bovine pericardium.  The patch was trimmed and modified and was sewn in with 6-0 Prolene suture.  This was performed in a continuous fashion.  This suture was started at the apex of the arteriotomy and ran on each side.  The patch was appropriately trimmed at the other end using Metzenbaum scissors and a 5 0 Prolene was started at this ended the patch and ran in a continuous fashion along either side of the artery.  This was planned to meet the 1st suture in the middle of the arteriotomy. One bovine patch were needed to close the arteriotomy.  Prior to throwing the last few throws, the shunt was removed and the internal carotid artery was allowed to back bleed.  The internal carotid artery was then again clamped.  Next the internal carotid, external carotid, and superior thyroid arteries were allowed to back bleed.  These with all clamped again the common carotid artery was flushed forward bled 1 time.  The carotid lumen was irrigated once again with heparinized saline.  Flow was 1st reestablished to the external carotid artery for 5 beats and then flow was reestablished to the internal carotid artery.  The suture line was checked for hemostasis and small needle hole bleeding was controlled with Surgicel.  Doppler confirmed excellent flow through the common carotid artery, external carotid artery, and internal carotid artery.  After ensuring hemostasis the wound was closed with 3-0 Vicryl suture closing the platysmal layer 1st.  The skin was closed with 4-0 Monocryl in a running continuous fashion.  Sterile dressings were applied.  A debriefing checklist was performed to share information critical to patient care.  The patient was awakened, and noted to have no neurological deficits and was taken to the postoperative care  unit in stable condition.    Eleazar Prajapati MD PGY6  Vascular Surgery Fellow  [phone]

## 2022-04-09 NOTE — TELEPHONE ENCOUNTER
Reason for Disposition   [1] Systolic BP  >= 160 OR Diastolic >= 100 AND [2] cardiac or neurologic symptoms (e.g., chest pain, difficulty breathing, unsteady gait, blurred vision)    Additional Information   Negative: Difficult to awaken or acting confused (e.g., disoriented, slurred speech)   Negative: SEVERE difficulty breathing (e.g., struggling for each breath, speaks in single words)   Negative: [1] Weakness of the face, arm or leg on one side of the body AND [2] new-onset   Negative: [1] Numbness (i.e., loss of sensation) of the face, arm or leg on one side of the body AND [2] new-onset   Negative: [1] Chest pain lasts > 5 minutes AND [2] history of heart disease  (i.e., heart attack, bypass surgery, angina, angioplasty, CHF)   Negative: [1] Chest pain AND [2] took nitrogylcerin AND [3] pain was not relieved   Negative: Sounds like a life-threatening emergency to the triager   Negative: Symptom is main concern  (e.g., headache, chest pain)   Negative: Low blood pressure is main concern    Protocols [facility]        Magdalena called to say she is post op carotid endarterectomy 04/02/2022.  Says today that her BP was OK last night. This morning is 185/76, pulse 53.  Surgeon told her that he did not want her pressure to be going high.  She is having no HA, but neck and back tension.  She said she took irbesartan last night at midnight, also verapamil.  Her HCTZ was stopped due to sodium level when she was hospitalized and not resumed.  She has not yet taken the spironolactone as she usually did. Her digital pressure during this call is 175/79, pulse 70.  She states there is pressure in her neck, which is new since discharge yesterday, and she is concerned that atrial fibrillation is present again, as her pulse is usually in the 50's. Feels very dry, extreme thirst, and says that she is on fluid restriction.  Feeling tension in neck and upper shoulders. Per [facility] triage  protocol, recommend ED now for evaluation.  Says her  will bring her now.  Message to Dr Brantley, Ruby Garner MD, pcp, and to Dr Pratt, cardiology. Please contact caller directly with any additional care advice.

## 2022-04-09 NOTE — ED PROVIDER NOTES
"Encounter Date: 4/9/2022       History     Chief Complaint   Patient presents with    Hypertension     Sbp 175, controlled a fib but "pulse is going up", took her statin at 11pm, carotid endar. On 4/7/22, right side for >90% blockage.     Post-op Problem     HPI   Magdalena Mane is a 77-year-old female with history of anemia, atrial fibrillation and status post right carotid endarterectomy on 04/07 presenting with hypertension.  She was instructed by her vascular surgeon to present if her blood pressure continued to rise or if she had hypertension.  She reports feeling anxious but denies any chest pain, lightheadedness, headaches, visual changes, neck pain, back pain, numbness, tingling, paresthesias, paralysis or any other symptoms.  She denies any fevers or chills, abdominal pain, dysuria, hematochezia, melena or hemoptysis.  No other aggravating or alleviating factors.  Her dressing is clean dry and intact on her right neck.    Review of patient's allergies indicates:   Allergen Reactions    Prednisone      ELEVATED B/P FOR SEVERAL DAYS/WENT TO ER    Lactose      Past Medical History:   Diagnosis Date    Anemia     Atrial fibrillation     Basal cell carcinoma     DJD (degenerative joint disease) 12/12/2012    Obesity (BMI 30-39.9) 11/27/2015    Vaginal atrophy 4/1/2016     Past Surgical History:   Procedure Laterality Date    BREAST BIOPSY Left 1999    Excisional bx, benign cyst    CARDIOVERSION  02/2021    CAROTID ENDARTERECTOMY Right 4/7/2022    Procedure: ENDARTERECTOMY-CAROTID;  Surgeon: TUCKER Brantley III, MD;  Location: [facility] OR 2ND [facility];  Service: Peripheral Vascular;  Laterality: Right;    COLONOSCOPY  2012    normal    COLONOSCOPY N/A 6/28/2017    Procedure: COLONOSCOPY;  Surgeon: Markel Montemayor MD;  Location: [facility] ENDO (4TH FLR);  Service: Endoscopy;  Laterality: N/A;    EXCISION OF GANGLION OF WRIST Left 6/27/2018    Procedure: EXCISION, GANGLION CYST, WRIST - Left Volar wrist;  Surgeon: " Mary Moore MD;  Location: [facility] OR [address] [place] [address];  Service: Orthopedics;  Laterality: Left;    HIP SURGERY  2014    bilateral    JOINT REPLACEMENT Bilateral     MOLLY    TONSILLECTOMY      TREATMENT OF CARDIAC ARRHYTHMIA N/A 2020    Procedure: CARDIOVERSION;  Surgeon: Nigel García MD;  Location: [facility] EP LAB;  Service: Cardiology;  Laterality: N/A;  AF, LIGIA, DCCV, MAC, ND, 3 Prep    TREATMENT OF CARDIAC ARRHYTHMIA N/A 2020    Procedure: Cardioversion or Defibrillation;  Surgeon: Nigel García MD;  Location: [facility] EP LAB;  Service: Cardiology;  Laterality: N/A;  AF, LIGIA, DCCV, MAC, ND, 3 Prep     Family History   Problem Relation Age of Onset    Breast cancer Mother     Arthritis Mother     Hyperlipidemia Mother     Scoliosis Father     Heart disease Father     Tuberculosis Father          1 lung from collapse lung    Heart attack Father     Heart failure Father     Hyperlipidemia Father     Hypertension Father     No Known Problems Brother     Stroke Maternal Grandmother     Heart disease Paternal Grandmother      Social History     Tobacco Use    Smoking status: Former Smoker     Quit date: 1988     Years since quittin.2    Smokeless tobacco: Never Used   Substance Use Topics    Alcohol use: Never    Drug use: No     Review of Systems   Constitutional: Negative for chills, fatigue and fever.   HENT: Negative for congestion, sinus pain and sore throat.    Eyes: Negative for photophobia and visual disturbance.   Respiratory: Negative for chest tightness and shortness of breath.    Cardiovascular: Negative for chest pain and palpitations.   Gastrointestinal: Negative for abdominal pain, nausea and vomiting.   Genitourinary: Negative for dysuria, frequency and urgency.   Musculoskeletal: Negative for back pain, neck pain and neck stiffness.   Skin: Positive for wound.   Neurological: Negative for facial asymmetry, weakness, light-headedness, numbness and headaches.    Psychiatric/Behavioral: Negative for confusion. The patient is nervous/anxious.        Physical Exam     Initial Vitals [04/09/22 0828]   BP Pulse Resp Temp SpO2   (!) 200/84 75 18 97.8 °F (36.6 °C) 97 %      MAP       --         Physical Exam    Nursing note and vitals reviewed.      Gen/Constitutional: Interactive. No acute distress  Head: Normocephalic, Atraumatic  Neck: supple, no masses or LAD, no JVD, right carotid dressing, clean dry and intact with small amount of bleeding on dressing.  Eyes: PERRLA, conjunctiva clear  Ears, Nose and Throat: No rhinorrhea or stridor.  Cardiac:  Regular rate, Reg Rhythm, No murmur  Pulmonary: CTA Bilat, no wheezes, rhonchi, rales.  No increased work of breathing.  GI: Abdomen soft, non-tender, non-distended; no rebound or guarding  : No CVA tenderness.  Musculoskeletal: Extremities warm, well perfused, no erythema, no edema  Skin: No rashes, cyanosis or jaundice.  Neuro: Alert and Oriented x 3; No focal motor or sensory deficits.  5/5 strength upper and lower extremity, no truncal ataxia, negative Romberg  Psych:  Appears anxious      ED Course   Procedures  Labs Reviewed   CBC W/ AUTO DIFFERENTIAL - Abnormal; Notable for the following components:       Result Value    RBC 3.88 (*)     MCH 32.2 (*)     Mono # 1.3 (*)     All other components within normal limits   COMPREHENSIVE METABOLIC PANEL - Abnormal; Notable for the following components:    Sodium 134 (*)     All other components within normal limits   MAGNESIUM          Imaging Results    None          Medications   sodium chloride 0.9% bolus 250 mL (0 mLs Intravenous Stopped 4/9/22 1020)     Medical Decision Making:   History:   Old Medical Records: I decided to obtain old medical records.  Old Records Summarized: records from previous admission(s).       <> Summary of Records: Recently discharged from the hospital yesterday after right CEA  Initial Assessment:   Magdalena Mane is a 77-year-old female with  history of anemia, atrial fibrillation and status post right carotid endarterectomy on 04/07 presenting with hypertension.  Differential Diagnosis:   Hypertension, postoperative complication, medication noncompliance, TIA, CVA, arrhythmia  Independently Interpreted Test(s):   I have ordered and independently interpreted EKG Reading(s) - see prior notes  Clinical Tests:   Lab Tests: Ordered and Reviewed  Medical Tests: Ordered and Reviewed  Other:   I have discussed this case with another health care provider.       <> Summary of the Discussion: Vascular surgery    Urgent evaluation of patient presenting with pressure along her right carotid wound and hypertension.  She is otherwise afebrile vital signs are stable with slight hypertension on arrival but improved on repeat evaluation.  Physical exam findings with no focal neurologic deficits, lung sounds clear, cardiac exam unremarkable.  Dressing is clean dry and intact with small amount of bleeding on the gauze.  Discussed case with vascular surgery given immediate postop period of 48 hours.  They have made plans to change blood pressure medication, and close outpatient follow-up.  ECG unremarkable with no signs of ischemia or STEMI on my read.  Labs unremarkable and at baseline.  Patient was given prescription for Robaxin instructed to follow-up with vascular surgery. Patient agreeable to discharge plan. Strict ED precautions and return instructions discussed at length and patient verbalized understanding. All questions were answered and ample time was given for questions.      Complexity:  High risk                    Clinical Impression:   Final diagnoses:  [I10] HTN (hypertension)  [Z98.890] Status post carotid endarterectomy (Primary)          ED Disposition Condition    Discharge Stable        ED Prescriptions     Medication Sig Dispense Start Date End Date Auth. Provider    methocarbamoL (ROBAXIN) 500 MG Tab  (Status: Discontinued) Take 1 tablet (500 mg  total) by mouth 4 (four) times daily. for 10 days 30 tablet 4/9/2022 4/9/2022 Eleazar Prajapati MD    methocarbamoL (ROBAXIN) 500 MG Tab Take 1 tablet (500 mg total) by mouth 4 (four) times daily. for 10 days 30 tablet 4/9/2022 4/19/2022 Amadeo Cotton DO        Follow-up Information    None        Amadeo Cotton DO, [facility]  Emergency Staff Physician   Dept of Emergency Medicine   [facility]  Spectralink: [id number]        Disclaimer: This note has been generated using voice-recognition software. There may be typographical errors that have been missed during proof-reading.       Amadeo Cotton DO  04/09/22 0024

## 2022-04-09 NOTE — SUBJECTIVE & OBJECTIVE
(Not in a hospital admission)      Review of patient's allergies indicates:   Allergen Reactions    Prednisone      ELEVATED B/P FOR SEVERAL DAYS/WENT TO ER    Lactose        Past Medical History:   Diagnosis Date    Anemia     Atrial fibrillation     Basal cell carcinoma     DJD (degenerative joint disease) 12/12/2012    Obesity (BMI 30-39.9) 11/27/2015    Vaginal atrophy 4/1/2016     Past Surgical History:   Procedure Laterality Date    BREAST BIOPSY Left 1999    Excisional bx, benign cyst    CARDIOVERSION  02/2021    CAROTID ENDARTERECTOMY Right 4/7/2022    Procedure: ENDARTERECTOMY-CAROTID;  Surgeon: TUCKER Brantley III, MD;  Location: [facility] OR 2ND FLR;  Service: Peripheral Vascular;  Laterality: Right;    COLONOSCOPY  2012    normal    COLONOSCOPY N/A 6/28/2017    Procedure: COLONOSCOPY;  Surgeon: Markel Montemayor MD;  Location: [facility] ENDO (4TH FLR);  Service: Endoscopy;  Laterality: N/A;    EXCISION OF GANGLION OF WRIST Left 6/27/2018    Procedure: EXCISION, GANGLION CYST, WRIST - Left Volar wrist;  Surgeon: Mary Moore MD;  Location: [facility] OR 1ST FLR;  Service: Orthopedics;  Laterality: Left;    HIP SURGERY  05/05/2014    bilateral    JOINT REPLACEMENT Bilateral     MOLLY    TONSILLECTOMY      TREATMENT OF CARDIAC ARRHYTHMIA N/A 9/29/2020    Procedure: CARDIOVERSION;  Surgeon: Nigel García MD;  Location: [facility] EP LAB;  Service: Cardiology;  Laterality: N/A;  AF, LIGIA, DCCV, MAC, [place], 3 Prep    TREATMENT OF CARDIAC ARRHYTHMIA N/A 12/8/2020    Procedure: Cardioversion or Defibrillation;  Surgeon: Nigel García MD;  Location: [facility] EP LAB;  Service: Cardiology;  Laterality: N/A;  AF, LIGIA, DCCV, MAC, [place], 3 Prep     Family History       Problem Relation (Age of Onset)    Arthritis Mother    Breast cancer Mother    Heart attack Father    Heart disease Father, Paternal Grandmother    Heart failure Father    Hyperlipidemia Mother, Father    Hypertension Father    No Known Problems Brother    Scoliosis Father    Stroke  Maternal Grandmother    Tuberculosis Father          Tobacco Use    Smoking status: Former Smoker     Quit date: 1988     Years since quittin.2    Smokeless tobacco: Never Used   Substance and Sexual Activity    Alcohol use: Never    Drug use: No    Sexual activity: Yes     Partners: Male     Review of Systems   HENT:  Negative for facial swelling.    Musculoskeletal:  Positive for neck pain.   Skin: Negative.    Neurological:  Negative for speech difficulty, weakness, light-headedness, numbness and headaches.   All other systems reviewed and are negative.  Objective:     Vital Signs (Most Recent):  Temp: 97.8 °F (36.6 °C) (22)  Pulse: 60 (22)  Resp: 18 (22)  BP: (!) 172/79 (22)  SpO2: 100 % (22)   Vital Signs (24h Range):  Temp:  [97.3 °F (36.3 °C)-97.8 °F (36.6 °C)] 97.8 °F (36.6 °C)  Pulse:  [56-75] 60  Resp:  [12-20] 18  SpO2:  [95 %-100 %] 100 %  BP: (113-200)/(56-84) 172/79     Weight: 63.5 kg (140 lb)  Body mass index is 27.34 kg/m².    Physical Exam  Vitals and nursing note reviewed.   Constitutional:       Appearance: Normal appearance. She is normal weight.   Cardiovascular:      Rate and Rhythm: Normal rate.      Pulses: Normal pulses.   Pulmonary:      Effort: Pulmonary effort is normal.   Musculoskeletal:         General: Normal range of motion.      Cervical back: Normal range of motion and neck supple. No rigidity or tenderness.   Skin:     General: Skin is warm and dry.      Capillary Refill: Capillary refill takes less than 2 seconds.   Neurological:      General: No focal deficit present.      Mental Status: She is alert and oriented to person, place, and time.      Sensory: No sensory deficit.      Motor: No weakness.      Coordination: Coordination normal.       Significant Labs:  CBC:   Recent Labs   Lab 22  0253   WBC 9.26   RBC 3.26*   HGB 10.5*   HCT 31.0*      MCV 95   MCH 32.2*   MCHC 33.9     CMP:   Recent Labs    Lab 04/08/22  0253   *   CALCIUM 8.5*   ALBUMIN 3.1*   PROT 5.8*   *   K 4.8   CO2 23   CL 98   BUN 14   CREATININE 0.7   ALKPHOS 68   ALT 12   AST 21   BILITOT 0.5       Significant Diagnostics:  I have reviewed all pertinent imaging results/findings within the past 24 hours.

## 2022-04-09 NOTE — HPI
Ms Mane is a 77 year old female with a pmh of htn, afibb , anemia who is s/p Right CEA on 4/7/22 who presents to the ER with hypertension. Patients states that she missed a dose of her ibersartan last night and forgot to take it. She also had some tension in her right shoulder and neck. She noticed that her blood pressure was elevated and she decided to come to the ER. She denies any headaches, she denies any muscle weakness, numbness, tingling, dysarthria.

## 2022-04-09 NOTE — ED NOTES
"Pt is s/p carotid endarterectomy (right neck). Pt takes her BP at home and stated that is getting progressively higher. Pt also states her "pulse is going up" -- says her baseline is "60 and it was at 72." She called her PCP who referred her to the ED. Denies chest pain, palpitations, SOB.  at bedside. Bed low and locked; side rails up x2; call light within reach. Will continue to monitor.    Patient identifiers for Magdalena Mane 77 y.o. female checked and correct.  Chief Complaint   Patient presents with    Hypertension     Sbp 175, controlled a fib but "pulse is going up", took her statin at 11pm, carotid endar. On 4/7/22, right side for >90% blockage.     Post-op Problem     Past Medical History:   Diagnosis Date    Anemia     Atrial fibrillation     Basal cell carcinoma     DJD (degenerative joint disease) 12/12/2012    Obesity (BMI 30-39.9) 11/27/2015    Vaginal atrophy 4/1/2016     Allergies reported:   Review of patient's allergies indicates:   Allergen Reactions    Prednisone      ELEVATED B/P FOR SEVERAL DAYS/WENT TO ER    Lactose        LOC: Patient is awake, alert, and aware of environment with an appropriate affect. Patient is oriented x 3 and speaking appropriately.  APPEARANCE: Patient resting comfortably and in no acute distress. Patient is clean and well groomed, patient's clothing is properly fastened. Pt reports anxiety.  HEENT: Bandage over incision on right side of neck; dressing is clean, dry, and intact.  SKIN: The skin is warm and dry. Patient has normal skin turgor and moist mucus membranes. Skin is intact; no bruising or breakdown noted.  MUSKULOSKELETAL: Patient is moving all extremities well, no obvious deformities noted. Pulses intact.   RESPIRATORY: Airway is open and patent. Respirations are spontaneous and non-labored with normal effort and rate, BBS=clear  CARDIAC: Patient has a normal rate and rhythm. Pt on continuous cardiac monitoring, pulse ox, and BP cuff " cycling Q 15 min. Mild bilateral edema of lower extremities.  ABDOMEN: No distention noted. Bowel sounds active in all 4 quadrants. Soft and non-tender upon palpation.  NEUROLOGICAL: PERRL. Facial expression is symmetrical. Hand grasps are equal bilaterally. Normal sensation in all extremities when touched with finger.

## 2022-04-09 NOTE — DISCHARGE INSTRUCTIONS
Today, your evaluation did not show any abnormalities.  Your labs, incision and your exam is great.  Please follow-up with your vascular surgeon as needed and as scheduled.    Our goal in the emergency department is to always give you outstanding care and exceptional service. You may receive a survey by mail or e-mail in the next week regarding your experience in our ED. We would greatly appreciate your completing and returning the survey. Your feedback provides us with a way to recognize our staff who give very good care and it helps us learn how to improve when your experience was below our aspiration of excellence.

## 2022-04-10 ENCOUNTER — PATIENT OUTREACH (OUTPATIENT)
Dept: ADMINISTRATIVE | Facility: CLINIC | Age: 77
End: 2022-04-10
Payer: MEDICARE

## 2022-04-10 ENCOUNTER — NURSE TRIAGE (OUTPATIENT)
Dept: ADMINISTRATIVE | Facility: CLINIC | Age: 77
End: 2022-04-10
Payer: MEDICARE

## 2022-04-10 RX ORDER — HYDROCHLOROTHIAZIDE 12.5 MG/1
12.5 TABLET ORAL DAILY
Qty: 30 TABLET | Refills: 2 | Status: SHIPPED | OUTPATIENT
Start: 2022-04-10 | End: 2022-04-12

## 2022-04-10 NOTE — PROGRESS NOTES
"[facility]  Follow-up Visit Progress Note     PRESENTING HISTORY     PCP: Ruby Garner MD  Chief Complaint/Reason for Visit:  Follow up from recent visit.    No chief complaint on file.    History of Present Illness & ROS: Ms. Magdalena Mane is a 77 y.o. female.  ER follow up.   Pleasant lady, here with her supportive spouse.   Notations made to chart of messaging between she, her PCP, and on call RN, Vascular Surgery team and the recent ER visit.   She is with voiced frustration today, but states, 'feel better about some things since there seems to be some communication now'. She is taking the regiment of medication as per her Digital HTN Pharm D, whom Ms. Nichols has and is in contact with.   Denies any dizziness, headaches, change in vision or physical level. She ambulates with a walker and this is reportedly her baseline.    No chest pain or SOB.     She is interested in the COVID (2nd) Booster, but 'will wait until her follow up labs and post op follow up"  with Vascular Surgery.    Review of Systems:  Eyes: denies visual changes at this time denies floaters   ENT: no nasal congestion or sore throat  Respiratory: no cough or shorness of breath  Cardiovascular: no chest pain or palpitations  Gastrointestinal: no nausea or vomiting, no abdominal pain or change in bowel habits  Genitourinary: no hematuria or dysuria; denies frequency  Hematologic/Lymphatic: no easy bruising or lymphadenopathy  Musculoskeletal: no arthralgias or myalgias  Neurological: no seizures or tremors  Endocrine: no heat or cold intolerance      PAST HISTORY:     Past Medical History:   Diagnosis Date    Anemia     Atrial fibrillation     Basal cell carcinoma     DJD (degenerative joint disease) 12/12/2012    Obesity (BMI 30-39.9) 11/27/2015    Vaginal atrophy 4/1/2016       Past Surgical History:   Procedure Laterality Date    BREAST BIOPSY Left 1999    Excisional bx, benign cyst    CARDIOVERSION  02/2021    CAROTID " ENDARTERECTOMY Right 2022    Procedure: ENDARTERECTOMY-CAROTID;  Surgeon: TUCKER Brantley III, MD;  Location: [facility] OR 2ND FLR;  Service: Peripheral Vascular;  Laterality: Right;    COLONOSCOPY      normal    COLONOSCOPY N/A 2017    Procedure: COLONOSCOPY;  Surgeon: Markel Montemayor MD;  Location: [facility] ENDO (4TH FLR);  Service: Endoscopy;  Laterality: N/A;    EXCISION OF GANGLION OF WRIST Left 2018    Procedure: EXCISION, GANGLION CYST, WRIST - Left Volar wrist;  Surgeon: Mary Moore MD;  Location: [facility] OR 1ST FLR;  Service: Orthopedics;  Laterality: Left;    HIP SURGERY  2014    bilateral    JOINT REPLACEMENT Bilateral     MOLLY    TONSILLECTOMY      TREATMENT OF CARDIAC ARRHYTHMIA N/A 2020    Procedure: CARDIOVERSION;  Surgeon: Nigel García MD;  Location: [facility] EP LAB;  Service: Cardiology;  Laterality: N/A;  AF, LIGIA, DCCV, MAC, [place], 3 Prep    TREATMENT OF CARDIAC ARRHYTHMIA N/A 2020    Procedure: Cardioversion or Defibrillation;  Surgeon: Nigel García MD;  Location: [facility] EP LAB;  Service: Cardiology;  Laterality: N/A;  AF, LIGIA, DCCV, [place], [place], 3 Prep       Family History   Problem Relation Age of Onset    Breast cancer Mother     Arthritis Mother     Hyperlipidemia Mother     Scoliosis Father     Heart disease Father     Tuberculosis Father          1 lung from collapse lung    Heart attack Father     Heart failure Father     Hyperlipidemia Father     Hypertension Father     No Known Problems Brother     Stroke Maternal Grandmother     Heart disease Paternal Grandmother        Social History     Socioeconomic History    Marital status:    Occupational History    Occupation: retired   Tobacco Use    Smoking status: Former Smoker     Quit date: 1988     Years since quittin.2    Smokeless tobacco: Never Used   Substance and Sexual Activity    Alcohol use: Never    Drug use: No    Sexual activity: Yes     Partners: Male   Social History  Narrative     to HEATH Montemayor    Nurse    Likes to garden     Social Determinants of Health     Financial Resource Strain: Low Risk     Difficulty of Paying Living Expenses: Not very hard   Food Insecurity: No Food Insecurity    Worried About Running Out of Food in the Last Year: Never true    Ran Out of Food in the Last Year: Never true   Transportation Needs: No Transportation Needs    Lack of Transportation (Medical): No    Lack of Transportation (Non-Medical): No   Physical Activity: Sufficiently Active    Days of Exercise per Week: 5 days    Minutes of Exercise per Session: 60 min   Stress: Stress Concern Present    Feeling of Stress : To some extent   Social Connections: Socially Integrated    Frequency of Communication with Friends and Family: More than three times a week    Frequency of Social Gatherings with Friends and Family: More than three times a week    Attends [employer] Services: More than 4 times per year    Active Member of Clubs or Organizations: Yes    Attends Club or Organization Meetings: More than 4 times per year    Marital Status:    Housing Stability: Unknown    Unable to Pay for Housing in the Last Year: No    Unstable Housing in the Last Year: No       MEDICATIONS & ALLERGIES:     Current Outpatient Medications on File Prior to Visit   Medication Sig Dispense Refill    acetaminophen (TYLENOL) 500 MG tablet Take 500 mg by mouth 2 (two) times daily.      aspirin (ECOTRIN) 81 MG EC tablet Take 81 mg by mouth nightly.      atorvastatin (LIPITOR) 20 MG tablet Take 1 tablet (20 mg total) by mouth once daily. (Patient taking differently: Take 20 mg by mouth every evening.) 30 tablet 11    calcium-vitamin D 250-100 mg-unit per tablet Take 1 tablet by mouth 2 (two) times daily.      cholecalciferol, vitamin D3, (VITAMIN D3 ORAL) Take 3,000 Units by mouth once daily.      diphenhydrAMINE (BENADRYL) 25 mg capsule Take 25 mg by mouth every 6 (six) hours as  needed for Itching.      ELIQUIS 5 mg Tab TAKE 1 TABLET BY MOUTH  TWICE DAILY 180 tablet 3    ferrous sulfate (FEOSOL) Tab tablet Take 1 tablet by mouth every evening. PATIENT TAKING 3 X WEEKLY IN THE EVENING (M-W-F)      flecainide (TAMBOCOR) 50 MG Tab Take 1 tablet (50 mg total) by mouth every 12 (twelve) hours. 180 tablet 3    hydroCHLOROthiazide (HYDRODIURIL) 12.5 MG Tab Take 1 tablet (12.5 mg total) by mouth once daily. 30 tablet 2    HYDROcodone-acetaminophen (NORCO) 5-325 mg per tablet Take 1 tablet by mouth every 6 (six) hours as needed for Pain. 15 tablet 0    irbesartan (AVAPRO) 300 MG tablet Take 1 tablet (300 mg total) by mouth once daily. (Patient taking differently: Take 300 mg by mouth every morning.) 30 tablet 5    lactase (LACTAID) 3,000 unit tablet Take 1 tablet by mouth 3 (three) times daily as needed.      loratadine (CLARITIN) 10 mg tablet Take 10 mg by mouth every morning.      methocarbamoL (ROBAXIN) 500 MG Tab Take 1 tablet (500 mg total) by mouth 4 (four) times daily. for 10 days 30 tablet 0    spironolactone (ALDACTONE) 25 MG tablet Take 0.5 tablets (12.5 mg total) by mouth once daily. (Patient taking differently: Take 25 mg by mouth once daily.) 30 tablet 5    verapamiL (VERELAN) 120 MG C24P Take 1 capsule (120 mg total) by mouth every evening. 90 capsule 3     No current facility-administered medications on file prior to visit.        Review of patient's allergies indicates:   Allergen Reactions    Prednisone      ELEVATED B/P FOR SEVERAL DAYS/WENT TO ER    Lactose        Medications Reconciliation:   I have reconciled the patient's home medications and discharge medications with the patient/family. I have updated all changes.  Refer to After-Visit Medication List.    OBJECTIVE:     Vital Signs:  There were no vitals filed for this visit.  Wt Readings from Last 3 Encounters:   04/09/22 0828 63.5 kg (140 lb)   04/08/22 0500 62 kg (136 lb 11 oz)   04/07/22 1102 64 kg (141 lb)    03/25/22 1110 64 kg (141 lb 1.5 oz)     There is no height or weight on file to calculate BMI.   Wt Readings from Last 3 Encounters:   04/12/22 62.7 kg (138 lb 3.7 oz)   04/09/22 63.5 kg (140 lb)   04/08/22 62 kg (136 lb 11 oz)     Temp Readings from Last 3 Encounters:   04/09/22 97.8 °F (36.6 °C) (Oral)   04/08/22 97.3 °F (36.3 °C) (Oral)   03/25/22 98.5 °F (36.9 °C) (Oral)     BP Readings from Last 3 Encounters:   04/12/22 128/74   04/09/22 (!) 172/79   04/08/22 113/68     Pulse Readings from Last 3 Encounters:   04/12/22 81   04/09/22 60   04/08/22 60       Physical Exam:  (Focused Exam)  General: Well developed, well nourished. No distress.  HEENT: Head is normocephalic, atraumatic  Eyes: Clear conjunctiva.  Neck: Supple, symmetrical neck; trachea midline.  Surgical incision noted to right lateral neck wall, dry and intact with surrounding surgical ecchymosis noted.   Cardiovascular: Heart with regular rate and rhythm. No murmurs, gallops or rubs  Skin: Skin color, texture, turgor normal. No rashes.  Musculoskeletal: Normal gait with walker.  Neurologic:  No focal numbness or weakness.   Psychiatric: Not depressed.        Laboratory  Lab Results   Component Value Date    WBC 9.12 04/09/2022    HGB 12.5 04/09/2022    HCT 38.0 04/09/2022     04/09/2022    CHOL 176 03/03/2022    TRIG 49 03/03/2022    HDL 73 03/03/2022    ALT 14 04/09/2022    AST 20 04/09/2022     (L) 04/09/2022    K 4.0 04/09/2022    CL 99 04/09/2022    CREATININE 0.7 04/09/2022    BUN 16 04/09/2022    CO2 24 04/09/2022    TSH 2.798 03/03/2022    INR 1.1 04/07/2022         ASSESSMENT & PLAN:     Same day apt.    ER follow up 4/9/2022 for BPs    Bilateral carotid artery stenosis   S/p Carotid Endarterectomy on 4/47/2022 per Dr. Brantley.   Hospital follow up will appropriately be deferred to [facility] team of providers, Vascular Surgery.   ` ASA  ` Lipitor     Will address any medicine issues on today's  single hospital follow up. She will be following up with her established PCP after today's visit.        Essential hypertension  [facility].   Notations made to her messaging to Dr. Botello as recent as on 4/11/2022 and her primary, Dr. Garner on 4/11/2022:   Today: 128/74 (controlled; seems to have normalized)  ` Varapamil 120 q pm  ` Aldactone 25  ` Avapro 300  ` HcTZ 12.5  *BMP per her Cardiologist and PCP today.       Paroxysmal Tachy / A Fib:   On chronic anti coagulation therapy   History of Cardioversion.  HR today: 81  *Seen by Dr. Mendoza on 3/14/2022; rec'd follow up in 1 year.  ` Tambocor  ` Eliquis      *Questions answered for Ms. Mane. Encouraged to schedule follow up with her Primary at this time.     Future Appointments   Date Time Provider Department Center   4/18/2022 11:45 AM LAB, [facility] [place]        Medication List          Accurate as of April 12, 2022 11:59 AM. If you have any questions, ask your nurse or doctor.            CHANGE how you take these medications    atorvastatin 20 MG tablet  Commonly known as: LIPITOR  Take 1 tablet (20 mg total) by mouth once daily.  What changed: when to take this     irbesartan 300 MG tablet  Commonly known as: AVAPRO  Take 1 tablet (300 mg total) by mouth once daily.  What changed: when to take this     spironolactone 25 MG tablet  Commonly known as: ALDACTONE  Take 0.5 tablets (12.5 mg total) by mouth once daily.  What changed: how much to take        CONTINUE taking these medications    acetaminophen 500 MG tablet  Commonly known as: TYLENOL     aspirin 81 MG EC tablet  Commonly known as: ECOTRIN     calcium-vitamin D 250 mg-2.5 mcg (100 unit) per tablet     diphenhydrAMINE 25 mg capsule  Commonly known as: BENADRYL     ELIQUIS 5 mg Tab  Generic drug: apixaban  TAKE 1 TABLET BY MOUTH  TWICE DAILY     ferrous sulfate Tab tablet  Commonly known as: FEOSOL     flecainide 50 MG Tab  Commonly known as:  TAMBOCOR  Take 1 tablet (50 mg total) by mouth every 12 (twelve) hours.     hydroCHLOROthiazide 12.5 MG Tab  Commonly known as: HYDRODIURIL  Take 1 tablet (12.5 mg total) by mouth once daily.     HYDROcodone-acetaminophen 5-325 mg per tablet  Commonly known as: NORCO  Take 1 tablet by mouth every 6 (six) hours as needed for Pain.     lactase 3,000 unit tablet  Commonly known as: LACTAID     loratadine 10 mg tablet  Commonly known as: CLARITIN     methocarbamoL 500 MG Tab  Commonly known as: ROBAXIN  Take 1 tablet (500 mg total) by mouth 4 (four) times daily. for 10 days     verapamiL 120 MG C24p  Commonly known as: VERELAN  Take 1 capsule (120 mg total) by mouth every evening.     VITAMIN D3 ORAL            Signing Physician:  PRIMO Titus

## 2022-04-10 NOTE — TELEPHONE ENCOUNTER
Pt calling asking for something she can take now ast home for her blood pressure at home. Advised that she was already triaged. Pt states she wants to speak to the oncall provider. Advised that I cant just call the on call provider. Pt then hung up. Will route message.     Reason for Disposition   [1] Follow-up call to recent contact AND [2] information only call, no triage required    Protocols used: INFORMATION ONLY CALL - NO TRIAGE-A-

## 2022-04-10 NOTE — TELEPHONE ENCOUNTER
Patient calling regarding elevated BP of 189/62. States her meds are not controlling her BP. Asymptomatic. Per protocol, advised to see physician within 24 hours.    Reason for Disposition   Systolic BP  >= 180 OR Diastolic >= 110    Additional Information   Negative: Difficult to awaken or acting confused (e.g., disoriented, slurred speech)   Negative: SEVERE difficulty breathing (e.g., struggling for each breath, speaks in single words)   Negative: [1] Weakness of the face, arm or leg on one side of the body AND [2] new-onset   Negative: [1] Numbness (i.e., loss of sensation) of the face, arm or leg on one side of the body AND [2] new-onset   Negative: [1] Chest pain lasts > 5 minutes AND [2] history of heart disease  (i.e., heart attack, bypass surgery, angina, angioplasty, CHF)   Negative: [1] Chest pain AND [2] took nitrogylcerin AND [3] pain was not relieved   Negative: Sounds like a life-threatening emergency to the triager   Negative: [1] Systolic BP  >= 160 OR Diastolic >= 100 AND [2] cardiac or neurologic symptoms (e.g., chest pain, difficulty breathing, unsteady gait, blurred vision)   Negative: [1] Pregnant 20 or more weeks (or postpartum < 6 weeks) AND [2] new hand or face swelling   Negative: [1] Pregnant 20 or more weeks AND [2] Systolic BP  >= 140 OR Diastolic >= 90   Negative: [1] Systolic BP  >= 200 OR Diastolic >= 120  AND [2] having NO cardiac or neurologic symptoms   Negative: [1] Postpartum < 6 weeks AND [2] Systolic BP  >= 140 OR Diastolic >= 90   Negative: [1] Systolic BP  >= 180 OR Diastolic >= 110 AND [2] missed most recent dose of blood pressure medication    Protocols used: BLOOD PRESSURE - HIGH-A-

## 2022-04-10 NOTE — PROGRESS NOTES
Received a Day 2 PD escalation from patient, contacted patient via telephone. Patient stated her BP is uncontrolled. She is not in pain but is tense. She is unsure of if its tension. She does not know what she should be doing since she has no symptoms but tension and has uncontrolled readings. Contacted Nurse Triage and awaiting a Nurse to transfer patient to. Call transferred to Nurse Chan. Patient verbalized understanding.

## 2022-04-11 ENCOUNTER — PATIENT MESSAGE (OUTPATIENT)
Dept: INTERNAL MEDICINE | Facility: CLINIC | Age: 77
End: 2022-04-11
Payer: MEDICARE

## 2022-04-11 ENCOUNTER — PATIENT MESSAGE (OUTPATIENT)
Dept: VASCULAR SURGERY | Facility: CLINIC | Age: 77
End: 2022-04-11
Payer: MEDICARE

## 2022-04-11 ENCOUNTER — TELEPHONE (OUTPATIENT)
Dept: CARDIOLOGY | Facility: CLINIC | Age: 77
End: 2022-04-11
Payer: MEDICARE

## 2022-04-11 ENCOUNTER — TELEPHONE (OUTPATIENT)
Dept: VASCULAR SURGERY | Facility: CLINIC | Age: 77
End: 2022-04-11
Payer: MEDICARE

## 2022-04-11 DIAGNOSIS — I10 ESSENTIAL HYPERTENSION: Primary | ICD-10-CM

## 2022-04-11 NOTE — TELEPHONE ENCOUNTER
Contacted patient in response to message regarding pt's complaints of HTN over the weekend. Pt states she contacted the nurse on call twice and present to ED after hospital discharge because of SPB as high as 190's. States she missed two doses of blood pressure medication at home due to her confusion. States she also does not believe she received any of her BP medications in ICU although they may have been given IV. Pt states she is very upset because it should not take this much effort for someone to manage her blood pressure. Acknowledged patient's frustration and explained that although vascular surgery is caring for her post-operatively, this department defers management of blood pressure to cardiology and primary care. Explained to patient that nurse will send message to Dr. Pratt as this is who patient manages her BP (as stated by patient) and request Dr. Dial's staff contacts her to assist. Pt ended call.

## 2022-04-11 NOTE — TELEPHONE ENCOUNTER
Contacted patient in response to message. States she is trying to increase her activity daily but notices that she is still weak and would like to know if there is a protein shake that she can drink. Explained to patient that the best thing she can do to increase strength and promote wound healing is to eat a healthy diet that's rich in protein and increase her activity daily. 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 Pt states she will continue to eat a healthy diet.----- Message from Maura Cotton sent at 4/11/2022 12:09 PM CDT -----      Patient is asking what kind protein shake is recommended for her to help w/nutrition and healing and patient is LACTOSE INTOLERANT    Patient can be contacted @# [phone]

## 2022-04-11 NOTE — TELEPHONE ENCOUNTER
Continue medications as recommended by digitial HTN team- spironolactone 12.5mg qAM, hctz 12.5mg qAM, irbesartan 300mg daily, and verapamil 120mg qHS. It may take some time for BP to normalize since surgery and medication adjustment but continue to monitor so that the program has data to adjust based on and keep lab apt to follow up sodium. I haven't seen her within the timeframe to be able to order HH, but also not sure it is necessary for this one lab draw.

## 2022-04-11 NOTE — TELEPHONE ENCOUNTER
Pt called stating she had carotid endarterectomy   last Thursday and her bp has been high. Pt stated her systolic by has been in the 170's. Pt stated her HCTZ was discontinued at discharge because her sodium was low. Pt stated she called and spoke to a resident about her bp and was advised to restart HCTZ and her systolic bp went form 190's to 120's in 30 minutes after restarting HCTZ. Pt stated she feels a little lightheaded but feels better than she did over the weekend. Pt stated she will also contact the digital bp program. Please advise.

## 2022-04-11 NOTE — TELEPHONE ENCOUNTER
----- Message from Lidia Mejia RN sent at 4/11/2022  8:44 AM CDT -----  Good morning,    Mrs. Mane presented to the ED over the weekend for hypertension. She recently had a carotid endarterectomy with Dr. Brantley. She is very concerned because her SBP is still in the 170's. She says her BP is managed by both you and by the digital medicine program.    Lidia

## 2022-04-12 ENCOUNTER — OFFICE VISIT (OUTPATIENT)
Dept: INTERNAL MEDICINE | Facility: CLINIC | Age: 77
End: 2022-04-12
Payer: MEDICARE

## 2022-04-12 VITALS
HEIGHT: 60 IN | HEART RATE: 81 BPM | BODY MASS INDEX: 27.14 KG/M2 | WEIGHT: 138.25 LBS | DIASTOLIC BLOOD PRESSURE: 74 MMHG | OXYGEN SATURATION: 98 % | SYSTOLIC BLOOD PRESSURE: 128 MMHG

## 2022-04-12 DIAGNOSIS — I65.23 BILATERAL CAROTID ARTERY STENOSIS: ICD-10-CM

## 2022-04-12 DIAGNOSIS — I10 ESSENTIAL HYPERTENSION: Primary | ICD-10-CM

## 2022-04-12 PROCEDURE — 99999 PR PBB SHADOW E&M-EST. PATIENT-LVL IV: CPT | Mod: PBBFAC,,, | Performed by: NURSE PRACTITIONER

## 2022-04-12 PROCEDURE — 99499 RISK ADDL DX/OHS AUDIT: ICD-10-PCS | Mod: S$GLB,,, | Performed by: NURSE PRACTITIONER

## 2022-04-12 PROCEDURE — 3288F PR FALLS RISK ASSESSMENT DOCUMENTED: ICD-10-PCS | Mod: CPTII,S$GLB,, | Performed by: NURSE PRACTITIONER

## 2022-04-12 PROCEDURE — 3074F SYST BP LT 130 MM HG: CPT | Mod: CPTII,S$GLB,, | Performed by: NURSE PRACTITIONER

## 2022-04-12 PROCEDURE — 99214 OFFICE O/P EST MOD 30 MIN: CPT | Mod: S$GLB,,, | Performed by: NURSE PRACTITIONER

## 2022-04-12 PROCEDURE — 3078F DIAST BP <80 MM HG: CPT | Mod: CPTII,S$GLB,, | Performed by: NURSE PRACTITIONER

## 2022-04-12 PROCEDURE — 4010F ACE/ARB THERAPY RXD/TAKEN: CPT | Mod: CPTII,S$GLB,, | Performed by: NURSE PRACTITIONER

## 2022-04-12 PROCEDURE — 1159F MED LIST DOCD IN RCRD: CPT | Mod: CPTII,S$GLB,, | Performed by: NURSE PRACTITIONER

## 2022-04-12 PROCEDURE — 1111F PR DISCHARGE MEDS RECONCILED W/ CURRENT OUTPATIENT MED LIST: ICD-10-PCS | Mod: CPTII,S$GLB,, | Performed by: NURSE PRACTITIONER

## 2022-04-12 PROCEDURE — 3288F FALL RISK ASSESSMENT DOCD: CPT | Mod: CPTII,S$GLB,, | Performed by: NURSE PRACTITIONER

## 2022-04-12 PROCEDURE — 1157F PR ADVANCE CARE PLAN OR EQUIV PRESENT IN MEDICAL RECORD: ICD-10-PCS | Mod: CPTII,S$GLB,, | Performed by: NURSE PRACTITIONER

## 2022-04-12 PROCEDURE — 1126F AMNT PAIN NOTED NONE PRSNT: CPT | Mod: CPTII,S$GLB,, | Performed by: NURSE PRACTITIONER

## 2022-04-12 PROCEDURE — 1126F PR PAIN SEVERITY QUANTIFIED, NO PAIN PRESENT: ICD-10-PCS | Mod: CPTII,S$GLB,, | Performed by: NURSE PRACTITIONER

## 2022-04-12 PROCEDURE — 4010F PR ACE/ARB THEARPY RXD/TAKEN: ICD-10-PCS | Mod: CPTII,S$GLB,, | Performed by: NURSE PRACTITIONER

## 2022-04-12 PROCEDURE — 99499 UNLISTED E&M SERVICE: CPT | Mod: S$GLB,,, | Performed by: NURSE PRACTITIONER

## 2022-04-12 PROCEDURE — 1101F PT FALLS ASSESS-DOCD LE1/YR: CPT | Mod: CPTII,S$GLB,, | Performed by: NURSE PRACTITIONER

## 2022-04-12 PROCEDURE — 3078F PR MOST RECENT DIASTOLIC BLOOD PRESSURE < 80 MM HG: ICD-10-PCS | Mod: CPTII,S$GLB,, | Performed by: NURSE PRACTITIONER

## 2022-04-12 PROCEDURE — 1157F ADVNC CARE PLAN IN RCRD: CPT | Mod: CPTII,S$GLB,, | Performed by: NURSE PRACTITIONER

## 2022-04-12 PROCEDURE — 99214 PR OFFICE/OUTPT VISIT, EST, LEVL IV, 30-39 MIN: ICD-10-PCS | Mod: S$GLB,,, | Performed by: NURSE PRACTITIONER

## 2022-04-12 PROCEDURE — 99999 PR PBB SHADOW E&M-EST. PATIENT-LVL IV: ICD-10-PCS | Mod: PBBFAC,,, | Performed by: NURSE PRACTITIONER

## 2022-04-12 PROCEDURE — 1159F PR MEDICATION LIST DOCUMENTED IN MEDICAL RECORD: ICD-10-PCS | Mod: CPTII,S$GLB,, | Performed by: NURSE PRACTITIONER

## 2022-04-12 PROCEDURE — 1111F DSCHRG MED/CURRENT MED MERGE: CPT | Mod: CPTII,S$GLB,, | Performed by: NURSE PRACTITIONER

## 2022-04-12 PROCEDURE — 3074F PR MOST RECENT SYSTOLIC BLOOD PRESSURE < 130 MM HG: ICD-10-PCS | Mod: CPTII,S$GLB,, | Performed by: NURSE PRACTITIONER

## 2022-04-12 PROCEDURE — 1101F PR PT FALLS ASSESS DOC 0-1 FALLS W/OUT INJ PAST YR: ICD-10-PCS | Mod: CPTII,S$GLB,, | Performed by: NURSE PRACTITIONER

## 2022-04-12 NOTE — TELEPHONE ENCOUNTER
"Contacted patient in response to messages. Explained to patient that Dr. Brantley is aware of HTN and would like patient to report to ED if she is hypertensive and has a headache. Otherwise she should FU with her PCP or digital medicine provider for BP control. Pt verbalized understanding and states she is frustrated because when she attempted to call clinic over the weekend and was not able to reach anyone on call to discuss her hypertension she felt like she was "hitting a brick wall" because she was told at discharge to call, however she wasn't able to reach anyone. Also states she is frustrated that it seems different treatment teams are not working together. Explained to patient that nurse will escalate this to supervisor.  "

## 2022-04-13 ENCOUNTER — TELEPHONE (OUTPATIENT)
Dept: CARDIOLOGY | Facility: CLINIC | Age: 77
End: 2022-04-13
Payer: MEDICARE

## 2022-04-13 DIAGNOSIS — I10 ESSENTIAL HYPERTENSION: Primary | ICD-10-CM

## 2022-04-13 RX ORDER — SPIRONOLACTONE 25 MG/1
25 TABLET ORAL DAILY
COMMUNITY
End: 2022-04-13 | Stop reason: SDUPTHER

## 2022-04-13 RX ORDER — CANDESARTAN 16 MG/1
16 TABLET ORAL DAILY
Qty: 30 TABLET | Refills: 3 | Status: SHIPPED | OUTPATIENT
Start: 2022-04-13 | End: 2022-04-20

## 2022-04-13 RX ORDER — CANDESARTAN 16 MG/1
16 TABLET ORAL DAILY
COMMUNITY
End: 2022-04-13 | Stop reason: SDUPTHER

## 2022-04-13 RX ORDER — SPIRONOLACTONE 25 MG/1
25 TABLET ORAL DAILY
Qty: 15 TABLET | Refills: 3 | Status: SHIPPED | OUTPATIENT
Start: 2022-04-13 | End: 2022-04-20

## 2022-04-13 NOTE — TELEPHONE ENCOUNTER
Pt notified before leaving office today, verbalized understanding. Printout of medications given to pt. This message routed to Karen Laird at [facility].

## 2022-04-13 NOTE — TELEPHONE ENCOUNTER
Pt here today for bp check. BP in right arm 169 70 p66 and in 169 70 p66 left arm. Please advise.

## 2022-04-15 ENCOUNTER — PATIENT MESSAGE (OUTPATIENT)
Dept: CARDIOLOGY | Facility: CLINIC | Age: 77
End: 2022-04-15
Payer: MEDICARE

## 2022-04-15 ENCOUNTER — PATIENT MESSAGE (OUTPATIENT)
Dept: INTERNAL MEDICINE | Facility: CLINIC | Age: 77
End: 2022-04-15
Payer: MEDICARE

## 2022-04-16 ENCOUNTER — PATIENT MESSAGE (OUTPATIENT)
Dept: CARDIOLOGY | Facility: CLINIC | Age: 77
End: 2022-04-16
Payer: MEDICARE

## 2022-04-17 ENCOUNTER — PATIENT MESSAGE (OUTPATIENT)
Dept: CARDIOLOGY | Facility: CLINIC | Age: 77
End: 2022-04-17
Payer: MEDICARE

## 2022-04-18 ENCOUNTER — PATIENT MESSAGE (OUTPATIENT)
Dept: CARDIOLOGY | Facility: CLINIC | Age: 77
End: 2022-04-18
Payer: MEDICARE

## 2022-04-18 ENCOUNTER — PATIENT MESSAGE (OUTPATIENT)
Dept: ADMINISTRATIVE | Facility: OTHER | Age: 77
End: 2022-04-18
Payer: MEDICARE

## 2022-04-18 ENCOUNTER — PATIENT MESSAGE (OUTPATIENT)
Dept: INTERNAL MEDICINE | Facility: CLINIC | Age: 77
End: 2022-04-18
Payer: MEDICARE

## 2022-04-19 ENCOUNTER — PATIENT MESSAGE (OUTPATIENT)
Dept: ADMINISTRATIVE | Facility: OTHER | Age: 77
End: 2022-04-19
Payer: MEDICARE

## 2022-04-19 ENCOUNTER — LAB VISIT (OUTPATIENT)
Dept: LAB | Facility: HOSPITAL | Age: 77
End: 2022-04-19
Attending: INTERNAL MEDICINE
Payer: MEDICARE

## 2022-04-19 ENCOUNTER — OFFICE VISIT (OUTPATIENT)
Dept: INTERNAL MEDICINE | Facility: CLINIC | Age: 77
End: 2022-04-19
Payer: MEDICARE

## 2022-04-19 VITALS
HEIGHT: 60 IN | HEART RATE: 64 BPM | DIASTOLIC BLOOD PRESSURE: 68 MMHG | SYSTOLIC BLOOD PRESSURE: 182 MMHG | TEMPERATURE: 97 F | RESPIRATION RATE: 17 BRPM | BODY MASS INDEX: 28.09 KG/M2 | WEIGHT: 143.06 LBS | OXYGEN SATURATION: 97 %

## 2022-04-19 DIAGNOSIS — I10 ESSENTIAL HYPERTENSION: ICD-10-CM

## 2022-04-19 DIAGNOSIS — I10 ESSENTIAL HYPERTENSION: Primary | ICD-10-CM

## 2022-04-19 DIAGNOSIS — F43.9 STRESS: ICD-10-CM

## 2022-04-19 DIAGNOSIS — D50.9 IRON DEFICIENCY ANEMIA, UNSPECIFIED IRON DEFICIENCY ANEMIA TYPE: ICD-10-CM

## 2022-04-19 DIAGNOSIS — I48.0 PAROXYSMAL ATRIAL FIBRILLATION: ICD-10-CM

## 2022-04-19 DIAGNOSIS — S22.000D COMPRESSION FRACTURE OF THORACIC VERTEBRA WITH ROUTINE HEALING, UNSPECIFIED THORACIC VERTEBRAL LEVEL, SUBSEQUENT ENCOUNTER: ICD-10-CM

## 2022-04-19 DIAGNOSIS — I70.0 ATHEROSCLEROSIS OF AORTA: ICD-10-CM

## 2022-04-19 DIAGNOSIS — G47.00 INSOMNIA, UNSPECIFIED TYPE: ICD-10-CM

## 2022-04-19 DIAGNOSIS — E55.9 VITAMIN D INSUFFICIENCY: ICD-10-CM

## 2022-04-19 LAB
25(OH)D3+25(OH)D2 SERPL-MCNC: 42 NG/ML (ref 30–96)
ALBUMIN SERPL BCP-MCNC: 3.5 G/DL (ref 3.5–5.2)
ALP SERPL-CCNC: 81 U/L (ref 55–135)
ALT SERPL W/O P-5'-P-CCNC: 16 U/L (ref 10–44)
ANION GAP SERPL CALC-SCNC: 7 MMOL/L (ref 8–16)
ANION GAP SERPL CALC-SCNC: 7 MMOL/L (ref 8–16)
AST SERPL-CCNC: 19 U/L (ref 10–40)
BASOPHILS # BLD AUTO: 0.09 K/UL (ref 0–0.2)
BASOPHILS NFR BLD: 1.2 % (ref 0–1.9)
BILIRUB SERPL-MCNC: 0.4 MG/DL (ref 0.1–1)
BUN SERPL-MCNC: 18 MG/DL (ref 8–23)
BUN SERPL-MCNC: 18 MG/DL (ref 8–23)
CALCIUM SERPL-MCNC: 9.5 MG/DL (ref 8.7–10.5)
CALCIUM SERPL-MCNC: 9.5 MG/DL (ref 8.7–10.5)
CHLORIDE SERPL-SCNC: 100 MMOL/L (ref 95–110)
CHLORIDE SERPL-SCNC: 100 MMOL/L (ref 95–110)
CO2 SERPL-SCNC: 30 MMOL/L (ref 23–29)
CO2 SERPL-SCNC: 30 MMOL/L (ref 23–29)
CREAT SERPL-MCNC: 0.7 MG/DL (ref 0.5–1.4)
CREAT SERPL-MCNC: 0.7 MG/DL (ref 0.5–1.4)
DIFFERENTIAL METHOD: ABNORMAL
EOSINOPHIL # BLD AUTO: 0.4 K/UL (ref 0–0.5)
EOSINOPHIL NFR BLD: 5.8 % (ref 0–8)
ERYTHROCYTE [DISTWIDTH] IN BLOOD BY AUTOMATED COUNT: 13.1 % (ref 11.5–14.5)
EST. GFR  (AFRICAN AMERICAN): >60 ML/MIN/1.73 M^2
EST. GFR  (AFRICAN AMERICAN): >60 ML/MIN/1.73 M^2
EST. GFR  (NON AFRICAN AMERICAN): >60 ML/MIN/1.73 M^2
EST. GFR  (NON AFRICAN AMERICAN): >60 ML/MIN/1.73 M^2
FERRITIN SERPL-MCNC: 196 NG/ML (ref 20–300)
GLUCOSE SERPL-MCNC: 97 MG/DL (ref 70–110)
GLUCOSE SERPL-MCNC: 97 MG/DL (ref 70–110)
HCT VFR BLD AUTO: 33.6 % (ref 37–48.5)
HGB BLD-MCNC: 10.8 G/DL (ref 12–16)
IMM GRANULOCYTES # BLD AUTO: 0.03 K/UL (ref 0–0.04)
IMM GRANULOCYTES NFR BLD AUTO: 0.4 % (ref 0–0.5)
IRON SERPL-MCNC: 67 UG/DL (ref 30–160)
LYMPHOCYTES # BLD AUTO: 1.3 K/UL (ref 1–4.8)
LYMPHOCYTES NFR BLD: 18 % (ref 18–48)
MCH RBC QN AUTO: 32.6 PG (ref 27–31)
MCHC RBC AUTO-ENTMCNC: 32.1 G/DL (ref 32–36)
MCV RBC AUTO: 102 FL (ref 82–98)
MONOCYTES # BLD AUTO: 1 K/UL (ref 0.3–1)
MONOCYTES NFR BLD: 13.5 % (ref 4–15)
NEUTROPHILS # BLD AUTO: 4.5 K/UL (ref 1.8–7.7)
NEUTROPHILS NFR BLD: 61.1 % (ref 38–73)
NRBC BLD-RTO: 0 /100 WBC
PLATELET # BLD AUTO: 325 K/UL (ref 150–450)
PMV BLD AUTO: 10.8 FL (ref 9.2–12.9)
POTASSIUM SERPL-SCNC: 4.1 MMOL/L (ref 3.5–5.1)
POTASSIUM SERPL-SCNC: 4.1 MMOL/L (ref 3.5–5.1)
PROT SERPL-MCNC: 6.5 G/DL (ref 6–8.4)
RBC # BLD AUTO: 3.31 M/UL (ref 4–5.4)
SATURATED IRON: 18 % (ref 20–50)
SODIUM SERPL-SCNC: 137 MMOL/L (ref 136–145)
SODIUM SERPL-SCNC: 137 MMOL/L (ref 136–145)
TOTAL IRON BINDING CAPACITY: 374 UG/DL (ref 250–450)
TRANSFERRIN SERPL-MCNC: 253 MG/DL (ref 200–375)
WBC # BLD AUTO: 7.43 K/UL (ref 3.9–12.7)

## 2022-04-19 PROCEDURE — 3078F PR MOST RECENT DIASTOLIC BLOOD PRESSURE < 80 MM HG: ICD-10-PCS | Mod: CPTII,S$GLB,, | Performed by: INTERNAL MEDICINE

## 2022-04-19 PROCEDURE — 3288F FALL RISK ASSESSMENT DOCD: CPT | Mod: CPTII,S$GLB,, | Performed by: INTERNAL MEDICINE

## 2022-04-19 PROCEDURE — 99215 PR OFFICE/OUTPT VISIT, EST, LEVL V, 40-54 MIN: ICD-10-PCS | Mod: S$GLB,,, | Performed by: INTERNAL MEDICINE

## 2022-04-19 PROCEDURE — 4010F PR ACE/ARB THEARPY RXD/TAKEN: ICD-10-PCS | Mod: CPTII,S$GLB,, | Performed by: INTERNAL MEDICINE

## 2022-04-19 PROCEDURE — 1100F PR PT FALLS ASSESS DOC 2+ FALLS/FALL W/INJURY/YR: ICD-10-PCS | Mod: CPTII,S$GLB,, | Performed by: INTERNAL MEDICINE

## 2022-04-19 PROCEDURE — 82728 ASSAY OF FERRITIN: CPT | Performed by: INTERNAL MEDICINE

## 2022-04-19 PROCEDURE — 1157F PR ADVANCE CARE PLAN OR EQUIV PRESENT IN MEDICAL RECORD: ICD-10-PCS | Mod: CPTII,S$GLB,, | Performed by: INTERNAL MEDICINE

## 2022-04-19 PROCEDURE — 99499 UNLISTED E&M SERVICE: CPT | Mod: S$GLB,,, | Performed by: INTERNAL MEDICINE

## 2022-04-19 PROCEDURE — 36415 COLL VENOUS BLD VENIPUNCTURE: CPT | Mod: PO | Performed by: INTERNAL MEDICINE

## 2022-04-19 PROCEDURE — 1159F MED LIST DOCD IN RCRD: CPT | Mod: CPTII,S$GLB,, | Performed by: INTERNAL MEDICINE

## 2022-04-19 PROCEDURE — 99999 PR PBB SHADOW E&M-EST. PATIENT-LVL V: ICD-10-PCS | Mod: PBBFAC,,, | Performed by: INTERNAL MEDICINE

## 2022-04-19 PROCEDURE — 99499 RISK ADDL DX/OHS AUDIT: ICD-10-PCS | Mod: S$GLB,,, | Performed by: INTERNAL MEDICINE

## 2022-04-19 PROCEDURE — 3078F DIAST BP <80 MM HG: CPT | Mod: CPTII,S$GLB,, | Performed by: INTERNAL MEDICINE

## 2022-04-19 PROCEDURE — 3077F SYST BP >= 140 MM HG: CPT | Mod: CPTII,S$GLB,, | Performed by: INTERNAL MEDICINE

## 2022-04-19 PROCEDURE — 1160F RVW MEDS BY RX/DR IN RCRD: CPT | Mod: CPTII,S$GLB,, | Performed by: INTERNAL MEDICINE

## 2022-04-19 PROCEDURE — 85025 COMPLETE CBC W/AUTO DIFF WBC: CPT | Performed by: INTERNAL MEDICINE

## 2022-04-19 PROCEDURE — 1157F ADVNC CARE PLAN IN RCRD: CPT | Mod: CPTII,S$GLB,, | Performed by: INTERNAL MEDICINE

## 2022-04-19 PROCEDURE — 99999 PR PBB SHADOW E&M-EST. PATIENT-LVL V: CPT | Mod: PBBFAC,,, | Performed by: INTERNAL MEDICINE

## 2022-04-19 PROCEDURE — 4010F ACE/ARB THERAPY RXD/TAKEN: CPT | Mod: CPTII,S$GLB,, | Performed by: INTERNAL MEDICINE

## 2022-04-19 PROCEDURE — 3077F PR MOST RECENT SYSTOLIC BLOOD PRESSURE >= 140 MM HG: ICD-10-PCS | Mod: CPTII,S$GLB,, | Performed by: INTERNAL MEDICINE

## 2022-04-19 PROCEDURE — 1125F AMNT PAIN NOTED PAIN PRSNT: CPT | Mod: CPTII,S$GLB,, | Performed by: INTERNAL MEDICINE

## 2022-04-19 PROCEDURE — 1111F PR DISCHARGE MEDS RECONCILED W/ CURRENT OUTPATIENT MED LIST: ICD-10-PCS | Mod: CPTII,S$GLB,, | Performed by: INTERNAL MEDICINE

## 2022-04-19 PROCEDURE — 1159F PR MEDICATION LIST DOCUMENTED IN MEDICAL RECORD: ICD-10-PCS | Mod: CPTII,S$GLB,, | Performed by: INTERNAL MEDICINE

## 2022-04-19 PROCEDURE — 99215 OFFICE O/P EST HI 40 MIN: CPT | Mod: S$GLB,,, | Performed by: INTERNAL MEDICINE

## 2022-04-19 PROCEDURE — 3288F PR FALLS RISK ASSESSMENT DOCUMENTED: ICD-10-PCS | Mod: CPTII,S$GLB,, | Performed by: INTERNAL MEDICINE

## 2022-04-19 PROCEDURE — 80053 COMPREHEN METABOLIC PANEL: CPT | Performed by: INTERNAL MEDICINE

## 2022-04-19 PROCEDURE — 82306 VITAMIN D 25 HYDROXY: CPT | Performed by: INTERNAL MEDICINE

## 2022-04-19 PROCEDURE — 1125F PR PAIN SEVERITY QUANTIFIED, PAIN PRESENT: ICD-10-PCS | Mod: CPTII,S$GLB,, | Performed by: INTERNAL MEDICINE

## 2022-04-19 PROCEDURE — 1160F PR REVIEW ALL MEDS BY PRESCRIBER/CLIN PHARMACIST DOCUMENTED: ICD-10-PCS | Mod: CPTII,S$GLB,, | Performed by: INTERNAL MEDICINE

## 2022-04-19 PROCEDURE — 84466 ASSAY OF TRANSFERRIN: CPT | Performed by: INTERNAL MEDICINE

## 2022-04-19 PROCEDURE — 1111F DSCHRG MED/CURRENT MED MERGE: CPT | Mod: CPTII,S$GLB,, | Performed by: INTERNAL MEDICINE

## 2022-04-19 PROCEDURE — 1100F PTFALLS ASSESS-DOCD GE2>/YR: CPT | Mod: CPTII,S$GLB,, | Performed by: INTERNAL MEDICINE

## 2022-04-19 RX ORDER — TRAZODONE HYDROCHLORIDE 50 MG/1
50 TABLET ORAL NIGHTLY PRN
Qty: 30 TABLET | Refills: 3 | Status: SHIPPED | OUTPATIENT
Start: 2022-04-19 | End: 2022-08-23 | Stop reason: SDUPTHER

## 2022-04-19 NOTE — TELEPHONE ENCOUNTER
Will discuss with patient at apt this morning. Can you call pt and ask her to bring her BP machine to clinic apt?

## 2022-04-19 NOTE — PROGRESS NOTES
Subjective:       Patient ID: Magdalena Mane is a 77 y.o. female.    Chief Complaint: Hypertension, Knee Pain (Lt knee ), and Back Pain (Upper back)    HPI    77 y.o. female presents for follow up of chronic medical problems:     HTN: erratic BP when checked at home. Participating in digital program. meds being adjusted by several departments- digital, cardiology, vascular, primary.     FERNANDEZ: increased otc iron supplement from 3x/week to daily about 2 weeks ago in response to pre-surgery labs.     Stress, insomnia: recent stressors including family stressors, her own recent surgery, etc. Hasn't slept more than a couple of hours at a time since her surgery. Plans to see therapist.     Back pain: previously identified thoracic compression fracture on imaging- declined referral at that time. Agreeable now. Has done PT in past for chronic back and knee pain- d/w her that I am happy to place PT orders but prefer to wait until BP more stable, controlled.     Review of Systems   Constitutional: Positive for fatigue.   Respiratory: Negative for shortness of breath.    Cardiovascular: Positive for leg swelling.   Genitourinary: Negative for difficulty urinating.   Musculoskeletal: Positive for arthralgias and back pain.   Neurological: Negative for syncope.   Psychiatric/Behavioral: Positive for decreased concentration, dysphoric mood and sleep disturbance. The patient is nervous/anxious.        Objective:        Vitals:    04/19/22 1026 04/19/22 1042 04/19/22 1119   BP: (!) 200/78 (!) 192/72 (!) 182/68   BP Location: Right arm     Patient Position: Sitting     BP Method: Small (Manual)     Pulse: 64     Resp: 17     Temp: 97.2 °F (36.2 °C)     TempSrc: Temporal     SpO2: 97%     Weight: 64.9 kg (143 lb 1.3 oz)     Height: 5' (1.524 m)         Body mass index is 27.94 kg/m².    Physical Exam  Constitutional:       General: She is not in acute distress.     Appearance: She is well-developed. She is not diaphoretic.   HENT:       Head: Normocephalic and atraumatic.   Eyes:      General:         Right eye: No discharge.         Left eye: No discharge.      Conjunctiva/sclera: Conjunctivae normal.   Cardiovascular:      Rate and Rhythm: Normal rate and regular rhythm.      Heart sounds: Normal heart sounds.   Pulmonary:      Effort: Pulmonary effort is normal.      Breath sounds: Normal breath sounds. No rales.   Abdominal:      General: Bowel sounds are normal.      Palpations: Abdomen is soft.   Musculoskeletal:      Cervical back: Neck supple.      Right lower leg: Edema present.      Left lower leg: Edema present.   Skin:     General: Skin is warm and dry.      Findings: No erythema.   Neurological:      Mental Status: She is alert.   Psychiatric:         Mood and Affect: Mood is anxious.         Behavior: Behavior normal.         Thought Content: Thought content normal.         Assessment:     1. Essential hypertension    2. Paroxysmal atrial fibrillation    3. Insomnia, unspecified type    4. Iron deficiency anemia, unspecified iron deficiency anemia type    5. Stress    6. Compression fracture of thoracic vertebra with routine healing, unspecified thoracic vertebral level, subsequent encounter    7. Vitamin D insufficiency    8. Atherosclerosis of aorta           Plan:         1. Essential hypertension  - agree with cardiology recommendations: allow 1 service to manage HTN - digital HTN program. Discussed w/ pt rationale of having one service manage and they can contact either cardiologist or me if issues, but simultaneous management can lead to confusion, different rec's, and overall not optimal management. Pt expresses understanding.  - discussed lowering stress, getting sleep, low salt diet and importance of these factors in also controlling BP  - Comprehensive Metabolic Panel; Future    2. Paroxysmal atrial fibrillation  - cont current meds    3. Insomnia, unspecified type  - traZODone (DESYREL) 50 MG tablet; Take 1 tablet (50  mg total) by mouth nightly as needed for Insomnia.  Dispense: 30 tablet; Refill: 3    4. Iron deficiency anemia, unspecified iron deficiency anemia type  - can likely decrease iron supplement frequency, rec's pending results  - CBC Auto Differential; Future  - Iron and TIBC; Future  - Ferritin; Future    5. Stress  - traZODone (DESYREL) 50 MG tablet; Take 1 tablet (50 mg total) by mouth nightly as needed for Insomnia.  Dispense: 30 tablet; Refill: 3    6. Compression fracture of thoracic vertebra with routine healing, unspecified thoracic vertebral level, subsequent encounter  - Ambulatory referral/consult to Pain Clinic; Future    7. Vitamin D insufficiency  - Comprehensive Metabolic Panel; Future  - Vitamin D; Future    8. Atherosclerosis of aorta  - chronic, stable, asymptomatic, noted on imaging. On statin.    Previous [facility] labs, notes and/or records reviewed and considered for their impact on our clinical decision making today.    Independent interpretation of results completed by another healthcare professional.     Unless there are intervening problems, Follow up if symptoms worsen or fail to improve, for and per previous recommendations/treatment plan.      Patient note was created using MModal Dictation.  Any errors in syntax or even information may not have been identified and edited on initial review prior to signing this note.      50 minutes total time spent on the encounter, which includes face to face time and non-face to face time preparing to see the patient (eg, review of tests), Obtaining and/or reviewing separately obtained history, Documenting clinical information in the electronic or other health record, Independently interpreting results (not separately reported) and communicating results to the patient/family/caregiver, or Care coordination (not separately reported).

## 2022-04-20 ENCOUNTER — TELEPHONE (OUTPATIENT)
Dept: CARDIOLOGY | Facility: CLINIC | Age: 77
End: 2022-04-20
Payer: MEDICARE

## 2022-04-20 ENCOUNTER — PATIENT MESSAGE (OUTPATIENT)
Dept: INTERNAL MEDICINE | Facility: CLINIC | Age: 77
End: 2022-04-20
Payer: MEDICARE

## 2022-04-20 ENCOUNTER — TELEPHONE (OUTPATIENT)
Dept: INTERNAL MEDICINE | Facility: CLINIC | Age: 77
End: 2022-04-20
Payer: MEDICARE

## 2022-04-20 NOTE — TELEPHONE ENCOUNTER
----- Message from Ruby Garner MD sent at 4/20/2022 12:45 PM CDT -----  Results released to patient portal.

## 2022-04-20 NOTE — TELEPHONE ENCOUNTER
----- Message from Maki Pratt MD sent at 4/20/2022  9:06 AM CDT -----  Please inform the patient that her chemistry was fine.  She should stay hydrated. Y

## 2022-04-21 NOTE — TELEPHONE ENCOUNTER
Pt notified, verbalized understanding. Pt would like to know if she is going to have any testing or procedures in the future because of the right carotid stenosis. Please advise.

## 2022-04-26 ENCOUNTER — PATIENT OUTREACH (OUTPATIENT)
Dept: ADMINISTRATIVE | Facility: OTHER | Age: 77
End: 2022-04-26
Payer: MEDICARE

## 2022-04-26 ENCOUNTER — PATIENT MESSAGE (OUTPATIENT)
Dept: PAIN MEDICINE | Facility: CLINIC | Age: 77
End: 2022-04-26
Payer: MEDICARE

## 2022-04-27 ENCOUNTER — OFFICE VISIT (OUTPATIENT)
Dept: PAIN MEDICINE | Facility: CLINIC | Age: 77
End: 2022-04-27
Attending: ANESTHESIOLOGY
Payer: MEDICARE

## 2022-04-27 VITALS
BODY MASS INDEX: 28.47 KG/M2 | SYSTOLIC BLOOD PRESSURE: 157 MMHG | DIASTOLIC BLOOD PRESSURE: 71 MMHG | HEART RATE: 55 BPM | RESPIRATION RATE: 18 BRPM | WEIGHT: 145 LBS | HEIGHT: 60 IN | TEMPERATURE: 99 F

## 2022-04-27 DIAGNOSIS — S22.000D COMPRESSION FRACTURE OF THORACIC VERTEBRA WITH ROUTINE HEALING, UNSPECIFIED THORACIC VERTEBRAL LEVEL, SUBSEQUENT ENCOUNTER: Primary | ICD-10-CM

## 2022-04-27 PROCEDURE — 1159F MED LIST DOCD IN RCRD: CPT | Mod: CPTII,S$GLB,, | Performed by: ANESTHESIOLOGY

## 2022-04-27 PROCEDURE — 4010F PR ACE/ARB THEARPY RXD/TAKEN: ICD-10-PCS | Mod: CPTII,S$GLB,, | Performed by: ANESTHESIOLOGY

## 2022-04-27 PROCEDURE — 99999 PR PBB SHADOW E&M-EST. PATIENT-LVL V: ICD-10-PCS | Mod: PBBFAC,,, | Performed by: ANESTHESIOLOGY

## 2022-04-27 PROCEDURE — 1157F ADVNC CARE PLAN IN RCRD: CPT | Mod: CPTII,S$GLB,, | Performed by: ANESTHESIOLOGY

## 2022-04-27 PROCEDURE — 1100F PTFALLS ASSESS-DOCD GE2>/YR: CPT | Mod: CPTII,S$GLB,, | Performed by: ANESTHESIOLOGY

## 2022-04-27 PROCEDURE — 1160F PR REVIEW ALL MEDS BY PRESCRIBER/CLIN PHARMACIST DOCUMENTED: ICD-10-PCS | Mod: CPTII,S$GLB,, | Performed by: ANESTHESIOLOGY

## 2022-04-27 PROCEDURE — 99204 OFFICE O/P NEW MOD 45 MIN: CPT | Mod: S$GLB,,, | Performed by: ANESTHESIOLOGY

## 2022-04-27 PROCEDURE — 1157F PR ADVANCE CARE PLAN OR EQUIV PRESENT IN MEDICAL RECORD: ICD-10-PCS | Mod: CPTII,S$GLB,, | Performed by: ANESTHESIOLOGY

## 2022-04-27 PROCEDURE — 3077F SYST BP >= 140 MM HG: CPT | Mod: CPTII,S$GLB,, | Performed by: ANESTHESIOLOGY

## 2022-04-27 PROCEDURE — 99999 PR PBB SHADOW E&M-EST. PATIENT-LVL V: CPT | Mod: PBBFAC,,, | Performed by: ANESTHESIOLOGY

## 2022-04-27 PROCEDURE — 1160F RVW MEDS BY RX/DR IN RCRD: CPT | Mod: CPTII,S$GLB,, | Performed by: ANESTHESIOLOGY

## 2022-04-27 PROCEDURE — 1100F PR PT FALLS ASSESS DOC 2+ FALLS/FALL W/INJURY/YR: ICD-10-PCS | Mod: CPTII,S$GLB,, | Performed by: ANESTHESIOLOGY

## 2022-04-27 PROCEDURE — 1111F DSCHRG MED/CURRENT MED MERGE: CPT | Mod: CPTII,S$GLB,, | Performed by: ANESTHESIOLOGY

## 2022-04-27 PROCEDURE — 1125F AMNT PAIN NOTED PAIN PRSNT: CPT | Mod: CPTII,S$GLB,, | Performed by: ANESTHESIOLOGY

## 2022-04-27 PROCEDURE — 1111F PR DISCHARGE MEDS RECONCILED W/ CURRENT OUTPATIENT MED LIST: ICD-10-PCS | Mod: CPTII,S$GLB,, | Performed by: ANESTHESIOLOGY

## 2022-04-27 PROCEDURE — 3078F PR MOST RECENT DIASTOLIC BLOOD PRESSURE < 80 MM HG: ICD-10-PCS | Mod: CPTII,S$GLB,, | Performed by: ANESTHESIOLOGY

## 2022-04-27 PROCEDURE — 4010F ACE/ARB THERAPY RXD/TAKEN: CPT | Mod: CPTII,S$GLB,, | Performed by: ANESTHESIOLOGY

## 2022-04-27 PROCEDURE — 3288F PR FALLS RISK ASSESSMENT DOCUMENTED: ICD-10-PCS | Mod: CPTII,S$GLB,, | Performed by: ANESTHESIOLOGY

## 2022-04-27 PROCEDURE — 3077F PR MOST RECENT SYSTOLIC BLOOD PRESSURE >= 140 MM HG: ICD-10-PCS | Mod: CPTII,S$GLB,, | Performed by: ANESTHESIOLOGY

## 2022-04-27 PROCEDURE — 99204 PR OFFICE/OUTPT VISIT, NEW, LEVL IV, 45-59 MIN: ICD-10-PCS | Mod: S$GLB,,, | Performed by: ANESTHESIOLOGY

## 2022-04-27 PROCEDURE — 1125F PR PAIN SEVERITY QUANTIFIED, PAIN PRESENT: ICD-10-PCS | Mod: CPTII,S$GLB,, | Performed by: ANESTHESIOLOGY

## 2022-04-27 PROCEDURE — 3288F FALL RISK ASSESSMENT DOCD: CPT | Mod: CPTII,S$GLB,, | Performed by: ANESTHESIOLOGY

## 2022-04-27 PROCEDURE — 1159F PR MEDICATION LIST DOCUMENTED IN MEDICAL RECORD: ICD-10-PCS | Mod: CPTII,S$GLB,, | Performed by: ANESTHESIOLOGY

## 2022-04-27 PROCEDURE — 3078F DIAST BP <80 MM HG: CPT | Mod: CPTII,S$GLB,, | Performed by: ANESTHESIOLOGY
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
## 2022-04-28 ENCOUNTER — TELEPHONE (OUTPATIENT)
Dept: VASCULAR SURGERY | Facility: CLINIC | Age: 77
End: 2022-04-28
Payer: MEDICARE

## 2022-04-28 NOTE — TELEPHONE ENCOUNTER
Appointment scheduled, pt verified. Appointment letter placed in mail.   ----- Message from Lauren Milan MD sent at 4/8/2022 11:25 AM CDT -----  Hi!    Patient s/p CEA yesterday. Patient needs a 4 week follow-up with bilateral carotid artery ultrasounds. Thanks!    Lauren

## 2022-04-28 NOTE — TELEPHONE ENCOUNTER
----- Message from Lauren Milan MD sent at 4/8/2022 11:25 AM CDT -----  Hi!    Patient s/p CEA yesterday. Patient needs a 4 week follow-up with bilateral carotid artery ultrasounds. Thanks!    Lauren

## 2022-04-29 ENCOUNTER — TELEPHONE (OUTPATIENT)
Dept: INTERNAL MEDICINE | Facility: CLINIC | Age: 77
End: 2022-04-29
Payer: MEDICARE

## 2022-04-29 VITALS — DIASTOLIC BLOOD PRESSURE: 77 MMHG | SYSTOLIC BLOOD PRESSURE: 127 MMHG

## 2022-05-13 ENCOUNTER — HOSPITAL ENCOUNTER (OUTPATIENT)
Dept: VASCULAR SURGERY | Facility: CLINIC | Age: 77
Discharge: HOME | End: 2022-05-13
Attending: SURGERY
Payer: MEDICARE

## 2022-05-13 ENCOUNTER — OFFICE VISIT (OUTPATIENT)
Dept: VASCULAR SURGERY | Facility: CLINIC | Age: 77
End: 2022-05-13
Payer: MEDICARE

## 2022-05-13 ENCOUNTER — LAB VISIT (OUTPATIENT)
Dept: LAB | Facility: HOSPITAL | Age: 77
End: 2022-05-13
Attending: INTERNAL MEDICINE
Payer: MEDICARE

## 2022-05-13 VITALS
WEIGHT: 139.56 LBS | BODY MASS INDEX: 27.4 KG/M2 | HEIGHT: 60 IN | TEMPERATURE: 98 F | SYSTOLIC BLOOD PRESSURE: 181 MMHG | HEART RATE: 54 BPM | DIASTOLIC BLOOD PRESSURE: 78 MMHG

## 2022-05-13 DIAGNOSIS — I10 ESSENTIAL HYPERTENSION: ICD-10-CM

## 2022-05-13 DIAGNOSIS — I65.23 BILATERAL CAROTID ARTERY STENOSIS: ICD-10-CM

## 2022-05-13 DIAGNOSIS — I65.21 ASYMPTOMATIC CAROTID ARTERY STENOSIS WITHOUT INFARCTION, RIGHT: ICD-10-CM

## 2022-05-13 DIAGNOSIS — Z98.890 S/P CAROTID ENDARTERECTOMY: Primary | ICD-10-CM

## 2022-05-13 LAB
ANION GAP SERPL CALC-SCNC: 6 MMOL/L (ref 8–16)
BUN SERPL-MCNC: 17 MG/DL (ref 8–23)
CALCIUM SERPL-MCNC: 9.4 MG/DL (ref 8.7–10.5)
CHLORIDE SERPL-SCNC: 102 MMOL/L (ref 95–110)
CO2 SERPL-SCNC: 27 MMOL/L (ref 23–29)
CREAT SERPL-MCNC: 0.7 MG/DL (ref 0.5–1.4)
EST. GFR  (AFRICAN AMERICAN): >60 ML/MIN/1.73 M^2
EST. GFR  (NON AFRICAN AMERICAN): >60 ML/MIN/1.73 M^2
GLUCOSE SERPL-MCNC: 96 MG/DL (ref 70–110)
POTASSIUM SERPL-SCNC: 4.7 MMOL/L (ref 3.5–5.1)
SODIUM SERPL-SCNC: 135 MMOL/L (ref 136–145)

## 2022-05-13 PROCEDURE — 1125F AMNT PAIN NOTED PAIN PRSNT: CPT | Mod: CPTII,S$GLB,, | Performed by: SURGERY

## 2022-05-13 PROCEDURE — 4010F ACE/ARB THERAPY RXD/TAKEN: CPT | Mod: CPTII,S$GLB,, | Performed by: SURGERY

## 2022-05-13 PROCEDURE — 3077F PR MOST RECENT SYSTOLIC BLOOD PRESSURE >= 140 MM HG: ICD-10-PCS | Mod: CPTII,S$GLB,, | Performed by: SURGERY

## 2022-05-13 PROCEDURE — 80048 BASIC METABOLIC PNL TOTAL CA: CPT | Performed by: INTERNAL MEDICINE

## 2022-05-13 PROCEDURE — 93880 PR DUPLEX SCAN EXTRACRANIAL,BILAT: ICD-10-PCS | Mod: S$GLB,,, | Performed by: SURGERY

## 2022-05-13 PROCEDURE — 1159F MED LIST DOCD IN RCRD: CPT | Mod: CPTII,S$GLB,, | Performed by: SURGERY

## 2022-05-13 PROCEDURE — 3288F FALL RISK ASSESSMENT DOCD: CPT | Mod: CPTII,S$GLB,, | Performed by: SURGERY

## 2022-05-13 PROCEDURE — 99024 PR POST-OP FOLLOW-UP VISIT: ICD-10-PCS | Mod: S$GLB,,, | Performed by: SURGERY

## 2022-05-13 PROCEDURE — 93880 EXTRACRANIAL BILAT STUDY: CPT | Mod: S$GLB,,, | Performed by: SURGERY

## 2022-05-13 PROCEDURE — 1157F ADVNC CARE PLAN IN RCRD: CPT | Mod: CPTII,S$GLB,, | Performed by: SURGERY

## 2022-05-13 PROCEDURE — 1101F PT FALLS ASSESS-DOCD LE1/YR: CPT | Mod: CPTII,S$GLB,, | Performed by: SURGERY

## 2022-05-13 PROCEDURE — 99999 PR PBB SHADOW E&M-EST. PATIENT-LVL IV: ICD-10-PCS | Mod: PBBFAC,,, | Performed by: SURGERY

## 2022-05-13 PROCEDURE — 3078F PR MOST RECENT DIASTOLIC BLOOD PRESSURE < 80 MM HG: ICD-10-PCS | Mod: CPTII,S$GLB,, | Performed by: SURGERY

## 2022-05-13 PROCEDURE — 4010F PR ACE/ARB THEARPY RXD/TAKEN: ICD-10-PCS | Mod: CPTII,S$GLB,, | Performed by: SURGERY

## 2022-05-13 PROCEDURE — 3077F SYST BP >= 140 MM HG: CPT | Mod: CPTII,S$GLB,, | Performed by: SURGERY

## 2022-05-13 PROCEDURE — 3078F DIAST BP <80 MM HG: CPT | Mod: CPTII,S$GLB,, | Performed by: SURGERY

## 2022-05-13 PROCEDURE — 3288F PR FALLS RISK ASSESSMENT DOCUMENTED: ICD-10-PCS | Mod: CPTII,S$GLB,, | Performed by: SURGERY

## 2022-05-13 PROCEDURE — 1157F PR ADVANCE CARE PLAN OR EQUIV PRESENT IN MEDICAL RECORD: ICD-10-PCS | Mod: CPTII,S$GLB,, | Performed by: SURGERY

## 2022-05-13 PROCEDURE — 36415 COLL VENOUS BLD VENIPUNCTURE: CPT | Performed by: INTERNAL MEDICINE

## 2022-05-13 PROCEDURE — 99024 POSTOP FOLLOW-UP VISIT: CPT | Mod: S$GLB,,, | Performed by: SURGERY

## 2022-05-13 PROCEDURE — 1101F PR PT FALLS ASSESS DOC 0-1 FALLS W/OUT INJ PAST YR: ICD-10-PCS | Mod: CPTII,S$GLB,, | Performed by: SURGERY

## 2022-05-13 PROCEDURE — 1159F PR MEDICATION LIST DOCUMENTED IN MEDICAL RECORD: ICD-10-PCS | Mod: CPTII,S$GLB,, | Performed by: SURGERY

## 2022-05-13 PROCEDURE — 99999 PR PBB SHADOW E&M-EST. PATIENT-LVL IV: CPT | Mod: PBBFAC,,, | Performed by: SURGERY

## 2022-05-13 PROCEDURE — 1160F RVW MEDS BY RX/DR IN RCRD: CPT | Mod: CPTII,S$GLB,, | Performed by: SURGERY

## 2022-05-13 PROCEDURE — 1160F PR REVIEW ALL MEDS BY PRESCRIBER/CLIN PHARMACIST DOCUMENTED: ICD-10-PCS | Mod: CPTII,S$GLB,, | Performed by: SURGERY

## 2022-05-13 PROCEDURE — 1125F PR PAIN SEVERITY QUANTIFIED, PAIN PRESENT: ICD-10-PCS | Mod: CPTII,S$GLB,, | Performed by: SURGERY

## 2022-05-13 NOTE — PROGRESS NOTES
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  She has no history of stroke TIA or amaurosis.  She has no history of coronary artery disease chest pain or shortness of breath.    She is not on antiplatelet agent or statin    5/13/2022:  Now returns status post right carotid endarterectomy for 07/20/2022.   She had unremarkable course and was discharged 1st postoperative day.  She states she was quite fatigued for 1-2 weeks had some insomnia also had some difficulties with blood pressure regulation this is now than improved.  She states she feels now back to her normal.    Past Medical History:   Diagnosis Date    Anemia     Atrial fibrillation     Basal cell carcinoma     DJD (degenerative joint disease) 12/12/2012    Obesity (BMI 30-39.9) 11/27/2015    Vaginal atrophy 4/1/2016       Past Surgical History:   Procedure Laterality Date    BREAST BIOPSY Left 1999    Excisional bx, benign cyst    CARDIOVERSION  02/2021    CAROTID ENDARTERECTOMY Right 4/7/2022    Procedure: ENDARTERECTOMY-CAROTID;  Surgeon: TUCKER Brantley III, MD;  Location: [facility] OR 2ND FLR;  Service: Peripheral Vascular;  Laterality: Right;    COLONOSCOPY  2012    normal    COLONOSCOPY N/A 6/28/2017    Procedure: COLONOSCOPY;  Surgeon: Markel Montemayor MD;  Location: [facility] (4TH FLR);  Service: Endoscopy;  Laterality: N/A;    EXCISION OF GANGLION OF WRIST Left 6/27/2018    Procedure: EXCISION, GANGLION CYST, WRIST - Left Volar wrist;  Surgeon: Mary Moore MD;  Location: [facility] OR 1ST FLR;  Service: Orthopedics;  Laterality: Left;    HIP SURGERY  05/05/2014    bilateral    JOINT REPLACEMENT Bilateral     MOLLY    TONSILLECTOMY      TREATMENT OF CARDIAC ARRHYTHMIA N/A 9/29/2020    Procedure: CARDIOVERSION;   Surgeon: Nigel García MD;  Location: [facility] EP LAB;  Service: Cardiology;  Laterality: N/A;  AF, LIGIA, DCCV, MAC, AL, 3 Prep    TREATMENT OF CARDIAC ARRHYTHMIA N/A 12/8/2020    Procedure: Cardioversion or Defibrillation;  Surgeon: Nigel García MD;  Location: [facility] EP LAB;  Service: Cardiology;  Laterality: N/A;  AF, LIGIA, DCCV, MAC, AL, 3 Prep         Current Outpatient Medications:     acetaminophen (TYLENOL) 500 MG tablet, Take 500 mg by mouth 2 (two) times daily., Disp: , Rfl:     aspirin (ECOTRIN) 81 MG EC tablet, Take 81 mg by mouth nightly., Disp: , Rfl:     atorvastatin (LIPITOR) 20 MG tablet, Take 1 tablet (20 mg total) by mouth once daily. (Patient taking differently: Take 20 mg by mouth every evening.), Disp: 30 tablet, Rfl: 11    calcium-vitamin D 250-100 mg-unit per tablet, Take 1 tablet by mouth 2 (two) times daily., Disp: , Rfl:     candesartan (ATACAND) 16 MG tablet, Take 0.5 tablets (8 mg total) by mouth 2 (two) times a day., Disp: 30 tablet, Rfl: 2    cholecalciferol, vitamin D3, (VITAMIN D3 ORAL), Take 3,000 Units by mouth once daily., Disp: , Rfl:     diphenhydrAMINE (BENADRYL) 25 mg capsule, Take 25 mg by mouth every 6 (six) hours as needed for Itching., Disp: , Rfl:     ELIQUIS 5 mg Tab, TAKE 1 TABLET BY MOUTH  TWICE DAILY, Disp: 180 tablet, Rfl: 3    ferrous sulfate (FEOSOL) Tab tablet, Take 1 tablet by mouth every evening. PATIENT TAKING 3 X WEEKLY IN THE EVENING (M-W-F), Disp: , Rfl:     flecainide (TAMBOCOR) 50 MG Tab, Take 1 tablet (50 mg total) by mouth every 12 (twelve) hours., Disp: 180 tablet, Rfl: 3    HYDROcodone-acetaminophen (NORCO) 5-325 mg per tablet, Take 1 tablet by mouth every 6 (six) hours as needed for Pain., Disp: 15 tablet, Rfl: 0    lactase (LACTAID) 3,000 unit tablet, Take 1 tablet by mouth 3 (three) times daily as needed., Disp: , Rfl:     loratadine (CLARITIN) 10 mg tablet, Take 10 mg by mouth every morning., Disp: , Rfl:     spironolactone (ALDACTONE) 25  MG tablet, Take 0.5 tablets (12.5 mg total) by mouth once daily., Disp: 15 tablet, Rfl: 2    traZODone (DESYREL) 50 MG tablet, Take 1 tablet (50 mg total) by mouth nightly as needed for Insomnia., Disp: 30 tablet, Rfl: 3    verapamiL (VERELAN) 120 MG C24P, Take 1 capsule (120 mg total) by mouth every evening., Disp: 90 capsule, Rfl: 3    Review of patient's allergies indicates:   Allergen Reactions    Prednisone      ELEVATED B/P FOR SEVERAL DAYS/WENT TO ER    Lactose        Family History   Problem Relation Age of Onset    Breast cancer Mother     Arthritis Mother     Hyperlipidemia Mother     Scoliosis Father     Heart disease Father     Tuberculosis Father          1 lung from collapse lung    Heart attack Father     Heart failure Father     Hyperlipidemia Father     Hypertension Father     No Known Problems Brother     Stroke Maternal Grandmother     Heart disease Paternal Grandmother        Social History     Tobacco Use    Smoking status: Former Smoker     Quit date: 1988     Years since quittin.3    Smokeless tobacco: Never Used   Substance Use Topics    Alcohol use: Never    Drug use: No     PHYSICAL EXAM:   BP (!) 181/78   Pulse (!) 54   Temp 98 °F (36.7 °C) (Oral)   Ht 5' (1.524 m)   Wt 63.3 kg (139 lb 8.8 oz)   BMI 27.25 kg/m²   Constitutional:  Alert,   Well-appearing  In no distress.   Neurological: Normal speech  no focal findings  CN II - XII grossly intact.  Neuro completely intact   Psychiatric: Mood and affect appropriate and symmetric.   HEENT: Normocephalic / atraumatic  PERRLA  Midline trachea  Right cervical incision well healed no erythema or drainage   Cardiac: Regular rate and rhythm.   Pulmonary: Normal pulmonary effort.   Abdomen: Soft, not distended.     Skin: Warm and well perfused.    Vascular:  Radial pulses 2+   Extremities/  Musculoskeletal: No edema.        IMAGING:  Carotid duplex today reveals no carotid stenosis bilaterally      IMPRESSION:    1. One month status post right carotid endarterectomy, doing well    PLAN:    1.  Continue current medical therapy  2.  Patient to follow-up with her cardiologist, Dr. Pratt.   Recommend repeat carotid duplex in 1 year    MECCA Brantley III, MD, FACS  Professor and Chief, Vascular and Endovascular Surgery     CC: Dr Pratt

## 2022-05-17 ENCOUNTER — CLINICAL SUPPORT (OUTPATIENT)
Dept: REHABILITATION | Facility: OTHER | Age: 77
End: 2022-05-17
Payer: MEDICARE

## 2022-05-17 DIAGNOSIS — S22.000D COMPRESSION FRACTURE OF THORACIC VERTEBRA WITH ROUTINE HEALING, UNSPECIFIED THORACIC VERTEBRAL LEVEL, SUBSEQUENT ENCOUNTER: ICD-10-CM

## 2022-05-17 DIAGNOSIS — R29.3 POOR POSTURE: ICD-10-CM

## 2022-05-17 DIAGNOSIS — R29.898 DECREASED STRENGTH OF TRUNK AND BACK: ICD-10-CM

## 2022-05-17 PROCEDURE — 97161 PT EVAL LOW COMPLEX 20 MIN: CPT

## 2022-05-17 PROCEDURE — 97110 THERAPEUTIC EXERCISES: CPT
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
## 2022-05-19 ENCOUNTER — TELEPHONE (OUTPATIENT)
Dept: REHABILITATION | Facility: OTHER | Age: 77
End: 2022-05-19
Payer: MEDICARE

## 2022-05-19 NOTE — TELEPHONE ENCOUNTER
HC talked to Pt about her experience during the evaluation; she reports that it went well and was sore the next day but taking Tylenol.  I talked to her about our health coaching service; she had heard about it from Marleni and is interested in taking advantage of the service.

## 2022-05-20 ENCOUNTER — CLINICAL SUPPORT (OUTPATIENT)
Dept: REHABILITATION | Facility: OTHER | Age: 77
End: 2022-05-20
Attending: INTERNAL MEDICINE
Payer: MEDICARE

## 2022-05-20 DIAGNOSIS — R29.898 DECREASED STRENGTH OF TRUNK AND BACK: ICD-10-CM

## 2022-05-20 DIAGNOSIS — R29.3 POOR POSTURE: Primary | ICD-10-CM

## 2022-05-20 PROCEDURE — 97110 THERAPEUTIC EXERCISES: CPT | Mod: CQ

## 2022-05-20 NOTE — PROGRESS NOTES
"[facility]      Name: Magdalena Mane  Clinic Number: [id number]    Therapy Diagnosis:   Encounter Diagnoses   Name Primary?    Poor posture Yes    Decreased strength of trunk and back      Physician: Anai Oliveros MD    Visit Date: 2022    Physician Orders: PT Eval and Treat   Medical Diagnosis from Referral: S22.000D (ICD-10-CM) - Compression fracture of thoracic vertebra with routine healing, unspecified thoracic vertebral level, subsequent encounter  Evaluation Date: 2022  Authorization Period Expiration: 23  Plan of Care Expiration: 22  Reassessment Due: 22  Visit # / Visits authorized:     Time In: 9:15  Time Out: 10:00  Total Billable Time: 40 minutes    Precautions: Standard/atrial fib/ B THR/endartectomy 3/22    Pattern of pain determined: 1 PEN    Subjective   Magdalena reports minimal to no thoracic back pain.  She reports she has been performing her HEP twice a day.  She experienced minimal soreness following initial evaluation.    Patient reports tolerating previous visit well with min soreness  Patient reports their pain to be 0/10 on a 0-10 scale with 0 being no pain and 10 being the worst pain imaginable.  Pain Location: bilateral back      Occupation: retired  Leisure: works in her garden /walking   Pt goals: "Increase core strength, go back to waking 2 miles"    Objective     Baseline IM Testing Results:   Date of testin22  ROM 0-48 deg   Max Peak Torque 89    min 40   Flex/Ext Ratio 2.25:1   % below normative data 33         Limitation/Restriction for FOTO lumbar Survey     Therapist reviewed FOTO scores for Magdalena Mane on 2022.   FOTO documents entered into [employer] - see Media section.     Limitation Score: 30%              Treatment    Pt was instructed in and performed the following:     Magdalena received therapeutic exercises to develop/improved posture, cardiovascular endurance, muscular endurance, lumbar/cervical " "ROM, strength and muscular endurance for 40 minutes including the following exercises:     Open book x10  LTR x10  scap squeeze x15 5" hold  Cat/cow x10  +SOC x10 5" hold    [facility] - Short 5/20/2022   Visit Number 2   VAS Pain Rating 0   Time 5   Lumbar Stretches - Slouch 10   Lumbar Extension - Seat Pad -   Femur Restraint -   Top Dead Center -   Counterweight -   Lumbar Flexion -   Lumbar Extension -   Lumbar Peak Torque -   Lumbar Weight 45   Repetitions 20   Rating of Perceived Exertion 4         Peripheral muscle strengthening which included 1 set of 15-20 repetitions at a slow, controlled 10-13 second per rep pace focused on strengthening supporting musculature for improved body mechanics and functional mobility.  Pt and therapist focused on proper form during treatment to ensure optimal strengthening of each targeted muscle group.  Machines were utilized including torso rotation, leg extension, leg curl, chest press, upright row. Tricep extension, bicep curl, leg press, and hip abduction added visit 3    Magdalena received the following manual therapy techniques: NA         Home Exercises Provided and Patient Education Provided   Home exercises include: scap retractions, LTR, Cat camel  Cardio program: Visit 5  Lifting education date: Visit 11  Lumbar roll: TBD    Education provided:   - HEP  - Pacing on Medx  -peripheral machines    Written Home Exercises Provided: Patient instructed to cont prior HEP.  Exercises were reviewed and Magdalena was able to demonstrate them prior to the end of the session.  Magdalena demonstrated good  understanding of the education provided.     See EMR under Patient Instructions for exercises provided prior visit.          Assessment   Magdalena returned for her first follow up visit reporting minimal to no bilateral thoracic  Back pain.  She reports compliance with HEP stating she completes her exercises twice a day.  She demonstrated a good understanding HEP needing only " minimal VC to ensure proper form.  Medx resistance began at 50% max torque (45#).  She completed 20 reps at a RPE of 4/10.  Consider a 5% increase in resistance NV per HB protocol.  First half of peripheral circuit completed today with no adverse effects.    Patient is making good progress towards established goals.  Pt will continue to benefit from skilled outpatient physical therapy to address the deficits stated in the impairment chart, provide pt/family education and to maximize pt's level of independence in the home and community environment.     Anticipated Barriers for therapy: None  Pt's spiritual, cultural and educational needs considered and pt agreeable to plan of care and goals as stated below:             Goals:     Short term goals:  6 weeks or 10 visits   1.  Pt will demonstrate increased lumbar ROM by at least 3 degrees from the initial ROM value with improvements noted in functional ROM and ability to perform ADLs.  (approp and ongoing)  2.  Pt will demonstrate increased MedX average isometric strength value  by 10% from initial test resulting in improved ability to perform bending, lifting, and carrying activities safely, confidently.  (approp and ongoing)  3.  Patient report a reduction in worst pain score by 1-2 points for improved tolerance for changing positions in bed.  (approp and ongoing)  4.  Pt able to perform HEP correctly with minimal cueing or supervision from therapist to encourage independent management of symptoms. (approp and ongoing)        Long term goals: 10 weeks or 20 visits   1. Pt will demonstrate increased lumbar ROM by at least 9 degrees from initial ROM value, resulting in improved ability to perform functional fwd bending while standing and sitting. (approp and ongoing)  2. Pt will demonstrate increased MedX average isometric strength value  by 30% from initial test resulting in improved ability to perform bending, lifting, and carrying activities safely, confidently.   (approp and ongoing)  3. Pt to demonstrate ability to independently control and reduce their pain through posture positioning and mechanical movements throughout a typical day.  (approp and ongoing)  4.  Pt will demonstrate reduced pain and improved functional outcomes as reported on the FOTO by reaching a limitation score of < or = 25% or less in order to demonstrate subjective improvement in pt's condition.    (approp and ongoing)  5. Pt will demonstrate independence with the HEP at discharge  (approp and ongoing)  6.  Pt(patient goal)will be able to walk 2 miles without back pain  (approp and ongoing)  7. Pt will be able to garden without pain        Plan   Continue with established Plan of Care towards established PT goals.

## 2022-05-24 ENCOUNTER — CLINICAL SUPPORT (OUTPATIENT)
Dept: REHABILITATION | Facility: OTHER | Age: 77
End: 2022-05-24
Attending: ANESTHESIOLOGY
Payer: MEDICARE

## 2022-05-24 DIAGNOSIS — R29.3 POOR POSTURE: Primary | ICD-10-CM

## 2022-05-24 DIAGNOSIS — R29.898 DECREASED STRENGTH OF TRUNK AND BACK: ICD-10-CM

## 2022-05-24 PROCEDURE — 97110 THERAPEUTIC EXERCISES: CPT

## 2022-05-24 NOTE — PROGRESS NOTES
"[facility]      Name: Magdalena Mane  Clinic Number: [id number]    Therapy Diagnosis:   Encounter Diagnoses   Name Primary?    Poor posture Yes    Decreased strength of trunk and back      Physician: Anai Oliveros MD    Visit Date: 2022    Physician Orders: PT Eval and Treat   Medical Diagnosis from Referral: S22.000D (ICD-10-CM) - Compression fracture of thoracic vertebra with routine healing, unspecified thoracic vertebral level, subsequent encounter  Evaluation Date: 2022  Authorization Period Expiration: 23  Plan of Care Expiration: 22  Reassessment Due: 22  Visit # / Visits authorized: 3/ 20    Time In: 8:45 am  Time Out: 9:30 am   Total Billable Time: 45 minutes    Precautions: Standard/atrial fib/ B THR/endartectomy 3/22    Pattern of pain determined: 1 PEN    Subjective   Magdalena reports over the last few days she is helping family member moving which inc her activity level including liting and extended standing.  Pt reports she is having less LB sx presentation than expected and feels her legs feel stronger.  However, pt note she was wearing lumbar brace during moving.    Pt report HEP compliance performing them when waking (AM) and laying down for bed (PM)     Pt note obtain lumbar roll.      Patient reports tolerating previous visit well /c no c/o of excess discomfort   Patient reports their pain to be 0/10 on a 0-10 scale with 0 being no pain and 10 being the worst pain imaginable.  Pain Location: bilateral back, thoracic      Occupation: retired  Leisure: works in her garden /walking   Pt goals: "Increase core strength, go back to walking 2 miles"    Objective     Baseline IM Testing Results:   Date of testin22  ROM 0-48 deg   Max Peak Torque 89    min 40   Flex/Ext Ratio 2.25:1   % below normative data 33         Limitation/Restriction for FOTO lumbar Survey     Therapist reviewed FOTO scores for Magdalena Mane on 2022.   FOTO " "documents entered into [employer] - see Media section.     Limitation Score: 30%              Treatment    Pt was instructed in and performed the following:     Magdalena received therapeutic exercises to develop/improved posture, cardiovascular endurance, muscular endurance, lumbar/cervical ROM, strength and muscular endurance for 45 minutes including the following exercises:       LTR x10  Open book x10  Quad   Cat/Cow x 10 cue for inc focus thoracic ext    Thread needle x 5 B     Scap retract x20  3" hold Y TB     SOC x 20 5" hold cue for dec cervical motion     [facility] 5/24/2022   Visit Number 3   VAS Pain Rating 0   Time 5   Scapular Retraction 20   Lumbar Stretches - Slouch Overcorrection 20   Lumbar Extension Seat Pad -   Femur Restraint -   Top Dead Center -   Counterweight -   Lumbar Flexion -   Lumbar Extension -   Lumbar Peak Torque -   Min Torque -   Test Percent Below Normative Data -   Lumbar Weight 50   Repetitions 20   Rating of Perceived Exertion 4   Ice - Z Lie (in min.) 5         Peripheral muscle strengthening which included 1 set of 15-20 repetitions at a slow, controlled 10-13 second per rep pace focused on strengthening supporting musculature for improved body mechanics and functional mobility.  Pt and therapist focused on proper form during treatment to ensure optimal strengthening of each targeted muscle group.  Machines were utilized including torso rotation, leg extension, leg curl, chest press, upright row. Tricep extension, bicep curl, leg press, and hip abduction added visit 3    Magdalena received the following manual therapy techniques: NA         Home Exercises Provided and Patient Education Provided   Home exercises include:   scap retractions,   LTR,  Cat camel      Cardio program: Visit 5  Lifting education date: Visit 11  Lumbar roll: not obtained as of 05/24/22    Education provided:   -  Brace wearing schedule     Written Home Exercises Provided: Patient instructed to cont " prior HEP.  Exercises were reviewed and Magdalena was able to demonstrate them prior to the end of the session.  Magdalena demonstrated good  understanding of the education provided.     See EMR under Patient Instructions for exercises provided prior visit.          Assessment     Pt subjective report indicate brace usage for improve functionality.  Pt educated on wearing schedule to dec over reliance on braces and limit intrinsic stability.   Pt verbalize understanding.  Cat/cow demo sig thoracic kyphosis therefore pt cued to focus on thoracic ext motion.  Progressed thoracic mobility challenge /c core stabilization component /c thread needles.  Pt complete /s inc discomfort indicating appropriateness of cont performance for thoracic rotational control. Also added Y TB for resistance.  Pt issued Y TB to inc HEP intensity.  Lumbar MedX weight increased per pt tolerance last visit.   Today pt perform 20 reps at 4 RPE indicating inc strength.  Progress per HB protocol.  Next visit recommend progress flexion range as pt note comfort /c inc flexion measure upon MedX set up.  MedX peripheral exercises deferred due to pt needing to leave for an appointment. Plan to perform full complement of peripheral MedX exercises next visit.        Patient is making good progress towards established goals.  Pt will continue to benefit from skilled outpatient physical therapy to address the deficits stated in the impairment chart, provide pt/family education and to maximize pt's level of independence in the home and community environment.     Anticipated Barriers for therapy: None  Pt's spiritual, cultural and educational needs considered and pt agreeable to plan of care and goals as stated below:             Goals:     Short term goals:  6 weeks or 10 visits   1.  Pt will demonstrate increased lumbar ROM by at least 3 degrees from the initial ROM value with improvements noted in functional ROM and ability to perform ADLs.  (approp and  ongoing)  2.  Pt will demonstrate increased MedX average isometric strength value  by 10% from initial test resulting in improved ability to perform bending, lifting, and carrying activities safely, confidently.  (approp and ongoing)  3.  Patient report a reduction in worst pain score by 1-2 points for improved tolerance for changing positions in bed.  (approp and ongoing)  4.  Pt able to perform HEP correctly with minimal cueing or supervision from therapist to encourage independent management of symptoms. (approp and ongoing)        Long term goals: 10 weeks or 20 visits   1. Pt will demonstrate increased lumbar ROM by at least 9 degrees from initial ROM value, resulting in improved ability to perform functional fwd bending while standing and sitting. (approp and ongoing)  2. Pt will demonstrate increased MedX average isometric strength value  by 30% from initial test resulting in improved ability to perform bending, lifting, and carrying activities safely, confidently.  (approp and ongoing)  3. Pt to demonstrate ability to independently control and reduce their pain through posture positioning and mechanical movements throughout a typical day.  (approp and ongoing)  4.  Pt will demonstrate reduced pain and improved functional outcomes as reported on the FOTO by reaching a limitation score of < or = 25% or less in order to demonstrate subjective improvement in pt's condition.    (approp and ongoing)  5. Pt will demonstrate independence with the HEP at discharge  (approp and ongoing)  6.  Pt(patient goal)will be able to walk 2 miles without back pain  (approp and ongoing)  7. Pt will be able to garden without pain for inc tolerance to recreational activities (approp and ongoing)         Plan   Continue with established Plan of Care towards established PT goals.

## 2022-05-26 ENCOUNTER — PES CALL (OUTPATIENT)
Dept: ADMINISTRATIVE | Facility: CLINIC | Age: 77
End: 2022-05-26
Payer: MEDICARE

## 2022-05-27 ENCOUNTER — CLINICAL SUPPORT (OUTPATIENT)
Dept: REHABILITATION | Facility: OTHER | Age: 77
End: 2022-05-27
Attending: INTERNAL MEDICINE
Payer: MEDICARE

## 2022-05-27 DIAGNOSIS — R29.3 POOR POSTURE: Primary | ICD-10-CM

## 2022-05-27 DIAGNOSIS — R29.898 DECREASED STRENGTH OF TRUNK AND BACK: ICD-10-CM

## 2022-05-27 PROCEDURE — 97110 THERAPEUTIC EXERCISES: CPT

## 2022-05-27 NOTE — PROGRESS NOTES
"[facility]      Name: Magdalena Mane  Clinic Number: [id number]    Therapy Diagnosis:   Encounter Diagnoses   Name Primary?    Poor posture Yes    Decreased strength of trunk and back      Physician: Anai Oliveros MD    Visit Date: 2022    Physician Orders: PT Eval and Treat   Medical Diagnosis from Referral: S22.000D (ICD-10-CM) - Compression fracture of thoracic vertebra with routine healing, unspecified thoracic vertebral level, subsequent encounter  Evaluation Date: 2022  Authorization Period Expiration: 23  Plan of Care Expiration: 22  Reassessment Due: 22  Visit # / Visits authorized:     Time In: 1 pm  Time Out: 1:45 pm   Total Billable Time: 45 minutes    Precautions: Standard/atrial fib/ B THR/endartectomy 3/22    Pattern of pain determined: 1 PEN    Subjective   Magdalena reports having some pain with moving that was expected, completed stretches and able to decrease pain levels.     Patient reports tolerating previous visit well /c no c/o of excess discomfort   Patient reports their pain to be 0/10 on a 0-10 scale with 0 being no pain and 10 being the worst pain imaginable.  Pain Location: bilateral back, thoracic      Occupation: retired  Leisure: works in her garden /walking   Pt goals: "Increase core strength, go back to walking 2 miles"    Objective     Baseline IM Testing Results:   Date of testin22  ROM 0-48 deg   Max Peak Torque 89    min 40   Flex/Ext Ratio 2.25:1   % below normative data 33         Limitation/Restriction for FOTO lumbar Survey     Therapist reviewed FOTO scores for Magdalena Mane on 2022.   FOTO documents entered into [employer] - see Media section.     Limitation Score: 30%              Treatment    Pt was instructed in and performed the following:     Magdalena received therapeutic exercises to develop/improved posture, cardiovascular endurance, muscular endurance, lumbar/cervical ROM, strength and muscular " "endurance for 45 minutes including the following exercises:       LTR x15  Open book x10  PPT x10 5" hold  Bridge x10  Quad   Cat/Cow x 10 cue for inc focus thoracic ext    Thread needle x 5 B   SOC x 20 5" hold cue for dec cervical motion     Not performed 5/27/2022 :  Scap retract x20  3" hold Y TB   [facility] 5/27/2022   Visit Number 4   VAS Pain Rating 0   Time 5   Scapular Retraction -   Lumbar Stretches - Slouch Overcorrection 20   Lumbar Extension Seat Pad -   Femur Restraint -   Top Dead Center -   Counterweight -   Lumbar Flexion -   Lumbar Extension -   Lumbar Peak Torque -   Min Torque -   Test Percent Below Normative Data -   Lumbar Weight 55   Repetitions 15   Rating of Perceived Exertion 3   Ice - Z Lie (in min.) 5           Peripheral muscle strengthening which included 1 set of 15-20 repetitions at a slow, controlled 10-13 second per rep pace focused on strengthening supporting musculature for improved body mechanics and functional mobility.  Pt and therapist focused on proper form during treatment to ensure optimal strengthening of each targeted muscle group.  Machines were utilized including torso rotation, leg extension, leg curl, chest press, upright row. Tricep extension, bicep curl, leg press, and hip abduction added visit 3    Magdalena received the following manual therapy techniques: NA         Home Exercises Provided and Patient Education Provided   Home exercises include:   scap retractions,   LTR,  Cat camel      Cardio program: Visit 5  Lifting education date: Visit 11  Lumbar roll: not obtained as of 05/24/22    Education provided:   -  Brace wearing schedule     Written Home Exercises Provided: Patient instructed to cont prior HEP.  Exercises were reviewed and Magdalena was able to demonstrate them prior to the end of the session.  Magdalena demonstrated good  understanding of the education provided.     See EMR under Patient Instructions for exercises provided prior " visit.          Assessment   Pt with good carryover as she was able to decrease pain levels with stretching. Weight was increased 5% this visit, she was able to complete 15 reps with 3/10 RPE. Full peripheral circuit was completed this visit.     Patient is making good progress towards established goals.  Pt will continue to benefit from skilled outpatient physical therapy to address the deficits stated in the impairment chart, provide pt/family education and to maximize pt's level of independence in the home and community environment.     Anticipated Barriers for therapy: None  Pt's spiritual, cultural and educational needs considered and pt agreeable to plan of care and goals as stated below:             Goals:     Short term goals:  6 weeks or 10 visits   1.  Pt will demonstrate increased lumbar ROM by at least 3 degrees from the initial ROM value with improvements noted in functional ROM and ability to perform ADLs.  (approp and ongoing)  2.  Pt will demonstrate increased MedX average isometric strength value  by 10% from initial test resulting in improved ability to perform bending, lifting, and carrying activities safely, confidently.  (approp and ongoing)  3.  Patient report a reduction in worst pain score by 1-2 points for improved tolerance for changing positions in bed.  (approp and ongoing)  4.  Pt able to perform HEP correctly with minimal cueing or supervision from therapist to encourage independent management of symptoms. (approp and ongoing)        Long term goals: 10 weeks or 20 visits   1. Pt will demonstrate increased lumbar ROM by at least 9 degrees from initial ROM value, resulting in improved ability to perform functional fwd bending while standing and sitting. (approp and ongoing)  2. Pt will demonstrate increased MedX average isometric strength value  by 30% from initial test resulting in improved ability to perform bending, lifting, and carrying activities safely, confidently.  (approp and  ongoing)  3. Pt to demonstrate ability to independently control and reduce their pain through posture positioning and mechanical movements throughout a typical day.  (approp and ongoing)  4.  Pt will demonstrate reduced pain and improved functional outcomes as reported on the FOTO by reaching a limitation score of < or = 25% or less in order to demonstrate subjective improvement in pt's condition.    (approp and ongoing)  5. Pt will demonstrate independence with the HEP at discharge  (approp and ongoing)  6.  Pt(patient goal)will be able to walk 2 miles without back pain  (approp and ongoing)  7. Pt will be able to garden without pain for inc tolerance to recreational activities (approp and ongoing)         Plan   Continue with established Plan of Care towards established PT goals.

## 2022-05-30 ENCOUNTER — CLINICAL SUPPORT (OUTPATIENT)
Dept: REHABILITATION | Facility: OTHER | Age: 77
End: 2022-05-30
Attending: INTERNAL MEDICINE
Payer: MEDICARE

## 2022-05-30 DIAGNOSIS — R29.898 DECREASED STRENGTH OF TRUNK AND BACK: ICD-10-CM

## 2022-05-30 DIAGNOSIS — R29.3 POOR POSTURE: Primary | ICD-10-CM

## 2022-05-30 PROCEDURE — 97110 THERAPEUTIC EXERCISES: CPT | Mod: CQ

## 2022-05-30 NOTE — PROGRESS NOTES
"[facility]      Name: Magdalena Mane  Clinic Number: [id number]    Therapy Diagnosis:   Encounter Diagnoses   Name Primary?    Poor posture Yes    Decreased strength of trunk and back      Physician: Mo Owens MD    Visit Date: 2022    Physician Orders: PT Eval and Treat   Medical Diagnosis from Referral: S22.000D (ICD-10-CM) - Compression fracture of thoracic vertebra with routine healing, unspecified thoracic vertebral level, subsequent encounter  Evaluation Date: 2022  Authorization Period Expiration: 23  Plan of Care Expiration: 22  Reassessment Due: 22  Visit # / Visits authorized:     Time In: 7:30am  Time Out: 8:25am   Total Billable Time: 45 minutes    Precautions: Standard/atrial fib/ B THR/endartectomy 3/22    Pattern of pain determined: 1 PEN    Subjective   Magdalena reports only having back pain at the end of the day     Patient reports tolerating previous visit well /c no c/o of excess discomfort   Patient reports their pain to be 0/10 on a 0-10 scale with 0 being no pain and 10 being the worst pain imaginable.  Pain Location: bilateral back, thoracic      Occupation: retired  Leisure: works in her garden /walking   Pt goals: "Increase core strength, go back to walking 2 miles"    Objective     Baseline IM Testing Results:   Date of testin22  ROM 0-48 deg   Max Peak Torque 89    min 40   Flex/Ext Ratio 2.25:1   % below normative data 33         Limitation/Restriction for FOTO lumbar Survey     Therapist reviewed FOTO scores for Magdalena Mane on 2022.   FOTO documents entered into [employer] - see Media section.     Limitation Score: 30%              Treatment    Pt was instructed in and performed the following:     Magdalena received therapeutic exercises to develop/improved posture, cardiovascular endurance, muscular endurance, lumbar/cervical ROM, strength and muscular endurance for 45 minutes including the following " "exercises:       LTR x15  Open book x10  PPT + marching x10   Bridge w/GTB x15,3"  Quad   Cat/Cow x 10 cue for inc focus thoracic ext    Thread needle x10B   SOC x 20 5" hold cue for dec cervical motion     [facility] 5/30/2022   Visit Number 5   VAS Pain Rating 0   Time 5   Scapular Retraction -   Lumbar Stretches - Slouch Overcorrection -   Lumbar Extension Seat Pad -   Femur Restraint -   Top Dead Center -   Counterweight -   Lumbar Flexion -   Lumbar Extension -   Lumbar Peak Torque -   Min Torque -   Test Percent Below Normative Data -   Lumbar Weight 55   Repetitions 18   Rating of Perceived Exertion 5   Ice - Z Lie (in min.) 5         Not performed 5/30/2022 :  Scap retract x20  3" hold Y TB           Peripheral muscle strengthening which included 1 set of 15-20 repetitions at a slow, controlled 10-13 second per rep pace focused on strengthening supporting musculature for improved body mechanics and functional mobility.  Pt and therapist focused on proper form during treatment to ensure optimal strengthening of each targeted muscle group.  Machines were utilized including torso rotation, leg extension, leg curl, chest press, upright row. Tricep extension, bicep curl, leg press, and hip abduction added visit 3    Magdalena received the following manual therapy techniques: NA         Home Exercises Provided and Patient Education Provided   Home exercises include:   scap retractions,   LTR,  Cat camel      Cardio program: 5/30/22  Lifting education date: Visit 11  Lumbar roll: not obtained as of 05/24/22    Education provided:   -  Brace wearing schedule     Written Home Exercises Provided: Patient instructed to cont prior HEP.  Exercises were reviewed and Magdalena was able to demonstrate them prior to the end of the session.  Magdalena demonstrated good  understanding of the education provided.     See EMR under Patient Instructions for exercises provided prior visit.          Assessment   Patient tolerated " progressed core stabilization exercises well without increased pain. Discussed the benefits of a cardio program and provided patient with cardio handout. Educated patient on graded exposure with walking as she has taken a break from walking. Patient able to perform 18 reps on l/s medx machine with an RPE of 5/10. Continue to progress per HB protocol.     Patient is making good progress towards established goals.  Pt will continue to benefit from skilled outpatient physical therapy to address the deficits stated in the impairment chart, provide pt/family education and to maximize pt's level of independence in the home and community environment.     Anticipated Barriers for therapy: None  Pt's spiritual, cultural and educational needs considered and pt agreeable to plan of care and goals as stated below:             Goals:     Short term goals:  6 weeks or 10 visits   1.  Pt will demonstrate increased lumbar ROM by at least 3 degrees from the initial ROM value with improvements noted in functional ROM and ability to perform ADLs.  (approp and ongoing)  2.  Pt will demonstrate increased MedX average isometric strength value  by 10% from initial test resulting in improved ability to perform bending, lifting, and carrying activities safely, confidently.  (approp and ongoing)  3.  Patient report a reduction in worst pain score by 1-2 points for improved tolerance for changing positions in bed.  (approp and ongoing)  4.  Pt able to perform HEP correctly with minimal cueing or supervision from therapist to encourage independent management of symptoms. (approp and ongoing)        Long term goals: 10 weeks or 20 visits   1. Pt will demonstrate increased lumbar ROM by at least 9 degrees from initial ROM value, resulting in improved ability to perform functional fwd bending while standing and sitting. (approp and ongoing)  2. Pt will demonstrate increased MedX average isometric strength value  by 30% from initial test resulting  in improved ability to perform bending, lifting, and carrying activities safely, confidently.  (approp and ongoing)  3. Pt to demonstrate ability to independently control and reduce their pain through posture positioning and mechanical movements throughout a typical day.  (approp and ongoing)  4.  Pt will demonstrate reduced pain and improved functional outcomes as reported on the FOTO by reaching a limitation score of < or = 25% or less in order to demonstrate subjective improvement in pt's condition.    (approp and ongoing)  5. Pt will demonstrate independence with the HEP at discharge  (approp and ongoing)  6.  Pt(patient goal)will be able to walk 2 miles without back pain  (approp and ongoing)  7. Pt will be able to garden without pain for inc tolerance to recreational activities (approp and ongoing)         Plan   Continue with established Plan of Care towards established PT goals.

## 2022-06-01 ENCOUNTER — PATIENT MESSAGE (OUTPATIENT)
Dept: INTERNAL MEDICINE | Facility: CLINIC | Age: 77
End: 2022-06-01
Payer: MEDICARE

## 2022-06-02 ENCOUNTER — TELEPHONE (OUTPATIENT)
Dept: ELECTROPHYSIOLOGY | Facility: CLINIC | Age: 77
End: 2022-06-02
Payer: MEDICARE

## 2022-06-03 ENCOUNTER — CLINICAL SUPPORT (OUTPATIENT)
Dept: REHABILITATION | Facility: OTHER | Age: 77
End: 2022-06-03
Attending: INTERNAL MEDICINE
Payer: MEDICARE

## 2022-06-03 DIAGNOSIS — R29.898 DECREASED STRENGTH OF TRUNK AND BACK: ICD-10-CM

## 2022-06-03 DIAGNOSIS — R29.3 POOR POSTURE: Primary | ICD-10-CM

## 2022-06-03 PROCEDURE — 97110 THERAPEUTIC EXERCISES: CPT | Mod: CQ

## 2022-06-03 NOTE — PROGRESS NOTES
"[facility]      Name: Magdalena Mnae  Clinic Number: [id number]    Therapy Diagnosis:   Encounter Diagnoses   Name Primary?    Poor posture Yes    Decreased strength of trunk and back      Physician: Anai Oliveros MD    Visit Date: 6/3/2022    Physician Orders: PT Eval and Treat   Medical Diagnosis from Referral: S22.000D (ICD-10-CM) - Compression fracture of thoracic vertebra with routine healing, unspecified thoracic vertebral level, subsequent encounter  Evaluation Date: 2022  Authorization Period Expiration: 23  Plan of Care Expiration: 22  Reassessment Due: 22  Visit # / Visits authorized:     Time In: 3:03 pm  Time Out: 3:58 pm   Total Billable Time: 50 minutes    Precautions: Standard/atrial fib/ B THR/endartectomy 3/22    Pattern of pain determined: 1 PEN    Subjective   Magdalena states "I have been feeling great, I'm getting stronger and the exercises are getting easier.     Patient reports tolerating previous visit well /c no c/o of excess discomfort   Patient reports their pain to be 0/10 on a 0-10 scale with 0 being no pain and 10 being the worst pain imaginable.  Pain Location: bilateral back, thoracic      Occupation: retired  Leisure: works in her garden /walking   Pt goals: "Increase core strength, go back to walking 2 miles"    Objective     Baseline IM Testing Results:   Date of testin22  ROM 0-48 deg   Max Peak Torque 89    min 40   Flex/Ext Ratio 2.25:1   % below normative data 33         Limitation/Restriction for FOTO lumbar Survey     Therapist reviewed FOTO scores for Magdalena Mane on 2022.   FOTO documents entered into [facility] - see Media section.     Limitation Score: 30%              Treatment    Pt was instructed in and performed the following:     Magdalena received therapeutic exercises to develop/improved posture, cardiovascular endurance, muscular endurance, lumbar/cervical ROM, strength and muscular endurance for " "50 minutes including the following exercises:     LTR x15  Open book x10  PPT + marching x10   Bridge w/GTB x15,3"  +Braced BKFO GTB x15  Quad   Cat/Cow x 10 cue for inc focus thoracic ext    Thread needle x10B   SOC x 20 5" hold cue for dec cervical motion     [facility] 6/3/2022   Visit Number 6   VAS Pain Rating 0   Time 5   Scapular Retraction -   Lumbar Stretches - Slouch 20   Lumbar Extension - Seat Pad -   Femur Restraint -   Top Dead Center -   Counterweight -   Lumbar Flexion -   Lumbar Extension -   Lumbar Peak Torque -   Lumbar Weight 55   Repetitions 20   Rating of Perceived Exertion 4       Not performed 6/3/2022 :  Scap retract x20  3" hold Y TB       Peripheral muscle strengthening which included 1 set of 15-20 repetitions at a slow, controlled 10-13 second per rep pace focused on strengthening supporting musculature for improved body mechanics and functional mobility.  Pt and therapist focused on proper form during treatment to ensure optimal strengthening of each targeted muscle group.  Machines were utilized including torso rotation, leg extension, leg curl, chest press, upright row. Tricep extension, bicep curl, leg press, and hip abduction added visit 3    Magdalena received the following manual therapy techniques: NA         Home Exercises Provided and Patient Education Provided   Home exercises include:   scap retractions,   LTR,  Cat camel      Cardio program: 5/30/22  Lifting education date: Visit 11  Lumbar roll: not obtained as of 05/24/22    Education provided:   -  Brace wearing schedule     Written Home Exercises Provided: Patient instructed to cont prior HEP.  Exercises were reviewed and Magdalena was able to demonstrate them prior to the end of the session.  Magdalena demonstrated good  understanding of the education provided.     See EMR under Patient Instructions for exercises provided prior visit.          Assessment   Magdalena returned denying any lower back pain and stating " she feels she is getting stronger.  Treatment progressed by adding braced BKFO w/GTB to dynamically strengthen hip musculature and core stabilization. Exercise was added to HEP and pt was issued another cardio handout as she lost hers since last visit.  Medx resistance remained at 55#. She completed 20 reps at a RPE of 4/10.  Consider a 5% increase in resistance NV per HB protocol.    Patient is making good progress towards established goals.  Pt will continue to benefit from skilled outpatient physical therapy to address the deficits stated in the impairment chart, provide pt/family education and to maximize pt's level of independence in the home and community environment.     Anticipated Barriers for therapy: None  Pt's spiritual, cultural and educational needs considered and pt agreeable to plan of care and goals as stated below:             Goals:     Short term goals:  6 weeks or 10 visits   1.  Pt will demonstrate increased lumbar ROM by at least 3 degrees from the initial ROM value with improvements noted in functional ROM and ability to perform ADLs.  (approp and ongoing)  2.  Pt will demonstrate increased MedX average isometric strength value  by 10% from initial test resulting in improved ability to perform bending, lifting, and carrying activities safely, confidently.  (approp and ongoing)  3.  Patient report a reduction in worst pain score by 1-2 points for improved tolerance for changing positions in bed.  (approp and ongoing)  4.  Pt able to perform HEP correctly with minimal cueing or supervision from therapist to encourage independent management of symptoms. (approp and ongoing)        Long term goals: 10 weeks or 20 visits   1. Pt will demonstrate increased lumbar ROM by at least 9 degrees from initial ROM value, resulting in improved ability to perform functional fwd bending while standing and sitting. (approp and ongoing)  2. Pt will demonstrate increased MedX average isometric strength value  by  30% from initial test resulting in improved ability to perform bending, lifting, and carrying activities safely, confidently.  (approp and ongoing)  3. Pt to demonstrate ability to independently control and reduce their pain through posture positioning and mechanical movements throughout a typical day.  (approp and ongoing)  4.  Pt will demonstrate reduced pain and improved functional outcomes as reported on the FOTO by reaching a limitation score of < or = 25% or less in order to demonstrate subjective improvement in pt's condition.    (approp and ongoing)  5. Pt will demonstrate independence with the HEP at discharge  (approp and ongoing)  6.  Pt(patient goal)will be able to walk 2 miles without back pain  (approp and ongoing)  7. Pt will be able to garden without pain for inc tolerance to recreational activities (approp and ongoing)         Plan   Continue with established Plan of Care towards established PT goals.

## 2022-06-07 ENCOUNTER — OFFICE VISIT (OUTPATIENT)
Dept: INTERNAL MEDICINE | Facility: CLINIC | Age: 77
End: 2022-06-07
Payer: MEDICARE

## 2022-06-07 ENCOUNTER — CLINICAL SUPPORT (OUTPATIENT)
Dept: REHABILITATION | Facility: OTHER | Age: 77
End: 2022-06-07
Attending: INTERNAL MEDICINE
Payer: MEDICARE

## 2022-06-07 VITALS
HEART RATE: 54 BPM | SYSTOLIC BLOOD PRESSURE: 130 MMHG | OXYGEN SATURATION: 99 % | HEIGHT: 59 IN | DIASTOLIC BLOOD PRESSURE: 68 MMHG | BODY MASS INDEX: 27.97 KG/M2 | WEIGHT: 138.75 LBS

## 2022-06-07 DIAGNOSIS — Z00.00 ENCOUNTER FOR PREVENTIVE HEALTH EXAMINATION: Primary | ICD-10-CM

## 2022-06-07 DIAGNOSIS — R29.898 DECREASED STRENGTH OF TRUNK AND BACK: ICD-10-CM

## 2022-06-07 DIAGNOSIS — R29.3 POOR POSTURE: Primary | ICD-10-CM

## 2022-06-07 DIAGNOSIS — M15.9 PRIMARY OSTEOARTHRITIS INVOLVING MULTIPLE JOINTS: ICD-10-CM

## 2022-06-07 DIAGNOSIS — I65.23 BILATERAL CAROTID ARTERY STENOSIS: ICD-10-CM

## 2022-06-07 DIAGNOSIS — T67.1XXD HEAT SYNCOPE, SUBSEQUENT ENCOUNTER: ICD-10-CM

## 2022-06-07 DIAGNOSIS — S22.000D COMPRESSION FRACTURE OF THORACIC VERTEBRA WITH ROUTINE HEALING, UNSPECIFIED THORACIC VERTEBRAL LEVEL, SUBSEQUENT ENCOUNTER: ICD-10-CM

## 2022-06-07 DIAGNOSIS — R09.89 BILATERAL CAROTID BRUITS: ICD-10-CM

## 2022-06-07 DIAGNOSIS — E66.3 OVERWEIGHT: ICD-10-CM

## 2022-06-07 DIAGNOSIS — Z98.890 S/P CAROTID ENDARTERECTOMY: ICD-10-CM

## 2022-06-07 DIAGNOSIS — R26.81 UNSTEADY GAIT: ICD-10-CM

## 2022-06-07 DIAGNOSIS — E55.9 VITAMIN D INSUFFICIENCY: ICD-10-CM

## 2022-06-07 DIAGNOSIS — I48.0 PAROXYSMAL ATRIAL FIBRILLATION: ICD-10-CM

## 2022-06-07 DIAGNOSIS — F43.9 STRESS: ICD-10-CM

## 2022-06-07 DIAGNOSIS — I47.9 PAROXYSMAL TACHYCARDIA: ICD-10-CM

## 2022-06-07 DIAGNOSIS — M67.40 GANGLION CYST: ICD-10-CM

## 2022-06-07 DIAGNOSIS — N95.2 VAGINAL ATROPHY: ICD-10-CM

## 2022-06-07 DIAGNOSIS — R26.89 BALANCE PROBLEMS: ICD-10-CM

## 2022-06-07 DIAGNOSIS — I10 ESSENTIAL HYPERTENSION: ICD-10-CM

## 2022-06-07 DIAGNOSIS — D50.9 IRON DEFICIENCY ANEMIA, UNSPECIFIED IRON DEFICIENCY ANEMIA TYPE: ICD-10-CM

## 2022-06-07 DIAGNOSIS — R00.1 SINUS BRADYCARDIA: ICD-10-CM

## 2022-06-07 DIAGNOSIS — R29.3 POOR POSTURE: ICD-10-CM

## 2022-06-07 DIAGNOSIS — I70.0 ATHEROSCLEROSIS OF AORTA: ICD-10-CM

## 2022-06-07 PROCEDURE — 1157F PR ADVANCE CARE PLAN OR EQUIV PRESENT IN MEDICAL RECORD: ICD-10-PCS | Mod: CPTII,S$GLB,, | Performed by: NURSE PRACTITIONER

## 2022-06-07 PROCEDURE — 1101F PT FALLS ASSESS-DOCD LE1/YR: CPT | Mod: CPTII,S$GLB,, | Performed by: NURSE PRACTITIONER

## 2022-06-07 PROCEDURE — G0439 PR MEDICARE ANNUAL WELLNESS SUBSEQUENT VISIT: ICD-10-PCS | Mod: S$GLB,,, | Performed by: NURSE PRACTITIONER

## 2022-06-07 PROCEDURE — 3075F SYST BP GE 130 - 139MM HG: CPT | Mod: CPTII,S$GLB,, | Performed by: NURSE PRACTITIONER

## 2022-06-07 PROCEDURE — 1159F PR MEDICATION LIST DOCUMENTED IN MEDICAL RECORD: ICD-10-PCS | Mod: CPTII,S$GLB,, | Performed by: NURSE PRACTITIONER

## 2022-06-07 PROCEDURE — 97110 THERAPEUTIC EXERCISES: CPT

## 2022-06-07 PROCEDURE — 1160F RVW MEDS BY RX/DR IN RCRD: CPT | Mod: CPTII,S$GLB,, | Performed by: NURSE PRACTITIONER

## 2022-06-07 PROCEDURE — 3288F PR FALLS RISK ASSESSMENT DOCUMENTED: ICD-10-PCS | Mod: CPTII,S$GLB,, | Performed by: NURSE PRACTITIONER

## 2022-06-07 PROCEDURE — 3288F FALL RISK ASSESSMENT DOCD: CPT | Mod: CPTII,S$GLB,, | Performed by: NURSE PRACTITIONER

## 2022-06-07 PROCEDURE — 1160F PR REVIEW ALL MEDS BY PRESCRIBER/CLIN PHARMACIST DOCUMENTED: ICD-10-PCS | Mod: CPTII,S$GLB,, | Performed by: NURSE PRACTITIONER

## 2022-06-07 PROCEDURE — 1101F PR PT FALLS ASSESS DOC 0-1 FALLS W/OUT INJ PAST YR: ICD-10-PCS | Mod: CPTII,S$GLB,, | Performed by: NURSE PRACTITIONER

## 2022-06-07 PROCEDURE — 99999 PR PBB SHADOW E&M-EST. PATIENT-LVL IV: CPT | Mod: PBBFAC,,, | Performed by: NURSE PRACTITIONER

## 2022-06-07 PROCEDURE — 99999 PR PBB SHADOW E&M-EST. PATIENT-LVL IV: ICD-10-PCS | Mod: PBBFAC,,, | Performed by: NURSE PRACTITIONER

## 2022-06-07 PROCEDURE — 3078F DIAST BP <80 MM HG: CPT | Mod: CPTII,S$GLB,, | Performed by: NURSE PRACTITIONER

## 2022-06-07 PROCEDURE — 1157F ADVNC CARE PLAN IN RCRD: CPT | Mod: CPTII,S$GLB,, | Performed by: NURSE PRACTITIONER

## 2022-06-07 PROCEDURE — 1159F MED LIST DOCD IN RCRD: CPT | Mod: CPTII,S$GLB,, | Performed by: NURSE PRACTITIONER

## 2022-06-07 PROCEDURE — 1170F FXNL STATUS ASSESSED: CPT | Mod: CPTII,S$GLB,, | Performed by: NURSE PRACTITIONER

## 2022-06-07 PROCEDURE — 4010F ACE/ARB THERAPY RXD/TAKEN: CPT | Mod: CPTII,S$GLB,, | Performed by: NURSE PRACTITIONER

## 2022-06-07 PROCEDURE — 3075F PR MOST RECENT SYSTOLIC BLOOD PRESS GE 130-139MM HG: ICD-10-PCS | Mod: CPTII,S$GLB,, | Performed by: NURSE PRACTITIONER

## 2022-06-07 PROCEDURE — 3078F PR MOST RECENT DIASTOLIC BLOOD PRESSURE < 80 MM HG: ICD-10-PCS | Mod: CPTII,S$GLB,, | Performed by: NURSE PRACTITIONER

## 2022-06-07 PROCEDURE — G0439 PPPS, SUBSEQ VISIT: HCPCS | Mod: S$GLB,,, | Performed by: NURSE PRACTITIONER

## 2022-06-07 PROCEDURE — 4010F PR ACE/ARB THEARPY RXD/TAKEN: ICD-10-PCS | Mod: CPTII,S$GLB,, | Performed by: NURSE PRACTITIONER

## 2022-06-07 PROCEDURE — 1170F PR FUNCTIONAL STATUS ASSESSED: ICD-10-PCS | Mod: CPTII,S$GLB,, | Performed by: NURSE PRACTITIONER

## 2022-06-07 NOTE — PROGRESS NOTES
"[facility]      Name: Magdalena Mane  Clinic Number: [id number]    Therapy Diagnosis:   Encounter Diagnoses   Name Primary?    Poor posture Yes    Decreased strength of trunk and back      Physician: Anai Oliveros MD    Visit Date: 2022    Physician Orders: PT Eval and Treat   Medical Diagnosis from Referral: S22.000D (ICD-10-CM) - Compression fracture of thoracic vertebra with routine healing, unspecified thoracic vertebral level, subsequent encounter  Evaluation Date: 2022  Authorization Period Expiration: 23  Plan of Care Expiration: 22  Reassessment Due: 22  Visit # / Visits authorized:     Time In: 1215  Time Out: 115  Total Billable Time: 50 minutes    Precautions: Standard/atrial fib/ B THR/endartectomy 3/22    Pattern of pain determined: 1 PEN    Subjective   Magdalena states she is feeling better, stronger and reports improved ability to balance.    Patient reports tolerating previous visit well /c no c/o of excess discomfort   Patient reports their pain to be 0/10 on a 0-10 scale with 0 being no pain and 10 being the worst pain imaginable.  Pain Location: bilateral back, thoracic      Occupation: retired  Leisure: works in her garden /walking   Pt goals: "Increase core strength, go back to walking 2 miles"    Objective     Baseline IM Testing Results:   Date of testin22  ROM 0-48 deg   Max Peak Torque 89    min 40   Flex/Ext Ratio 2.25:1   % below normative data 33         Limitation/Restriction for FOTO lumbar Survey     Therapist reviewed FOTO scores for Magdalena Mane on 2022.   FOTO documents entered into [employer] - see Media section.     Limitation Score: 30%           MOVEMENT LOSS 22    ROM Loss   Flexion within functional limits   Extension minimal -mod   Side glide Right WFL   Side glide Left WFL   Rotation Right WFL   Rotation Left WFL           Treatment    Pt was instructed in and performed the following:     Magdalena" "received therapeutic exercises to develop/improved posture, cardiovascular endurance, muscular endurance, lumbar/cervical ROM, strength and muscular endurance for 60 minutes including the following exercises:   [facility] 6/7/2022   Visit Number 7   VAS Pain Rating 0   Time 5   Scapular Retraction -   Lumbar Stretches - Slouch Overcorrection 20   Lumbar Extension Seat Pad -   Femur Restraint -   Top Dead Center -   Counterweight -   Lumbar Flexion 54   Lumbar Extension 0   Lumbar Peak Torque -   Min Torque -   Test Percent Below Normative Data -   Lumbar Weight 58   Repetitions 15   Rating of Perceived Exertion 4   Ice - Z Lie (in min.) 5       LTR x15  Open book x10  PPT + marching x10   Bridge w/GTB x20  +Braced BKFO GTB x20  Quad   Cat/Cow x 10 cue for inc focus thoracic ext    Thread needle x10B   SOC x 20 5" hold cue for dec cervical motion       Not performed 6/7/2022 :  Scap retract x20  3" hold Y TB       Peripheral muscle strengthening which included 1 set of 15-20 repetitions at a slow, controlled 10-13 second per rep pace focused on strengthening supporting musculature for improved body mechanics and functional mobility.  Pt and therapist focused on proper form during treatment to ensure optimal strengthening of each targeted muscle group.  Machines were utilized including torso rotation, leg extension, leg curl, chest press, upright row. Tricep extension, bicep curl, leg press, and hip abduction added visit 3    Magdalena received the following manual therapy techniques: NA         Home Exercises Provided and Patient Education Provided   Home exercises include:   scap retractions,   LTR,  Cat camel      Cardio program: 5/30/22  Lifting education date: Visit 11  Lumbar roll: not obtained as of 05/24/22    Education provided:   -  Brace wearing schedule     Written Home Exercises Provided: Patient instructed to cont prior HEP.  Exercises were reviewed and Magdalena was able to demonstrate them prior to " the end of the session.  Magdalena demonstrated good  understanding of the education provided.     See EMR under Patient Instructions for exercises provided prior visit.          Assessment   Magdalena returned denying any lower back pain and stating she feels she is getting better.  Increased reps for stability exercises to increase challenge.  Pt also reports her balance is better since starting program.  Re-assessed pt today and noted improved ROM from initial evaluation.  Initiated EIL today to assist with mobility.  Pt reports no pain with exercise.  Issued to her HEP today.  Increased ROM to 0-54 today with a 5% increase in weight.  Pt able to attain 15 reps with 4/10 exertion level.    Patient is making good progress towards established goals.  Pt will continue to benefit from skilled outpatient physical therapy to address the deficits stated in the impairment chart, provide pt/family education and to maximize pt's level of independence in the home and community environment.     Anticipated Barriers for therapy: None  Pt's spiritual, cultural and educational needs considered and pt agreeable to plan of care and goals as stated below:             Goals:     Short term goals:  6 weeks or 10 visits   1.  Pt will demonstrate increased lumbar ROM by at least 3 degrees from the initial ROM value with improvements noted in functional ROM and ability to perform ADLs.  (approp and ongoing)  2.  Pt will demonstrate increased MedX average isometric strength value  by 10% from initial test resulting in improved ability to perform bending, lifting, and carrying activities safely, confidently.  (approp and ongoing)  3.  Patient report a reduction in worst pain score by 1-2 points for improved tolerance for changing positions in bed.  (approp and ongoing)  4.  Pt able to perform HEP correctly with minimal cueing or supervision from therapist to encourage independent management of symptoms. (approp and ongoing)        Long term  goals: 10 weeks or 20 visits   1. Pt will demonstrate increased lumbar ROM by at least 9 degrees from initial ROM value, resulting in improved ability to perform functional fwd bending while standing and sitting. (approp and ongoing)  2. Pt will demonstrate increased MedX average isometric strength value  by 30% from initial test resulting in improved ability to perform bending, lifting, and carrying activities safely, confidently.  (approp and ongoing)  3. Pt to demonstrate ability to independently control and reduce their pain through posture positioning and mechanical movements throughout a typical day.  (approp and ongoing)  4.  Pt will demonstrate reduced pain and improved functional outcomes as reported on the FOTO by reaching a limitation score of < or = 25% or less in order to demonstrate subjective improvement in pt's condition.    (approp and ongoing)  5. Pt will demonstrate independence with the HEP at discharge  (approp and ongoing)  6.  Pt(patient goal)will be able to walk 2 miles without back pain  (approp and ongoing)  7. Pt will be able to garden without pain for inc tolerance to recreational activities (approp and ongoing)         Plan   Continue with established Plan of Care towards established PT goals.

## 2022-06-07 NOTE — PROGRESS NOTES
"  Magdalena Mane presented for a  Medicare AWV and comprehensive Health Risk Assessment today. The following components were reviewed and updated:    · Medical history  · Family History  · Social history  · Allergies and Current Medications  · Health Risk Assessment  · Health Maintenance  · Care Team         ** See Completed Assessments for Annual Wellness Visit within the encounter summary.**         The following assessments were completed:  · Living Situation  · CAGE  · Depression Screening  · Timed Get Up and Go  · Whisper Test  · Cognitive Function Screening  · Nutrition Screening  · ADL Screening  · PAQ Screening            Vitals:    06/07/22 1333   BP: 130/68   BP Location: Left arm   Patient Position: Sitting   Pulse: (!) 54   SpO2: 99%   Weight: 62.9 kg (138 lb 12.5 oz)   Height: 4' 11" (1.499 m)     Body mass index is 28.03 kg/m².     Physical Exam  Vitals reviewed.   Constitutional:       Appearance: She is well-developed.   HENT:      Head: Normocephalic and atraumatic. Not macrocephalic and not microcephalic. No raccoon eyes, Tavarez's sign, abrasion, contusion, right periorbital erythema, left periorbital erythema or laceration. Hair is normal.      Right Ear: No decreased hearing noted. No laceration, drainage, swelling or tenderness. No middle ear effusion. No foreign body. No mastoid tenderness. No hemotympanum. Tympanic membrane is not injected, scarred, perforated, erythematous, retracted or bulging. Tympanic membrane has normal mobility.      Left Ear: No decreased hearing noted. No laceration, drainage, swelling or tenderness.  No middle ear effusion. No foreign body. No mastoid tenderness. No hemotympanum. Tympanic membrane is not injected, scarred, perforated, erythematous, retracted or bulging. Tympanic membrane has normal mobility.      Nose: Nose normal. No nasal deformity, laceration or mucosal edema.      Mouth/Throat:      Pharynx: Uvula midline.   Eyes:      General: Lids are normal. No " scleral icterus.     Conjunctiva/sclera: Conjunctivae normal.   Neck:      Thyroid: No thyroid mass or thyromegaly.      Trachea: Trachea normal.   Cardiovascular:      Rate and Rhythm: Normal rate and regular rhythm.   Pulmonary:      Effort: Pulmonary effort is normal. No respiratory distress.      Breath sounds: Normal breath sounds.   Abdominal:      Palpations: Abdomen is soft.   Musculoskeletal:         General: Normal range of motion.      Cervical back: Neck supple. No edema or erythema. No spinous process tenderness or muscular tenderness. Normal range of motion.   Lymphadenopathy:      Head:      Right side of head: No submental, submandibular, tonsillar, preauricular or posterior auricular adenopathy.      Left side of head: No submental, submandibular, tonsillar, preauricular, posterior auricular or occipital adenopathy.   Skin:     General: Skin is warm and dry.   Neurological:      Mental Status: She is alert and oriented to person, place, and time.   Psychiatric:         Behavior: Behavior normal.         Thought Content: Thought content normal.         Judgment: Judgment normal.             Diagnoses and health risks identified today and associated recommendations/orders:    1. Encounter for preventive health examination  Annual Health Risk Assessment (HRA) visit today.  Counseling and referral of health maintenance and preventative health measures performed.  Patient given annual wellness paperwork to take home.  Encouraged to return in 1 year for subsequent HRA visit.     2. Atherosclerosis of aorta  Chronic. Stable. Continue current treatment plan as previously prescribed by PCP.    3. Bilateral carotid artery stenosis  Chronic. Stable. Continue current treatment plan as previously prescribed by PCP.    4. Iron deficiency anemia, unspecified iron deficiency anemia type  Chronic. Stable. Continue current treatment plan as previously prescribed by PCP.    5. Essential hypertension  Chronic. Stable.  Controlled. Encouraged to increase exercise as tolerated (moderate-intensity aerobic activity and muscle-strengthening activities) improve diet to heart healthy, low sodium diet. Continue current treatment plan as previously prescribed by PCP.    6. Paroxysmal tachycardia  Chronic. Stable. Continue current treatment plan as previously prescribed by PCP.    7. Paroxysmal atrial fibrillation  Chronic. Stable. Continue current treatment plan as previously prescribed by PCP.    8. S/P carotid endarterectomy  Chronic. Stable. Continue current treatment plan as previously prescribed by PCP.    9. Sinus bradycardia  Chronic. Stable. Continue current treatment plan as previously prescribed by PCP.    10. Bilateral carotid bruits  Chronic. Stable. Continue current treatment plan as previously prescribed by PCP.    11. Stress  Chronic. Stable. Continue current treatment plan as previously prescribed by PCP.    12. Compression fracture of thoracic vertebra with routine healing, unspecified thoracic vertebral level, subsequent encounter  Chronic. Stable. Continue current treatment plan as previously prescribed by PCP.    13. Vaginal atrophy  Chronic. Stable. Continue current treatment plan as previously prescribed by PCP.    14. Overweight  Chronic. Stable. Continue current treatment plan as previously prescribed by PCP.    15. Vitamin D insufficiency  Chronic. Stable. Continue current treatment plan as previously prescribed by PCP.    16. Primary osteoarthritis involving multiple joints  Chronic. Stable. Continue current treatment plan as previously prescribed by PCP.    17. Ganglion cyst  Chronic. Stable. Continue current treatment plan as previously prescribed by PCP.    18. Heat syncope, subsequent encounter  Chronic. Stable. Continue current treatment plan as previously prescribed by PCP.    19. Decreased strength of trunk and back  Chronic. Stable. Continue current treatment plan as previously prescribed by PCP.    20.  Balance problems  Chronic. Stable. Continue current treatment plan as previously prescribed by PCP.    21. Unsteady gait  Chronic. Stable. Continue current treatment plan as previously prescribed by PCP.    22. Poor posture  Chronic. Stable. Continue current treatment plan as previously prescribed by PCP.        Provided Magdalena with a 5-10 year written screening schedule and personal prevention plan. Recommendations were developed using the USPSTF age appropriate recommendations. Education, counseling, and referrals were provided as needed. After Visit Summary printed and given to patient which includes a list of additional screenings\tests needed.      I offered to discuss end of life issues, including information on how to make advance directives that the patient could use to name someone who would make medical decisions on their behalf if they became too ill to make themselves.    ___Patient declined  _X_Patient is interested, I provided paper work and offered to discuss.    Follow up in about 1 year (around 6/7/2023).    Farrukh Ann NP  I offered to discuss advanced care planning, including how to pick a person who would make decisions for you if you were unable to make them for yourself, called a health care power of , and what kind of decisions you might make such as use of life sustaining treatments such as ventilators and tube feeding when faced with a life limiting illness recorded on a living will that they will need to know. (How you want to be cared for as you near the end of your natural life)     X Patient is interested in learning more about how to make advanced directives.  I provided them paperwork and offered to discuss this with them.

## 2022-06-07 NOTE — PATIENT INSTRUCTIONS
Counseling and Referral of Other Preventative  (Italic type indicates deductible and co-insurance are waived)    Patient Name: Magdalena Mane  Today's Date: 6/7/2022    Health Maintenance       Date Due Completion Date    Pneumococcal Vaccines (Age 65+) (3 - PPSV23 or PCV20) 05/07/2019 3/10/2015    Mammogram 08/09/2022 8/9/2021    Lipid Panel 03/03/2023 3/3/2022    High Dose Statin 05/13/2023 5/13/2022    DEXA Scan 07/25/2024 7/25/2019    TETANUS VACCINE 10/09/2025 10/9/2015    Colorectal Cancer Screening 06/28/2027 6/28/2017        No orders of the defined types were placed in this encounter.    The following information is provided to all patients.  This information is to help you find resources for any of the problems found today that may be affecting your health:                Living healthy guide: www.[facility].[place].[facility]      Understanding Diabetes: www.diabetes.org      Eating healthy: www.cdc.gov/healthyweight      CDC home safety checklist: www.cdc.gov/steadi/patient.html      Agency on Aging: www.goea.[place].[facility]      Alcoholics anonymous (AA): www.aa.org      Physical Activity: www.daria.nih.gov/yi7lhzv      Tobacco use: www.quitwithusla.org

## 2022-06-10 ENCOUNTER — CLINICAL SUPPORT (OUTPATIENT)
Dept: REHABILITATION | Facility: OTHER | Age: 77
End: 2022-06-10
Attending: INTERNAL MEDICINE
Payer: MEDICARE

## 2022-06-10 DIAGNOSIS — R29.898 DECREASED STRENGTH OF TRUNK AND BACK: ICD-10-CM

## 2022-06-10 DIAGNOSIS — R29.3 POOR POSTURE: Primary | ICD-10-CM

## 2022-06-10 PROCEDURE — 97110 THERAPEUTIC EXERCISES: CPT | Mod: CQ

## 2022-06-10 NOTE — PROGRESS NOTES
"[facility]      Name: Magdalena Mane  Clinic Number: [id number]    Therapy Diagnosis:   Encounter Diagnoses   Name Primary?    Poor posture Yes    Decreased strength of trunk and back      Physician: Anai Oliveros MD    Visit Date: 6/10/2022    Physician Orders: PT Eval and Treat   Medical Diagnosis from Referral: S22.000D (ICD-10-CM) - Compression fracture of thoracic vertebra with routine healing, unspecified thoracic vertebral level, subsequent encounter  Evaluation Date: 2022  Authorization Period Expiration: 23  Plan of Care Expiration: 22  Reassessment Due: 22  Visit # / Visits authorized:     Time In: 2:15  Time Out: 3:05  Total Billable Time: 45 minutes    Precautions: Standard/atrial fib/ B THR/endartectomy 3/22    Pattern of pain determined: 1 PEN    Subjective   Magdalena reports she felt increased pain and soreness following progressions in exercises and ROM on Medx last visit.  She was able to relieve pain and soreness with rest and HEP stretches. Upon arrival for treatment she reports no lower back pain.    Patient reports tolerating previous visit well /c no c/o of excess discomfort   Patient reports their pain to be 0/10 on a 0-10 scale with 0 being no pain and 10 being the worst pain imaginable.  Pain Location: bilateral back, thoracic      Occupation: retired  Leisure: works in her garden /walking   Pt goals: "Increase core strength, go back to walking 2 miles"    Objective     Baseline IM Testing Results:   Date of testin22  ROM 0-48 deg   Max Peak Torque 89    min 40   Flex/Ext Ratio 2.25:1   % below normative data 33         Limitation/Restriction for FOTO lumbar Survey     Therapist reviewed FOTO scores for Magdalena Mane on 2022.   FOTO documents entered into [employer] - see Media section.     Limitation Score: 30%           MOVEMENT LOSS 22    ROM Loss   Flexion within functional limits   Extension minimal -mod   Side " "glide Right WFL   Side glide Left WFL   Rotation Right WFL   Rotation Left WFL           Treatment    Pt was instructed in and performed the following:     Magdalena received therapeutic exercises to develop/improved posture, cardiovascular endurance, muscular endurance, lumbar/cervical ROM, strength and muscular endurance for 45 minutes including the following exercises:   [facility] - Short 6/10/2022   Visit Number 8   VAS Pain Rating 0   Time 5   Scapular Retraction -   Lumbar Stretches - Slouch -   Extension in Lying 10   Lumbar Extension - Seat Pad -   Femur Restraint -   Top Dead Center -   Counterweight -   Lumbar Flexion -   Lumbar Extension -   Lumbar Peak Torque -   Lumbar Weight 58   Repetitions 20   Rating of Perceived Exertion 4       LTR x15  Open book x10  PPT + marching x10   Bridge w/GTB x20  Braced BKFO GTB x20  +TA activation w/SLR x10 ea  EIL x10   Quad   Cat/Cow x 10 cue for inc focus thoracic ext    Thread needle x10B       Not performed 6/10/2022 :  Scap retract x20  3" hold Y TB   SOC x 20 5" hold cue for dec cervical motion     Peripheral muscle strengthening which included 1 set of 15-20 repetitions at a slow, controlled 10-13 second per rep pace focused on strengthening supporting musculature for improved body mechanics and functional mobility.  Pt and therapist focused on proper form during treatment to ensure optimal strengthening of each targeted muscle group.  Machines were utilized including torso rotation, leg extension, leg curl, chest press, upright row. Tricep extension, bicep curl, leg press, and hip abduction added visit 3    Magdalena received the following manual therapy techniques: NA         Home Exercises Provided and Patient Education Provided   Home exercises include:   scap retractions,   LTR,  Cat camel      Cardio program: 5/30/22  Lifting education date: Visit 11  Lumbar roll: not obtained as of 05/24/22    Education provided:   -  Brace wearing schedule "     Written Home Exercises Provided: Patient instructed to cont prior HEP.  Exercises were reviewed and Magdalena was able to demonstrate them prior to the end of the session.  Magdalena demonstrated good  understanding of the education provided.     See EMR under Patient Instructions for exercises provided prior visit.          Assessment   Magdalena returned reporting no lower back pain upon arrival for treatment but experience increased pain and soreness following ROM and exercises progression last visit.  Treatment progressed today by adding TA activation w/SLR for added challenge to core musculature.  Medx resistance remained at 58#.  She completed 20 reps at a RPE of 4/10.  Consider a 5% increase in resistance NV per HB protocol.    Patient is making good progress towards established goals.  Pt will continue to benefit from skilled outpatient physical therapy to address the deficits stated in the impairment chart, provide pt/family education and to maximize pt's level of independence in the home and community environment.     Anticipated Barriers for therapy: None  Pt's spiritual, cultural and educational needs considered and pt agreeable to plan of care and goals as stated below:             Goals:     Short term goals:  6 weeks or 10 visits   1.  Pt will demonstrate increased lumbar ROM by at least 3 degrees from the initial ROM value with improvements noted in functional ROM and ability to perform ADLs.  (approp and ongoing)  2.  Pt will demonstrate increased MedX average isometric strength value  by 10% from initial test resulting in improved ability to perform bending, lifting, and carrying activities safely, confidently.  (approp and ongoing)  3.  Patient report a reduction in worst pain score by 1-2 points for improved tolerance for changing positions in bed.  (approp and ongoing)  4.  Pt able to perform HEP correctly with minimal cueing or supervision from therapist to encourage independent management of  symptoms. (approp and ongoing)        Long term goals: 10 weeks or 20 visits   1. Pt will demonstrate increased lumbar ROM by at least 9 degrees from initial ROM value, resulting in improved ability to perform functional fwd bending while standing and sitting. (approp and ongoing)  2. Pt will demonstrate increased MedX average isometric strength value  by 30% from initial test resulting in improved ability to perform bending, lifting, and carrying activities safely, confidently.  (approp and ongoing)  3. Pt to demonstrate ability to independently control and reduce their pain through posture positioning and mechanical movements throughout a typical day.  (approp and ongoing)  4.  Pt will demonstrate reduced pain and improved functional outcomes as reported on the FOTO by reaching a limitation score of < or = 25% or less in order to demonstrate subjective improvement in pt's condition.    (approp and ongoing)  5. Pt will demonstrate independence with the HEP at discharge  (approp and ongoing)  6.  Pt(patient goal)will be able to walk 2 miles without back pain  (approp and ongoing)  7. Pt will be able to garden without pain for inc tolerance to recreational activities (approp and ongoing)         Plan   Continue with established Plan of Care towards established PT goals.

## 2022-06-13 ENCOUNTER — PATIENT MESSAGE (OUTPATIENT)
Dept: INTERNAL MEDICINE | Facility: CLINIC | Age: 77
End: 2022-06-13
Payer: MEDICARE

## 2022-06-14 ENCOUNTER — CLINICAL SUPPORT (OUTPATIENT)
Dept: REHABILITATION | Facility: OTHER | Age: 77
End: 2022-06-14
Attending: INTERNAL MEDICINE
Payer: MEDICARE

## 2022-06-14 DIAGNOSIS — R29.3 POOR POSTURE: Primary | ICD-10-CM

## 2022-06-14 DIAGNOSIS — R29.898 DECREASED STRENGTH OF TRUNK AND BACK: ICD-10-CM

## 2022-06-14 PROCEDURE — 97110 THERAPEUTIC EXERCISES: CPT | Mod: CQ

## 2022-06-14 NOTE — PROGRESS NOTES
"[facility]      Name: Magdalena Mane  Clinic Number: [id number]    Therapy Diagnosis:   Encounter Diagnoses   Name Primary?    Poor posture Yes    Decreased strength of trunk and back      Physician: Anai Oliveros MD    Visit Date: 2022    Physician Orders: PT Eval and Treat   Medical Diagnosis from Referral: S22.000D (ICD-10-CM) - Compression fracture of thoracic vertebra with routine healing, unspecified thoracic vertebral level, subsequent encounter  Evaluation Date: 2022  Authorization Period Expiration: 23  Plan of Care Expiration: 22  Reassessment Due: 22  Visit # / Visits authorized:     Time In: 3:10  Time Out: 4:05  Total Billable Time: 50 minutes    Precautions: Standard/atrial fib/ B THR/endartectomy 3/22    Pattern of pain determined: 1 PEN    Subjective   Magdalena reports she has been very active since last visit, she has been helping her daughter move and has been doing yard work at her house.  She reports no increase in pain while lifting boxes or doing yard work. "I can tell I'm stronger".    Patient reports tolerating previous visit well /c no c/o of excess discomfort   Patient reports their pain to be 0/10 on a 0-10 scale with 0 being no pain and 10 being the worst pain imaginable.  Pain Location: bilateral back, thoracic      Occupation: retired  Leisure: works in her garden /walking   Pt goals: "Increase core strength, go back to walking 2 miles"    Objective     Baseline IM Testing Results:   Date of testin22  ROM 0-48 deg   Max Peak Torque 89    min 40   Flex/Ext Ratio 2.25:1   % below normative data 33         Limitation/Restriction for FOTO lumbar Survey     Therapist reviewed FOTO scores for Magdalena Mane on 2022.   FOTO documents entered into [employer] - see Media section.     Limitation Score: 30%           MOVEMENT LOSS 22    ROM Loss   Flexion within functional limits   Extension minimal -mod   Side glide " "Right WFL   Side glide Left WFL   Rotation Right WFL   Rotation Left WFL           Treatment    Pt was instructed in and performed the following:     Magdalena received therapeutic exercises to develop/improved posture, cardiovascular endurance, muscular endurance, lumbar/cervical ROM, strength and muscular endurance for 45 minutes including the following exercises:     [facility] - Short 6/14/2022   Visit Number 9   VAS Pain Rating 0   Time 5   Scapular Retraction -   Lumbar Stretches - Slouch -   Extension in Lying 10   Lumbar Extension - Seat Pad -   Femur Restraint -   Top Dead Center -   Counterweight -   Lumbar Flexion -   Lumbar Extension -   Lumbar Peak Torque -   Lumbar Weight 63   Repetitions 15   Rating of Perceived Exertion 4.5     LTR x15  Open book x10  PPT + marching x10   Bridge w/GTB x20  Braced BKFO GTB x20  EIL x10   Quad   Cat/Cow x 10 cue for inc focus thoracic ext    Thread needle x10B    Bird dogs x5    Not performed 6/14/2022 :  Scap retract x20  3" hold Y TB   SOC x 20 5" hold cue for dec cervical motion   TA activation w/SLR x10 ea    Peripheral muscle strengthening which included 1 set of 15-20 repetitions at a slow, controlled 10-13 second per rep pace focused on strengthening supporting musculature for improved body mechanics and functional mobility.  Pt and therapist focused on proper form during treatment to ensure optimal strengthening of each targeted muscle group.  Machines were utilized including torso rotation, leg extension, leg curl, chest press, upright row. Tricep extension, bicep curl, leg press, and hip abduction added visit 3    Magdalena received the following manual therapy techniques: NA         Home Exercises Provided and Patient Education Provided   Home exercises include:   scap retractions,   LTR,  Cat camel      Cardio program: 5/30/22  Lifting education date: Visit 11  Lumbar roll: not obtained as of 05/24/22    Education provided:   -  Brace wearing schedule "     Written Home Exercises Provided: Patient instructed to cont prior HEP.  Exercises were reviewed and Magdalena was able to demonstrate them prior to the end of the session.  Magdalena demonstrated good  understanding of the education provided.     See EMR under Patient Instructions for exercises provided prior visit.          Assessment   Magdalena returned continuing to deny any lower back pain.  She reports improved function with lifting. She helped her daughter move over the weekend and reports she experienced no increase in pain.  Bird dogs added for dynamic core stabilization.  She tolerated progressions well requiring min VC to maintain flat back posture.  Medx resistance increased to 63#.  She completed 15 reps at a RPE of 4.5/10.  Continue to progress per pt's tolerance.    Patient is making good progress towards established goals.  Pt will continue to benefit from skilled outpatient physical therapy to address the deficits stated in the impairment chart, provide pt/family education and to maximize pt's level of independence in the home and community environment.     Anticipated Barriers for therapy: None  Pt's spiritual, cultural and educational needs considered and pt agreeable to plan of care and goals as stated below:             Goals:     Short term goals:  6 weeks or 10 visits   1.  Pt will demonstrate increased lumbar ROM by at least 3 degrees from the initial ROM value with improvements noted in functional ROM and ability to perform ADLs.  (approp and ongoing)  2.  Pt will demonstrate increased MedX average isometric strength value  by 10% from initial test resulting in improved ability to perform bending, lifting, and carrying activities safely, confidently.  (approp and ongoing)  3.  Patient report a reduction in worst pain score by 1-2 points for improved tolerance for changing positions in bed.  (approp and ongoing)  4.  Pt able to perform HEP correctly with minimal cueing or supervision from  therapist to encourage independent management of symptoms. (approp and ongoing)        Long term goals: 10 weeks or 20 visits   1. Pt will demonstrate increased lumbar ROM by at least 9 degrees from initial ROM value, resulting in improved ability to perform functional fwd bending while standing and sitting. (approp and ongoing)  2. Pt will demonstrate increased MedX average isometric strength value  by 30% from initial test resulting in improved ability to perform bending, lifting, and carrying activities safely, confidently.  (approp and ongoing)  3. Pt to demonstrate ability to independently control and reduce their pain through posture positioning and mechanical movements throughout a typical day.  (approp and ongoing)  4.  Pt will demonstrate reduced pain and improved functional outcomes as reported on the FOTO by reaching a limitation score of < or = 25% or less in order to demonstrate subjective improvement in pt's condition.    (approp and ongoing)  5. Pt will demonstrate independence with the HEP at discharge  (approp and ongoing)  6.  Pt(patient goal)will be able to walk 2 miles without back pain  (approp and ongoing)  7. Pt will be able to garden without pain for inc tolerance to recreational activities (approp and ongoing)         Plan   Continue with established Plan of Care towards established PT goals.

## 2022-06-15 ENCOUNTER — PATIENT MESSAGE (OUTPATIENT)
Dept: INTERNAL MEDICINE | Facility: CLINIC | Age: 77
End: 2022-06-15
Payer: MEDICARE

## 2022-06-20 ENCOUNTER — PATIENT MESSAGE (OUTPATIENT)
Dept: INTERNAL MEDICINE | Facility: CLINIC | Age: 77
End: 2022-06-20
Payer: MEDICARE

## 2022-06-23 ENCOUNTER — CLINICAL SUPPORT (OUTPATIENT)
Dept: REHABILITATION | Facility: OTHER | Age: 77
End: 2022-06-23
Attending: INTERNAL MEDICINE
Payer: MEDICARE

## 2022-06-23 DIAGNOSIS — R29.3 POOR POSTURE: Primary | ICD-10-CM

## 2022-06-23 DIAGNOSIS — R29.898 DECREASED STRENGTH OF TRUNK AND BACK: ICD-10-CM

## 2022-06-23 PROCEDURE — 97110 THERAPEUTIC EXERCISES: CPT

## 2022-06-23 PROCEDURE — 97750 PHYSICAL PERFORMANCE TEST: CPT

## 2022-06-23 NOTE — PROGRESS NOTES
"[facility] Treatment      Name: Magdalena Mane  Clinic Number: [id number]    Therapy Diagnosis:   Encounter Diagnoses   Name Primary?    Poor posture Yes    Decreased strength of trunk and back      Physician: Anai Oliveros MD    Visit Date: 2022    Physician Orders: PT Eval and Treat   Medical Diagnosis from Referral: S22.000D (ICD-10-CM) - Compression fracture of thoracic vertebra with routine healing, unspecified thoracic vertebral level, subsequent encounter  Evaluation Date: 2022  Authorization Period Expiration: 23  Plan of Care Expiration: 22  Reassessment Due: 22  Visit # / Visits authorized: 10/ 20    Time In:450  Time Out: 550  Total Billable Time: 50 minutes    Precautions: Standard/atrial fib/ B THR/endartectomy 3/22    Pattern of pain determined: 1 PEN    Subjective   Magdalena reports she is feeling stronger since starting    Patient reports tolerating previous visit well /c no c/o of excess discomfort   Patient reports their pain to be 0/10 on a 0-10 scale with 0 being no pain and 10 being the worst pain imaginable.  Pain Location: bilateral back, thoracic      Occupation: retired  Leisure: works in her garden /walking   Pt goals: "Increase core strength, go back to walking 2 miles"    Objective     Baseline IM Testing Results:   Date of testin22  ROM 0-48 deg   Max Peak Torque 89    min 40   Flex/Ext Ratio 2.25:1   % below normative data 33     mid IM Testing Results:   Date of testin22  ROM 0-60deg   Max Peak Torque 111   min 46   Flex/Ext Ratio 2.4:1   % below normative data 13   29% strength gainn      Limitation/Restriction for FOTO lumbar Survey     Therapist reviewed FOTO scores for Magdalena Mane on 2022.   FOTO documents entered into [employer] - see Media section.     Limitation Score: 30%           MOVEMENT LOSS 22    ROM Loss   Flexion within functional limits   Extension minimal -mod   Side glide Right WFL   Side glide " "Left WFL   Rotation Right WFL   Rotation Left WFL           Treatment    Pt was instructed in and performed the following:     Magdalena received therapeutic exercises to develop/improved posture, cardiovascular endurance, muscular endurance, lumbar/cervical ROM, strength and muscular endurance for 60 minutes including the following exercises:     [facility] 6/23/2022   Visit Number 10   VAS Pain Rating 0   Time 5   Scapular Retraction -   Lumbar Stretches - Slouch Overcorrection -   Extension in Lying 10   Lumbar Extension Seat Pad -   Femur Restraint -   Top Dead Center -   Counterweight -   Lumbar Flexion 60   Lumbar Extension 0   Lumbar Peak Torque 111   Min Torque 46   Test Percent Below Normative Data 13   Lumbar Weight -   Repetitions -   Rating of Perceived Exertion -   Ice - Z Lie (in min.) 5       LTR x15  Open book x10  PPT + marching x10   Bridge w/GTB x20  Braced BKFO GTB x20  EIL x10   Quad   Cat/Cow x 10 cue for inc focus thoracic ext    Thread needle x10B    Bird dogs x5    Not performed 6/23/2022 :  Scap retract x20  3" hold Y TB   SOC x 20 5" hold cue for dec cervical motion   TA activation w/SLR x10 ea    Peripheral muscle strengthening which included 1 set of 15-20 repetitions at a slow, controlled 10-13 second per rep pace focused on strengthening supporting musculature for improved body mechanics and functional mobility.  Pt and therapist focused on proper form during treatment to ensure optimal strengthening of each targeted muscle group.  Machines were utilized including torso rotation, leg extension, leg curl, chest press, upright row. Tricep extension, bicep curl, leg press, and hip abduction added visit 3    Magdalena received the following manual therapy techniques: NA         Home Exercises Provided and Patient Education Provided   Home exercises include:   scap retractions,   LTR,  Cat camel      Cardio program: 5/30/22  Lifting education date: Visit 11  Lumbar roll: not obtained as " of 05/24/22    Education provided:   -  Brace wearing schedule     Written Home Exercises Provided: Patient instructed to cont prior HEP.  Exercises were reviewed and Magdalena was able to demonstrate them prior to the end of the session.  Magdalena demonstrated good  understanding of the education provided.     See EMR under Patient Instructions for exercises provided prior visit.          Assessment   Patient has attended 10 visits at [facility] which included MD evaluation, PT evaluation with isometric testing, and physical therapy treatment including HEP instruction, education, aerobic work, dynamic strengthening on med ex equipment for the spine, and whole body strengthening on med ex equipment with increasing weight loads.  Patient  is demonstrating increased ability to reduce symptoms, improved posture, improved   ROM, and improved   strength as follows:    -Improved 12 ROM,  initially on med ex test 0-48  and   currently 0-60  -Improved strength at each test point on lumbar med ex IM test with   29% average improvement noted with Reduced pain  Noted by patient  -Initial outcome tool score 30% and current outcome tool score  17% indicating reduced pain and improved function      Patient is making good progress towards established goals.  Pt will continue to benefit from skilled outpatient physical therapy to address the deficits stated in the impairment chart, provide pt/family education and to maximize pt's level of independence in the home and community environment.     Anticipated Barriers for therapy: None  Pt's spiritual, cultural and educational needs considered and pt agreeable to plan of care and goals as stated below:     Goals:     Short term goals:  6 weeks or 10 visits   1.  Pt will demonstrate increased lumbar ROM by at least 3 degrees from the initial ROM value with improvements noted in functional ROM and ability to perform ADLs. MET  2.  Pt will demonstrate increased MedX average isometric  strength value  by 10% from initial test resulting in improved ability to perform bending, lifting, and carrying activities safely, confidently MET  3.  Patient report a reduction in worst pain score by 1-2 points for improved tolerance for changing positions in bed.  (approp and ongoing)  4.  Pt able to perform HEP correctly with minimal cueing or supervision from therapist to encourage independent management of symptoms. (approp and ongoing)        Long term goals: 10 weeks or 20 visits   1. Pt will demonstrate increased lumbar ROM by at least 9 degrees from initial ROM value, resulting in improved ability to perform functional fwd bending while standing and sitting. MET  2. Pt will demonstrate increased MedX average isometric strength value  by 30% from initial test resulting in improved ability to perform bending, lifting, and carrying activities safely, confidently.  (approp and ongoing)  3. Pt to demonstrate ability to independently control and reduce their pain through posture positioning and mechanical movements throughout a typical day.  (approp and ongoing)  4.  Pt will demonstrate reduced pain and improved functional outcomes as reported on the FOTO by reaching a limitation score of < or = 25% or less in order to demonstrate subjective improvement in pt's condition.   MET  5. Pt will demonstrate independence with the HEP at discharge  (approp and ongoing)  6.  Pt(patient goal)will be able to walk 2 miles without back pain  (approp and ongoing)  7. Pt will be able to garden without pain for inc tolerance to recreational activities (approp and ongoing)         Plan   Continue with established Plan of Care towards established PT goals.

## 2022-06-28 ENCOUNTER — CLINICAL SUPPORT (OUTPATIENT)
Dept: REHABILITATION | Facility: OTHER | Age: 77
End: 2022-06-28
Attending: INTERNAL MEDICINE
Payer: MEDICARE

## 2022-06-28 DIAGNOSIS — R29.3 POOR POSTURE: Primary | ICD-10-CM

## 2022-06-28 DIAGNOSIS — R29.898 DECREASED STRENGTH OF TRUNK AND BACK: ICD-10-CM

## 2022-06-28 PROCEDURE — 97110 THERAPEUTIC EXERCISES: CPT | Mod: CQ

## 2022-06-28 NOTE — PROGRESS NOTES
"[facility]      Name: Magdalena Mane  Clinic Number: [id number]    Therapy Diagnosis:   Encounter Diagnoses   Name Primary?    Poor posture Yes    Decreased strength of trunk and back      Physician: Anai Oliveros MD    Visit Date: 2022    Physician Orders: PT Eval and Treat   Medical Diagnosis from Referral: S22.000D (ICD-10-CM) - Compression fracture of thoracic vertebra with routine healing, unspecified thoracic vertebral level, subsequent encounter  Evaluation Date: 2022  Authorization Period Expiration: 23  Plan of Care Expiration: 22  Reassessment Due: 22  Visit # / Visits authorized:     Time In: 3:12  Time Out: 4:07  Total Billable Time: 50 minutes    Precautions: Standard/atrial fib/ B THR/endartectomy 3/22    Pattern of pain determined: 1 PEN    Subjective   Magdalena reports increased soreness following testing on Medx last visit.  She reports no lower back pain currently.    Patient reports tolerating previous visit well /c increased soreness following testing on Medx  Patient reports their pain to be 0/10 on a 0-10 scale with 0 being no pain and 10 being the worst pain imaginable.  Pain Location: bilateral back, thoracic      Occupation: retired  Leisure: works in her garden /walking   Pt goals: "Increase core strength, go back to walking 2 miles"    Objective     Baseline IM Testing Results:   Date of testin22  ROM 0-48 deg   Max Peak Torque 89    min 40   Flex/Ext Ratio 2.25:1   % below normative data 33     mid IM Testing Results:   Date of testin22  ROM 0-60deg   Max Peak Torque 111   min 46   Flex/Ext Ratio 2.4:1   % below normative data 13   29% strength gainn      Limitation/Restriction for FOTO lumbar Survey     Therapist reviewed FOTO scores for Magdalena Mane on 2022.   FOTO documents entered into [employer] - see Media section.     Limitation Score: 30%           MOVEMENT LOSS 22    ROM Loss   Flexion within " "functional limits   Extension minimal -mod   Side glide Right WFL   Side glide Left WFL   Rotation Right WFL   Rotation Left WFL           Treatment    Pt was instructed in and performed the following:     Magdalena received therapeutic exercises to develop/improved posture, cardiovascular endurance, muscular endurance, lumbar/cervical ROM, strength and muscular endurance for 50 minutes including the following exercises:     [facility] - Short 6/28/2022   Visit Number 11   VAS Pain Rating 0   Time 5   Scapular Retraction -   Lumbar Stretches - Slouch -   Extension in Lying 10   Lumbar Extension - Seat Pad -   Femur Restraint -   Top Dead Center -   Counterweight -   Lumbar Flexion -   Lumbar Extension -   Lumbar Peak Torque -   Lumbar Weight 63   Repetitions 18   Rating of Perceived Exertion 4       LTR x15  Open book x10  PPT + marching x10   Bridge w/GTB x20  Braced BKFO GTB x20  EIL x10  Quad   LE ext x10  +Lifting Mechanics: floor lift & golfers lift    Not performed 6/28/2022 :  Scap retract x20  3" hold Y TB   SOC x 20 5" hold cue for dec cervical motion   TA activation w/SLR x10 ea  Quad    Cat/Cow x 10 cue for inc focus thoracic ext    Thread needle x10B    Bird dogs x5      Peripheral muscle strengthening which included 1 set of 15-20 repetitions at a slow, controlled 10-13 second per rep pace focused on strengthening supporting musculature for improved body mechanics and functional mobility.  Pt and therapist focused on proper form during treatment to ensure optimal strengthening of each targeted muscle group.  Machines were utilized including torso rotation, leg extension, leg curl, chest press, upright row. Tricep extension, bicep curl, leg press, and hip abduction added visit 3    Magdalena received the following manual therapy techniques: NA         Home Exercises Provided and Patient Education Provided   Home exercises include:   scap retractions,   LTR,  Cat camel      Cardio program: " 5/30/22  Lifting education date: Visit 11  Lumbar roll: not obtained as of 05/24/22    Education provided:   -  Brace wearing schedule     Written Home Exercises Provided: Patient instructed to cont prior HEP.  Exercises were reviewed and Magdalena was able to demonstrate them prior to the end of the session.  Magdalena demonstrated good  understanding of the education provided.     See EMR under Patient Instructions for exercises provided prior visit.          Assessment   Magdalena returned without complaints of lower back pain, but experienced increased soreness following testing on Medx last visit.  Magdalena demonstrated difficulty maintaining flat back and minimizing hip rotation during bird dogs exercise, so exercise altered to quadruped LE extensions to maintain proper form.  Lifting do's and don't's handout given to and explained with Magdalena demonstrating a good understanding of floor lift and golfer's lift. Medx resistance remained at 63#.  She completed 18 reps at a RPE of 4/10.  Will continue to progress per pt's tolerance and HB protocol.    Patient is making good progress towards established goals.  Pt will continue to benefit from skilled outpatient physical therapy to address the deficits stated in the impairment chart, provide pt/family education and to maximize pt's level of independence in the home and community environment.     Anticipated Barriers for therapy: None  Pt's spiritual, cultural and educational needs considered and pt agreeable to plan of care and goals as stated below:     Goals:     Short term goals:  6 weeks or 10 visits   1.  Pt will demonstrate increased lumbar ROM by at least 3 degrees from the initial ROM value with improvements noted in functional ROM and ability to perform ADLs. MET  2.  Pt will demonstrate increased MedX average isometric strength value  by 10% from initial test resulting in improved ability to perform bending, lifting, and carrying activities safely, confidently  MET  3.  Patient report a reduction in worst pain score by 1-2 points for improved tolerance for changing positions in bed.  (approp and ongoing)  4.  Pt able to perform HEP correctly with minimal cueing or supervision from therapist to encourage independent management of symptoms. (approp and ongoing)        Long term goals: 10 weeks or 20 visits   1. Pt will demonstrate increased lumbar ROM by at least 9 degrees from initial ROM value, resulting in improved ability to perform functional fwd bending while standing and sitting. MET  2. Pt will demonstrate increased MedX average isometric strength value  by 30% from initial test resulting in improved ability to perform bending, lifting, and carrying activities safely, confidently.  (approp and ongoing)  3. Pt to demonstrate ability to independently control and reduce their pain through posture positioning and mechanical movements throughout a typical day.  (approp and ongoing)  4.  Pt will demonstrate reduced pain and improved functional outcomes as reported on the FOTO by reaching a limitation score of < or = 25% or less in order to demonstrate subjective improvement in pt's condition.   MET  5. Pt will demonstrate independence with the HEP at discharge  (approp and ongoing)  6.  Pt(patient goal)will be able to walk 2 miles without back pain  (approp and ongoing)  7. Pt will be able to garden without pain for inc tolerance to recreational activities (approp and ongoing)         Plan   Continue with established Plan of Care towards established PT goals.

## 2022-06-30 ENCOUNTER — PATIENT MESSAGE (OUTPATIENT)
Dept: REHABILITATION | Facility: OTHER | Age: 77
End: 2022-06-30

## 2022-06-30 ENCOUNTER — CLINICAL SUPPORT (OUTPATIENT)
Dept: REHABILITATION | Facility: OTHER | Age: 77
End: 2022-06-30
Attending: INTERNAL MEDICINE
Payer: MEDICARE

## 2022-06-30 DIAGNOSIS — R29.3 POOR POSTURE: Primary | ICD-10-CM

## 2022-06-30 DIAGNOSIS — R29.898 DECREASED STRENGTH OF TRUNK AND BACK: ICD-10-CM

## 2022-06-30 PROCEDURE — 97110 THERAPEUTIC EXERCISES: CPT | Mod: CQ

## 2022-06-30 NOTE — PROGRESS NOTES
"[facility] Treatment      Name: Magdalena Mane  Clinic Number: [id number]    Therapy Diagnosis:   Encounter Diagnoses   Name Primary?    Poor posture Yes    Decreased strength of trunk and back      Physician: Anai Oliveros MD    Visit Date: 2022    Physician Orders: PT Eval and Treat   Medical Diagnosis from Referral: S22.000D (ICD-10-CM) - Compression fracture of thoracic vertebra with routine healing, unspecified thoracic vertebral level, subsequent encounter  Evaluation Date: 2022  Authorization Period Expiration: 23  Plan of Care Expiration: 22  Reassessment Due: 22  Visit # / Visits authorized:     Time In: 12:45  Time Out: 1:40  Total Billable Time: 50 minutes    Precautions: Standard/atrial fib/ B THR/endartectomy 3/22    Pattern of pain determined: 1 PEN    Subjective   Magdalena returns with no complaints of lower back pain.  She reports she can tell she has been more aware of her posture recently.    Patient reports tolerating previous visit well /c increased soreness following testing on Medx  Patient reports their pain to be 0/10 on a 0-10 scale with 0 being no pain and 10 being the worst pain imaginable.  Pain Location: bilateral back, thoracic      Occupation: retired  Leisure: works in her garden /walking   Pt goals: "Increase core strength, go back to walking 2 miles"    Objective     Baseline IM Testing Results:   Date of testin22  ROM 0-48 deg   Max Peak Torque 89    min 40   Flex/Ext Ratio 2.25:1   % below normative data 33     mid IM Testing Results:   Date of testin22  ROM 0-60deg   Max Peak Torque 111   min 46   Flex/Ext Ratio 2.4:1   % below normative data 13   29% strength gainn      Limitation/Restriction for FOTO lumbar Survey     Therapist reviewed FOTO scores for Magdalena Mane on 2022.   FOTO documents entered into [employer] - see Media section.     Limitation Score: 30%           MOVEMENT LOSS 22    ROM Loss " "  Flexion within functional limits   Extension minimal -mod   Side glide Right WFL   Side glide Left WFL   Rotation Right WFL   Rotation Left WFL           Treatment    Pt was instructed in and performed the following:     Magdalena received therapeutic exercises to develop/improved posture, cardiovascular endurance, muscular endurance, lumbar/cervical ROM, strength and muscular endurance for 50 minutes including the following exercises:     [facility] - Short 6/30/2022   Visit Number 12   VAS Pain Rating 0   Time 5   Scapular Retraction -   Lumbar Stretches - Slouch -   Extension in Lying 10   Lumbar Extension - Seat Pad -   Femur Restraint -   Top Dead Center -   Counterweight -   Lumbar Flexion -   Lumbar Extension -   Lumbar Peak Torque -   Lumbar Weight 63   Repetitions 20   Rating of Perceived Exertion 4       LTR x15  Open book x10  PPT + marching x10   Bridge w/BTB x20  Braced BKFO BTB x20  EIL x10  Quad    Cat/Cow x 10 cue for inc focus thoracic ext    Thread needle x10B    LE ext x10    Not performed 6/30/2022 :  Scap retract x20  3" hold Y TB   SOC x 20 5" hold cue for dec cervical motion   TA activation w/SLR x10 ea  Bird dogs x5  Lifting Mechanics: floor lift & golfers lift      Peripheral muscle strengthening which included 1 set of 15-20 repetitions at a slow, controlled 10-13 second per rep pace focused on strengthening supporting musculature for improved body mechanics and functional mobility.  Pt and therapist focused on proper form during treatment to ensure optimal strengthening of each targeted muscle group.  Machines were utilized including torso rotation, leg extension, leg curl, chest press, upright row. Tricep extension, bicep curl, leg press, and hip abduction added visit 3    Magdalena received the following manual therapy techniques: NA         Home Exercises Provided and Patient Education Provided   Home exercises include:   scap retractions,   LTR,  Cat camel      Cardio program: " 5/30/22  Lifting education date: Visit 11  Lumbar roll: not obtained as of 05/24/22    Education provided:   -  Brace wearing schedule     Written Home Exercises Provided: Patient instructed to cont prior HEP.  Exercises were reviewed and Magdalena was able to demonstrate them prior to the end of the session.  Magdalena demonstrated good  understanding of the education provided.     See EMR under Patient Instructions for exercises provided prior visit.          Assessment   Magdalena returned continuing to report no lower back pain.  Treatment progressed by increasing resistance to BTB on bridge and BKFO exercises to further challenge glute/hip musculature.  BTB given to pt for home use.  Magdalena demonstrated better motor control to maintain flat back posture during quad LE extension. Consider reincorporating bird dogs to treatment NV.  Medx resistance maintained at 63#.  She completed 20 reps at a RPE of 4/10.  Consider a 5% increase in resistance NV per HB protocol.    Patient is making good progress towards established goals.  Pt will continue to benefit from skilled outpatient physical therapy to address the deficits stated in the impairment chart, provide pt/family education and to maximize pt's level of independence in the home and community environment.     Anticipated Barriers for therapy: None  Pt's spiritual, cultural and educational needs considered and pt agreeable to plan of care and goals as stated below:     Goals:     Short term goals:  6 weeks or 10 visits   1.  Pt will demonstrate increased lumbar ROM by at least 3 degrees from the initial ROM value with improvements noted in functional ROM and ability to perform ADLs. MET  2.  Pt will demonstrate increased MedX average isometric strength value  by 10% from initial test resulting in improved ability to perform bending, lifting, and carrying activities safely, confidently MET  3.  Patient report a reduction in worst pain score by 1-2 points for improved  tolerance for changing positions in bed.  (approp and ongoing)  4.  Pt able to perform HEP correctly with minimal cueing or supervision from therapist to encourage independent management of symptoms. (approp and ongoing)        Long term goals: 10 weeks or 20 visits   1. Pt will demonstrate increased lumbar ROM by at least 9 degrees from initial ROM value, resulting in improved ability to perform functional fwd bending while standing and sitting. MET  2. Pt will demonstrate increased MedX average isometric strength value  by 30% from initial test resulting in improved ability to perform bending, lifting, and carrying activities safely, confidently.  (approp and ongoing)  3. Pt to demonstrate ability to independently control and reduce their pain through posture positioning and mechanical movements throughout a typical day.  (approp and ongoing)  4.  Pt will demonstrate reduced pain and improved functional outcomes as reported on the FOTO by reaching a limitation score of < or = 25% or less in order to demonstrate subjective improvement in pt's condition.   MET  5. Pt will demonstrate independence with the HEP at discharge  (approp and ongoing)  6.  Pt(patient goal)will be able to walk 2 miles without back pain  (approp and ongoing)  7. Pt will be able to garden without pain for inc tolerance to recreational activities (approp and ongoing)         Plan   Continue with established Plan of Care towards established PT goals.

## 2022-07-05 ENCOUNTER — CLINICAL SUPPORT (OUTPATIENT)
Dept: REHABILITATION | Facility: OTHER | Age: 77
End: 2022-07-05
Attending: INTERNAL MEDICINE
Payer: MEDICARE

## 2022-07-05 DIAGNOSIS — R29.3 POOR POSTURE: Primary | ICD-10-CM

## 2022-07-05 DIAGNOSIS — R29.898 DECREASED STRENGTH OF TRUNK AND BACK: ICD-10-CM

## 2022-07-05 PROCEDURE — 97110 THERAPEUTIC EXERCISES: CPT

## 2022-07-05 NOTE — PROGRESS NOTES
"[facility]      Name: Magdalena Mane  Clinic Number: [id number]    Therapy Diagnosis:   Encounter Diagnoses   Name Primary?    Poor posture Yes    Decreased strength of trunk and back      Physician: Anai Oliveros MD    Visit Date: 2022    Physician Orders: PT Eval and Treat   Medical Diagnosis from Referral: S22.000D (ICD-10-CM) - Compression fracture of thoracic vertebra with routine healing, unspecified thoracic vertebral level, subsequent encounter  Evaluation Date: 2022  Authorization Period Expiration: 23  Plan of Care Expiration: 22  Reassessment Due: 22  Visit # / Visits authorized:     Time In: 1530  Time Out: 16:35  Total Billable Time:45 minutes    Precautions: Standard/atrial fib/ B THR/endartectomy 3/22    Pattern of pain determined: 1 PEN    Subjective   Magdalena returns with no complaints of lower back pain.States she was a little sore this morning because she went back to gardening yesterday    Patient reports tolerating previous visit well /c increased soreness following testing on Medx  Patient reports their pain to be 0/10 on a 0-10 scale with 0 being no pain and 10 being the worst pain imaginable.  Pain Location: bilateral back, thoracic      Occupation: retired  Leisure: works in her garden /walking   Pt goals: "Increase core strength, go back to walking 2 miles"    Objective     Baseline IM Testing Results:   Date of testin22  ROM 0-48 deg   Max Peak Torque 89    min 40   Flex/Ext Ratio 2.25:1   % below normative data 33     mid IM Testing Results:   Date of testin22  ROM 0-60deg   Max Peak Torque 111   min 46   Flex/Ext Ratio 2.4:1   % below normative data 13   29% strength gainn      Limitation/Restriction for FOTO lumbar Survey     Therapist reviewed FOTO scores for Magdalena Mane on 2022.   FOTO documents entered into EPIC - see Media section.     Limitation Score: 30%           MOVEMENT LOSS 22    " "ROM Loss   Flexion within functional limits   Extension minimal -mod   Side glide Right WFL   Side glide Left WFL   Rotation Right WFL   Rotation Left WFL           Treatment    Pt was instructed in and performed the following:     Magdalena received therapeutic exercises to develop/improved posture, cardiovascular endurance, muscular endurance, lumbar/cervical ROM, strength and muscular endurance for 50 minutes including the following exercises:     [facility] 7/5/2022   Visit Number 13   VAS Pain Rating 0   Time 5   Scapular Retraction -   Lumbar Stretches - Slouch Overcorrection -   Extension in Lying 10   Lumbar Extension Seat Pad -   Test Percent Below Normative Data -   Lumbar Weight 66   Repetitions 15   Rating of Perceived Exertion 4   Ice - Z Lie (in min.) 5         LTR x15  Open book x10  PPT + marching x10   Bridge w/BTB x20  Braced BKFO BTB x20  EIL x10  Quad    Cat/Cow x 10 cue for inc focus thoracic ext    Thread needle x10B    LE ext x10    Not performed 7/5/2022 :  Scap retract x20  3" hold Y TB   SOC x 20 5" hold cue for dec cervical motion   TA activation w/SLR x10 ea  Bird dogs x5  Lifting Mechanics: floor lift & golfers lift      Peripheral muscle strengthening which included 1 set of 15-20 repetitions at a slow, controlled 10-13 second per rep pace focused on strengthening supporting musculature for improved body mechanics and functional mobility.  Pt and therapist focused on proper form during treatment to ensure optimal strengthening of each targeted muscle group.  Machines were utilized including torso rotation, leg extension, leg curl, chest press, upright row. Tricep extension, bicep curl, leg press, and hip abduction added visit 3    Magdalena received the following manual therapy techniques: NA         Home Exercises Provided and Patient Education Provided   Home exercises include:   scap retractions,   LTR,  Cat camel      Cardio program: 5/30/22  Lifting education date: Visit " 11  Lumbar roll: not obtained as of 05/24/22    Education provided:   -  Brace wearing schedule     Written Home Exercises Provided: Patient instructed to cont prior HEP.  Exercises were reviewed and Magdalena was able to demonstrate them prior to the end of the session.  Magdalena demonstrated good  understanding of the education provided.     See EMR under Patient Instructions for exercises provided prior visit.          Assessment   Magdalena returned continuing to report no lower back pain. Attempted to stacia 2-3 stretches in / day but she is for sure getting 1. Pt educated to continue with stretches and pick the stretches that feel the best or the strengthening exercises that are the most challenging. Resumed stretches and she was able to complete with minimal cues for execution and progression. Increased resistance on the lumbar medx to 66 ft lbs and she was able to complete 15 repetitions at 4/10 RPE, plan to progress reps next visit per protocol.    Patient is making good progress towards established goals.  Pt will continue to benefit from skilled outpatient physical therapy to address the deficits stated in the impairment chart, provide pt/family education and to maximize pt's level of independence in the home and community environment.     Anticipated Barriers for therapy: None  Pt's spiritual, cultural and educational needs considered and pt agreeable to plan of care and goals as stated below:     Goals:     Short term goals:  6 weeks or 10 visits   1.  Pt will demonstrate increased lumbar ROM by at least 3 degrees from the initial ROM value with improvements noted in functional ROM and ability to perform ADLs. MET  2.  Pt will demonstrate increased MedX average isometric strength value  by 10% from initial test resulting in improved ability to perform bending, lifting, and carrying activities safely, confidently MET  3.  Patient report a reduction in worst pain score by 1-2 points for improved tolerance for  changing positions in bed.  (approp and ongoing)  4.  Pt able to perform HEP correctly with minimal cueing or supervision from therapist to encourage independent management of symptoms. (approp and ongoing)        Long term goals: 10 weeks or 20 visits   1. Pt will demonstrate increased lumbar ROM by at least 9 degrees from initial ROM value, resulting in improved ability to perform functional fwd bending while standing and sitting. MET  2. Pt will demonstrate increased MedX average isometric strength value  by 30% from initial test resulting in improved ability to perform bending, lifting, and carrying activities safely, confidently.  (approp and ongoing)  3. Pt to demonstrate ability to independently control and reduce their pain through posture positioning and mechanical movements throughout a typical day.  (approp and ongoing)  4.  Pt will demonstrate reduced pain and improved functional outcomes as reported on the FOTO by reaching a limitation score of < or = 25% or less in order to demonstrate subjective improvement in pt's condition.   MET  5. Pt will demonstrate independence with the HEP at discharge  (approp and ongoing)  6.  Pt(patient goal)will be able to walk 2 miles without back pain  (approp and ongoing)  7. Pt will be able to garden without pain for inc tolerance to recreational activities (approp and ongoing)         Plan   Continue with established Plan of Care towards established PT goals.

## 2022-07-08 ENCOUNTER — CLINICAL SUPPORT (OUTPATIENT)
Dept: REHABILITATION | Facility: OTHER | Age: 77
End: 2022-07-08
Attending: INTERNAL MEDICINE
Payer: MEDICARE

## 2022-07-08 DIAGNOSIS — R29.898 DECREASED STRENGTH OF TRUNK AND BACK: ICD-10-CM

## 2022-07-08 DIAGNOSIS — R29.3 POOR POSTURE: Primary | ICD-10-CM

## 2022-07-08 PROCEDURE — 97110 THERAPEUTIC EXERCISES: CPT | Mod: CQ

## 2022-07-08 NOTE — PROGRESS NOTES
"[facility]      Name: Magdalena Mane  Clinic Number: [id number]    Therapy Diagnosis:   Encounter Diagnoses   Name Primary?    Poor posture Yes    Decreased strength of trunk and back      Physician: Anai Oliveros MD    Visit Date: 2022    Physician Orders: PT Eval and Treat   Medical Diagnosis from Referral: S22.000D (ICD-10-CM) - Compression fracture of thoracic vertebra with routine healing, unspecified thoracic vertebral level, subsequent encounter  Evaluation Date: 2022  Authorization Period Expiration: 23  Plan of Care Expiration: 22  Reassessment Due: 22  Visit # / Visits authorized:     Time In: 1:00  Time Out: 1:55  Total Billable Time: 50 minutes    Precautions: Standard/atrial fib/ B THR/endartectomy 3/22    Pattern of pain determined: 1 PEN    Subjective   Magdalena reports she has gotten back to gardening recently, which requires her to do some lifting.  She states she has been incorporating the lifting mechanics she learned and reports no increase in pain from increased gardening activities.    Patient reports tolerating previous visit well /c increased soreness following testing on Medx  Patient reports their pain to be 0/10 on a 0-10 scale with 0 being no pain and 10 being the worst pain imaginable.  Pain Location: bilateral back, thoracic      Occupation: retired  Leisure: works in her garden /walking   Pt goals: "Increase core strength, go back to walking 2 miles"    Objective     Baseline IM Testing Results:   Date of testin22  ROM 0-48 deg   Max Peak Torque 89    min 40   Flex/Ext Ratio 2.25:1   % below normative data 33     mid IM Testing Results:   Date of testin22  ROM 0-60deg   Max Peak Torque 111   min 46   Flex/Ext Ratio 2.4:1   % below normative data 13   29% strength gainn      Limitation/Restriction for FOTO lumbar Survey     Therapist reviewed FOTO scores for Magdalena Mane on 2022.   FOTO documents " "entered into EPIC - see Media section.     Limitation Score: 30%           MOVEMENT LOSS 6/7/22    ROM Loss   Flexion within functional limits   Extension minimal -mod   Side glide Right WFL   Side glide Left WFL   Rotation Right WFL   Rotation Left WFL           Treatment    Pt was instructed in and performed the following:     Magdalena received therapeutic exercises to develop/improved posture, cardiovascular endurance, muscular endurance, lumbar/cervical ROM, strength and muscular endurance for 50 minutes including the following exercises:     [facility] - Short 7/8/2022   Visit Number 14   VAS Pain Rating 0   Time 5   Scapular Retraction -   Lumbar Stretches - Slouch -   Extension in Lying 10   Lumbar Extension - Seat Pad -   Femur Restraint -   Top Dead Center -   Counterweight -   Lumbar Flexion -   Lumbar Extension -   Lumbar Peak Torque -   Lumbar Weight 66   Repetitions 20   Rating of Perceived Exertion 4       LTR x15  Open book x10  PPT + marching x10   Bridge w/BTB x20  Braced BKFO BTB x20  EIL x10  Quad    Cat/Cow x 10 cue for inc focus thoracic ext    Thread needle x10B    Bird dogs x10       Not performed 7/8/2022 :  Scap retract x20  3" hold Y TB   SOC x 20 5" hold cue for dec cervical motion   TA activation w/SLR x10 ea  LE ext x10  Lifting Mechanics: floor lift & golfers lift      Peripheral muscle strengthening which included 1 set of 15-20 repetitions at a slow, controlled 10-13 second per rep pace focused on strengthening supporting musculature for improved body mechanics and functional mobility.  Pt and therapist focused on proper form during treatment to ensure optimal strengthening of each targeted muscle group.  Machines were utilized including torso rotation, leg extension, leg curl, chest press, upright row. Tricep extension, bicep curl, leg press, and hip abduction added visit 3    Magdalena received the following manual therapy techniques: NA         Home Exercises Provided and " Patient Education Provided   Home exercises include:   scap retractions,   LTR,  Cat camel      Cardio program: 5/30/22  Lifting education date: Visit 11  Lumbar roll: not obtained as of 05/24/22    Education provided:       Written Home Exercises Provided: Patient instructed to cont prior HEP.  Exercises were reviewed and Magdalena was able to demonstrate them prior to the end of the session.  Magdalena demonstrated good  understanding of the education provided.     See EMR under Patient Instructions for exercises provided prior visit.          Assessment   Magdalena returned continuing to report no lower back pain along with increased functional activity with her returning to gardening.  Treatment progressed by reincorporating bird dog exercise with Magdalena demonstrating better motor control and core stabilization compared to previous visits.  Medx resistance maintained at 66#.  She completed 20 reps at a RPE of 4/10.  Consider a 5% increase in resistance NV per HB protocol.     Patient is making good progress towards established goals.  Pt will continue to benefit from skilled outpatient physical therapy to address the deficits stated in the impairment chart, provide pt/family education and to maximize pt's level of independence in the home and community environment.     Anticipated Barriers for therapy: None  Pt's spiritual, cultural and educational needs considered and pt agreeable to plan of care and goals as stated below:     Goals:     Short term goals:  6 weeks or 10 visits   1.  Pt will demonstrate increased lumbar ROM by at least 3 degrees from the initial ROM value with improvements noted in functional ROM and ability to perform ADLs. MET  2.  Pt will demonstrate increased MedX average isometric strength value  by 10% from initial test resulting in improved ability to perform bending, lifting, and carrying activities safely, confidently MET  3.  Patient report a reduction in worst pain score by 1-2 points for  improved tolerance for changing positions in bed.  (approp and ongoing)  4.  Pt able to perform HEP correctly with minimal cueing or supervision from therapist to encourage independent management of symptoms. (approp and ongoing)        Long term goals: 10 weeks or 20 visits   1. Pt will demonstrate increased lumbar ROM by at least 9 degrees from initial ROM value, resulting in improved ability to perform functional fwd bending while standing and sitting. MET  2. Pt will demonstrate increased MedX average isometric strength value  by 30% from initial test resulting in improved ability to perform bending, lifting, and carrying activities safely, confidently.  (approp and ongoing)  3. Pt to demonstrate ability to independently control and reduce their pain through posture positioning and mechanical movements throughout a typical day.  (approp and ongoing)  4.  Pt will demonstrate reduced pain and improved functional outcomes as reported on the FOTO by reaching a limitation score of < or = 25% or less in order to demonstrate subjective improvement in pt's condition.   MET  5. Pt will demonstrate independence with the HEP at discharge  (approp and ongoing)  6.  Pt(patient goal)will be able to walk 2 miles without back pain  (approp and ongoing)  7. Pt will be able to garden without pain for inc tolerance to recreational activities (approp and ongoing)         Plan   Continue with established Plan of Care towards established PT goals.

## 2022-07-11 NOTE — PROGRESS NOTES
Health  Consult Note    Name: Magdalena Mane  [facility] Number: [id number]  Physician: Anai Oliveros MD  Past Medical History:   Diagnosis Date    Anemia     Atrial fibrillation     Basal cell carcinoma     DJD (degenerative joint disease) 12/12/2012    Obesity (BMI 30-39.9) 11/27/2015    Vaginal atrophy 4/1/2016     Time In: 10:00 AM  Time Out: 10:52 AM    Health  Agreement signed: yes    Coaching performed performed: virtual    Subjective:   Patient reports that she is already contributing to her health/wellness in (but not limited to) the following ways: she grows her own vegetables, stays on top of her doctor's appts, takes all appropriate meds, is in healthy back program, interested in digital med, etc. She has been consistently losing weight recently, and is not sure if this is a result of clean eating or if there are other underlying factors involved; she will bring this up with physician soon. Given all that's going well, pt would like to work on time management, prioritizing herself, having an even better grasp on nutritional needs, and her frustrations with pt-provider communication.     Vision:     Values: family, peace, longevity, happiness    Strengths:  Knowledgeable, determined, willing to change, flexibility, self-aware, determined    Challenges: family members' obstacles, time management    Support:  , HB program     Hobbies:  reading        INITIAL date  One a scale of 1-10, with 10 being 100% happy, how would you rate your happiness in each of the wellness areas below?    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 She is resilient.      Plan:  Patient goals for next consult include:      1. Be mindful about asking for help  2. Read/ meditate at least 1hr/day  3. 1/2 hr - 1 hr of exercise daily (HEP)    Long-Term:    1. Downsize from house to apt  2. Maintain a consistent HEP routine/ have chronic pain under control  3. Have an even better grasp on nutritional needs.      The patient location is: home  The chief complaint leading to consultation is: Health Coaching     Visit type: audiovisual    Face to Face time with patient: 52 min  62 minutes of total time spent on the encounter, which includes face to face time and non-face to face time preparing to see the patient (eg, review of tests), Obtaining and/or reviewing separately obtained history, Documenting clinical information in the electronic or other health record, Independently interpreting results (not separately reported) and communicating results to the patient/family/caregiver, or Care coordination (not separately reported).         Each patient to whom he or she provides medical services by telemedicine is:  (1) informed of the relationship between the physician and patient and the respective role of any other health care provider with respect to management of the patient; and (2) notified that he or she may decline to receive medical services by telemedicine and may withdraw from such care at any time.       Health : Sho Morfin  7/11/2022

## 2022-07-11 NOTE — PROGRESS NOTES
"Health  Consult Note    Name: Magdalena Mane  [facility] Number: [id number]  Physician: Anai Oliveros MD  Past Medical History:   Diagnosis Date    Anemia     Atrial fibrillation     Basal cell carcinoma     DJD (degenerative joint disease) 12/12/2012    Obesity (BMI 30-39.9) 11/27/2015    Vaginal atrophy 4/1/2016     Time In: 10:00 AM  Time Out: 10:25 AM    Health  Agreement signed: yes    Coaching performed performed: virtual    Subjective:   Patient reports that she is doing well this morning, despite having to deal with acquiring a rental car (pt was in car for entirety of session). One highlight of her week includes her grandson going out of his way to make her breakfast one morning, which is significant due to her needed more help around the house in general. She did accomplish some reading over a couple of days, but is still feeling a craving tgo read more and spend more time on devotionals. She has also decided to join a gym and will cont working out there after she discharges from . Her goal of exercising every day was not completely successful, but she is motivated to keep working on it (she exercised twice in one day and felt great abt it). She made one comment about  physical therapy, stating "this program has allowed me to regain 10 years of my life back"    Vision:     Values: family, peace, longevity, happiness    Strengths:  Knowledgeable, determined, willing to change, flexibility, self-aware, determined    Challenges: family members' obstacles, time management    Support:  ,  program     Hobbies:  reading        INITIAL date  One a scale of 1-10, with 10 being 100% happy, how would you rate your happiness in each of the wellness areas below?    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 She is resilient.      Plan:  Patient goals for next consult include:      1. Be mindful about asking for help  2. Read/ meditate at least 1hr/day  3. 1/2 hr - 1 hr of exercise daily (HEP)    Long-Term:    1. Downsize from house to apt  2. Maintain a consistent HEP routine/ have chronic pain under control  3. Have an even better grasp on nutritional needs.      The patient location is: home  The chief complaint leading to consultation is: Health Coaching     Visit type: audiovisual    Face to Face time with patient: 25 min  35 minutes of total time spent on the encounter, which includes face to face time and non-face to face time preparing to see the patient (eg, review of tests), Obtaining and/or reviewing separately obtained history, Documenting clinical information in the electronic or other health record, Independently interpreting results (not separately reported) and communicating results to the patient/family/caregiver, or Care coordination (not separately reported).         Each patient to whom he or she provides medical services by telemedicine is:  (1) informed of the relationship between the physician and patient and the respective role of any other health care provider with respect to management of the patient; and (2) notified that he or she may decline to receive medical services by telemedicine and may withdraw from such care at any time.       Health : Sho Morfin  7/11/2022

## 2022-07-12 ENCOUNTER — CLINICAL SUPPORT (OUTPATIENT)
Dept: REHABILITATION | Facility: OTHER | Age: 77
End: 2022-07-12
Attending: INTERNAL MEDICINE
Payer: MEDICARE

## 2022-07-12 DIAGNOSIS — R29.3 POOR POSTURE: Primary | ICD-10-CM

## 2022-07-12 DIAGNOSIS — R29.898 DECREASED STRENGTH OF TRUNK AND BACK: ICD-10-CM

## 2022-07-12 PROCEDURE — 97110 THERAPEUTIC EXERCISES: CPT | Mod: CQ

## 2022-07-12 NOTE — PROGRESS NOTES
"[facility]      Name: Magdalena Mane  Clinic Number: [id number]    Therapy Diagnosis:   Encounter Diagnoses   Name Primary?    Poor posture Yes    Decreased strength of trunk and back      Physician: Anai Oliveros MD    Visit Date: 2022    Physician Orders: PT Eval and Treat   Medical Diagnosis from Referral: S22.000D (ICD-10-CM) - Compression fracture of thoracic vertebra with routine healing, unspecified thoracic vertebral level, subsequent encounter  Evaluation Date: 2022  Authorization Period Expiration: 23  Plan of Care Expiration: 22  Reassessment Due: 22  Visit # / Visits authorized: 15/ 20    Time In: 3:10  Time Out: 4:05  Total Billable Time: 50 minutes    Precautions: Standard/atrial fib/ B THR/endartectomy 3/22    Pattern of pain determined: 1 PEN    Subjective   Magdalena returns without complaints of lower back pain.  She reports she was too busy with errands yesterday to perform her HEP stretches in the morning and states she could feel the difference when she doesn't do her stretches in the morning.    Patient reports tolerating previous visit well /c increased soreness following testing on Medx  Patient reports their pain to be 0/10 on a 0-10 scale with 0 being no pain and 10 being the worst pain imaginable.  Pain Location: bilateral back, thoracic      Occupation: retired  Leisure: works in her garden /walking   Pt goals: "Increase core strength, go back to walking 2 miles"    Objective     Baseline IM Testing Results:   Date of testin22  ROM 0-48 deg   Max Peak Torque 89    min 40   Flex/Ext Ratio 2.25:1   % below normative data 33     mid IM Testing Results:   Date of testin22  ROM 0-60deg   Max Peak Torque 111   min 46   Flex/Ext Ratio 2.4:1   % below normative data 13   29% strength gainn      Limitation/Restriction for FOTO lumbar Survey     Therapist reviewed FOTO scores for Magdalena Mane on 2022.   FOTO " "documents entered into EPIC - see Media section.     Limitation Score: 30%           MOVEMENT LOSS 6/7/22    ROM Loss   Flexion within functional limits   Extension minimal -mod   Side glide Right WFL   Side glide Left WFL   Rotation Right WFL   Rotation Left WFL           Treatment    Pt was instructed in and performed the following:     Magdalena received therapeutic exercises to develop/improved posture, cardiovascular endurance, muscular endurance, lumbar/cervical ROM, strength and muscular endurance for 50 minutes including the following exercises:     [facility] - Short 7/12/2022   Visit Number 15   VAS Pain Rating 0   Time 5   Scapular Retraction -   Lumbar Stretches - Slouch -   Extension in Lying 10   Lumbar Extension - Seat Pad -   Femur Restraint -   Top Dead Center -   Counterweight -   Lumbar Flexion -   Lumbar Extension -   Lumbar Peak Torque -   Lumbar Weight 70   Repetitions 15   Rating of Perceived Exertion 4       LTR x15  Open book x10  PPT + marching x15  Bridge w/BTB x20  Braced BKFO BTB x20  EIL x10  No money RTB x15  Quad    Cat/Cow x 10 cue for inc focus thoracic ext    Thread needle x10B    Bird dogs x10       Not performed 7/12/2022 :  Scap retract x20  3" hold Y TB   SOC x 20 5" hold cue for dec cervical motion   TA activation w/SLR x10 ea  LE ext x10  Lifting Mechanics: floor lift & golfers lift      Peripheral muscle strengthening which included 1 set of 15-20 repetitions at a slow, controlled 10-13 second per rep pace focused on strengthening supporting musculature for improved body mechanics and functional mobility.  Pt and therapist focused on proper form during treatment to ensure optimal strengthening of each targeted muscle group.  Machines were utilized including torso rotation, leg extension, leg curl, chest press, upright row. Tricep extension, bicep curl, leg press, and hip abduction added visit 3    Magdalena received the following manual therapy techniques: NA         Home " Exercises Provided and Patient Education Provided   Home exercises include:   scap retractions,   LTR,  Cat camel      Cardio program: 5/30/22  Lifting education date: Visit 11  Lumbar roll: not obtained as of 05/24/22    Education provided:       Written Home Exercises Provided: Patient instructed to cont prior HEP.  Exercises were reviewed and Magdalena was able to demonstrate them prior to the end of the session.  Magdalena demonstrated good  understanding of the education provided.     See EMR under Patient Instructions for exercises provided prior visit.          Assessment   Magdalena arrived again without compliant of lower back pain.  Treatment progressed by adding No money exercise to address rounded shoulder posture and periscapular weakness.  She tolerated today's progression well with no increase in pain.  Medx resistance increased to 70#.  She completed 15 reps at a RPE of 4/10.  Will continue to progress per pt's tolerance and HB protocol.    Patient is making good progress towards established goals.  Pt will continue to benefit from skilled outpatient physical therapy to address the deficits stated in the impairment chart, provide pt/family education and to maximize pt's level of independence in the home and community environment.     Anticipated Barriers for therapy: None  Pt's spiritual, cultural and educational needs considered and pt agreeable to plan of care and goals as stated below:     Goals:     Short term goals:  6 weeks or 10 visits   1.  Pt will demonstrate increased lumbar ROM by at least 3 degrees from the initial ROM value with improvements noted in functional ROM and ability to perform ADLs. MET  2.  Pt will demonstrate increased MedX average isometric strength value  by 10% from initial test resulting in improved ability to perform bending, lifting, and carrying activities safely, confidently MET  3.  Patient report a reduction in worst pain score by 1-2 points for improved tolerance for  changing positions in bed.  (approp and ongoing)  4.  Pt able to perform HEP correctly with minimal cueing or supervision from therapist to encourage independent management of symptoms. (approp and ongoing)        Long term goals: 10 weeks or 20 visits   1. Pt will demonstrate increased lumbar ROM by at least 9 degrees from initial ROM value, resulting in improved ability to perform functional fwd bending while standing and sitting. MET  2. Pt will demonstrate increased MedX average isometric strength value  by 30% from initial test resulting in improved ability to perform bending, lifting, and carrying activities safely, confidently.  (approp and ongoing)  3. Pt to demonstrate ability to independently control and reduce their pain through posture positioning and mechanical movements throughout a typical day.  (approp and ongoing)  4.  Pt will demonstrate reduced pain and improved functional outcomes as reported on the FOTO by reaching a limitation score of < or = 25% or less in order to demonstrate subjective improvement in pt's condition.   MET  5. Pt will demonstrate independence with the HEP at discharge  (approp and ongoing)  6.  Pt(patient goal)will be able to walk 2 miles without back pain  (approp and ongoing)  7. Pt will be able to garden without pain for inc tolerance to recreational activities (approp and ongoing)         Plan   Continue with established Plan of Care towards established PT goals.

## 2022-07-15 ENCOUNTER — OFFICE VISIT (OUTPATIENT)
Dept: INTERNAL MEDICINE | Facility: CLINIC | Age: 77
End: 2022-07-15
Payer: MEDICARE

## 2022-07-15 ENCOUNTER — CLINICAL SUPPORT (OUTPATIENT)
Dept: REHABILITATION | Facility: OTHER | Age: 77
End: 2022-07-15
Attending: INTERNAL MEDICINE
Payer: MEDICARE

## 2022-07-15 VITALS
HEART RATE: 60 BPM | SYSTOLIC BLOOD PRESSURE: 112 MMHG | OXYGEN SATURATION: 97 % | TEMPERATURE: 97 F | BODY MASS INDEX: 26.98 KG/M2 | DIASTOLIC BLOOD PRESSURE: 50 MMHG | HEIGHT: 59 IN | WEIGHT: 133.81 LBS

## 2022-07-15 DIAGNOSIS — Z86.39 HISTORY OF ELEVATED GLUCOSE: ICD-10-CM

## 2022-07-15 DIAGNOSIS — S22.000D COMPRESSION FRACTURE OF THORACIC VERTEBRA WITH ROUTINE HEALING, UNSPECIFIED THORACIC VERTEBRAL LEVEL, SUBSEQUENT ENCOUNTER: ICD-10-CM

## 2022-07-15 DIAGNOSIS — R29.3 POOR POSTURE: Primary | ICD-10-CM

## 2022-07-15 DIAGNOSIS — Z12.31 VISIT FOR SCREENING MAMMOGRAM: ICD-10-CM

## 2022-07-15 DIAGNOSIS — R29.898 DECREASED STRENGTH OF TRUNK AND BACK: ICD-10-CM

## 2022-07-15 DIAGNOSIS — I12.9 HYPERTENSIVE CHRONIC KIDNEY DISEASE WITH STAGE 1 THROUGH STAGE 4 CHRONIC KIDNEY DISEASE, OR UNSPECIFIED CHRONIC KIDNEY DISEASE: ICD-10-CM

## 2022-07-15 DIAGNOSIS — Z00.00 ANNUAL PHYSICAL EXAM: Primary | ICD-10-CM

## 2022-07-15 DIAGNOSIS — D53.9 MACROCYTIC ANEMIA: ICD-10-CM

## 2022-07-15 DIAGNOSIS — M80.88XS OTHER OSTEOPOROSIS WITH CURRENT PATHOLOGICAL FRACTURE, VERTEBRA(E), SEQUELA: ICD-10-CM

## 2022-07-15 DIAGNOSIS — I10 ESSENTIAL HYPERTENSION: ICD-10-CM

## 2022-07-15 PROCEDURE — 99214 PR OFFICE/OUTPT VISIT, EST, LEVL IV, 30-39 MIN: ICD-10-PCS | Mod: S$GLB,,, | Performed by: INTERNAL MEDICINE

## 2022-07-15 PROCEDURE — 99499 UNLISTED E&M SERVICE: CPT | Mod: S$GLB,,, | Performed by: INTERNAL MEDICINE

## 2022-07-15 PROCEDURE — 1157F PR ADVANCE CARE PLAN OR EQUIV PRESENT IN MEDICAL RECORD: ICD-10-PCS | Mod: CPTII,S$GLB,, | Performed by: INTERNAL MEDICINE

## 2022-07-15 PROCEDURE — 3288F FALL RISK ASSESSMENT DOCD: CPT | Mod: CPTII,S$GLB,, | Performed by: INTERNAL MEDICINE

## 2022-07-15 PROCEDURE — 1126F PR PAIN SEVERITY QUANTIFIED, NO PAIN PRESENT: ICD-10-PCS | Mod: CPTII,S$GLB,, | Performed by: INTERNAL MEDICINE

## 2022-07-15 PROCEDURE — 4010F ACE/ARB THERAPY RXD/TAKEN: CPT | Mod: CPTII,S$GLB,, | Performed by: INTERNAL MEDICINE

## 2022-07-15 PROCEDURE — 1160F PR REVIEW ALL MEDS BY PRESCRIBER/CLIN PHARMACIST DOCUMENTED: ICD-10-PCS | Mod: CPTII,S$GLB,, | Performed by: INTERNAL MEDICINE

## 2022-07-15 PROCEDURE — 3078F DIAST BP <80 MM HG: CPT | Mod: CPTII,S$GLB,, | Performed by: INTERNAL MEDICINE

## 2022-07-15 PROCEDURE — 99999 PR PBB SHADOW E&M-EST. PATIENT-LVL V: CPT | Mod: PBBFAC,,, | Performed by: INTERNAL MEDICINE

## 2022-07-15 PROCEDURE — 3078F PR MOST RECENT DIASTOLIC BLOOD PRESSURE < 80 MM HG: ICD-10-PCS | Mod: CPTII,S$GLB,, | Performed by: INTERNAL MEDICINE

## 2022-07-15 PROCEDURE — 3074F SYST BP LT 130 MM HG: CPT | Mod: CPTII,S$GLB,, | Performed by: INTERNAL MEDICINE

## 2022-07-15 PROCEDURE — 1101F PT FALLS ASSESS-DOCD LE1/YR: CPT | Mod: CPTII,S$GLB,, | Performed by: INTERNAL MEDICINE

## 2022-07-15 PROCEDURE — 1159F MED LIST DOCD IN RCRD: CPT | Mod: CPTII,S$GLB,, | Performed by: INTERNAL MEDICINE

## 2022-07-15 PROCEDURE — 3074F PR MOST RECENT SYSTOLIC BLOOD PRESSURE < 130 MM HG: ICD-10-PCS | Mod: CPTII,S$GLB,, | Performed by: INTERNAL MEDICINE

## 2022-07-15 PROCEDURE — 99499 RISK ADDL DX/OHS AUDIT: ICD-10-PCS | Mod: S$GLB,,, | Performed by: INTERNAL MEDICINE

## 2022-07-15 PROCEDURE — 99999 PR PBB SHADOW E&M-EST. PATIENT-LVL V: ICD-10-PCS | Mod: PBBFAC,,, | Performed by: INTERNAL MEDICINE

## 2022-07-15 PROCEDURE — 4010F PR ACE/ARB THEARPY RXD/TAKEN: ICD-10-PCS | Mod: CPTII,S$GLB,, | Performed by: INTERNAL MEDICINE

## 2022-07-15 PROCEDURE — 1160F RVW MEDS BY RX/DR IN RCRD: CPT | Mod: CPTII,S$GLB,, | Performed by: INTERNAL MEDICINE

## 2022-07-15 PROCEDURE — 1101F PR PT FALLS ASSESS DOC 0-1 FALLS W/OUT INJ PAST YR: ICD-10-PCS | Mod: CPTII,S$GLB,, | Performed by: INTERNAL MEDICINE

## 2022-07-15 PROCEDURE — 97110 THERAPEUTIC EXERCISES: CPT | Mod: CQ

## 2022-07-15 PROCEDURE — 99214 OFFICE O/P EST MOD 30 MIN: CPT | Mod: S$GLB,,, | Performed by: INTERNAL MEDICINE

## 2022-07-15 PROCEDURE — 1159F PR MEDICATION LIST DOCUMENTED IN MEDICAL RECORD: ICD-10-PCS | Mod: CPTII,S$GLB,, | Performed by: INTERNAL MEDICINE

## 2022-07-15 PROCEDURE — 1126F AMNT PAIN NOTED NONE PRSNT: CPT | Mod: CPTII,S$GLB,, | Performed by: INTERNAL MEDICINE

## 2022-07-15 PROCEDURE — 1157F ADVNC CARE PLAN IN RCRD: CPT | Mod: CPTII,S$GLB,, | Performed by: INTERNAL MEDICINE

## 2022-07-15 PROCEDURE — 3288F PR FALLS RISK ASSESSMENT DOCUMENTED: ICD-10-PCS | Mod: CPTII,S$GLB,, | Performed by: INTERNAL MEDICINE

## 2022-07-15 RX ORDER — ALENDRONATE SODIUM 70 MG/1
70 TABLET ORAL
Qty: 4 TABLET | Refills: 11 | Status: SHIPPED | OUTPATIENT
Start: 2022-07-15 | End: 2023-03-20

## 2022-07-15 RX ORDER — CANDESARTAN 16 MG/1
8 TABLET ORAL 2 TIMES DAILY
Qty: 90 TABLET | Refills: 2 | Status: SHIPPED | OUTPATIENT
Start: 2022-07-15 | End: 2022-08-23 | Stop reason: SDUPTHER

## 2022-07-15 RX ORDER — BNT162B2 0.23 MG/2.25ML
INJECTION, SUSPENSION INTRAMUSCULAR
COMMUNITY
Start: 2022-05-14 | End: 2023-04-26 | Stop reason: ALTCHOICE

## 2022-07-15 NOTE — PROGRESS NOTES
Subjective:       Patient ID: Magdalena Mane is a 77 y.o. female.    Chief Complaint: Annual Exam    HPI    77 y.o. female here for healthcare maintenance and f/u chronic medical conditions.    Health Maintenance/ Screening:   Labs: due  Breast Cancer: Mammogram due soon   Colorectal Cancer: 2017   Bone Density: DEXA  2019   Vaccines: reviewed      Medical Problems:  HTN: at goal. Digital program.  Compression fracture: participating in healthy back program- significant improvement. Not candidate for kyphoplasty. Agreeable to bisphosphonate now.   FERNANDEZ: taking iron intermittently. Recently macrocytic anemia- not on MV or B12/folate.   HLd: atorvastatin    Health Maintenance Topics with due status: Not Due       Topic Last Completion Date    TETANUS VACCINE 10/09/2015    Colorectal Cancer Screening 06/28/2017    DEXA Scan 07/25/2019    Influenza Vaccine 10/14/2021    Lipid Panel 03/03/2022    High Dose Statin 07/15/2022       Health Maintenance Due   Topic Date Due    Mammogram  08/09/2022       Health Maintenance Due   Topic Date Due    Mammogram  08/09/2022                 Past Medical History:   Diagnosis Date    Anemia     Atrial fibrillation     Basal cell carcinoma     DJD (degenerative joint disease) 12/12/2012    Obesity (BMI 30-39.9) 11/27/2015    Vaginal atrophy 4/1/2016     Past Surgical History:   Procedure Laterality Date    ADENOIDECTOMY      BREAST BIOPSY Left 1999    Excisional bx, benign cyst    BREAST SURGERY      CARDIOVERSION  02/2021    CAROTID ENDARTERECTOMY Right 04/07/2022    Procedure: ENDARTERECTOMY-CAROTID;  Surgeon: TUCKER Brantley III, MD;  Location: [facility] OR [address] [place] [address];  Service: Peripheral Vascular;  Laterality: Right;    COLONOSCOPY  2012    normal    COLONOSCOPY N/A 06/28/2017    Procedure: COLONOSCOPY;  Surgeon: Markel Montemayor MD;  [facility]  Service: Endoscopy;  Laterality: N/A;    EXCISION OF GANGLION OF WRIST Left 06/27/2018    Procedure: EXCISION,  GANGLION CYST, WRIST - Left Volar wrist;  Surgeon: Mary Moore MD;  Location: [facility] OR [address] [place] [address];  Service: Orthopedics;  Laterality: Left;    HIP SURGERY  2014    bilateral    JOINT REPLACEMENT Bilateral     MOLLY    TONSILLECTOMY      TREATMENT OF CARDIAC ARRHYTHMIA N/A 2020    Procedure: CARDIOVERSION;  Surgeon: Nigel García MD;  Location: [facility] EP LAB;  Service: Cardiology;  Laterality: N/A;  AF, LIGIA, DCCV, MAC, FL, 3 Prep    TREATMENT OF CARDIAC ARRHYTHMIA N/A 2020    Procedure: Cardioversion or Defibrillation;  Surgeon: Nigel García MD;  Location: [facility] EP LAB;  Service: Cardiology;  Laterality: N/A;  AF, LIGIA, DCCV, MAC, FL, 3 Prep     Family History   Problem Relation Age of Onset    Heart disease Mother         pacemaker    Breast cancer Mother     Arthritis Mother     Hyperlipidemia Mother     Scoliosis Father     Heart disease Father     Tuberculosis Father          1 lung from collapse lung    Heart attack Father     Heart failure Father     Hyperlipidemia Father     Hypertension Father     No Known Problems Brother     Stroke Maternal Grandmother     Heart disease Paternal Grandmother     Dementia Paternal Grandmother      Social History     Socioeconomic History    Marital status:    Occupational History    Occupation: retired   Tobacco Use    Smoking status: Former Smoker     Packs/day: 1.00     Years: 10.00     Pack years: 10.00     Types: Cigarettes     Quit date: 1988     Years since quittin.5    Smokeless tobacco: Never Used   Substance and Sexual Activity    Alcohol use: Not Currently    Drug use: No    Sexual activity: Yes     Partners: Male   Social History Narrative     to HEATH Montemayor    Nurse    Likes to garden     Social Determinants of Health     Financial Resource Strain: Low Risk     Difficulty of Paying Living Expenses: Not very hard   Food Insecurity: No Food Insecurity    Worried About Running Out of Food  in the Last Year: Never true    Ran Out of Food in the Last Year: Never true   Transportation Needs: No Transportation Needs    Lack of Transportation (Medical): No    Lack of Transportation (Non-Medical): No   Physical Activity: Sufficiently Active    Days of Exercise per Week: 5 days    Minutes of Exercise per Session: 60 min   Stress: Stress Concern Present    Feeling of Stress : To some extent   Social Connections: Socially Integrated    Frequency of Communication with Friends and Family: More than three times a week    Frequency of Social Gatherings with Friends and Family: More than three times a week    Attends Taoism Services: More than 4 times per year    Active Member of Clubs or Organizations: Yes    Attends Club or Organization Meetings: More than 4 times per year    Marital Status:    Housing Stability: Unknown    Unable to Pay for Housing in the Last Year: No    Unstable Housing in the Last Year: No     Review of patient's allergies indicates:   Allergen Reactions    Prednisone      ELEVATED B/P FOR SEVERAL DAYS/WENT TO ER    Lactose        Current Outpatient Medications:     acetaminophen (TYLENOL) 500 MG tablet, Take 500 mg by mouth 2 (two) times daily., Disp: , Rfl:     aspirin (ECOTRIN) 81 MG EC tablet, Take 81 mg by mouth nightly., Disp: , Rfl:     atorvastatin (LIPITOR) 20 MG tablet, Take 1 tablet (20 mg total) by mouth once daily. (Patient taking differently: Take 20 mg by mouth every evening.), Disp: 30 tablet, Rfl: 11    calcium-vitamin D 250-100 mg-unit per tablet, Take 1 tablet by mouth 2 (two) times daily., Disp: , Rfl:     cholecalciferol, vitamin D3, (VITAMIN D3 ORAL), Take 3,000 Units by mouth once daily., Disp: , Rfl:     diphenhydrAMINE (BENADRYL) 25 mg capsule, Take 25 mg by mouth every 6 (six) hours as needed for Itching., Disp: , Rfl:     ELIQUIS 5 mg Tab, TAKE 1 TABLET BY MOUTH  TWICE DAILY, Disp: 180 tablet, Rfl: 3    ferrous sulfate (FEOSOL) Tab  tablet, Take 1 tablet by mouth every evening. PATIENT TAKING 3 X WEEKLY IN THE EVENING (M-W-F), Disp: , Rfl:     lactase (LACTAID) 3,000 unit tablet, Take 1 tablet by mouth 3 (three) times daily as needed., Disp: , Rfl:     loratadine (CLARITIN) 10 mg tablet, Take 10 mg by mouth every morning., Disp: , Rfl:     PFIZER COVID-19 MACIEJ VACCN,PF, 30 mcg/0.3 mL injection, , Disp: , Rfl:     spironolactone (ALDACTONE) 25 MG tablet, Take 0.5 tablets (12.5 mg total) by mouth once daily., Disp: 15 tablet, Rfl: 2    traZODone (DESYREL) 50 MG tablet, Take 1 tablet (50 mg total) by mouth nightly as needed for Insomnia., Disp: 30 tablet, Rfl: 3    verapamiL (VERELAN) 120 MG C24P, Take 1 capsule (120 mg total) by mouth every evening., Disp: 90 capsule, Rfl: 3    alendronate (FOSAMAX) 70 MG tablet, Take 1 tablet (70 mg total) by mouth every 7 days., Disp: 4 tablet, Rfl: 11    candesartan (ATACAND) 16 MG tablet, Take 0.5 tablets (8 mg total) by mouth 2 (two) times a day., Disp: 90 tablet, Rfl: 2    flecainide (TAMBOCOR) 50 MG Tab, Take 1 tablet (50 mg total) by mouth every 12 (twelve) hours., Disp: 180 tablet, Rfl: 3      Review of Systems   Constitutional: Negative for diaphoresis and fever.   HENT: Negative for trouble swallowing.    Respiratory: Negative for cough and shortness of breath.    Cardiovascular: Positive for leg swelling (improving). Negative for chest pain.   Gastrointestinal: Negative for abdominal pain and blood in stool.   Genitourinary: Negative for difficulty urinating and dysuria.   Musculoskeletal: Positive for back pain (improving) and gait problem (improving).   Skin: Negative for pallor.   Neurological: Negative for syncope and weakness.       Objective:        Vitals:    07/15/22 1027 07/15/22 1042   BP: (!) 140/60 (!) 112/50   BP Location: Right arm    Patient Position: Sitting    BP Method: Medium (Manual)    Pulse: 60    Temp: 97.3 °F (36.3 °C)    TempSrc: Skin    SpO2: 97%    Weight: 60.7 kg  "(133 lb 13.1 oz)    Height: 4' 11" (1.499 m)        Body mass index is 27.03 kg/m².    Physical Exam  Constitutional:       General: She is not in acute distress.     Appearance: She is well-developed. She is not diaphoretic.   HENT:      Head: Normocephalic and atraumatic.      Right Ear: External ear normal.      Left Ear: External ear normal.   Eyes:      Conjunctiva/sclera: Conjunctivae normal.   Cardiovascular:      Rate and Rhythm: Normal rate and regular rhythm.   Pulmonary:      Effort: Pulmonary effort is normal.      Breath sounds: Normal breath sounds.   Abdominal:      General: There is no distension.      Palpations: Abdomen is soft.   Musculoskeletal:      Cervical back: Neck supple.      Right lower leg: No edema.      Left lower leg: No edema.   Skin:     General: Skin is warm and dry.      Nails: There is no clubbing.   Neurological:      General: No focal deficit present.      Mental Status: She is alert. Mental status is at baseline.   Psychiatric:         Behavior: Behavior normal.         Judgment: Judgment normal.       I have reviewed the following:     Details / Date    [x]   Labs     []   Micro     []   Pathology     [x]   Imaging     [x]   Cardiology Procedures     []   Other        Assessment:     1. Annual physical exam    2. Visit for screening mammogram    3. Compression fracture of thoracic vertebra with routine healing, unspecified thoracic vertebral level, subsequent encounter    4. Essential hypertension    5. Macrocytic anemia    6. History of elevated glucose    7. Other osteoporosis with current pathological fracture, vertebra(e), sequela     8. Hypertensive chronic kidney disease with stage 1 through stage 4 chronic kidney disease, or unspecified chronic kidney disease            Plan:         1. Annual physical exam  - immunizations reviewed, discussed  - mammo due soon  - CRC screen utd  - dexa utd  - CBC Auto Differential; Future  - Comprehensive Metabolic Panel; Future  - " Hemoglobin A1C; Future  - TSH; Future  - Vitamin D; Future  - Iron and TIBC; Future  - Ferritin; Future  - Folate; Future  - Vitamin B12 Deficiency Panel; Future    2. Visit for screening mammogram  - Mammo Digital Screening Bilat w/ Chele; Future    3. Compression fracture of thoracic vertebra with routine healing, unspecified thoracic vertebral level, subsequent encounter  - cont healthy back program  - alendronate (FOSAMAX) 70 MG tablet; Take 1 tablet (70 mg total) by mouth every 7 days.  Dispense: 4 tablet; Refill: 11  - Vitamin D; Future    4. Essential hypertension  - controlled  - Comprehensive Metabolic Panel; Future  - TSH; Future  - Ambulatory referral/consult to Nutrition Services; Future  - candesartan (ATACAND) 16 MG tablet; Take 0.5 tablets (8 mg total) by mouth 2 (two) times a day.  Dispense: 90 tablet; Refill: 2    5. Macrocytic anemia  - CBC Auto Differential; Future  - Iron and TIBC; Future  - Ferritin; Future  - Folate; Future  - Vitamin B12 Deficiency Panel; Future  - Ambulatory referral/consult to Nutrition Services; Future    6. History of elevated glucose  - Hemoglobin A1C; Future    7. Other osteoporosis with current pathological fracture, vertebra(e), sequela  - Vitamin D; Future    8. Hypertensive chronic kidney disease with stage 1 through stage 4 chronic kidney disease, or unspecified chronic kidney disease   - Ambulatory referral/consult to Nutrition Services; Future      Unless there are intervening problems, Follow up if symptoms worsen or fail to improve, for 6mo-1y for chronic medical conditions, can stagger to 1yr interval if specialist in interim..      Patient note was created using MModal Dictation.  Any errors in syntax or even information may not have been identified and edited on initial review prior to signing this note.      35 minutes total time spent on the encounter, which includes face to face time and non-face to face time preparing to see the patient (eg, review of  tests), Obtaining and/or reviewing separately obtained history, Documenting clinical information in the electronic or other health record, Independently interpreting results (not separately reported) and communicating results to the patient/family/caregiver, or Care coordination (not separately reported).

## 2022-07-15 NOTE — PROGRESS NOTES
"[facility]      Name: Magdalena Mane  Clinic Number: [id number]    Therapy Diagnosis:   Encounter Diagnoses   Name Primary?    Poor posture Yes    Decreased strength of trunk and back      Physician: Anai Oliveros MD    Visit Date: 7/15/2022    Physician Orders: PT Eval and Treat   Medical Diagnosis from Referral: S22.000D (ICD-10-CM) - Compression fracture of thoracic vertebra with routine healing, unspecified thoracic vertebral level, subsequent encounter  Evaluation Date: 2022  Authorization Period Expiration: 23  Plan of Care Expiration: 22  Reassessment Due: 22  Visit # / Visits authorized:     Time In: 2:35  Time Out: 3:30  Total Billable Time: 50 minutes    Precautions: Standard/atrial fib/ B THR/endartectomy 3/22    Pattern of pain determined: 1 PEN    Subjective   Magdalena returns without complaints of lower back pain.  She reports she has felt increases in lower back pain while gardening but has been able to relieve pain with HEP stretches.    Patient reports tolerating previous visit well /c increased soreness following testing on Medx  Patient reports their pain to be 0/10 on a 0-10 scale with 0 being no pain and 10 being the worst pain imaginable.  Pain Location: bilateral back, thoracic      Occupation: retired  Leisure: works in her garden /walking   Pt goals: "Increase core strength, go back to walking 2 miles"    Objective     Baseline IM Testing Results:   Date of testin22  ROM 0-48 deg   Max Peak Torque 89    min 40   Flex/Ext Ratio 2.25:1   % below normative data 33     mid IM Testing Results:   Date of testin22  ROM 0-60deg   Max Peak Torque 111   min 46   Flex/Ext Ratio 2.4:1   % below normative data 13   29% strength gainn      Limitation/Restriction for FOTO lumbar Survey     Therapist reviewed FOTO scores for Magdalena Mane on 2022.   FOTO documents entered into [employer] - see Media section.     Limitation Score: " "30%           MOVEMENT LOSS 6/7/22    ROM Loss   Flexion within functional limits   Extension minimal -mod   Side glide Right WFL   Side glide Left WFL   Rotation Right WFL   Rotation Left WFL           Treatment    Pt was instructed in and performed the following:     Magdalena received therapeutic exercises to develop/improved posture, cardiovascular endurance, muscular endurance, lumbar/cervical ROM, strength and muscular endurance for 50 minutes including the following exercises:     [facility] - Short 7/15/2022   Visit Number 16   VAS Pain Rating 0   Time 5   Scapular Retraction -   Lumbar Stretches - Slouch -   Extension in Lying 10   Lumbar Extension - Seat Pad -   Femur Restraint -   Top Dead Center -   Counterweight -   Lumbar Flexion -   Lumbar Extension -   Lumbar Peak Torque -   Lumbar Weight 70   Repetitions 18   Rating of Perceived Exertion 4       LTR x15  Open book x10  PPT + marching x15  Bridge w/BTB x20  Braced BKFO BTB x20  EIL x10  No money RTB x15  Quad    Cat/Cow x 10 cue for inc focus thoracic ext    Bird dogs x15       Not performed 7/15/2022 :  Scap retract x20  3" hold Y TB   SOC x 20 5" hold cue for dec cervical motion   TA activation w/SLR x10 ea  LE ext x10  Thread needle x10B   Lifting Mechanics: floor lift & golfers lift      Peripheral muscle strengthening which included 1 set of 15-20 repetitions at a slow, controlled 10-13 second per rep pace focused on strengthening supporting musculature for improved body mechanics and functional mobility.  Pt and therapist focused on proper form during treatment to ensure optimal strengthening of each targeted muscle group.  Machines were utilized including torso rotation, leg extension, leg curl, chest press, upright row. Tricep extension, bicep curl, leg press, and hip abduction added visit 3    Magdalena received the following manual therapy techniques: NA         Home Exercises Provided and Patient Education Provided   Home exercises " include:   scap retractions,   LTR,  Cat camel      Cardio program: 5/30/22  Lifting education date: Visit 11  Lumbar roll: not obtained as of 05/24/22    Education provided:       Written Home Exercises Provided: Patient instructed to cont prior HEP.  Exercises were reviewed and Magdalena was able to demonstrate them prior to the end of the session.  Magdalena demonstrated good  understanding of the education provided.     See EMR under Patient Instructions for exercises provided prior visit.          Assessment   Magdalena returned continuing to report no lower back pain.  She reports being able to better reduce pain when it does occur by performing her HEP stretches.  Lumbopelvic and thoracic mobility and core/hip/glute strengthening continued without complaints of increased pain.  Medx resistance remained at 70#.  She completed 18 reps at a RPE of 4/10.  Will continue to progress per pt's tolerance and HB protocol.    Patient is making good progress towards established goals.  Pt will continue to benefit from skilled outpatient physical therapy to address the deficits stated in the impairment chart, provide pt/family education and to maximize pt's level of independence in the home and community environment.     Anticipated Barriers for therapy: None  Pt's spiritual, cultural and educational needs considered and pt agreeable to plan of care and goals as stated below:     Goals:     Short term goals:  6 weeks or 10 visits   1.  Pt will demonstrate increased lumbar ROM by at least 3 degrees from the initial ROM value with improvements noted in functional ROM and ability to perform ADLs. MET  2.  Pt will demonstrate increased MedX average isometric strength value  by 10% from initial test resulting in improved ability to perform bending, lifting, and carrying activities safely, confidently MET  3.  Patient report a reduction in worst pain score by 1-2 points for improved tolerance for changing positions in bed.  (approp and  ongoing)  4.  Pt able to perform HEP correctly with minimal cueing or supervision from therapist to encourage independent management of symptoms. (approp and ongoing)        Long term goals: 10 weeks or 20 visits   1. Pt will demonstrate increased lumbar ROM by at least 9 degrees from initial ROM value, resulting in improved ability to perform functional fwd bending while standing and sitting. MET  2. Pt will demonstrate increased MedX average isometric strength value  by 30% from initial test resulting in improved ability to perform bending, lifting, and carrying activities safely, confidently.  (approp and ongoing)  3. Pt to demonstrate ability to independently control and reduce their pain through posture positioning and mechanical movements throughout a typical day.  (approp and ongoing)  4.  Pt will demonstrate reduced pain and improved functional outcomes as reported on the FOTO by reaching a limitation score of < or = 25% or less in order to demonstrate subjective improvement in pt's condition.   MET  5. Pt will demonstrate independence with the HEP at discharge  (approp and ongoing)  6.  Pt(patient goal)will be able to walk 2 miles without back pain  (approp and ongoing)  7. Pt will be able to garden without pain for inc tolerance to recreational activities (approp and ongoing)         Plan   Continue with established Plan of Care towards established PT goals.

## 2022-07-19 ENCOUNTER — CLINICAL SUPPORT (OUTPATIENT)
Dept: REHABILITATION | Facility: OTHER | Age: 77
End: 2022-07-19
Attending: INTERNAL MEDICINE
Payer: MEDICARE

## 2022-07-19 DIAGNOSIS — R29.3 POOR POSTURE: Primary | ICD-10-CM

## 2022-07-19 DIAGNOSIS — R29.898 DECREASED STRENGTH OF TRUNK AND BACK: ICD-10-CM

## 2022-07-19 PROCEDURE — 97110 THERAPEUTIC EXERCISES: CPT | Mod: CQ

## 2022-07-19 NOTE — PROGRESS NOTES
"[facility]      Name: Magdalena Mane  Clinic Number: [id number]    Therapy Diagnosis:   Encounter Diagnoses   Name Primary?    Poor posture Yes    Decreased strength of trunk and back      Physician: Anai Oliveros MD    Visit Date: 2022    Physician Orders: PT Eval and Treat   Medical Diagnosis from Referral: S22.000D (ICD-10-CM) - Compression fracture of thoracic vertebra with routine healing, unspecified thoracic vertebral level, subsequent encounter  Evaluation Date: 2022  Authorization Period Expiration: 23  Plan of Care Expiration: 22  Reassessment Due: 22  Visit # / Visits authorized:     Time In: 3:15  Time Out: 4:05  Total Billable Time: 45 minutes    Precautions: Standard/atrial fib/ B THR/endartectomy 3/22    Pattern of pain determined: 1 PEN    Subjective   Magdalena reports no lower back pain upon arrival for treatment and continues to report being able to do more functional activities without increase in pain, such as gardening.    Patient reports tolerating previous visit well /c increased soreness following testing on Medx  Patient reports their pain to be 0/10 on a 0-10 scale with 0 being no pain and 10 being the worst pain imaginable.  Pain Location: bilateral back, thoracic      Occupation: retired  Leisure: works in her garden /walking   Pt goals: "Increase core strength, go back to walking 2 miles"    Objective     Baseline IM Testing Results:   Date of testin22  ROM 0-48 deg   Max Peak Torque 89    min 40   Flex/Ext Ratio 2.25:1   % below normative data 33     mid IM Testing Results:   Date of testin22  ROM 0-60deg   Max Peak Torque 111   min 46   Flex/Ext Ratio 2.4:1   % below normative data 13   29% strength gainn      Limitation/Restriction for FOTO lumbar Survey     Therapist reviewed FOTO scores for Magdalena Mane on 2022.   FOTO documents entered into [employer] - see Media section.     Limitation Score: " "30%           MOVEMENT LOSS 6/7/22    ROM Loss   Flexion within functional limits   Extension minimal -mod   Side glide Right WFL   Side glide Left WFL   Rotation Right WFL   Rotation Left WFL           Treatment    Pt was instructed in and performed the following:     Magdalena received therapeutic exercises to develop/improved posture, cardiovascular endurance, muscular endurance, lumbar/cervical ROM, strength and muscular endurance for 45 minutes including the following exercises:     [facility] - Short 7/19/2022   Visit Number 17   VAS Pain Rating 0   Time 5   Scapular Retraction -   Lumbar Stretches - Slouch -   Extension in Lying 10   Lumbar Extension - Seat Pad -   Femur Restraint -   Top Dead Center -   Counterweight -   Lumbar Flexion -   Lumbar Extension -   Lumbar Peak Torque -   Lumbar Weight 70   Repetitions 20   Rating of Perceived Exertion 5       LTR x15  Open book x10  PPT + marching x15  Single leg bridges x5 ea  90/90 heel taps 2x5 ea  EIL x10  No money RTB x15  Quad    Cat/Cow x 10 cue for inc focus thoracic ext    Bird dogs x10       Not performed 7/19/2022 :  Scap retract x20  3" hold Y TB   SOC x 20 5" hold cue for dec cervical motion   TA activation w/SLR x10 ea  LE ext x10  Thread needle x10B   Lifting Mechanics: floor lift & golfers lift      Peripheral muscle strengthening which included 1 set of 15-20 repetitions at a slow, controlled 10-13 second per rep pace focused on strengthening supporting musculature for improved body mechanics and functional mobility.  Pt and therapist focused on proper form during treatment to ensure optimal strengthening of each targeted muscle group.  Machines were utilized including torso rotation, leg extension, leg curl, chest press, upright row. Tricep extension, bicep curl, leg press, and hip abduction added visit 3    Magdalena received the following manual therapy techniques: NA         Home Exercises Provided and Patient Education Provided   Home " exercises include:   scap retractions,   LTR,  Cat camel      Cardio program: 5/30/22  Lifting education date: Visit 11  Lumbar roll: not obtained as of 05/24/22    Education provided:       Written Home Exercises Provided: Patient instructed to cont prior HEP.  Exercises were reviewed and Magdalena was able to demonstrate them prior to the end of the session.  Magdalena demonstrated good  understanding of the education provided.     See EMR under Patient Instructions for exercises provided prior visit.          Assessment   Magdalena returns without complaints of lower back pain.  Treatment progressed by initiating single leg bridges and 90/90 heel taps for added challenge to glute med and dynamic core stabilization.  She tolerated today's progression with no increase in pain and appropriate increase of difficulty.  Medx resistance remained at 70#.  She completed 20 reps at a RPE of 5/10.  Consider a 5% increase in resistance NV per HB protocol.      Patient is making good progress towards established goals.  Pt will continue to benefit from skilled outpatient physical therapy to address the deficits stated in the impairment chart, provide pt/family education and to maximize pt's level of independence in the home and community environment.     Anticipated Barriers for therapy: None  Pt's spiritual, cultural and educational needs considered and pt agreeable to plan of care and goals as stated below:     Goals:     Short term goals:  6 weeks or 10 visits   1.  Pt will demonstrate increased lumbar ROM by at least 3 degrees from the initial ROM value with improvements noted in functional ROM and ability to perform ADLs. MET  2.  Pt will demonstrate increased MedX average isometric strength value  by 10% from initial test resulting in improved ability to perform bending, lifting, and carrying activities safely, confidently MET  3.  Patient report a reduction in worst pain score by 1-2 points for improved tolerance for changing  positions in bed.  (approp and ongoing)  4.  Pt able to perform HEP correctly with minimal cueing or supervision from therapist to encourage independent management of symptoms. (approp and ongoing)        Long term goals: 10 weeks or 20 visits   1. Pt will demonstrate increased lumbar ROM by at least 9 degrees from initial ROM value, resulting in improved ability to perform functional fwd bending while standing and sitting. MET  2. Pt will demonstrate increased MedX average isometric strength value  by 30% from initial test resulting in improved ability to perform bending, lifting, and carrying activities safely, confidently.  (approp and ongoing)  3. Pt to demonstrate ability to independently control and reduce their pain through posture positioning and mechanical movements throughout a typical day.  (approp and ongoing)  4.  Pt will demonstrate reduced pain and improved functional outcomes as reported on the FOTO by reaching a limitation score of < or = 25% or less in order to demonstrate subjective improvement in pt's condition.   MET  5. Pt will demonstrate independence with the HEP at discharge  (approp and ongoing)  6.  Pt(patient goal)will be able to walk 2 miles without back pain  (approp and ongoing)  7. Pt will be able to garden without pain for inc tolerance to recreational activities (approp and ongoing)         Plan   Continue with established Plan of Care towards established PT goals.

## 2022-07-22 ENCOUNTER — CLINICAL SUPPORT (OUTPATIENT)
Dept: REHABILITATION | Facility: OTHER | Age: 77
End: 2022-07-22
Attending: INTERNAL MEDICINE
Payer: MEDICARE

## 2022-07-22 ENCOUNTER — PATIENT MESSAGE (OUTPATIENT)
Dept: ELECTROPHYSIOLOGY | Facility: CLINIC | Age: 77
End: 2022-07-22
Payer: MEDICARE

## 2022-07-22 DIAGNOSIS — R29.898 DECREASED STRENGTH OF TRUNK AND BACK: ICD-10-CM

## 2022-07-22 DIAGNOSIS — R29.3 POOR POSTURE: Primary | ICD-10-CM

## 2022-07-22 PROCEDURE — 97110 THERAPEUTIC EXERCISES: CPT | Mod: CQ

## 2022-07-22 NOTE — PROGRESS NOTES
"[facility]      Name: Magdalena Mane  Clinic Number: [id number]    Therapy Diagnosis:   Encounter Diagnoses   Name Primary?    Poor posture Yes    Decreased strength of trunk and back      Physician: Anai Oliveros MD    Visit Date: 2022    Physician Orders: PT Eval and Treat   Medical Diagnosis from Referral: S22.000D (ICD-10-CM) - Compression fracture of thoracic vertebra with routine healing, unspecified thoracic vertebral level, subsequent encounter  Evaluation Date: 2022  Authorization Period Expiration: 23  Plan of Care Expiration: 22  Reassessment Due: 22  Visit # / Visits authorized:     Time In: 1:41  Time Out: 2:31  Total Billable Time: 45 minutes    Precautions: Standard/atrial fib/ B THR/endartectomy 3/22    Pattern of pain determined: 1 PEN    Subjective   Magdalena reports no lower back pain upon arrival for treatment and states she has been more aware of engaging her core while lifting things while gardening.    Patient reports tolerating previous visit well /c increased soreness following testing on Medx  Patient reports their pain to be 0/10 on a 0-10 scale with 0 being no pain and 10 being the worst pain imaginable.  Pain Location: bilateral back, thoracic      Occupation: retired  Leisure: works in her garden /walking   Pt goals: "Increase core strength, go back to walking 2 miles"    Objective     Baseline IM Testing Results:   Date of testin22  ROM 0-48 deg   Max Peak Torque 89    min 40   Flex/Ext Ratio 2.25:1   % below normative data 33     mid IM Testing Results:   Date of testin22  ROM 0-60deg   Max Peak Torque 111   min 46   Flex/Ext Ratio 2.4:1   % below normative data 13   29% strength gainn      Limitation/Restriction for FOTO lumbar Survey     Therapist reviewed FOTO scores for Magdalena Mane on 2022.   FOTO documents entered into [employer] - see Media section.     Limitation Score: 30%           MOVEMENT " "LOSS 6/7/22    ROM Loss   Flexion within functional limits   Extension minimal -mod   Side glide Right WFL   Side glide Left WFL   Rotation Right WFL   Rotation Left WFL           Treatment    Pt was instructed in and performed the following:     Magdalena received therapeutic exercises to develop/improved posture, cardiovascular endurance, muscular endurance, lumbar/cervical ROM, strength and muscular endurance for 45 minutes including the following exercises:     [facility] - Short 7/22/2022   Visit Number 18   VAS Pain Rating 0   Time 5   Scapular Retraction -   Lumbar Stretches - Slouch -   Extension in Lying 10   Lumbar Extension - Seat Pad -   Femur Restraint -   Top Dead Center -   Counterweight -   Lumbar Flexion -   Lumbar Extension -   Lumbar Peak Torque -   Lumbar Weight 74   Repetitions 15   Rating of Perceived Exertion 5       LTR x15  Open book x10  Single leg bridges x 10 ea  90/90 heel taps 2x5 ea  EIL x10  No money RTB x15  Quad    Cat/Cow x 10 cue for inc focus thoracic ext    Thread needle x10B    Bird dogs x10       Not performed 7/22/2022 :  Scap retract x20  3" hold Y TB   SOC x 20 5" hold cue for dec cervical motion   TA activation w/SLR x10 ea  LE ext x10  PPT + marching x15  Lifting Mechanics: floor lift & golfers lift      Peripheral muscle strengthening which included 1 set of 15-20 repetitions at a slow, controlled 10-13 second per rep pace focused on strengthening supporting musculature for improved body mechanics and functional mobility.  Pt and therapist focused on proper form during treatment to ensure optimal strengthening of each targeted muscle group.  Machines were utilized including torso rotation, leg extension, leg curl, chest press, upright row. Tricep extension, bicep curl, leg press, and hip abduction added visit 3    Magdalena received the following manual therapy techniques: NA         Home Exercises Provided and Patient Education Provided   Home exercises include: "   scap retractions,   LTR,  Cat camel      Cardio program: 5/30/22  Lifting education date: Visit 11  Lumbar roll: not obtained as of 05/24/22    Education provided:       Written Home Exercises Provided: Patient instructed to cont prior HEP.  Exercises were reviewed and Magdalena was able to demonstrate them prior to the end of the session.  Magdalena demonstrated good  understanding of the education provided.     See EMR under Patient Instructions for exercises provided prior visit.          Assessment   Magdalena returns without complaints of lower back pain.  Treatment continued with progressions from last visit with Magdalena reporting no adverse effects and demonstrating more conformability with single leg bridges completing more reps than last visit.  Medx resistance increased to 74#.  She completed 15 reps at a RPE of 5/10.  Will continue to progress per pt's tolerance and HB protocol.     Patient is making good progress towards established goals.  Pt will continue to benefit from skilled outpatient physical therapy to address the deficits stated in the impairment chart, provide pt/family education and to maximize pt's level of independence in the home and community environment.     Anticipated Barriers for therapy: None  Pt's spiritual, cultural and educational needs considered and pt agreeable to plan of care and goals as stated below:     Goals:     Short term goals:  6 weeks or 10 visits   1.  Pt will demonstrate increased lumbar ROM by at least 3 degrees from the initial ROM value with improvements noted in functional ROM and ability to perform ADLs. MET  2.  Pt will demonstrate increased MedX average isometric strength value  by 10% from initial test resulting in improved ability to perform bending, lifting, and carrying activities safely, confidently MET  3.  Patient report a reduction in worst pain score by 1-2 points for improved tolerance for changing positions in bed.  (approp and ongoing)  4.  Pt able to  perform HEP correctly with minimal cueing or supervision from therapist to encourage independent management of symptoms. (approp and ongoing)        Long term goals: 10 weeks or 20 visits   1. Pt will demonstrate increased lumbar ROM by at least 9 degrees from initial ROM value, resulting in improved ability to perform functional fwd bending while standing and sitting. MET  2. Pt will demonstrate increased MedX average isometric strength value  by 30% from initial test resulting in improved ability to perform bending, lifting, and carrying activities safely, confidently.  (approp and ongoing)  3. Pt to demonstrate ability to independently control and reduce their pain through posture positioning and mechanical movements throughout a typical day.  (approp and ongoing)  4.  Pt will demonstrate reduced pain and improved functional outcomes as reported on the FOTO by reaching a limitation score of < or = 25% or less in order to demonstrate subjective improvement in pt's condition.   MET  5. Pt will demonstrate independence with the HEP at discharge  (approp and ongoing)  6.  Pt(patient goal)will be able to walk 2 miles without back pain  (approp and ongoing)  7. Pt will be able to garden without pain for inc tolerance to recreational activities (approp and ongoing)         Plan   Continue with established Plan of Care towards established PT goals.

## 2022-07-26 ENCOUNTER — CLINICAL SUPPORT (OUTPATIENT)
Dept: REHABILITATION | Facility: OTHER | Age: 77
End: 2022-07-26
Attending: INTERNAL MEDICINE
Payer: MEDICARE

## 2022-07-26 ENCOUNTER — PATIENT MESSAGE (OUTPATIENT)
Dept: REHABILITATION | Facility: OTHER | Age: 77
End: 2022-07-26

## 2022-07-26 DIAGNOSIS — R29.898 DECREASED STRENGTH OF TRUNK AND BACK: ICD-10-CM

## 2022-07-26 DIAGNOSIS — R29.3 POOR POSTURE: Primary | ICD-10-CM

## 2022-07-26 PROCEDURE — 97110 THERAPEUTIC EXERCISES: CPT

## 2022-07-26 NOTE — PROGRESS NOTES
"[facility]      Name: Magdalena Mane  Clinic Number: [id number]    Therapy Diagnosis:   Encounter Diagnoses   Name Primary?    Poor posture Yes    Decreased strength of trunk and back      Physician: Anai Oliveros MD    Visit Date: 2022    Physician Orders: PT Eval and Treat   Medical Diagnosis from Referral: S22.000D (ICD-10-CM) - Compression fracture of thoracic vertebra with routine healing, unspecified thoracic vertebral level, subsequent encounter  Evaluation Date: 2022  Authorization Period Expiration: 23  Plan of Care Expiration: 22  Reassessment Due: 22  Visit # / Visits authorized:     Time In: 3:30  Time Out: 4:20  Total Billable Time: 45 minutes    Precautions: Standard/atrial fib/ B THR/endartectomy 3/22    Pattern of pain determined: 1 PEN    Subjective   Magdalena reports that she is not having any pain today, plans on continuing with wellness. She does get some discomfort at the end of the day, but does the stretches and feels better    Patient reports tolerating previous visit well /c increased soreness following testing on Medx  Patient reports their pain to be 0/10 on a 0-10 scale with 0 being no pain and 10 being the worst pain imaginable.  Pain Location: bilateral back, thoracic      Occupation: retired  Leisure: works in her garden /walking   Pt goals: "Increase core strength, go back to walking 2 miles"    Objective     Baseline IM Testing Results:   Date of testin22  ROM 0-48 deg   Max Peak Torque 89    min 40   Flex/Ext Ratio 2.25:1   % below normative data 33     mid IM Testing Results:   Date of testin22  ROM 0-60deg   Max Peak Torque 111   min 46   Flex/Ext Ratio 2.4:1   % below normative data 13   29% strength gainn      Limitation/Restriction for FOTO lumbar Survey     Therapist reviewed FOTO scores for Magdalena Mane on 2022.   FOTO documents entered into [employer] - see Media section.     Limitation " "Score: 30%           MOVEMENT LOSS 6/7/22    ROM Loss   Flexion within functional limits   Extension minimal -mod   Side glide Right WFL   Side glide Left WFL   Rotation Right WFL   Rotation Left WFL           Treatment    Pt was instructed in and performed the following:     Magdalena received therapeutic exercises to develop/improved posture, cardiovascular endurance, muscular endurance, lumbar/cervical ROM, strength and muscular endurance for 45 minutes including the following exercises:     [facility] 7/26/2022   Visit Number 19   VAS Pain Rating 0   Time 5   Scapular Retraction -   Lumbar Stretches - Slouch Overcorrection -   Extension in Lying 10   Lumbar Extension Seat Pad -   Femur Restraint -   Top Dead Center -   Counterweight -   Lumbar Flexion -   Lumbar Extension -   Lumbar Peak Torque -   Min Torque -   Test Percent Below Normative Data -   Lumbar Weight 74   Repetitions 19   Rating of Perceived Exertion 3   Ice - Z Lie (in min.) 5         LTR x15  Open book x10  Single leg bridges x 10 ea  90/90 heel taps 2x5 ea  EIL x10  No money RTB x15  Quad    Cat/Cow x 10 cue for inc focus thoracic ext    Thread needle x10B    Bird dogs x10       Not performed 7/26/2022 :  Scap retract x20  3" hold Y TB   SOC x 20 5" hold cue for dec cervical motion   TA activation w/SLR x10 ea  LE ext x10  PPT + marching x15  Lifting Mechanics: floor lift & golfers lift      Peripheral muscle strengthening which included 1 set of 15-20 repetitions at a slow, controlled 10-13 second per rep pace focused on strengthening supporting musculature for improved body mechanics and functional mobility.  Pt and therapist focused on proper form during treatment to ensure optimal strengthening of each targeted muscle group.  Machines were utilized including torso rotation, leg extension, leg curl, chest press, upright row. Tricep extension, bicep curl, leg press, and hip abduction added visit 3    Magdalena received the following manual " therapy techniques: NA         Home Exercises Provided and Patient Education Provided   Home exercises include:   scap retractions,   LTR,  Cat camel      Cardio program: 5/30/22  Lifting education date: Visit 11  Lumbar roll: not obtained as of 05/24/22    Education provided:       Written Home Exercises Provided: Patient instructed to cont prior HEP.  Exercises were reviewed and Magdalena was able to demonstrate them prior to the end of the session.  Magdalena demonstrated good  understanding of the education provided.     See EMR under Patient Instructions for exercises provided prior visit.          Assessment   Magdalena returns without complaints of lower back pain, continuing to do stretches in the morning and afternoon. Plans on starting wellness after d/c and learning machines for [facility]. Resumed exercises with good recall and progression. Maintained resistance on the lumbar medx and she was able to complete 19 repetitions at 3/10 RPE, plan to test and d/c next visit.    Patient is making good progress towards established goals.  Pt will continue to benefit from skilled outpatient physical therapy to address the deficits stated in the impairment chart, provide pt/family education and to maximize pt's level of independence in the home and community environment.     Anticipated Barriers for therapy: None  Pt's spiritual, cultural and educational needs considered and pt agreeable to plan of care and goals as stated below:     Goals:     Short term goals:  6 weeks or 10 visits   1.  Pt will demonstrate increased lumbar ROM by at least 3 degrees from the initial ROM value with improvements noted in functional ROM and ability to perform ADLs. MET  2.  Pt will demonstrate increased MedX average isometric strength value  by 10% from initial test resulting in improved ability to perform bending, lifting, and carrying activities safely, confidently MET  3.  Patient report a reduction in worst pain score by 1-2  points for improved tolerance for changing positions in bed.  (approp and ongoing)  4.  Pt able to perform HEP correctly with minimal cueing or supervision from therapist to encourage independent management of symptoms. (approp and ongoing)        Long term goals: 10 weeks or 20 visits   1. Pt will demonstrate increased lumbar ROM by at least 9 degrees from initial ROM value, resulting in improved ability to perform functional fwd bending while standing and sitting. MET  2. Pt will demonstrate increased MedX average isometric strength value  by 30% from initial test resulting in improved ability to perform bending, lifting, and carrying activities safely, confidently.  (approp and ongoing)  3. Pt to demonstrate ability to independently control and reduce their pain through posture positioning and mechanical movements throughout a typical day.  (approp and ongoing)  4.  Pt will demonstrate reduced pain and improved functional outcomes as reported on the FOTO by reaching a limitation score of < or = 25% or less in order to demonstrate subjective improvement in pt's condition.   MET  5. Pt will demonstrate independence with the HEP at discharge  (approp and ongoing)  6.  Pt(patient goal)will be able to walk 2 miles without back pain  (approp and ongoing)  7. Pt will be able to garden without pain for inc tolerance to recreational activities (approp and ongoing)         Plan   Continue with established Plan of Care towards established PT goals.       Elisha Gotti, PT  7/26/2022

## 2022-07-26 NOTE — PROGRESS NOTES
Health  Consult Note    Name: Magdalena Mane  Clinic Number: [id number]  Physician: Anai Oliveros MD  Past Medical History:   Diagnosis Date    Anemia     Atrial fibrillation     Basal cell carcinoma     DJD (degenerative joint disease) 12/12/2012    Obesity (BMI 30-39.9) 11/27/2015    Vaginal atrophy 4/1/2016     Time In: 11:30 AM  Time Out: 11:50 AM    Health  Agreement signed: yes    Coaching performed performed: virtual    Subjective:   Patient reports that she is feeling really good mentally and physically this morning. 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 Over the last 2 weeks, she has made great strides with dedicating more time to [employer]. She feels like she is finally putting herself first and has felt clarity surrounding the circumstances with her daughter. Magdalena has also developed a consistent HEP routine and has made good progress with her back pain. She wants to join a gym after graduating from  on Friday, and also discussed the possibility of continuing with the Wellness program here at [facility]. She is motivated and feeling positive about the future.     Vision: Magdalena's best self makes her own health, wellness, and mentality a number 1 priority.    Values: family, peace, longevity, happiness    Strengths:  Knowledgeable, determined, willing to change, flexibility, self-aware, determined, positive    Challenges: family members' circumstances, time management, motivation to be consistent     Support:  , HB program, Al-[employer]    Hobbies:  reading        INITIAL date  One a scale of 1-10, with 10 being 100% happy, how would you rate your happiness in each of the wellness areas below?    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 She is resilient.      Plan:  Patient goals for next consult include:      1. Be mindful about asking for help  2. Read/ meditate at least 1hr/day  3. 1/2 hr - 1 hr of exercise daily (HEP)    Long-Term:    1. Downsize from house to apt  2. Maintain a consistent HEP routine/ have chronic pain under control  3. Have an even better grasp on nutritional needs.      The patient location is: home  The chief complaint leading to consultation is: Health Coaching     Visit type: audiovisual    Face to Face time with patient: 20 min  35 minutes of total time spent on the encounter, which includes face to face time and non-face to face time preparing to see the patient (eg, review of tests), Obtaining and/or reviewing separately obtained history, Documenting clinical information in the electronic or other health record, Independently interpreting results (not separately reported) and communicating results to the patient/family/caregiver, or Care coordination (not separately reported).         Each patient to whom he or she provides medical services by telemedicine is:  (1) informed of the relationship between the physician and patient and the respective role of any other health care provider with respect to management of the patient; and (2) notified that he or she may decline to receive medical services by telemedicine and may withdraw from such care at any time.       Health : Sho Morfin  7/26/2022

## 2022-07-29 ENCOUNTER — CLINICAL SUPPORT (OUTPATIENT)
Dept: REHABILITATION | Facility: OTHER | Age: 77
End: 2022-07-29
Attending: INTERNAL MEDICINE
Payer: MEDICARE

## 2022-07-29 DIAGNOSIS — R29.898 DECREASED STRENGTH OF TRUNK AND BACK: ICD-10-CM

## 2022-07-29 DIAGNOSIS — R29.3 POOR POSTURE: Primary | ICD-10-CM

## 2022-07-29 PROCEDURE — 97750 PHYSICAL PERFORMANCE TEST: CPT

## 2022-07-29 PROCEDURE — 97110 THERAPEUTIC EXERCISES: CPT

## 2022-07-29 NOTE — PROGRESS NOTES
"[facility]      Name: Magdalena QUIÑONEZ Alhaji  Clinic Number: [id number]    Therapy Diagnosis:   Encounter Diagnoses   Name Primary?    Poor posture Yes    Decreased strength of trunk and back      Physician: Anai Oliveros MD    Visit Date: 2022    Physician Orders: PT Eval and Treat   Medical Diagnosis from Referral: S22.000D (ICD-10-CM) - Compression fracture of thoracic vertebra with routine healing, unspecified thoracic vertebral level, subsequent encounter  Evaluation Date: 2022  Authorization Period Expiration: 23  Plan of Care Expiration: 22  Reassessment Due: 22  Visit # / Visits authorized:     Time In: 1210  Time Out: 100  Total Billable Time: 40 minutes    Precautions: Standard/atrial fib/ B THR/endartectomy 3/22    Pattern of pain determined: 1 PEN    Subjective   Magdalena reports that she is not having any pain today, plans on continuing with wellness. She does get some discomfort at the end of the day, but does the stretches and feels better    Patient reports tolerating previous visit well /c increased soreness following testing on Medx  Patient reports their pain to be 0/10 on a 0-10 scale with 0 being no pain and 10 being the worst pain imaginable.  Pain Location: bilateral back, thoracic      Occupation: retired  Leisure: works in her garden /walking   Pt goals: "Increase core strength, go back to walking 2 miles"    Objective     Baseline IM Testing Results:   Date of testin22  ROM 0-48 deg   Max Peak Torque 89    min 40   Flex/Ext Ratio 2.25:1   % below normative data 33     mid IM Testing Results:   Date of testin22  ROM 0-60deg   Max Peak Torque 111   min 46   Flex/Ext Ratio 2.4:1   % below normative data 13   29% strength gain    End IM Testing Results:   Date of testin22  ROM 0-60deg   Max Peak Torque 110   min 74   Flex/Ext Ratio 1.4:1   % below normative data 1%   53% strength gain    Limitation/Restriction for " "FOTO lumbar Survey     Therapist reviewed FOTO scores for Magdalena Mane on 5/17/2022.   FOTO documents entered into [employer] - see Media section.     Limitation Score: 30%   17% FOTO  Visit 20        MOVEMENT LOSS 6/7/22    ROM Loss   Flexion within functional limits   Extension minimal -mod   Side glide Right WFL   Side glide Left WFL   Rotation Right WFL   Rotation Left WFL           Treatment    Pt was instructed in and performed the following:     Magdalena received therapeutic exercises to develop/improved posture, cardiovascular endurance, muscular endurance, lumbar/cervical ROM, strength and muscular endurance for 50 minutes including the following exercises:     [facility] 7/29/2022   Visit Number 20   VAS Pain Rating 0   Time 5   Scapular Retraction -   Lumbar Stretches - Slouch Overcorrection -   Extension in Lying 10   Lumbar Extension Seat Pad -   Femur Restraint -   Top Dead Center -   Counterweight -   Lumbar Flexion -   Lumbar Extension -   Lumbar Peak Torque 110   Min Torque 74   Test Percent Below Normative Data 1   Test Percent Gain in Strength from Initial  56   Lumbar Weight -   Repetitions -   Rating of Perceived Exertion -   Ice - Z Lie (in min.) 5         LTR x15  Open book x10  Single leg bridges x 10 ea  90/90 heel taps 2x5 ea  EIL x10  No money RTB x15  Quad    Cat/Cow x 10 cue for inc focus thoracic ext    Thread needle x10B    Bird dogs x10       Not performed 7/29/2022 :  Scap retract x20  3" hold Y TB   SOC x 20 5" hold cue for dec cervical motion   TA activation w/SLR x10 ea  LE ext x10  PPT + marching x15  Lifting Mechanics: floor lift & golfers lift      Peripheral muscle strengthening which included 1 set of 15-20 repetitions at a slow, controlled 10-13 second per rep pace focused on strengthening supporting musculature for improved body mechanics and functional mobility.  Pt and therapist focused on proper form during treatment to ensure optimal strengthening of each targeted " muscle group.  Machines were utilized including torso rotation, leg extension, leg curl, chest press, upright row. Tricep extension, bicep curl, leg press, and hip abduction added visit 3    Magdalena received the following manual therapy techniques: NA         Home Exercises Provided and Patient Education Provided   Home exercises include:   scap retractions,   LTR,  Cat camel      Cardio program: 5/30/22  Lifting education date: Visit 11  Lumbar roll: not obtained as of 05/24/22    Education provided:       Written Home Exercises Provided: Patient instructed to cont prior HEP.  Exercises were reviewed and Magdalena was able to demonstrate them prior to the end of the session.  Magdalena demonstrated good  understanding of the education provided.     See EMR under Patient Instructions for exercises provided prior visit.          Patient has attended 20 visits of the [facility] program for aerobic work, med ex isometric testing with dynamic strengthening on med ex equipment for spine, whole body strengthening on med ex equipment, HEP, education.  Patient has completed [facility] Program and is ready to be transitioned into wellness program.  Importance of wellness program, and attending 1/week to maintain strength stressed.  Importance of continuing there ex and body mech and ergonomics stressed.   Patient demonstrates improvement in ability to reduce symptoms, improved posture, improved ROM and improved strength.   Reviewed HEP, and importance of maintaining a healthy spine through continued stretching and performance of HEP, good posture, good ergonomics, good body mech with ADL, leisure, and work.  Discharge handout with HEP given, and discussed aspects of exercise program, cardio, strengthening, and stretching.    Patient expressed understanding information given.  Patient plans to attend wellness and is ready to transition to wellness.      -Improved 12 ROM,  initially on med ex test 0-48 and   currently  0-60  -Improved strength at each test point on lumbar med ex IM test with   56 average improvement noted with Reduced pain  Noted by patient  -Initial outcome tool score 30% and current outcome tool score  17% indicating reduced pain and improved function  :     Goals:     Short term goals:  6 weeks or 10 visits   1.  Pt will demonstrate increased lumbar ROM by at least 3 degrees from the initial ROM value with improvements noted in functional ROM and ability to perform ADLs. MET  2.  Pt will demonstrate increased MedX average isometric strength value  by 10% from initial test resulting in improved ability to perform bending, lifting, and carrying activities safely, confidently MET  3.  Patient report a reduction in worst pain score by 1-2 points for improved tolerance for changing positions in bed.  MET  4.  Pt able to perform HEP correctly with minimal cueing or supervision from therapist to encourage independent management of symptoms. MET        Long term goals: 10 weeks or 20 visits   1. Pt will demonstrate increased lumbar ROM by at least 9 degrees from initial ROM value, resulting in improved ability to perform functional fwd bending while standing and sitting. MET  2. Pt will demonstrate increased MedX average isometric strength value  by 30% from initial test resulting in improved ability to perform bending, lifting, and carrying activities safely, confidently. MET  3. Pt to demonstrate ability to independently control and reduce their pain through posture positioning and mechanical movements throughout a typical day. MET  4.  Pt will demonstrate reduced pain and improved functional outcomes as reported on the FOTO by reaching a limitation score of < or = 25% or less in order to demonstrate subjective improvement in pt's condition.   MET  5. Pt will demonstrate independence with the HEP at discharge  MET  6.  Pt(patient goal)will be able to walk 2 miles without back pain  MET  7. Pt will be able to garden  without pain for inc tolerance to recreational activities MET      Plan   DC to wellness program  Marleni Naylor, PT  7/29/2022

## 2022-07-29 NOTE — PATIENT INSTRUCTIONS
"HEALTHY BACK TOOLS        KEEP YOUR SPINE FEELING FINE   HEALTHY HABITS   Do your "GO TO" stretches 2/day   Get a good night's REST   Watch your POSTURE in sitting/standing Drink PLENTY of water   Use a lumbar roll Eat LOTS of fruits & vegetables   GET UP often (walk and/or stretch) Manage your STRESS   Make your workplace IDEAL FOR YOU  Don't smoke   Lift correctly EXERCISE                           WHAT TO DO WHEN SYMPTOMS FLARE UP  Back and neck pain may occasionally flare up.  If you experience a flare   up, remember your tools. Be encouraged, by remembering that flare-ups will   usually pass.   My Tools:    ~Use your "Go To" Stretches/Positions   ~Keep Moving-pain usually gets better if you move  ~Z lie (with or without ice)  10 min several times a day until symptoms reduce  ~Slowly resume normal activities   ~Practice Deep Breathing and Relaxation techniques                                                 MY EXERCISE PLAN  GO TO STRETCHES  2/day (like brushing your teeth) STRENGTHENING  2-3 times/week CARDIO PROGRAM  3 week   Hip rotation 10x Torture 1 10x( single leg bridge) walking   Open book 10x Torture 2 (bicycle on back) 10x    Push up on stomach 10x Doggie thing 10x    Cat/cow 10x            "

## 2022-08-04 ENCOUNTER — DOCUMENTATION ONLY (OUTPATIENT)
Dept: REHABILITATION | Facility: OTHER | Age: 77
End: 2022-08-04
Attending: INTERNAL MEDICINE
Payer: MEDICARE

## 2022-08-04 NOTE — PROGRESS NOTES
Health  Wellness Visit Note    Name: Magdalena Westonn  Clinic Number: [id number]  Physician: Anai Oliveros MD  Diagnosis: No diagnosis found.  Past Medical History:   Diagnosis Date    Anemia     Atrial fibrillation     Basal cell carcinoma     DJD (degenerative joint disease) 12/12/2012    Obesity (BMI 30-39.9) 11/27/2015    Vaginal atrophy 4/1/2016     Visit Number: 21  Precautions: Atrial Fibrillation      1st PT visit: 5/17/2022  Year of care end date: 5/17/2023  6 Month test  Performed: Nov 2022  12 Month test performed: May 2023  Mind body plan: 2F  Patient level: C    Time In: 12:25 PM  Time Out: 1:20 PM  Total Treatment Time: 55 minutes    Wellness Vision 2022  Handout on this week's wellness topic SMART Goals was provided  along with a discussion on what it means, the benefits, and suggestions for practice.  Reviewed last week's topic of NA-pt's first wellness visit..    Subjective:   Patient reports for her first wellness visit with no back pain. She stretches every morning and evening.  Does floor strengthening exercises daily, if she missed, her balance is affected.    Objective:   Magdalena completed therapeutic stretches (EIL, ABRAHAM) and the following MedX exercise machines: core lumbar, torso rotation l/r, leg extension, leg curl, upright row, chest press, biceps curl, triceps extension, leg press    See exercise log in patient folder for rate of exertion and repetitions completed.       Fitness Machine Education Key:  E=education on equipment initiated and further follow up and education needed  I=independent with  and exercise.  The patient:   Adjusts machines to his/her settings   Uses equipment levers, pins, weights safely   Maintains safe and correct posture while exercising   Moves through exercise with correct pace and control   Gets on and off equipment safely      Lumbar/Cervical Ext.  Torso Rotation  Leg Press    Leg Extension  Seated Leg Curl  Chest Press    Seated Row   Hip ADD  Hip ABD    Triceps Extension  Bicep Curl  Other:        Assessment:   Patient tolerated Patient tolerated MedX Core Lumbar Strength and all other peripheral exercises without an increase in symptoms. Patient warmed up on recumbent bike for 5 minutes, stretched, and iced low back for 5 minutes after the workout.    Plan:  Continue with established plan of care towards wellness goals.     Health  : Marilin Kraft  8/4/2022

## 2022-08-10 ENCOUNTER — HOSPITAL ENCOUNTER (OUTPATIENT)
Dept: RADIOLOGY | Facility: HOSPITAL | Age: 77
Discharge: HOME | End: 2022-08-10
Attending: INTERNAL MEDICINE
Payer: MEDICARE

## 2022-08-10 DIAGNOSIS — Z12.31 VISIT FOR SCREENING MAMMOGRAM: ICD-10-CM

## 2022-08-10 PROCEDURE — 77063 MAMMO DIGITAL SCREENING BILAT WITH TOMO: ICD-10-PCS | Mod: 26,,, | Performed by: RADIOLOGY

## 2022-08-10 PROCEDURE — 77063 BREAST TOMOSYNTHESIS BI: CPT | Mod: TC,PO

## 2022-08-10 PROCEDURE — 77067 SCR MAMMO BI INCL CAD: CPT | Mod: 26,,, | Performed by: RADIOLOGY

## 2022-08-10 PROCEDURE — 77067 MAMMO DIGITAL SCREENING BILAT WITH TOMO: ICD-10-PCS | Mod: 26,,, | Performed by: RADIOLOGY

## 2022-08-10 PROCEDURE — 77063 BREAST TOMOSYNTHESIS BI: CPT | Mod: 26,,, | Performed by: RADIOLOGY

## 2022-08-12 ENCOUNTER — DOCUMENTATION ONLY (OUTPATIENT)
Dept: REHABILITATION | Facility: OTHER | Age: 77
End: 2022-08-12
Attending: INTERNAL MEDICINE
Payer: MEDICARE

## 2022-08-12 NOTE — PROGRESS NOTES
Health  Wellness Visit Note    Name: Magdalena Mane  Clinic Number: [id number]  Physician: Anai Oliveros MD  Diagnosis: No diagnosis found.  Past Medical History:   Diagnosis Date    Anemia     Atrial fibrillation     Basal cell carcinoma     DJD (degenerative joint disease) 12/12/2012    Obesity (BMI 30-39.9) 11/27/2015    Vaginal atrophy 4/1/2016     Visit Number: 22  Precautions: Atrial Fibrillation      1st PT visit: 5/17/2022  Year of care end date: 5/17/2023  6 Month test  Performed: Nov 2022  12 Month test performed: May 2023  Mind body plan: 2F  Patient level: C    Time In: 12:20 PM  Time Out: 1:03 PM  Total Treatment Time: 43  minutes    Wellness Vision 2022  Handout on this week's wellness topic Small Steps was provided  along with a discussion on what it means, the benefits, and suggestions for practice.  Reviewed last week's topic of  SMART Goals.    Subjective:   Patient reports that her low back is pain-free today. Pt completes stretches twice/day, but does not typically ice at home.  Pt completes home strengthening exercises on a regular basis throughout the week. She is interested in joining [employer] soon.    Objective:   Magdalena completed therapeutic stretches (EIL, ABRAHAM) and the following MedX exercise machines: core lumbar, torso rotation l/r, leg extension, leg curl, upright row, chest press, biceps curl, triceps extension, leg press    See exercise log in patient folder for rate of exertion and repetitions completed.       Fitness Machine Education Key:  E=education on equipment initiated and further follow up and education needed  I=independent with  and exercise.  The patient:   Adjusts machines to his/her settings   Uses equipment levers, pins, weights safely   Maintains safe and correct posture while exercising   Moves through exercise with correct pace and control   Gets on and off equipment safely      Core Lumbar Strength  Torso Rotation  Leg Press    Leg Extension   Seated Leg Curl  Chest Press    Seated Row  Hip ADD  Hip ABD    Triceps Extension  Bicep Curl  Other:        Assessment:   Patient tolerated Patient tolerated MedX Core Lumbar Strength and all other peripheral exercises without an increase in symptoms. Patient warmed up on recumbent bike for 5 minutes, stretched, and iced low back for 5 minutes after the workout.    Plan:  Continue with established plan of care towards wellness goals.     Health  : Sho Morfin  8/12/2022

## 2022-08-22 ENCOUNTER — PATIENT MESSAGE (OUTPATIENT)
Dept: REHABILITATION | Facility: OTHER | Age: 77
End: 2022-08-22
Payer: MEDICARE

## 2022-08-23 ENCOUNTER — PATIENT MESSAGE (OUTPATIENT)
Dept: CARDIOLOGY | Facility: CLINIC | Age: 77
End: 2022-08-23
Payer: MEDICARE

## 2022-08-23 DIAGNOSIS — I10 ESSENTIAL HYPERTENSION: ICD-10-CM

## 2022-08-23 RX ORDER — CANDESARTAN 16 MG/1
8 TABLET ORAL 2 TIMES DAILY
Qty: 90 TABLET | Refills: 2 | Status: SHIPPED | OUTPATIENT
Start: 2022-08-23 | End: 2022-11-10 | Stop reason: SDUPTHER

## 2022-09-01 ENCOUNTER — DOCUMENTATION ONLY (OUTPATIENT)
Dept: REHABILITATION | Facility: OTHER | Age: 77
End: 2022-09-01
Attending: INTERNAL MEDICINE
Payer: MEDICARE

## 2022-09-01 NOTE — PROGRESS NOTES
Health  Wellness Visit Note    Name: Magdalena Mane  Clinic Number: [id number]  Physician: No ref. provider found  Diagnosis: No diagnosis found.  Past Medical History:   Diagnosis Date    Anemia     Atrial fibrillation     Basal cell carcinoma     DJD (degenerative joint disease) 12/12/2012    Obesity (BMI 30-39.9) 11/27/2015    Vaginal atrophy 4/1/2016     Visit Number: 23  Precautions: Atrial Fibrillation      1st PT visit: 5/17/2022  Year of care end date: 5/17/2023  6 Month test  Performed: Nov 2022  12 Month test performed: May 2023  Mind body plan: Plan A 9 months  Patient level: B    Time In: 12:25 PM  Time Out: 1:10 PM  Total Treatment Time:  45 minutes    Wellness Vision 2022  Handout on this week's wellness topic Decisional Balance was provided  along with a discussion on what it means, the benefits, and suggestions for practice.  Reviewed last week's topic of  n/a    Subjective:   Patient reports that her low back is pain-free today. Pt stretches at least 2x/day, but does not typically ice at home.  Pt completes home strengthening exercises on a regular basis throughout the week. She is interested in joining [employer] soon.    Objective:   Magdalena completed therapeutic stretches (EIL, ABRAHAM) and the following MedX exercise machines: core lumbar, torso rotation l/r, leg extension, leg curl, upright row, chest press, biceps curl, triceps extension, leg press    See exercise log in patient folder for rate of exertion and repetitions completed.       Fitness Machine Education Key:  E=education on equipment initiated and further follow up and education needed  I=independent with  and exercise.  The patient:  Adjusts machines to his/her settings  Uses equipment levers, pins, weights safely  Maintains safe and correct posture while exercising  Moves through exercise with correct pace and control  Gets on and off equipment safely      Core Lumbar Strength  Torso Rotation  Leg Press    Leg Extension E  Seated Leg Curl  Chest Press    Seated Row  Hip ADD X Hip ABD E   Triceps Extension  Bicep Curl  Other: X       Assessment:   Patient tolerated Patient tolerated MedX Core Lumbar Strength and all other peripheral exercises without an increase in symptoms. Patient warmed up on recumbent bike for 5 minutes, stretched, and iced low back for 5 minutes after the workout.    Plan:  Continue with established plan of care towards wellness goals.     Health  : Sho Morfin  9/1/2022

## 2022-09-08 ENCOUNTER — DOCUMENTATION ONLY (OUTPATIENT)
Dept: REHABILITATION | Facility: OTHER | Age: 77
End: 2022-09-08
Attending: INTERNAL MEDICINE
Payer: MEDICARE

## 2022-09-08 NOTE — PROGRESS NOTES
Health  Wellness Visit Note    Name: Magdalena Mane  Clinic Number: [id number]  Physician: No ref. provider found  Diagnosis: No diagnosis found.  Past Medical History:   Diagnosis Date    Anemia     Atrial fibrillation     Basal cell carcinoma     DJD (degenerative joint disease) 12/12/2012    Obesity (BMI 30-39.9) 11/27/2015    Vaginal atrophy 4/1/2016     Visit Number: 23  Precautions: Atrial Fibrillation      1st PT visit: 5/17/2022  Year of care end date: 5/17/2023  6 Month test  Performed: Nov 2022  12 Month test performed: May 2023  Mind body plan: Plan A 9 months  Patient level: B    Time In: 12:30 PM  Time Out: 1:20 PM  Total Treatment Time: 50  minutes    Wellness Vision 2022  Handout on this week's wellness topic Body Image was provided  along with a discussion on what it means, the benefits, and suggestions for practice.  Reviewed last week's topic of Decisional Balance    Subjective:   Patient reports that her low back feels good today. Pt has continued to stretch twice per day, but does not regularly ice her back. She completed her exercises at home 6/7 days over the last week. She is interested in joining [employer] soon.    Objective:   Magdalena completed therapeutic stretches (EIL, ABRAHAM) and the following MedX exercise machines: core lumbar, torso rotation l/r, leg extension, leg curl, upright row, chest press, biceps curl, triceps extension, leg press    See exercise log in patient folder for rate of exertion and repetitions completed.       Fitness Machine Education Key:  E=education on equipment initiated and further follow up and education needed  I=independent with  and exercise.  The patient:  Adjusts machines to his/her settings  Uses equipment levers, pins, weights safely  Maintains safe and correct posture while exercising  Moves through exercise with correct pace and control  Gets on and off equipment safely      Core Lumbar Strength  Torso Rotation  Leg Press    Leg Extension E  Seated Leg Curl E Chest Press    Seated Row E Hip ADD X Hip ABD E   Triceps Extension  Bicep Curl  Other: X       Assessment:   Patient tolerated Patient tolerated MedX Core Lumbar Strength and all other peripheral exercises without an increase in symptoms. Patient warmed up on recumbent bike for 5 minutes, stretched, and iced low back for 5 minutes after the workout.    Plan:  Continue with established plan of care towards wellness goals.     Health  : Sho Morfin  9/8/2022

## 2022-09-15 ENCOUNTER — DOCUMENTATION ONLY (OUTPATIENT)
Dept: REHABILITATION | Facility: OTHER | Age: 77
End: 2022-09-15
Attending: INTERNAL MEDICINE
Payer: MEDICARE

## 2022-09-15 NOTE — PROGRESS NOTES
Health  Wellness Visit Note    Name: Magdalena Mane  Clinic Number: [id number]  Physician: No ref. provider found  Diagnosis: No diagnosis found.  Past Medical History:   Diagnosis Date    Anemia     Atrial fibrillation     Basal cell carcinoma     DJD (degenerative joint disease) 12/12/2012    Obesity (BMI 30-39.9) 11/27/2015    Vaginal atrophy 4/1/2016     Visit Number: 24  Precautions: Atrial Fibrillation      1st PT visit: 5/17/2022  Year of care end date: 5/17/2023  6 Month test  Performed: Nov 2022  12 Month test performed: May 2023  Mind body plan: Plan A 9 months  Patient level: B    Time In: 12:20 PM  Time Out: 1:08 PM  Total Treatment Time: 48 minutes    Wellness Vision 2022  Handout on this week's wellness topic Preventing Burnout was provided  along with a discussion on what it means, the benefits, and suggestions for practice.  Reviewed last week's topic of Body Image    Subjective:   Patient reports that her low back feels good today, although she was sore after working in her yard for 4 hours on Tuesday; he pain has improved. Pt stretches 2x/day and only ices when needed. Pt has begun to take small walks in her neighborhood. She also completes home strengthening exercises throughout the week.     Objective:   Magdalena completed therapeutic stretches (EIL, ABRAHAM) and the following MedX exercise machines: core lumbar, torso rotation l/r, leg extension, leg curl, upright row, chest press, biceps curl, triceps extension, leg press    See exercise log in patient folder for rate of exertion and repetitions completed.       Fitness Machine Education Key:  E=education on equipment initiated and further follow up and education needed  I=independent with  and exercise.  The patient:  Adjusts machines to his/her settings  Uses equipment levers, pins, weights safely  Maintains safe and correct posture while exercising  Moves through exercise with correct pace and control  Gets on and off equipment  safely      Core Lumbar Strength  Torso Rotation E Leg Press E   Leg Extension E Seated Leg Curl E Chest Press    Seated Row E Hip ADD X Hip ABD E   Triceps Extension  Bicep Curl  Other: X       Assessment:   Patient tolerated Patient tolerated MedX Core Lumbar Strength and all other peripheral exercises without an increase in symptoms. Patient warmed up on recumbent bike for 5 minutes, stretched, and iced low back for 5 minutes after the workout.    Plan:  Continue with established plan of care towards wellness goals.     Health  : Sho Morfin  9/15/2022

## 2022-09-20 ENCOUNTER — HOSPITAL ENCOUNTER (EMERGENCY)
Facility: HOSPITAL | Age: 77
Discharge: HOME | End: 2022-09-20
Attending: EMERGENCY MEDICINE
Payer: MEDICARE

## 2022-09-20 VITALS
WEIGHT: 133.81 LBS | TEMPERATURE: 99 F | SYSTOLIC BLOOD PRESSURE: 193 MMHG | HEART RATE: 55 BPM | OXYGEN SATURATION: 99 % | RESPIRATION RATE: 18 BRPM | DIASTOLIC BLOOD PRESSURE: 76 MMHG | BODY MASS INDEX: 27.03 KG/M2

## 2022-09-20 DIAGNOSIS — R55 NEAR SYNCOPE: ICD-10-CM

## 2022-09-20 LAB
ALBUMIN SERPL BCP-MCNC: 3.8 G/DL (ref 3.5–5.2)
ALP SERPL-CCNC: 91 U/L (ref 55–135)
ALT SERPL W/O P-5'-P-CCNC: 14 U/L (ref 10–44)
ANION GAP SERPL CALC-SCNC: 5 MMOL/L (ref 8–16)
AST SERPL-CCNC: 20 U/L (ref 10–40)
BASOPHILS # BLD AUTO: 0.06 K/UL (ref 0–0.2)
BASOPHILS NFR BLD: 0.8 % (ref 0–1.9)
BILIRUB SERPL-MCNC: 0.5 MG/DL (ref 0.1–1)
BILIRUB UR QL STRIP: NEGATIVE
BUN SERPL-MCNC: 14 MG/DL (ref 8–23)
CALCIUM SERPL-MCNC: 9.4 MG/DL (ref 8.7–10.5)
CHLORIDE SERPL-SCNC: 98 MMOL/L (ref 95–110)
CLARITY UR REFRACT.AUTO: CLEAR
CO2 SERPL-SCNC: 28 MMOL/L (ref 23–29)
COLOR UR AUTO: COLORLESS
CREAT SERPL-MCNC: 0.8 MG/DL (ref 0.5–1.4)
DIFFERENTIAL METHOD: ABNORMAL
EOSINOPHIL # BLD AUTO: 0 K/UL (ref 0–0.5)
EOSINOPHIL NFR BLD: 0.6 % (ref 0–8)
ERYTHROCYTE [DISTWIDTH] IN BLOOD BY AUTOMATED COUNT: 12.6 % (ref 11.5–14.5)
EST. GFR  (NO RACE VARIABLE): >60 ML/MIN/1.73 M^2
GLUCOSE SERPL-MCNC: 117 MG/DL (ref 70–110)
GLUCOSE UR QL STRIP: NEGATIVE
HCT VFR BLD AUTO: 34.1 % (ref 37–48.5)
HCV AB SERPL QL IA: NORMAL
HGB BLD-MCNC: 11.3 G/DL (ref 12–16)
HGB UR QL STRIP: ABNORMAL
HIV 1+2 AB+HIV1 P24 AG SERPL QL IA: NORMAL
IMM GRANULOCYTES # BLD AUTO: 0.02 K/UL (ref 0–0.04)
IMM GRANULOCYTES NFR BLD AUTO: 0.3 % (ref 0–0.5)
KETONES UR QL STRIP: NEGATIVE
LEUKOCYTE ESTERASE UR QL STRIP: ABNORMAL
LYMPHOCYTES # BLD AUTO: 1.5 K/UL (ref 1–4.8)
LYMPHOCYTES NFR BLD: 21 % (ref 18–48)
MCH RBC QN AUTO: 32.1 PG (ref 27–31)
MCHC RBC AUTO-ENTMCNC: 33.1 G/DL (ref 32–36)
MCV RBC AUTO: 97 FL (ref 82–98)
MICROSCOPIC COMMENT: NORMAL
MONOCYTES # BLD AUTO: 0.7 K/UL (ref 0.3–1)
MONOCYTES NFR BLD: 10.1 % (ref 4–15)
NEUTROPHILS # BLD AUTO: 4.9 K/UL (ref 1.8–7.7)
NEUTROPHILS NFR BLD: 67.2 % (ref 38–73)
NITRITE UR QL STRIP: NEGATIVE
NRBC BLD-RTO: 0 /100 WBC
PH UR STRIP: 7 [PH] (ref 5–8)
PLATELET # BLD AUTO: 291 K/UL (ref 150–450)
PMV BLD AUTO: 10.6 FL (ref 9.2–12.9)
POTASSIUM SERPL-SCNC: 4.1 MMOL/L (ref 3.5–5.1)
PROT SERPL-MCNC: 6.7 G/DL (ref 6–8.4)
PROT UR QL STRIP: NEGATIVE
RBC # BLD AUTO: 3.52 M/UL (ref 4–5.4)
RBC #/AREA URNS AUTO: 1 /HPF (ref 0–4)
SODIUM SERPL-SCNC: 131 MMOL/L (ref 136–145)
SP GR UR STRIP: 1 (ref 1–1.03)
URN SPEC COLLECT METH UR: ABNORMAL
WBC # BLD AUTO: 7.23 K/UL (ref 3.9–12.7)
WBC #/AREA URNS AUTO: 3 /HPF (ref 0–5)

## 2022-09-20 PROCEDURE — 85025 COMPLETE CBC W/AUTO DIFF WBC: CPT | Performed by: EMERGENCY MEDICINE

## 2022-09-20 PROCEDURE — 99284 PR EMERGENCY DEPT VISIT,LEVEL IV: ICD-10-PCS | Mod: ,,, | Performed by: PHYSICIAN ASSISTANT

## 2022-09-20 PROCEDURE — 81001 URINALYSIS AUTO W/SCOPE: CPT | Performed by: PHYSICIAN ASSISTANT

## 2022-09-20 PROCEDURE — 93010 EKG 12-LEAD: ICD-10-PCS | Mod: ,,, | Performed by: INTERNAL MEDICINE

## 2022-09-20 PROCEDURE — 99284 EMERGENCY DEPT VISIT MOD MDM: CPT

## 2022-09-20 PROCEDURE — 93005 ELECTROCARDIOGRAM TRACING: CPT

## 2022-09-20 PROCEDURE — 99284 EMERGENCY DEPT VISIT MOD MDM: CPT | Mod: ,,, | Performed by: PHYSICIAN ASSISTANT

## 2022-09-20 PROCEDURE — 93010 ELECTROCARDIOGRAM REPORT: CPT | Mod: ,,, | Performed by: INTERNAL MEDICINE

## 2022-09-20 PROCEDURE — 87389 HIV-1 AG W/HIV-1&-2 AB AG IA: CPT | Performed by: PHYSICIAN ASSISTANT

## 2022-09-20 PROCEDURE — 80053 COMPREHEN METABOLIC PANEL: CPT | Performed by: EMERGENCY MEDICINE

## 2022-09-20 PROCEDURE — 86803 HEPATITIS C AB TEST: CPT | Performed by: PHYSICIAN ASSISTANT

## 2022-09-20 NOTE — ED NOTES
Patient identifiers verified and correct for Ms Mane  C/C: Lightheaded SEE NN  APPEARANCE: awake and alert in NAD.  SKIN: warm, dry and intact. No breakdown or bruising.  MUSCULOSKELETAL: Patient moving all extremities spontaneously, 2+ pedal edema . Ambulates independently.  RESPIRATORY: Denies shortness of breath.Respirations unlabored.   CARDIAC: Denies CP, 2+ distal pulses; no peripheral edema  ABDOMEN: S/ND/NT, Denies nausea  : voids spontaneously, denies difficulty  Neurologic: AAO x 4; follows commands equal strength in all extremities; denies numbness/tingling. Denies dizziness  Reports lightheaded, deneis  new weakness

## 2022-09-20 NOTE — ED PROVIDER NOTES
"Encounter Date: 9/20/2022       History     Chief Complaint   Patient presents with    Dizziness     "2 dizzy spells today." Denies syncope.      77-year-old female history of atrial fibrillation, anemia, DJD, obesity presents to the ED for near-syncope.  States that yesterday she is walking couple [place] the [place] with her [place] in the heat had a brief episode of near syncope sitting which resolved its own.  Today she went to a doctor's appointment for her daughter and while in the waiting room while sitting head another 2 episodes of near-syncope have.  Patient denies any palpitations, chest pain, shortness of breath, abdominal pain or urinary symptoms.    Review of patient's allergies indicates:   Allergen Reactions    Prednisone      ELEVATED B/P FOR SEVERAL DAYS/WENT TO ER    Lactose      Past Medical History:   Diagnosis Date    Anemia     Atrial fibrillation     Basal cell carcinoma     DJD (degenerative joint disease) 12/12/2012    Obesity (BMI 30-39.9) 11/27/2015    Vaginal atrophy 4/1/2016     Past Surgical History:   Procedure Laterality Date    ADENOIDECTOMY      BREAST BIOPSY Left 1999    Excisional bx, benign cyst    BREAST SURGERY      CARDIOVERSION  02/2021    CAROTID ENDARTERECTOMY Right 04/07/2022    Procedure: ENDARTERECTOMY-CAROTID;  Surgeon: TUCKER Brantley III, MD;  Location: [facility] OR 2ND FLR;  Service: Peripheral Vascular;  Laterality: Right;    COLONOSCOPY  2012    normal    COLONOSCOPY N/A 06/28/2017    Procedure: COLONOSCOPY;  Surgeon: Markel Montemayor MD;  Location: [facility] (4TH FLR);  Service: Endoscopy;  Laterality: N/A;    EXCISION OF GANGLION OF WRIST Left 06/27/2018    Procedure: EXCISION, GANGLION CYST, WRIST - Left Volar wrist;  Surgeon: Mary Moore MD;  Location: [facility] OR 1ST FLR;  Service: Orthopedics;  Laterality: Left;    HIP SURGERY  05/05/2014    bilateral    JOINT REPLACEMENT Bilateral     MOLLY    TONSILLECTOMY      TREATMENT OF CARDIAC " ARRHYTHMIA N/A 2020    Procedure: CARDIOVERSION;  Surgeon: Nigel García MD;  Location: [facility] EP LAB;  Service: Cardiology;  Laterality: N/A;  AF, LIGIA, DCCV, MAC, [place], 3 Prep    TREATMENT OF CARDIAC ARRHYTHMIA N/A 2020    Procedure: Cardioversion or Defibrillation;  Surgeon: Nigel García MD;  Location: [facility] EP LAB;  Service: Cardiology;  Laterality: N/A;  AF, LIGIA, DCCV, MAC, [place], 3 Prep     Family History   Problem Relation Age of Onset    Heart disease Mother         pacemaker    Breast cancer Mother     Arthritis Mother     Hyperlipidemia Mother     Scoliosis Father     Heart disease Father     Tuberculosis Father          1 lung from collapse lung    Heart attack Father     Heart failure Father     Hyperlipidemia Father     Hypertension Father     No Known Problems Brother     Stroke Maternal Grandmother     Heart disease Paternal Grandmother     Dementia Paternal Grandmother      Social History     Tobacco Use    Smoking status: Former     Packs/day: 1.00     Years: 10.00     Pack years: 10.00     Types: Cigarettes     Quit date: 1988     Years since quittin.6    Smokeless tobacco: Never   Substance Use Topics    Alcohol use: Not Currently    Drug use: No     Review of Systems   Constitutional:  Negative for chills and fever.   HENT:  Negative for sore throat.    Eyes:  Negative for pain.   Respiratory:  Negative for cough and shortness of breath.    Cardiovascular:  Negative for chest pain.   Gastrointestinal:  Positive for nausea. Negative for abdominal pain and vomiting.   Genitourinary:  Negative for difficulty urinating and dysuria.   Musculoskeletal: Negative.    Skin: Negative.    Neurological:  Positive for light-headedness. Negative for syncope.   Psychiatric/Behavioral:  Negative for confusion.      Physical Exam     Initial Vitals [22 1726]   BP Pulse Resp Temp SpO2   (!) 180/75 (!) 59 18 98.2 °F (36.8 °C) 99 %      MAP       --         Physical  Exam    Nursing note and vitals reviewed.  Constitutional: She appears well-developed and well-nourished.   HENT:   Head: Normocephalic and atraumatic.   Eyes: EOM are normal. Pupils are equal, round, and reactive to light.   Neck: Neck supple.   Normal range of motion.  Cardiovascular:  Regular rhythm, normal heart sounds and intact distal pulses.           Pulmonary/Chest: Breath sounds normal.   Abdominal: Abdomen is soft. Bowel sounds are normal. There is no abdominal tenderness.   Musculoskeletal:         General: Normal range of motion.      Cervical back: Normal range of motion and neck supple.     Neurological: She is alert and oriented to person, place, and time. She has normal strength. No cranial nerve deficit. GCS score is 15. GCS eye subscore is 4. GCS verbal subscore is 5. GCS motor subscore is 6.   Skin: Skin is warm and dry. Capillary refill takes less than 2 seconds.   Psychiatric: She has a normal mood and affect.       ED Course   Procedures  Labs Reviewed   CBC W/ AUTO DIFFERENTIAL - Abnormal; Notable for the following components:       Result Value    RBC 3.52 (*)     Hemoglobin 11.3 (*)     Hematocrit 34.1 (*)     MCH 32.1 (*)     All other components within normal limits    Narrative:     Release to patient->Immediate   COMPREHENSIVE METABOLIC PANEL - Abnormal; Notable for the following components:    Sodium 131 (*)     Glucose 117 (*)     Anion Gap 5 (*)     All other components within normal limits    Narrative:     Release to patient->Immediate   URINALYSIS, REFLEX TO URINE CULTURE - Abnormal; Notable for the following components:    Color, UA Colorless (*)     Occult Blood UA Trace (*)     Leukocytes, UA 1+ (*)     All other components within normal limits    Narrative:     Specimen Source->Urine   HIV 1 / 2 ANTIBODY    Narrative:     Release to patient->Immediate   HEPATITIS C ANTIBODY    Narrative:     Release to patient->Immediate   URINALYSIS MICROSCOPIC    Narrative:     Specimen  Source->Urine          Imaging Results    None          Medications - No data to display  Medical Decision Making:   History:   Old Medical Records: I decided to obtain old medical records.  Independently Interpreted Test(s):   I have ordered and independently interpreted EKG Reading(s) - see summary below       <> Summary of EKG Reading(s): On my individual interpretation EKG sinus bradycardia at the rate of 58.  First-degree AV block which is similar to previous tracings.  Normal intervals, normal axis, no STEMI  Clinical Tests:   Lab Tests: Ordered and Reviewed  Medical Tests: Ordered and Reviewed     APC / Resident Notes:   77 y.o. year old female presenting with near syncope.  On exam patient is afebrile and nontoxic. Heart rate and rhythm are regular. Lungs with clear breath sounds throughout. Abdomen is soft, nontender. No edema.    DDx includes but is not limited to electrolyte derangement, dysrhythmia, UTI, dehydration    ED workup reveals UA without infection.  EKG sinus bradycardia with no ischemic changes.  Monitored in the ED with no palpitations or arrhythmias.  Very mild hyponatremia of 131.  CMP otherwise unremarkable.  Patient appears euvolemic.  History of syncope with heat exposure which I suspect this is a case.  Will discharge home hand discussed PCP follow-up.  Discussed return precautions to the ED for any new or worsening symptoms.    Discussed findings and plan with patient who verbalized understanding and agrees with the plan and course of treatment. Return to ED precautions discussed. Patient is stable for discharge. I discussed the care of this patient with my supervising physician.                       Clinical Impression:   Final diagnoses:  [R55] Near syncope               Sandy Garvin PA-C  09/20/22 1941       Sandy Garvin PA-C  09/20/22 1941

## 2022-09-20 NOTE — ED NOTES
Patient states 2 episodes of dizziness/lightheaded at 1300 today, states felt like she was going  to pass out. Lizabeth LOC

## 2022-09-20 NOTE — FIRST PROVIDER EVALUATION
Medical screening examination initiated.  I have conducted a focused provider triage encounter, findings are as follows:    Brief history of present illness:  77-year-old female, history of AFib on anticoagulation, complaining of 2 episodes of dizziness/near syncope today.    Vitals:    09/20/22 1726   BP: (!) 180/75   BP Location: Right arm   Patient Position: Sitting   Pulse: (!) 59   Resp: 18   Temp: 98.2 °F (36.8 °C)   TempSrc: Oral   SpO2: 99%   Weight: 133 lb 13.1 oz (60.7 kg)       Pertinent physical exam:  Well-appearing clinically    Brief workup plan:  EKG, labs    Preliminary workup initiated; this workup will be continued and followed by the physician or advanced practice provider that is assigned to the patient when roomed.

## 2022-09-23 ENCOUNTER — DOCUMENTATION ONLY (OUTPATIENT)
Dept: REHABILITATION | Facility: OTHER | Age: 77
End: 2022-09-23
Attending: INTERNAL MEDICINE
Payer: MEDICARE

## 2022-09-23 NOTE — PROGRESS NOTES
Health  Wellness Visit Note    Name: Magdalena Mane  Clinic Number: [id number]  Physician: No ref. provider found  Diagnosis: No diagnosis found.  Past Medical History:   Diagnosis Date    Anemia     Atrial fibrillation     Basal cell carcinoma     DJD (degenerative joint disease) 12/12/2012    Obesity (BMI 30-39.9) 11/27/2015    Vaginal atrophy 4/1/2016     Visit Number: 26  Precautions: Atrial Fibrillation      1st PT visit: 5/17/2022  Year of care end date: 5/17/2023  6 Month test  Performed: Nov 2022  12 Month test performed: May 2023  Mind body plan: Plan A 9 months  Patient level: B    Time In: 11:25 PM  Time Out: 12:10 PM  Total Treatment Time: 45 minutes    Wellness Vision 2022  Handout on this week's wellness topic Taking Ownership was provided  along with a discussion on what it means, the benefits, and suggestions for practice.  Reviewed last week's topic of Preventing Burnout     Subjective:   Patient reports that her low back feels good today. Pt stretches twice per day and ices when needed. Pt also has been walking in her neighborhood for exercise. She completes home strengthening exercises regularly during the week.     Objective:   Magdalena completed therapeutic stretches (EIL, ABRAHAM) and the following MedX exercise machines: core lumbar, torso rotation l/r, leg extension, leg curl, upright row, chest press, biceps curl, triceps extension, leg press    See exercise log in patient folder for rate of exertion and repetitions completed.       Fitness Machine Education Key:  E=education on equipment initiated and further follow up and education needed  I=independent with  and exercise.  The patient:  Adjusts machines to his/her settings  Uses equipment levers, pins, weights safely  Maintains safe and correct posture while exercising  Moves through exercise with correct pace and control  Gets on and off equipment safely      Core Lumbar Strength  Torso Rotation E Leg Press E   Leg Extension  E Seated Leg Curl E Chest Press    Seated Row E Hip ADD X Hip ABD E   Triceps Extension  Bicep Curl  Other: X       Assessment:   Patient tolerated Patient tolerated MedX Core Lumbar Strength and all other peripheral exercises without an increase in symptoms. Patient warmed up on recumbent bike for 5 minutes, stretched, and iced low back for 5 minutes after the workout.    Plan:  Continue with established plan of care towards wellness goals.     Health  : Sho Morfin  9/23/2022

## 2022-10-06 ENCOUNTER — DOCUMENTATION ONLY (OUTPATIENT)
Dept: REHABILITATION | Facility: OTHER | Age: 77
End: 2022-10-06
Attending: INTERNAL MEDICINE
Payer: MEDICARE

## 2022-10-06 NOTE — PROGRESS NOTES
Health  Wellness Visit Note    Name: Magdalena Mane  Clinic Number: [id number]  Physician: No ref. provider found  Diagnosis: No diagnosis found.  Past Medical History:   Diagnosis Date    Anemia     Atrial fibrillation     Basal cell carcinoma     DJD (degenerative joint disease) 12/12/2012    Obesity (BMI 30-39.9) 11/27/2015    Vaginal atrophy 4/1/2016     Visit Number: 27  Precautions: Atrial Fibrillation      1st PT visit: 5/17/2022  Year of care end date: 5/17/2023  6 Month test  Performed: Nov 2022  12 Month test performed: May 2023  Mind body plan: Plan A 9 months  Patient level: B    Time In: 12:00 PM  Time Out: 12:42 PM  Total Treatment Time: 42 minutes    Wellness Vision 2022  Handout on this week's wellness topic Relationships was provided  along with a discussion on what it means, the benefits, and suggestions for practice.  Reviewed last week's topic of Taking Ownership     Subjective:   Patient reports that her low back feels good today. Pt has continued to complete her stretches 2x/day and ices when needed. Pt also has been walking in her neighborhood for exercise; she recently was out of town and walked 3-5 miles per day for transportation. She completes home strengthening exercises periodically during the week.     Objective:   Magdalena completed therapeutic stretches (EIL, ABRAHAM) and the following MedX exercise machines: core lumbar, torso rotation l/r, leg extension, leg curl, upright row, chest press, biceps curl, triceps extension, leg press    See exercise log in patient folder for rate of exertion and repetitions completed.       Fitness Machine Education Key:  E=education on equipment initiated and further follow up and education needed  I=independent with  and exercise.  The patient:  Adjusts machines to his/her settings  Uses equipment levers, pins, weights safely  Maintains safe and correct posture while exercising  Moves through exercise with correct pace and control  Gets  on and off equipment safely      Core Lumbar Strength E Torso Rotation E Leg Press E   Leg Extension E Seated Leg Curl E Chest Press    Seated Row E Hip ADD X Hip ABD E   Triceps Extension  Bicep Curl E Other: X       Assessment:   Patient tolerated Patient tolerated MedX Core Lumbar Strength and all other peripheral exercises without an increase in symptoms. Patient warmed up on recumbent bike for 5 minutes, stretched, and iced low back for 5 minutes after the workout.    Plan:  Continue with established plan of care towards wellness goals.     Health  : Sho Morfin  10/6/2022

## 2022-10-10 ENCOUNTER — NUTRITION (OUTPATIENT)
Dept: NUTRITION | Facility: CLINIC | Age: 77
End: 2022-10-10
Payer: MEDICARE

## 2022-10-10 ENCOUNTER — LAB VISIT (OUTPATIENT)
Dept: LAB | Facility: HOSPITAL | Age: 77
End: 2022-10-10
Attending: INTERNAL MEDICINE
Payer: MEDICARE

## 2022-10-10 VITALS — BODY MASS INDEX: 26.31 KG/M2 | HEIGHT: 59 IN | WEIGHT: 130.5 LBS

## 2022-10-10 DIAGNOSIS — I12.9 HYPERTENSIVE CHRONIC KIDNEY DISEASE WITH STAGE 1 THROUGH STAGE 4 CHRONIC KIDNEY DISEASE, OR UNSPECIFIED CHRONIC KIDNEY DISEASE: ICD-10-CM

## 2022-10-10 DIAGNOSIS — I10 ESSENTIAL HYPERTENSION: Primary | ICD-10-CM

## 2022-10-10 DIAGNOSIS — I10 ESSENTIAL HYPERTENSION: ICD-10-CM

## 2022-10-10 DIAGNOSIS — Z71.3 DIETARY COUNSELING: ICD-10-CM

## 2022-10-10 DIAGNOSIS — I10 HYPERTENSION, UNSPECIFIED TYPE: ICD-10-CM

## 2022-10-10 DIAGNOSIS — D53.9 MACROCYTIC ANEMIA: ICD-10-CM

## 2022-10-10 LAB
ALBUMIN SERPL BCP-MCNC: 3.7 G/DL (ref 3.5–5.2)
ALP SERPL-CCNC: 81 U/L (ref 55–135)
ALT SERPL W/O P-5'-P-CCNC: 15 U/L (ref 10–44)
ANION GAP SERPL CALC-SCNC: 6 MMOL/L (ref 8–16)
ANION GAP SERPL CALC-SCNC: 6 MMOL/L (ref 8–16)
AST SERPL-CCNC: 19 U/L (ref 10–40)
BILIRUB SERPL-MCNC: 0.5 MG/DL (ref 0.1–1)
BUN SERPL-MCNC: 14 MG/DL (ref 8–23)
BUN SERPL-MCNC: 14 MG/DL (ref 8–23)
CALCIUM SERPL-MCNC: 9.5 MG/DL (ref 8.7–10.5)
CALCIUM SERPL-MCNC: 9.5 MG/DL (ref 8.7–10.5)
CHLORIDE SERPL-SCNC: 98 MMOL/L (ref 95–110)
CHLORIDE SERPL-SCNC: 98 MMOL/L (ref 95–110)
CO2 SERPL-SCNC: 29 MMOL/L (ref 23–29)
CO2 SERPL-SCNC: 29 MMOL/L (ref 23–29)
CREAT SERPL-MCNC: 0.7 MG/DL (ref 0.5–1.4)
CREAT SERPL-MCNC: 0.7 MG/DL (ref 0.5–1.4)
EST. GFR  (NO RACE VARIABLE): >60 ML/MIN/1.73 M^2
EST. GFR  (NO RACE VARIABLE): >60 ML/MIN/1.73 M^2
GLUCOSE SERPL-MCNC: 91 MG/DL (ref 70–110)
GLUCOSE SERPL-MCNC: 91 MG/DL (ref 70–110)
POTASSIUM SERPL-SCNC: 4.1 MMOL/L (ref 3.5–5.1)
POTASSIUM SERPL-SCNC: 4.1 MMOL/L (ref 3.5–5.1)
PROT SERPL-MCNC: 6.4 G/DL (ref 6–8.4)
SODIUM SERPL-SCNC: 133 MMOL/L (ref 136–145)
SODIUM SERPL-SCNC: 133 MMOL/L (ref 136–145)

## 2022-10-10 PROCEDURE — 97802 PR MED NUTR THER, 1ST, INDIV, EA 15 MIN: ICD-10-PCS | Mod: S$GLB,,, | Performed by: DIETITIAN, REGISTERED

## 2022-10-10 PROCEDURE — 99999 PR PBB SHADOW E&M-EST. PATIENT-LVL III: ICD-10-PCS | Mod: PBBFAC,,,

## 2022-10-10 PROCEDURE — 36415 COLL VENOUS BLD VENIPUNCTURE: CPT | Mod: PO | Performed by: INTERNAL MEDICINE

## 2022-10-10 PROCEDURE — 99999 PR PBB SHADOW E&M-EST. PATIENT-LVL III: CPT | Mod: PBBFAC,,,

## 2022-10-10 PROCEDURE — 97802 MEDICAL NUTRITION INDIV IN: CPT | Mod: S$GLB,,, | Performed by: DIETITIAN, REGISTERED

## 2022-10-10 PROCEDURE — 80053 COMPREHEN METABOLIC PANEL: CPT | Performed by: INTERNAL MEDICINE

## 2022-10-10 NOTE — PROGRESS NOTES
"Referring Physician:Ruby Garner MD     Reason for visit:  Chief Complaint   Patient presents with    Hypertension    Chronic Kidney Disease    Nutrition Counseling      Initial Visit    :1945     Allergies Reviewed - pt states she is lactose-intolerant  Meds Reviewed    Anthropometrics  Weight:59.2 kg (130 lb 8.2 oz)-standing scale in clinic today  Height:4' 11" (1.499 m)  BMI:Body mass index is 26.36 kg/m².   IBW: 45.5 kg  +/-10%    Meds:  Outpatient Medications Prior to Visit   Medication Sig Dispense Refill    acetaminophen (TYLENOL) 500 MG tablet Take 500 mg by mouth 2 (two) times daily.      alendronate (FOSAMAX) 70 MG tablet Take 1 tablet (70 mg total) by mouth every 7 days. 4 tablet 11    aspirin (ECOTRIN) 81 MG EC tablet Take 81 mg by mouth nightly.      atorvastatin (LIPITOR) 20 MG tablet Take 1 tablet (20 mg total) by mouth once daily. (Patient taking differently: Take 20 mg by mouth every evening.) 30 tablet 11    calcium-vitamin D 250-100 mg-unit per tablet Take 1 tablet by mouth 2 (two) times daily.      candesartan (ATACAND) 16 MG tablet Take 0.5 tablets (8 mg total) by mouth 2 (two) times a day. 90 tablet 2    cholecalciferol, vitamin D3, (VITAMIN D3 ORAL) Take 3,000 Units by mouth once daily.      diphenhydrAMINE (BENADRYL) 25 mg capsule Take 25 mg by mouth every 6 (six) hours as needed for Itching.      ELIQUIS 5 mg Tab TAKE 1 TABLET BY MOUTH  TWICE DAILY 180 tablet 3    ferrous sulfate (FEOSOL) Tab tablet Take 1 tablet by mouth every evening. PATIENT TAKING 3 X WEEKLY IN THE EVENING (--)      flecainide (TAMBOCOR) 50 MG Tab Take 1 tablet (50 mg total) by mouth every 12 (twelve) hours. 180 tablet 3    lactase (LACTAID) 3,000 unit tablet Take 1 tablet by mouth 3 (three) times daily as needed.      loratadine (CLARITIN) 10 mg tablet Take 10 mg by mouth every morning.      PFIZER COVID-19 MACIEJ VACCN,PF, 30 mcg/0.3 mL injection       spironolactone (ALDACTONE) 25 MG tablet Take 0.5 " "tablets (12.5 mg total) by mouth once daily. 30 tablet 1    traZODone (DESYREL) 50 MG tablet Take 1 tablet (50 mg total) by mouth nightly as needed for Insomnia. 30 tablet 3    verapamiL (VERELAN) 120 MG C24P Take 1 capsule (120 mg total) by mouth every evening. 90 capsule 3     No facility-administered medications prior to visit.       Food/Drug Interactions Noted:  n/a    Vitamins/Supplements/Herbs:  see med list    Labs: Gluc  117   Cr  0.8   eGFR  >60.0     Nutrition Prescription: 1365 Kcals/day( 30 kcal/kg IBW),  45 g protein( 1.0 g/kg IBW)     Support System:   pt does her own grocery shopping and cooking.  Grows her own vegetables in the summer. Regular sessions with [facility]     Diet Hx:  pt is a nurse.  She voices concern with unintentional, gradual weight loss (~1 lb/month).  Brings detailed food journal from past few days.  States her appetite is good, and she eats small, frequent meals/snacks throughout the day.  Eats a lot of fresh vegetables, and prepares her food from scratch.   Is lactost-intolerant; uses cashew milk and cashew butter, and lactaid dairy.  Has been eating 4-6 oz meat with meals, and including some form of protein with all meals and snacks.    Breakfast:   coffee.  1/2 sandwich:  7 grain bread, kale, lettuce, tomatoes, 3 pieces chicken.  Potato salad.  Onion.  Lunch:   other half of sandwich.  Oat & raisin cookie.  3 cashews & 5 peanuts.  Coffee.  Water with agave nectar & lime.  Dinner:   kale & veggies with hamburger & sweet potato roll.  [place]uss[place] sprouts & broccoli.  Water & coffee    Current activity level and/or physical limitations:  "rigorous exercise program" with [facility] since May (attends therapy once a week, and does the exercises at home every day)    Motivation to make changes/anticipated barriers and/or expected adherence:  no barriers to continued adherence identified    Nutrition-Focus Physical Findings:  Pt wearing face covering per COVID19 " protocol; pt appears well nourished      Assessment:  pt very attentive and shared extensive medical history and concern about unintentional weight loss and adequacy of protein intake.  She asked numerous relevant questions about foods/beverages recommended, healthy snacks, portion sizes.  All of her questions were answered, and her concerns addressed.  She verbalized understanding of information.    Nutrition Diagnosis: none at this time      Recommendations: heart healthy diet    Strategies Implemented:  maintain adequate caloric intake    Consultation Time:30 minutes.  Communicated with referring healthcare provider:  Consult note available in pt's Epic chart per MD discretion  Follow Up:  Pt provided with dietitian contact number and advised to call with questions or make future appointment if further intervention needed.

## 2022-10-12 ENCOUNTER — DOCUMENTATION ONLY (OUTPATIENT)
Dept: REHABILITATION | Facility: OTHER | Age: 77
End: 2022-10-12
Attending: INTERNAL MEDICINE
Payer: MEDICARE

## 2022-10-12 NOTE — PROGRESS NOTES
Health  Wellness Visit Note    Name: Magdalena Mane  Clinic Number: [id number]  Physician: No ref. provider found  Diagnosis: No diagnosis found.  Past Medical History:   Diagnosis Date    Anemia     Atrial fibrillation     Basal cell carcinoma     DJD (degenerative joint disease) 12/12/2012    Obesity (BMI 30-39.9) 11/27/2015    Vaginal atrophy 4/1/2016     Visit Number: 28  Precautions: Atrial Fibrillation      1st PT visit: 5/17/2022  Year of care end date: 5/17/2023  6 Month test  Performed: Nov 2022  12 Month test performed: May 2023  Mind body plan: Plan A 9 months  Patient level: B    Time In: 9:34 AM  Time Out: 10:28 AM  Total Treatment Time: 54 minutes    Wellness Vision 2022  Handout on this week's wellness topic Communication was provided along with a discussion on what it means, the benefits, and suggestions for practice.  Reviewed last week's topic of Relationships.    Subjective:   Patient reports her low back feels no pain or tightness.  Upon waking it is a 2/10, but after stretching pain/tightness goes away. She stretches 2-3x/day.  Feels no need to ice her back but she does ice her knee. Her knees have been painful the past few weeks.  She walks in her neighborhood for exercise when weather is cooler.     Objective:   Magdalena completed therapeutic stretches (EIL, ABRAHAM) and the following MedX exercise machines: core lumbar, torso rotation l/r, leg extension, leg curl, upright row, chest press, biceps curl, triceps extension, leg press    See exercise log in patient folder for rate of exertion and repetitions completed.       Fitness Machine Education Key:  E=education on equipment initiated and further follow up and education needed  I=independent with  and exercise.  The patient:  Adjusts machines to his/her settings  Uses equipment levers, pins, weights safely  Maintains safe and correct posture while exercising  Moves through exercise with correct pace and control  Gets on and off  equipment safely      Core Lumbar Strength E Torso Rotation E Leg Press E   Leg Extension E Seated Leg Curl E Chest Press E   Seated Row E Hip ADD X Hip ABD E   Triceps Extension  Bicep Curl E Other: X       Assessment:   Patient tolerated Patient tolerated MedX Core Lumbar Strength and all other peripheral exercises without an increase in symptoms. Patient warmed up on recumbent bike for 5 minutes, stretched, and iced low back and knees for 5 minutes after the workout.    Plan:  Continue with established plan of care towards wellness goals.     Health  : Marilin Kraft  10/12/2022                 Trilobed Flap Text: The defect edges were debeveled with a #15 scalpel blade.  Given the location of the defect and the proximity to free margins a trilobed flap was deemed most appropriate.  Using a sterile surgical marker, an appropriate trilobed flap drawn around the defect.    The area thus outlined was incised deep to adipose tissue with a #15 scalpel blade.  The skin margins were undermined to an appropriate distance in all directions utilizing iris scissors.

## 2022-10-20 ENCOUNTER — DOCUMENTATION ONLY (OUTPATIENT)
Dept: REHABILITATION | Facility: OTHER | Age: 77
End: 2022-10-20
Attending: INTERNAL MEDICINE
Payer: MEDICARE

## 2022-10-20 NOTE — PROGRESS NOTES
Health  Wellness Visit Note    Name: Magdalena Mane  Clinic Number: [id number]  Physician: No ref. provider found  Diagnosis: No diagnosis found.  Past Medical History:   Diagnosis Date    Anemia     Atrial fibrillation     Basal cell carcinoma     DJD (degenerative joint disease) 12/12/2012    Obesity (BMI 30-39.9) 11/27/2015    Vaginal atrophy 4/1/2016     Visit Number: 29  Precautions: Atrial Fibrillation      1st PT visit: 5/17/2022  Year of care end date: 5/17/2023  6 Month test  Performed: Nov 2022  12 Month test performed: May 2023  Mind body plan: Plan A 9 months  Patient level: B    Time In: 11:55 AM  Time Out: 12:45 AM  Total Treatment Time: 50 minutes    Wellness Vision 2022  Handout on this week's wellness topic Self-Assessment was provided along with a discussion on what it means, the benefits, and suggestions for practice.  Reviewed last week's topic of Communication .    Subjective:   Patient reports her low back feels good today.  Pt stretches at least 2x/day, but does not typically use ice at home.  Pt walked a significant amount daily while on vacation.     Objective:   Magdalena completed therapeutic stretches (EIL, ABRAHAM) and the following MedX exercise machines: core lumbar, torso rotation l/r, leg extension, leg curl, upright row, chest press, biceps curl, triceps extension, leg press    See exercise log in patient folder for rate of exertion and repetitions completed.       Fitness Machine Education Key:  E=education on equipment initiated and further follow up and education needed  I=independent with  and exercise.  The patient:  Adjusts machines to his/her settings  Uses equipment levers, pins, weights safely  Maintains safe and correct posture while exercising  Moves through exercise with correct pace and control  Gets on and off equipment safely      Core Lumbar Strength E Torso Rotation E Leg Press E   Leg Extension E Seated Leg Curl E Chest Press E   Seated Row E Hip ADD X  Hip ABD E   Triceps Extension  Bicep Curl E Other: X       Assessment:   Patient tolerated Patient tolerated MedX Core Lumbar Strength and all other peripheral exercises without an increase in symptoms. Patient warmed up on recumbent bike for 5 minutes, stretched, and iced low back and knees for 5 minutes after the workout.    Plan:  Continue with established plan of care towards wellness goals.     Health  : Sho Morfin  10/20/2022

## 2022-10-27 ENCOUNTER — DOCUMENTATION ONLY (OUTPATIENT)
Dept: REHABILITATION | Facility: OTHER | Age: 77
End: 2022-10-27
Attending: INTERNAL MEDICINE
Payer: MEDICARE

## 2022-10-27 NOTE — PROGRESS NOTES
Health  Wellness Visit Note    Name: Magdalena Mane  Clinic Number: [id number]  Physician: No ref. provider found  Diagnosis: No diagnosis found.  Past Medical History:   Diagnosis Date    Anemia     Atrial fibrillation     Basal cell carcinoma     DJD (degenerative joint disease) 12/12/2012    Obesity (BMI 30-39.9) 11/27/2015    Vaginal atrophy 4/1/2016     Visit Number: 30  Precautions: Atrial Fibrillation      1st PT visit: 5/17/2022  Year of care end date: 5/17/2023  6 Month test  Performed: Nov 2022  12 Month test performed: May 2023  Mind body plan: Plan A 9 months  Patient level: B    Time In: 11:53 AM  Time Out: 12:40 PM  Total Treatment Time: 53 minutes    Wellness Vision 2022  Handout on this week's wellness topic Boundaries was provided along with a discussion on what it means, the benefits, and suggestions for practice.  Reviewed last week's topic of Self-Assessment .    Subjective:   Patient reports her low back feels good today.  Pt has continued to stretch twice per day and ices when needed.  Pt has been using her dumbbells at home, and plans to get back to walking this week.    Objective:   Magdalena completed therapeutic stretches (EIL, ABRAHAM) and the following MedX exercise machines: core lumbar, torso rotation l/r, leg extension, leg curl, upright row, chest press, biceps curl, triceps extension, leg press    See exercise log in patient folder for rate of exertion and repetitions completed.       Fitness Machine Education Key:  E=education on equipment initiated and further follow up and education needed  I=independent with  and exercise.  The patient:  Adjusts machines to his/her settings  Uses equipment levers, pins, weights safely  Maintains safe and correct posture while exercising  Moves through exercise with correct pace and control  Gets on and off equipment safely      Core Lumbar Strength E Torso Rotation E Leg Press E   Leg Extension E Seated Leg Curl E Chest Press E    Seated Row E Hip ADD X Hip ABD E   Triceps Extension  Bicep Curl E Other: X       Assessment:   Patient tolerated Patient tolerated MedX Core Lumbar Strength and all other peripheral exercises without an increase in symptoms. Patient warmed up on recumbent bike for 5 minutes, stretched, and iced low back and knees for 5 minutes after the workout.    Plan:  Continue with established plan of care towards wellness goals.     Health  : Sho Morfin  10/27/2022

## 2022-11-03 ENCOUNTER — PATIENT MESSAGE (OUTPATIENT)
Dept: REHABILITATION | Facility: OTHER | Age: 77
End: 2022-11-03
Payer: MEDICARE

## 2022-11-04 ENCOUNTER — DOCUMENTATION ONLY (OUTPATIENT)
Dept: REHABILITATION | Facility: OTHER | Age: 77
End: 2022-11-04
Attending: INTERNAL MEDICINE
Payer: MEDICARE

## 2022-11-04 NOTE — PROGRESS NOTES
Health  Wellness Visit Note    Name: Magdalena Mane  Clinic Number: [id number]  Physician: No ref. provider found  Diagnosis: No diagnosis found.  Past Medical History:   Diagnosis Date    Anemia     Atrial fibrillation     Basal cell carcinoma     DJD (degenerative joint disease) 12/12/2012    Obesity (BMI 30-39.9) 11/27/2015    Vaginal atrophy 4/1/2016     Visit Number: 31  Precautions: Atrial Fibrillation      1st PT visit: 5/17/2022  Year of care end date: 5/17/2023  6 Month test  Performed: Nov 2022  12 Month test performed: May 2023  Mind body plan: Plan A 9 months  Patient level: B    Time In: 10:25 AM  Time Out: 11:15 AM  Total Treatment Time: 50 minutes    Wellness Vision 2022  Handout on this week's wellness topic Sleep Requirements was provided along with a discussion on what it means, the benefits, and suggestions for practice.  Reviewed last week's topic of Boundaries    Subjective:   Patient reports her low back is without pain today.  Pt stretched twice/day over the week, but did not feel the need to ice. Pt has been using her dumbbells at home, and gardened all week long.     Objective:   Magdalena completed therapeutic stretches (EIL, ABRAHAM) and the following MedX exercise machines: core lumbar, torso rotation l/r, leg extension, leg curl, upright row, chest press, biceps curl, triceps extension, leg press    See exercise log in patient folder for rate of exertion and repetitions completed.       Fitness Machine Education Key:  E=education on equipment initiated and further follow up and education needed  I=independent with  and exercise.  The patient:  Adjusts machines to his/her settings  Uses equipment levers, pins, weights safely  Maintains safe and correct posture while exercising  Moves through exercise with correct pace and control  Gets on and off equipment safely      Core Lumbar Strength E Torso Rotation E Leg Press E   Leg Extension E Seated Leg Curl E Chest Press E   Seated  Row E Hip ADD X Hip ABD E   Triceps Extension E Bicep Curl E Other: X       Assessment:   Patient tolerated Patient tolerated MedX Core Lumbar Strength and all other peripheral exercises without an increase in symptoms. Patient warmed up on recumbent bike for 5 minutes, stretched, and iced low back and knees for 5 minutes after the workout.    Plan:  Continue with established plan of care towards wellness goals.     Health  : Sho Morfin  11/4/2022

## 2022-11-10 ENCOUNTER — TELEPHONE (OUTPATIENT)
Dept: INTERNAL MEDICINE | Facility: CLINIC | Age: 77
End: 2022-11-10
Payer: MEDICARE

## 2022-11-10 ENCOUNTER — NURSE TRIAGE (OUTPATIENT)
Dept: ADMINISTRATIVE | Facility: CLINIC | Age: 77
End: 2022-11-10
Payer: MEDICARE

## 2022-11-10 ENCOUNTER — OFFICE VISIT (OUTPATIENT)
Dept: INTERNAL MEDICINE | Facility: CLINIC | Age: 77
End: 2022-11-10
Payer: MEDICARE

## 2022-11-10 ENCOUNTER — LAB VISIT (OUTPATIENT)
Dept: LAB | Facility: HOSPITAL | Age: 77
End: 2022-11-10
Attending: INTERNAL MEDICINE
Payer: MEDICARE

## 2022-11-10 ENCOUNTER — TELEPHONE (OUTPATIENT)
Dept: INTERNAL MEDICINE | Facility: CLINIC | Age: 77
End: 2022-11-10

## 2022-11-10 VITALS
HEIGHT: 59 IN | SYSTOLIC BLOOD PRESSURE: 198 MMHG | HEART RATE: 65 BPM | WEIGHT: 129.88 LBS | DIASTOLIC BLOOD PRESSURE: 72 MMHG | OXYGEN SATURATION: 99 % | BODY MASS INDEX: 26.18 KG/M2

## 2022-11-10 DIAGNOSIS — I10 ESSENTIAL HYPERTENSION: Primary | ICD-10-CM

## 2022-11-10 DIAGNOSIS — I10 ESSENTIAL HYPERTENSION: ICD-10-CM

## 2022-11-10 DIAGNOSIS — I48.0 PAROXYSMAL ATRIAL FIBRILLATION: ICD-10-CM

## 2022-11-10 LAB
ALBUMIN SERPL BCP-MCNC: 3.6 G/DL (ref 3.5–5.2)
ALP SERPL-CCNC: 81 U/L (ref 55–135)
ALT SERPL W/O P-5'-P-CCNC: 14 U/L (ref 10–44)
ANION GAP SERPL CALC-SCNC: 8 MMOL/L (ref 8–16)
AST SERPL-CCNC: 20 U/L (ref 10–40)
BILIRUB SERPL-MCNC: 0.6 MG/DL (ref 0.1–1)
BUN SERPL-MCNC: 11 MG/DL (ref 8–23)
CALCIUM SERPL-MCNC: 9.3 MG/DL (ref 8.7–10.5)
CHLORIDE SERPL-SCNC: 102 MMOL/L (ref 95–110)
CO2 SERPL-SCNC: 28 MMOL/L (ref 23–29)
CREAT SERPL-MCNC: 0.7 MG/DL (ref 0.5–1.4)
EST. GFR  (NO RACE VARIABLE): >60 ML/MIN/1.73 M^2
GLUCOSE SERPL-MCNC: 97 MG/DL (ref 70–110)
POTASSIUM SERPL-SCNC: 4.1 MMOL/L (ref 3.5–5.1)
PROT SERPL-MCNC: 6.2 G/DL (ref 6–8.4)
SODIUM SERPL-SCNC: 138 MMOL/L (ref 136–145)

## 2022-11-10 PROCEDURE — 1160F RVW MEDS BY RX/DR IN RCRD: CPT | Mod: CPTII,S$GLB,, | Performed by: INTERNAL MEDICINE

## 2022-11-10 PROCEDURE — 99214 OFFICE O/P EST MOD 30 MIN: CPT | Mod: S$GLB,,, | Performed by: INTERNAL MEDICINE

## 2022-11-10 PROCEDURE — 80053 COMPREHEN METABOLIC PANEL: CPT | Performed by: INTERNAL MEDICINE

## 2022-11-10 PROCEDURE — 1101F PR PT FALLS ASSESS DOC 0-1 FALLS W/OUT INJ PAST YR: ICD-10-PCS | Mod: CPTII,S$GLB,, | Performed by: INTERNAL MEDICINE

## 2022-11-10 PROCEDURE — 36415 COLL VENOUS BLD VENIPUNCTURE: CPT | Performed by: INTERNAL MEDICINE

## 2022-11-10 PROCEDURE — 4010F PR ACE/ARB THEARPY RXD/TAKEN: ICD-10-PCS | Mod: CPTII,S$GLB,, | Performed by: INTERNAL MEDICINE

## 2022-11-10 PROCEDURE — 99999 PR PBB SHADOW E&M-EST. PATIENT-LVL V: CPT | Mod: PBBFAC,,, | Performed by: INTERNAL MEDICINE

## 2022-11-10 PROCEDURE — 3288F PR FALLS RISK ASSESSMENT DOCUMENTED: ICD-10-PCS | Mod: CPTII,S$GLB,, | Performed by: INTERNAL MEDICINE

## 2022-11-10 PROCEDURE — 99999 PR PBB SHADOW E&M-EST. PATIENT-LVL V: ICD-10-PCS | Mod: PBBFAC,,, | Performed by: INTERNAL MEDICINE

## 2022-11-10 PROCEDURE — 1160F PR REVIEW ALL MEDS BY PRESCRIBER/CLIN PHARMACIST DOCUMENTED: ICD-10-PCS | Mod: CPTII,S$GLB,, | Performed by: INTERNAL MEDICINE

## 2022-11-10 PROCEDURE — 1159F MED LIST DOCD IN RCRD: CPT | Mod: CPTII,S$GLB,, | Performed by: INTERNAL MEDICINE

## 2022-11-10 PROCEDURE — 3288F FALL RISK ASSESSMENT DOCD: CPT | Mod: CPTII,S$GLB,, | Performed by: INTERNAL MEDICINE

## 2022-11-10 PROCEDURE — 3077F SYST BP >= 140 MM HG: CPT | Mod: CPTII,S$GLB,, | Performed by: INTERNAL MEDICINE

## 2022-11-10 PROCEDURE — 3078F DIAST BP <80 MM HG: CPT | Mod: CPTII,S$GLB,, | Performed by: INTERNAL MEDICINE

## 2022-11-10 PROCEDURE — 99214 PR OFFICE/OUTPT VISIT, EST, LEVL IV, 30-39 MIN: ICD-10-PCS | Mod: S$GLB,,, | Performed by: INTERNAL MEDICINE

## 2022-11-10 PROCEDURE — 3078F PR MOST RECENT DIASTOLIC BLOOD PRESSURE < 80 MM HG: ICD-10-PCS | Mod: CPTII,S$GLB,, | Performed by: INTERNAL MEDICINE

## 2022-11-10 PROCEDURE — 3044F PR MOST RECENT HEMOGLOBIN A1C LEVEL <7.0%: ICD-10-PCS | Mod: CPTII,S$GLB,, | Performed by: INTERNAL MEDICINE

## 2022-11-10 PROCEDURE — 1126F PR PAIN SEVERITY QUANTIFIED, NO PAIN PRESENT: ICD-10-PCS | Mod: CPTII,S$GLB,, | Performed by: INTERNAL MEDICINE

## 2022-11-10 PROCEDURE — 3044F HG A1C LEVEL LT 7.0%: CPT | Mod: CPTII,S$GLB,, | Performed by: INTERNAL MEDICINE

## 2022-11-10 PROCEDURE — 1157F PR ADVANCE CARE PLAN OR EQUIV PRESENT IN MEDICAL RECORD: ICD-10-PCS | Mod: CPTII,S$GLB,, | Performed by: INTERNAL MEDICINE

## 2022-11-10 PROCEDURE — 1159F PR MEDICATION LIST DOCUMENTED IN MEDICAL RECORD: ICD-10-PCS | Mod: CPTII,S$GLB,, | Performed by: INTERNAL MEDICINE

## 2022-11-10 PROCEDURE — 1157F ADVNC CARE PLAN IN RCRD: CPT | Mod: CPTII,S$GLB,, | Performed by: INTERNAL MEDICINE

## 2022-11-10 PROCEDURE — 1126F AMNT PAIN NOTED NONE PRSNT: CPT | Mod: CPTII,S$GLB,, | Performed by: INTERNAL MEDICINE

## 2022-11-10 PROCEDURE — 4010F ACE/ARB THERAPY RXD/TAKEN: CPT | Mod: CPTII,S$GLB,, | Performed by: INTERNAL MEDICINE

## 2022-11-10 PROCEDURE — 1101F PT FALLS ASSESS-DOCD LE1/YR: CPT | Mod: CPTII,S$GLB,, | Performed by: INTERNAL MEDICINE

## 2022-11-10 PROCEDURE — 3077F PR MOST RECENT SYSTOLIC BLOOD PRESSURE >= 140 MM HG: ICD-10-PCS | Mod: CPTII,S$GLB,, | Performed by: INTERNAL MEDICINE

## 2022-11-10 RX ORDER — CANDESARTAN 16 MG/1
TABLET ORAL
Qty: 135 TABLET | Refills: 0 | Status: SHIPPED | OUTPATIENT
Start: 2022-11-10 | End: 2022-11-15

## 2022-11-10 NOTE — TELEPHONE ENCOUNTER
Went to [place] in [place]. Had 2 dizzy episodes. Bp has been elevated. Last night she had a dizzy episode. Pt stopped the spirolactone. Last night 181/60 pulse 52. No headache, no nausea. Bp 200/83 pulse 56. Pt took bp medication 1 hour ago. Pt denies any symptoms at present time. Care advice recommend pt come into office now. Pt verbalized understanding.   Reason for Disposition   Systolic BP >= 200 OR Diastolic >= 120 and having NO cardiac or neurologic symptoms    Additional Information   Negative: Sounds like a life-threatening emergency to the triager   Negative: Systolic BP >= 160 OR Diastolic >= 100, and any cardiac or neurologic symptoms (e.g., chest pain, difficulty breathing, unsteady gait, blurred vision)   Negative: Pregnant 20 or more weeks (or postpartum < 6 weeks) with new hand or face swelling   Negative: Pregnant 20 or more weeks (or postpartum < 6 weeks) and Systolic BP >= 160 OR Diastolic >= 100   Negative: Patient sounds very sick or weak to the triager    Protocols used: Blood Pressure - High-A-OH

## 2022-11-10 NOTE — TELEPHONE ENCOUNTER
----- Message from Nette Freeman MA sent at 11/10/2022 10:09 AM CST -----  Contact: EZRA EWING [[id number]] [phone]    ----- Message -----  From: Mis Valdivia  Sent: 11/10/2022   9:54 AM CST  To: [facility] Clinical Staff, #    Type:  Same Day Appointment Request    Caller is requesting a same day appointment.  Caller declined first available appointment listed below.     Name of Caller: EZRA EWING [[id number]]  When is the first available appointment? 11/11/22 at [facility]  Reason for Visit: High blood pressure, dizziness, headaches, and feeling like she's going to faint  Best Call Back Number: [phone]  Additional Information: PCP Dr. Garner has left. Transferred patient to nurse on call to discuss while she waits for a call back.

## 2022-11-10 NOTE — TELEPHONE ENCOUNTER
Spoke to pt. Pt says she was seen by an provider already about issues she were having. Pt says her Blood pressure was still elevated this morning. But here symptoms or not severe anymore.The provider stated instead of her taking a half of candesartan take the whole 16 mg. Pt states she took it an Hour ago and haven't check her pressure yet.

## 2022-11-10 NOTE — PROGRESS NOTES
"Subjective:       Patient ID: Magdalena Mane is a 77 y.o. female.    Chief Complaint: Hypertension    HPI  77 y.o. female here for evaluation of HTN.   New to me. Previously saw Dr. Garner and dayana ruffin at visit in July 2022.     Candesartan 8mg BID (taking at 8a;8p)  Verapamil 120mg qhs  Previously on spironolactone 12.5mg daily but stopped due to hyponatremia.   Also on flecainide and eliquis for a fib.   She still has some edema bilateral lower ext; wearing compression hose. Left always worse.   She has a mild headache with sinus congestion; also hit her head on friend's car trunk. Not new.   No chest pain. No shortness of breath (though she did notice a few weeks ago that exercise was making her more short of breath than usual.)  According to her monitor has not been in a fib.   She has a long hx of blood pressure lability.   She states she often "does too much" and this drives her BP up. Very high readings after surgeries in the past.   Digital med. Readigns were getting low in August 2022.   Now steadily rising since Oct 2022.      11/10/2022 11/9/2022 11/9/2022 11/8/2022 11/7/2022   SBP (mmHg) 200 164 186 161 178   DBP (mmHg) 83 85 66 56 76   Pulse 56 52 52 54 51     Review of Systems   Constitutional:  Negative for fever.   Respiratory:  Negative for shortness of breath.    Cardiovascular:  Negative for chest pain.   Musculoskeletal: Negative.    Skin: Negative.      Objective:   BP (!) 198/72   Pulse 65   Ht 4' 11" (1.499 m)   Wt 58.9 kg (129 lb 13.6 oz)   SpO2 99%   BMI 26.23 kg/m²      Physical Exam  Constitutional:       General: She is not in acute distress.     Appearance: She is well-developed. She is not diaphoretic.   HENT:      Head: Normocephalic and atraumatic.   Cardiovascular:      Rate and Rhythm: Normal rate and regular rhythm.   Pulmonary:      Effort: Pulmonary effort is normal. No respiratory distress.      Breath sounds: No wheezing or rales.   Skin:     General: Skin is warm " and dry.   Neurological:      Mental Status: She is alert and oriented to person, place, and time.   Psychiatric:         Behavior: Behavior normal.       Assessment:       1. Essential hypertension    2. Paroxysmal atrial fibrillation        Plan:       Magdalena was seen today for hypertension.    Diagnoses and all orders for this visit:    Essential hypertension  -     Comprehensive Metabolic Panel; Future  -    INCREASE candesartan (ATACAND) 16 MG tablet; Take 1 tab (16mg) in the AM and 0.5mg tab (8mg) in the PM   Continue daily digital htn monitoring and follow up w me in 1 month.   Paroxysmal atrial fibrillation  NSR today

## 2022-11-10 NOTE — TELEPHONE ENCOUNTER
----- Message from Mis Valdivia sent at 11/10/2022  9:52 AM CST -----  Contact: EZRA EWING OALMIDE [[id number]] [phone]  Type:  Same Day Appointment Request    Caller is requesting a same day appointment.  Caller declined first available appointment listed below.     Name of Caller: EZRA EWING [[id number]]  When is the first available appointment? 11/11/22 at [facility]  Reason for Visit: High blood pressure, dizziness, headaches, and feeling like she's going to faint  Best Call Back Number: [phone]  Additional Information: PCP Dr. Garner has left. Transferred patient to nurse on call to discuss while she waits for a call back.

## 2022-11-11 ENCOUNTER — TELEPHONE (OUTPATIENT)
Dept: INTERNAL MEDICINE | Facility: CLINIC | Age: 77
End: 2022-11-11
Payer: MEDICARE

## 2022-11-11 VITALS — DIASTOLIC BLOOD PRESSURE: 71 MMHG | SYSTOLIC BLOOD PRESSURE: 139 MMHG

## 2022-11-14 ENCOUNTER — HOSPITAL ENCOUNTER (EMERGENCY)
Facility: HOSPITAL | Age: 77
Discharge: HOME | End: 2022-11-14
Attending: STUDENT IN AN ORGANIZED HEALTH CARE EDUCATION/TRAINING PROGRAM
Payer: MEDICARE

## 2022-11-14 VITALS
TEMPERATURE: 98 F | BODY MASS INDEX: 26 KG/M2 | SYSTOLIC BLOOD PRESSURE: 196 MMHG | HEIGHT: 59 IN | WEIGHT: 129 LBS | DIASTOLIC BLOOD PRESSURE: 79 MMHG | HEART RATE: 52 BPM | RESPIRATION RATE: 18 BRPM | OXYGEN SATURATION: 99 %

## 2022-11-14 DIAGNOSIS — I10 HYPERTENSION: ICD-10-CM

## 2022-11-14 DIAGNOSIS — N30.00 ACUTE CYSTITIS WITHOUT HEMATURIA: Primary | ICD-10-CM

## 2022-11-14 LAB
ALBUMIN SERPL BCP-MCNC: 3.4 G/DL (ref 3.5–5.2)
ALP SERPL-CCNC: 78 U/L (ref 55–135)
ALT SERPL W/O P-5'-P-CCNC: 13 U/L (ref 10–44)
ANION GAP SERPL CALC-SCNC: 6 MMOL/L (ref 8–16)
AST SERPL-CCNC: 19 U/L (ref 10–40)
BACTERIA #/AREA URNS AUTO: ABNORMAL /HPF
BASOPHILS # BLD AUTO: 0.06 K/UL (ref 0–0.2)
BASOPHILS NFR BLD: 0.9 % (ref 0–1.9)
BILIRUB SERPL-MCNC: 0.4 MG/DL (ref 0.1–1)
BILIRUB UR QL STRIP: NEGATIVE
BUN SERPL-MCNC: 13 MG/DL (ref 8–23)
CALCIUM SERPL-MCNC: 8.7 MG/DL (ref 8.7–10.5)
CHLORIDE SERPL-SCNC: 101 MMOL/L (ref 95–110)
CLARITY UR REFRACT.AUTO: CLEAR
CO2 SERPL-SCNC: 27 MMOL/L (ref 23–29)
COLOR UR AUTO: ABNORMAL
CREAT SERPL-MCNC: 0.7 MG/DL (ref 0.5–1.4)
DIFFERENTIAL METHOD: ABNORMAL
EOSINOPHIL # BLD AUTO: 0.2 K/UL (ref 0–0.5)
EOSINOPHIL NFR BLD: 2.1 % (ref 0–8)
ERYTHROCYTE [DISTWIDTH] IN BLOOD BY AUTOMATED COUNT: 12.4 % (ref 11.5–14.5)
EST. GFR  (NO RACE VARIABLE): >60 ML/MIN/1.73 M^2
GLUCOSE SERPL-MCNC: 87 MG/DL (ref 70–110)
GLUCOSE UR QL STRIP: NEGATIVE
HCT VFR BLD AUTO: 32.6 % (ref 37–48.5)
HGB BLD-MCNC: 10.6 G/DL (ref 12–16)
HGB UR QL STRIP: ABNORMAL
IMM GRANULOCYTES # BLD AUTO: 0.02 K/UL (ref 0–0.04)
IMM GRANULOCYTES NFR BLD AUTO: 0.3 % (ref 0–0.5)
KETONES UR QL STRIP: NEGATIVE
LEUKOCYTE ESTERASE UR QL STRIP: ABNORMAL
LYMPHOCYTES # BLD AUTO: 1.1 K/UL (ref 1–4.8)
LYMPHOCYTES NFR BLD: 15.7 % (ref 18–48)
MCH RBC QN AUTO: 31.9 PG (ref 27–31)
MCHC RBC AUTO-ENTMCNC: 32.5 G/DL (ref 32–36)
MCV RBC AUTO: 98 FL (ref 82–98)
MICROSCOPIC COMMENT: ABNORMAL
MONOCYTES # BLD AUTO: 0.7 K/UL (ref 0.3–1)
MONOCYTES NFR BLD: 10.6 % (ref 4–15)
NEUTROPHILS # BLD AUTO: 4.9 K/UL (ref 1.8–7.7)
NEUTROPHILS NFR BLD: 70.4 % (ref 38–73)
NITRITE UR QL STRIP: NEGATIVE
NRBC BLD-RTO: 0 /100 WBC
PH UR STRIP: 6 [PH] (ref 5–8)
PLATELET # BLD AUTO: 260 K/UL (ref 150–450)
PMV BLD AUTO: 10.9 FL (ref 9.2–12.9)
POTASSIUM SERPL-SCNC: 3.9 MMOL/L (ref 3.5–5.1)
PROT SERPL-MCNC: 6.1 G/DL (ref 6–8.4)
PROT UR QL STRIP: NEGATIVE
RBC # BLD AUTO: 3.32 M/UL (ref 4–5.4)
RBC #/AREA URNS AUTO: 2 /HPF (ref 0–4)
SODIUM SERPL-SCNC: 134 MMOL/L (ref 136–145)
SP GR UR STRIP: 1.01 (ref 1–1.03)
TROPONIN I SERPL DL<=0.01 NG/ML-MCNC: 0.01 NG/ML (ref 0–0.03)
URN SPEC COLLECT METH UR: ABNORMAL
WBC # BLD AUTO: 7.01 K/UL (ref 3.9–12.7)
WBC #/AREA URNS AUTO: 11 /HPF (ref 0–5)
WBC CLUMPS UR QL AUTO: ABNORMAL

## 2022-11-14 PROCEDURE — 84484 ASSAY OF TROPONIN QUANT: CPT | Performed by: EMERGENCY MEDICINE

## 2022-11-14 PROCEDURE — 85025 COMPLETE CBC W/AUTO DIFF WBC: CPT | Performed by: EMERGENCY MEDICINE

## 2022-11-14 PROCEDURE — 81001 URINALYSIS AUTO W/SCOPE: CPT | Performed by: EMERGENCY MEDICINE

## 2022-11-14 PROCEDURE — 87086 URINE CULTURE/COLONY COUNT: CPT | Performed by: EMERGENCY MEDICINE

## 2022-11-14 PROCEDURE — 25000003 PHARM REV CODE 250: Performed by: PHYSICIAN ASSISTANT

## 2022-11-14 PROCEDURE — 99285 PR EMERGENCY DEPT VISIT,LEVEL V: ICD-10-PCS | Mod: ,,, | Performed by: PHYSICIAN ASSISTANT

## 2022-11-14 PROCEDURE — 93010 EKG 12-LEAD: ICD-10-PCS | Mod: ,,, | Performed by: INTERNAL MEDICINE

## 2022-11-14 PROCEDURE — 93010 ELECTROCARDIOGRAM REPORT: CPT | Mod: ,,, | Performed by: INTERNAL MEDICINE

## 2022-11-14 PROCEDURE — 99285 EMERGENCY DEPT VISIT HI MDM: CPT | Mod: ,,, | Performed by: PHYSICIAN ASSISTANT

## 2022-11-14 PROCEDURE — 36000 PLACE NEEDLE IN VEIN: CPT

## 2022-11-14 PROCEDURE — 99284 EMERGENCY DEPT VISIT MOD MDM: CPT | Mod: 25

## 2022-11-14 PROCEDURE — 80053 COMPREHEN METABOLIC PANEL: CPT | Performed by: EMERGENCY MEDICINE

## 2022-11-14 PROCEDURE — 93005 ELECTROCARDIOGRAM TRACING: CPT

## 2022-11-14 RX ORDER — CEPHALEXIN 500 MG/1
500 CAPSULE ORAL
Status: COMPLETED | OUTPATIENT
Start: 2022-11-14 | End: 2022-11-14

## 2022-11-14 RX ORDER — CEPHALEXIN 500 MG/1
500 CAPSULE ORAL EVERY 12 HOURS
Qty: 10 CAPSULE | Refills: 0 | OUTPATIENT
Start: 2022-11-14 | End: 2022-11-14 | Stop reason: SDUPTHER

## 2022-11-14 RX ORDER — CEPHALEXIN 500 MG/1
500 CAPSULE ORAL EVERY 12 HOURS
Qty: 10 CAPSULE | Refills: 0 | Status: SHIPPED | OUTPATIENT
Start: 2022-11-14 | End: 2022-11-19

## 2022-11-14 RX ADMIN — CEPHALEXIN 500 MG: 500 CAPSULE ORAL at 04:11

## 2022-11-14 NOTE — DISCHARGE INSTRUCTIONS
Please continue to monitor your blood pressure.  If it continues to be high please follow-up with your PCP.  Your urine was suspicious for urinary tract infection.  I have prescribed her antibiotics please take this until finished.  ATP develop any new or worsening symptoms return to the ED sooner.

## 2022-11-14 NOTE — ED PROVIDER NOTES
Encounter Date: 11/14/2022       History     Chief Complaint   Patient presents with    Hypertension     Denies cardiac hx     77-year-old female with history of atrial fibrillation, obesity, hypertension, anemia who presents ED for hypertension.  Reports 1 week of progressively worsening blood pressure prompted her to come to the ED.  States that she gets readings ranging in the 150-170s at home.  Complains of occasional sinus headaches as well as generalized fatigue.  Five days ago her candesartan was increased to 16 mg in the morning and 8 mg at night.    The history is provided by the patient.   Review of patient's allergies indicates:   Allergen Reactions    Prednisone      ELEVATED B/P FOR SEVERAL DAYS/WENT TO ER    Lactose      Past Medical History:   Diagnosis Date    Anemia     Atrial fibrillation     Basal cell carcinoma     DJD (degenerative joint disease) 12/12/2012    Obesity (BMI 30-39.9) 11/27/2015    Vaginal atrophy 4/1/2016     Past Surgical History:   Procedure Laterality Date    ADENOIDECTOMY      BREAST BIOPSY Left 1999    Excisional bx, benign cyst    BREAST SURGERY      CARDIOVERSION  02/2021    CAROTID ENDARTERECTOMY Right 04/07/2022    Procedure: ENDARTERECTOMY-CAROTID;  Surgeon: TUCKER Brantley III, MD;  Location: [facility] OR 2ND FLR;  Service: Peripheral Vascular;  Laterality: Right;    COLONOSCOPY  2012    normal    COLONOSCOPY N/A 06/28/2017    Procedure: COLONOSCOPY;  Surgeon: Markel Montemayor MD;  Location: [facility] (4TH FLR);  Service: Endoscopy;  Laterality: N/A;    EXCISION OF GANGLION OF WRIST Left 06/27/2018    Procedure: EXCISION, GANGLION CYST, WRIST - Left Volar wrist;  Surgeon: Mary Moore MD;  [facility];  Service: Orthopedics;  Laterality: Left;    HIP SURGERY  05/05/2014    bilateral    JOINT REPLACEMENT Bilateral     MOLLY    TONSILLECTOMY      TREATMENT OF CARDIAC ARRHYTHMIA N/A 09/29/2020    Procedure: CARDIOVERSION;  Surgeon: Nigel García MD;  Location:  [facility] EP LAB;  Service: Cardiology;  Laterality: N/A;  AF, LIGIA, DCCV, MAC, WI, 3 Prep    TREATMENT OF CARDIAC ARRHYTHMIA N/A 2020    Procedure: Cardioversion or Defibrillation;  Surgeon: Nigel García MD;  Location: [facility] EP LAB;  Service: Cardiology;  Laterality: N/A;  AF, LIGIA, DCCV, MAC, WI, 3 Prep     Family History   Problem Relation Age of Onset    Heart disease Mother         pacemaker    Breast cancer Mother     Arthritis Mother     Hyperlipidemia Mother     Scoliosis Father     Heart disease Father     Tuberculosis Father          1 lung from collapse lung    Heart attack Father     Heart failure Father     Hyperlipidemia Father     Hypertension Father     No Known Problems Brother     Stroke Maternal Grandmother     Heart disease Paternal Grandmother     Dementia Paternal Grandmother      Social History     Tobacco Use    Smoking status: Former     Packs/day: 1.00     Years: 10.00     Pack years: 10.00     Types: Cigarettes     Quit date: 1988     Years since quittin.8    Smokeless tobacco: Never   Substance Use Topics    Alcohol use: Not Currently    Drug use: No     Review of Systems   Constitutional:  Positive for fatigue. Negative for chills and fever.   HENT:  Negative for sore throat.    Respiratory:  Negative for cough and shortness of breath.    Cardiovascular:  Negative for chest pain.   Gastrointestinal:  Negative for abdominal pain.   Genitourinary:  Negative for difficulty urinating and dysuria.   Musculoskeletal: Negative.    Skin: Negative.    Neurological:  Negative for weakness.   Psychiatric/Behavioral:  Negative for confusion.      Physical Exam     Initial Vitals [22 1148]   BP Pulse Resp Temp SpO2   (!) 216/91 60 18 98.1 °F (36.7 °C) 97 %      MAP       --         Physical Exam    Nursing note and vitals reviewed.  Constitutional: She appears well-developed and well-nourished. She is not diaphoretic. No distress.   HENT:   Head: Normocephalic and atraumatic.   Eyes:  Conjunctivae are normal. Pupils are equal, round, and reactive to light.   Neck: Neck supple.   Normal range of motion.  Cardiovascular:  Regular rhythm, normal heart sounds and intact distal pulses.           Pulmonary/Chest: Breath sounds normal.   Abdominal: Abdomen is soft. Bowel sounds are normal. She exhibits no distension and no mass. There is no abdominal tenderness.   No CVA tenderness There is no rebound and no guarding.   Musculoskeletal:         General: Normal range of motion.      Cervical back: Normal range of motion and neck supple.     Neurological: She is alert and oriented to person, place, and time. She has normal strength. No cranial nerve deficit. GCS score is 15. GCS eye subscore is 4. GCS verbal subscore is 5. GCS motor subscore is 6.   Skin: Skin is warm and dry. Capillary refill takes less than 2 seconds.   Psychiatric: She has a normal mood and affect.       ED Course   Procedures  Labs Reviewed   CBC W/ AUTO DIFFERENTIAL - Abnormal; Notable for the following components:       Result Value    RBC 3.32 (*)     Hemoglobin 10.6 (*)     Hematocrit 32.6 (*)     MCH 31.9 (*)     Lymph % 15.7 (*)     All other components within normal limits   COMPREHENSIVE METABOLIC PANEL - Abnormal; Notable for the following components:    Sodium 134 (*)     Albumin 3.4 (*)     Anion Gap 6 (*)     All other components within normal limits   URINALYSIS, REFLEX TO URINE CULTURE - Abnormal; Notable for the following components:    Occult Blood UA 1+ (*)     Leukocytes, UA Trace (*)     All other components within normal limits    Narrative:     Specimen Source->Urine   URINALYSIS MICROSCOPIC - Abnormal; Notable for the following components:    WBC, UA 11 (*)     All other components within normal limits    Narrative:     Specimen Source->Urine   CULTURE, URINE   TROPONIN I          Imaging Results    None          Medications   cephALEXin capsule 500 mg (has no administration in time range)     Medical Decision  Making:   History:   Old Medical Records: I decided to obtain old medical records.  Initial Assessment:   77-year-old female presents to the ED for progressively worsening blood pressure.  Reports generalized fatigue for 5 days.  Compliant with her medications.  Differential Diagnosis:   Hypertensive emergency, asymptomatic hypertension, NAGA, UTI  Independently Interpreted Test(s):   I have ordered and independently interpreted EKG Reading(s) - see summary below       <> Summary of EKG Reading(s): My individual interpretation EKG shows sinus bradycardia at a rate of 49.  Normal axis, normal intervals, no ischemic changes.  No STEMI.  Of note no longer showing first-degree AV block from her previous EKG.    Clinical Tests:   Lab Tests: Ordered and Reviewed  Radiological Study: Ordered and Reviewed  ED Management:  Sinus bradycardia on arrival with no ischemic changes.  No evidence of AV block.  Troponin negative.  No evidence of end-organ dysfunction on labs.  Normal creatinine.  No focal neurological deficits.  UA suspicious for UTI with no signs or symptoms of pyelonephritis.  Will treat with Keflex p.o..  Patient is well-appearing.  BP is slightly elevated and improved without intervention in the ED.  Will have her follow-up with PCP if she continues to have high blood pressure on beat BP log.  Discussed return to ED precautions for any new or worsening symptoms.           ED Course as of 11/14/22 1617   Mon Nov 14, 2022   1408 Troponin I: 0.008 [HJ]      ED Course User Index  [HJ] Sandy Garvin PA-C                 Clinical Impression:   Final diagnoses:  [I10] Hypertension  [N30.00] Acute cystitis without hematuria (Primary)        ED Disposition Condition    Discharge Stable          ED Prescriptions       Medication Sig Dispense Start Date End Date Auth. Provider    cephALEXin (KEFLEX) 500 MG capsule Take 1 capsule (500 mg total) by mouth every 12 (twelve) hours. for 5 days 10 capsule 11/14/2022 11/19/2022 Sandy  CHANCE Garvin          Follow-up Information       Follow up With Specialties Details Why Contact Info    Ruby Garner MD Internal Medicine Schedule an appointment as soon as possible for a visit  As needed [address] [facility] [place] [id number]  [phone]      Ruby Garner MD Internal Medicine Schedule an appointment as soon as possible for a visit  As needed [address] [facility] [place] [id number]  [phone]               Sandy Garvin PA-C  11/14/22 1611

## 2022-11-15 ENCOUNTER — TELEPHONE (OUTPATIENT)
Dept: INTERNAL MEDICINE | Facility: CLINIC | Age: 77
End: 2022-11-15
Payer: MEDICARE

## 2022-11-15 ENCOUNTER — PATIENT MESSAGE (OUTPATIENT)
Dept: REHABILITATION | Facility: OTHER | Age: 77
End: 2022-11-15
Payer: MEDICARE

## 2022-11-15 ENCOUNTER — PATIENT MESSAGE (OUTPATIENT)
Dept: INTERNAL MEDICINE | Facility: CLINIC | Age: 77
End: 2022-11-15
Payer: MEDICARE

## 2022-11-15 LAB — BACTERIA UR CULT: NO GROWTH

## 2022-11-15 NOTE — TELEPHONE ENCOUNTER
----- Message from Mis Valdivia sent at 11/10/2022  9:52 AM CST -----  Contact: EZRA EWING OLAMIDE [[id number]] [phone]  Type:  Same Day Appointment Request    Caller is requesting a same day appointment.  Caller declined first available appointment listed below.     Name of Caller: EZRA EWING [[id number]]  When is the first available appointment? 11/11/22 at [facility]  Reason for Visit: High blood pressure, dizziness, headaches, and feeling like she's going to faint  Best Call Back Number: [phone]  Additional Information: PCP Dr. Garner has left. Transferred patient to nurse on call to discuss while she waits for a call back.

## 2022-11-16 ENCOUNTER — PATIENT MESSAGE (OUTPATIENT)
Dept: REHABILITATION | Facility: OTHER | Age: 77
End: 2022-11-16
Payer: MEDICARE

## 2022-11-18 ENCOUNTER — TELEPHONE (OUTPATIENT)
Dept: INTERNAL MEDICINE | Facility: CLINIC | Age: 77
End: 2022-11-18
Payer: MEDICARE

## 2022-11-18 ENCOUNTER — PATIENT MESSAGE (OUTPATIENT)
Dept: INTERNAL MEDICINE | Facility: CLINIC | Age: 77
End: 2022-11-18
Payer: MEDICARE

## 2022-11-18 DIAGNOSIS — I10 ESSENTIAL HYPERTENSION: ICD-10-CM

## 2022-11-18 RX ORDER — CANDESARTAN 32 MG/1
32 TABLET ORAL DAILY
Qty: 90 TABLET | Refills: 1 | Status: ON HOLD | OUTPATIENT
Start: 2022-11-18 | End: 2022-12-04 | Stop reason: SDUPTHER

## 2022-11-18 NOTE — TELEPHONE ENCOUNTER
Received a fax from pharmacy asking for clarification of candesartan 32 mg directions bc it says to take 1 tablet in the am and 0.5 in the evening but rx is written for 32 mg    Please resend with updated directions

## 2022-11-25 ENCOUNTER — DOCUMENTATION ONLY (OUTPATIENT)
Dept: REHABILITATION | Facility: OTHER | Age: 77
End: 2022-11-25
Attending: INTERNAL MEDICINE
Payer: MEDICARE

## 2022-11-25 NOTE — PROGRESS NOTES
Health  Wellness Visit Note    Name: Magdalena Mane  Clinic Number: [id number]  Physician: No ref. provider found  Diagnosis: No diagnosis found.  Past Medical History:   Diagnosis Date    Anemia     Atrial fibrillation     Basal cell carcinoma     DJD (degenerative joint disease) 12/12/2012    Obesity (BMI 30-39.9) 11/27/2015    Vaginal atrophy 4/1/2016     Visit Number: 32  Precautions: Atrial Fibrillation      1st PT visit: 5/17/2022  Year of care end date: 5/17/2023  6 Month test  Performed: Nov 2022  12 Month test performed: May 2023  Mind body plan: Plan A 9 months  Patient level: B    Time In: 8:04 AM  Time Out: 8:55 AM  Total Treatment Time: 51 minutes    Wellness [employer] 2022  Handout on this week's wellness topic Sleep Tips and Conditions was provided along with a discussion on what it means, the benefits, and suggestions for practice.  Reviewed last week's topic n/a.     Subjective:   Patient reports she had elevated blood pressure the last three weeks. She hasn't been to wellness in the last two weeks and hasn't done any exercises since. The last three days she hasn't done her stretches and noticed slight back pain. She usually does her stretches everyday, but her grandson was in the hospital the last three and she had no time. Patient hasn't iced within the last week.     Objective:   Magdalena completed therapeutic stretches (EIL, ABRAHAM) and the following MedX exercise machines: core lumbar, torso rotation l/r, leg extension, leg curl, upright row, chest press, biceps curl, triceps extension, leg press    See exercise log in patient folder for rate of exertion and repetitions completed.       Fitness Machine Education Key:  E=education on equipment initiated and further follow up and education needed  I=independent with  and exercise.  The patient:  Adjusts machines to his/her settings  Uses equipment levers, pins, weights safely  Maintains safe and correct posture while exercising  Moves  through exercise with correct pace and control  Gets on and off equipment safely      Core Lumbar Strength E Torso Rotation E Leg Press E   Leg Extension E Seated Leg Curl E Chest Press E   Seated Row E Hip ADD X Hip ABD E   Triceps Extension E Bicep Curl E Other: X       Assessment:   Patient tolerated Patient tolerated MedX Core Lumbar Strength and all other peripheral exercises without an increase in symptoms. Patient warmed up on treadmill for 5 minutes, stretched, and iced low back and knees for 5 minutes after the workout.    Plan:  Continue with established plan of care towards wellness goals.     Health  : Chasidy Yoon  11/25/2022                         Unknown if ever smoked

## 2022-11-28 ENCOUNTER — PATIENT MESSAGE (OUTPATIENT)
Dept: INTERNAL MEDICINE | Facility: CLINIC | Age: 77
End: 2022-11-28
Payer: MEDICARE

## 2022-11-28 ENCOUNTER — PATIENT MESSAGE (OUTPATIENT)
Dept: REHABILITATION | Facility: OTHER | Age: 77
End: 2022-11-28
Payer: MEDICARE

## 2022-11-28 DIAGNOSIS — H92.02 LEFT EAR PAIN: Primary | ICD-10-CM

## 2022-11-28 DIAGNOSIS — R51.9 NONINTRACTABLE HEADACHE, UNSPECIFIED CHRONICITY PATTERN, UNSPECIFIED HEADACHE TYPE: ICD-10-CM

## 2022-12-02 ENCOUNTER — OFFICE VISIT (OUTPATIENT)
Dept: CARDIOLOGY | Facility: CLINIC | Age: 77
End: 2022-12-02
Payer: MEDICARE

## 2022-12-02 ENCOUNTER — PATIENT MESSAGE (OUTPATIENT)
Dept: REHABILITATION | Facility: OTHER | Age: 77
End: 2022-12-02
Payer: MEDICARE

## 2022-12-02 ENCOUNTER — HOSPITAL ENCOUNTER (OUTPATIENT)
Facility: HOSPITAL | Age: 77
Discharge: HOME | End: 2022-12-04
Attending: EMERGENCY MEDICINE | Admitting: EMERGENCY MEDICINE
Payer: MEDICARE

## 2022-12-02 VITALS
SYSTOLIC BLOOD PRESSURE: 202 MMHG | WEIGHT: 129 LBS | BODY MASS INDEX: 26.05 KG/M2 | DIASTOLIC BLOOD PRESSURE: 72 MMHG | HEART RATE: 90 BPM

## 2022-12-02 DIAGNOSIS — I48.0 PAROXYSMAL ATRIAL FIBRILLATION: ICD-10-CM

## 2022-12-02 DIAGNOSIS — I70.0 ATHEROSCLEROSIS OF AORTA: ICD-10-CM

## 2022-12-02 DIAGNOSIS — I10 ESSENTIAL HYPERTENSION: ICD-10-CM

## 2022-12-02 DIAGNOSIS — I47.9 PAROXYSMAL TACHYCARDIA: ICD-10-CM

## 2022-12-02 DIAGNOSIS — R00.1 SINUS BRADYCARDIA: ICD-10-CM

## 2022-12-02 DIAGNOSIS — R07.9 CHEST PAIN: ICD-10-CM

## 2022-12-02 DIAGNOSIS — R06.02 SHORTNESS OF BREATH: ICD-10-CM

## 2022-12-02 DIAGNOSIS — I10 HYPERTENSION COMPLICATIONS: ICD-10-CM

## 2022-12-02 DIAGNOSIS — I10 HYPERTENSION: ICD-10-CM

## 2022-12-02 DIAGNOSIS — I50.9 ACUTE HEART FAILURE: ICD-10-CM

## 2022-12-02 DIAGNOSIS — I48.0 PAROXYSMAL ATRIAL FIBRILLATION: Primary | ICD-10-CM

## 2022-12-02 DIAGNOSIS — I10 PRIMARY HYPERTENSION: Primary | ICD-10-CM

## 2022-12-02 LAB
ALBUMIN SERPL BCP-MCNC: 3.6 G/DL (ref 3.5–5.2)
ALP SERPL-CCNC: 97 U/L (ref 55–135)
ALT SERPL W/O P-5'-P-CCNC: 14 U/L (ref 10–44)
ANION GAP SERPL CALC-SCNC: 12 MMOL/L (ref 8–16)
AST SERPL-CCNC: 20 U/L (ref 10–40)
BACTERIA #/AREA URNS AUTO: NORMAL /HPF
BASOPHILS # BLD AUTO: 0.07 K/UL (ref 0–0.2)
BASOPHILS NFR BLD: 1 % (ref 0–1.9)
BILIRUB SERPL-MCNC: 0.4 MG/DL (ref 0.1–1)
BILIRUB UR QL STRIP: NEGATIVE
BNP SERPL-MCNC: 197 PG/ML (ref 0–99)
BUN SERPL-MCNC: 15 MG/DL (ref 8–23)
CALCIUM SERPL-MCNC: 9.5 MG/DL (ref 8.7–10.5)
CHLORIDE SERPL-SCNC: 100 MMOL/L (ref 95–110)
CLARITY UR REFRACT.AUTO: CLEAR
CO2 SERPL-SCNC: 24 MMOL/L (ref 23–29)
COLOR UR AUTO: COLORLESS
CREAT SERPL-MCNC: 0.8 MG/DL (ref 0.5–1.4)
DIFFERENTIAL METHOD: ABNORMAL
EOSINOPHIL # BLD AUTO: 0.1 K/UL (ref 0–0.5)
EOSINOPHIL NFR BLD: 1.9 % (ref 0–8)
ERYTHROCYTE [DISTWIDTH] IN BLOOD BY AUTOMATED COUNT: 12.6 % (ref 11.5–14.5)
EST. GFR  (NO RACE VARIABLE): >60 ML/MIN/1.73 M^2
GLUCOSE SERPL-MCNC: 100 MG/DL (ref 70–110)
GLUCOSE UR QL STRIP: NEGATIVE
HCT VFR BLD AUTO: 35.9 % (ref 37–48.5)
HGB BLD-MCNC: 11.4 G/DL (ref 12–16)
HGB UR QL STRIP: NEGATIVE
IMM GRANULOCYTES # BLD AUTO: 0.02 K/UL (ref 0–0.04)
IMM GRANULOCYTES NFR BLD AUTO: 0.3 % (ref 0–0.5)
KETONES UR QL STRIP: NEGATIVE
LEUKOCYTE ESTERASE UR QL STRIP: ABNORMAL
LYMPHOCYTES # BLD AUTO: 1.5 K/UL (ref 1–4.8)
LYMPHOCYTES NFR BLD: 21.8 % (ref 18–48)
MCH RBC QN AUTO: 31.5 PG (ref 27–31)
MCHC RBC AUTO-ENTMCNC: 31.8 G/DL (ref 32–36)
MCV RBC AUTO: 99 FL (ref 82–98)
MICROSCOPIC COMMENT: NORMAL
MONOCYTES # BLD AUTO: 0.8 K/UL (ref 0.3–1)
MONOCYTES NFR BLD: 11.7 % (ref 4–15)
NEUTROPHILS # BLD AUTO: 4.3 K/UL (ref 1.8–7.7)
NEUTROPHILS NFR BLD: 63.3 % (ref 38–73)
NITRITE UR QL STRIP: NEGATIVE
NON-SQ EPI CELLS #/AREA URNS AUTO: 0 /HPF
NRBC BLD-RTO: 0 /100 WBC
PH UR STRIP: 8 [PH] (ref 5–8)
PLATELET # BLD AUTO: 235 K/UL (ref 150–450)
PMV BLD AUTO: 11 FL (ref 9.2–12.9)
POTASSIUM SERPL-SCNC: 3.9 MMOL/L (ref 3.5–5.1)
PROT SERPL-MCNC: 6.5 G/DL (ref 6–8.4)
PROT UR QL STRIP: NEGATIVE
RBC # BLD AUTO: 3.62 M/UL (ref 4–5.4)
RBC #/AREA URNS AUTO: 2 /HPF (ref 0–4)
SODIUM SERPL-SCNC: 136 MMOL/L (ref 136–145)
SP GR UR STRIP: 1 (ref 1–1.03)
SQUAMOUS #/AREA URNS AUTO: 1 /HPF
TROPONIN I SERPL DL<=0.01 NG/ML-MCNC: 0.01 NG/ML (ref 0–0.03)
TROPONIN I SERPL DL<=0.01 NG/ML-MCNC: 0.02 NG/ML (ref 0–0.03)
TSH SERPL DL<=0.005 MIU/L-ACNC: 2.56 UIU/ML (ref 0.4–4)
URN SPEC COLLECT METH UR: ABNORMAL
WBC # BLD AUTO: 6.75 K/UL (ref 3.9–12.7)
WBC #/AREA URNS AUTO: 1 /HPF (ref 0–5)

## 2022-12-02 PROCEDURE — 1100F PTFALLS ASSESS-DOCD GE2>/YR: CPT | Mod: CPTII,S$GLB,, | Performed by: INTERNAL MEDICINE

## 2022-12-02 PROCEDURE — 84443 ASSAY THYROID STIM HORMONE: CPT | Performed by: EMERGENCY MEDICINE

## 2022-12-02 PROCEDURE — 3288F PR FALLS RISK ASSESSMENT DOCUMENTED: ICD-10-PCS | Mod: CPTII,S$GLB,, | Performed by: INTERNAL MEDICINE

## 2022-12-02 PROCEDURE — 83880 ASSAY OF NATRIURETIC PEPTIDE: CPT | Performed by: EMERGENCY MEDICINE

## 2022-12-02 PROCEDURE — 25000003 PHARM REV CODE 250: Performed by: EMERGENCY MEDICINE

## 2022-12-02 PROCEDURE — 3078F DIAST BP <80 MM HG: CPT | Mod: CPTII,S$GLB,, | Performed by: INTERNAL MEDICINE

## 2022-12-02 PROCEDURE — 1160F PR REVIEW ALL MEDS BY PRESCRIBER/CLIN PHARMACIST DOCUMENTED: ICD-10-PCS | Mod: CPTII,S$GLB,, | Performed by: INTERNAL MEDICINE

## 2022-12-02 PROCEDURE — 4010F PR ACE/ARB THEARPY RXD/TAKEN: ICD-10-PCS | Mod: CPTII,S$GLB,, | Performed by: INTERNAL MEDICINE

## 2022-12-02 PROCEDURE — 25000003 PHARM REV CODE 250: Performed by: STUDENT IN AN ORGANIZED HEALTH CARE EDUCATION/TRAINING PROGRAM

## 2022-12-02 PROCEDURE — 85025 COMPLETE CBC W/AUTO DIFF WBC: CPT | Performed by: EMERGENCY MEDICINE

## 2022-12-02 PROCEDURE — 93005 ELECTROCARDIOGRAM TRACING: CPT

## 2022-12-02 PROCEDURE — 99285 PR EMERGENCY DEPT VISIT,LEVEL V: ICD-10-PCS | Mod: ,,, | Performed by: EMERGENCY MEDICINE

## 2022-12-02 PROCEDURE — 84484 ASSAY OF TROPONIN QUANT: CPT | Mod: 91 | Performed by: STUDENT IN AN ORGANIZED HEALTH CARE EDUCATION/TRAINING PROGRAM

## 2022-12-02 PROCEDURE — 3044F PR MOST RECENT HEMOGLOBIN A1C LEVEL <7.0%: ICD-10-PCS | Mod: CPTII,S$GLB,, | Performed by: INTERNAL MEDICINE

## 2022-12-02 PROCEDURE — 1159F MED LIST DOCD IN RCRD: CPT | Mod: CPTII,S$GLB,, | Performed by: INTERNAL MEDICINE

## 2022-12-02 PROCEDURE — 20600001 HC STEP DOWN PRIVATE ROOM

## 2022-12-02 PROCEDURE — 1157F ADVNC CARE PLAN IN RCRD: CPT | Mod: CPTII,S$GLB,, | Performed by: INTERNAL MEDICINE

## 2022-12-02 PROCEDURE — 1160F RVW MEDS BY RX/DR IN RCRD: CPT | Mod: CPTII,S$GLB,, | Performed by: INTERNAL MEDICINE

## 2022-12-02 PROCEDURE — G0378 HOSPITAL OBSERVATION PER HR: HCPCS

## 2022-12-02 PROCEDURE — 3288F FALL RISK ASSESSMENT DOCD: CPT | Mod: CPTII,S$GLB,, | Performed by: INTERNAL MEDICINE

## 2022-12-02 PROCEDURE — 3078F PR MOST RECENT DIASTOLIC BLOOD PRESSURE < 80 MM HG: ICD-10-PCS | Mod: CPTII,S$GLB,, | Performed by: INTERNAL MEDICINE

## 2022-12-02 PROCEDURE — 93010 EKG 12-LEAD: ICD-10-PCS | Mod: ,,, | Performed by: INTERNAL MEDICINE

## 2022-12-02 PROCEDURE — 3077F SYST BP >= 140 MM HG: CPT | Mod: CPTII,S$GLB,, | Performed by: INTERNAL MEDICINE

## 2022-12-02 PROCEDURE — 99285 EMERGENCY DEPT VISIT HI MDM: CPT | Mod: 25

## 2022-12-02 PROCEDURE — 63600175 PHARM REV CODE 636 W HCPCS: Performed by: EMERGENCY MEDICINE

## 2022-12-02 PROCEDURE — 99999 PR PBB SHADOW E&M-EST. PATIENT-LVL III: ICD-10-PCS | Mod: PBBFAC,,, | Performed by: INTERNAL MEDICINE

## 2022-12-02 PROCEDURE — 1159F PR MEDICATION LIST DOCUMENTED IN MEDICAL RECORD: ICD-10-PCS | Mod: CPTII,S$GLB,, | Performed by: INTERNAL MEDICINE

## 2022-12-02 PROCEDURE — 99999 PR PBB SHADOW E&M-EST. PATIENT-LVL III: CPT | Mod: PBBFAC,,, | Performed by: INTERNAL MEDICINE

## 2022-12-02 PROCEDURE — 4010F ACE/ARB THERAPY RXD/TAKEN: CPT | Mod: CPTII,S$GLB,, | Performed by: INTERNAL MEDICINE

## 2022-12-02 PROCEDURE — 99285 EMERGENCY DEPT VISIT HI MDM: CPT | Mod: ,,, | Performed by: EMERGENCY MEDICINE

## 2022-12-02 PROCEDURE — 81001 URINALYSIS AUTO W/SCOPE: CPT | Performed by: EMERGENCY MEDICINE

## 2022-12-02 PROCEDURE — 3077F PR MOST RECENT SYSTOLIC BLOOD PRESSURE >= 140 MM HG: ICD-10-PCS | Mod: CPTII,S$GLB,, | Performed by: INTERNAL MEDICINE

## 2022-12-02 PROCEDURE — 36415 COLL VENOUS BLD VENIPUNCTURE: CPT | Performed by: STUDENT IN AN ORGANIZED HEALTH CARE EDUCATION/TRAINING PROGRAM

## 2022-12-02 PROCEDURE — 96374 THER/PROPH/DIAG INJ IV PUSH: CPT

## 2022-12-02 PROCEDURE — 1125F PR PAIN SEVERITY QUANTIFIED, PAIN PRESENT: ICD-10-PCS | Mod: CPTII,S$GLB,, | Performed by: INTERNAL MEDICINE

## 2022-12-02 PROCEDURE — 3044F HG A1C LEVEL LT 7.0%: CPT | Mod: CPTII,S$GLB,, | Performed by: INTERNAL MEDICINE

## 2022-12-02 PROCEDURE — 99214 OFFICE O/P EST MOD 30 MIN: CPT | Mod: S$GLB,,, | Performed by: INTERNAL MEDICINE

## 2022-12-02 PROCEDURE — 1100F PR PT FALLS ASSESS DOC 2+ FALLS/FALL W/INJURY/YR: ICD-10-PCS | Mod: CPTII,S$GLB,, | Performed by: INTERNAL MEDICINE

## 2022-12-02 PROCEDURE — 93010 ELECTROCARDIOGRAM REPORT: CPT | Mod: ,,, | Performed by: INTERNAL MEDICINE

## 2022-12-02 PROCEDURE — 99214 PR OFFICE/OUTPT VISIT, EST, LEVL IV, 30-39 MIN: ICD-10-PCS | Mod: S$GLB,,, | Performed by: INTERNAL MEDICINE

## 2022-12-02 PROCEDURE — 1157F PR ADVANCE CARE PLAN OR EQUIV PRESENT IN MEDICAL RECORD: ICD-10-PCS | Mod: CPTII,S$GLB,, | Performed by: INTERNAL MEDICINE

## 2022-12-02 PROCEDURE — 84484 ASSAY OF TROPONIN QUANT: CPT | Performed by: EMERGENCY MEDICINE

## 2022-12-02 PROCEDURE — 1125F AMNT PAIN NOTED PAIN PRSNT: CPT | Mod: CPTII,S$GLB,, | Performed by: INTERNAL MEDICINE

## 2022-12-02 PROCEDURE — 80053 COMPREHEN METABOLIC PANEL: CPT | Performed by: EMERGENCY MEDICINE

## 2022-12-02 RX ORDER — ONDANSETRON 2 MG/ML
4 INJECTION INTRAMUSCULAR; INTRAVENOUS EVERY 8 HOURS PRN
Status: DISCONTINUED | OUTPATIENT
Start: 2022-12-02 | End: 2022-12-04 | Stop reason: HOSPADM

## 2022-12-02 RX ORDER — FUROSEMIDE 10 MG/ML
40 INJECTION INTRAMUSCULAR; INTRAVENOUS
Status: COMPLETED | OUTPATIENT
Start: 2022-12-02 | End: 2022-12-02

## 2022-12-02 RX ORDER — FLECAINIDE ACETATE 50 MG/1
50 TABLET ORAL EVERY 12 HOURS
Status: DISCONTINUED | OUTPATIENT
Start: 2022-12-02 | End: 2022-12-04 | Stop reason: HOSPADM

## 2022-12-02 RX ORDER — ONDANSETRON 4 MG/1
4 TABLET, ORALLY DISINTEGRATING ORAL EVERY 8 HOURS PRN
Status: DISCONTINUED | OUTPATIENT
Start: 2022-12-02 | End: 2022-12-04 | Stop reason: HOSPADM

## 2022-12-02 RX ORDER — TRAZODONE HYDROCHLORIDE 50 MG/1
50 TABLET ORAL NIGHTLY PRN
Status: DISCONTINUED | OUTPATIENT
Start: 2022-12-02 | End: 2022-12-04 | Stop reason: HOSPADM

## 2022-12-02 RX ORDER — ASPIRIN 81 MG/1
81 TABLET ORAL NIGHTLY
Status: DISCONTINUED | OUTPATIENT
Start: 2022-12-02 | End: 2022-12-04 | Stop reason: HOSPADM

## 2022-12-02 RX ORDER — VALSARTAN 160 MG/1
320 TABLET ORAL DAILY
Status: DISCONTINUED | OUTPATIENT
Start: 2022-12-02 | End: 2022-12-03

## 2022-12-02 RX ORDER — ACETAMINOPHEN 325 MG/1
650 TABLET ORAL EVERY 8 HOURS PRN
Status: DISCONTINUED | OUTPATIENT
Start: 2022-12-02 | End: 2022-12-04 | Stop reason: HOSPADM

## 2022-12-02 RX ORDER — IPRATROPIUM BROMIDE AND ALBUTEROL SULFATE 2.5; .5 MG/3ML; MG/3ML
3 SOLUTION RESPIRATORY (INHALATION) EVERY 4 HOURS PRN
Status: DISCONTINUED | OUTPATIENT
Start: 2022-12-02 | End: 2022-12-04 | Stop reason: HOSPADM

## 2022-12-02 RX ORDER — POLYETHYLENE GLYCOL 3350 17 G/17G
17 POWDER, FOR SOLUTION ORAL 3 TIMES DAILY PRN
Status: DISCONTINUED | OUTPATIENT
Start: 2022-12-02 | End: 2022-12-04 | Stop reason: HOSPADM

## 2022-12-02 RX ORDER — SIMETHICONE 80 MG
1 TABLET,CHEWABLE ORAL 4 TIMES DAILY PRN
Status: DISCONTINUED | OUTPATIENT
Start: 2022-12-02 | End: 2022-12-04 | Stop reason: HOSPADM

## 2022-12-02 RX ORDER — VERAPAMIL HYDROCHLORIDE 180 MG/1
180 CAPSULE, EXTENDED RELEASE ORAL NIGHTLY
Qty: 90 CAPSULE | Refills: 3 | Status: ON HOLD | OUTPATIENT
Start: 2022-12-02 | End: 2022-12-04 | Stop reason: SDUPTHER

## 2022-12-02 RX ORDER — ATORVASTATIN CALCIUM 20 MG/1
20 TABLET, FILM COATED ORAL NIGHTLY
Status: DISCONTINUED | OUTPATIENT
Start: 2022-12-02 | End: 2022-12-04 | Stop reason: HOSPADM

## 2022-12-02 RX ORDER — SODIUM CHLORIDE 0.9 % (FLUSH) 0.9 %
10 SYRINGE (ML) INJECTION EVERY 12 HOURS PRN
Status: DISCONTINUED | OUTPATIENT
Start: 2022-12-02 | End: 2022-12-04 | Stop reason: HOSPADM

## 2022-12-02 RX ORDER — VERAPAMIL HYDROCHLORIDE 180 MG/1
180 TABLET, FILM COATED, EXTENDED RELEASE ORAL DAILY
Status: DISCONTINUED | OUTPATIENT
Start: 2022-12-03 | End: 2022-12-03

## 2022-12-02 RX ORDER — NALOXONE HCL 0.4 MG/ML
0.02 VIAL (ML) INJECTION
Status: DISCONTINUED | OUTPATIENT
Start: 2022-12-02 | End: 2022-12-04 | Stop reason: HOSPADM

## 2022-12-02 RX ORDER — VERAPAMIL HYDROCHLORIDE 80 MG/1
80 TABLET ORAL
Status: COMPLETED | OUTPATIENT
Start: 2022-12-02 | End: 2022-12-02

## 2022-12-02 RX ORDER — NITROGLYCERIN 0.4 MG/1
0.4 TABLET SUBLINGUAL EVERY 5 MIN PRN
Status: DISCONTINUED | OUTPATIENT
Start: 2022-12-02 | End: 2022-12-04 | Stop reason: HOSPADM

## 2022-12-02 RX ORDER — TALC
6 POWDER (GRAM) TOPICAL NIGHTLY PRN
Status: DISCONTINUED | OUTPATIENT
Start: 2022-12-02 | End: 2022-12-04 | Stop reason: HOSPADM

## 2022-12-02 RX ADMIN — APIXABAN 5 MG: 5 TABLET, FILM COATED ORAL at 08:12

## 2022-12-02 RX ADMIN — FLECAINIDE ACETATE 50 MG: 50 TABLET ORAL at 08:12

## 2022-12-02 RX ADMIN — VALSARTAN 320 MG: 160 TABLET, FILM COATED ORAL at 09:12

## 2022-12-02 RX ADMIN — ACETAMINOPHEN 650 MG: 325 TABLET ORAL at 09:12

## 2022-12-02 RX ADMIN — FUROSEMIDE 40 MG: 10 INJECTION, SOLUTION INTRAMUSCULAR; INTRAVENOUS at 04:12

## 2022-12-02 RX ADMIN — ATORVASTATIN CALCIUM 20 MG: 20 TABLET, FILM COATED ORAL at 08:12

## 2022-12-02 RX ADMIN — ASPIRIN 81 MG: 81 TABLET, COATED ORAL at 08:12

## 2022-12-02 RX ADMIN — VERAPAMIL HYDROCHLORIDE 80 MG: 80 TABLET ORAL at 05:12

## 2022-12-02 NOTE — PROGRESS NOTES
Subjective:    Patient ID:  Magdalena Mane is a 77 y.o. female who presents for evaluation of Atrial Fibrillation    Referring Cardiologist: Maki Pratt MD    HPI  Prior Hx:  I had the pleasure of seeing Mrs. Mane in our electrophysiology clinic in consultation for her atrial arrhythmia. As you are aware she is a pleasant 77 year-old woman with hypertension and recently diagnosed persistent atrial fibrillation. She was in her usual state of health until February of 2020 when she had a respiratory illness (COVID antibody test later was negative) and since that time has had dyspnea on exertion. Also notes her pulse runs in the 50s-60s however in February her heart rate began running in the 80s (participates in the [facility] clinic). In early July 2020 she developed palpitations. She saw Dr. Area 7/27/2020 and was diagnosed with atrial fibrillation and was referred to Dr. Pratt who started eliquis for stroke risk reduction ([id number] score 3). She followed up with Dr. Pratt on 9/16/2020 and remained in atrial fibrillation for which she presents for further evaluation. She notes her dyspnea on exertion and palpitations have largely resolved despite AF persistence however still has exercise intolerance.    She reports an episode of pAF post hip surgery in 2014 at [facility].    An echocardiogram from July of 2020 notes preserved LV function with moderate LA enlargement.     Mrs. Mane underwent DCCV on 9/29/2020 with NSR with symptomatic benefit but then had AF recurrence. She underwent repeat DCCV on 12/8/2020 and started on low dose flecainide (has resting sinus bradycardia). Her 1 week post-DCCV ECG noted AF recurrence. At her follow-up visit in December of 2020 she was in sinus rhythm and noted she is in and out of AF. She reported her breathing improved when she was in normal rhythm but feels fatigued when in normal rhythm correlating with a pulse rate in the 40s. We discussed option of PVI versus  PPM + AAD therapy versus dofetilide and stopping AV delma agents. She elected initially for dofetilide however had a change of mind and elected to stagger her metoprolol and flecainide and felt better.    A holter monitor 1/2021 noted no AF and an average sinus rate of 56 bpm.    8/2021: Mrs. Mane returned for follow-up. She was seen by Dr. Vazquez in June of 2021 following an urgent care clinic visit for an episode of syncope. She reported she was working in the yard in the heat and began to feel light-headed. She went inside and had transient loss of consciousness. She had persistent dizziness and went to an urgent care clinic. She was given IVFs. HR was 51 bpm. Metoprolol was discontinued. She feltmuch better off metoprolol. She reportedshe had libido issues with beta-blockers. Discussed she should not be on flecainide without an AV delma blocking agent. Unable to do dofetilide inpatient loading at that time due to COVID surge and would rather avoid a PPM. She may consider PVI at some point. Low dose sotalol (40mg bid) alone may be an option although she is concerned about it affecting her libido. We elected to try verapamil 120mg daily in lieu of amlodipine with the hope it had less SA delma effect than a beta-blocker.     9/2021: Holter noted average sinus rate of 56 bpm.    Interim Hx:  Mrs. Mane returns for AF follow-up. No symptomatic AF recurrences. She feels well on verapamil/flecainide. She is having issues with hypertension. She is actively managed by digital HTN. She woke up with low back pain. She also is having balance issues when turning her head to the right. She is seeing an ENT soon for this and sinus issues. BP very high today and she reports she woke up feeling poorly and has a headache.    My interpretation of today's in clinic ECG is sinus rhythm at 59 bpm.      Review of Systems   Constitutional: Negative for fever and malaise/fatigue.   HENT:  Negative for congestion and sore throat.     Eyes:  Negative for blurred vision and visual disturbance.   Cardiovascular:  Negative for chest pain, dyspnea on exertion, irregular heartbeat, near-syncope, palpitations and syncope.   Respiratory:  Negative for cough and shortness of breath.    Hematologic/Lymphatic: Negative for bleeding problem. Does not bruise/bleed easily.   Skin: Negative.    Musculoskeletal:  Positive for back pain.   Gastrointestinal:  Negative for bloating, abdominal pain, hematochezia and melena.   Neurological:  Positive for headaches and loss of balance. Negative for focal weakness and weakness.   Psychiatric/Behavioral: Negative.        Objective:    Physical Exam  Vitals reviewed.   Constitutional:       General: She is not in acute distress.     Appearance: She is well-developed. She is not diaphoretic.   HENT:      Head: Normocephalic and atraumatic.   Eyes:      General:         Right eye: No discharge.         Left eye: No discharge.      Conjunctiva/sclera: Conjunctivae normal.   Cardiovascular:      Rate and Rhythm: Regular rhythm. Bradycardia present.      Heart sounds: No murmur heard.    No friction rub. No gallop.   Pulmonary:      Effort: Pulmonary effort is normal. No respiratory distress.      Breath sounds: Normal breath sounds. No wheezing or rales.   Abdominal:      General: Bowel sounds are normal. There is no distension.      Palpations: Abdomen is soft.      Tenderness: There is no abdominal tenderness.   Musculoskeletal:      Cervical back: Neck supple.   Skin:     General: Skin is warm and dry.   Neurological:      Mental Status: She is alert and oriented to person, place, and time.   Psychiatric:         Behavior: Behavior normal.         Thought Content: Thought content normal.         Judgment: Judgment normal.         Assessment:       1. Paroxysmal atrial fibrillation    2. Paroxysmal tachycardia    3. Essential hypertension    4. Atherosclerosis of aorta    5. Sinus bradycardia         Plan:       In  summary, Mrs. Mane is a pleasant 77 year-old woman with hypertension and recently diagnosed persistent atrial fibrillation who is now paroxysmal on low dose flecainide. She has shortness of breath with AF and then fatigue in sinus bradycardia. She is intolerant to flecainide and metoprolol together. She feels better on flecainide/verapamil with no symptomatic AF. BP remains elevated on my check (210/100) and she has a headache. I recommend going to the ER due to symptomatic hypertension. Recommend increasing verapamil to 180mg daily for hopefully improved BP control. I sent a message to digital HTN and Dr. Pratt.    RTC in 6 months    Thank you for allowing me to participate in the care of this patient. Please do not hesitate to call me with any questions or concerns.    Nigel García MD, PhD  Cardiac Electrophysiology

## 2022-12-02 NOTE — ED PROVIDER NOTES
Encounter Date: 12/2/2022       History     Chief Complaint   Patient presents with    Hypertension     Seen at clinic and told to come to ED. HTN x 3 months. Also C/o back pain. Recent UTI on ATB, completed.        HPI    This is a 77-year-old woman with a history of hypertension, AFib, anemia who presents with 3 months of elevated white labile blood pressures.  She was seen in electrophysiology clinic today where she endorsed a headache so was sent here given concern for symptomatic hypertension.  She reports that she has had frequent issues with sodium abnormalities, and often has lower extremity edema, such that she wears compression socks.  Her last echo was 2 years ago, and was notable for EF of 55%, mild mitral regurgitation.  She has been taken off of her spironolactone recently due to concerns about hyponatremia.  She also complains of bilateral sacroiliac pain, does not feel like prior bladder infection.  No extremity paresthesias.  She does report intermittent pressure across her chest that occasionally wakes her from sleep.  It is not particularly exertional.  There is no recent stress test or catheterization in our system, she is known to have vascular disease and is s/p R endarterectomy.   Review of patient's allergies indicates:   Allergen Reactions    Prednisone      ELEVATED B/P FOR SEVERAL DAYS/WENT TO ER    Lactose      Past Medical History:   Diagnosis Date    Anemia     Atrial fibrillation     Basal cell carcinoma     DJD (degenerative joint disease) 12/12/2012    Obesity (BMI 30-39.9) 11/27/2015    Vaginal atrophy 4/1/2016     Past Surgical History:   Procedure Laterality Date    ADENOIDECTOMY      BREAST BIOPSY Left 1999    Excisional bx, benign cyst    BREAST SURGERY      CARDIOVERSION  02/2021    CAROTID ENDARTERECTOMY Right 04/07/2022    Procedure: ENDARTERECTOMY-CAROTID;  Surgeon: TUCKER Brantley III, MD;  Location: [facility] OR [address] [place] [address];  Service: Peripheral Vascular;  Laterality: Right;     COLONOSCOPY      normal    COLONOSCOPY N/A 2017    Procedure: COLONOSCOPY;  Surgeon: Markel Montemayor MD;  Location: [facility] (4TH FLR);  Service: Endoscopy;  Laterality: N/A;    EXCISION OF GANGLION OF WRIST Left 2018    Procedure: EXCISION, GANGLION CYST, WRIST - Left Volar wrist;  Surgeon: Mary Moore MD;  Location: [facility] OR 1ST FLR;  Service: Orthopedics;  Laterality: Left;    HIP SURGERY  2014    bilateral    JOINT REPLACEMENT Bilateral     MOLLY    TONSILLECTOMY      TREATMENT OF CARDIAC ARRHYTHMIA N/A 2020    Procedure: CARDIOVERSION;  Surgeon: Nigel García MD;  Location: [facility] EP LAB;  Service: Cardiology;  Laterality: N/A;  AF, LIGIA, DCCV, MAC, PA, 3 Prep    TREATMENT OF CARDIAC ARRHYTHMIA N/A 2020    Procedure: Cardioversion or Defibrillation;  Surgeon: Nigel García MD;  Location: [facility] EP LAB;  Service: Cardiology;  Laterality: N/A;  AF, LIGIA, DCCV, MAC, PA, 3 Prep     Family History   Problem Relation Age of Onset    Heart disease Mother         pacemaker    Breast cancer Mother     Arthritis Mother     Hyperlipidemia Mother     Scoliosis Father     Heart disease Father     Tuberculosis Father          1 lung from collapse lung    Heart attack Father     Heart failure Father     Hyperlipidemia Father     Hypertension Father     No Known Problems Brother     Stroke Maternal Grandmother     Heart disease Paternal Grandmother     Dementia Paternal Grandmother      Social History     Tobacco Use    Smoking status: Former     Packs/day: 1.00     Years: 10.00     Pack years: 10.00     Types: Cigarettes     Quit date: 1988     Years since quittin.8    Smokeless tobacco: Never   Substance Use Topics    Alcohol use: Not Currently    Drug use: No     Review of Systems   Constitutional:  Negative for chills, diaphoresis, fatigue and fever.   HENT:  Negative for congestion, rhinorrhea and sore throat.    Eyes:  Negative for visual disturbance.   Respiratory:  Positive  for chest tightness. Negative for cough and shortness of breath.    Cardiovascular:  Positive for leg swelling. Negative for chest pain and palpitations.   Gastrointestinal:  Positive for abdominal distention. Negative for abdominal pain, blood in stool, constipation, diarrhea and vomiting.   Genitourinary:  Negative for dysuria, hematuria and urgency.   Musculoskeletal:  Positive for back pain.   Skin:  Negative for rash.   Neurological:  Negative for seizures and syncope.   Hematological:  Does not bruise/bleed easily.   Psychiatric/Behavioral:  Negative for agitation and hallucinations.      Physical Exam     Initial Vitals   BP Pulse Resp Temp SpO2   12/02/22 1307 12/02/22 1307 12/02/22 1307 12/02/22 1309 12/02/22 1307   (!) 236/96 73 18 98.3 °F (36.8 °C) 98 %      MAP       --                Physical Exam  Gen: AxOx3, NAD, well nourished, appears stated age  Eye: EOMI, no scleral icterus, no periorbital edema or ecchymosis  Head: normocephalic, atraumatic, no lesions, scalp appears normal  ENT: neck supple, no stridor, no masses, no drooling or voice changes  CVS: diastolic murmur, RRR,  distal pulses intact/symmetric  Pulm: CTAB, no wheezes, rales or rhonchi, no increased work of breathing  Abd: soft, nontender, nondistended, no organomegaly, no CVAT  Ext: 1+ BLE edema to knees, no lesions, rashes, or deformity  Neuro: GCS15, moving all extremities, gait intact, face grossly symmetric  Psych: normal affect, cooperative, well groomed, makes good eye contact    ED Course   Procedures  Labs Reviewed   CBC W/ AUTO DIFFERENTIAL - Abnormal; Notable for the following components:       Result Value    RBC 3.62 (*)     Hemoglobin 11.4 (*)     Hematocrit 35.9 (*)     MCV 99 (*)     MCH 31.5 (*)     MCHC 31.8 (*)     All other components within normal limits   URINALYSIS, REFLEX TO URINE CULTURE - Abnormal; Notable for the following components:    Color, UA Colorless (*)     Leukocytes, UA Trace (*)     All other  components within normal limits    Narrative:     Specimen Source->Urine   B-TYPE NATRIURETIC PEPTIDE - Abnormal; Notable for the following components:     (*)     All other components within normal limits   COMPREHENSIVE METABOLIC PANEL   TROPONIN I   TSH   URINALYSIS MICROSCOPIC    Narrative:     Specimen Source->Urine          Imaging Results              X-Ray Chest 1 View (Final result)  Result time 12/02/22 15:31:28      Final result by Madhav Vregara MD (12/02/22 15:31:28)                   Impression:      See above      Electronically signed by: Madhav Vergara MD  Date:    12/02/2022  Time:    15:31               Narrative:    EXAMINATION:  XR CHEST 1 VIEW    CLINICAL HISTORY:  Shortness of breath    TECHNIQUE:  Single frontal view of the chest was performed.    COMPARISON:  N 04/04/2022 one    FINDINGS:  Heart size upper limit of normal.  The lungs are clear.  No pleural effusion.  Thoracolumbar scoliosis as before.                                       Medications   verapamiL tablet 80 mg (80 mg Oral Given 12/2/22 1718)   furosemide injection 40 mg (40 mg Intravenous Given 12/2/22 1628)     Medical Decision Making:   History:   Old Medical Records: I decided to obtain old medical records.  Old Records Summarized: records from clinic visits.  Initial Assessment:   This is a 77-year-old woman with a history of hypertension, AFib, carotid endarterectomy who presents with acute worsening of chronic hypertension, appears slightly volume overloaded on exam.  Shows diastolic murmur, and biatrial enlargement on her EKG I wonder if she has CHF that is acute in onset, or has been unmasked by her recent discontinuation of spironolactone.  Plan for labs, a dose of her home blood pressure medicine and diuresis.  Clinical Tests:   Lab Tests: Ordered and Reviewed  Radiological Study: Ordered and Reviewed  ED Management:  Labs are notable for an elevated BNP, negative troponin.  Otherwise reassuring.  Chest x-ray by  my independent interpretation is notable for cardiomegaly.  I think this presentation represents new heart failure, and will admitted to the hospital for echo and further monitoring.           ED Course as of 12/02/22 1732   Fri Dec 02, 2022   1425 EKG by my independent interpretation is sinus bradycardia rate of 53, there is first-degree AV block, axis is normal, there are biphasic P waves suggestive of biatrial enlargement [EB]      ED Course User Index  [EB] Ruby Fleming MD                 Clinical Impression:   Final diagnoses:  [I10] Hypertension  [R06.02] Shortness of breath      ED Disposition Condition    Admit Stable                Ruby Fleming MD  12/02/22 3603

## 2022-12-02 NOTE — ED NOTES
Patient identifiers verified and correct for Magdalena Mane  LOC: The patient is awake, alert and aware of environment with an appropriate affect, the patient is oriented x 3 and speaking appropriately.   APPEARANCE: Patient appears comfortable and in no acute distress, patient is clean and well groomed.  SKIN: The skin is warm and dry, color consistent with ethnicity, patient has normal skin turgor and moist mucus membranes, skin intact, no breakdown or bruising noted.   MUSCULOSKELETAL: Patient moving all extremities spontaneously, no swelling noted.  RESPIRATORY: Airway is open and patent, respirations are spontaneous, patient has a normal effort and rate, no accessory muscle use noted, pt placed on continuous pulse ox with O2 sats noted at 97% on room air.  CARDIAC: Pt placed on cardiac monitor. Patient has a normal rate and regular rhythm, no edema noted, capillary refill < 3 seconds. Pt is hypertensive  GASTRO: Soft and non tender to palpation, no distention noted, normoactive bowel sounds present in all four quadrants. Pt states bowel movements have been regular.  : Pt denies any pain or frequency with urination.  NEURO: Pt opens eyes spontaneously, behavior appropriate to situation, follows commands, facial expression symmetrical, bilateral hand grasp equal and even, purposeful motor response noted, normal sensation in all extremities when touched with a finger.

## 2022-12-03 PROBLEM — R06.00 DYSPNEA: Status: ACTIVE | Noted: 2022-12-03

## 2022-12-03 LAB
ANION GAP SERPL CALC-SCNC: 6 MMOL/L (ref 8–16)
ASCENDING AORTA: 2.86 CM
AV INDEX (PROSTH): 0.68
AV MEAN GRADIENT: 8 MMHG
AV PEAK GRADIENT: 1 MMHG
AV VALVE AREA: 2.04 CM2
AV VELOCITY RATIO: 1.9
BSA FOR ECHO PROCEDURE: 1.53 M2
BUN SERPL-MCNC: 17 MG/DL (ref 8–23)
CALCIUM SERPL-MCNC: 9 MG/DL (ref 8.7–10.5)
CHLORIDE SERPL-SCNC: 100 MMOL/L (ref 95–110)
CO2 SERPL-SCNC: 28 MMOL/L (ref 23–29)
CREAT SERPL-MCNC: 0.8 MG/DL (ref 0.5–1.4)
CV ECHO LV RWT: 0.35 CM
DOP CALC AO PEAK VEL: 0.6 M/S
DOP CALC AO VTI: 44.12 CM
DOP CALC LVOT AREA: 3 CM2
DOP CALC LVOT DIAMETER: 1.96 CM
DOP CALC LVOT PEAK VEL: 1.14 M/S
DOP CALC LVOT STROKE VOLUME: 90.11 CM3
DOP CALCLVOT PEAK VEL VTI: 29.88 CM
E WAVE DECELERATION TIME: 120.03 MSEC
E/A RATIO: 1.13
E/E' RATIO: 12.35 M/S
ECHO LV POSTERIOR WALL: 0.82 CM (ref 0.6–1.1)
EJECTION FRACTION: 65 %
ERYTHROCYTE [DISTWIDTH] IN BLOOD BY AUTOMATED COUNT: 12.5 % (ref 11.5–14.5)
EST. GFR  (NO RACE VARIABLE): >60 ML/MIN/1.73 M^2
FRACTIONAL SHORTENING: 30 % (ref 28–44)
GLUCOSE SERPL-MCNC: 95 MG/DL (ref 70–110)
HCT VFR BLD AUTO: 34.6 % (ref 37–48.5)
HGB BLD-MCNC: 11.2 G/DL (ref 12–16)
INTERVENTRICULAR SEPTUM: 0.77 CM (ref 0.6–1.1)
IVRT: 122.74 MSEC
LA MAJOR: 5.81 CM
LA MINOR: 6.01 CM
LA WIDTH: 4.67 CM
LEFT ATRIUM SIZE: 3.81 CM
LEFT ATRIUM VOLUME INDEX MOD: 50.6 ML/M2
LEFT ATRIUM VOLUME INDEX: 59.6 ML/M2
LEFT ATRIUM VOLUME MOD: 75.93 CM3
LEFT ATRIUM VOLUME: 89.36 CM3
LEFT INTERNAL DIMENSION IN SYSTOLE: 3.24 CM (ref 2.1–4)
LEFT VENTRICLE DIASTOLIC VOLUME INDEX: 65.91 ML/M2
LEFT VENTRICLE DIASTOLIC VOLUME: 98.86 ML
LEFT VENTRICLE MASS INDEX: 79 G/M2
LEFT VENTRICLE SYSTOLIC VOLUME INDEX: 28 ML/M2
LEFT VENTRICLE SYSTOLIC VOLUME: 42.07 ML
LEFT VENTRICULAR INTERNAL DIMENSION IN DIASTOLE: 4.63 CM (ref 3.5–6)
LEFT VENTRICULAR MASS: 118.24 G
LV LATERAL E/E' RATIO: 10.5 M/S
LV SEPTAL E/E' RATIO: 15 M/S
MAGNESIUM SERPL-MCNC: 1.9 MG/DL (ref 1.6–2.6)
MCH RBC QN AUTO: 31.6 PG (ref 27–31)
MCHC RBC AUTO-ENTMCNC: 32.4 G/DL (ref 32–36)
MCV RBC AUTO: 98 FL (ref 82–98)
MV PEAK A VEL: 0.93 M/S
MV PEAK E VEL: 1.05 M/S
MV STENOSIS PRESSURE HALF TIME: 34.81 MS
MV VALVE AREA P 1/2 METHOD: 6.32 CM2
PISA TR MAX VEL: 3.05 M/S
PLATELET # BLD AUTO: 247 K/UL (ref 150–450)
PMV BLD AUTO: 11.7 FL (ref 9.2–12.9)
POTASSIUM SERPL-SCNC: 3.3 MMOL/L (ref 3.5–5.1)
RA MAJOR: 4.8 CM
RA PRESSURE: 3 MMHG
RA WIDTH: 4.09 CM
RBC # BLD AUTO: 3.54 M/UL (ref 4–5.4)
RIGHT VENTRICULAR END-DIASTOLIC DIMENSION: 2.82 CM
RV TISSUE DOPPLER FREE WALL SYSTOLIC VELOCITY 1 (APICAL 4 CHAMBER VIEW): 12.8 CM/S
SINUS: 3.06 CM
SODIUM SERPL-SCNC: 134 MMOL/L (ref 136–145)
STJ: 2.54 CM
TDI LATERAL: 0.1 M/S
TDI SEPTAL: 0.07 M/S
TDI: 0.09 M/S
TR MAX PG: 37 MMHG
TRICUSPID ANNULAR PLANE SYSTOLIC EXCURSION: 2.9 CM
TV REST PULMONARY ARTERY PRESSURE: 40 MMHG
WBC # BLD AUTO: 7.6 K/UL (ref 3.9–12.7)

## 2022-12-03 PROCEDURE — 25000003 PHARM REV CODE 250: Performed by: STUDENT IN AN ORGANIZED HEALTH CARE EDUCATION/TRAINING PROGRAM

## 2022-12-03 PROCEDURE — 85027 COMPLETE CBC AUTOMATED: CPT | Performed by: STUDENT IN AN ORGANIZED HEALTH CARE EDUCATION/TRAINING PROGRAM

## 2022-12-03 PROCEDURE — 80048 BASIC METABOLIC PNL TOTAL CA: CPT | Performed by: STUDENT IN AN ORGANIZED HEALTH CARE EDUCATION/TRAINING PROGRAM

## 2022-12-03 PROCEDURE — 83735 ASSAY OF MAGNESIUM: CPT | Performed by: STUDENT IN AN ORGANIZED HEALTH CARE EDUCATION/TRAINING PROGRAM

## 2022-12-03 PROCEDURE — 25000003 PHARM REV CODE 250

## 2022-12-03 PROCEDURE — 36415 COLL VENOUS BLD VENIPUNCTURE: CPT | Performed by: STUDENT IN AN ORGANIZED HEALTH CARE EDUCATION/TRAINING PROGRAM

## 2022-12-03 PROCEDURE — 25000003 PHARM REV CODE 250: Performed by: INTERNAL MEDICINE

## 2022-12-03 PROCEDURE — G0378 HOSPITAL OBSERVATION PER HR: HCPCS

## 2022-12-03 PROCEDURE — 99223 PR INITIAL HOSPITAL CARE,LEVL III: ICD-10-PCS | Mod: ,,, | Performed by: STUDENT IN AN ORGANIZED HEALTH CARE EDUCATION/TRAINING PROGRAM

## 2022-12-03 PROCEDURE — 99223 1ST HOSP IP/OBS HIGH 75: CPT | Mod: ,,, | Performed by: STUDENT IN AN ORGANIZED HEALTH CARE EDUCATION/TRAINING PROGRAM

## 2022-12-03 RX ORDER — VERAPAMIL HYDROCHLORIDE 180 MG/1
180 TABLET, FILM COATED, EXTENDED RELEASE ORAL DAILY
Status: DISCONTINUED | OUTPATIENT
Start: 2022-12-03 | End: 2022-12-04 | Stop reason: HOSPADM

## 2022-12-03 RX ORDER — VALSARTAN 160 MG/1
320 TABLET ORAL DAILY
Status: DISCONTINUED | OUTPATIENT
Start: 2022-12-03 | End: 2022-12-04 | Stop reason: HOSPADM

## 2022-12-03 RX ORDER — POTASSIUM CHLORIDE 750 MG/1
10 CAPSULE, EXTENDED RELEASE ORAL ONCE
Status: DISCONTINUED | OUTPATIENT
Start: 2022-12-03 | End: 2022-12-03

## 2022-12-03 RX ORDER — POTASSIUM CHLORIDE 20 MEQ/1
20 TABLET, EXTENDED RELEASE ORAL ONCE
Status: COMPLETED | OUTPATIENT
Start: 2022-12-03 | End: 2022-12-03

## 2022-12-03 RX ADMIN — ATORVASTATIN CALCIUM 20 MG: 20 TABLET, FILM COATED ORAL at 08:12

## 2022-12-03 RX ADMIN — POTASSIUM CHLORIDE 20 MEQ: 1500 TABLET, EXTENDED RELEASE ORAL at 03:12

## 2022-12-03 RX ADMIN — Medication 6 MG: at 12:12

## 2022-12-03 RX ADMIN — ACETAMINOPHEN 650 MG: 325 TABLET ORAL at 08:12

## 2022-12-03 RX ADMIN — ACETAMINOPHEN 650 MG: 325 TABLET ORAL at 05:12

## 2022-12-03 RX ADMIN — ASPIRIN 81 MG: 81 TABLET, COATED ORAL at 08:12

## 2022-12-03 RX ADMIN — ACETAMINOPHEN 650 MG: 325 TABLET ORAL at 03:12

## 2022-12-03 RX ADMIN — TRAZODONE HYDROCHLORIDE 50 MG: 50 TABLET ORAL at 08:12

## 2022-12-03 RX ADMIN — VERAPAMIL HYDROCHLORIDE 180 MG: 180 TABLET, FILM COATED, EXTENDED RELEASE ORAL at 06:12

## 2022-12-03 RX ADMIN — APIXABAN 5 MG: 5 TABLET, FILM COATED ORAL at 08:12

## 2022-12-03 RX ADMIN — FLECAINIDE ACETATE 50 MG: 50 TABLET ORAL at 08:12

## 2022-12-03 RX ADMIN — VALSARTAN 320 MG: 160 TABLET, FILM COATED ORAL at 06:12

## 2022-12-03 NOTE — NURSING
0440 pt bp 199/88  0530 pt reports headache 9/10; tylenol given, bp assessed, 215/86, PA notified. New order modify valsartan and verapemil time to 0615  0625 pt administered verapemil and valsartan.  Will continue to monitor

## 2022-12-03 NOTE — ASSESSMENT & PLAN NOTE
Patient complaining of chronic dyspnea on exertion and reduced exercise tolerance in the setting of paroxysmal nocturnal dyspnea and reports of increased edema. History of atrial fibrillation. Chest radiograph with overt edema with mildly elevated BNP on admission at 197.  - f/u transthoracic echocardiogram to evaluate for underlying structural heart disease

## 2022-12-03 NOTE — PROGRESS NOTES
Hospitalist Progress Note - Addendum    Pt admitted for elevated bp - now improved with med adjustment. Also afib - rate controlled on Flecainide.   Potassium 3.3 - will replace  H/P reviewed  Will continue to monitor bp on current meds  Echo ordered - given concern for evaluating stuctural heart dx in the setting of AFIB / elevated bp.    Adam Freeman MD

## 2022-12-03 NOTE — HPI
"Magdalena Mane is a 77-year-old woman with a past medical history of HTN, paroxysmal atrial fibrillation, and carotid stenosis s/p carotid endarterectomy who presents to the emergency department with elevated blood pressure and chest pressure. Of note, the patient was at her Cardiology appointment today when her blood pressure was noted to be elevated with a systolic blood pressure >200. The patient states that she has had difficulty controlling her blood pressure over the past month with systolic blood pressure readings >180. She has been following with Cardiology for blood pressure control and has had several blood pressure medication changes. She notes that her blood pressure control worsened when she was told to discontinue her spironolactone. She recently was told to increase her candesartan and at today's appointment was told to increase her verapamil. She states that she began having chest pressure over the past week and has noted the pressure more over the past several days. It seems to be worsened with exertion per her report. The patient denies any shortness of breath at rest but does state she has noted more dyspnea on exertion and has decreased exercise tolerance. She denies orthopnea, but does note that she awakens frequently at night with episodes of "air hunger." She describes lower extremity edema and edema over her "entire body." Her  notes that she does snore at night but denies observing any periods of apnea. She denies any abdominal pain or epigastric pain. She notes a constant frontal headache which began last week. She denies any numbness, paresthesias or weakness.     In the emergency department, the patient was found to have a blood pressure of 236/96 and was given verapamil 80 mg. Additionally, the patient was given 40 mg ov IV furosemide. She was admitted Hospital Medicine for further management.   "

## 2022-12-03 NOTE — SUBJECTIVE & OBJECTIVE
Past Medical History:   Diagnosis Date    Anemia     Atrial fibrillation     Basal cell carcinoma     DJD (degenerative joint disease) 12/12/2012    Obesity (BMI 30-39.9) 11/27/2015    Vaginal atrophy 4/1/2016       Past Surgical History:   Procedure Laterality Date    ADENOIDECTOMY      BREAST BIOPSY Left 1999    Excisional bx, benign cyst    BREAST SURGERY      CARDIOVERSION  02/2021    CAROTID ENDARTERECTOMY Right 04/07/2022    Procedure: ENDARTERECTOMY-CAROTID;  Surgeon: TUCKER Brantley III, MD;  Location: [facility] OR 2ND FLR;  Service: Peripheral Vascular;  Laterality: Right;    COLONOSCOPY  2012    normal    COLONOSCOPY N/A 06/28/2017    Procedure: COLONOSCOPY;  Surgeon: Markel Montemayor MD;  Location: [facility] ENDO (4TH FLR);  Service: Endoscopy;  Laterality: N/A;    EXCISION OF GANGLION OF WRIST Left 06/27/2018    Procedure: EXCISION, GANGLION CYST, WRIST - Left Volar wrist;  Surgeon: Mary Moore MD;  Location: [facility] OR 1ST FLR;  Service: Orthopedics;  Laterality: Left;    HIP SURGERY  05/05/2014    bilateral    JOINT REPLACEMENT Bilateral     MOLLY    TONSILLECTOMY      TREATMENT OF CARDIAC ARRHYTHMIA N/A 09/29/2020    Procedure: CARDIOVERSION;  Surgeon: Nigel García MD;  Location: [facility] EP LAB;  Service: Cardiology;  Laterality: N/A;  AF, LIGIA, DCCV, MAC, [place], 3 Prep    TREATMENT OF CARDIAC ARRHYTHMIA N/A 12/08/2020    Procedure: Cardioversion or Defibrillation;  Surgeon: Nigel García MD;  Location: [facility] EP LAB;  Service: Cardiology;  Laterality: N/A;  AF, LIGIA, DCCV, MAC, OH, 3 Prep       Review of patient's allergies indicates:   Allergen Reactions    Prednisone      ELEVATED B/P FOR SEVERAL DAYS/WENT TO ER    Lactose        No current facility-administered medications on file prior to encounter.     Current Outpatient Medications on File Prior to Encounter   Medication Sig    candesartan (ATACAND) 32 MG tablet Take 1 tablet (32 mg total) by mouth once daily.    acetaminophen (TYLENOL) 500 MG tablet Take 500 mg  by mouth 2 (two) times daily.    alendronate (FOSAMAX) 70 MG tablet Take 1 tablet (70 mg total) by mouth every 7 days.    aspirin (ECOTRIN) 81 MG EC tablet Take 81 mg by mouth nightly.    atorvastatin (LIPITOR) 20 MG tablet Take 1 tablet (20 mg total) by mouth once daily. (Patient taking differently: Take 20 mg by mouth every evening.)    calcium-vitamin D 250-100 mg-unit per tablet Take 1 tablet by mouth 2 (two) times daily.    cholecalciferol, vitamin D3, (VITAMIN D3 ORAL) Take 3,000 Units by mouth once daily.    diphenhydrAMINE (BENADRYL) 25 mg capsule Take 25 mg by mouth every 6 (six) hours as needed for Itching.    ELIQUIS 5 mg Tab TAKE 1 TABLET BY MOUTH  TWICE DAILY    ferrous sulfate (FEOSOL) Tab tablet Take 1 tablet by mouth every evening. PATIENT TAKING 3 X WEEKLY IN THE EVENING (M-W-F)    flecainide (TAMBOCOR) 50 MG Tab Take 1 tablet (50 mg total) by mouth every 12 (twelve) hours.    lactase (LACTAID) 3,000 unit tablet Take 1 tablet by mouth 3 (three) times daily as needed.    loratadine (CLARITIN) 10 mg tablet Take 10 mg by mouth every morning.    PFIZER COVID-19 MACIEJ VACCN,PF, 30 mcg/0.3 mL injection     traZODone (DESYREL) 50 MG tablet TAKE 1 TABLET(50 MG) BY MOUTH EVERY NIGHT AS NEEDED FOR INSOMNIA    verapamiL (VERELAN) 180 MG C24P Take 1 capsule (180 mg total) by mouth every evening.    [DISCONTINUED] verapamiL (VERELAN) 120 MG C24P Take 1 capsule (120 mg total) by mouth every evening.     Family History       Problem Relation (Age of Onset)    Arthritis Mother    Breast cancer Mother    Dementia Paternal Grandmother    Heart attack Father    Heart disease Mother, Father, Paternal Grandmother    Heart failure Father    Hyperlipidemia Mother, Father    Hypertension Father    No Known Problems Brother    Scoliosis Father    Stroke Maternal Grandmother    Tuberculosis Father          Tobacco Use    Smoking status: Former     Packs/day: 1.00     Years: 10.00     Pack years: 10.00     Types: Cigarettes      Quit date: 1988     Years since quittin.8    Smokeless tobacco: Never   Substance and Sexual Activity    Alcohol use: Not Currently    Drug use: No    Sexual activity: Yes     Partners: Male     Review of Systems   Constitutional:  Negative for activity change, chills, fatigue and fever.   HENT:  Negative for congestion, nosebleeds, sinus pressure and trouble swallowing.    Eyes:  Positive for photophobia. Negative for visual disturbance.   Respiratory:  Positive for chest tightness and shortness of breath.    Cardiovascular:  Positive for chest pain and leg swelling. Negative for palpitations.   Gastrointestinal:  Positive for constipation.   Endocrine: Negative for polyphagia and polyuria.   Genitourinary:  Negative for difficulty urinating, dysuria, frequency, hematuria and urgency.   Musculoskeletal:  Negative for neck pain and neck stiffness.   Skin:  Negative for rash and wound.   Neurological:  Positive for headaches. Negative for dizziness, tremors, seizures, syncope, weakness and numbness.   Psychiatric/Behavioral:  Negative for agitation, confusion, dysphoric mood and hallucinations. The patient is not hyperactive.    Objective:     Vital Signs (Most Recent):  Temp: 99 °F (37.2 °C) (22 1428)  Pulse: 61 (22 1720)  Resp: 20 (22 1720)  BP: (!) 185/84 (22 1720)  SpO2: 99 % (22 1720)   Vital Signs (24h Range):  Temp:  [98.3 °F (36.8 °C)-99 °F (37.2 °C)] 99 °F (37.2 °C)  Pulse:  [52-90] 61  Resp:  [17-20] 20  SpO2:  [98 %-99 %] 99 %  BP: (185-236)/(72-96) 185/84     Weight: 58.1 kg (128 lb)  Body mass index is 25.85 kg/m².    Physical Exam  Constitutional:       General: She is not in acute distress.     Appearance: Normal appearance. She is normal weight. She is not ill-appearing, toxic-appearing or diaphoretic.      Comments: Pleasant woman in no acute distress. Cooperative with physical examination and conversant with interview.   HENT:      Head: Normocephalic and  atraumatic.      Nose: No congestion or rhinorrhea.      Mouth/Throat:      Mouth: Mucous membranes are moist.      Pharynx: Oropharynx is clear. No oropharyngeal exudate or posterior oropharyngeal erythema.   Eyes:      General: No scleral icterus.     Extraocular Movements: Extraocular movements intact.   Cardiovascular:      Rate and Rhythm: Normal rate and regular rhythm.      Pulses:           Dorsalis pedis pulses are 1+ on the right side and 1+ on the left side.      Heart sounds: No murmur heard.    No friction rub. No gallop.   Pulmonary:      Effort: Pulmonary effort is normal. No respiratory distress.      Breath sounds: Normal breath sounds. No stridor. No wheezing, rhonchi or rales.   Abdominal:      General: Abdomen is flat. There is no distension.      Palpations: Abdomen is soft.      Tenderness: There is no abdominal tenderness. There is no guarding or rebound.      Hernia: No hernia is present.   Musculoskeletal:      Cervical back: Normal range of motion and neck supple.      Right lower leg: Edema present.      Left lower leg: Edema present.      Right foot: Normal range of motion.      Left foot: Normal range of motion.      Comments: +1 pitting edema of the lower extremities   Skin:     General: Skin is warm and dry.   Neurological:      General: No focal deficit present.      Mental Status: She is alert and oriented to person, place, and time. Mental status is at baseline.      Sensory: No sensory deficit.      Motor: No weakness.   Psychiatric:         Mood and Affect: Mood normal.         Behavior: Behavior normal.         Thought Content: Thought content normal.         Judgment: Judgment normal.           Significant Labs: All pertinent labs within the past 24 hours have been reviewed.  CBC:   Recent Labs   Lab 12/02/22  1403   WBC 6.75   HGB 11.4*   HCT 35.9*        CMP:   Recent Labs   Lab 12/02/22  1403      K 3.9      CO2 24      BUN 15   CREATININE 0.8    CALCIUM 9.5   PROT 6.5   ALBUMIN 3.6   BILITOT 0.4   ALKPHOS 97   AST 20   ALT 14   ANIONGAP 12       Significant Imaging: I have reviewed all pertinent imaging results/findings within the past 24 hours.

## 2022-12-03 NOTE — NURSING
"1845 in the middle of getting update on a different pt, day nurse Krishna, inquired if I would like to take report directly from ED Nurse Lo Barton or would I prefer he take report. I respond, "yes id like to take report myself, I'm almost done with this update".   1855 KrishnaRN returned to reveal and added me to a secure chat containing said report on pt. Report is as follows from Lo Barton RN; '77 yr old female, full code, admitted for HTN urgency Bp initally in the 200s/80s, 40 IV lasix and verapamil given, BP better but not great, has a history of afib and anemia, 20 gauge in the left forearm, currently in afib, has a external female cath on'.   2000 pt arrived to unit. I had not received report from night nurse, whom oversaw the care of Mrs. Mane; per pt, day nurse of ED was MAURIZIO Hopper. Pt bp 217/91 upon arrival HR 59. It is noted that valsartan due at 1800 was not given.   Notified ED charge nurse of clarity whether medication was given or not, ED charge informed me to contact [phone] for MAURIZIO Nichols with info on the pt; no answer.   2030 pt was provided 2100 meds, pt expresses concern and clarity of whether she had received valsartan.   2138 valsartan given.  Will continue to monitor     "

## 2022-12-03 NOTE — ASSESSMENT & PLAN NOTE
Patient presenting with systolic blood pressure >220 in the emergency department. Sent from Cardiology clinic for elevated blood pressure. History of uncontrolled blood pressure over the past month after being taken off of her home spironolactone. Patient with some chest pressure upon arrival that resolved with antihypertensive medication. EKG without ischemic changes, troponin levels negative x2.   - s/p IV furosemide 40 mg x1 and verapamil 80 mg in the emergency department  - on candesartan as outpatient; will begin valsartan 320 mg daily given it is on formulary  - resume home verapamil 180 mg daily

## 2022-12-03 NOTE — ASSESSMENT & PLAN NOTE
Patient with history of bilateral carotid endarterectomy in April 2022. Will resume home aspirin and atorvastatin.

## 2022-12-03 NOTE — H&P
"Ward Dawkins - Cardiology [facility] Medicine  History & Physical    Patient Name: Magdalena Mane  MRN: [id number]  Patient Class: IP- Inpatient  Admission Date: 12/2/2022  Attending Physician: Adam Freeman MD   Primary Care Provider: Primary Doctor No         Patient information was obtained from patient, spouse/SO and ER records.     Subjective:     Chief Complaint:   Chief Complaint   Patient presents with    Hypertension     Seen at clinic and told to come to ED. HTN x 3 months. Also C/o back pain. Recent UTI on ATB, completed.           HPI: Magdalena Mane is a 77-year-old woman with a past medical history of HTN, paroxysmal atrial fibrillation, and carotid stenosis s/p carotid endarterectomy who presents to the emergency department with elevated blood pressure and chest pressure. Of note, the patient was at her Cardiology appointment today when her blood pressure was noted to be elevated with a systolic blood pressure >200. The patient states that she has had difficulty controlling her blood pressure over the past month with systolic blood pressure readings >180. She has been following with Cardiology for blood pressure control and has had several blood pressure medication changes. She notes that her blood pressure control worsened when she was told to discontinue her spironolactone. She recently was told to increase her candesartan and at today's appointment was told to increase her verapamil. She states that she began having chest pressure over the past week and has noted the pressure more over the past several days. It seems to be worsened with exertion per her report. The patient denies any shortness of breath at rest but does state she has noted more dyspnea on exertion and has decreased exercise tolerance. She denies orthopnea, but does note that she awakens frequently at night with episodes of "air hunger." She describes lower extremity edema and edema over her "entire body." Her  notes " that she does snore at night but denies observing any periods of apnea. She denies any abdominal pain or epigastric pain. She notes a constant frontal headache which began last week. She denies any numbness, paresthesias or weakness.     In the emergency department, the patient was found to have a blood pressure of 236/96 and was given verapamil 80 mg. Additionally, the patient was given 40 mg ov IV furosemide. She was admitted Hospital Medicine for further management.       Past Medical History:   Diagnosis Date    Anemia     Atrial fibrillation     Basal cell carcinoma     DJD (degenerative joint disease) 12/12/2012    Obesity (BMI 30-39.9) 11/27/2015    Vaginal atrophy 4/1/2016       Past Surgical History:   Procedure Laterality Date    ADENOIDECTOMY      BREAST BIOPSY Left 1999    Excisional bx, benign cyst    BREAST SURGERY      CARDIOVERSION  02/2021    CAROTID ENDARTERECTOMY Right 04/07/2022    Procedure: ENDARTERECTOMY-CAROTID;  Surgeon: TUCKER Brantley III, MD;  Location: [facility] OR 2ND FLR;  Service: Peripheral Vascular;  Laterality: Right;    COLONOSCOPY  2012    normal    COLONOSCOPY N/A 06/28/2017    Procedure: COLONOSCOPY;  Surgeon: Markel Montemayor MD;  Location: [facility] ENDO (4TH FLR);  Service: Endoscopy;  Laterality: N/A;    EXCISION OF GANGLION OF WRIST Left 06/27/2018    Procedure: EXCISION, GANGLION CYST, WRIST - Left Volar wrist;  Surgeon: Mary Moore MD;  Location: [facility] OR 1ST FLR;  Service: Orthopedics;  Laterality: Left;    HIP SURGERY  05/05/2014    bilateral    JOINT REPLACEMENT Bilateral     MOLLY    TONSILLECTOMY      TREATMENT OF CARDIAC ARRHYTHMIA N/A 09/29/2020    Procedure: CARDIOVERSION;  Surgeon: Nigel García MD;  Location: [facility] EP LAB;  Service: Cardiology;  Laterality: N/A;  AF, LIGIA, DCCV, MAC, NC, 3 Prep    TREATMENT OF CARDIAC ARRHYTHMIA N/A 12/08/2020    Procedure: Cardioversion or Defibrillation;  Surgeon: Nigel García MD;  Location: [facility] EP LAB;  Service:  Cardiology;  Laterality: N/A;  AF, LIGIA, DCCV, MAC, UT, 3 Prep       Review of patient's allergies indicates:   Allergen Reactions    Prednisone      ELEVATED B/P FOR SEVERAL DAYS/WENT TO ER    Lactose        No current facility-administered medications on file prior to encounter.     Current Outpatient Medications on File Prior to Encounter   Medication Sig    candesartan (ATACAND) 32 MG tablet Take 1 tablet (32 mg total) by mouth once daily.    acetaminophen (TYLENOL) 500 MG tablet Take 500 mg by mouth 2 (two) times daily.    alendronate (FOSAMAX) 70 MG tablet Take 1 tablet (70 mg total) by mouth every 7 days.    aspirin (ECOTRIN) 81 MG EC tablet Take 81 mg by mouth nightly.    atorvastatin (LIPITOR) 20 MG tablet Take 1 tablet (20 mg total) by mouth once daily. (Patient taking differently: Take 20 mg by mouth every evening.)    calcium-vitamin D 250-100 mg-unit per tablet Take 1 tablet by mouth 2 (two) times daily.    cholecalciferol, vitamin D3, (VITAMIN D3 ORAL) Take 3,000 Units by mouth once daily.    diphenhydrAMINE (BENADRYL) 25 mg capsule Take 25 mg by mouth every 6 (six) hours as needed for Itching.    ELIQUIS 5 mg Tab TAKE 1 TABLET BY MOUTH  TWICE DAILY    ferrous sulfate (FEOSOL) Tab tablet Take 1 tablet by mouth every evening. PATIENT TAKING 3 X WEEKLY IN THE EVENING (M-W-F)    flecainide (TAMBOCOR) 50 MG Tab Take 1 tablet (50 mg total) by mouth every 12 (twelve) hours.    lactase (LACTAID) 3,000 unit tablet Take 1 tablet by mouth 3 (three) times daily as needed.    loratadine (CLARITIN) 10 mg tablet Take 10 mg by mouth every morning.    PFIZER COVID-19 MACIEJ VACCN,PF, 30 mcg/0.3 mL injection     traZODone (DESYREL) 50 MG tablet TAKE 1 TABLET(50 MG) BY MOUTH EVERY NIGHT AS NEEDED FOR INSOMNIA    verapamiL (VERELAN) 180 MG C24P Take 1 capsule (180 mg total) by mouth every evening.    [DISCONTINUED] verapamiL (VERELAN) 120 MG C24P Take 1 capsule (120 mg total) by mouth every evening.      Family History       Problem Relation (Age of Onset)    Arthritis Mother    Breast cancer Mother    Dementia Paternal Grandmother    Heart attack Father    Heart disease Mother, Father, Paternal Grandmother    Heart failure Father    Hyperlipidemia Mother, Father    Hypertension Father    No Known Problems Brother    Scoliosis Father    Stroke Maternal Grandmother    Tuberculosis Father          Tobacco Use    Smoking status: Former     Packs/day: 1.00     Years: 10.00     Pack years: 10.00     Types: Cigarettes     Quit date: 1988     Years since quittin.8    Smokeless tobacco: Never   Substance and Sexual Activity    Alcohol use: Not Currently    Drug use: No    Sexual activity: Yes     Partners: Male     Review of Systems   Constitutional:  Negative for activity change, chills, fatigue and fever.   HENT:  Negative for congestion, nosebleeds, sinus pressure and trouble swallowing.    Eyes:  Positive for photophobia. Negative for visual disturbance.   Respiratory:  Positive for chest tightness and shortness of breath.    Cardiovascular:  Positive for chest pain and leg swelling. Negative for palpitations.   Gastrointestinal:  Positive for constipation.   Endocrine: Negative for polyphagia and polyuria.   Genitourinary:  Negative for difficulty urinating, dysuria, frequency, hematuria and urgency.   Musculoskeletal:  Negative for neck pain and neck stiffness.   Skin:  Negative for rash and wound.   Neurological:  Positive for headaches. Negative for dizziness, tremors, seizures, syncope, weakness and numbness.   Psychiatric/Behavioral:  Negative for agitation, confusion, dysphoric mood and hallucinations. The patient is not hyperactive.    Objective:     Vital Signs (Most Recent):  Temp: 99 °F (37.2 °C) (22 1428)  Pulse: 61 (22 1720)  Resp: 20 (22 1720)  BP: (!) 185/84 (22 1720)  SpO2: 99 % (22 1720)   Vital Signs (24h Range):  Temp:  [98.3 °F (36.8 °C)-99 °F (37.2  °C)] 99 °F (37.2 °C)  Pulse:  [52-90] 61  Resp:  [17-20] 20  SpO2:  [98 %-99 %] 99 %  BP: (185-236)/(72-96) 185/84     Weight: 58.1 kg (128 lb)  Body mass index is 25.85 kg/m².    Physical Exam  Constitutional:       General: She is not in acute distress.     Appearance: Normal appearance. She is normal weight. She is not ill-appearing, toxic-appearing or diaphoretic.      Comments: Pleasant woman in no acute distress. Cooperative with physical examination and conversant with interview.   HENT:      Head: Normocephalic and atraumatic.      Nose: No congestion or rhinorrhea.      Mouth/Throat:      Mouth: Mucous membranes are moist.      Pharynx: Oropharynx is clear. No oropharyngeal exudate or posterior oropharyngeal erythema.   Eyes:      General: No scleral icterus.     Extraocular Movements: Extraocular movements intact.   Cardiovascular:      Rate and Rhythm: Normal rate and regular rhythm.      Pulses:           Dorsalis pedis pulses are 1+ on the right side and 1+ on the left side.      Heart sounds: No murmur heard.    No friction rub. No gallop.   Pulmonary:      Effort: Pulmonary effort is normal. No respiratory distress.      Breath sounds: Normal breath sounds. No stridor. No wheezing, rhonchi or rales.   Abdominal:      General: Abdomen is flat. There is no distension.      Palpations: Abdomen is soft.      Tenderness: There is no abdominal tenderness. There is no guarding or rebound.      Hernia: No hernia is present.   Musculoskeletal:      Cervical back: Normal range of motion and neck supple.      Right lower leg: Edema present.      Left lower leg: Edema present.      Right foot: Normal range of motion.      Left foot: Normal range of motion.      Comments: +1 pitting edema of the lower extremities   Skin:     General: Skin is warm and dry.   Neurological:      General: No focal deficit present.      Mental Status: She is alert and oriented to person, place, and time. Mental status is at baseline.       Sensory: No sensory deficit.      Motor: No weakness.   Psychiatric:         Mood and Affect: Mood normal.         Behavior: Behavior normal.         Thought Content: Thought content normal.         Judgment: Judgment normal.           Significant Labs: All pertinent labs within the past 24 hours have been reviewed.  CBC:   Recent Labs   Lab 12/02/22  1403   WBC 6.75   HGB 11.4*   HCT 35.9*        CMP:   Recent Labs   Lab 12/02/22  1403      K 3.9      CO2 24      BUN 15   CREATININE 0.8   CALCIUM 9.5   PROT 6.5   ALBUMIN 3.6   BILITOT 0.4   ALKPHOS 97   AST 20   ALT 14   ANIONGAP 12       Significant Imaging: I have reviewed all pertinent imaging results/findings within the past 24 hours.    Assessment/Plan:     HTN (hypertension)  Patient presenting with systolic blood pressure >220 in the emergency department. Sent from Cardiology clinic for elevated blood pressure. History of uncontrolled blood pressure over the past month after being taken off of her home spironolactone. Patient with some chest pressure upon arrival that resolved with antihypertensive medication. EKG without ischemic changes, troponin levels negative x2.   - s/p IV furosemide 40 mg x1 and verapamil 80 mg in the emergency department  - on candesartan as outpatient; will begin valsartan 320 mg daily given it is on formulary  - resume home verapamil 180 mg daily      Dyspnea  Patient complaining of chronic dyspnea on exertion and reduced exercise tolerance in the setting of paroxysmal nocturnal dyspnea and reports of increased edema. History of atrial fibrillation. Chest radiograph with overt edema with mildly elevated BNP on admission at 197.  - f/u transthoracic echocardiogram to evaluate for underlying structural heart disease      Bilateral carotid artery stenosis  Patient with history of bilateral carotid endarterectomy in April 2022. Will resume home aspirin and atorvastatin.    Atrial fibrillation  The patient  is rate controlled within the emergency department. Resume home apixaban for anticoagulation. Resume home flecainide.       VTE Risk Mitigation (From admission, onward)         Ordered     apixaban tablet 5 mg  2 times daily         12/02/22 1818               Code Status: FULL    Mickey Barry MD  Department of Hospital Medicine   [facility] - Cardiology Stepdown

## 2022-12-03 NOTE — ASSESSMENT & PLAN NOTE
The patient is rate controlled within the emergency department. Resume home apixaban for anticoagulation. Resume home flecainide.

## 2022-12-04 VITALS
RESPIRATION RATE: 16 BRPM | WEIGHT: 124 LBS | BODY MASS INDEX: 25 KG/M2 | TEMPERATURE: 98 F | HEIGHT: 59 IN | HEART RATE: 54 BPM | SYSTOLIC BLOOD PRESSURE: 159 MMHG | OXYGEN SATURATION: 99 % | DIASTOLIC BLOOD PRESSURE: 69 MMHG

## 2022-12-04 LAB
ANION GAP SERPL CALC-SCNC: 4 MMOL/L (ref 8–16)
BUN SERPL-MCNC: 17 MG/DL (ref 8–23)
CALCIUM SERPL-MCNC: 8.9 MG/DL (ref 8.7–10.5)
CHLORIDE SERPL-SCNC: 103 MMOL/L (ref 95–110)
CO2 SERPL-SCNC: 26 MMOL/L (ref 23–29)
CREAT SERPL-MCNC: 0.7 MG/DL (ref 0.5–1.4)
ERYTHROCYTE [DISTWIDTH] IN BLOOD BY AUTOMATED COUNT: 12.5 % (ref 11.5–14.5)
EST. GFR  (NO RACE VARIABLE): >60 ML/MIN/1.73 M^2
GLUCOSE SERPL-MCNC: 93 MG/DL (ref 70–110)
HCT VFR BLD AUTO: 36.4 % (ref 37–48.5)
HGB BLD-MCNC: 11.3 G/DL (ref 12–16)
MAGNESIUM SERPL-MCNC: 1.9 MG/DL (ref 1.6–2.6)
MCH RBC QN AUTO: 30.7 PG (ref 27–31)
MCHC RBC AUTO-ENTMCNC: 31 G/DL (ref 32–36)
MCV RBC AUTO: 99 FL (ref 82–98)
PLATELET # BLD AUTO: 237 K/UL (ref 150–450)
PMV BLD AUTO: 11.1 FL (ref 9.2–12.9)
POTASSIUM SERPL-SCNC: 3.8 MMOL/L (ref 3.5–5.1)
RBC # BLD AUTO: 3.68 M/UL (ref 4–5.4)
SODIUM SERPL-SCNC: 133 MMOL/L (ref 136–145)
WBC # BLD AUTO: 5.4 K/UL (ref 3.9–12.7)

## 2022-12-04 PROCEDURE — 85027 COMPLETE CBC AUTOMATED: CPT | Performed by: STUDENT IN AN ORGANIZED HEALTH CARE EDUCATION/TRAINING PROGRAM

## 2022-12-04 PROCEDURE — 83735 ASSAY OF MAGNESIUM: CPT | Performed by: STUDENT IN AN ORGANIZED HEALTH CARE EDUCATION/TRAINING PROGRAM

## 2022-12-04 PROCEDURE — 99217 PR OBSERVATION CARE DISCHARGE: CPT | Mod: ,,, | Performed by: INTERNAL MEDICINE

## 2022-12-04 PROCEDURE — 36415 COLL VENOUS BLD VENIPUNCTURE: CPT | Performed by: STUDENT IN AN ORGANIZED HEALTH CARE EDUCATION/TRAINING PROGRAM

## 2022-12-04 PROCEDURE — 99217 PR OBSERVATION CARE DISCHARGE: ICD-10-PCS | Mod: ,,, | Performed by: INTERNAL MEDICINE

## 2022-12-04 PROCEDURE — 25000003 PHARM REV CODE 250: Performed by: STUDENT IN AN ORGANIZED HEALTH CARE EDUCATION/TRAINING PROGRAM

## 2022-12-04 PROCEDURE — 25000003 PHARM REV CODE 250

## 2022-12-04 PROCEDURE — G0378 HOSPITAL OBSERVATION PER HR: HCPCS

## 2022-12-04 PROCEDURE — 80048 BASIC METABOLIC PNL TOTAL CA: CPT | Performed by: STUDENT IN AN ORGANIZED HEALTH CARE EDUCATION/TRAINING PROGRAM

## 2022-12-04 RX ORDER — CANDESARTAN 32 MG/1
16 TABLET ORAL 2 TIMES DAILY
Qty: 90 TABLET | Refills: 1 | Status: SHIPPED | OUTPATIENT
Start: 2022-12-04 | End: 2022-12-04 | Stop reason: SDUPTHER

## 2022-12-04 RX ORDER — VERAPAMIL HYDROCHLORIDE 240 MG/1
240 CAPSULE, EXTENDED RELEASE ORAL NIGHTLY
Qty: 90 CAPSULE | Refills: 3 | Status: SHIPPED | OUTPATIENT
Start: 2022-12-04 | End: 2022-12-21 | Stop reason: DRUGHIGH

## 2022-12-04 RX ORDER — CANDESARTAN 16 MG/1
16 TABLET ORAL 2 TIMES DAILY
Qty: 90 TABLET | Refills: 2 | Status: SHIPPED | OUTPATIENT
Start: 2022-12-04 | End: 2022-12-30

## 2022-12-04 RX ORDER — VALSARTAN 320 MG/1
320 TABLET ORAL DAILY
Qty: 90 TABLET | Refills: 3 | Status: SHIPPED | OUTPATIENT
Start: 2022-12-05 | End: 2022-12-04 | Stop reason: HOSPADM

## 2022-12-04 RX ADMIN — VALSARTAN 320 MG: 160 TABLET, FILM COATED ORAL at 08:12

## 2022-12-04 RX ADMIN — VERAPAMIL HYDROCHLORIDE 180 MG: 180 TABLET, FILM COATED, EXTENDED RELEASE ORAL at 08:12

## 2022-12-04 RX ADMIN — FLECAINIDE ACETATE 50 MG: 50 TABLET ORAL at 08:12

## 2022-12-04 RX ADMIN — APIXABAN 5 MG: 5 TABLET, FILM COATED ORAL at 08:12

## 2022-12-04 RX ADMIN — ACETAMINOPHEN 650 MG: 325 TABLET ORAL at 08:12

## 2022-12-04 NOTE — DISCHARGE SUMMARY
"Ward Dawkins - Cardiology [facility] Medicine  Discharge Summary      Patient Name: Magdalena Mane  MRN: [id number]  TERESA: [id number]  Patient Class: OP- Observation  Admission Date: 12/2/2022  Hospital Length of Stay: 1 days  Discharge Date and Time:  12/04/2022 3:01 PM  Attending Physician: Adam Freeman MD   Discharging Provider: Adam Freeman MD, MD  Primary Care Provider: Primary Doctor [facility] Medicine Team: [facility] MED S Adam Freeman MD, MD  Primary Care Team: [facility] MED S    HPI:   Magdalena Mane is a 77-year-old woman with a past medical history of HTN, paroxysmal atrial fibrillation, and carotid stenosis s/p carotid endarterectomy who presents to the emergency department with elevated blood pressure and chest pressure. Of note, the patient was at her Cardiology appointment today when her blood pressure was noted to be elevated with a systolic blood pressure >200. The patient states that she has had difficulty controlling her blood pressure over the past month with systolic blood pressure readings >180. She has been following with Cardiology for blood pressure control and has had several blood pressure medication changes. She notes that her blood pressure control worsened when she was told to discontinue her spironolactone. She recently was told to increase her candesartan and at today's appointment was told to increase her verapamil. She states that she began having chest pressure over the past week and has noted the pressure more over the past several days. It seems to be worsened with exertion per her report. The patient denies any shortness of breath at rest but does state she has noted more dyspnea on exertion and has decreased exercise tolerance. She denies orthopnea, but does note that she awakens frequently at night with episodes of "air hunger." She describes lower extremity edema and edema over her "entire body." Her  notes that she does snore at night but denies " observing any periods of apnea. She denies any abdominal pain or epigastric pain. She notes a constant frontal headache which began last week. She denies any numbness, paresthesias or weakness.     In the emergency department, the patient was found to have a blood pressure of 236/96 and was given verapamil 80 mg. Additionally, the patient was given 40 mg ov IV furosemide. She was admitted Hospital Medicine for further management.       * No surgery found *      Hospital Course:   No notes on file      Pt admitted for elevated bp - now improved with med adjustment - CANDESTARTAN INCREASED TO 16MG BID AND VERAPMAIL INCREASED TO 320MG DLY . Also afib - rate controlled on Flecainide.   Potassium was 3.3 on this admission - corrected with replacement  Echo ordered - given concern for evaluating stuctural heart dx in the setting of AFIB / elevated bp. Findings showed nl EF with no valvular defects.    Summary:  The estimated ejection fraction is 65%.  The left ventricle is normal in size with normal systolic function.  Grade II left ventricular diastolic dysfunction.  Normal right ventricular size with normal right ventricular systolic function.  Severe left atrial enlargement.  Mild aortic regurgitation.  The estimated PA systolic pressure is 40 mmHg.  Normal central venous pressure (3 mmHg).    Goals of Care Treatment Preferences:  Code Status: Full Code    Living Will: Yes              Consults:     No new Assessment & Plan notes have been filed under this hospital service since the last note was generated.  Service: Hospital Medicine    Final Active Diagnoses:    Diagnosis Date Noted POA    PRINCIPAL PROBLEM:  HTN (hypertension) [I10] 12/12/2012 Unknown    Dyspnea [R06.00] 12/03/2022 Unknown    Bilateral carotid artery stenosis [I65.23] 03/25/2022 Yes    Atrial fibrillation [I48.91] 09/16/2020 Yes      Problems Resolved During this Admission:       Discharged Condition: good    Disposition: Home or Self Care    Follow  Up:   Follow-up Information       Primary Doctor No Follow up in 1 month(s).                           Patient Instructions:      Diet Cardiac     Activity as tolerated     PE:    HEENT:      Head: Normocephalic and atraumatic.      Nose: No congestion or rhinorrhea.      Mouth/Throat:      Mouth: Mucous membranes are moist.      Pharynx: Oropharynx is clear. No oropharyngeal exudate or posterior oropharyngeal erythema.   Eyes:      General: No scleral icterus.     Extraocular Movements: Extraocular movements intact.   Cardiovascular:      Rate and Rhythm: Normal rate and regular rhythm.      Pulses:           Dorsalis pedis pulses are 1+ on the right side and 1+ on the left side.      Heart sounds: No murmur heard.    No friction rub. No gallop.   Pulmonary:      Effort: Pulmonary effort is normal. No respiratory distress.      Breath sounds: Normal breath sounds. No stridor. No wheezing, rhonchi or rales.   Abdominal:      General: Abdomen is flat. There is no distension.      Palpations: Abdomen is soft.      Tenderness: There is no abdominal tenderness. There is no guarding or rebound.      Hernia: No hernia is present.   Musculoskeletal:      Cervical back: Normal range of motion and neck supple.    edema is resolved    Significant Diagnostic Studies: Labs:   BMP:   Recent Labs   Lab 12/03/22  0229 12/04/22  0709   GLU 95 93   * 133*   K 3.3* 3.8    103   CO2 28 26   BUN 17 17   CREATININE 0.8 0.7   CALCIUM 9.0 8.9   MG 1.9 1.9       Pending Diagnostic Studies:       None           Medications:  Reconciled Home Medications:      Medication List        START taking these medications                CHANGE how you take these medications      atorvastatin 20 MG tablet  Commonly known as: LIPITOR  Take 1 tablet (20 mg total) by mouth once daily.  What changed: when to take this     candesartan 16 MG tablet  Commonly known as: ATACAND  Take 1  tablet (16 mg total) by mouth 2 (two) times a day.  What  changed:   how much to take  when to take this            CONTINUE taking these medications      acetaminophen 500 MG tablet  Commonly known as: TYLENOL  Take 500 mg by mouth 2 (two) times daily.     alendronate 70 MG tablet  Commonly known as: FOSAMAX  Take 1 tablet (70 mg total) by mouth every 7 days.     aspirin 81 MG EC tablet  Commonly known as: ECOTRIN  Take 81 mg by mouth nightly.     calcium-vitamin D 250 mg-2.5 mcg (100 unit) per tablet  Take 1 tablet by mouth 2 (two) times daily.     diphenhydrAMINE 25 mg capsule  Commonly known as: BENADRYL  Take 25 mg by mouth every 6 (six) hours as needed for Itching.     ELIQUIS 5 mg Tab  Generic drug: apixaban  TAKE 1 TABLET BY MOUTH  TWICE DAILY     ferrous sulfate Tab tablet  Commonly known as: FEOSOL  Take 1 tablet by mouth every evening. PATIENT TAKING 3 X WEEKLY IN THE EVENING (M-W-F)     flecainide 50 MG Tab  Commonly known as: TAMBOCOR  Take 1 tablet (50 mg total) by mouth every 12 (twelve) hours.     lactase 3,000 unit tablet  Commonly known as: LACTAID  Take 1 tablet by mouth 3 (three) times daily as needed.     loratadine 10 mg tablet  Commonly known as: CLARITIN  Take 10 mg by mouth every morning.     traZODone 50 MG tablet  Commonly known as: DESYREL  TAKE 1 TABLET(50 MG) BY MOUTH EVERY NIGHT AS NEEDED FOR INSOMNIA     VITAMIN D3 ORAL  Take 3,000 Units by mouth once daily.            ASK your doctor about these medications      PFIZER COVID-19 MACIEJ VACCN(PF) 30 mcg/0.3 mL injection  Generic drug: COVID-19 vac, maciej(Pfizer)(PF)     verapamiL 240 MG C24p  Commonly known as: VERELAN  Take 1 capsule (240 mg total) by mouth every evening.              Indwelling Lines/Drains at time of discharge:   Lines/Drains/Airways       None                   Time spent on the discharge of patient: 32 minutes         Adam Freeman MD  Department of Hospital Medicine  [facility] - Cardiology Stepdown

## 2022-12-05 ENCOUNTER — PATIENT MESSAGE (OUTPATIENT)
Dept: CARDIOLOGY | Facility: CLINIC | Age: 77
End: 2022-12-05
Payer: MEDICARE

## 2022-12-05 ENCOUNTER — TELEPHONE (OUTPATIENT)
Dept: ELECTROPHYSIOLOGY | Facility: CLINIC | Age: 77
End: 2022-12-05
Payer: MEDICARE

## 2022-12-05 DIAGNOSIS — I50.33 ACUTE ON CHRONIC HEART FAILURE WITH PRESERVED EJECTION FRACTION (HFPEF): Primary | ICD-10-CM

## 2022-12-05 RX ORDER — SPIRONOLACTONE 25 MG/1
25 TABLET ORAL DAILY
Qty: 30 TABLET | Refills: 6 | Status: SHIPPED | OUTPATIENT
Start: 2022-12-05 | End: 2023-02-22 | Stop reason: SDUPTHER

## 2022-12-05 RX ORDER — SPIRONOLACTONE 25 MG/1
25 TABLET ORAL DAILY
COMMUNITY
End: 2022-12-05 | Stop reason: SDUPTHER

## 2022-12-05 NOTE — PROGRESS NOTES
AVS and meds reviewed. IV removed. Pt will  medications at pharmacy tomorrow, as med rec was changed and pharmacy was closed. BP still elevated upon discharge to 170's systolic. Pt is following up with cardiologist outpatient. No acute needs upon discharge. Transport took pt by wheelchair, and pt discharged with .

## 2022-12-05 NOTE — TELEPHONE ENCOUNTER
----- Message from Yeny Jaimes MA sent at 12/5/2022  2:57 PM CST -----    ----- Message -----  From: Lucie Michael  Sent: 12/5/2022   2:55 PM CST  To: Ricky QUIÑONEZ Staff    Needs advice from nurse:      Who Called:pt  Regarding:patient went to ER and medication was changed//needs to discuss Echo  Would the patient rather a call back or [facility]IA [facility]?  Best Call Back number:[phone]  Additional Info:

## 2022-12-07 ENCOUNTER — PATIENT MESSAGE (OUTPATIENT)
Dept: CARDIOLOGY | Facility: CLINIC | Age: 77
End: 2022-12-07
Payer: MEDICARE

## 2022-12-07 ENCOUNTER — PATIENT MESSAGE (OUTPATIENT)
Dept: ADMINISTRATIVE | Facility: OTHER | Age: 77
End: 2022-12-07
Payer: MEDICARE

## 2022-12-13 ENCOUNTER — OFFICE VISIT (OUTPATIENT)
Dept: INTERNAL MEDICINE | Facility: CLINIC | Age: 77
End: 2022-12-13
Payer: MEDICARE

## 2022-12-13 ENCOUNTER — PATIENT MESSAGE (OUTPATIENT)
Dept: ADMINISTRATIVE | Facility: OTHER | Age: 77
End: 2022-12-13
Payer: MEDICARE

## 2022-12-13 VITALS
HEIGHT: 59 IN | OXYGEN SATURATION: 99 % | HEART RATE: 59 BPM | DIASTOLIC BLOOD PRESSURE: 68 MMHG | WEIGHT: 127.13 LBS | BODY MASS INDEX: 25.63 KG/M2 | SYSTOLIC BLOOD PRESSURE: 152 MMHG

## 2022-12-13 DIAGNOSIS — I48.0 PAROXYSMAL ATRIAL FIBRILLATION: ICD-10-CM

## 2022-12-13 DIAGNOSIS — G47.00 INSOMNIA, UNSPECIFIED TYPE: ICD-10-CM

## 2022-12-13 DIAGNOSIS — I10 HYPERTENSION, ESSENTIAL: Primary | ICD-10-CM

## 2022-12-13 PROCEDURE — 1160F RVW MEDS BY RX/DR IN RCRD: CPT | Mod: CPTII,S$GLB,, | Performed by: PHYSICIAN ASSISTANT

## 2022-12-13 PROCEDURE — 3044F HG A1C LEVEL LT 7.0%: CPT | Mod: CPTII,S$GLB,, | Performed by: PHYSICIAN ASSISTANT

## 2022-12-13 PROCEDURE — 1101F PT FALLS ASSESS-DOCD LE1/YR: CPT | Mod: CPTII,S$GLB,, | Performed by: PHYSICIAN ASSISTANT

## 2022-12-13 PROCEDURE — 1125F AMNT PAIN NOTED PAIN PRSNT: CPT | Mod: CPTII,S$GLB,, | Performed by: PHYSICIAN ASSISTANT

## 2022-12-13 PROCEDURE — 99999 PR PBB SHADOW E&M-EST. PATIENT-LVL V: CPT | Mod: PBBFAC,,, | Performed by: PHYSICIAN ASSISTANT

## 2022-12-13 PROCEDURE — 4010F PR ACE/ARB THEARPY RXD/TAKEN: ICD-10-PCS | Mod: CPTII,S$GLB,, | Performed by: PHYSICIAN ASSISTANT

## 2022-12-13 PROCEDURE — 3077F SYST BP >= 140 MM HG: CPT | Mod: CPTII,S$GLB,, | Performed by: PHYSICIAN ASSISTANT

## 2022-12-13 PROCEDURE — 1101F PR PT FALLS ASSESS DOC 0-1 FALLS W/OUT INJ PAST YR: ICD-10-PCS | Mod: CPTII,S$GLB,, | Performed by: PHYSICIAN ASSISTANT

## 2022-12-13 PROCEDURE — 99214 PR OFFICE/OUTPT VISIT, EST, LEVL IV, 30-39 MIN: ICD-10-PCS | Mod: S$GLB,,, | Performed by: PHYSICIAN ASSISTANT

## 2022-12-13 PROCEDURE — 1157F PR ADVANCE CARE PLAN OR EQUIV PRESENT IN MEDICAL RECORD: ICD-10-PCS | Mod: CPTII,S$GLB,, | Performed by: PHYSICIAN ASSISTANT

## 2022-12-13 PROCEDURE — 3288F PR FALLS RISK ASSESSMENT DOCUMENTED: ICD-10-PCS | Mod: CPTII,S$GLB,, | Performed by: PHYSICIAN ASSISTANT

## 2022-12-13 PROCEDURE — 1159F MED LIST DOCD IN RCRD: CPT | Mod: CPTII,S$GLB,, | Performed by: PHYSICIAN ASSISTANT

## 2022-12-13 PROCEDURE — 3044F PR MOST RECENT HEMOGLOBIN A1C LEVEL <7.0%: ICD-10-PCS | Mod: CPTII,S$GLB,, | Performed by: PHYSICIAN ASSISTANT

## 2022-12-13 PROCEDURE — 1125F PR PAIN SEVERITY QUANTIFIED, PAIN PRESENT: ICD-10-PCS | Mod: CPTII,S$GLB,, | Performed by: PHYSICIAN ASSISTANT

## 2022-12-13 PROCEDURE — 3078F DIAST BP <80 MM HG: CPT | Mod: CPTII,S$GLB,, | Performed by: PHYSICIAN ASSISTANT

## 2022-12-13 PROCEDURE — 3288F FALL RISK ASSESSMENT DOCD: CPT | Mod: CPTII,S$GLB,, | Performed by: PHYSICIAN ASSISTANT

## 2022-12-13 PROCEDURE — 1160F PR REVIEW ALL MEDS BY PRESCRIBER/CLIN PHARMACIST DOCUMENTED: ICD-10-PCS | Mod: CPTII,S$GLB,, | Performed by: PHYSICIAN ASSISTANT

## 2022-12-13 PROCEDURE — 99999 PR PBB SHADOW E&M-EST. PATIENT-LVL V: ICD-10-PCS | Mod: PBBFAC,,, | Performed by: PHYSICIAN ASSISTANT

## 2022-12-13 PROCEDURE — 3077F PR MOST RECENT SYSTOLIC BLOOD PRESSURE >= 140 MM HG: ICD-10-PCS | Mod: CPTII,S$GLB,, | Performed by: PHYSICIAN ASSISTANT

## 2022-12-13 PROCEDURE — 3078F PR MOST RECENT DIASTOLIC BLOOD PRESSURE < 80 MM HG: ICD-10-PCS | Mod: CPTII,S$GLB,, | Performed by: PHYSICIAN ASSISTANT

## 2022-12-13 PROCEDURE — 1159F PR MEDICATION LIST DOCUMENTED IN MEDICAL RECORD: ICD-10-PCS | Mod: CPTII,S$GLB,, | Performed by: PHYSICIAN ASSISTANT

## 2022-12-13 PROCEDURE — 1157F ADVNC CARE PLAN IN RCRD: CPT | Mod: CPTII,S$GLB,, | Performed by: PHYSICIAN ASSISTANT

## 2022-12-13 PROCEDURE — 99214 OFFICE O/P EST MOD 30 MIN: CPT | Mod: S$GLB,,, | Performed by: PHYSICIAN ASSISTANT

## 2022-12-13 PROCEDURE — 4010F ACE/ARB THERAPY RXD/TAKEN: CPT | Mod: CPTII,S$GLB,, | Performed by: PHYSICIAN ASSISTANT

## 2022-12-13 RX ORDER — CLONIDINE HYDROCHLORIDE 0.2 MG/1
0.2 TABLET ORAL NIGHTLY
COMMUNITY
Start: 2022-12-07 | End: 2023-01-13

## 2022-12-13 RX ORDER — LORAZEPAM 1 MG/1
1 TABLET ORAL EVERY 8 HOURS PRN
COMMUNITY
Start: 2022-12-07

## 2022-12-13 NOTE — PROGRESS NOTES
Subjective:       Patient ID: Magdalena Mane is a 77 y.o. female.        Chief Complaint: Blood Pressure Check    Magdalena Mane is an established patient of [facility] here today for follow up visit.    Previous patient of Dr. Garner, saw Dr. Avendano last visit, here today for follow up with me as pervious f/u had to be urgently cancelled.  She needs a PCP for coordination of care.      She is a retired RN.    HTN -   Verapamil 240 mg daily  Candesartan 16 mg BID  Spironolactone 25 mg daily  Clonidine 0.2 mg prn only    Many medication adjustments in past few weeks    She thinks BP is finally starting to trend down but remains somewhat labile  She follows with digital hypertension  Also follows with Dr. Pratt in cardiology    Afib - followed by Dr. García  Flecainide 50 mg BID  Eliquis 5 mg BID    12/6-12/7 at  for admission for hypertension  12/2-12/4 admission at [facility] for hypertension    Anxiety - given lorazepam 1 mg to use prn at hospital d/c from , she has only used it once  Gets anxious checking her BP    Energy level is improving             Review of Systems   Constitutional:  Negative for chills, diaphoresis, fatigue and fever.   HENT:  Negative for congestion and sore throat.    Eyes:  Negative for visual disturbance.   Respiratory:  Negative for cough, chest tightness and shortness of breath.    Cardiovascular:  Negative for chest pain, palpitations and leg swelling.   Gastrointestinal:  Negative for abdominal pain, blood in stool, constipation, diarrhea, nausea and vomiting.   Genitourinary:  Negative for dysuria, frequency, hematuria and urgency.   Musculoskeletal:  Negative for arthralgias and back pain.   Skin:  Negative for rash.   Neurological:  Negative for dizziness, syncope, weakness and headaches.   Psychiatric/Behavioral:  Negative for dysphoric mood and sleep disturbance. The patient is not nervous/anxious.      Objective:      Physical Exam  Vitals and nursing note  "reviewed.   Constitutional:       Appearance: Normal appearance. She is well-developed.   HENT:      Head: Normocephalic.      Right Ear: External ear normal.      Left Ear: External ear normal.   Eyes:      Pupils: Pupils are equal, round, and reactive to light.   Cardiovascular:      Rate and Rhythm: Normal rate and regular rhythm.      Heart sounds: Normal heart sounds. No murmur heard.    No friction rub. No gallop.   Pulmonary:      Effort: Pulmonary effort is normal. No respiratory distress.      Breath sounds: Normal breath sounds.   Abdominal:      Palpations: Abdomen is soft.      Tenderness: There is no abdominal tenderness.   Skin:     General: Skin is warm and dry.   Neurological:      Mental Status: She is alert.       Assessment:       1. Hypertension, essential    2. Paroxysmal atrial fibrillation    3. Insomnia, unspecified type        Plan:       Magdalena was seen today for blood pressure check.    Diagnoses and all orders for this visit:    Hypertension, essential - followed by digital hypertension, may recent medication adjustments with two recent admissions, will stay the course with meds for now and give some time to stabilize  BP Readings from Last 5 Encounters:   12/13/22 (!) 152/68   12/04/22 (!) (P) 185/76   12/02/22 (!) 202/72   11/14/22 (!) 196/79   11/11/22 139/71      Paroxysmal atrial fibrillation - tx with flecainide and eliquis, followed by Dr. García    Insomnia, unspecified type - tx with trazodone    Needs new PCP for coordination of care  >30 minutes spent on patient encounter    Pt has been given instructions populated from patient instructions database and has verbalized understanding of the after visit summary and information contained wherein.    Follow up with a primary care provider. May go to ER for acute shortness of breath, lightheadedness, fever, or any other emergent complaints or changes in condition.    "This note will be shared with the patient"    Future Appointments "   Date Time Provider Department Center   12/14/2022 10:30 AM LAB, APPOINTMENT [facility]   12/16/2022 11:30 AM Sho Morfin [facility]   12/23/2022 11:00 AM Larisa Ann [facility]   12/30/2022 11:00 AM Chasidy Yoon [facility]   1/26/2023 10:30 AM Miriam Gomez MD [facility] [place]   2/13/2023  1:00 PM Maki Pratt MD [facility] CARDIO [place]   6/9/2023  1:20 PM Nigel García MD [facility] [place]

## 2022-12-14 ENCOUNTER — TELEPHONE (OUTPATIENT)
Dept: PRIMARY CARE CLINIC | Facility: CLINIC | Age: 77
End: 2022-12-14
Payer: MEDICARE

## 2022-12-14 ENCOUNTER — PATIENT MESSAGE (OUTPATIENT)
Dept: INTERNAL MEDICINE | Facility: CLINIC | Age: 77
End: 2022-12-14
Payer: MEDICARE

## 2022-12-14 NOTE — TELEPHONE ENCOUNTER
Spoke to pt this morning, pt has been scheduled to see Dr Dsouza on Mon 01/23/23 at 1pm, pt appreciated the phone call.

## 2022-12-14 NOTE — TELEPHONE ENCOUNTER
----- Message from Farideh Welsh PA-C sent at 12/14/2022  5:37 AM CST -----  Yes, I think that would be great!    Farideh Rios PA-C  [facility]  [address] [place] [id number]  P: [phone]  F: [phone]    ----- Message -----  From: Tiffany London MD  Sent: 12/13/2022   4:21 PM CST  To: Farideh Welsh PA-C, Maki De La Garza MA    Could she see Dr Dsouza? She is a new PCP here under the 65+ umbrella. I'm cc'ing Maki to see if she can help.  ----- Message -----  From: Farideh Welsh PA-C  Sent: 12/13/2022   3:51 PM CST  To: Tiffany London MD    Hi!    Are you all able to see patient's with peoples health insurance?  This patient very much needs a new PCP.  She is looking for longer appointment times and more frequent touches/follow up on her medical issues.  Let me know.  Her previous PCP was Dr. Garner who is no longer here...    Farideh Rois PA-C  [facility] [address] [address]  P: [phone]  F: [phone]

## 2022-12-16 ENCOUNTER — DOCUMENTATION ONLY (OUTPATIENT)
Dept: REHABILITATION | Facility: OTHER | Age: 77
End: 2022-12-16
Attending: PHYSICAL MEDICINE & REHABILITATION
Payer: MEDICARE

## 2022-12-16 NOTE — PROGRESS NOTES
Health  Wellness Visit Note    Name: Magdalena Mane  Clinic Number: [id number]  Physician: No ref. provider found  Diagnosis: No diagnosis found.  Past Medical History:   Diagnosis Date    Anemia     Atrial fibrillation     Basal cell carcinoma     DJD (degenerative joint disease) 12/12/2012    Obesity (BMI 30-39.9) 11/27/2015    Vaginal atrophy 4/1/2016     Visit Number: 33  Precautions: Atrial Fibrillation      1st PT visit: 5/17/2022  Year of care end date: 5/17/2023  6 Month test  Performed: Nov 2022  12 Month test performed: May 2023  Mind body plan: Plan A 9 months  Patient level: B    Time In: 11:25 AM  Time Out: 12:12 PM  Total Treatment Time: 47 minutes    Wellness [employer] 2022  Handout on this week's wellness topic Coping was provided along with a discussion on what it means, the benefits, and suggestions for practice.  Reviewed last week's topic n/a.     Subjective:   Patient reports back to wellness after about 3 weeks out; she's been in/out of hospital with incidences of high blood pressure. Pt states that she's completes her stretches as often as she was able.      Objective:   Magdalena completed therapeutic stretches (EIL, ABRAHAM) and the following MedX exercise machines: core lumbar, torso rotation l/r, leg extension, leg curl, upright row, chest press, biceps curl, triceps extension, leg press    See exercise log in patient folder for rate of exertion and repetitions completed.       Fitness Machine Education Key:  E=education on equipment initiated and further follow up and education needed  I=independent with  and exercise.  The patient:  Adjusts machines to his/her settings  Uses equipment levers, pins, weights safely  Maintains safe and correct posture while exercising  Moves through exercise with correct pace and control  Gets on and off equipment safely      Core Lumbar Strength E Torso Rotation E Leg Press E   Leg Extension E Seated Leg Curl E Chest Press E   Seated Row E Hip ADD  X Hip ABD E   Triceps Extension E Bicep Curl E Other: X       Assessment:   Patient tolerated Patient tolerated MedX Core Lumbar Strength and all other peripheral exercises without an increase in symptoms. Patient warmed up on treadmill for 5 minutes, stretched, and iced low back and knees for 5 minutes after the workout.    Plan:  Continue with established plan of care towards wellness goals.     Health  : Sho Morfin  12/16/2022

## 2022-12-23 ENCOUNTER — LAB VISIT (OUTPATIENT)
Dept: LAB | Facility: HOSPITAL | Age: 77
End: 2022-12-23
Attending: INTERNAL MEDICINE
Payer: MEDICARE

## 2022-12-23 DIAGNOSIS — I10 ESSENTIAL HYPERTENSION: ICD-10-CM

## 2022-12-23 PROCEDURE — 80048 BASIC METABOLIC PNL TOTAL CA: CPT | Performed by: INTERNAL MEDICINE

## 2022-12-23 PROCEDURE — 36415 COLL VENOUS BLD VENIPUNCTURE: CPT | Mod: PO | Performed by: INTERNAL MEDICINE

## 2022-12-24 LAB
ANION GAP SERPL CALC-SCNC: 8 MMOL/L (ref 8–16)
BUN SERPL-MCNC: 28 MG/DL (ref 8–23)
CALCIUM SERPL-MCNC: 9 MG/DL (ref 8.7–10.5)
CHLORIDE SERPL-SCNC: 103 MMOL/L (ref 95–110)
CO2 SERPL-SCNC: 28 MMOL/L (ref 23–29)
CREAT SERPL-MCNC: 1 MG/DL (ref 0.5–1.4)
EST. GFR  (NO RACE VARIABLE): 58.8 ML/MIN/1.73 M^2
GLUCOSE SERPL-MCNC: 86 MG/DL (ref 70–110)
POTASSIUM SERPL-SCNC: 4.2 MMOL/L (ref 3.5–5.1)
SODIUM SERPL-SCNC: 139 MMOL/L (ref 136–145)

## 2022-12-27 ENCOUNTER — TELEPHONE (OUTPATIENT)
Dept: CARDIOLOGY | Facility: CLINIC | Age: 77
End: 2022-12-27
Payer: MEDICARE

## 2022-12-27 NOTE — TELEPHONE ENCOUNTER
----- Message from Maki Pratt MD sent at 12/27/2022  9:31 AM CST -----  Please inform the patient that her chemistry has improved.  Good news.

## 2022-12-30 ENCOUNTER — DOCUMENTATION ONLY (OUTPATIENT)
Dept: REHABILITATION | Facility: OTHER | Age: 77
End: 2022-12-30
Attending: PHYSICAL MEDICINE & REHABILITATION
Payer: MEDICARE

## 2022-12-30 NOTE — PROGRESS NOTES
Health  Wellness Visit Note    Name: Magdalena Mane  Clinic Number: [id number]  Physician: No ref. provider found  Diagnosis: No diagnosis found.  Past Medical History:   Diagnosis Date    Anemia     Atrial fibrillation     Basal cell carcinoma     DJD (degenerative joint disease) 12/12/2012    Obesity (BMI 30-39.9) 11/27/2015    Vaginal atrophy 4/1/2016     Visit Number: 34  Precautions: Atrial Fibrillation      1st PT visit: 5/17/2022  Year of care end date: 5/17/2023  6 Month test  Performed: Nov 2022  12 Month test performed: May 2023  Mind body plan: Plan A 9 months  Patient level: B    Time In: 11:05 AM  Time Out: 11:55 AM  Total Treatment Time: 50 minutes    Wellness [employer] 2022  Handout on this week's wellness topic [place] [employer]- Journal/Prioritization was provided along with a discussion on what it means, the benefits, and suggestions for practice.  Reviewed last week's topic [place] [employer]-Time Management.    Subjective:   Patient reports she has no back pain today. Within the last week she continued to do her stretches daily. The holidays were last week and she did a lot of walking and moving around with her family. She did not ice since her last wellness session.     Objective:   Magdalena completed therapeutic stretches (EIL, ABRAHAM) and the following MedX exercise machines: core lumbar, torso rotation l/r, leg extension, leg curl, upright row, chest press, biceps curl, triceps extension, leg press    See exercise log in patient folder for rate of exertion and repetitions completed.       Fitness Machine Education Key:  E=education on equipment initiated and further follow up and education needed  I=independent with  and exercise.  The patient:  Adjusts machines to his/her settings  Uses equipment levers, pins, weights safely  Maintains safe and correct posture while exercising  Moves through exercise with correct pace and control  Gets on and off equipment safely      Core Lumbar  Strength E Torso Rotation E Leg Press E   Leg Extension E Seated Leg Curl E Chest Press E   Seated Row E Hip ADD X Hip ABD E   Triceps Extension E Bicep Curl E Other: X       Assessment:   Patient tolerated Patient tolerated MedX Core Lumbar Strength and all other peripheral exercises without an increase in symptoms. Patient warmed up on treadmill for 5 minutes, stretched, and iced low back and knees for 5 minutes after the workout.    Plan:  Continue with established plan of care towards wellness goals.     Health  : Chasidy Yoon  12/30/2022

## 2023-01-06 ENCOUNTER — DOCUMENTATION ONLY (OUTPATIENT)
Dept: REHABILITATION | Facility: OTHER | Age: 78
End: 2023-01-06
Payer: MEDICARE

## 2023-01-06 NOTE — PROGRESS NOTES
Health  Wellness Visit Note    Name: Magdalena Mane  Clinic Number: [id number]  Physician: No ref. provider found  Diagnosis: No diagnosis found.  Past Medical History:   Diagnosis Date    Anemia     Atrial fibrillation     Basal cell carcinoma     DJD (degenerative joint disease) 12/12/2012    Obesity (BMI 30-39.9) 11/27/2015    Vaginal atrophy 4/1/2016     Visit Number: 35  Precautions: Atrial Fibrillation      1st PT visit: 5/17/2022  Year of care end date: 5/17/2023  6 Month test  Performed: Nov 2022  12 Month test performed: May 2023  Mind body plan: Plan A 9 months  Patient level: B    Time In: 11:20 AM  Time Out: 12:10 PM  Total Treatment Time: 50 minutes    Wellness [employer] 2022  Handout on this week's wellness topic Gratitude was provided along with a discussion on what it means, the benefits, and suggestions for practice.  Reviewed last week's topic [place] [employer]- Journal/Prioritization    Subjective:   Patient reports that her low back feels good today. Over the week, she stretched and completed her HEP on a daily basis; she does not often ice at home. To respect her health limitations for now, she's been taking it easy and not pushing her physical activity lately.     Objective:   Magdalena completed therapeutic stretches (EIL, ABRAHAM) and the following MedX exercise machines: core lumbar, torso rotation l/r, leg extension, leg curl, upright row, chest press, biceps curl, triceps extension, leg press    See exercise log in patient folder for rate of exertion and repetitions completed.       Fitness Machine Education Key:  E=education on equipment initiated and further follow up and education needed  I=independent with  and exercise.  The patient:  Adjusts machines to his/her settings  Uses equipment levers, pins, weights safely  Maintains safe and correct posture while exercising  Moves through exercise with correct pace and control  Gets on and off equipment safely      Core Lumbar Strength  E Torso Rotation E Leg Press E   Leg Extension E Seated Leg Curl E Chest Press E   Seated Row E Hip ADD X Hip ABD E   Triceps Extension E Bicep Curl E Other: X       Assessment:   Patient tolerated Patient tolerated MedX Core Lumbar Strength and all other peripheral exercises without an increase in symptoms. Patient warmed up on treadmill for 5 minutes, stretched, and iced low back and knees for 5 minutes after the workout.    Plan:  Continue with established plan of care towards wellness goals.     Health  : Sho Morfin  1/6/2023

## 2023-01-11 ENCOUNTER — PATIENT MESSAGE (OUTPATIENT)
Dept: REHABILITATION | Facility: OTHER | Age: 78
End: 2023-01-11
Payer: MEDICARE

## 2023-01-13 ENCOUNTER — PATIENT MESSAGE (OUTPATIENT)
Dept: CARDIOLOGY | Facility: CLINIC | Age: 78
End: 2023-01-13
Payer: MEDICARE

## 2023-01-13 ENCOUNTER — OFFICE VISIT (OUTPATIENT)
Dept: CARDIOLOGY | Facility: CLINIC | Age: 78
End: 2023-01-13
Payer: MEDICARE

## 2023-01-13 ENCOUNTER — PATIENT MESSAGE (OUTPATIENT)
Dept: ADMINISTRATIVE | Facility: OTHER | Age: 78
End: 2023-01-13
Payer: MEDICARE

## 2023-01-13 ENCOUNTER — DOCUMENTATION ONLY (OUTPATIENT)
Dept: REHABILITATION | Facility: OTHER | Age: 78
End: 2023-01-13
Payer: MEDICARE

## 2023-01-13 VITALS
BODY MASS INDEX: 25.8 KG/M2 | HEART RATE: 58 BPM | WEIGHT: 128 LBS | OXYGEN SATURATION: 98 % | SYSTOLIC BLOOD PRESSURE: 142 MMHG | DIASTOLIC BLOOD PRESSURE: 60 MMHG | HEIGHT: 59 IN

## 2023-01-13 DIAGNOSIS — I48.91 ATRIAL FIBRILLATION, UNSPECIFIED TYPE: ICD-10-CM

## 2023-01-13 DIAGNOSIS — I50.9 ACUTE HEART FAILURE, UNSPECIFIED HEART FAILURE TYPE: Primary | ICD-10-CM

## 2023-01-13 DIAGNOSIS — I20.89 OTHER FORMS OF ANGINA PECTORIS: ICD-10-CM

## 2023-01-13 DIAGNOSIS — I10 ESSENTIAL HYPERTENSION: ICD-10-CM

## 2023-01-13 DIAGNOSIS — Z98.890 S/P CAROTID ENDARTERECTOMY: ICD-10-CM

## 2023-01-13 DIAGNOSIS — I70.0 ATHEROSCLEROSIS OF AORTA: ICD-10-CM

## 2023-01-13 DIAGNOSIS — I10 PRIMARY HYPERTENSION: ICD-10-CM

## 2023-01-13 DIAGNOSIS — I48.0 PAROXYSMAL ATRIAL FIBRILLATION: ICD-10-CM

## 2023-01-13 PROCEDURE — 3077F SYST BP >= 140 MM HG: CPT | Mod: CPTII,S$GLB,, | Performed by: INTERNAL MEDICINE

## 2023-01-13 PROCEDURE — 1157F ADVNC CARE PLAN IN RCRD: CPT | Mod: CPTII,S$GLB,, | Performed by: INTERNAL MEDICINE

## 2023-01-13 PROCEDURE — 1159F PR MEDICATION LIST DOCUMENTED IN MEDICAL RECORD: ICD-10-PCS | Mod: CPTII,S$GLB,, | Performed by: INTERNAL MEDICINE

## 2023-01-13 PROCEDURE — 3288F FALL RISK ASSESSMENT DOCD: CPT | Mod: CPTII,S$GLB,, | Performed by: INTERNAL MEDICINE

## 2023-01-13 PROCEDURE — 1100F PTFALLS ASSESS-DOCD GE2>/YR: CPT | Mod: CPTII,S$GLB,, | Performed by: INTERNAL MEDICINE

## 2023-01-13 PROCEDURE — 3078F PR MOST RECENT DIASTOLIC BLOOD PRESSURE < 80 MM HG: ICD-10-PCS | Mod: CPTII,S$GLB,, | Performed by: INTERNAL MEDICINE

## 2023-01-13 PROCEDURE — 3077F PR MOST RECENT SYSTOLIC BLOOD PRESSURE >= 140 MM HG: ICD-10-PCS | Mod: CPTII,S$GLB,, | Performed by: INTERNAL MEDICINE

## 2023-01-13 PROCEDURE — 99999 PR PBB SHADOW E&M-EST. PATIENT-LVL III: CPT | Mod: PBBFAC,,, | Performed by: INTERNAL MEDICINE

## 2023-01-13 PROCEDURE — 99204 PR OFFICE/OUTPT VISIT, NEW, LEVL IV, 45-59 MIN: ICD-10-PCS | Mod: S$GLB,,, | Performed by: INTERNAL MEDICINE

## 2023-01-13 PROCEDURE — 3078F DIAST BP <80 MM HG: CPT | Mod: CPTII,S$GLB,, | Performed by: INTERNAL MEDICINE

## 2023-01-13 PROCEDURE — 1159F MED LIST DOCD IN RCRD: CPT | Mod: CPTII,S$GLB,, | Performed by: INTERNAL MEDICINE

## 2023-01-13 PROCEDURE — 3288F PR FALLS RISK ASSESSMENT DOCUMENTED: ICD-10-PCS | Mod: CPTII,S$GLB,, | Performed by: INTERNAL MEDICINE

## 2023-01-13 PROCEDURE — 1100F PR PT FALLS ASSESS DOC 2+ FALLS/FALL W/INJURY/YR: ICD-10-PCS | Mod: CPTII,S$GLB,, | Performed by: INTERNAL MEDICINE

## 2023-01-13 PROCEDURE — 99499 UNLISTED E&M SERVICE: CPT | Mod: S$GLB,,, | Performed by: INTERNAL MEDICINE

## 2023-01-13 PROCEDURE — 99999 PR PBB SHADOW E&M-EST. PATIENT-LVL III: ICD-10-PCS | Mod: PBBFAC,,, | Performed by: INTERNAL MEDICINE

## 2023-01-13 PROCEDURE — 99499 RISK ADDL DX/OHS AUDIT: ICD-10-PCS | Mod: S$GLB,,, | Performed by: INTERNAL MEDICINE

## 2023-01-13 PROCEDURE — 99204 OFFICE O/P NEW MOD 45 MIN: CPT | Mod: S$GLB,,, | Performed by: INTERNAL MEDICINE

## 2023-01-13 PROCEDURE — 1157F PR ADVANCE CARE PLAN OR EQUIV PRESENT IN MEDICAL RECORD: ICD-10-PCS | Mod: CPTII,S$GLB,, | Performed by: INTERNAL MEDICINE

## 2023-01-13 RX ORDER — FLECAINIDE ACETATE 50 MG/1
50 TABLET ORAL EVERY 12 HOURS
Qty: 180 TABLET | Refills: 3 | Status: SHIPPED | OUTPATIENT
Start: 2023-01-13 | End: 2023-12-16 | Stop reason: SDUPTHER

## 2023-01-13 RX ORDER — CANDESARTAN 16 MG/1
16 TABLET ORAL DAILY
Qty: 90 TABLET | Refills: 3 | Status: SHIPPED | OUTPATIENT
Start: 2023-01-13 | End: 2023-11-14

## 2023-01-13 RX ORDER — CLONIDINE HYDROCHLORIDE 0.1 MG/1
0.1 TABLET ORAL EVERY 6 HOURS PRN
Qty: 60 TABLET | Refills: 11 | Status: SHIPPED | OUTPATIENT
Start: 2023-01-13 | End: 2024-01-13

## 2023-01-13 RX ORDER — VERAPAMIL HYDROCHLORIDE 180 MG/1
180 CAPSULE, EXTENDED RELEASE ORAL DAILY
Qty: 90 CAPSULE | Refills: 3 | Status: SHIPPED | OUTPATIENT
Start: 2023-01-13 | End: 2023-02-22 | Stop reason: SDUPTHER

## 2023-01-13 NOTE — PROGRESS NOTES
Cardiology    1/13/2023  10:18 AM    Problem list  Patient Active Problem List   Diagnosis    DJD (degenerative joint disease)    HTN (hypertension)    Iron deficiency anemia    Vaginal atrophy    Overweight    Ganglion cyst    Balance problems    Unsteady gait    Paroxysmal tachycardia    Atrial fibrillation    S/P carotid endarterectomy    Sinus bradycardia    Heat syncope    Bilateral carotid bruits    Bilateral carotid artery stenosis    Compression fracture of thoracic vertebra with routine healing    Vitamin D insufficiency    Stress    Atherosclerosis of aorta    Poor posture    Decreased strength of trunk and back    Dyspnea    Acute heart failure, unspecified heart failure type       CC:  Establish cardiology care at [facility]    HPI:  Patient would like to establish cardiology care at [facility] since she comes here her back ([facility]).  She has a history of hypertension with labile blood pressure.  Last month, she presented to the emergency room 3 times with elevated blood pressure with the highest being systolic blood pressure 236.  She states that during the holiday season, she is under lot of stress and her blood pressure would spike high.  She reported episode of chest pressure during 1 of these ER admissions.  She attributed her stress her daughter with mental illness and addiction and she is raising her grandchild.  She was diagnosed with atrial fibrillation 2 years ago and underwent 2 cardioversion with the second episode being successful.  She sees Dr. García who has her on flecainide and Eliquis.  She is being monitor on hypertension digital medicine program.  She is active by doing gardening and [employer] work.  She does not exercise.  She follows low-salt diet.  She quit smoking when she was 42.  Family history includes father with CAD.    Medications  Current Outpatient Medications   Medication Sig Dispense Refill    acetaminophen (TYLENOL) 500 MG tablet Take 500 mg by mouth 2 (two)  times daily.      alendronate (FOSAMAX) 70 MG tablet Take 1 tablet (70 mg total) by mouth every 7 days. 4 tablet 11    aspirin (ECOTRIN) 81 MG EC tablet Take 81 mg by mouth nightly.      atorvastatin (LIPITOR) 20 MG tablet Take 1 tablet (20 mg total) by mouth once daily. (Patient taking differently: Take 20 mg by mouth every evening.) 30 tablet 11    calcium-vitamin D 250-100 mg-unit per tablet Take 1 tablet by mouth 2 (two) times daily.      cholecalciferol, vitamin D3, (VITAMIN D3 ORAL) Take 3,000 Units by mouth once daily.      diphenhydrAMINE (BENADRYL) 25 mg capsule Take 25 mg by mouth every 6 (six) hours as needed for Itching.      ferrous sulfate (FEOSOL) Tab tablet Take 1 tablet by mouth every evening. PATIENT TAKING 3 X WEEKLY IN THE EVENING (M-W-F)      lactase (LACTAID) 3,000 unit tablet Take 1 tablet by mouth 3 (three) times daily as needed.      loratadine (CLARITIN) 10 mg tablet Take 10 mg by mouth every morning.      LORazepam (ATIVAN) 1 MG tablet Take 1 mg by mouth every 8 (eight) hours as needed.      spironolactone (ALDACTONE) 25 MG tablet Take 1 tablet (25 mg total) by mouth once daily. 30 tablet 6    traZODone (DESYREL) 50 MG tablet TAKE 1 TABLET(50 MG) BY MOUTH EVERY NIGHT AS NEEDED FOR INSOMNIA 30 tablet 3    apixaban (ELIQUIS) 5 mg Tab Take 1 tablet (5 mg total) by mouth 2 (two) times daily. 180 tablet 3    candesartan (ATACAND) 16 MG tablet Take 1 tablet (16 mg total) by mouth once daily. 90 tablet 3    cloNIDine (CATAPRES) 0.1 MG tablet Take 1 tablet (0.1 mg total) by mouth every 6 (six) hours as needed (). 60 tablet 11    flecainide (TAMBOCOR) 50 MG Tab Take 1 tablet (50 mg total) by mouth every 12 (twelve) hours. 180 tablet 3    PFIZER COVID-19 MACIEJ VACCN,PF, 30 mcg/0.3 mL injection       verapamiL (VERELAN) 180 MG C24P Take 1 capsule (180 mg total) by mouth once daily. Dose decrease. 90 capsule 3     No current facility-administered medications for this visit.      Prior to  Admission medications    Medication Sig Start Date End Date Taking? Authorizing Provider   acetaminophen (TYLENOL) 500 MG tablet Take 500 mg by mouth 2 (two) times daily.   Yes Historical Provider   alendronate (FOSAMAX) 70 MG tablet Take 1 tablet (70 mg total) by mouth every 7 days. 7/15/22 7/15/23 Yes Ruby Garner MD   aspirin (ECOTRIN) 81 MG EC tablet Take 81 mg by mouth nightly.   Yes Historical Provider   atorvastatin (LIPITOR) 20 MG tablet Take 1 tablet (20 mg total) by mouth once daily.  Patient taking differently: Take 20 mg by mouth every evening. 3/25/22 3/25/23 Yes TUCKER Brantley III, MD   calcium-vitamin D 250-100 mg-unit per tablet Take 1 tablet by mouth 2 (two) times daily.   Yes Historical Provider   cholecalciferol, vitamin D3, (VITAMIN D3 ORAL) Take 3,000 Units by mouth once daily.   Yes Historical Provider   diphenhydrAMINE (BENADRYL) 25 mg capsule Take 25 mg by mouth every 6 (six) hours as needed for Itching.   Yes Historical Provider   ferrous sulfate (FEOSOL) Tab tablet Take 1 tablet by mouth every evening. PATIENT TAKING 3 X WEEKLY IN THE EVENING (M-W-F)   Yes Historical Provider   lactase (LACTAID) 3,000 unit tablet Take 1 tablet by mouth 3 (three) times daily as needed.   Yes Historical Provider   loratadine (CLARITIN) 10 mg tablet Take 10 mg by mouth every morning.   Yes Historical Provider   LORazepam (ATIVAN) 1 MG tablet Take 1 mg by mouth every 8 (eight) hours as needed. 12/7/22  Yes Historical Provider   spironolactone (ALDACTONE) 25 MG tablet Take 1 tablet (25 mg total) by mouth once daily. 12/5/22  Yes Maki Pratt MD   traZODone (DESYREL) 50 MG tablet TAKE 1 TABLET(50 MG) BY MOUTH EVERY NIGHT AS NEEDED FOR INSOMNIA 11/28/22  Yes Lucie Avendano MD   candesartan (ATACAND) 16 MG tablet Take 1 tablet (16 mg total) by mouth once daily. 12/30/22 1/13/23 Yes Damián Pollack MD   cloNIDine (CATAPRES) 0.2 MG tablet Take 0.2 mg by mouth every evening. 12/7/22 1/13/23 Yes  Historical Provider   ELIQUIS 5 mg Tab TAKE 1 TABLET BY MOUTH  TWICE DAILY 1/29/22 1/13/23 Yes Maki Pratt MD   flecainide (TAMBOCOR) 50 MG Tab Take 1 tablet (50 mg total) by mouth every 12 (twelve) hours. 3/7/22 1/13/23 Yes Nigel García MD   verapamiL (VERELAN) 180 MG C24P Take 1 capsule (180 mg total) by mouth once daily. Dose decrease. 12/21/22 1/13/23 Yes Damián Pollack MD   apixaban (ELIQUIS) 5 mg Tab Take 1 tablet (5 mg total) by mouth 2 (two) times daily. 1/13/23 4/13/23  Clem Mckeon MD   candesartan (ATACAND) 16 MG tablet Take 1 tablet (16 mg total) by mouth once daily. 1/13/23 4/13/23  Clem Mckeon MD   cloNIDine (CATAPRES) 0.1 MG tablet Take 1 tablet (0.1 mg total) by mouth every 6 (six) hours as needed (). 1/13/23 1/13/24  Clem Mckeon MD   flecainide (TAMBOCOR) 50 MG Tab Take 1 tablet (50 mg total) by mouth every 12 (twelve) hours. 1/13/23 5/13/23  Clem Mckeon MD   PFIZER COVID-19 MACIEJ VACCN,PF, 30 mcg/0.3 mL injection  5/14/22   Historical Provider   verapamiL (VERELAN) 180 MG C24P Take 1 capsule (180 mg total) by mouth once daily. Dose decrease. 1/13/23 1/13/24  Clem Mckeon MD         History  Past Medical History:   Diagnosis Date    Anemia     Atrial fibrillation     Basal cell carcinoma     DJD (degenerative joint disease) 12/12/2012    Obesity (BMI 30-39.9) 11/27/2015    Vaginal atrophy 4/1/2016     Past Surgical History:   Procedure Laterality Date    ADENOIDECTOMY      BREAST BIOPSY Left 1999    Excisional bx, benign cyst    BREAST SURGERY      CARDIOVERSION  02/2021    CAROTID ENDARTERECTOMY Right 04/07/2022    Procedure: ENDARTERECTOMY-CAROTID;  Surgeon: TUCKER Brantley III, MD;  Location: [facility] OR 2ND FLR;  Service: Peripheral Vascular;  Laterality: Right;    COLONOSCOPY  2012    normal    COLONOSCOPY N/A 06/28/2017    Procedure: COLONOSCOPY;  Surgeon: Markel Montemayor MD;  Location: [facility] (4TH FLR);  Service: Endoscopy;  Laterality: N/A;    EXCISION OF  GANGLION OF WRIST Left 2018    Procedure: EXCISION, GANGLION CYST, WRIST - Left Volar wrist;  Surgeon: Mary Moore MD;  Location: [facility] OR [address] [place] [address];  Service: Orthopedics;  Laterality: Left;    HIP SURGERY  2014    bilateral    JOINT REPLACEMENT Bilateral     MOLLY    TONSILLECTOMY      TREATMENT OF CARDIAC ARRHYTHMIA N/A 2020    Procedure: CARDIOVERSION;  Surgeon: Nigel García MD;  Location: [facility] EP LAB;  Service: Cardiology;  Laterality: N/A;  AF, LIGIA, DCCV, MAC, PA, 3 Prep    TREATMENT OF CARDIAC ARRHYTHMIA N/A 2020    Procedure: Cardioversion or Defibrillation;  Surgeon: Nigel García MD;  Location: [facility] EP LAB;  Service: Cardiology;  Laterality: N/A;  AF, LIGIA, DCCV, MAC, PA, 3 Prep     Social History     Socioeconomic History    Marital status:    Occupational History    Occupation: retired   Tobacco Use    Smoking status: Former     Packs/day: 1.00     Years: 10.00     Pack years: 10.00     Types: Cigarettes     Quit date: 1988     Years since quittin.0    Smokeless tobacco: Never   Substance and Sexual Activity    Alcohol use: Not Currently    Drug use: No    Sexual activity: Yes     Partners: Male   Social History Narrative     to HEATH Montemayor    Nurse    Likes to garden         Allergies  Review of patient's allergies indicates:   Allergen Reactions    Prednisone      ELEVATED B/P FOR SEVERAL DAYS/WENT TO ER    Lactose          Review of Systems   Review of Systems   Cardiovascular: Negative.    Respiratory: Negative.     All other systems reviewed and are negative.      Physical Exam  Wt Readings from Last 1 Encounters:   23 58.1 kg (128 lb)     BP Readings from Last 3 Encounters:   23 (!) 142/60   22 (!) 152/68   22 (!) (P) 185/76     Pulse Readings from Last 1 Encounters:   23 (!) 58     Body mass index is 25.85 kg/m².    Physical Exam  Constitutional:       Appearance: She is well-developed.   HENT:      Head:  Atraumatic.   Eyes:      General: No scleral icterus.  Neck:      Vascular: Normal carotid pulses. No carotid bruit, hepatojugular reflux or JVD.   Cardiovascular:      Rate and Rhythm: Normal rate and regular rhythm.      Chest Wall: PMI is not displaced.      Pulses: Intact distal pulses.           Carotid pulses are 2+ on the right side and 2+ on the left side.       Radial pulses are 2+ on the right side and 2+ on the left side.        Dorsalis pedis pulses are 2+ on the right side and 2+ on the left side.      Heart sounds: Normal heart sounds, S1 normal and S2 normal. No murmur heard.    No friction rub.   Pulmonary:      Effort: Pulmonary effort is normal. No respiratory distress.      Breath sounds: Normal breath sounds. No stridor. No wheezing or rales.   Chest:      Chest wall: No tenderness.   Abdominal:      General: Bowel sounds are normal.      Palpations: Abdomen is soft.   Musculoskeletal:      Cervical back: Neck supple. No edema.   Skin:     General: Skin is warm and dry.      Nails: There is no clubbing.   Neurological:      Mental Status: She is alert and oriented to person, place, and time.   Psychiatric:         Behavior: Behavior normal.         Thought Content: Thought content normal.           Assessment  1. Acute heart failure, unspecified heart failure type  Stable, being monitor and evaluate  - Nuclear Stress - Cardiology Interpreted; Future  - CV Ultrasound renal artery stenosis hypertension limited; Future    2. Paroxysmal atrial fibrillation  Stable, on Eliquis and flecainide    3. Atherosclerosis of aorta  On statin    4. S/P carotid endarterectomy  Status post right CEA    5. Atrial fibrillation, unspecified type  Stable  - apixaban (ELIQUIS) 5 mg Tab; Take 1 tablet (5 mg total) by mouth 2 (two) times daily.  Dispense: 180 tablet; Refill: 3  - flecainide (TAMBOCOR) 50 MG Tab; Take 1 tablet (50 mg total) by mouth every 12 (twelve) hours.  Dispense: 180 tablet; Refill: 3    6.  Essential hypertension  Currently stable, continue to monitor and treat with current medications.  - apixaban (ELIQUIS) 5 mg Tab; Take 1 tablet (5 mg total) by mouth 2 (two) times daily.  Dispense: 180 tablet; Refill: 3  - candesartan (ATACAND) 16 MG tablet; Take 1 tablet (16 mg total) by mouth once daily.  Dispense: 90 tablet; Refill: 3  - verapamiL (VERELAN) 180 MG C24P; Take 1 capsule (180 mg total) by mouth once daily. Dose decrease.  Dispense: 90 capsule; Refill: 3    8. Other forms of angina pectoris  Being assessed  - Nuclear Stress - Cardiology Interpreted; Future        Plan and Discussion  Discussed her labile blood pressure.  She is not had an ischemic workup.  Will proceed with Lexiscan nuclear stress to to evaluate her hypertensive crisis and chest pain.  Will also get renal artery Doppler to evaluate for renal artery stenosis.  Recommend to continue current medication.  Recommend to use clonidine 0.1 mg as needed every 6 hours if her systolic blood pressures greater than 170.  Continue to monitor on hypertension digital medicine program.    Follow Up  2 months      Clem Mckeon MD, F.A.C.C, F.S.C.A.I.        40 minutes were spent in chart review, documentation and review of results, and evaluation, treatment, and counseling of patient on the same day of service.    Disclaimer: This document was created using voice recognition software ([employer] Direct). Although it may be edited, this document may contain errors related to incorrect recognition of the spoken word. Please call the physician if clarification is needed.

## 2023-01-14 ENCOUNTER — OFFICE VISIT (OUTPATIENT)
Dept: URGENT CARE | Facility: CLINIC | Age: 78
End: 2023-01-14
Payer: MEDICARE

## 2023-01-14 VITALS
BODY MASS INDEX: 25.8 KG/M2 | OXYGEN SATURATION: 98 % | DIASTOLIC BLOOD PRESSURE: 58 MMHG | RESPIRATION RATE: 18 BRPM | WEIGHT: 128 LBS | HEIGHT: 59 IN | TEMPERATURE: 98 F | SYSTOLIC BLOOD PRESSURE: 139 MMHG | HEART RATE: 54 BPM

## 2023-01-14 DIAGNOSIS — N30.00 ACUTE CYSTITIS WITHOUT HEMATURIA: ICD-10-CM

## 2023-01-14 DIAGNOSIS — R35.0 FREQUENCY OF URINATION: Primary | ICD-10-CM

## 2023-01-14 LAB
BILIRUB UR QL STRIP: NEGATIVE
GLUCOSE UR QL STRIP: NEGATIVE
KETONES UR QL STRIP: NEGATIVE
LEUKOCYTE ESTERASE UR QL STRIP: NEGATIVE
PH, POC UA: 7
POC BLOOD, URINE: POSITIVE
POC NITRATES, URINE: NEGATIVE
PROT UR QL STRIP: NEGATIVE
SP GR UR STRIP: 1 (ref 1–1.03)
UROBILINOGEN UR STRIP-ACNC: ABNORMAL (ref 0.1–1.1)

## 2023-01-14 PROCEDURE — 1160F PR REVIEW ALL MEDS BY PRESCRIBER/CLIN PHARMACIST DOCUMENTED: ICD-10-PCS | Mod: CPTII,S$GLB,, | Performed by: FAMILY MEDICINE

## 2023-01-14 PROCEDURE — 1159F PR MEDICATION LIST DOCUMENTED IN MEDICAL RECORD: ICD-10-PCS | Mod: CPTII,S$GLB,, | Performed by: FAMILY MEDICINE

## 2023-01-14 PROCEDURE — 1159F MED LIST DOCD IN RCRD: CPT | Mod: CPTII,S$GLB,, | Performed by: FAMILY MEDICINE

## 2023-01-14 PROCEDURE — 81003 URINALYSIS AUTO W/O SCOPE: CPT | Mod: QW,S$GLB,, | Performed by: FAMILY MEDICINE

## 2023-01-14 PROCEDURE — 87086 URINE CULTURE/COLONY COUNT: CPT | Performed by: FAMILY MEDICINE

## 2023-01-14 PROCEDURE — 99213 PR OFFICE/OUTPT VISIT, EST, LEVL III, 20-29 MIN: ICD-10-PCS | Mod: S$GLB,,, | Performed by: FAMILY MEDICINE

## 2023-01-14 PROCEDURE — 1157F PR ADVANCE CARE PLAN OR EQUIV PRESENT IN MEDICAL RECORD: ICD-10-PCS | Mod: CPTII,S$GLB,, | Performed by: FAMILY MEDICINE

## 2023-01-14 PROCEDURE — 3075F SYST BP GE 130 - 139MM HG: CPT | Mod: CPTII,S$GLB,, | Performed by: FAMILY MEDICINE

## 2023-01-14 PROCEDURE — 3078F PR MOST RECENT DIASTOLIC BLOOD PRESSURE < 80 MM HG: ICD-10-PCS | Mod: CPTII,S$GLB,, | Performed by: FAMILY MEDICINE

## 2023-01-14 PROCEDURE — 1125F PR PAIN SEVERITY QUANTIFIED, PAIN PRESENT: ICD-10-PCS | Mod: CPTII,S$GLB,, | Performed by: FAMILY MEDICINE

## 2023-01-14 PROCEDURE — 1157F ADVNC CARE PLAN IN RCRD: CPT | Mod: CPTII,S$GLB,, | Performed by: FAMILY MEDICINE

## 2023-01-14 PROCEDURE — 99213 OFFICE O/P EST LOW 20 MIN: CPT | Mod: S$GLB,,, | Performed by: FAMILY MEDICINE

## 2023-01-14 PROCEDURE — 1125F AMNT PAIN NOTED PAIN PRSNT: CPT | Mod: CPTII,S$GLB,, | Performed by: FAMILY MEDICINE

## 2023-01-14 PROCEDURE — 81003 POCT URINALYSIS, DIPSTICK, AUTOMATED, W/O SCOPE: ICD-10-PCS | Mod: QW,S$GLB,, | Performed by: FAMILY MEDICINE

## 2023-01-14 PROCEDURE — 1160F RVW MEDS BY RX/DR IN RCRD: CPT | Mod: CPTII,S$GLB,, | Performed by: FAMILY MEDICINE

## 2023-01-14 PROCEDURE — 3075F PR MOST RECENT SYSTOLIC BLOOD PRESS GE 130-139MM HG: ICD-10-PCS | Mod: CPTII,S$GLB,, | Performed by: FAMILY MEDICINE

## 2023-01-14 PROCEDURE — 3078F DIAST BP <80 MM HG: CPT | Mod: CPTII,S$GLB,, | Performed by: FAMILY MEDICINE

## 2023-01-14 RX ORDER — NITROFURANTOIN 25; 75 MG/1; MG/1
100 CAPSULE ORAL 2 TIMES DAILY
Qty: 14 CAPSULE | Refills: 0 | Status: SHIPPED | OUTPATIENT
Start: 2023-01-14 | End: 2023-01-23 | Stop reason: ALTCHOICE

## 2023-01-14 NOTE — PROGRESS NOTES
"Subjective:       Patient ID: Magdalena Mane is a 77 y.o. female.    Vitals:  height is 4' 11" (1.499 m) and weight is 58.1 kg (128 lb). Her oral temperature is 98 °F (36.7 °C). Her blood pressure is 139/58 (abnormal) and her pulse is 54 (abnormal). Her respiration is 18 and oxygen saturation is 98%.     Chief Complaint: Urinary Tract Infection    This is a 77 y.o. female who presents today with a chief complaint of possible urinary tract infection x 1 day. Pt states she has frequency, urgency and sensation of bladder fullness. She reports history of fecal and urine incontinence. She plans to follow up with a new internist, but next appointment is in approximately 2 weeks.      Urinary Tract Infection   This is a recurrent problem. The current episode started yesterday. The problem occurs every urination. The problem has been unchanged. The quality of the pain is described as aching and burning. The pain is at a severity of 0/10. The patient is experiencing no pain. There has been no fever. She is Not sexually active. There is No history of pyelonephritis. Associated symptoms include frequency and urgency. Pertinent negatives include no hematuria, hesitancy, nausea or vomiting. She has tried nothing for the symptoms. The treatment provided no relief. Her past medical history is significant for recurrent UTIs.     Gastrointestinal:  Negative for nausea and vomiting.   Genitourinary:  Positive for frequency and urgency. Negative for hematuria.     Objective:      Vitals:    01/14/23 1427   BP: (!) 139/58   Pulse: (!) 54   Resp: 18   Temp: 98 °F (36.7 °C)   TempSrc: Oral   SpO2: 98%   Weight: 58.1 kg (128 lb)   Height: 4' 11" (1.499 m)      Physical Exam   Constitutional: She is oriented to person, place, and time.  Non-toxic appearance. She does not appear ill. No distress.   HENT:   Head: Atraumatic.   Eyes: Conjunctivae are normal.   Cardiovascular: Normal rate, regular rhythm, normal heart sounds and normal " pulses.   Pulmonary/Chest: Effort normal and breath sounds normal.   Abdominal: Bowel sounds are normal. Soft. There is no abdominal tenderness. There is no rebound, no left CVA tenderness and no right CVA tenderness.   Neurological: She is alert and oriented to person, place, and time.   Skin: Skin is not diaphoretic.   Psychiatric: Judgment and thought content normal.       Results for orders placed or performed in visit on 01/14/23   POCT Urinalysis, Dipstick, Automated, W/O Scope   Result Value Ref Range    POC Blood, Urine Positive (A) Negative    POC Bilirubin, Urine Negative Negative    POC Urobilinogen, Urine norm 0.1 - 1.1    POC Ketones, Urine Negative Negative    POC Protein, Urine Negative Negative    POC Nitrates, Urine Negative Negative    POC Glucose, Urine Negative Negative    pH, UA 7.0     POC Specific Gravity, Urine 1.005 1.003 - 1.029    POC Leukocytes, Urine Negative Negative      Assessment:       1. Frequency of urination    2. Acute cystitis without hematuria          Plan:         Frequency of urination  -     POCT Urinalysis, Dipstick, Automated, W/O Scope    2. Acute cystitis without hematuria  -     nitrofurantoin, macrocrystal-monohydrate, (MACROBID) 100 MG capsule; Take 1 capsule (100 mg total) by mouth 2 (two) times daily. for 7 days  Dispense: 14 capsule; Refill: 0  -     Urine culture    Patient Instructions   You will be contacted with results of urine culture.  Make sure to stay hydrated

## 2023-01-15 LAB
BACTERIA UR CULT: NORMAL
BACTERIA UR CULT: NORMAL

## 2023-01-16 ENCOUNTER — TELEPHONE (OUTPATIENT)
Dept: URGENT CARE | Facility: CLINIC | Age: 78
End: 2023-01-16
Payer: MEDICARE

## 2023-01-20 ENCOUNTER — DOCUMENTATION ONLY (OUTPATIENT)
Dept: REHABILITATION | Facility: OTHER | Age: 78
End: 2023-01-20
Payer: MEDICARE

## 2023-01-20 NOTE — PROGRESS NOTES
Health  Wellness Visit Note    Name: Magdalena Mane  Clinic Number: [id number]  Physician: No ref. provider found  Diagnosis: No diagnosis found.  Past Medical History:   Diagnosis Date    Anemia     Atrial fibrillation     Basal cell carcinoma     DJD (degenerative joint disease) 12/12/2012    Obesity (BMI 30-39.9) 11/27/2015    Vaginal atrophy 4/1/2016     Visit Number: [id number]  Precautions: Atrial Fibrillation      1st PT visit: 5/17/2022  Year of care end date: 5/17/2023  6 Month test  Performed: Nov 2022  12 Month test performed: May 2023  Mind body plan: Plan A 9 months  Patient level: B    Time In: 11:15 AM  Time Out: 12:05 PM  Total Treatment Time:  50 minutes    [employer] 2022  Handout on this week's wellness topic Meditation was provided along with a discussion on what it means, the benefits, and suggestions for practice.  Reviewed last week's topic Get Outdoors.    Subjective:   Patient reports minimal low back pain today, though it has been bothering her recently. Over the week, she completed her stretches on all days but one. She went walking for exercise, and completed HEP regularly. Pt does not typically ice her back at home on a regular basis. She noted that she has been slowly and mindfully returning to more activity after her health events over the last couple of months.    Objective:   Magdalena completed therapeutic stretches (EIL, ABRAHAM) and the following MedX exercise machines: core lumbar, torso rotation l/r, leg extension, leg curl, upright row, chest press, biceps curl, triceps extension, leg press    See exercise log in patient folder for rate of exertion and repetitions completed.       Fitness Machine Education Key:  E=education on equipment initiated and further follow up and education needed  I=independent with  and exercise.  The patient:  Adjusts machines to his/her settings  Uses equipment levers, pins, weights safely  Maintains safe and correct posture while  exercising  Moves through exercise with correct pace and control  Gets on and off equipment safely      Core Lumbar Strength E Torso Rotation E Leg Press E   Leg Extension E Seated Leg Curl E Chest Press E   Seated Row E Hip ADD X Hip ABD E   Triceps Extension E Bicep Curl E Other: X       Assessment:   Patient tolerated Patient tolerated MedX Core Lumbar Strength and all other peripheral exercises without an increase in symptoms. Patient warmed up on treadmill for 5 minutes, stretched, and iced low back and knees for 5 minutes after the workout.    Plan:  Continue with established plan of care towards wellness goals.     Health  : Sho Morfin  1/20/2023

## 2023-01-21 ENCOUNTER — PATIENT MESSAGE (OUTPATIENT)
Dept: ADMINISTRATIVE | Facility: OTHER | Age: 78
End: 2023-01-21
Payer: MEDICARE

## 2023-01-23 ENCOUNTER — OFFICE VISIT (OUTPATIENT)
Dept: INTERNAL MEDICINE | Facility: CLINIC | Age: 78
End: 2023-01-23
Payer: MEDICARE

## 2023-01-23 VITALS
HEIGHT: 59 IN | OXYGEN SATURATION: 100 % | DIASTOLIC BLOOD PRESSURE: 64 MMHG | HEART RATE: 57 BPM | BODY MASS INDEX: 25.29 KG/M2 | SYSTOLIC BLOOD PRESSURE: 144 MMHG | WEIGHT: 125.44 LBS

## 2023-01-23 DIAGNOSIS — D50.9 IRON DEFICIENCY ANEMIA, UNSPECIFIED IRON DEFICIENCY ANEMIA TYPE: ICD-10-CM

## 2023-01-23 DIAGNOSIS — G47.00 INSOMNIA, UNSPECIFIED TYPE: ICD-10-CM

## 2023-01-23 DIAGNOSIS — E78.5 HYPERLIPIDEMIA, UNSPECIFIED HYPERLIPIDEMIA TYPE: ICD-10-CM

## 2023-01-23 DIAGNOSIS — I48.0 PAROXYSMAL ATRIAL FIBRILLATION: ICD-10-CM

## 2023-01-23 DIAGNOSIS — I10 PRIMARY HYPERTENSION: Primary | ICD-10-CM

## 2023-01-23 PROBLEM — M81.0 OSTEOPOROSIS: Status: ACTIVE | Noted: 2023-01-23

## 2023-01-23 PROCEDURE — 1157F PR ADVANCE CARE PLAN OR EQUIV PRESENT IN MEDICAL RECORD: ICD-10-PCS | Mod: CPTII,S$GLB,, | Performed by: STUDENT IN AN ORGANIZED HEALTH CARE EDUCATION/TRAINING PROGRAM

## 2023-01-23 PROCEDURE — 3078F PR MOST RECENT DIASTOLIC BLOOD PRESSURE < 80 MM HG: ICD-10-PCS | Mod: CPTII,S$GLB,, | Performed by: STUDENT IN AN ORGANIZED HEALTH CARE EDUCATION/TRAINING PROGRAM

## 2023-01-23 PROCEDURE — 99214 OFFICE O/P EST MOD 30 MIN: CPT | Mod: S$GLB,,, | Performed by: STUDENT IN AN ORGANIZED HEALTH CARE EDUCATION/TRAINING PROGRAM

## 2023-01-23 PROCEDURE — 1159F PR MEDICATION LIST DOCUMENTED IN MEDICAL RECORD: ICD-10-PCS | Mod: CPTII,S$GLB,, | Performed by: STUDENT IN AN ORGANIZED HEALTH CARE EDUCATION/TRAINING PROGRAM

## 2023-01-23 PROCEDURE — 1101F PT FALLS ASSESS-DOCD LE1/YR: CPT | Mod: CPTII,S$GLB,, | Performed by: STUDENT IN AN ORGANIZED HEALTH CARE EDUCATION/TRAINING PROGRAM

## 2023-01-23 PROCEDURE — 3077F PR MOST RECENT SYSTOLIC BLOOD PRESSURE >= 140 MM HG: ICD-10-PCS | Mod: CPTII,S$GLB,, | Performed by: STUDENT IN AN ORGANIZED HEALTH CARE EDUCATION/TRAINING PROGRAM

## 2023-01-23 PROCEDURE — 1101F PR PT FALLS ASSESS DOC 0-1 FALLS W/OUT INJ PAST YR: ICD-10-PCS | Mod: CPTII,S$GLB,, | Performed by: STUDENT IN AN ORGANIZED HEALTH CARE EDUCATION/TRAINING PROGRAM

## 2023-01-23 PROCEDURE — 1125F PR PAIN SEVERITY QUANTIFIED, PAIN PRESENT: ICD-10-PCS | Mod: CPTII,S$GLB,, | Performed by: STUDENT IN AN ORGANIZED HEALTH CARE EDUCATION/TRAINING PROGRAM

## 2023-01-23 PROCEDURE — 3078F DIAST BP <80 MM HG: CPT | Mod: CPTII,S$GLB,, | Performed by: STUDENT IN AN ORGANIZED HEALTH CARE EDUCATION/TRAINING PROGRAM

## 2023-01-23 PROCEDURE — 99999 PR PBB SHADOW E&M-EST. PATIENT-LVL III: CPT | Mod: PBBFAC,,, | Performed by: STUDENT IN AN ORGANIZED HEALTH CARE EDUCATION/TRAINING PROGRAM

## 2023-01-23 PROCEDURE — 3288F PR FALLS RISK ASSESSMENT DOCUMENTED: ICD-10-PCS | Mod: CPTII,S$GLB,, | Performed by: STUDENT IN AN ORGANIZED HEALTH CARE EDUCATION/TRAINING PROGRAM

## 2023-01-23 PROCEDURE — 1160F RVW MEDS BY RX/DR IN RCRD: CPT | Mod: CPTII,S$GLB,, | Performed by: STUDENT IN AN ORGANIZED HEALTH CARE EDUCATION/TRAINING PROGRAM

## 2023-01-23 PROCEDURE — 1157F ADVNC CARE PLAN IN RCRD: CPT | Mod: CPTII,S$GLB,, | Performed by: STUDENT IN AN ORGANIZED HEALTH CARE EDUCATION/TRAINING PROGRAM

## 2023-01-23 PROCEDURE — 1160F PR REVIEW ALL MEDS BY PRESCRIBER/CLIN PHARMACIST DOCUMENTED: ICD-10-PCS | Mod: CPTII,S$GLB,, | Performed by: STUDENT IN AN ORGANIZED HEALTH CARE EDUCATION/TRAINING PROGRAM

## 2023-01-23 PROCEDURE — 3077F SYST BP >= 140 MM HG: CPT | Mod: CPTII,S$GLB,, | Performed by: STUDENT IN AN ORGANIZED HEALTH CARE EDUCATION/TRAINING PROGRAM

## 2023-01-23 PROCEDURE — 3288F FALL RISK ASSESSMENT DOCD: CPT | Mod: CPTII,S$GLB,, | Performed by: STUDENT IN AN ORGANIZED HEALTH CARE EDUCATION/TRAINING PROGRAM

## 2023-01-23 PROCEDURE — 99214 PR OFFICE/OUTPT VISIT, EST, LEVL IV, 30-39 MIN: ICD-10-PCS | Mod: S$GLB,,, | Performed by: STUDENT IN AN ORGANIZED HEALTH CARE EDUCATION/TRAINING PROGRAM

## 2023-01-23 PROCEDURE — 99999 PR PBB SHADOW E&M-EST. PATIENT-LVL III: ICD-10-PCS | Mod: PBBFAC,,, | Performed by: STUDENT IN AN ORGANIZED HEALTH CARE EDUCATION/TRAINING PROGRAM

## 2023-01-23 PROCEDURE — 1125F AMNT PAIN NOTED PAIN PRSNT: CPT | Mod: CPTII,S$GLB,, | Performed by: STUDENT IN AN ORGANIZED HEALTH CARE EDUCATION/TRAINING PROGRAM

## 2023-01-23 PROCEDURE — 1159F MED LIST DOCD IN RCRD: CPT | Mod: CPTII,S$GLB,, | Performed by: STUDENT IN AN ORGANIZED HEALTH CARE EDUCATION/TRAINING PROGRAM

## 2023-01-23 NOTE — ASSESSMENT & PLAN NOTE
BP has been slightly elevated. On digital medicine. Dr. Mckeon is ruling out ischemia by doing a stress test. He also ordered renal U/S. Will f/u.

## 2023-01-23 NOTE — PROGRESS NOTES
INTERNAL MEDICINE INITIAL VISIT NOTE      CHIEF COMPLAINT     Chief Complaint   Patient presents with    Establish Care       HPI     Magdalena Mane is a 77 y.o.  female who presents with a PMHx of  HTN, HLD, afib, basal cell carcinoma on the nose, and iron def anemia is here to establish care with me.  -pt has been having high blood pressure since September. She saw a new cardiologist Dr. Clem Mckeon recently who ordered some tests such as a stress test, renal U/S. He recommended she take clonidine for systolic pressure >170. She basically lives on a mediterranean diet.  Admits to feeling off balance. Denies flushing or diaphoresis. Reviewed bmp, cbc, tsh. Once she took the clonidine and BP bottomed. She is on digital medicine.   -just finished macrobid for URI    Past Medical History:  Past Medical History:   Diagnosis Date    Anemia     Atrial fibrillation     Basal cell carcinoma     DJD (degenerative joint disease) 12/12/2012    Obesity (BMI 30-39.9) 11/27/2015    Vaginal atrophy 4/1/2016       Past Surgical History:  Past Surgical History:   Procedure Laterality Date    ADENOIDECTOMY      BREAST BIOPSY Left 1999    Excisional bx, benign cyst    BREAST SURGERY      CARDIOVERSION  02/2021    CAROTID ENDARTERECTOMY Right 04/07/2022    Procedure: ENDARTERECTOMY-CAROTID;  Surgeon: TUCKER Brantley III, MD;  Location: [facility] OR 2ND FLR;  Service: Peripheral Vascular;  Laterality: Right;    COLONOSCOPY  2012    normal    COLONOSCOPY N/A 06/28/2017    Procedure: COLONOSCOPY;  Surgeon: Markel Montemayor MD;  Location: [facility] (4TH FLR);  Service: Endoscopy;  Laterality: N/A;    EXCISION OF GANGLION OF WRIST Left 06/27/2018    Procedure: EXCISION, GANGLION CYST, WRIST - Left Volar wrist;  Surgeon: Mary Moore MD;  Location: [facility] OR 1ST FLR;  Service: Orthopedics;  Laterality: Left;    HIP SURGERY  05/05/2014    bilateral    JOINT REPLACEMENT Bilateral     MOLLY    TONSILLECTOMY      TREATMENT OF CARDIAC  ARRHYTHMIA N/A 09/29/2020    Procedure: CARDIOVERSION;  Surgeon: Nigel García MD;  Location: [facility] EP LAB;  Service: Cardiology;  Laterality: N/A;  AF, LIGIA, DCCV, MAC, MA, 3 Prep    TREATMENT OF CARDIAC ARRHYTHMIA N/A 12/08/2020    Procedure: Cardioversion or Defibrillation;  Surgeon: Nigel García MD;  Location: [facility] EP LAB;  Service: Cardiology;  Laterality: N/A;  AF, LIGIA, DCCV, MAC, MA, 3 Prep       Allergies:  Review of patient's allergies indicates:   Allergen Reactions    Prednisone      ELEVATED B/P FOR SEVERAL DAYS/WENT TO ER    Lactose        Home Medications:  Prior to Admission medications    Medication Sig Start Date End Date Taking? Authorizing Provider   acetaminophen (TYLENOL) 500 MG tablet Take 500 mg by mouth 2 (two) times daily.   Yes Historical Provider   alendronate (FOSAMAX) 70 MG tablet Take 1 tablet (70 mg total) by mouth every 7 days. 7/15/22 7/15/23 Yes Ruby Garner MD   apixaban (ELIQUIS) 5 mg Tab Take 1 tablet (5 mg total) by mouth 2 (two) times daily. 1/13/23 4/13/23 Yes Clem Mckeon MD   aspirin (ECOTRIN) 81 MG EC tablet Take 81 mg by mouth nightly.   Yes Historical Provider   atorvastatin (LIPITOR) 20 MG tablet Take 1 tablet (20 mg total) by mouth once daily.  Patient taking differently: Take 20 mg by mouth every evening. 3/25/22 3/25/23 Yes TUCKER Brantley III, MD   calcium-vitamin D 250-100 mg-unit per tablet Take 1 tablet by mouth 2 (two) times daily.   Yes Historical Provider   candesartan (ATACAND) 16 MG tablet Take 1 tablet (16 mg total) by mouth once daily. 1/13/23 4/13/23 Yes Clem Mckeon MD   cholecalciferol, vitamin D3, (VITAMIN D3 ORAL) Take 3,000 Units by mouth once daily.   Yes Historical Provider   cloNIDine (CATAPRES) 0.1 MG tablet Take 1 tablet (0.1 mg total) by mouth every 6 (six) hours as needed (). 1/13/23 1/13/24 Yes Clem Mckeon MD   diphenhydrAMINE (BENADRYL) 25 mg capsule Take 25 mg by mouth every 6 (six) hours as needed for Itching.   Yes  Historical Provider   ferrous sulfate (FEOSOL) Tab tablet Take 1 tablet by mouth every evening. PATIENT TAKING 3 X WEEKLY IN THE EVENING (M-W-)   Yes Historical Provider   flecainide (TAMBOCOR) 50 MG Tab Take 1 tablet (50 mg total) by mouth every 12 (twelve) hours. 23 Yes Clem Mckeon MD   loratadine (CLARITIN) 10 mg tablet Take 10 mg by mouth every morning.   Yes Historical Provider   LORazepam (ATIVAN) 1 MG tablet Take 1 mg by mouth every 8 (eight) hours as needed. 22  Yes Historical Provider   spironolactone (ALDACTONE) 25 MG tablet Take 1 tablet (25 mg total) by mouth once daily. 22  Yes Maki Pratt MD   traZODone (DESYREL) 50 MG tablet TAKE 1 TABLET(50 MG) BY MOUTH EVERY NIGHT AS NEEDED FOR INSOMNIA 22  Yes Lucie Avendano MD   verapamiL (VERELAN) 180 MG C24P Take 1 capsule (180 mg total) by mouth once daily. Dose decrease. 23 Yes Clem Mckeon MD   lactase (LACTAID) 3,000 unit tablet Take 1 tablet by mouth 3 (three) times daily as needed.    Historical Provider   PFIZER COVID-19 MACIEJ VACCN,PF, 30 mcg/0.3 mL injection  22   Historical Provider       Family History:  Family History   Problem Relation Age of Onset    Heart disease Mother         pacemaker    Breast cancer Mother     Arthritis Mother     Hyperlipidemia Mother     Scoliosis Father     Heart disease Father     Tuberculosis Father          1 lung from collapse lung    Heart attack Father     Heart failure Father     Hyperlipidemia Father     Hypertension Father     No Known Problems Brother     Stroke Maternal Grandmother     Heart disease Paternal Grandmother     Dementia Paternal Grandmother        Social History:  Social History     Tobacco Use    Smoking status: Former     Packs/day: 1.00     Years: 10.00     Pack years: 10.00     Types: Cigarettes     Quit date: 1988     Years since quittin.0    Smokeless tobacco: Never   Substance Use Topics    Alcohol use: Not  "Currently    Drug use: No       Review of Systems:  Review of Systems   Constitutional:  Negative for chills and fever.   HENT:  Positive for rhinorrhea. Negative for congestion and sore throat.    Respiratory:  Negative for cough, chest tightness and shortness of breath.    Cardiovascular:  Negative for chest pain.   Gastrointestinal:  Negative for abdominal pain, blood in stool, constipation and diarrhea.   Genitourinary:  Negative for dysuria and hematuria.   Skin:  Negative for rash.   Neurological:  Positive for light-headedness. Negative for dizziness and headaches.   Psychiatric/Behavioral:  Negative for confusion.      Health Maintainence:   Tdap 10/2015  Flu 9/2022  Prevnar 3/2015  Pneumovax 5/2014  Zoster 2/2020, 7/2020  MMG 8/2022  C-SCOPE 6/2017, no polyps, repeat in 10 yrs  DEXA 7/2019, normal bone density. On alendronate due to her compression fractures.   Hep C screening 9/2022  Low Dose CT scan in pts if 55-79 y/o with 30 pack yr smoking hx and currently smoke or have quit within past 15 yrs. Does not meet criteria.     PHYSICAL EXAM     BP (!) 144/64 (BP Location: Left arm, Patient Position: Sitting)   Pulse (!) 57   Ht 4' 11" (1.499 m)   Wt 56.9 kg (125 lb 7.1 oz)   SpO2 100%   BMI 25.34 kg/m²     GEN - A+OX4, NAD   HEENT - PERRL  Neck - No thyromegaly or cervical LAD. No thyroid masses felt.  CV - irregular rate and rhythm, rate controlled  Chest - CTAB, no wheezing or rhonchi  Abd - S/NT/ND/+BS.   Ext - 2+ radial pulses. No LE edema.   MSK - Normal gait.   Skin - No rash.    LABS     Previous labs reviewed from 12/6/22. Hb wnl, creatinine is at 1 which is a slight increase from the time before. Lfts wnl. Tsh from 12/2022 wnl. B12 low from 7/2022    ASSESSMENT/PLAN   1. Primary hypertension  Assessment & Plan:  BP has been slightly elevated. On digital medicine. Dr. Mckeon is ruling out ischemia by doing a stress test. He also ordered renal U/S. Will f/u.       2. Paroxysmal atrial " fibrillation  Assessment & Plan:  Rate controlled. On flecainide, eliquis.       3. Iron deficiency anemia, unspecified iron deficiency anemia type  Assessment & Plan:  Reviewed recent iron panel, b12 and folate from 7/2022. Iron is wnl. b12 on the low side so pt is taking b12 daily. Folate wnl. Will monitor. She takes iron supplements daily. Last hb was 12.6      4. Hyperlipidemia, unspecified hyperlipidemia type  Assessment & Plan:  Reviewed lipid panel from 3/3/22. At goal. Will monitor. Continue statin.       5. Insomnia, unspecified type  Assessment & Plan:  On trazodone at bedtime for this. Working well. Continue.        I spent a total of 30 minutes on the day of the visit.  This includes face to face time and non-face to face time preparing to see the patient (eg, review of tests), obtaining and/or reviewing separately obtained history, documenting clinical information in the electronic or other health record, independently interpreting results and communicating results to the patient/family/caregiver, or care coordinator.     RTC in 3 months, sooner if needed     Geneva Dsouza MD  Department of Internal Medicine - [facility]  1:13 PM

## 2023-01-23 NOTE — ASSESSMENT & PLAN NOTE
Reviewed recent iron panel, b12 and folate from 7/2022. Iron is wnl. b12 on the low side so pt is taking b12 daily. Folate wnl. Will monitor. She takes iron supplements daily. Last hb was 12.6

## 2023-01-24 NOTE — PROGRESS NOTES
"                            Occupational Therapy Daily Treatment Note     Name: Magdalena QUIÑONEZ [place]  Clinic Number: [id number]    Therapy Diagnosis:   Encounter Diagnosis   Name Primary?    Decreased ROM of wrist      Physician: Mary Moore, *    Physician Orders: eval and treat  Medical Diagnosis: S/P ganglionectomy on LUE  Evaluation Date: 7/16/18  Insurance Authorization period Expiration: 12/31/18  Plan of Care Certification Period: 8/10/18  Date of Return to MD: 8/8/18    Visit # / Visits authorized: 2 / 20  Time In:1:10 pm  Time Out: 2 pm  Total Billable Time: 45 minutes    Precautions: Standard    Subjective     Pt reports: "I feel a slight pain throughout my wrist when I try to use it"  She was compliant with home exercise program given last session.   Response to previous treatment: wound glue is gone and wound appears to be healing adequately    Pain: 2/10 with movement   Location: left wrist    Objective     Magdalena received the following supervised modalities after being cleared for contradictions for 10 minutes:   -Patient received paraffin bath to L hand for 10 minutes to increase blood flow, circulation, pain management and for tissue elasticity prior to therex.     Magdalena received the following direct contact modalities after being cleared for contraindications for 10 minutes:  -Patient received ultrasound to  L area to increase blood flow, circulation, tissue elasticity, pain management and for wound/scar management for 10 minutes @ 3.3 Mhz, Intensity .8 w/cm2 at 100% duty cycle.     Magdalena received the following manual therapy techniques for 10 minutes:   -Performed retrograde followed by scar massage to L wrist area for 10 minutes with vibrator tool to decrease adhesions and improve tensile glide.     Magdalena received therapeutic exercises for 20 minutes including:  -Patient performed wrist flex/ext, pron/sup, and RD followed by thumb composite flexion, light 3pt pinches, light squeezes, " RAbd/Add, and PAbd/Add while in fluidotherapy to L hand(s) for 20 minutes to increase blood flow, circulation, desensitization, sensory re-education and for pain management.       Home Exercises and Education Provided     Education provided:   - Proper scar massage technique  - Progress towards goals     Written Home Exercises Provided: Patient instructed to cont prior HEP.  Exercises were reviewed and Magdalena was able to demonstrate them prior to the end of the session.  Magdalena demonstrated good  understanding of the education provided.   .   See EMR under Patient Instructions for exercises provided prior visit.     Magdalena demonstrated good  understanding of the education provided.     Assessment     Pt would continue to benefit from skilled OT. Pt presented with further improved wound healing as evidenced by decreases in swelling and redness. AROM at wrist and thumb shows slight increases. Pain is minimal and is isolated to wrist while actively moving. Magdalena is progressing well towards her goals and there are no updates to goals at this time. Pt prognosis is Good. Pt will continue to benefit from skilled outpatient occupational therapy to address the deficits listed in the problem list on initial evalution provide pt/family education and to maximize pt's level of independence in the home and community environment.     Anticipated barriers to occupational therapy: none  Pt's spiritual, cultural and educational needs considered and pt agreeable to plan of care and goals.    Goals:  *Short Term (3 weeks on 8/10/18):  1)   Patient to be IND with HEP and modalities for pain management  2)   Increase  strength 2-5 lbs. to grasp objects during cutting tasks  3)   Increase pinch 1-3 psis for increased LUE during gardening  4)   Decrease edema .2-.3 cm to increase joint mobility /flexibility for improved overall functional hand use.      Long Term (by discharge):     1)   Patient to score at 72% or more on FOTO to  demonstrate improved perception of functional LUE use.  2)   Pt will return to near to prior level of function for ADLs and household management reporting I or Mod I with ADLs (dressing, feeding, grooming, toileting).   3)   Increase  strength 5-10 to improve functional LUE use during gardening tasks**    Plan     Discussed Plan of Care with patient: Yes  Updates/Grading for next session: F/U on scar maturation    Wen Ramirez, OT    Statement Selected

## 2023-01-25 ENCOUNTER — DOCUMENTATION ONLY (OUTPATIENT)
Dept: REHABILITATION | Facility: OTHER | Age: 78
End: 2023-01-25
Attending: STUDENT IN AN ORGANIZED HEALTH CARE EDUCATION/TRAINING PROGRAM
Payer: MEDICARE

## 2023-01-25 NOTE — PROGRESS NOTES
Health  Wellness Visit Note    Name: Magdalena Mane  Clinic Number: [id number]  Physician: No ref. provider found  Diagnosis: No diagnosis found.  Past Medical History:   Diagnosis Date    Anemia     Atrial fibrillation     Basal cell carcinoma     DJD (degenerative joint disease) 12/12/2012    Obesity (BMI 30-39.9) 11/27/2015    Vaginal atrophy 4/1/2016     Visit Number: 38  Precautions: Atrial Fibrillation      1st PT visit: 5/17/2022  Year of care end date: 5/17/2023  6 Month test  Performed: Nov 2022  12 Month test performed: May 2023  Mind body plan: Plan A 9 months  Patient level: B    Time In: 9:25 AM  Time Out: 10:12 AM  Total Treatment Time:  47 minutes    Wellness [employer] 2022  Handout on this week's wellness topic Kindness was provided along with a discussion on what it means, the benefits, and suggestions for practice.  Reviewed last week's topic Meditation     Subjective:   Patient reports minimal low back pain today. Pt stretches daily, but has not been icing at home (freezer has been going out). She went walking for exercise, and completed HEP regularly. She noted that she has been slowly and mindfully returning to more activity after her health events over the last couple of months.    Objective:   Magdalena completed therapeutic stretches (EIL, ABRAHAM) and the following MedX exercise machines: core lumbar, torso rotation l/r, leg extension, leg curl, upright row, chest press, biceps curl, triceps extension, leg press    See exercise log in patient folder for rate of exertion and repetitions completed.       Fitness Machine Education Key:  E=education on equipment initiated and further follow up and education needed  I=independent with  and exercise.  The patient:  Adjusts machines to his/her settings  Uses equipment levers, pins, weights safely  Maintains safe and correct posture while exercising  Moves through exercise with correct pace and control  Gets on and off equipment  safely      Core Lumbar Strength E Torso Rotation E Leg Press E   Leg Extension E Seated Leg Curl E Chest Press E   Seated Row E Hip ADD X Hip ABD E   Triceps Extension E Bicep Curl E Other: X       Assessment:   Patient tolerated Patient tolerated MedX Core Lumbar Strength and all other peripheral exercises without an increase in symptoms. Patient warmed up on treadmill for 5 minutes, stretched, and iced low back and knees for 5 minutes after the workout.    Plan:  Continue with established plan of care towards wellness goals.     Health  : Sho Morfin  1/25/2023

## 2023-01-27 ENCOUNTER — PES CALL (OUTPATIENT)
Dept: ADMINISTRATIVE | Facility: CLINIC | Age: 78
End: 2023-01-27
Payer: MEDICARE

## 2023-02-03 ENCOUNTER — DOCUMENTATION ONLY (OUTPATIENT)
Dept: REHABILITATION | Facility: OTHER | Age: 78
End: 2023-02-03
Attending: STUDENT IN AN ORGANIZED HEALTH CARE EDUCATION/TRAINING PROGRAM
Payer: MEDICARE

## 2023-02-03 NOTE — PROGRESS NOTES
Health  Wellness Visit Note    Name: Magdalena Mane  Clinic Number: [id number]  Physician: No ref. provider found  Diagnosis: No diagnosis found.  Past Medical History:   Diagnosis Date    Anemia     Atrial fibrillation     Basal cell carcinoma     DJD (degenerative joint disease) 12/12/2012    Obesity (BMI 30-39.9) 11/27/2015    Vaginal atrophy 4/1/2016     Visit Number: 39  Precautions: Atrial Fibrillation      1st PT visit: 5/17/2022  Year of care end date: 5/17/2023  6 Month test  Performed: Nov 2022  12 Month test performed: May 2023  Mind body plan: Plan A 9 months  Patient level: B    Time In: 11:15 AM  Time Out: 12:00 AM  Total Treatment Time:  45 minutes    Wellness [employer] 2022  Handout on this week's wellness topic Muscular Strength was provided along with a discussion on what it means, the benefits, and suggestions for practice.  Reviewed last week's topic Kindness     Subjective:   Patient reports that her back feels good today. Pt stretches daily, but has not been icing at home. She went walking for exercise, and completed HEP regularly. She has been slowly and mindfully returning to more activity after her health events over the last couple of months.    Objective:   Magdalena completed therapeutic stretches (EIL, ABRAHAM) and the following MedX exercise machines: core lumbar, torso rotation l/r, leg extension, leg curl, upright row, chest press, biceps curl, triceps extension, leg press    See exercise log in patient folder for rate of exertion and repetitions completed.       Fitness Machine Education Key:  E=education on equipment initiated and further follow up and education needed  I=independent with  and exercise.  The patient:  Adjusts machines to his/her settings  Uses equipment levers, pins, weights safely  Maintains safe and correct posture while exercising  Moves through exercise with correct pace and control  Gets on and off equipment safely      Core Lumbar Strength E Torso  Rotation E Leg Press E   Leg Extension E Seated Leg Curl E Chest Press E   Seated Row E Hip ADD X Hip ABD E   Triceps Extension E Bicep Curl E Other: X       Assessment:   Patient tolerated Patient tolerated MedX Core Lumbar Strength and all other peripheral exercises without an increase in symptoms. Patient warmed up on treadmill for 5 minutes, stretched, and iced low back and knees for 5 minutes after the workout.    Plan:  Continue with established plan of care towards wellness goals.     Health  : Sho Morfin  2/3/2023

## 2023-02-09 ENCOUNTER — DOCUMENTATION ONLY (OUTPATIENT)
Dept: REHABILITATION | Facility: OTHER | Age: 78
End: 2023-02-09
Attending: STUDENT IN AN ORGANIZED HEALTH CARE EDUCATION/TRAINING PROGRAM
Payer: MEDICARE

## 2023-02-09 NOTE — PROGRESS NOTES
Health  Wellness Visit Note    Name: Magdalena Mane  Clinic Number: [id number]  Physician: No ref. provider found  Diagnosis: No diagnosis found.  Past Medical History:   Diagnosis Date    Anemia     Atrial fibrillation     Basal cell carcinoma     DJD (degenerative joint disease) 12/12/2012    Obesity (BMI 30-39.9) 11/27/2015    Vaginal atrophy 4/1/2016     Visit Number: 40  Precautions: Atrial Fibrillation      1st PT visit: 5/17/2022  Year of care end date: 5/17/2023  6 Month test  Performed: Nov 2022  12 Month test performed: May 2023  Mind body plan: Plan A 9 months  Patient level: B    Time In: 11:55 AM  Time Out: 12:40 PM  Total Treatment Time: 45  minutes    Wellness [employer] 2022  Handout on this week's wellness topic Cardio was provided along with a discussion on what it means, the benefits, and suggestions for practice.  Reviewed last week's topic Muscular Strength     Subjective:   Patient reports that her back feels good today. Pt stretches daily, but has not been icing at home. She went walking for exercise, and completed HEP regularly. She has been slowly and mindfully returning to more activity after her health events over the last couple of months.She will be doing some cardio testing soon.    Objective:   Magdalena completed therapeutic stretches (EIL, ABRAHAM) and the following MedX exercise machines: core lumbar, torso rotation l/r, leg extension, leg curl, upright row, chest press, biceps curl, triceps extension, leg press    See exercise log in patient folder for rate of exertion and repetitions completed.       Fitness Machine Education Key:  E=education on equipment initiated and further follow up and education needed  I=independent with  and exercise.  The patient:  Adjusts machines to his/her settings  Uses equipment levers, pins, weights safely  Maintains safe and correct posture while exercising  Moves through exercise with correct pace and control  Gets on and off equipment  safely      Core Lumbar Strength E Torso Rotation E Leg Press E   Leg Extension E Seated Leg Curl E Chest Press E   Seated Row E Hip ADD X Hip ABD E   Triceps Extension E Bicep Curl E Other: X       Assessment:   Patient tolerated Patient tolerated MedX Core Lumbar Strength and all other peripheral exercises without an increase in symptoms. Patient warmed up on treadmill for 5 minutes, stretched, and iced low back and knees for 5 minutes after the workout.    Plan:  Continue with established plan of care towards wellness goals.     Health  : Sho Morfin  2/9/2023

## 2023-02-10 ENCOUNTER — HOSPITAL ENCOUNTER (OUTPATIENT)
Dept: RADIOLOGY | Facility: OTHER | Age: 78
Discharge: HOME | End: 2023-02-10
Attending: INTERNAL MEDICINE
Payer: MEDICARE

## 2023-02-10 ENCOUNTER — HOSPITAL ENCOUNTER (OUTPATIENT)
Dept: CARDIOLOGY | Facility: OTHER | Age: 78
Discharge: HOME | End: 2023-02-10
Attending: INTERNAL MEDICINE
Payer: MEDICARE

## 2023-02-10 ENCOUNTER — TELEPHONE (OUTPATIENT)
Dept: ADMINISTRATIVE | Facility: CLINIC | Age: 78
End: 2023-02-10
Payer: MEDICARE

## 2023-02-10 ENCOUNTER — PATIENT MESSAGE (OUTPATIENT)
Dept: ADMINISTRATIVE | Facility: CLINIC | Age: 78
End: 2023-02-10
Payer: MEDICARE

## 2023-02-10 VITALS — DIASTOLIC BLOOD PRESSURE: 67 MMHG | SYSTOLIC BLOOD PRESSURE: 142 MMHG

## 2023-02-10 DIAGNOSIS — I20.89 OTHER FORMS OF ANGINA PECTORIS: ICD-10-CM

## 2023-02-10 DIAGNOSIS — I50.9 ACUTE HEART FAILURE, UNSPECIFIED HEART FAILURE TYPE: ICD-10-CM

## 2023-02-10 LAB
ABDOMINAL AORTA PROX EDV: 19 CM/S
ABDOMINAL AORTA PROX PSV: 108 CM/S
CV STRESS BASE HR: 52 BPM
DIASTOLIC BLOOD PRESSURE: 67 MMHG
LEFT RENAL DIST DIAS: 16 CM/S
LEFT RENAL DIST SYS: 83 CM/S
LEFT RENAL MID DIAS: 27 CM/S
LEFT RENAL MID SYS: 132 CM/S
LEFT RENAL PROX DIAS: 15 CM/S
LEFT RENAL PROX RAR: 0.69
LEFT RENAL PROX SYS: 75 CM/S
NUC REST EJECTION FRACTION: 68
NUC STRESS EJECTION FRACTION: 71 %
OHS CV CPX 85 PERCENT MAX PREDICTED HEART RATE MALE: 119
OHS CV CPX MAX PREDICTED HEART RATE: 140
OHS CV CPX PATIENT IS FEMALE: 1
OHS CV CPX PATIENT IS MALE: 0
OHS CV CPX PEAK DIASTOLIC BLOOD PRESSURE: 63 MMHG
OHS CV CPX PEAK HEAR RATE: 66 BPM
OHS CV CPX PEAK RATE PRESSURE PRODUCT: NORMAL
OHS CV CPX PEAK SYSTOLIC BLOOD PRESSURE: 156 MMHG
OHS CV CPX PERCENT MAX PREDICTED HEART RATE ACHIEVED: 47
OHS CV CPX RATE PRESSURE PRODUCT PRESENTING: 7384
OHS CV LEFT RENAL RAR: 1.22
OHS CV RIGHT RENAL RAR: 1.25
OHS CV US LEFT RENAL HIGHEST EDV: 27
OHS CV US LEFT RENAL HIGHEST PSV: 132
OHS CV US RIGHT RENAL HIGHEST EDV: 21
OHS CV US RIGHT RENAL HIGHEST PSV: 135
PROX AORTIC AP: 1.6 CM
PROX AORTIC TRANS: 1.6 CM
RIGHT RENAL DIST DIAS: 21 CM/S
RIGHT RENAL DIST SYS: 135 CM/S
RIGHT RENAL MID DIAS: 21 CM/S
RIGHT RENAL MID SYS: 118 CM/S
RIGHT RENAL PROX DIAS: 12 CM/S
RIGHT RENAL PROX RAR: 0.77
RIGHT RENAL PROX SYS: 83 CM/S
SYSTOLIC BLOOD PRESSURE: 142 MMHG

## 2023-02-10 PROCEDURE — 93975 VASCULAR STUDY: CPT | Mod: 26,,, | Performed by: INTERNAL MEDICINE

## 2023-02-10 PROCEDURE — 93018 CV STRESS TEST I&R ONLY: CPT | Mod: ,,, | Performed by: INTERNAL MEDICINE

## 2023-02-10 PROCEDURE — 93975 CV US RENAL ARTERY STENOSIS HYPERTENSION LIMITED (CUPID ONLY): ICD-10-PCS | Mod: 26,,, | Performed by: INTERNAL MEDICINE

## 2023-02-10 PROCEDURE — 93976 VASCULAR STUDY: CPT | Mod: 50

## 2023-02-10 PROCEDURE — 63600175 PHARM REV CODE 636 W HCPCS: Performed by: INTERNAL MEDICINE

## 2023-02-10 PROCEDURE — 93018 NUCLEAR STRESS - CARDIOLOGY INTERPRETED (CUPID ONLY): ICD-10-PCS | Mod: ,,, | Performed by: INTERNAL MEDICINE

## 2023-02-10 PROCEDURE — 78452 HT MUSCLE IMAGE SPECT MULT: CPT | Mod: 26,,, | Performed by: INTERNAL MEDICINE

## 2023-02-10 PROCEDURE — A9500 TC99M SESTAMIBI: HCPCS

## 2023-02-10 PROCEDURE — 93016 NUCLEAR STRESS - CARDIOLOGY INTERPRETED (CUPID ONLY): ICD-10-PCS | Mod: ,,, | Performed by: INTERNAL MEDICINE

## 2023-02-10 PROCEDURE — 93016 CV STRESS TEST SUPVJ ONLY: CPT | Mod: ,,, | Performed by: INTERNAL MEDICINE

## 2023-02-10 PROCEDURE — 78452 HT MUSCLE IMAGE SPECT MULT: CPT

## 2023-02-10 PROCEDURE — 78452 NUCLEAR STRESS - CARDIOLOGY INTERPRETED (CUPID ONLY): ICD-10-PCS | Mod: 26,,, | Performed by: INTERNAL MEDICINE

## 2023-02-10 RX ORDER — REGADENOSON 0.08 MG/ML
0.4 INJECTION, SOLUTION INTRAVENOUS ONCE
Status: COMPLETED | OUTPATIENT
Start: 2023-02-10 | End: 2023-02-10

## 2023-02-10 RX ADMIN — REGADENOSON 0.4 MG: 0.08 INJECTION, SOLUTION INTRAVENOUS at 11:02

## 2023-02-13 ENCOUNTER — TELEPHONE (OUTPATIENT)
Dept: ADMINISTRATIVE | Facility: CLINIC | Age: 78
End: 2023-02-13
Payer: MEDICARE

## 2023-02-13 ENCOUNTER — OFFICE VISIT (OUTPATIENT)
Dept: INTERNAL MEDICINE | Facility: CLINIC | Age: 78
End: 2023-02-13
Payer: MEDICARE

## 2023-02-13 VITALS
WEIGHT: 123.69 LBS | RESPIRATION RATE: 18 BRPM | DIASTOLIC BLOOD PRESSURE: 59 MMHG | BODY MASS INDEX: 24.93 KG/M2 | SYSTOLIC BLOOD PRESSURE: 112 MMHG | HEART RATE: 54 BPM | HEIGHT: 59 IN | OXYGEN SATURATION: 98 %

## 2023-02-13 DIAGNOSIS — E78.2 MIXED HYPERLIPIDEMIA: ICD-10-CM

## 2023-02-13 DIAGNOSIS — I65.23 BILATERAL CAROTID ARTERY STENOSIS: ICD-10-CM

## 2023-02-13 DIAGNOSIS — I70.0 ATHEROSCLEROSIS OF AORTA: ICD-10-CM

## 2023-02-13 DIAGNOSIS — I50.9 ACUTE HEART FAILURE, UNSPECIFIED HEART FAILURE TYPE: ICD-10-CM

## 2023-02-13 DIAGNOSIS — R26.89 BALANCE PROBLEMS: ICD-10-CM

## 2023-02-13 DIAGNOSIS — I10 PRIMARY HYPERTENSION: ICD-10-CM

## 2023-02-13 DIAGNOSIS — I47.9 PAROXYSMAL TACHYCARDIA: ICD-10-CM

## 2023-02-13 DIAGNOSIS — Z00.00 ENCOUNTER FOR PREVENTIVE HEALTH EXAMINATION: Primary | ICD-10-CM

## 2023-02-13 DIAGNOSIS — R26.81 UNSTEADY GAIT: ICD-10-CM

## 2023-02-13 PROCEDURE — 1170F PR FUNCTIONAL STATUS ASSESSED: ICD-10-PCS | Mod: CPTII,95,, | Performed by: NURSE PRACTITIONER

## 2023-02-13 PROCEDURE — 3078F DIAST BP <80 MM HG: CPT | Mod: CPTII,95,, | Performed by: NURSE PRACTITIONER

## 2023-02-13 PROCEDURE — G0439 PR MEDICARE ANNUAL WELLNESS SUBSEQUENT VISIT: ICD-10-PCS | Mod: 95,,, | Performed by: NURSE PRACTITIONER

## 2023-02-13 PROCEDURE — 3074F PR MOST RECENT SYSTOLIC BLOOD PRESSURE < 130 MM HG: ICD-10-PCS | Mod: CPTII,95,, | Performed by: NURSE PRACTITIONER

## 2023-02-13 PROCEDURE — 1159F PR MEDICATION LIST DOCUMENTED IN MEDICAL RECORD: ICD-10-PCS | Mod: CPTII,95,, | Performed by: NURSE PRACTITIONER

## 2023-02-13 PROCEDURE — 1157F ADVNC CARE PLAN IN RCRD: CPT | Mod: CPTII,95,, | Performed by: NURSE PRACTITIONER

## 2023-02-13 PROCEDURE — 1159F MED LIST DOCD IN RCRD: CPT | Mod: CPTII,95,, | Performed by: NURSE PRACTITIONER

## 2023-02-13 PROCEDURE — G0439 PPPS, SUBSEQ VISIT: HCPCS | Mod: 95,,, | Performed by: NURSE PRACTITIONER

## 2023-02-13 PROCEDURE — 1160F PR REVIEW ALL MEDS BY PRESCRIBER/CLIN PHARMACIST DOCUMENTED: ICD-10-PCS | Mod: CPTII,95,, | Performed by: NURSE PRACTITIONER

## 2023-02-13 PROCEDURE — 1160F RVW MEDS BY RX/DR IN RCRD: CPT | Mod: CPTII,95,, | Performed by: NURSE PRACTITIONER

## 2023-02-13 PROCEDURE — 1170F FXNL STATUS ASSESSED: CPT | Mod: CPTII,95,, | Performed by: NURSE PRACTITIONER

## 2023-02-13 PROCEDURE — 3074F SYST BP LT 130 MM HG: CPT | Mod: CPTII,95,, | Performed by: NURSE PRACTITIONER

## 2023-02-13 PROCEDURE — 1100F PTFALLS ASSESS-DOCD GE2>/YR: CPT | Mod: CPTII,95,, | Performed by: NURSE PRACTITIONER

## 2023-02-13 PROCEDURE — 1157F PR ADVANCE CARE PLAN OR EQUIV PRESENT IN MEDICAL RECORD: ICD-10-PCS | Mod: CPTII,95,, | Performed by: NURSE PRACTITIONER

## 2023-02-13 PROCEDURE — 1100F PR PT FALLS ASSESS DOC 2+ FALLS/FALL W/INJURY/YR: ICD-10-PCS | Mod: CPTII,95,, | Performed by: NURSE PRACTITIONER

## 2023-02-13 PROCEDURE — 3288F FALL RISK ASSESSMENT DOCD: CPT | Mod: CPTII,95,, | Performed by: NURSE PRACTITIONER

## 2023-02-13 PROCEDURE — 3078F PR MOST RECENT DIASTOLIC BLOOD PRESSURE < 80 MM HG: ICD-10-PCS | Mod: CPTII,95,, | Performed by: NURSE PRACTITIONER

## 2023-02-13 PROCEDURE — 3288F PR FALLS RISK ASSESSMENT DOCUMENTED: ICD-10-PCS | Mod: CPTII,95,, | Performed by: NURSE PRACTITIONER

## 2023-02-13 NOTE — PATIENT INSTRUCTIONS
Counseling and Referral of Other Preventative  (Italic type indicates deductible and co-insurance are waived)    Patient Name: Magdalena Mane  Today's Date: 2/13/2023    Health Maintenance       Date Due Completion Date    Lipid Panel 03/03/2023 3/3/2022    Mammogram 08/10/2023 8/10/2022    High Dose Statin 01/23/2024 1/23/2023    DEXA Scan 07/25/2024 7/25/2019    TETANUS VACCINE 10/09/2025 10/9/2015    Colorectal Cancer Screening 06/28/2027 6/28/2017        No orders of the defined types were placed in this encounter.    The following information is provided to all patients.  This information is to help you find resources for any of the problems found today that may be affecting your health:                Living healthy guide: www.[facility].[place].gov      Understanding Diabetes: www.diabetes.org      Eating healthy: www.cdc.gov/healthyweight      [facility] home safety checklist: www.cdc.gov/steadi/patient.html      Agency on Aging: www.goea.[place].[facility]      Alcoholics anonymous (AA): www.aa.org      Physical Activity: www.daria.nih.gov/px1ieyo      Tobacco use: www.quitwithusla.org

## 2023-02-13 NOTE — PROGRESS NOTES
"The patient location is: [place]  The chief complaint leading to consultation is: Medicare HRA    Visit type: audiovisual    Face to Face time with patient: 20 minutes  22 minutes of total time spent on the encounter, which includes face to face time and non-face to face time preparing to see the patient (eg, review of tests), Obtaining and/or reviewing separately obtained history, Documenting clinical information in the electronic or other health record, Independently interpreting results (not separately reported) and communicating results to the patient/family/caregiver, or Care coordination (not separately reported).         Each patient to whom he or she provides medical services by telemedicine is:  (1) informed of the relationship between the physician and patient and the respective role of any other health care provider with respect to management of the patient; and (2) notified that he or she may decline to receive medical services by telemedicine and may withdraw from such care at any time.    Notes: Pt of Dr. Dsouza, seen virtually for medicare HRA      Magdalena Mane presented for a  Medicare AWV and comprehensive Health Risk Assessment today. The following components were reviewed and updated:    Medical history  Family History  Social history  Allergies and Current Medications  Health Risk Assessment  Health Maintenance  Care Team         ** See Completed Assessments for Annual Wellness Visit within the encounter summary.**         The following assessments were completed:  Living Situation  CAGE  Depression Screening  Fall Risk Assessment (MACH 10)  Hearing Assessment(HHI)  Cognitive Function Screening  Nutrition Screening  ADL Screening  PAQ Screening      Vitals:    02/13/23 1037   BP: (!) 112/59   Pulse: (!) 54   Resp: 18   SpO2: 98%   Weight: 56.1 kg (123 lb 11.2 oz)   Height: 4' 11" (1.499 m)     Body mass index is 24.98 kg/m².    Physical Exam    Limited PE, seen virtually, no distress during " video visit    Diagnoses and health risks identified today and associated recommendations/orders:    1. Encounter for preventive health examination  Limited PE, seen virtually, no distress during video visit    Health Maintenance updated    Records reviewed    2. Acute heart failure, unspecified heart failure type  Chronic, followed by Cardiology    3. Paroxysmal tachycardia  Chronic, followed by Cardiology    4. Atherosclerosis of aorta  Chronic, followed by Cardiology    5. Bilateral carotid artery stenosis  Chronic, followed by Cardiology    6. Mixed hyperlipidemia  Chronic, followed by PCP    Continue cholesterol med, low fat diet, and exercise.     7. Primary hypertension  Chronic, followed by PCP    Take medications as prescribed.    Monitor BP at home, goal BP < or = 140/80, call office if consistently above this range.    Follow low salt DASH diet and exercise.    BMI reviewed.    Go to ED if Headaches, blurred vision, chest pain, or SOB occurs along with elevated readings > or = 160/90.    8. Unsteady gait  Chronic, followed by PCP    9. Balance problems  Chronic, followed by PCP    Provided Magdalena with a 5-10 year written screening schedule and personal prevention plan. Recommendations were developed using the USPSTF age appropriate recommendations. Education, counseling, and referrals were provided as needed. After Visit Summary printed and given to patient which includes a list of additional screenings\tests needed.    Follow up in about 2 months (around 4/26/2023) for with PCP Dr. Dsouza as scheduled.    Lianet Scanlon, TAMIKO      I offered to discuss advanced care planning, including how to pick a person who would make decisions for you if you were unable to make them for yourself, called a health care power of , and what kind of decisions you might make such as use of life sustaining treatments such as ventilators and tube feeding when faced with a life limiting illness recorded on a living  will that they will need to know. (How you want to be cared for as you near the end of your natural life)     X Patient is interested in learning more about how to make advanced directives.  I provided them paperwork and offered to discuss this with them.

## 2023-02-17 ENCOUNTER — DOCUMENTATION ONLY (OUTPATIENT)
Dept: REHABILITATION | Facility: OTHER | Age: 78
End: 2023-02-17
Payer: MEDICARE

## 2023-02-17 NOTE — PROGRESS NOTES
Health  Wellness Visit Note    Name: Magdalena Mane  Clinic Number: [id number]  Physician: No ref. provider found  Diagnosis: No diagnosis found.  Past Medical History:   Diagnosis Date    Anemia     Atrial fibrillation     Basal cell carcinoma     DJD (degenerative joint disease) 12/12/2012    Obesity (BMI 30-39.9) 11/27/2015    Vaginal atrophy 4/1/2016     Visit Number: 41  Precautions: Atrial Fibrillation      1st PT visit: 5/17/2022  Year of care end date: 5/17/2023  6 Month test  Performed: Nov 2022  12 Month test performed: May 2023  Mind body plan: Plan A 9 months  Patient level: B    Time In: 10:55 AM  Time Out: 11:40 AM  Total Treatment Time: 45 minutes    Wellness [facility] 2022  Handout on this week's wellness topic Flexibility was provided along with a discussion on what it means, the benefits, and suggestions for practice.  Reviewed last week's topic Cardio    Subjective:   Patient reports that her back feels good today. Pt stretches daily, but does not typically ice at home. She went walking for exercise, and completed HEP regularly. She has been slowly and mindfully returning to more activity after her health events over the last couple of months.    Objective:   Magdalena completed therapeutic stretches (EIL, ABRAHAM) and the following MedX exercise machines: core lumbar, torso rotation l/r, leg extension, leg curl, upright row, chest press, biceps curl, triceps extension, leg press    See exercise log in patient folder for rate of exertion and repetitions completed.       Fitness Machine Education Key:  E=education on equipment initiated and further follow up and education needed  I=independent with  and exercise.  The patient:  Adjusts machines to his/her settings  Uses equipment levers, pins, weights safely  Maintains safe and correct posture while exercising  Moves through exercise with correct pace and control  Gets on and off equipment safely      Core Lumbar Strength E Torso Rotation E  Leg Press E   Leg Extension E Seated Leg Curl E Chest Press E   Seated Row E Hip ADD X Hip ABD E   Triceps Extension E Bicep Curl E Other: X       Assessment:   Patient tolerated Patient tolerated MedX Core Lumbar Strength and all other peripheral exercises without an increase in symptoms. Patient warmed up on treadmill for 5 minutes, stretched, and iced low back and knees for 5 minutes after the workout.    Plan:  Continue with established plan of care towards wellness goals.     Health  : Sho Morfin  2/17/2023

## 2023-02-22 ENCOUNTER — PATIENT MESSAGE (OUTPATIENT)
Dept: CARDIOLOGY | Facility: CLINIC | Age: 78
End: 2023-02-22
Payer: MEDICARE

## 2023-02-24 ENCOUNTER — DOCUMENTATION ONLY (OUTPATIENT)
Dept: REHABILITATION | Facility: OTHER | Age: 78
End: 2023-02-24
Attending: STUDENT IN AN ORGANIZED HEALTH CARE EDUCATION/TRAINING PROGRAM
Payer: MEDICARE

## 2023-02-24 NOTE — PROGRESS NOTES
Health  Wellness Visit Note    Name: Magdalena Mane  Clinic Number: [id number]  Physician: No ref. provider found  Diagnosis: No diagnosis found.  Past Medical History:   Diagnosis Date    Anemia     Atrial fibrillation     Basal cell carcinoma     DJD (degenerative joint disease) 12/12/2012    Obesity (BMI 30-39.9) 11/27/2015    Vaginal atrophy 4/1/2016     Visit Number: 42  Precautions: Atrial Fibrillation      1st PT visit: 5/17/2022  Year of care end date: 5/17/2023  6 Month test  Performed: Nov 2022  12 Month test performed: May 2023  Mind body plan: Plan A 9 months  Patient level: B    Time In: 3:30 PM  Time Out: 4:15 PM  Total Treatment Time: 45 minutes    Wellness [facility] 2022  Handout on this week's wellness topic Balance was provided along with a discussion on what it means, the benefits, and suggestions for practice.  Reviewed last week's topic Flexibility.    Subjective:   Patient reports she has had no back pain within the last week. She went to two parades, went for walks, hung out with her friends and did her stretches everyday. Patient plans to do some gardening this weekend at home. She hasn't iced her back outside of Wellness.     Objective:   Magdalena completed therapeutic stretches (EIL, ABRAHAM) and the following MedX exercise machines: core lumbar, torso rotation l/r, leg extension, leg curl, upright row, chest press, biceps curl, triceps extension, leg press    See exercise log in patient folder for rate of exertion and repetitions completed.       Fitness Machine Education Key:  E=education on equipment initiated and further follow up and education needed  I=independent with  and exercise.  The patient:  Adjusts machines to his/her settings  Uses equipment levers, pins, weights safely  Maintains safe and correct posture while exercising  Moves through exercise with correct pace and control  Gets on and off equipment safely      Core Lumbar Strength E Torso Rotation E Leg Press E    Leg Extension E Seated Leg Curl E Chest Press E   Seated Row E Hip ADD X Hip ABD E   Triceps Extension E Bicep Curl E Other: X       Assessment:   Patient tolerated Patient tolerated MedX Core Lumbar Strength and all other peripheral exercises without an increase in symptoms. Patient warmed up on treadmill for 5 minutes, stretched, and iced low back and knees for 5 minutes after the workout.    Plan:  Continue with established plan of care towards wellness goals.     Health  : Chasidy Yoon  2/24/2023

## 2023-03-03 ENCOUNTER — OFFICE VISIT (OUTPATIENT)
Dept: CARDIOLOGY | Facility: CLINIC | Age: 78
End: 2023-03-03
Payer: MEDICARE

## 2023-03-03 ENCOUNTER — DOCUMENTATION ONLY (OUTPATIENT)
Dept: REHABILITATION | Facility: OTHER | Age: 78
End: 2023-03-03
Payer: MEDICARE

## 2023-03-03 VITALS
BODY MASS INDEX: 25.49 KG/M2 | WEIGHT: 126.19 LBS | SYSTOLIC BLOOD PRESSURE: 138 MMHG | OXYGEN SATURATION: 97 % | DIASTOLIC BLOOD PRESSURE: 66 MMHG | HEART RATE: 55 BPM

## 2023-03-03 DIAGNOSIS — I50.9 ACUTE HEART FAILURE, UNSPECIFIED HEART FAILURE TYPE: ICD-10-CM

## 2023-03-03 DIAGNOSIS — I48.0 PAROXYSMAL ATRIAL FIBRILLATION: ICD-10-CM

## 2023-03-03 DIAGNOSIS — E78.2 MIXED HYPERLIPIDEMIA: Primary | ICD-10-CM

## 2023-03-03 PROCEDURE — 1159F MED LIST DOCD IN RCRD: CPT | Mod: CPTII,S$GLB,, | Performed by: INTERNAL MEDICINE

## 2023-03-03 PROCEDURE — 1126F PR PAIN SEVERITY QUANTIFIED, NO PAIN PRESENT: ICD-10-PCS | Mod: CPTII,S$GLB,, | Performed by: INTERNAL MEDICINE

## 2023-03-03 PROCEDURE — 3078F DIAST BP <80 MM HG: CPT | Mod: CPTII,S$GLB,, | Performed by: INTERNAL MEDICINE

## 2023-03-03 PROCEDURE — 1159F PR MEDICATION LIST DOCUMENTED IN MEDICAL RECORD: ICD-10-PCS | Mod: CPTII,S$GLB,, | Performed by: INTERNAL MEDICINE

## 2023-03-03 PROCEDURE — 1157F ADVNC CARE PLAN IN RCRD: CPT | Mod: CPTII,S$GLB,, | Performed by: INTERNAL MEDICINE

## 2023-03-03 PROCEDURE — 1101F PR PT FALLS ASSESS DOC 0-1 FALLS W/OUT INJ PAST YR: ICD-10-PCS | Mod: CPTII,S$GLB,, | Performed by: INTERNAL MEDICINE

## 2023-03-03 PROCEDURE — 1157F PR ADVANCE CARE PLAN OR EQUIV PRESENT IN MEDICAL RECORD: ICD-10-PCS | Mod: CPTII,S$GLB,, | Performed by: INTERNAL MEDICINE

## 2023-03-03 PROCEDURE — 1126F AMNT PAIN NOTED NONE PRSNT: CPT | Mod: CPTII,S$GLB,, | Performed by: INTERNAL MEDICINE

## 2023-03-03 PROCEDURE — 99214 OFFICE O/P EST MOD 30 MIN: CPT | Mod: S$GLB,,, | Performed by: INTERNAL MEDICINE

## 2023-03-03 PROCEDURE — 3075F SYST BP GE 130 - 139MM HG: CPT | Mod: CPTII,S$GLB,, | Performed by: INTERNAL MEDICINE

## 2023-03-03 PROCEDURE — 3078F PR MOST RECENT DIASTOLIC BLOOD PRESSURE < 80 MM HG: ICD-10-PCS | Mod: CPTII,S$GLB,, | Performed by: INTERNAL MEDICINE

## 2023-03-03 PROCEDURE — 3288F FALL RISK ASSESSMENT DOCD: CPT | Mod: CPTII,S$GLB,, | Performed by: INTERNAL MEDICINE

## 2023-03-03 PROCEDURE — 3075F PR MOST RECENT SYSTOLIC BLOOD PRESS GE 130-139MM HG: ICD-10-PCS | Mod: CPTII,S$GLB,, | Performed by: INTERNAL MEDICINE

## 2023-03-03 PROCEDURE — 99999 PR PBB SHADOW E&M-EST. PATIENT-LVL IV: CPT | Mod: PBBFAC,,, | Performed by: INTERNAL MEDICINE

## 2023-03-03 PROCEDURE — 99214 PR OFFICE/OUTPT VISIT, EST, LEVL IV, 30-39 MIN: ICD-10-PCS | Mod: S$GLB,,, | Performed by: INTERNAL MEDICINE

## 2023-03-03 PROCEDURE — 1101F PT FALLS ASSESS-DOCD LE1/YR: CPT | Mod: CPTII,S$GLB,, | Performed by: INTERNAL MEDICINE

## 2023-03-03 PROCEDURE — 99999 PR PBB SHADOW E&M-EST. PATIENT-LVL IV: ICD-10-PCS | Mod: PBBFAC,,, | Performed by: INTERNAL MEDICINE

## 2023-03-03 PROCEDURE — 3288F PR FALLS RISK ASSESSMENT DOCUMENTED: ICD-10-PCS | Mod: CPTII,S$GLB,, | Performed by: INTERNAL MEDICINE

## 2023-03-03 NOTE — PROGRESS NOTES
Cardiology    3/3/2023  10:36 AM    Problem list  Patient Active Problem List   Diagnosis    DJD (degenerative joint disease)    HTN (hypertension)    Iron deficiency anemia    Vaginal atrophy    Overweight    Ganglion cyst    Balance problems    Unsteady gait    Paroxysmal tachycardia    Atrial fibrillation    S/P carotid endarterectomy    Sinus bradycardia    Heat syncope    Bilateral carotid bruits    Bilateral carotid artery stenosis    Compression fracture of thoracic vertebra with routine healing    Vitamin D insufficiency    Stress    Atherosclerosis of aorta    Poor posture    Decreased strength of trunk and back    Dyspnea    Acute heart failure, unspecified heart failure type    Hyperlipidemia    Osteoporosis    Insomnia       CC:  F/u    HPI:  She is doing well.  Her blood pressures been well controlled.  She is not had to take any p.r.n. clonidine.  After her last visit with us, she was diagnosed with UTI and was treated with antibiotics and she felt great since.    Medications  Current Outpatient Medications   Medication Sig Dispense Refill    acetaminophen (TYLENOL) 500 MG tablet Take 500 mg by mouth 2 (two) times daily.      alendronate (FOSAMAX) 70 MG tablet Take 1 tablet (70 mg total) by mouth every 7 days. 4 tablet 11    apixaban (ELIQUIS) 5 mg Tab Take 1 tablet (5 mg total) by mouth 2 (two) times daily. 180 tablet 3    aspirin (ECOTRIN) 81 MG EC tablet Take 81 mg by mouth nightly.      atorvastatin (LIPITOR) 20 MG tablet Take 1 tablet (20 mg total) by mouth once daily. (Patient taking differently: Take 20 mg by mouth every evening.) 30 tablet 11    calcium-vitamin D 250-100 mg-unit per tablet Take 1 tablet by mouth Daily.      candesartan (ATACAND) 16 MG tablet Take 1 tablet (16 mg total) by mouth once daily. 90 tablet 3    cholecalciferol, vitamin D3, (VITAMIN D3 ORAL) Take 2,000 Units by mouth once daily.      diphenhydrAMINE (BENADRYL) 25 mg capsule Take 25 mg by mouth every 6 (six)  hours as needed for Itching.      ferrous sulfate (FEOSOL) Tab tablet Take 1 tablet by mouth every evening. PATIENT TAKING 2 X WEEKLY IN THE EVENING (M-W-F)      flecainide (TAMBOCOR) 50 MG Tab Take 1 tablet (50 mg total) by mouth every 12 (twelve) hours. 180 tablet 3    loratadine (CLARITIN) 10 mg tablet Take 10 mg by mouth every morning.      PFIZER COVID-19 MACIEJ VACCN,PF, 30 mcg/0.3 mL injection       spironolactone (ALDACTONE) 25 MG tablet Take 1 tablet (25 mg total) by mouth once daily. 90 tablet 3    traZODone (DESYREL) 50 MG tablet TAKE 1 TABLET(50 MG) BY MOUTH EVERY NIGHT AS NEEDED FOR INSOMNIA 30 tablet 3    verapamiL (VERELAN) 180 MG C24P Take 1 capsule (180 mg total) by mouth once daily. Dose decrease. 90 capsule 3    cloNIDine (CATAPRES) 0.1 MG tablet Take 1 tablet (0.1 mg total) by mouth every 6 (six) hours as needed (). (Patient not taking: Reported on 3/3/2023) 60 tablet 11    lactase (LACTAID) 3,000 unit tablet Take 1 tablet by mouth 3 (three) times daily as needed.      LORazepam (ATIVAN) 1 MG tablet Take 1 mg by mouth every 8 (eight) hours as needed.       No current facility-administered medications for this visit.      Prior to Admission medications    Medication Sig Start Date End Date Taking? Authorizing Provider   acetaminophen (TYLENOL) 500 MG tablet Take 500 mg by mouth 2 (two) times daily.   Yes Historical Provider   alendronate (FOSAMAX) 70 MG tablet Take 1 tablet (70 mg total) by mouth every 7 days. 7/15/22 7/15/23 Yes Ruby Garner MD   apixaban (ELIQUIS) 5 mg Tab Take 1 tablet (5 mg total) by mouth 2 (two) times daily. 1/13/23 4/13/23 Yes Clem Mckeon MD   aspirin (ECOTRIN) 81 MG EC tablet Take 81 mg by mouth nightly.   Yes Historical Provider   atorvastatin (LIPITOR) 20 MG tablet Take 1 tablet (20 mg total) by mouth once daily.  Patient taking differently: Take 20 mg by mouth every evening. 3/25/22 3/25/23 Yes TUCKER Brantley III, MD   calcium-vitamin D 250-100 mg-unit per  tablet Take 1 tablet by mouth Daily.   Yes Historical Provider   candesartan (ATACAND) 16 MG tablet Take 1 tablet (16 mg total) by mouth once daily. 1/13/23 4/13/23 Yes Clem Mckeon MD   cholecalciferol, vitamin D3, (VITAMIN D3 ORAL) Take 2,000 Units by mouth once daily.   Yes Historical Provider   diphenhydrAMINE (BENADRYL) 25 mg capsule Take 25 mg by mouth every 6 (six) hours as needed for Itching.   Yes Historical Provider   ferrous sulfate (FEOSOL) Tab tablet Take 1 tablet by mouth every evening. PATIENT TAKING 2 X WEEKLY IN THE EVENING (M-W-F)   Yes Historical Provider   flecainide (TAMBOCOR) 50 MG Tab Take 1 tablet (50 mg total) by mouth every 12 (twelve) hours. 1/13/23 5/13/23 Yes Clem Mckeon MD   loratadine (CLARITIN) 10 mg tablet Take 10 mg by mouth every morning.   Yes Historical Provider   PFIZER COVID-19 MACIEJ VACCN,PF, 30 mcg/0.3 mL injection  5/14/22  Yes Historical Provider   spironolactone (ALDACTONE) 25 MG tablet Take 1 tablet (25 mg total) by mouth once daily. 2/22/23  Yes Maki Pratt MD   traZODone (DESYREL) 50 MG tablet TAKE 1 TABLET(50 MG) BY MOUTH EVERY NIGHT AS NEEDED FOR INSOMNIA 11/28/22  Yes Lucie Avendano MD   verapamiL (VERELAN) 180 MG C24P Take 1 capsule (180 mg total) by mouth once daily. Dose decrease. 2/22/23 2/22/24 Yes Clem Mckeon MD   cloNIDine (CATAPRES) 0.1 MG tablet Take 1 tablet (0.1 mg total) by mouth every 6 (six) hours as needed ().  Patient not taking: Reported on 3/3/2023 1/13/23 1/13/24  Clem Mckeon MD   lactase (LACTAID) 3,000 unit tablet Take 1 tablet by mouth 3 (three) times daily as needed.    Historical Provider   LORazepam (ATIVAN) 1 MG tablet Take 1 mg by mouth every 8 (eight) hours as needed. 12/7/22   Historical Provider         History  Past Medical History:   Diagnosis Date    Anemia     Atrial fibrillation     Basal cell carcinoma     DJD (degenerative joint disease) 12/12/2012    Obesity (BMI 30-39.9) 11/27/2015     Vaginal atrophy 2016     Past Surgical History:   Procedure Laterality Date    ADENOIDECTOMY      BREAST BIOPSY Left     Excisional bx, benign cyst    BREAST SURGERY      CARDIOVERSION  2021    CAROTID ENDARTERECTOMY Right 2022    Procedure: ENDARTERECTOMY-CAROTID;  Surgeon: TUCKER Brantley III, MD;  Location: [facility] OR 2ND FLR;  Service: Peripheral Vascular;  Laterality: Right;    COLONOSCOPY      normal    COLONOSCOPY N/A 2017    Procedure: COLONOSCOPY;  Surgeon: Markel Montemayor MD;  Location: [facility] ENDO (4TH FLR);  Service: Endoscopy;  Laterality: N/A;    EXCISION OF GANGLION OF WRIST Left 2018    Procedure: EXCISION, GANGLION CYST, WRIST - Left Volar wrist;  Surgeon: Mary Moore MD;  Location: [facility] OR 1ST FLR;  Service: Orthopedics;  Laterality: Left;    HIP SURGERY  2014    bilateral    JOINT REPLACEMENT Bilateral     MOLLY    TONSILLECTOMY      TREATMENT OF CARDIAC ARRHYTHMIA N/A 2020    Procedure: CARDIOVERSION;  Surgeon: Nigel García MD;  Location: [facility] EP LAB;  Service: Cardiology;  Laterality: N/A;  AF, LIGIA, DCCV, MAC, ME, 3 Prep    TREATMENT OF CARDIAC ARRHYTHMIA N/A 2020    Procedure: Cardioversion or Defibrillation;  Surgeon: Nigel García MD;  Location: [facility] EP LAB;  Service: Cardiology;  Laterality: N/A;  AF, LIGIA, DCCV, MAC, ME, 3 Prep     Social History     Socioeconomic History    Marital status:    Occupational History    Occupation: retired   Tobacco Use    Smoking status: Former     Packs/day: 1.00     Years: 16.00     Pack years: 16.00     Types: Cigarettes     Start date: 9/15/1969     Quit date: 3/20/1988     Years since quittin.9    Smokeless tobacco: Never    Tobacco comments:     Lesrned in 1/2 hr. 10 year to quit   Substance and Sexual Activity    Alcohol use: Not Currently     Alcohol/week: 3.0 standard drinks     Types: 3 Glasses of wine per week    Drug use: No    Sexual activity: Yes     Partners: Male     Birth  control/protection: Post-menopausal, None     Comment: Age   Social History Narrative     to HEATH Montemayor    Nurse    Likes to garden     Social Determinants of Health     Financial Resource Strain: Low Risk     Difficulty of Paying Living Expenses: Not very hard   Food Insecurity: No Food Insecurity    Worried About Running Out of Food in the Last Year: Never true    Ran Out of Food in the Last Year: Never true   Transportation Needs: No Transportation Needs    Lack of Transportation (Medical): No    Lack of Transportation (Non-Medical): No   Physical Activity: Sufficiently Active    Days of Exercise per Week: 5 days    Minutes of Exercise per Session: 60 min   Stress: Stress Concern Present    Feeling of Stress : To some extent   Social Connections: Socially Integrated    Frequency of Communication with Friends and Family: More than three times a week    Frequency of Social Gatherings with Friends and Family: More than three times a week    Attends Taoism Services: More than 4 times per year    Active Member of Clubs or Organizations: Yes    Attends Club or Organization Meetings: More than 4 times per year    Marital Status:    Housing Stability: Unknown    Unable to Pay for Housing in the Last Year: No    Unstable Housing in the Last Year: No         Allergies  Review of patient's allergies indicates:   Allergen Reactions    Prednisone      ELEVATED B/P FOR SEVERAL DAYS/WENT TO ER    Lactose          Review of Systems   Review of Systems   Cardiovascular: Negative.    Respiratory: Negative.     All other systems reviewed and are negative.      Physical Exam  Wt Readings from Last 1 Encounters:   03/03/23 57.2 kg (126 lb 3.2 oz)     BP Readings from Last 3 Encounters:   03/03/23 138/66   02/13/23 (!) 112/59   02/10/23 (!) 142/67     Pulse Readings from Last 1 Encounters:   03/03/23 (!) 55     Body mass index is 25.49 kg/m².    Physical Exam  Constitutional:       Appearance: She is  well-developed.   HENT:      Head: Atraumatic.   Eyes:      General: No scleral icterus.  Neck:      Vascular: Normal carotid pulses. No carotid bruit, hepatojugular reflux or JVD.   Cardiovascular:      Rate and Rhythm: Normal rate and regular rhythm.      Chest Wall: PMI is not displaced.      Pulses: Intact distal pulses.           Carotid pulses are 2+ on the right side and 2+ on the left side.       Radial pulses are 2+ on the right side and 2+ on the left side.        Dorsalis pedis pulses are 2+ on the right side and 2+ on the left side.      Heart sounds: Normal heart sounds, S1 normal and S2 normal. No murmur heard.    No friction rub.   Pulmonary:      Effort: Pulmonary effort is normal. No respiratory distress.      Breath sounds: Normal breath sounds. No stridor. No wheezing or rales.   Chest:      Chest wall: No tenderness.   Abdominal:      General: Bowel sounds are normal.      Palpations: Abdomen is soft.   Musculoskeletal:      Cervical back: Neck supple. No edema.   Skin:     General: Skin is warm and dry.      Nails: There is no clubbing.   Neurological:      Mental Status: She is alert and oriented to person, place, and time.   Psychiatric:         Behavior: Behavior normal.         Thought Content: Thought content normal.           Assessment  1. Mixed hyperlipidemia  Stable    2. Acute heart failure, unspecified heart failure type  Stable    3. Paroxysmal atrial fibrillation  Stable    4.  HTN controlled  Continue current medications, continue to monitor.        Plan and Discussion  Discussed her blood pressure is well controlled on current medication.  Continue to monitor blood pressure.  Discussed her echocardiogram, nuclear stress test and renal Doppler results.  Encouraged to stay hydrated and follow low-sodium diet.    Follow Up  6 months      Clem Mckeon MD, F.A.C.C, F.S.C.A.I.        30 minutes were spent in chart review, documentation and review of results, and evaluation,  treatment, and counseling of patient on the same day of service.    Disclaimer: This document was created using voice recognition software ([employer]). Although it may be edited, this document may contain errors related to incorrect recognition of the spoken word. Please call the physician if clarification is needed.

## 2023-03-03 NOTE — PROGRESS NOTES
Health  Wellness Visit Note    Name: Magdalena Mane  Clinic Number: [id number]  Physician: No ref. provider found  Diagnosis: No diagnosis found.  Past Medical History:   Diagnosis Date    Anemia     Atrial fibrillation     Basal cell carcinoma     DJD (degenerative joint disease) 12/12/2012    Obesity (BMI 30-39.9) 11/27/2015    Vaginal atrophy 4/1/2016     Visit Number: 43  Precautions: Atrial Fibrillation      1st PT visit: 5/17/2022  Year of care end date: 5/17/2023  6 Month test  Performed: Nov 2022  12 Month test performed: May 2023  Mind body plan: Plan A 9 months  Patient level: B    Time In: 3:30 PM  Time Out: 4:15 PM  Total Treatment Time: 45 minutes    Wellness Vision 2022  Handout on this week's wellness topic Hydration was provided along with a discussion on what it means, the benefits, and suggestions for practice.  Reviewed last week's topic Balance.    Subjective:   Patient reports she has had no back pain within the last week. She went to two parades, went for walks, hung out with her friends and did her stretches everyday. Patient plans to do some gardening this weekend at home. She hasn't iced her back outside of Wellness.     Objective:   Magdalena completed therapeutic stretches (EIL, ABRAHAM) and the following MedX exercise machines: core lumbar, torso rotation l/r, leg extension, leg curl, upright row, chest press, biceps curl, triceps extension, leg press    See exercise log in patient folder for rate of exertion and repetitions completed.       Fitness Machine Education Key:  E=education on equipment initiated and further follow up and education needed  I=independent with  and exercise.  The patient:  Adjusts machines to his/her settings  Uses equipment levers, pins, weights safely  Maintains safe and correct posture while exercising  Moves through exercise with correct pace and control  Gets on and off equipment safely      Core Lumbar Strength E Torso Rotation E Leg Press E    Leg Extension E Seated Leg Curl E Chest Press E   Seated Row E Hip ADD X Hip ABD E   Triceps Extension E Bicep Curl E Other: X       Assessment:   Patient tolerated Patient tolerated MedX Core Lumbar Strength and all other peripheral exercises without an increase in symptoms. Patient warmed up on treadmill for 5 minutes, stretched, and iced low back and knees for 5 minutes after the workout.    Plan:  Continue with established plan of care towards wellness goals.     Health  : Sho Morfin  3/3/2023

## 2023-03-03 NOTE — PROGRESS NOTES
Health  Wellness Visit Note    Name: Magdalena Mane  Clinic Number: [id number]  Physician: No ref. provider found  Diagnosis: No diagnosis found.  Past Medical History:   Diagnosis Date    Anemia     Atrial fibrillation     Basal cell carcinoma     DJD (degenerative joint disease) 12/12/2012    Obesity (BMI 30-39.9) 11/27/2015    Vaginal atrophy 4/1/2016     Visit Number: 43  Precautions: Atrial Fibrillation      1st PT visit: 5/17/2022  Year of care end date: 5/17/2023  6 Month test  Performed: Nov 2022  12 Month test performed: May 2023  Mind body plan: Plan A 9 months  Patient level: B    Time In: 10:50 AM   Time Out: 11:35 AM  Total Treatment Time: 45 minutes    [facility] 2022  Handout on this week's wellness topic Hydration was provided along with a discussion on what it means, the benefits, and suggestions for practice.  Reviewed last week's topic Balance .    Subjective:   Patient reports that her back is pain-free today. Patient plans to do some gardening this weekend at home. She walks for exercise, and does a few strengthening exercise during the week at home (ex: bicep curls). She does not typically ice her back at home..     Objective:   Magdalena completed therapeutic stretches (EIL, ABRAHAM) and the following MedX exercise machines: core lumbar, torso rotation l/r, leg extension, leg curl, upright row, chest press, biceps curl, triceps extension, leg press    See exercise log in patient folder for rate of exertion and repetitions completed.       Fitness Machine Education Key:  E=education on equipment initiated and further follow up and education needed  I=independent with  and exercise.  The patient:  Adjusts machines to his/her settings  Uses equipment levers, pins, weights safely  Maintains safe and correct posture while exercising  Moves through exercise with correct pace and control  Gets on and off equipment safely      Core Lumbar Strength E Torso Rotation E Leg Press E   Leg  Extension E Seated Leg Curl E Chest Press E   Seated Row E Hip ADD X Hip ABD E   Triceps Extension E Bicep Curl E Other: X       Assessment:   Patient tolerated Patient tolerated MedX Core Lumbar Strength and all other peripheral exercises without an increase in symptoms. Patient warmed up on treadmill for 5 minutes, stretched, and iced low back and knees for 5 minutes after the workout.    Plan:  Continue with established plan of care towards wellness goals.     Health  : Sho Morfin  3/3/2023

## 2023-03-10 ENCOUNTER — DOCUMENTATION ONLY (OUTPATIENT)
Dept: REHABILITATION | Facility: OTHER | Age: 78
End: 2023-03-10
Attending: STUDENT IN AN ORGANIZED HEALTH CARE EDUCATION/TRAINING PROGRAM
Payer: MEDICARE

## 2023-03-10 NOTE — PROGRESS NOTES
Health  Wellness Visit Note    Name: Magdalena Mane  Clinic Number: [id number]  Physician: No ref. provider found  Diagnosis: No diagnosis found.  Past Medical History:   Diagnosis Date    Anemia     Atrial fibrillation     Basal cell carcinoma     DJD (degenerative joint disease) 12/12/2012    Obesity (BMI 30-39.9) 11/27/2015    Vaginal atrophy 4/1/2016     Visit Number: 44  Precautions: Atrial Fibrillation      1st PT visit: 5/17/2022  Year of care end date: 5/17/2023  6 Month test  Performed: Nov 2022  12 Month test performed: May 2023  Mind body plan: Plan A 9 months  Patient level: B    Time In: 10:55 AM   Time Out: 11:40 AM  Total Treatment Time: 45 minutes    [facility] 2022  Handout on this week's wellness topic Food Journal was provided along with a discussion on what it means, the benefits, and suggestions for practice.  Reviewed last week's topic Hydration.    Subjective:   Patient reports minimal gardening-related soreness in her ow back today. She walks for exercise, and does a few strengthening exercise during the week at home (ex: bicep curls). She does not typically ice her back at home. Se's going on a trip to [place] next week.    Objective:   Magdalena completed therapeutic stretches (EIL, ABRAHAM) and the following MedX exercise machines: core lumbar, torso rotation l/r, leg extension, leg curl, upright row, chest press, biceps curl, triceps extension, leg press    See exercise log in patient folder for rate of exertion and repetitions completed.       Fitness Machine Education Key:  E=education on equipment initiated and further follow up and education needed  I=independent with  and exercise.  The patient:  Adjusts machines to his/her settings  Uses equipment levers, pins, weights safely  Maintains safe and correct posture while exercising  Moves through exercise with correct pace and control  Gets on and off equipment safely      Core Lumbar Strength E Torso Rotation E Leg Press  E   Leg Extension E Seated Leg Curl E Chest Press E   Seated Row E Hip ADD X Hip ABD E   Triceps Extension E Bicep Curl E Other: X       Assessment:   Patient tolerated Patient tolerated MedX Core Lumbar Strength and all other peripheral exercises without an increase in symptoms. Patient warmed up on treadmill for 5 minutes, stretched, and iced low back and knees for 5 minutes after the workout.    Plan:  Continue with established plan of care towards wellness goals.     Health  : Sho Morfin  3/10/2023

## 2023-03-22 ENCOUNTER — DOCUMENTATION ONLY (OUTPATIENT)
Dept: REHABILITATION | Facility: OTHER | Age: 78
End: 2023-03-22
Attending: STUDENT IN AN ORGANIZED HEALTH CARE EDUCATION/TRAINING PROGRAM
Payer: MEDICARE

## 2023-03-22 NOTE — PROGRESS NOTES
Health  Wellness Visit Note    Name: Magdalena Mane  Clinic Number: [id number]  Physician: No ref. provider found  Diagnosis: No diagnosis found.  Past Medical History:   Diagnosis Date    Anemia     Atrial fibrillation     Basal cell carcinoma     DJD (degenerative joint disease) 12/12/2012    Obesity (BMI 30-39.9) 11/27/2015    Vaginal atrophy 4/1/2016     Visit Number: 45  Precautions: Atrial Fibrillation      1st PT visit: 5/17/2022  Year of care end date: 5/17/2023  6 Month test  Performed: Nov 2022  12 Month test performed: May 2023  Mind body plan: Plan A 9 months  Patient level: B    Time In: 2:28 PM   Time Out: 3:13 PM  Total Treatment Time: 45 minutes    [facility] 2022  Handout on this week's wellness topic Anti-Inflammatory Diet was provided along with a discussion on what it means, the benefits, and suggestions for practice. Reviewed last week's topic NA patient did not come to Wellness last week.    Subjective:   Patient reports no problems in her back. She went on a trip to [place] over the week. She stretched and did her home exercises every day but one when she flew back home  (ex: bicep curls). She does not typically ice her back at home.    Objective:   Magdalena completed therapeutic stretches (EIL, ABRAHAM) and the following MedX exercise machines: core lumbar, torso rotation l/r, leg extension, leg curl, upright row, chest press, biceps curl, triceps extension, leg press    See exercise log in patient folder for rate of exertion and repetitions completed.       Fitness Machine Education Key:  E=education on equipment initiated and further follow up and education needed  I=independent with  and exercise.  The patient:  Adjusts machines to his/her settings  Uses equipment levers, pins, weights safely  Maintains safe and correct posture while exercising  Moves through exercise with correct pace and control  Gets on and off equipment safely      Core Lumbar Strength E Torso Rotation  E Leg Press E   Leg Extension E Seated Leg Curl E Chest Press E   Seated Row E Hip ADD X Hip ABD E   Triceps Extension E Bicep Curl E Other: X       Assessment:   Patient tolerated Patient tolerated MedX Core Lumbar Strength and all other peripheral exercises without an increase in symptoms. Patient warmed up on treadmill for 5 minutes, stretched, and iced low back and knees for 5 minutes after the workout.    Plan:  Continue with established plan of care towards wellness goals.     Health  : Larisa Ann  3/22/2023

## 2023-03-28 NOTE — PATIENT INSTRUCTIONS
"    Do not wear thongs because these push the "bugs" around the rectum to the entrance of your bladder    Drink 6-8 glasses of water per day - until your urine is clear    Avoid caffeine- including chocolate , stimulants, sudafed, antihistamines (Claritin, Zyrtec, Allegra), and irritants    Empty bladder every 3-4 hours    Urinate before and after intercourse    If you wear pads, change them every 3-4 hours to keep the area dry    Follow up  if symptoms worsen or persist    "
no
Alert and oriented to person, place and time

## 2023-03-31 ENCOUNTER — DOCUMENTATION ONLY (OUTPATIENT)
Dept: REHABILITATION | Facility: OTHER | Age: 78
End: 2023-03-31
Attending: STUDENT IN AN ORGANIZED HEALTH CARE EDUCATION/TRAINING PROGRAM
Payer: MEDICARE

## 2023-04-05 ENCOUNTER — DOCUMENTATION ONLY (OUTPATIENT)
Dept: REHABILITATION | Facility: OTHER | Age: 78
End: 2023-04-05
Payer: MEDICARE

## 2023-04-05 NOTE — PROGRESS NOTES
Health  Wellness Visit Note    Name: Magdalena Mane  Clinic Number: [id number]  Physician: No ref. provider found  Diagnosis: No diagnosis found.  Past Medical History:   Diagnosis Date    Anemia     Atrial fibrillation     Basal cell carcinoma     DJD (degenerative joint disease) 12/12/2012    Obesity (BMI 30-39.9) 11/27/2015    Vaginal atrophy 4/1/2016     Visit Number: 47  Precautions: Atrial Fibrillation      1st PT visit: 5/17/2022  Year of care end date: 5/17/2023  6 Month test  Performed: Nov 2022  12 Month test performed: May 2023  Mind body plan: Plan A 9 months  Patient level: B    Time In: 1:43 PM   Time Out: 2:25 PM  Total Treatment Time: 42 minutes    [facility] 2022  Handout on this week's wellness topic Postural Awareness was provided along with a discussion on what it means, the benefits, and suggestions for practice. Reviewed last week's topic Fruits and Vegetables    Subjective:   Patient reports that her low back is feels good today. Pt completes her stretches on a daily basis, but does not typically ice her back at home. Pt plans to get back on track with her at-home strengthening exercises this week; she's been gardening regularly with no problems.    Objective:   Magdalena completed therapeutic stretches (EIL, ABRAHAM) and the following MedX exercise machines: core lumbar, torso rotation l/r, leg extension, leg curl, upright row, chest press, biceps curl, triceps extension, leg press    See exercise log in patient folder for rate of exertion and repetitions completed.       Fitness Machine Education Key:  E=education on equipment initiated and further follow up and education needed  I=independent with  and exercise.  The patient:  Adjusts machines to his/her settings  Uses equipment levers, pins, weights safely  Maintains safe and correct posture while exercising  Moves through exercise with correct pace and control  Gets on and off equipment safely      Core Lumbar Strength E  Torso Rotation E Leg Press E   Leg Extension E Seated Leg Curl E Chest Press E   Seated Row E Hip ADD X Hip ABD E   Triceps Extension E Bicep Curl E Other: X       Assessment:   Patient tolerated Patient tolerated MedX Core Lumbar Strength and all other peripheral exercises without an increase in symptoms. Patient warmed up on treadmill for 5 minutes, stretched, and iced low back and knees for 5 minutes after the workout.    Plan:  Continue with established plan of care towards wellness goals.     Health  : Sho Morfin  4/5/2023

## 2023-04-13 ENCOUNTER — DOCUMENTATION ONLY (OUTPATIENT)
Dept: REHABILITATION | Facility: OTHER | Age: 78
End: 2023-04-13
Payer: MEDICARE

## 2023-04-13 NOTE — PROGRESS NOTES
Health  Wellness Visit Note    Name: Magdalena Mane  Clinic Number: [id number]  Physician: No ref. provider found  Diagnosis: No diagnosis found.  Past Medical History:   Diagnosis Date    Anemia     Atrial fibrillation     Basal cell carcinoma     DJD (degenerative joint disease) 12/12/2012    Obesity (BMI 30-39.9) 11/27/2015    Vaginal atrophy 4/1/2016     Visit Number: 48  Precautions: Atrial Fibrillation      1st PT visit: 5/17/2022  Year of care end date: 5/17/2023  6 Month test  Performed: Nov 2022  12 Month test performed: May 2023  Mind body plan: Plan A 9 months  Patient level: B    Time In: 10:40 AM   Time Out: 11:25 AM  Total Treatment Time: 45 minutes    [facility] 2022  Handout on this week's wellness topic Breathing Awareness was provided along with a discussion on what it means, the benefits, and suggestions for practice. Reviewed last week's topic Postural Awareness    Subjective:   Patient reports that her low back is feels good today. Pt completes her stretches on a daily basis, but does not typically ice her back at home. Pt completes at-home strengthening exercises throughout the week; she's been gardening regularly with no problems.    Objective:   Magdalena completed therapeutic stretches (EIL, ABRAHAM) and the following MedX exercise machines: core lumbar, torso rotation l/r, leg extension, leg curl, upright row, chest press, biceps curl, triceps extension, leg press    See exercise log in patient folder for rate of exertion and repetitions completed.       Fitness Machine Education Key:  E=education on equipment initiated and further follow up and education needed  I=independent with  and exercise.  The patient:  Adjusts machines to his/her settings  Uses equipment levers, pins, weights safely  Maintains safe and correct posture while exercising  Moves through exercise with correct pace and control  Gets on and off equipment safely      Core Lumbar Strength I Torso Rotation  I Leg Press I   Leg Extension I Seated Leg Curl I Chest Press I   Seated Row I Hip ADD X Hip ABD I   Triceps Extension I Bicep Curl I Other: X       Assessment:   Patient tolerated Patient tolerated MedX Core Lumbar Strength and all other peripheral exercises without an increase in symptoms. Patient warmed up on treadmill for 5 minutes, stretched, and iced low back and knees for 5 minutes after the workout.    Plan:  Continue with established plan of care towards wellness goals.     Health  : Sho Morfin  4/13/2023

## 2023-04-21 ENCOUNTER — DOCUMENTATION ONLY (OUTPATIENT)
Dept: REHABILITATION | Facility: OTHER | Age: 78
End: 2023-04-21
Payer: MEDICARE

## 2023-04-21 NOTE — PROGRESS NOTES
Health  Wellness Visit Note    Name: Magdalena Mane  Clinic Number: [id number]  Physician: No ref. provider found  Diagnosis: No diagnosis found.  Past Medical History:   Diagnosis Date    Anemia     Atrial fibrillation     Basal cell carcinoma     DJD (degenerative joint disease) 12/12/2012    Obesity (BMI 30-39.9) 11/27/2015    Vaginal atrophy 4/1/2016     Visit Number: 49  Precautions: Atrial Fibrillation      1st PT visit: 5/17/2022  Year of care end date: 5/17/2023  6 Month test  Performed: Nov 2022  12 Month test performed: May 2023  Mind body plan: Plan A 9 months  Patient level: B    Time In: 11:15 AM   Time Out: 11:55 AM  Total Treatment Time: 40 minutes    [facility] 2022  Handout on this week's wellness topic Air Quality and Oxygen Utilization was provided along with a discussion on what it means, the benefits, and suggestions for practice. Reviewed last week's topic Breathing Awareness    Subjective:   Patient reports that her low back is pain-free today. Pt completes her stretches on a daily basis, but does not typically ice her back at home. Pt completes at-home strengthening exercises throughout the week; she's been gardening regularly with no problems.    Objective:   Magdalena completed therapeutic stretches (EIL, ABRAHAM) and the following MedX exercise machines: core lumbar, torso rotation l/r, leg extension, leg curl, upright row, chest press, biceps curl, triceps extension, leg press    See exercise log in patient folder for rate of exertion and repetitions completed.       Fitness Machine Education Key:  E=education on equipment initiated and further follow up and education needed  I=independent with  and exercise.  The patient:  Adjusts machines to his/her settings  Uses equipment levers, pins, weights safely  Maintains safe and correct posture while exercising  Moves through exercise with correct pace and control  Gets on and off equipment safely      Core Lumbar Strength I  Torso Rotation I Leg Press I   Leg Extension I Seated Leg Curl I Chest Press I   Seated Row I Hip ADD X Hip ABD I   Triceps Extension I Bicep Curl I Other: X       Assessment:   Patient tolerated Patient tolerated MedX Core Lumbar Strength and all other peripheral exercises without an increase in symptoms. Patient warmed up on treadmill for 5 minutes, stretched, and iced low back and knees for 5 minutes after the workout.    Plan:  Continue with established plan of care towards wellness goals.     Health  : Sho Morfin  4/21/2023

## 2023-04-26 ENCOUNTER — OFFICE VISIT (OUTPATIENT)
Dept: INTERNAL MEDICINE | Facility: CLINIC | Age: 78
End: 2023-04-26
Payer: MEDICARE

## 2023-04-26 VITALS
WEIGHT: 126.75 LBS | HEART RATE: 52 BPM | DIASTOLIC BLOOD PRESSURE: 60 MMHG | HEIGHT: 59 IN | OXYGEN SATURATION: 98 % | SYSTOLIC BLOOD PRESSURE: 122 MMHG | RESPIRATION RATE: 16 BRPM | BODY MASS INDEX: 25.55 KG/M2

## 2023-04-26 DIAGNOSIS — I10 PRIMARY HYPERTENSION: ICD-10-CM

## 2023-04-26 DIAGNOSIS — I65.23 BILATERAL CAROTID ARTERY STENOSIS: ICD-10-CM

## 2023-04-26 DIAGNOSIS — I50.32 CHRONIC DIASTOLIC HEART FAILURE: Primary | ICD-10-CM

## 2023-04-26 DIAGNOSIS — I48.0 PAROXYSMAL ATRIAL FIBRILLATION: ICD-10-CM

## 2023-04-26 DIAGNOSIS — S22.000D COMPRESSION FRACTURE OF THORACIC VERTEBRA WITH ROUTINE HEALING, UNSPECIFIED THORACIC VERTEBRAL LEVEL, SUBSEQUENT ENCOUNTER: ICD-10-CM

## 2023-04-26 DIAGNOSIS — E78.2 MIXED HYPERLIPIDEMIA: ICD-10-CM

## 2023-04-26 DIAGNOSIS — G47.00 INSOMNIA, UNSPECIFIED TYPE: ICD-10-CM

## 2023-04-26 PROCEDURE — 1159F MED LIST DOCD IN RCRD: CPT | Mod: CPTII,S$GLB,, | Performed by: STUDENT IN AN ORGANIZED HEALTH CARE EDUCATION/TRAINING PROGRAM

## 2023-04-26 PROCEDURE — 3078F PR MOST RECENT DIASTOLIC BLOOD PRESSURE < 80 MM HG: ICD-10-PCS | Mod: CPTII,S$GLB,, | Performed by: STUDENT IN AN ORGANIZED HEALTH CARE EDUCATION/TRAINING PROGRAM

## 2023-04-26 PROCEDURE — 99999 PR PBB SHADOW E&M-EST. PATIENT-LVL IV: CPT | Mod: PBBFAC,,, | Performed by: STUDENT IN AN ORGANIZED HEALTH CARE EDUCATION/TRAINING PROGRAM

## 2023-04-26 PROCEDURE — 99213 PR OFFICE/OUTPT VISIT, EST, LEVL III, 20-29 MIN: ICD-10-PCS | Mod: S$GLB,,, | Performed by: STUDENT IN AN ORGANIZED HEALTH CARE EDUCATION/TRAINING PROGRAM

## 2023-04-26 PROCEDURE — 99999 PR PBB SHADOW E&M-EST. PATIENT-LVL IV: ICD-10-PCS | Mod: PBBFAC,,, | Performed by: STUDENT IN AN ORGANIZED HEALTH CARE EDUCATION/TRAINING PROGRAM

## 2023-04-26 PROCEDURE — 3074F PR MOST RECENT SYSTOLIC BLOOD PRESSURE < 130 MM HG: ICD-10-PCS | Mod: CPTII,S$GLB,, | Performed by: STUDENT IN AN ORGANIZED HEALTH CARE EDUCATION/TRAINING PROGRAM

## 2023-04-26 PROCEDURE — 1126F AMNT PAIN NOTED NONE PRSNT: CPT | Mod: CPTII,S$GLB,, | Performed by: STUDENT IN AN ORGANIZED HEALTH CARE EDUCATION/TRAINING PROGRAM

## 2023-04-26 PROCEDURE — 3074F SYST BP LT 130 MM HG: CPT | Mod: CPTII,S$GLB,, | Performed by: STUDENT IN AN ORGANIZED HEALTH CARE EDUCATION/TRAINING PROGRAM

## 2023-04-26 PROCEDURE — 99499 UNLISTED E&M SERVICE: CPT | Mod: S$GLB,,, | Performed by: STUDENT IN AN ORGANIZED HEALTH CARE EDUCATION/TRAINING PROGRAM

## 2023-04-26 PROCEDURE — 3078F DIAST BP <80 MM HG: CPT | Mod: CPTII,S$GLB,, | Performed by: STUDENT IN AN ORGANIZED HEALTH CARE EDUCATION/TRAINING PROGRAM

## 2023-04-26 PROCEDURE — 99499 RISK ADDL DX/OHS AUDIT: ICD-10-PCS | Mod: S$GLB,,, | Performed by: STUDENT IN AN ORGANIZED HEALTH CARE EDUCATION/TRAINING PROGRAM

## 2023-04-26 PROCEDURE — 1101F PT FALLS ASSESS-DOCD LE1/YR: CPT | Mod: CPTII,S$GLB,, | Performed by: STUDENT IN AN ORGANIZED HEALTH CARE EDUCATION/TRAINING PROGRAM

## 2023-04-26 PROCEDURE — 99213 OFFICE O/P EST LOW 20 MIN: CPT | Mod: S$GLB,,, | Performed by: STUDENT IN AN ORGANIZED HEALTH CARE EDUCATION/TRAINING PROGRAM

## 2023-04-26 PROCEDURE — 1157F ADVNC CARE PLAN IN RCRD: CPT | Mod: CPTII,S$GLB,, | Performed by: STUDENT IN AN ORGANIZED HEALTH CARE EDUCATION/TRAINING PROGRAM

## 2023-04-26 PROCEDURE — 1101F PR PT FALLS ASSESS DOC 0-1 FALLS W/OUT INJ PAST YR: ICD-10-PCS | Mod: CPTII,S$GLB,, | Performed by: STUDENT IN AN ORGANIZED HEALTH CARE EDUCATION/TRAINING PROGRAM

## 2023-04-26 PROCEDURE — 1159F PR MEDICATION LIST DOCUMENTED IN MEDICAL RECORD: ICD-10-PCS | Mod: CPTII,S$GLB,, | Performed by: STUDENT IN AN ORGANIZED HEALTH CARE EDUCATION/TRAINING PROGRAM

## 2023-04-26 PROCEDURE — 1157F PR ADVANCE CARE PLAN OR EQUIV PRESENT IN MEDICAL RECORD: ICD-10-PCS | Mod: CPTII,S$GLB,, | Performed by: STUDENT IN AN ORGANIZED HEALTH CARE EDUCATION/TRAINING PROGRAM

## 2023-04-26 PROCEDURE — 1126F PR PAIN SEVERITY QUANTIFIED, NO PAIN PRESENT: ICD-10-PCS | Mod: CPTII,S$GLB,, | Performed by: STUDENT IN AN ORGANIZED HEALTH CARE EDUCATION/TRAINING PROGRAM

## 2023-04-26 PROCEDURE — 3288F FALL RISK ASSESSMENT DOCD: CPT | Mod: CPTII,S$GLB,, | Performed by: STUDENT IN AN ORGANIZED HEALTH CARE EDUCATION/TRAINING PROGRAM

## 2023-04-26 PROCEDURE — 3288F PR FALLS RISK ASSESSMENT DOCUMENTED: ICD-10-PCS | Mod: CPTII,S$GLB,, | Performed by: STUDENT IN AN ORGANIZED HEALTH CARE EDUCATION/TRAINING PROGRAM

## 2023-04-26 RX ORDER — ALENDRONATE SODIUM 70 MG/1
TABLET ORAL
Qty: 12 TABLET | Refills: 3 | Status: SHIPPED | OUTPATIENT
Start: 2023-04-26 | End: 2024-02-15

## 2023-04-26 RX ORDER — ATORVASTATIN CALCIUM 20 MG/1
20 TABLET, FILM COATED ORAL NIGHTLY
Qty: 90 TABLET | Refills: 3 | Status: SHIPPED | OUTPATIENT
Start: 2023-04-26 | End: 2023-05-26

## 2023-04-26 NOTE — PROGRESS NOTES
Subjective     Patient ID: Magdalena Mane is a 78 y.o. female.    Chief Complaint: Follow-up    Follow-up  Pertinent negatives include no abdominal pain, chest pain, chills, coughing, fever, headaches, nausea, sore throat or vomiting.   Magdalena Mane is a 77 y.o.  female who presents with a PMHx of  HTN, HLD, R CEA, afib, basal cell carcinoma on the nose, and iron def anemia is here for a f/u visit.   -BP at home is on the low side per patient. Sometimes systolic in the 90's at home. Denies dizziness, CP but does feel sleepy/tired. Bp is not low here but says usually high at the doctors. She was asked to limit her water intake to 1 L but now that weather is getting warmer, asked her to take at least 1500 ml a day.  -just needs a lipid panel and b12 checked but we can wait on that till next time. Order mammo at next visit.     Review of Systems   Constitutional:  Negative for chills, fever and unexpected weight change.   HENT:  Negative for rhinorrhea and sore throat.    Respiratory:  Negative for cough, chest tightness and shortness of breath.    Cardiovascular:  Negative for chest pain.   Gastrointestinal:  Positive for constipation. Negative for abdominal pain, blood in stool, diarrhea, nausea and vomiting.   Genitourinary:  Negative for dysuria and hematuria.   Neurological:  Negative for dizziness, light-headedness and headaches.        Objective     Physical Exam  Constitutional:       Appearance: Normal appearance.   Eyes:      Conjunctiva/sclera: Conjunctivae normal.   Cardiovascular:      Rate and Rhythm: Normal rate and regular rhythm.      Heart sounds: Normal heart sounds.   Pulmonary:      Effort: Pulmonary effort is normal. No respiratory distress.      Breath sounds: Normal breath sounds.   Musculoskeletal:      Comments: Has chronic LLE edema. No RLE edema   Skin:     General: Skin is warm.   Neurological:      Mental Status: She is alert and oriented to person, place, and time.    Psychiatric:         Mood and Affect: Mood normal.         Behavior: Behavior normal.          Assessment and Plan     1. Chronic diastolic heart failure  Assessment & Plan:  Grade II diastolic dysfunction noted on last echo. Ordered compression stockings for LE edema    Orders:  -     COMPRESSION STOCKINGS    2. Primary hypertension  Assessment & Plan:  Systolic pressure at home is running between 90'-120's. Bp is always high in clinic. Continue to monitor with digital medicine. Pt denies dizziness, sob or cp. Asked her to increase water intake to 1500ml at least especially during the summer. Continue candesartan, verapamil and aldactone. Asked pt to let me know if she ever feels dizzy or lightheaded. Might need to cut back on one of her meds.       3. Compression fracture of thoracic vertebra with routine healing, unspecified thoracic vertebral level, subsequent encounter  -     alendronate (FOSAMAX) 70 MG tablet; TAKE 1 TABLET BY MOUTH  WEEKLY WITH 8 OZ OF PLAIN  WATER 30 MINUTES BEFORE  FIRST FOOD, DRINK OR MEDS.  STAY UPRIGHT FOR 30 MINS  Dispense: 12 tablet; Refill: 3    4. Paroxysmal atrial fibrillation  Assessment & Plan:  On eliquis, flecainide and verapamil. Continue. Rate controlled.       5. Bilateral carotid artery stenosis  Assessment & Plan:  S/p right carotid endarterectomy. No issues. Follows with cardiology       6. Mixed hyperlipidemia  Assessment & Plan:  Will repeat lipid panel at next visit. Continue statin      7. Insomnia, unspecified type  Assessment & Plan:  Doing well on trazodone, continue      Other orders  -     atorvastatin (LIPITOR) 20 MG tablet; Take 1 tablet (20 mg total) by mouth every evening.  Dispense: 90 tablet; Refill: 3

## 2023-04-26 NOTE — ASSESSMENT & PLAN NOTE
Systolic pressure at home is running between 90'-120's. Bp is always high in clinic. Continue to monitor with digital medicine. Pt denies dizziness, sob or cp. Asked her to increase water intake to 1500ml at least especially during the summer. Continue candesartan, verapamil and aldactone. Asked pt to let me know if she ever feels dizzy or lightheaded. Might need to cut back on one of her meds.

## 2023-04-28 ENCOUNTER — DOCUMENTATION ONLY (OUTPATIENT)
Dept: REHABILITATION | Facility: OTHER | Age: 78
End: 2023-04-28
Payer: MEDICARE

## 2023-04-28 NOTE — PROGRESS NOTES
Health  Wellness Visit Note    Name: Magdalena Mane  Clinic Number: [id number]  Physician: No ref. provider found  Diagnosis: No diagnosis found.  Past Medical History:   Diagnosis Date    Anemia     Atrial fibrillation     Basal cell carcinoma     DJD (degenerative joint disease) 12/12/2012    Obesity (BMI 30-39.9) 11/27/2015    Vaginal atrophy 4/1/2016     Visit Number: 50  Precautions: Atrial Fibrillation      1st PT visit: 5/17/2022  Year of care end date: 5/17/2023  6 Month test  Performed: Nov 2022  12 Month test performed: May 2023  Mind body plan: Plan A 9 months  Patient level: B    Time In: 11:00 AM   Time Out: 11:44 AM  Total Treatment Time: 44 minutes    [facility] 2022  Handout on this week's wellness topic Breathing Exercises was provided along with a discussion on what it means, the benefits, and suggestions for practice. Reviewed last week's topic Air Quality and Oxygen Utilization    Subjective:   Patient reports that her low back feels good today. Pt completes her stretches on a daily basis, but does not typically ice her back at home. Pt completes at-home strengthening exercises throughout the week; she's been gardening regularly with no problems.    Objective:   Magdalena completed therapeutic stretches (EIL, ABRAHAM) and the following MedX exercise machines: core lumbar, torso rotation l/r, leg extension, leg curl, upright row, chest press, biceps curl, triceps extension, leg press    See exercise log in patient folder for rate of exertion and repetitions completed.       Fitness Machine Education Key:  E=education on equipment initiated and further follow up and education needed  I=independent with  and exercise.  The patient:  Adjusts machines to his/her settings  Uses equipment levers, pins, weights safely  Maintains safe and correct posture while exercising  Moves through exercise with correct pace and control  Gets on and off equipment safely      Core Lumbar Strength I  Torso Rotation I Leg Press I   Leg Extension I Seated Leg Curl I Chest Press I   Seated Row I Hip ADD X Hip ABD I   Triceps Extension I Bicep Curl I Other: X       Assessment:   Patient tolerated Patient tolerated MedX Core Lumbar Strength and all other peripheral exercises without an increase in symptoms. Patient warmed up on treadmill for 5 minutes, stretched, and iced low back and knees for 5 minutes after the workout.    Plan:  Continue with established plan of care towards wellness goals.     Health  : Sho Morfin  4/28/2023

## 2023-05-05 ENCOUNTER — DOCUMENTATION ONLY (OUTPATIENT)
Dept: REHABILITATION | Facility: OTHER | Age: 78
End: 2023-05-05
Payer: MEDICARE

## 2023-05-05 NOTE — PROGRESS NOTES
Health  Wellness Visit Note    Name: Magdalena Mane  Clinic Number: [id number]  Physician: No ref. provider found  Diagnosis: No diagnosis found.  Past Medical History:   Diagnosis Date    Anemia     Atrial fibrillation     Basal cell carcinoma     DJD (degenerative joint disease) 12/12/2012    Obesity (BMI 30-39.9) 11/27/2015    Vaginal atrophy 4/1/2016     Visit Number: 51  Precautions: Atrial Fibrillation      1st PT visit: 5/17/2022  Year of care end date: 5/17/2023  6 Month test  Performed: Nov 2022  12 Month test performed: May 2023  Mind body plan: Plan A 9 months  Patient level: B    Time In: 9:00 AM   Time Out: 9:53 AM  Total Treatment Time: 53 minutes    Wellness Vision 2022  Handout on this week's wellness topic Creative Brain was provided along with a discussion on what it means, the benefits, and suggestions for practice. Reviewed last week's topic Breathing Exercises    Subjective:   Patient reports that her low back feels good today. Pt completes her stretches on a daily basis, but does not typically ice her back at home. Pt completes at-home strengthening exercises throughout the week; she's been gardening regularly and dealing with a little soreness.    Objective:   Magdalena completed therapeutic stretches (EIL, ABRAHAM) and the following MedX exercise machines: core lumbar, torso rotation l/r, leg extension, leg curl, upright row, chest press, biceps curl, triceps extension, leg press    See exercise log in patient folder for rate of exertion and repetitions completed.       Fitness Machine Education Key:  E=education on equipment initiated and further follow up and education needed  I=independent with  and exercise.  The patient:  Adjusts machines to his/her settings  Uses equipment levers, pins, weights safely  Maintains safe and correct posture while exercising  Moves through exercise with correct pace and control  Gets on and off equipment safely      Core Lumbar Strength I  Torso Rotation I Leg Press I   Leg Extension I Seated Leg Curl I Chest Press I   Seated Row I Hip ADD X Hip ABD I   Triceps Extension I Bicep Curl I Other: X       Assessment:   Patient tolerated Patient tolerated MedX Core Lumbar Strength and all other peripheral exercises without an increase in symptoms. Patient warmed up on treadmill for 5 minutes, stretched, and iced low back and knees for 5 minutes after the workout.    Plan:  Continue with established plan of care towards wellness goals.     Health  : Larisa Ann  5/5/2023

## 2023-05-12 ENCOUNTER — DOCUMENTATION ONLY (OUTPATIENT)
Dept: REHABILITATION | Facility: OTHER | Age: 78
End: 2023-05-12
Payer: MEDICARE

## 2023-05-12 NOTE — PROGRESS NOTES
Health  Wellness Visit Note    Name: Magdalena Mane  Clinic Number: [id number]  Physician: No ref. provider found  Diagnosis: No diagnosis found.  Past Medical History:   Diagnosis Date    Anemia     Atrial fibrillation     Basal cell carcinoma     DJD (degenerative joint disease) 12/12/2012    Obesity (BMI 30-39.9) 11/27/2015    Vaginal atrophy 4/1/2016     Visit Number: 52  Precautions: Atrial Fibrillation      1st PT visit: 5/17/2022  Year of care end date: 5/17/2023  6 Month test  Performed: Nov 2022  12 Month test performed: May 2023  Mind body plan: Plan A 9 months  Patient level: B    Time In: 11:00 AM   Time Out: 11:48 AM  Total Treatment Time: 48 minutes    Wellness Vision 2022  Handout on this week's wellness topic Learning was provided along with a discussion on what it means, the benefits, and suggestions for practice. Reviewed last week's topic Creative Brain     Subjective:   Patient reports that her low back is pain-free today. Pt continues to complete her HEP and stretches every day at home. She does not typically ice her back at home. Pt completes strengthening exercises regularly at home with dumbbells. She working on [employer] [facility], just waiting on physician to sign off.     Objective:   Magdalena completed therapeutic stretches (EIL, ABRAHAM) and the following MedX exercise machines: core lumbar, torso rotation l/r, leg extension, leg curl, upright row, chest press, biceps curl, triceps extension, leg press    See exercise log in patient folder for rate of exertion and repetitions completed.       Fitness Machine Education Key:  E=education on equipment initiated and further follow up and education needed  I=independent with  and exercise.  The patient:  Adjusts machines to his/her settings  Uses equipment levers, pins, weights safely  Maintains safe and correct posture while exercising  Moves through exercise with correct pace and control  Gets on and off equipment  safely      Core Lumbar Strength I Torso Rotation I Leg Press I   Leg Extension I Seated Leg Curl I Chest Press I   Seated Row I Hip ADD X Hip ABD I   Triceps Extension I Bicep Curl I Other: X       Assessment:   Patient tolerated Patient tolerated MedX Core Lumbar Strength and all other peripheral exercises without an increase in symptoms. Patient warmed up on treadmill for 5 minutes, stretched, and iced low back and knees for 5 minutes after the workout.    Plan:  Continue with established plan of care towards wellness goals.     Health  : Sho Morfin  5/12/2023

## 2023-05-16 ENCOUNTER — TELEPHONE (OUTPATIENT)
Dept: INTERNAL MEDICINE | Facility: CLINIC | Age: 78
End: 2023-05-16
Payer: MEDICARE

## 2023-05-16 NOTE — TELEPHONE ENCOUNTER
----- Message from Enio Ann sent at 5/16/2023 10:24 AM CDT -----  Contact: self [phone]  Per pt returning a call.    Please call and advise

## 2023-05-19 ENCOUNTER — DOCUMENTATION ONLY (OUTPATIENT)
Dept: REHABILITATION | Facility: OTHER | Age: 78
End: 2023-05-19
Payer: MEDICARE

## 2023-05-19 NOTE — PROGRESS NOTES
Health  Wellness Visit Note    Name: Magdalena Mane  Clinic Number: [id number]  Physician: No ref. provider found  Diagnosis: No diagnosis found.  Past Medical History:   Diagnosis Date    Anemia     Atrial fibrillation     Basal cell carcinoma     DJD (degenerative joint disease) 12/12/2012    Obesity (BMI 30-39.9) 11/27/2015    Vaginal atrophy 4/1/2016     Visit Number: 53  Precautions: Atrial Fibrillation      1st PT visit: 5/17/2022  Year of care end date: 5/17/2023  6 Month test  Performed: Nov 2022  12 Month test performed: May 2023  Mind body plan: Plan A 9 months  Patient level: B    Time In: 11:25 AM   Time Out: 12:20 PM  Total Treatment Time: 55 minutes    Wellness Vision 2022  Handout on this week's wellness topic Positive Thinking was provided along with a discussion on what it means, the benefits, and suggestions for practice. Reviewed last week's topic Learning     Subjective:   Patient reports that her low back is pain-free today. Pt continues to complete her HEP and stretches every day at home. She does not typically ice her back at home. Pt completes strengthening exercises regularly at home with dumbbells. She working on [employer] [facility], just waiting on physician to sign off.     Objective:   Magdalena completed therapeutic stretches (EIL, ABRAHAM) and the following MedX exercise machines: core lumbar, torso rotation l/r, leg extension, leg curl, upright row, chest press, biceps curl, triceps extension, leg press    See exercise log in patient folder for rate of exertion and repetitions completed.       Fitness Machine Education Key:  E=education on equipment initiated and further follow up and education needed  I=independent with  and exercise.  The patient:  Adjusts machines to his/her settings  Uses equipment levers, pins, weights safely  Maintains safe and correct posture while exercising  Moves through exercise with correct pace and control  Gets on and off equipment  safely      Core Lumbar Strength I Torso Rotation I Leg Press I   Leg Extension I Seated Leg Curl I Chest Press I   Seated Row I Hip ADD X Hip ABD I   Triceps Extension I Bicep Curl I Other: X       Assessment:   Patient tolerated Patient tolerated MedX Core Lumbar Strength and all other peripheral exercises without an increase in symptoms. Patient warmed up on treadmill for 5 minutes, stretched, and iced low back and knees for 5 minutes after the workout.    Plan:  Continue with established plan of care towards wellness goals.     Health  : Sho Morfin  5/19/2023

## 2023-05-26 ENCOUNTER — DOCUMENTATION ONLY (OUTPATIENT)
Dept: REHABILITATION | Facility: OTHER | Age: 78
End: 2023-05-26
Payer: MEDICARE

## 2023-05-26 RX ORDER — ATORVASTATIN CALCIUM 20 MG/1
TABLET, FILM COATED ORAL
Qty: 90 TABLET | Refills: 3 | Status: SHIPPED | OUTPATIENT
Start: 2023-05-26

## 2023-05-26 NOTE — PROGRESS NOTES
Health  Wellness Visit Note    Name: Magdalena Mane  Clinic Number: [id number]  Physician: No ref. provider found  Diagnosis: No diagnosis found.  Past Medical History:   Diagnosis Date    Anemia     Atrial fibrillation     Basal cell carcinoma     DJD (degenerative joint disease) 12/12/2012    Obesity (BMI 30-39.9) 11/27/2015    Vaginal atrophy 4/1/2016     Visit Number: 54  Precautions: Atrial Fibrillation      1st PT visit: 5/17/2022  Year of care end date: 5/17/2023  6 Month test  Performed: Nov 2022  12 Month test performed: May 2023  Mind body plan: Plan A 9 months  Patient level: B    Time In: 11:00 AM   Time Out: 11:47 AM  Total Treatment Time: 47 minutes    Wellness Vision 2022  Handout on this week's wellness topic Memory was provided along with a discussion on what it means, the benefits, and suggestions for practice. Reviewed last week's topic Positive Thinking     Subjective:   Patient reports that her low back is pain-free today. Pt continues to complete her HEP and stretches every day at home. She does not typically ice her back at home. Pt completes strengthening exercises regularly at home with dumbbells. She has joined [facility], and will be meeting HC there next Friday.     Objective:   Magdalena completed therapeutic stretches (EIL, ABRAHAM) and the following MedX exercise machines: core lumbar, torso rotation l/r, leg extension, leg curl, upright row, chest press, biceps curl, triceps extension, leg press    See exercise log in patient folder for rate of exertion and repetitions completed.       Fitness Machine Education Key:  E=education on equipment initiated and further follow up and education needed  I=independent with  and exercise.  The patient:  Adjusts machines to his/her settings  Uses equipment levers, pins, weights safely  Maintains safe and correct posture while exercising  Moves through exercise with correct pace and control  Gets on and off equipment  safely      Core Lumbar Strength I Torso Rotation I Leg Press I   Leg Extension I Seated Leg Curl I Chest Press I   Seated Row I Hip ADD X Hip ABD I   Triceps Extension I Bicep Curl I Other: X       Assessment:   Patient tolerated Patient tolerated MedX Core Lumbar Strength and all other peripheral exercises without an increase in symptoms. Patient warmed up on treadmill for 5 minutes, stretched, and iced low back and knees for 5 minutes after the workout.    Plan:  Continue with established plan of care towards wellness goals.     Health  : Sho Morfin  5/26/2023

## 2023-06-01 ENCOUNTER — DOCUMENTATION ONLY (OUTPATIENT)
Dept: REHABILITATION | Facility: OTHER | Age: 78
End: 2023-06-01
Payer: MEDICARE

## 2023-06-01 ENCOUNTER — PATIENT MESSAGE (OUTPATIENT)
Dept: REHABILITATION | Facility: OTHER | Age: 78
End: 2023-06-01

## 2023-06-01 NOTE — PROGRESS NOTES
Health  Wellness Visit Note    Name: Magdalena Mane  Clinic Number: [id number]  Physician: No ref. provider found  Diagnosis: No diagnosis found.  Past Medical History:   Diagnosis Date    Anemia     Atrial fibrillation     Basal cell carcinoma     DJD (degenerative joint disease) 12/12/2012    Obesity (BMI 30-39.9) 11/27/2015    Vaginal atrophy 4/1/2016     Visit Number: 55  Precautions: Atrial Fibrillation      1st PT visit: 5/17/2022  Year of care end date: 5/17/2023  6 Month test  Performed: Nov 2022  12 Month test performed: May 2023  Mind body plan: Plan A 9 months  Patient level: B    Time In: 11:00 AM   Time Out: 11:55 AM  Total Treatment Time: 55 minutes    Wellness Vision 2022  Handout on this week's wellness topic Mindfulness was provided along with a discussion on what it means, the benefits, and suggestions for practice. Reviewed last week's topic Memory.     Subjective:   Patient reports that her low back is pain-free today. Pt continues to complete her HEP and stretches every day at home. She does not typically ice her back at home. Pt completes strengthening exercises regularly at home with dumbbells. She has joined [facility], and will be meeting HC there this upcoming Friday.    Objective:   Magdalena completed therapeutic stretches (EIL, ABRAHAM) and the following MedX exercise machines: core lumbar, torso rotation l/r, leg extension, leg curl, upright row, chest press, biceps curl, triceps extension, leg press    See exercise log in patient folder for rate of exertion and repetitions completed.       Fitness Machine Education Key:  E=education on equipment initiated and further follow up and education needed  I=independent with  and exercise.  The patient:  Adjusts machines to his/her settings  Uses equipment levers, pins, weights safely  Maintains safe and correct posture while exercising  Moves through exercise with correct pace and control  Gets on and off equipment  safely      Core Lumbar Strength I Torso Rotation I Leg Press I   Leg Extension I Seated Leg Curl I Chest Press I   Seated Row I Hip ADD X Hip ABD I   Triceps Extension I Bicep Curl I Other: X       Assessment:   Patient tolerated Patient tolerated MedX Core Lumbar Strength and all other peripheral exercises without an increase in symptoms. Patient warmed up on treadmill for 5 minutes, stretched, and iced low back and knees for 5 minutes after the workout.    Plan:  Continue with established plan of care towards wellness goals.     Health  : Larisa Ann  6/1/2023

## 2023-06-09 ENCOUNTER — OFFICE VISIT (OUTPATIENT)
Dept: CARDIOLOGY | Facility: CLINIC | Age: 78
End: 2023-06-09
Payer: MEDICARE

## 2023-06-09 VITALS
BODY MASS INDEX: 25.25 KG/M2 | WEIGHT: 125.25 LBS | SYSTOLIC BLOOD PRESSURE: 155 MMHG | HEART RATE: 49 BPM | DIASTOLIC BLOOD PRESSURE: 63 MMHG | HEIGHT: 59 IN

## 2023-06-09 DIAGNOSIS — R00.1 SINUS BRADYCARDIA: ICD-10-CM

## 2023-06-09 DIAGNOSIS — I70.0 ATHEROSCLEROSIS OF AORTA: ICD-10-CM

## 2023-06-09 DIAGNOSIS — I48.0 PAROXYSMAL ATRIAL FIBRILLATION: Primary | ICD-10-CM

## 2023-06-09 DIAGNOSIS — I10 PRIMARY HYPERTENSION: ICD-10-CM

## 2023-06-09 DIAGNOSIS — I50.32 CHRONIC DIASTOLIC HEART FAILURE: ICD-10-CM

## 2023-06-09 DIAGNOSIS — I49.8 OTHER CARDIAC ARRHYTHMIA: Primary | ICD-10-CM

## 2023-06-09 DIAGNOSIS — I49.8 OTHER CARDIAC ARRHYTHMIA: ICD-10-CM

## 2023-06-09 DIAGNOSIS — I47.9 PAROXYSMAL TACHYCARDIA: ICD-10-CM

## 2023-06-09 PROCEDURE — 1157F ADVNC CARE PLAN IN RCRD: CPT | Mod: CPTII,S$GLB,, | Performed by: INTERNAL MEDICINE

## 2023-06-09 PROCEDURE — 3077F SYST BP >= 140 MM HG: CPT | Mod: CPTII,S$GLB,, | Performed by: INTERNAL MEDICINE

## 2023-06-09 PROCEDURE — 3077F PR MOST RECENT SYSTOLIC BLOOD PRESSURE >= 140 MM HG: ICD-10-PCS | Mod: CPTII,S$GLB,, | Performed by: INTERNAL MEDICINE

## 2023-06-09 PROCEDURE — 1126F AMNT PAIN NOTED NONE PRSNT: CPT | Mod: CPTII,S$GLB,, | Performed by: INTERNAL MEDICINE

## 2023-06-09 PROCEDURE — 1101F PR PT FALLS ASSESS DOC 0-1 FALLS W/OUT INJ PAST YR: ICD-10-PCS | Mod: CPTII,S$GLB,, | Performed by: INTERNAL MEDICINE

## 2023-06-09 PROCEDURE — 93005 RHYTHM STRIP: ICD-10-PCS | Mod: S$GLB,,, | Performed by: INTERNAL MEDICINE

## 2023-06-09 PROCEDURE — 1159F PR MEDICATION LIST DOCUMENTED IN MEDICAL RECORD: ICD-10-PCS | Mod: CPTII,S$GLB,, | Performed by: INTERNAL MEDICINE

## 2023-06-09 PROCEDURE — 99214 PR OFFICE/OUTPT VISIT, EST, LEVL IV, 30-39 MIN: ICD-10-PCS | Mod: S$GLB,,, | Performed by: INTERNAL MEDICINE

## 2023-06-09 PROCEDURE — 1159F MED LIST DOCD IN RCRD: CPT | Mod: CPTII,S$GLB,, | Performed by: INTERNAL MEDICINE

## 2023-06-09 PROCEDURE — 3288F PR FALLS RISK ASSESSMENT DOCUMENTED: ICD-10-PCS | Mod: CPTII,S$GLB,, | Performed by: INTERNAL MEDICINE

## 2023-06-09 PROCEDURE — 1126F PR PAIN SEVERITY QUANTIFIED, NO PAIN PRESENT: ICD-10-PCS | Mod: CPTII,S$GLB,, | Performed by: INTERNAL MEDICINE

## 2023-06-09 PROCEDURE — 93010 RHYTHM STRIP: ICD-10-PCS | Mod: S$GLB,,, | Performed by: INTERNAL MEDICINE

## 2023-06-09 PROCEDURE — 1160F PR REVIEW ALL MEDS BY PRESCRIBER/CLIN PHARMACIST DOCUMENTED: ICD-10-PCS | Mod: CPTII,S$GLB,, | Performed by: INTERNAL MEDICINE

## 2023-06-09 PROCEDURE — 3288F FALL RISK ASSESSMENT DOCD: CPT | Mod: CPTII,S$GLB,, | Performed by: INTERNAL MEDICINE

## 2023-06-09 PROCEDURE — 3078F DIAST BP <80 MM HG: CPT | Mod: CPTII,S$GLB,, | Performed by: INTERNAL MEDICINE

## 2023-06-09 PROCEDURE — 99999 PR PBB SHADOW E&M-EST. PATIENT-LVL III: ICD-10-PCS | Mod: PBBFAC,,, | Performed by: INTERNAL MEDICINE

## 2023-06-09 PROCEDURE — 93010 ELECTROCARDIOGRAM REPORT: CPT | Mod: S$GLB,,, | Performed by: INTERNAL MEDICINE

## 2023-06-09 PROCEDURE — 99214 OFFICE O/P EST MOD 30 MIN: CPT | Mod: S$GLB,,, | Performed by: INTERNAL MEDICINE

## 2023-06-09 PROCEDURE — 1160F RVW MEDS BY RX/DR IN RCRD: CPT | Mod: CPTII,S$GLB,, | Performed by: INTERNAL MEDICINE

## 2023-06-09 PROCEDURE — 3078F PR MOST RECENT DIASTOLIC BLOOD PRESSURE < 80 MM HG: ICD-10-PCS | Mod: CPTII,S$GLB,, | Performed by: INTERNAL MEDICINE

## 2023-06-09 PROCEDURE — 93005 ELECTROCARDIOGRAM TRACING: CPT | Mod: S$GLB,,, | Performed by: INTERNAL MEDICINE

## 2023-06-09 PROCEDURE — 99999 PR PBB SHADOW E&M-EST. PATIENT-LVL III: CPT | Mod: PBBFAC,,, | Performed by: INTERNAL MEDICINE

## 2023-06-09 PROCEDURE — 1101F PT FALLS ASSESS-DOCD LE1/YR: CPT | Mod: CPTII,S$GLB,, | Performed by: INTERNAL MEDICINE

## 2023-06-09 PROCEDURE — 1157F PR ADVANCE CARE PLAN OR EQUIV PRESENT IN MEDICAL RECORD: ICD-10-PCS | Mod: CPTII,S$GLB,, | Performed by: INTERNAL MEDICINE

## 2023-06-09 NOTE — PROGRESS NOTES
Subjective:    Patient ID:  Magdalena Mane is a 78 y.o. female who presents for evaluation of Atrial Fibrillation    Primary Cardiologist: Clem Mckeon MD    HPI  Prior Hx:  I had the pleasure of seeing Mrs. Mane in our electrophysiology clinic in consultation for her atrial arrhythmia. As you are aware she is a pleasant 78 year-old woman with hypertension and recently diagnosed persistent atrial fibrillation. She was in her usual state of health until February of 2020 when she had a respiratory illness (COVID antibody test later was negative) and since that time has had dyspnea on exertion. Also notes her pulse runs in the 50s-60s however in February her heart rate began running in the 80s (participates in the [facility] clinic). In early July 2020 she developed palpitations. She saw Dr. Area 7/27/2020 and was diagnosed with atrial fibrillation and was referred to Dr. Pratt who started eliquis for stroke risk reduction ([id number] score 3). She followed up with Dr. Pratt on 9/16/2020 and remained in atrial fibrillation for which she presents for further evaluation. She notes her dyspnea on exertion and palpitations have largely resolved despite AF persistence however still has exercise intolerance.    She reports an episode of pAF post hip surgery in 2014 at [facility].    An echocardiogram from July of 2020 notes preserved LV function with moderate LA enlargement.     Mrs. Mane underwent DCCV on 9/29/2020 with NSR with symptomatic benefit but then had AF recurrence. She underwent repeat DCCV on 12/8/2020 and started on low dose flecainide (has resting sinus bradycardia). Her 1 week post-DCCV ECG noted AF recurrence. At her follow-up visit in December of 2020 she was in sinus rhythm and noted she is in and out of AF. She reported her breathing improved when she was in normal rhythm but feels fatigued when in normal rhythm correlating with a pulse rate in the 40s. We discussed option of PVI versus PPM +  AAD therapy versus dofetilide and stopping AV delma agents. She elected initially for dofetilide however had a change of mind and elected to stagger her metoprolol and flecainide and felt better.    A holter monitor 1/2021 noted no AF and an average sinus rate of 56 bpm.    8/2021: Mrs. Mane returned for follow-up. She was seen by Dr. Vazquez in June of 2021 following an urgent care clinic visit for an episode of syncope. She reported she was working in the yard in the heat and began to feel light-headed. She went inside and had transient loss of consciousness. She had persistent dizziness and went to an urgent care clinic. She was given IVFs. HR was 51 bpm. Metoprolol was discontinued. She feltmuch better off metoprolol. She reportedshe had libido issues with beta-blockers. Discussed she should not be on flecainide without an AV delma blocking agent. Unable to do dofetilide inpatient loading at that time due to COVID surge and would rather avoid a PPM. She may consider PVI at some point. Low dose sotalol (40mg bid) alone may be an option although she is concerned about it affecting her libido. We elected to try verapamil 120mg daily in lieu of amlodipine with the hope it had less SA delma effect than a beta-blocker.     9/2021: Holter noted average sinus rate of 56 bpm.    12/2022: Mrs. Mane returned for AF follow-up. No symptomatic AF recurrences. She feels well on verapamil/flecainide. She is having issues with hypertension. She is actively managed by digital HTN. She woke up with low back pain. She also is having balance issues when turning her head to the right. She is seeing an ENT soon for this and sinus issues. BP very high today and she reports she woke up feeling poorly and has a headache.    Interim Hx:  Mrs. Mane returns for follow-up. No symptomatic AF. Doing ok with verapamil/flecainide. She is doing an exercise program and is gardening.    My interpretation of today's in clinic ECG is sinus  rhythm at 50 bpm.      Review of Systems   Constitutional: Negative for fever and malaise/fatigue.   HENT:  Negative for congestion and sore throat.    Eyes:  Negative for blurred vision and visual disturbance.   Cardiovascular:  Negative for chest pain, dyspnea on exertion, irregular heartbeat, near-syncope, palpitations and syncope.   Respiratory:  Negative for cough and shortness of breath.    Hematologic/Lymphatic: Negative for bleeding problem. Does not bruise/bleed easily.   Skin: Negative.    Musculoskeletal:  Positive for back pain.   Gastrointestinal:  Negative for bloating, abdominal pain, hematochezia and melena.   Neurological:  Positive for headaches and loss of balance. Negative for focal weakness and weakness.   Psychiatric/Behavioral: Negative.        Objective:    Physical Exam  Vitals reviewed.   Constitutional:       General: She is not in acute distress.     Appearance: She is well-developed. She is not diaphoretic.   HENT:      Head: Normocephalic and atraumatic.   Eyes:      General:         Right eye: No discharge.         Left eye: No discharge.      Conjunctiva/sclera: Conjunctivae normal.   Cardiovascular:      Rate and Rhythm: Regular rhythm. Bradycardia present.      Heart sounds: No murmur heard.    No friction rub. No gallop.   Pulmonary:      Effort: Pulmonary effort is normal. No respiratory distress.      Breath sounds: Normal breath sounds. No wheezing or rales.   Abdominal:      General: Bowel sounds are normal. There is no distension.      Palpations: Abdomen is soft.      Tenderness: There is no abdominal tenderness.   Musculoskeletal:      Cervical back: Neck supple.   Skin:     General: Skin is warm and dry.   Neurological:      Mental Status: She is alert and oriented to person, place, and time.   Psychiatric:         Behavior: Behavior normal.         Thought Content: Thought content normal.         Judgment: Judgment normal.         Assessment:       1. Paroxysmal atrial  fibrillation    2. Other cardiac arrhythmia    3. Paroxysmal tachycardia    4. Primary hypertension    5. Sinus bradycardia    6. Atherosclerosis of aorta    7. Chronic diastolic heart failure         Plan:       In summary, Mrs. Mane is a pleasant 78 year-old woman with hypertension and with persistent atrial fibrillation who is now paroxysmal on low dose flecainide. She has shortness of breath with AF and then fatigue in sinus bradycardia. She is intolerant to flecainide and metoprolol together. She feels better on flecainide/verapamil with no symptomatic AF. [id number] score 4. Continue eliquis.    RTC in 6 months    Thank you for allowing me to participate in the care of this patient. Please do not hesitate to call me with any questions or concerns.    Nigel García MD, PhD  Cardiac Electrophysiology

## 2023-06-18 ENCOUNTER — OFFICE VISIT (OUTPATIENT)
Dept: URGENT CARE | Facility: CLINIC | Age: 78
End: 2023-06-18
Payer: MEDICARE

## 2023-06-18 VITALS
TEMPERATURE: 98 F | BODY MASS INDEX: 25.2 KG/M2 | HEIGHT: 59 IN | HEART RATE: 57 BPM | SYSTOLIC BLOOD PRESSURE: 132 MMHG | WEIGHT: 125 LBS | DIASTOLIC BLOOD PRESSURE: 60 MMHG | RESPIRATION RATE: 18 BRPM | OXYGEN SATURATION: 98 %

## 2023-06-18 DIAGNOSIS — Z23 ENCOUNTER FOR ADMINISTRATION OF VACCINE: ICD-10-CM

## 2023-06-18 DIAGNOSIS — M79.644 PAIN OF FINGER OF RIGHT HAND: Primary | ICD-10-CM

## 2023-06-18 DIAGNOSIS — S61.210A LACERATION WITHOUT FOREIGN BODY OF RIGHT INDEX FINGER WITHOUT DAMAGE TO NAIL, INITIAL ENCOUNTER: ICD-10-CM

## 2023-06-18 PROCEDURE — 99213 OFFICE O/P EST LOW 20 MIN: CPT | Mod: 25,S$GLB,,

## 2023-06-18 PROCEDURE — 12001 LACERATION REPAIR: ICD-10-PCS | Mod: S$GLB,,,

## 2023-06-18 PROCEDURE — 99213 PR OFFICE/OUTPT VISIT, EST, LEVL III, 20-29 MIN: ICD-10-PCS | Mod: 25,S$GLB,,

## 2023-06-18 PROCEDURE — 12001 RPR S/N/AX/GEN/TRNK 2.5CM/<: CPT | Mod: S$GLB,,,

## 2023-06-18 PROCEDURE — 90471 IMMUNIZATION ADMIN: CPT | Mod: S$GLB,,,

## 2023-06-18 PROCEDURE — 90714 TD VACCINE GREATER THAN OR EQUAL TO 7YO WITH PRESERVATIVE IM: ICD-10-PCS | Mod: S$GLB,,,

## 2023-06-18 PROCEDURE — 73130 X-RAY EXAM OF HAND: CPT | Mod: RT,S$GLB,, | Performed by: RADIOLOGY

## 2023-06-18 PROCEDURE — 73130 XR HAND COMPLETE 3 VIEW RIGHT: ICD-10-PCS | Mod: RT,S$GLB,, | Performed by: RADIOLOGY

## 2023-06-18 PROCEDURE — 90471 TD VACCINE GREATER THAN OR EQUAL TO 7YO WITH PRESERVATIVE IM: ICD-10-PCS | Mod: S$GLB,,,

## 2023-06-18 PROCEDURE — 90714 TD VACC NO PRESV 7 YRS+ IM: CPT | Mod: S$GLB,,,

## 2023-06-18 RX ORDER — LIDOCAINE HYDROCHLORIDE 10 MG/ML
1 INJECTION INFILTRATION; PERINEURAL
Status: COMPLETED | OUTPATIENT
Start: 2023-06-18 | End: 2023-06-18

## 2023-06-18 RX ORDER — MUPIROCIN 20 MG/G
OINTMENT TOPICAL 3 TIMES DAILY
Qty: 1 G | Refills: 0 | Status: SHIPPED | OUTPATIENT
Start: 2023-06-18

## 2023-06-18 RX ADMIN — LIDOCAINE HYDROCHLORIDE 2.5 ML: 10 INJECTION INFILTRATION; PERINEURAL at 12:06

## 2023-06-18 NOTE — PROCEDURES
Laceration Repair    Date/Time: 2023 10:45 AM  Performed by: Elena Crocker NP  Authorized by: Elena Crocker NP   Consent Done: Yes  Consent: Verbal consent obtained. Written consent not obtained.  Risks and benefits: risks, benefits and alternatives were discussed  Consent given by: patient  Patient identity confirmed: , MRN and name  Body area: upper extremity  Location details: right index finger  Laceration length: 0.5 cm  Foreign bodies: no foreign bodies  Tendon involvement: none  Nerve involvement: none  Vascular damage: no  Anesthesia: local infiltration    Anesthesia:  Local Anesthetic: lidocaine 1% without epinephrine  Anesthetic total: 0.5 mL    Patient sedated: no  Irrigation solution: saline  Irrigation method: syringe  Amount of cleaning: standard  Debridement: none  Degree of undermining: none  Skin closure: 4-0 nylon  Number of sutures: 1  Technique: simple  Approximation: close  Approximation difficulty: simple  Dressing: antibiotic ointment and splint for protection  Patient tolerance: Patient tolerated the procedure well with no immediate complications

## 2023-06-18 NOTE — PROCEDURES
Laceration Repair    Date/Time: 2023 10:45 AM  Performed by: Elena Crocker NP  Authorized by: Elena Crocker NP   Consent Done: Yes  Consent: Verbal consent obtained.  Risks and benefits: risks, benefits and alternatives were discussed  Consent given by: patient  Patient identity confirmed: , MRN and name  Body area: upper extremity  Location details: right index finger  Laceration length: 2 cm  Foreign bodies: no foreign bodies  Tendon involvement: none  Nerve involvement: none  Vascular damage: no  Anesthesia: local infiltration    Anesthesia:  Local Anesthetic: lidocaine 1% without epinephrine  Anesthetic total: 2.5 mL    Patient sedated: no  Irrigation solution: saline  Irrigation method: syringe  Amount of cleaning: standard  Debridement: none  Degree of undermining: none  Skin closure: 4-0 nylon  Number of sutures: 5  Approximation: close  Approximation difficulty: simple  Dressing: antibiotic ointment and splint for protection  Patient tolerance: Patient tolerated the procedure well with no immediate complications
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
## 2023-06-26 ENCOUNTER — OFFICE VISIT (OUTPATIENT)
Dept: URGENT CARE | Facility: CLINIC | Age: 78
End: 2023-06-26
Payer: MEDICARE

## 2023-06-26 VITALS
BODY MASS INDEX: 25.2 KG/M2 | SYSTOLIC BLOOD PRESSURE: 126 MMHG | HEIGHT: 59 IN | DIASTOLIC BLOOD PRESSURE: 71 MMHG | OXYGEN SATURATION: 97 % | TEMPERATURE: 98 F | HEART RATE: 50 BPM | WEIGHT: 125 LBS | RESPIRATION RATE: 14 BRPM

## 2023-06-26 DIAGNOSIS — Z48.02 VISIT FOR SUTURE REMOVAL: Primary | ICD-10-CM

## 2023-06-26 PROCEDURE — 99024 SUTURE REMOVAL: ICD-10-PCS | Mod: S$GLB,,, | Performed by: PHYSICIAN ASSISTANT

## 2023-06-26 PROCEDURE — 99024 POSTOP FOLLOW-UP VISIT: CPT | Mod: S$GLB,,, | Performed by: PHYSICIAN ASSISTANT

## 2023-06-26 NOTE — PROCEDURES
Suture Removal    Date/Time: 6/26/2023 2:15 PM  Location procedure was performed: [facility]  Performed by: Haley Torres PA-C  Authorized by: Haley Torres PA-C   Assisting provider: Haley Torres PA-C  Pre-operative diagnosis: sutures  Post-operative diagnosis: sutures removed  Body area: upper extremity  Location details: right index finger  Wound Appearance: clean, well healed, normal color, nontender, no drainage and nonpurulent  Sutures Removed: 5  Post-removal: antibiotic ointment applied and bandaid applied  Facility: sutures placed in this facility  Technical procedures used: none  Significant surgical tasks conducted by the assistant(s): none  Complications: No  Estimated blood loss (mL): 0  Specimens: No  Implants: No  Patient tolerance: Patient tolerated the procedure well with no immediate complications  Comments: Patient originally had 6 sutures placed however she states 1 fell out throughout the week that she did visualize when she took her bandage off.

## 2023-06-26 NOTE — PROGRESS NOTES
"Subjective:      Patient ID: Magdalena Mane is a 78 y.o. female.    Vitals:  height is 4' 11" (1.499 m) and weight is 56.7 kg (125 lb). Her oral temperature is 97.6 °F (36.4 °C). Her blood pressure is 126/71 and her pulse is 50 (abnormal). Her respiration is 14 and oxygen saturation is 97%.     Chief Complaint: Suture / Staple Removal    Patient is here for suture removal.  She states that she did lose 1 suture earlier this week it was in the Band-Aid she    Suture / Staple Removal  The sutures were placed 7 to 10 days ago (6/18/23;  5 sutures placed to laceration behind below right knuckle, 1 suture placed to laceration on back of right index finger.). She tried antibiotic ointment use and regular soap and water washings since the wound repair. Her temperature was unmeasured prior to arrival. There has been no drainage from the wound. The redness has improved. The swelling has improved. There is no pain present.     Constitution: Negative for chills and fever.   Cardiovascular:  Negative for chest pain.   Respiratory:  Negative for shortness of breath.    Musculoskeletal:  Negative for pain, joint pain, abnormal ROM of joint, muscle cramps and muscle ache.   Skin:  Negative for color change, pale, rash, erythema and bruising.   Neurological:  Negative for numbness and tingling.   Past Medical History:   Diagnosis Date    Anemia     Atrial fibrillation     Basal cell carcinoma     DJD (degenerative joint disease) 12/12/2012    Obesity (BMI 30-39.9) 11/27/2015    Vaginal atrophy 4/1/2016       Past Surgical History:   Procedure Laterality Date    ADENOIDECTOMY      BREAST BIOPSY Left 1999    Excisional bx, benign cyst    BREAST SURGERY      CARDIOVERSION  02/2021    CAROTID ENDARTERECTOMY Right 04/07/2022    Procedure: ENDARTERECTOMY-CAROTID;  Surgeon: TUCKER Brantley III, MD;  Location: [facility] OR [address] [place] [address];  Service: Peripheral Vascular;  Laterality: Right;    COLONOSCOPY  2012    normal    COLONOSCOPY N/A 06/28/2017 "    Procedure: COLONOSCOPY;  Surgeon: Markel Montemayor MD;  Location: [facility] ENDO (4TH FLR);  Service: Endoscopy;  Laterality: N/A;    EXCISION OF GANGLION OF WRIST Left 06/27/2018    Procedure: EXCISION, GANGLION CYST, WRIST - Left Volar wrist;  Surgeon: Mary Moore MD;  Location: [facility] OR 1ST FLR;  Service: Orthopedics;  Laterality: Left;    HIP SURGERY  05/05/2014    bilateral    JOINT REPLACEMENT Bilateral     MOLLY    TONSILLECTOMY      TREATMENT OF CARDIAC ARRHYTHMIA N/A 09/29/2020    Procedure: CARDIOVERSION;  Surgeon: Nigel García MD;  Location: [facility] EP LAB;  Service: Cardiology;  Laterality: N/A;  AF, LIGIA, DCCV, MAC, MN, 3 Prep    TREATMENT OF CARDIAC ARRHYTHMIA N/A 12/08/2020    Procedure: Cardioversion or Defibrillation;  Surgeon: Nigel García MD;  Location: [facility] EP LAB;  Service: Cardiology;  Laterality: N/A;  AF, LIGIA, DCCV, MAC, MN, 3 Prep       Family History   Problem Relation Age of Onset    Heart disease Mother         pacemaker    Breast cancer Mother     Arthritis Mother         Hip pain - no surgery    Hyperlipidemia Mother     Cancer Mother         Breast    Depression Mother     Hypertension Mother     Scoliosis Father     Heart disease Father     Tuberculosis Father          1 lung from collapse lung    Heart attack Father     Heart failure Father     Hyperlipidemia Father     Hypertension Father     Alcohol abuse Father         Functional alcoholic    Arthritis Father         Scoliosis, hip pain,    COPD Father     Depression Father     Learning disabilities Father         Dislexia    Asthma Brother         As a child only    Stroke Maternal Grandmother     Miscarriages / Stillbirths Maternal Grandmother     Heart disease Paternal Grandmother     Dementia Paternal Grandmother     Mental illness Paternal Grandmother         Dementia with Psychotic Depression    Asthma Maternal Aunt         Chronic    Hypertension Maternal Aunt        Social History     Socioeconomic History    Marital  status:    Occupational History    Occupation: retired   Tobacco Use    Smoking status: Former     Packs/day: 1.00     Years: 16.00     Pack years: 16.00     Types: Cigarettes     Start date: 9/15/1969     Quit date: 3/20/1988     Years since quittin.2    Smokeless tobacco: Never    Tobacco comments:     Lesrned in 1/2 hr. 10 year to quit   Substance and Sexual Activity    Alcohol use: Not Currently     Alcohol/week: 3.0 standard drinks     Types: 3 Glasses of wine per week    Drug use: No    Sexual activity: Yes     Partners: Male     Birth control/protection: Post-menopausal, None     Comment: Age   Social History Narrative     to HEATH Montemayor    Nurse    Likes to garden     Social Determinants of Health     Financial Resource Strain: Low Risk     Difficulty of Paying Living Expenses: Not very hard   Food Insecurity: No Food Insecurity    Worried About Running Out of Food in the Last Year: Never true    Ran Out of Food in the Last Year: Never true   Transportation Needs: No Transportation Needs    Lack of Transportation (Medical): No    Lack of Transportation (Non-Medical): No   Physical Activity: Sufficiently Active    Days of Exercise per Week: 5 days    Minutes of Exercise per Session: 60 min   Stress: Stress Concern Present    Feeling of Stress : To some extent   Social Connections: Socially Integrated    Frequency of Communication with Friends and Family: More than three times a week    Frequency of Social Gatherings with Friends and Family: More than three times a week    Attends Muslim Services: More than 4 times per year    Active Member of Clubs or Organizations: Yes    Attends Club or Organization Meetings: More than 4 times per year    Marital Status:    Housing Stability: Unknown    Unable to Pay for Housing in the Last Year: No    Unstable Housing in the Last Year: No       Current Outpatient Medications   Medication Sig Dispense Refill    acetaminophen (TYLENOL) 500  MG tablet Take 500 mg by mouth 2 (two) times daily.      alendronate (FOSAMAX) 70 MG tablet TAKE 1 TABLET BY MOUTH  WEEKLY WITH 8 OZ OF PLAIN  WATER 30 MINUTES BEFORE  FIRST FOOD, DRINK OR MEDS.  STAY UPRIGHT FOR 30 MINS 12 tablet 3    apixaban (ELIQUIS) 5 mg Tab Take 1 tablet (5 mg total) by mouth 2 (two) times daily. 180 tablet 3    aspirin (ECOTRIN) 81 MG EC tablet Take 81 mg by mouth nightly.      atorvastatin (LIPITOR) 20 MG tablet TAKE 1 TABLET BY MOUTH EVERY DAY 90 tablet 3    calcium-vitamin D 250-100 mg-unit per tablet Take 1 tablet by mouth Daily.      cholecalciferol, vitamin D3, (VITAMIN D3 ORAL) Take 2,000 Units by mouth once daily.      cloNIDine (CATAPRES) 0.1 MG tablet Take 1 tablet (0.1 mg total) by mouth every 6 (six) hours as needed (). 60 tablet 11    diphenhydrAMINE (BENADRYL) 25 mg capsule Take 25 mg by mouth every 6 (six) hours as needed for Itching.      ferrous sulfate (FEOSOL) Tab tablet Take 1 tablet by mouth every evening. PATIENT TAKING 2 X WEEKLY IN THE EVENING (M-W-F)      lactase (LACTAID) 3,000 unit tablet Take 1 tablet by mouth 3 (three) times daily as needed.      loratadine (CLARITIN) 10 mg tablet Take 10 mg by mouth every morning.      LORazepam (ATIVAN) 1 MG tablet Take 1 mg by mouth every 8 (eight) hours as needed.      mupirocin (BACTROBAN) 2 % ointment Apply topically 3 (three) times daily. 1 g 0    spironolactone (ALDACTONE) 25 MG tablet Take 1 tablet (25 mg total) by mouth once daily. 90 tablet 3    traZODone (DESYREL) 50 MG tablet TAKE 1 TABLET(50 MG) BY MOUTH EVERY NIGHT AS NEEDED FOR INSOMNIA 90 tablet 3    verapamiL (VERELAN) 180 MG C24P Take 1 capsule (180 mg total) by mouth once daily. Dose decrease. 90 capsule 3    candesartan (ATACAND) 16 MG tablet Take 1 tablet (16 mg total) by mouth once daily. 90 tablet 3    flecainide (TAMBOCOR) 50 MG Tab Take 1 tablet (50 mg total) by mouth every 12 (twelve) hours. 180 tablet 3     No current facility-administered  medications for this visit.       Review of patient's allergies indicates:   Allergen Reactions    Prednisone      ELEVATED B/P FOR SEVERAL DAYS/WENT TO ER    Lactose        Objective:     Physical Exam   Constitutional: She is oriented to person, place, and time.  Non-toxic appearance. She does not appear ill. No distress. normal  HENT:   Head: Normocephalic and atraumatic.   Cardiovascular: Normal pulses.   Pulmonary/Chest: Effort normal. No respiratory distress.   Abdominal: Normal appearance.   Musculoskeletal: Normal range of motion.         General: Swelling present. No tenderness, deformity or signs of injury. Normal range of motion.   Neurological: She is alert and oriented to person, place, and time.   Skin: Skin is warm, dry, not pale and no rash. No bruising and No erythema   Psychiatric: Her behavior is normal. Mood, judgment and thought content normal.   Nursing note and vitals reviewed.    Assessment:     1. Visit for suture removal        Plan:       Visit for suture removal  -     Suture Removal    I have reviewed the patient chart and pertinent past imaging/labs.  Patient Instructions   Your sutures were removed as we discussed you said you had 1 fall out this week that you visualized.  Use topical antibiotic as you have been.  Wash with warm soap and water.  Should you develop any fevers, chills, headache, dizziness, shortness of breath, chest pain, increased pain increased warmth please return for evaluation.  Follow up as needed.

## 2023-06-26 NOTE — PATIENT INSTRUCTIONS
Your sutures were removed as we discussed you said you had 1 fall out this week that you visualized.  Use topical antibiotic as you have been.  Wash with warm soap and water.  Should you develop any fevers, chills, headache, dizziness, shortness of breath, chest pain, increased pain increased warmth please return for evaluation.  Follow up as needed.

## 2023-06-29 NOTE — PROGRESS NOTES
Digital Medicine: Health  Follow-Up    The history is provided by the patient.     Follow Up  Follow-up reason(s): reading review and routine education    Patient attributes her higher BP reading on 10/28 to having a tooth extraction and an infection. The reading did trend back down later that evening.       Intervention/Plan    There are no preventive care reminders to display for this patient.    Last 5 Patient Entered Readings                                      Current 30 Day Average: 124/62     Recent Readings 10/28/2019 10/28/2019 10/21/2019 10/13/2019 10/5/2019    SBP (mmHg) 124 140 120 137 114    DBP (mmHg) 63 72 55 68 53    Pulse 56 55 54 59 55                      Diet Screening   No change to diet.      Medication Adherence Screening   She misses doses: never      Patient identified the following reasons for non-compliance: none    Patient is doing well on her current BP medication regimen. She denies symptoms/side effects.       SDOH   Adjacent Tissue Transfer Text: The defect edges were debeveled with a #15 scalpel blade. Given the location of the defect and the proximity to free margins an adjacent tissue transfer was deemed most appropriate. Using a sterile surgical marker, an appropriate flap was drawn incorporating the defect and placing the expected incisions within the relaxed skin tension lines where possible. The area thus outlined was incised deep to adipose tissue with a #15 scalpel blade. The skin margins were undermined to an appropriate distance in all directions utilizing iris scissors and carried over to close the primary defect.

## 2023-07-28 ENCOUNTER — OFFICE VISIT (OUTPATIENT)
Dept: INTERNAL MEDICINE | Facility: CLINIC | Age: 78
End: 2023-07-28
Payer: MEDICARE

## 2023-07-28 ENCOUNTER — LAB VISIT (OUTPATIENT)
Dept: LAB | Facility: HOSPITAL | Age: 78
End: 2023-07-28
Attending: STUDENT IN AN ORGANIZED HEALTH CARE EDUCATION/TRAINING PROGRAM
Payer: MEDICARE

## 2023-07-28 VITALS
HEIGHT: 59 IN | HEART RATE: 60 BPM | OXYGEN SATURATION: 96 % | RESPIRATION RATE: 14 BRPM | DIASTOLIC BLOOD PRESSURE: 50 MMHG | SYSTOLIC BLOOD PRESSURE: 124 MMHG | BODY MASS INDEX: 25.38 KG/M2 | WEIGHT: 125.88 LBS

## 2023-07-28 DIAGNOSIS — I10 PRIMARY HYPERTENSION: ICD-10-CM

## 2023-07-28 DIAGNOSIS — M17.10 ARTHRITIS OF KNEE: ICD-10-CM

## 2023-07-28 DIAGNOSIS — I48.0 PAROXYSMAL ATRIAL FIBRILLATION: ICD-10-CM

## 2023-07-28 DIAGNOSIS — Z12.31 SCREENING MAMMOGRAM FOR BREAST CANCER: Primary | ICD-10-CM

## 2023-07-28 DIAGNOSIS — E53.8 B12 DEFICIENCY: ICD-10-CM

## 2023-07-28 DIAGNOSIS — E78.2 MIXED HYPERLIPIDEMIA: ICD-10-CM

## 2023-07-28 DIAGNOSIS — D50.9 IRON DEFICIENCY ANEMIA, UNSPECIFIED IRON DEFICIENCY ANEMIA TYPE: ICD-10-CM

## 2023-07-28 LAB
ANION GAP SERPL CALC-SCNC: 11 MMOL/L (ref 8–16)
BASOPHILS # BLD AUTO: 0.07 K/UL (ref 0–0.2)
BASOPHILS NFR BLD: 1 % (ref 0–1.9)
BUN SERPL-MCNC: 27 MG/DL (ref 8–23)
CALCIUM SERPL-MCNC: 9.2 MG/DL (ref 8.7–10.5)
CHLORIDE SERPL-SCNC: 104 MMOL/L (ref 95–110)
CHOLEST SERPL-MCNC: 129 MG/DL (ref 120–199)
CHOLEST/HDLC SERPL: 1.9 {RATIO} (ref 2–5)
CO2 SERPL-SCNC: 22 MMOL/L (ref 23–29)
CREAT SERPL-MCNC: 0.9 MG/DL (ref 0.5–1.4)
DIFFERENTIAL METHOD: ABNORMAL
EOSINOPHIL # BLD AUTO: 0.2 K/UL (ref 0–0.5)
EOSINOPHIL NFR BLD: 2.5 % (ref 0–8)
ERYTHROCYTE [DISTWIDTH] IN BLOOD BY AUTOMATED COUNT: 13.3 % (ref 11.5–14.5)
EST. GFR  (NO RACE VARIABLE): >60 ML/MIN/1.73 M^2
FERRITIN SERPL-MCNC: 101 NG/ML (ref 20–300)
GLUCOSE SERPL-MCNC: 70 MG/DL (ref 70–110)
HCT VFR BLD AUTO: 33.9 % (ref 37–48.5)
HDLC SERPL-MCNC: 69 MG/DL (ref 40–75)
HDLC SERPL: 53.5 % (ref 20–50)
HGB BLD-MCNC: 11.2 G/DL (ref 12–16)
IMM GRANULOCYTES # BLD AUTO: 0.02 K/UL (ref 0–0.04)
IMM GRANULOCYTES NFR BLD AUTO: 0.3 % (ref 0–0.5)
IRON SERPL-MCNC: 99 UG/DL (ref 30–160)
LDLC SERPL CALC-MCNC: 47.6 MG/DL (ref 63–159)
LYMPHOCYTES # BLD AUTO: 1.2 K/UL (ref 1–4.8)
LYMPHOCYTES NFR BLD: 16.5 % (ref 18–48)
MCH RBC QN AUTO: 32.7 PG (ref 27–31)
MCHC RBC AUTO-ENTMCNC: 33 G/DL (ref 32–36)
MCV RBC AUTO: 99 FL (ref 82–98)
MONOCYTES # BLD AUTO: 0.9 K/UL (ref 0.3–1)
MONOCYTES NFR BLD: 12.3 % (ref 4–15)
NEUTROPHILS # BLD AUTO: 4.8 K/UL (ref 1.8–7.7)
NEUTROPHILS NFR BLD: 67.4 % (ref 38–73)
NONHDLC SERPL-MCNC: 60 MG/DL
NRBC BLD-RTO: 0 /100 WBC
PLATELET # BLD AUTO: 276 K/UL (ref 150–450)
PMV BLD AUTO: 10.7 FL (ref 9.2–12.9)
POTASSIUM SERPL-SCNC: 4.3 MMOL/L (ref 3.5–5.1)
RBC # BLD AUTO: 3.42 M/UL (ref 4–5.4)
SATURATED IRON: 26 % (ref 20–50)
SODIUM SERPL-SCNC: 137 MMOL/L (ref 136–145)
TOTAL IRON BINDING CAPACITY: 388 UG/DL (ref 250–450)
TRANSFERRIN SERPL-MCNC: 262 MG/DL (ref 200–375)
TRIGL SERPL-MCNC: 62 MG/DL (ref 30–150)
VIT B12 SERPL-MCNC: 1008 PG/ML (ref 210–950)
WBC # BLD AUTO: 7.17 K/UL (ref 3.9–12.7)

## 2023-07-28 PROCEDURE — 1101F PT FALLS ASSESS-DOCD LE1/YR: CPT | Mod: CPTII,S$GLB,, | Performed by: STUDENT IN AN ORGANIZED HEALTH CARE EDUCATION/TRAINING PROGRAM

## 2023-07-28 PROCEDURE — 84466 ASSAY OF TRANSFERRIN: CPT | Performed by: STUDENT IN AN ORGANIZED HEALTH CARE EDUCATION/TRAINING PROGRAM

## 2023-07-28 PROCEDURE — 99999 PR PBB SHADOW E&M-EST. PATIENT-LVL IV: CPT | Mod: PBBFAC,,, | Performed by: STUDENT IN AN ORGANIZED HEALTH CARE EDUCATION/TRAINING PROGRAM

## 2023-07-28 PROCEDURE — 99999 PR PBB SHADOW E&M-EST. PATIENT-LVL IV: ICD-10-PCS | Mod: PBBFAC,,, | Performed by: STUDENT IN AN ORGANIZED HEALTH CARE EDUCATION/TRAINING PROGRAM

## 2023-07-28 PROCEDURE — 99214 PR OFFICE/OUTPT VISIT, EST, LEVL IV, 30-39 MIN: ICD-10-PCS | Mod: S$GLB,,, | Performed by: STUDENT IN AN ORGANIZED HEALTH CARE EDUCATION/TRAINING PROGRAM

## 2023-07-28 PROCEDURE — 3074F SYST BP LT 130 MM HG: CPT | Mod: CPTII,S$GLB,, | Performed by: STUDENT IN AN ORGANIZED HEALTH CARE EDUCATION/TRAINING PROGRAM

## 2023-07-28 PROCEDURE — 1157F ADVNC CARE PLAN IN RCRD: CPT | Mod: CPTII,S$GLB,, | Performed by: STUDENT IN AN ORGANIZED HEALTH CARE EDUCATION/TRAINING PROGRAM

## 2023-07-28 PROCEDURE — 3074F PR MOST RECENT SYSTOLIC BLOOD PRESSURE < 130 MM HG: ICD-10-PCS | Mod: CPTII,S$GLB,, | Performed by: STUDENT IN AN ORGANIZED HEALTH CARE EDUCATION/TRAINING PROGRAM

## 2023-07-28 PROCEDURE — 1101F PR PT FALLS ASSESS DOC 0-1 FALLS W/OUT INJ PAST YR: ICD-10-PCS | Mod: CPTII,S$GLB,, | Performed by: STUDENT IN AN ORGANIZED HEALTH CARE EDUCATION/TRAINING PROGRAM

## 2023-07-28 PROCEDURE — 1126F PR PAIN SEVERITY QUANTIFIED, NO PAIN PRESENT: ICD-10-PCS | Mod: CPTII,S$GLB,, | Performed by: STUDENT IN AN ORGANIZED HEALTH CARE EDUCATION/TRAINING PROGRAM

## 2023-07-28 PROCEDURE — 3078F PR MOST RECENT DIASTOLIC BLOOD PRESSURE < 80 MM HG: ICD-10-PCS | Mod: CPTII,S$GLB,, | Performed by: STUDENT IN AN ORGANIZED HEALTH CARE EDUCATION/TRAINING PROGRAM

## 2023-07-28 PROCEDURE — 36415 COLL VENOUS BLD VENIPUNCTURE: CPT | Performed by: STUDENT IN AN ORGANIZED HEALTH CARE EDUCATION/TRAINING PROGRAM

## 2023-07-28 PROCEDURE — 99214 OFFICE O/P EST MOD 30 MIN: CPT | Mod: S$GLB,,, | Performed by: STUDENT IN AN ORGANIZED HEALTH CARE EDUCATION/TRAINING PROGRAM

## 2023-07-28 PROCEDURE — 3288F FALL RISK ASSESSMENT DOCD: CPT | Mod: CPTII,S$GLB,, | Performed by: STUDENT IN AN ORGANIZED HEALTH CARE EDUCATION/TRAINING PROGRAM

## 2023-07-28 PROCEDURE — 1157F PR ADVANCE CARE PLAN OR EQUIV PRESENT IN MEDICAL RECORD: ICD-10-PCS | Mod: CPTII,S$GLB,, | Performed by: STUDENT IN AN ORGANIZED HEALTH CARE EDUCATION/TRAINING PROGRAM

## 2023-07-28 PROCEDURE — 1126F AMNT PAIN NOTED NONE PRSNT: CPT | Mod: CPTII,S$GLB,, | Performed by: STUDENT IN AN ORGANIZED HEALTH CARE EDUCATION/TRAINING PROGRAM

## 2023-07-28 PROCEDURE — 3288F PR FALLS RISK ASSESSMENT DOCUMENTED: ICD-10-PCS | Mod: CPTII,S$GLB,, | Performed by: STUDENT IN AN ORGANIZED HEALTH CARE EDUCATION/TRAINING PROGRAM

## 2023-07-28 PROCEDURE — 82607 VITAMIN B-12: CPT | Performed by: STUDENT IN AN ORGANIZED HEALTH CARE EDUCATION/TRAINING PROGRAM

## 2023-07-28 PROCEDURE — 80061 LIPID PANEL: CPT | Performed by: STUDENT IN AN ORGANIZED HEALTH CARE EDUCATION/TRAINING PROGRAM

## 2023-07-28 PROCEDURE — 85025 COMPLETE CBC W/AUTO DIFF WBC: CPT | Performed by: STUDENT IN AN ORGANIZED HEALTH CARE EDUCATION/TRAINING PROGRAM

## 2023-07-28 PROCEDURE — 1159F MED LIST DOCD IN RCRD: CPT | Mod: CPTII,S$GLB,, | Performed by: STUDENT IN AN ORGANIZED HEALTH CARE EDUCATION/TRAINING PROGRAM

## 2023-07-28 PROCEDURE — 80048 BASIC METABOLIC PNL TOTAL CA: CPT | Performed by: STUDENT IN AN ORGANIZED HEALTH CARE EDUCATION/TRAINING PROGRAM

## 2023-07-28 PROCEDURE — 82728 ASSAY OF FERRITIN: CPT | Performed by: STUDENT IN AN ORGANIZED HEALTH CARE EDUCATION/TRAINING PROGRAM

## 2023-07-28 PROCEDURE — 3078F DIAST BP <80 MM HG: CPT | Mod: CPTII,S$GLB,, | Performed by: STUDENT IN AN ORGANIZED HEALTH CARE EDUCATION/TRAINING PROGRAM

## 2023-07-28 PROCEDURE — 1159F PR MEDICATION LIST DOCUMENTED IN MEDICAL RECORD: ICD-10-PCS | Mod: CPTII,S$GLB,, | Performed by: STUDENT IN AN ORGANIZED HEALTH CARE EDUCATION/TRAINING PROGRAM

## 2023-07-28 RX ORDER — TRIAMCINOLONE ACETONIDE 1 MG/G
CREAM TOPICAL DAILY PRN
Qty: 80 G | Refills: 1 | Status: SHIPPED | OUTPATIENT
Start: 2023-07-28 | End: 2024-07-27

## 2023-07-28 RX ORDER — TRAMADOL HYDROCHLORIDE 50 MG/1
50 TABLET ORAL EVERY 12 HOURS PRN
Qty: 14 TABLET | Refills: 0 | Status: SHIPPED | OUTPATIENT
Start: 2023-07-28 | End: 2023-08-04

## 2023-07-28 NOTE — PROGRESS NOTES
Subjective     Patient ID: Magdalena Mane is a 78 y.o. female.    Chief Complaint: Annual Exam    HPI  Magdalena Mane is a 77 y.o.  female who presents with HTN, HLD, R CEA, afib, basal cell carcinoma on the nose, and iron def anemia is here for a f/u visit  -BP is good.   -pt has pain in her knees which bothers her at night. Unable to sleep at night due to pain. She takes tylenol daily but still has pain some nights. She did take tramadol in the past which helped.     Review of Systems   Constitutional:  Negative for activity change and unexpected weight change.   HENT:  Negative for hearing loss, rhinorrhea and trouble swallowing.    Eyes:  Negative for discharge and visual disturbance.   Respiratory:  Negative for chest tightness and wheezing.    Cardiovascular:  Negative for chest pain and palpitations.   Gastrointestinal:  Negative for blood in stool, constipation, diarrhea and vomiting.   Endocrine: Negative for polydipsia and polyuria.   Genitourinary:  Negative for difficulty urinating, dysuria, hematuria and menstrual problem.   Musculoskeletal:  Positive for arthralgias. Negative for joint swelling and neck pain.   Neurological:  Negative for weakness and headaches.   Psychiatric/Behavioral:  Negative for confusion and dysphoric mood.         Objective     Physical Exam  Vitals and nursing note reviewed.   Constitutional:       Appearance: Normal appearance. She is normal weight.   Eyes:      Conjunctiva/sclera: Conjunctivae normal.   Cardiovascular:      Rate and Rhythm: Normal rate and regular rhythm.      Heart sounds: Murmur heard.   Pulmonary:      Effort: Pulmonary effort is normal. No respiratory distress.      Breath sounds: Normal breath sounds.   Abdominal:      General: Bowel sounds are normal.   Musculoskeletal:      Right lower leg: No edema.      Left lower leg: No edema.   Skin:     General: Skin is warm.   Neurological:      Mental Status: She is alert and oriented to person,  place, and time.   Psychiatric:         Mood and Affect: Mood normal.         Behavior: Behavior normal.          Assessment and Plan     1. Screening mammogram for breast cancer  -     Mammo Digital Screening Bilat w/ Chele; Future; Expected date: 08/10/2023    2. Mixed hyperlipidemia  Assessment & Plan:  Continue atorvastatin. Checking lipid panel today.     Orders:  -     Lipid Panel; Future; Expected date: 07/28/2023    3. Primary hypertension  Assessment & Plan:  BP well controlled on current regimen, continue. Takes clonidine as needed.     Orders:  -     Basic Metabolic Panel; Future; Expected date: 07/28/2023  -     CBC Auto Differential; Future; Expected date: 07/28/2023    4. B12 deficiency  Assessment & Plan:  Checking b12 level today. Was low a year ago.     Orders:  -     VITAMIN B12; Future; Expected date: 07/28/2023    5. Arthritis of knee  Assessment & Plan:  Takes tylenol daily which does help but sometimes she is unable to find relief. Pt has taken tramadol in the past which helped. No hx of drug abuse. Will give her a few tablets to take as needed.      6. Paroxysmal atrial fibrillation  Assessment & Plan:  On eliquis, flecainide and verapamil. Continue. Rate controlled.       7. Iron deficiency anemia, unspecified iron deficiency anemia type  -     IRON AND TIBC; Future; Expected date: 07/28/2023  -     FERRITIN; Future; Expected date: 07/28/2023    Other orders  -     triamcinolone acetonide 0.1% (KENALOG) 0.1 % cream; Apply topically daily as needed (rash).  Dispense: 80 g; Refill: 1  -     traMADoL (ULTRAM) 50 mg tablet; Take 1 tablet (50 mg total) by mouth every 12 (twelve) hours as needed for Pain.  Dispense: 14 tablet; Refill: 0

## 2023-07-28 NOTE — ASSESSMENT & PLAN NOTE
Takes tylenol daily which does help but sometimes she is unable to find relief. Pt has taken tramadol in the past which helped. No hx of drug abuse. Will give her a few tablets to take as needed.

## 2023-07-31 ENCOUNTER — TELEPHONE (OUTPATIENT)
Dept: INTERNAL MEDICINE | Facility: CLINIC | Age: 78
End: 2023-07-31
Payer: MEDICARE

## 2023-07-31 NOTE — TELEPHONE ENCOUNTER
Called PT. Informed PT that her labs are stable. Informed PT Her iron levels are normal. Also informed PT Her b12 is high, so she can now stop taking b12.

## 2023-07-31 NOTE — TELEPHONE ENCOUNTER
----- Message from Geneva Dsouza MD sent at 7/31/2023  4:10 PM CDT -----  Pls let patient know that her labs are stable. Her iron levels are normal. Her b12 is high so she can now stop taking b12.

## 2023-08-13 ENCOUNTER — OFFICE VISIT (OUTPATIENT)
Dept: URGENT CARE | Facility: CLINIC | Age: 78
End: 2023-08-13
Payer: MEDICARE

## 2023-08-13 VITALS
TEMPERATURE: 98 F | SYSTOLIC BLOOD PRESSURE: 93 MMHG | HEART RATE: 56 BPM | DIASTOLIC BLOOD PRESSURE: 53 MMHG | RESPIRATION RATE: 18 BRPM | HEIGHT: 59 IN | BODY MASS INDEX: 25.2 KG/M2 | WEIGHT: 125 LBS | OXYGEN SATURATION: 96 %

## 2023-08-13 DIAGNOSIS — K04.7 DENTAL ABSCESS: Primary | ICD-10-CM

## 2023-08-13 DIAGNOSIS — R31.9 HEMATURIA, UNSPECIFIED TYPE: ICD-10-CM

## 2023-08-13 LAB
BILIRUB UR QL STRIP: NEGATIVE
GLUCOSE UR QL STRIP: NEGATIVE
KETONES UR QL STRIP: NEGATIVE
LEUKOCYTE ESTERASE UR QL STRIP: NEGATIVE
PH, POC UA: 7.5 (ref 5–8)
POC BLOOD, URINE: POSITIVE
POC NITRATES, URINE: NEGATIVE
PROT UR QL STRIP: NEGATIVE
SP GR UR STRIP: 1.01 (ref 1–1.03)
UROBILINOGEN UR STRIP-ACNC: ABNORMAL (ref 0.1–1.1)

## 2023-08-13 PROCEDURE — 99213 PR OFFICE/OUTPT VISIT, EST, LEVL III, 20-29 MIN: ICD-10-PCS | Mod: S$GLB,,, | Performed by: PHYSICIAN ASSISTANT

## 2023-08-13 PROCEDURE — 99213 OFFICE O/P EST LOW 20 MIN: CPT | Mod: S$GLB,,, | Performed by: PHYSICIAN ASSISTANT

## 2023-08-13 PROCEDURE — 81003 POCT URINALYSIS, DIPSTICK, AUTOMATED, W/O SCOPE: ICD-10-PCS | Mod: QW,S$GLB,, | Performed by: PHYSICIAN ASSISTANT

## 2023-08-13 PROCEDURE — 81003 URINALYSIS AUTO W/O SCOPE: CPT | Mod: QW,S$GLB,, | Performed by: PHYSICIAN ASSISTANT

## 2023-08-13 RX ORDER — AMOXICILLIN AND CLAVULANATE POTASSIUM 875; 125 MG/1; MG/1
1 TABLET, FILM COATED ORAL 2 TIMES DAILY
Qty: 20 TABLET | Refills: 0 | Status: SHIPPED | OUTPATIENT
Start: 2023-08-13 | End: 2023-08-23

## 2023-08-13 NOTE — PATIENT INSTRUCTIONS
If your condition worsens or fails to improve we recommend that you receive another evaluation at the ER immediately or contact your PCP to discuss your concerns or return here. You must understand that you've received an urgent care treatment only and that you may be released before all your medical problems are known or treated. You the patient will arrange for followup care as instructed.       Cranberry juice may help. Get the 100% cranberry juice and mix 4 oz of juice with 4 oz of water and drink this 8 oz glass of liquid once a day.   Increase water intake to at least 8-10 glasses/day.    Avoid caffeine, alcohol, or spicy foods as they irritate the bladder.    Take antibiotic as prescribed with food.  Tylenol for pain relief.  Reach out to your dentist for an appointment.  Should your symptoms persist or worsen please return for evaluation

## 2023-08-13 NOTE — PROGRESS NOTES
"Subjective:      Patient ID: Magdalena Mane is a 78 y.o. female.    Vitals:  height is 4' 11" (1.499 m) and weight is 56.7 kg (125 lb). Her oral temperature is 98 °F (36.7 °C). Her blood pressure is 93/53 (abnormal) and her pulse is 56 (abnormal). Her respiration is 18 and oxygen saturation is 96%.     Chief Complaint: Dysuria    This is a 78 y.o. female who presents today with a chief complaint of  dysuria and right jaw pain     Dysuria   This is a recurrent problem. The current episode started yesterday. The problem occurs every urination. The problem has been unchanged. The quality of the pain is described as burning. The pain is at a severity of 4/10. The pain is mild. There has been no fever. The fever has been present for Less than 1 day. She is Not sexually active. There is No history of pyelonephritis. Associated symptoms include urgency. Pertinent negatives include no behavior changes, chills, discharge, flank pain, frequency, hematuria, hesitancy, nausea, possible pregnancy, sweats, vomiting, weight loss, bubble bath use, constipation, rash or withholding. She has tried nothing for the symptoms. The treatment provided no relief. Her past medical history is significant for hypertension and recurrent UTIs. There is no history of catheterization, diabetes insipidus, diabetes mellitus, genitourinary reflux, kidney stones, a single kidney, STD, urinary stasis or a urological procedure.       Constitution: Negative for chills, sweating, fatigue and fever.   HENT:  Positive for ear pain and dental problem. Negative for ear discharge, tinnitus, hearing loss, drooling, mouth sores, tongue pain, tongue lesion, congestion, postnasal drip, sinus pain, sinus pressure, sore throat, trouble swallowing and voice change.    Neck: Negative for neck pain, neck stiffness and painful lymph nodes.   Cardiovascular:  Negative for chest pain and sob on exertion.   Respiratory:  Negative for cough, shortness of breath and " wheezing.    Gastrointestinal:  Negative for abdominal pain, nausea, vomiting, constipation and diarrhea.   Genitourinary:  Positive for dysuria and urgency. Negative for frequency, urine decreased, flank pain, hematuria, irregular menstruation, vaginal pain, vaginal discharge, vaginal bleeding and genital sore.   Musculoskeletal:  Negative for muscle cramps and muscle ache.   Skin:  Negative for color change, pale and rash.   Neurological:  Negative for dizziness, headaches and altered mental status.   Hematologic/Lymphatic: Negative for swollen lymph nodes.   Psychiatric/Behavioral:  Negative for altered mental status.       Past Medical History:   Diagnosis Date    Anemia     Atrial fibrillation     Basal cell carcinoma     DJD (degenerative joint disease) 12/12/2012    Obesity (BMI 30-39.9) 11/27/2015    Vaginal atrophy 4/1/2016       Past Surgical History:   Procedure Laterality Date    ADENOIDECTOMY      BREAST BIOPSY Left 1999    Excisional bx, benign cyst    BREAST SURGERY      CARDIOVERSION  02/2021    CAROTID ENDARTERECTOMY Right 04/07/2022    Procedure: ENDARTERECTOMY-CAROTID;  Surgeon: TUCKER Brantley III, MD;  Location: [facility] OR 2ND FLR;  Service: Peripheral Vascular;  Laterality: Right;    COLONOSCOPY  2012    normal    COLONOSCOPY N/A 06/28/2017    Procedure: COLONOSCOPY;  Surgeon: Markel Montemayor MD;  Location: [facility] ENDO (4TH FLR);  Service: Endoscopy;  Laterality: N/A;    EXCISION OF GANGLION OF WRIST Left 06/27/2018    Procedure: EXCISION, GANGLION CYST, WRIST - Left Volar wrist;  Surgeon: Mary Moore MD;  Location: [facility] OR 1ST FLR;  Service: Orthopedics;  Laterality: Left;    HIP SURGERY  05/05/2014    bilateral    JOINT REPLACEMENT Bilateral     MOLLY    TONSILLECTOMY      TREATMENT OF CARDIAC ARRHYTHMIA N/A 09/29/2020    Procedure: CARDIOVERSION;  Surgeon: Nigel García MD;  Location: [facility] EP LAB;  Service: Cardiology;  Laterality: N/A;  AF, LIGIA, DCCV, MAC, [place], 3 Prep    TREATMENT OF CARDIAC  ARRHYTHMIA N/A 2020    Procedure: Cardioversion or Defibrillation;  Surgeon: Nigel García MD;  Location: [facility] EP LAB;  Service: Cardiology;  Laterality: N/A;  AF, LIGIA, DCCV, MAC, [place], 3 Prep       Family History   Problem Relation Age of Onset    Heart disease Mother         pacemaker    Breast cancer Mother     Arthritis Mother         Hip pain - no surgery    Hyperlipidemia Mother     Cancer Mother         Breast    Depression Mother     Hypertension Mother     Scoliosis Father     Heart disease Father     Tuberculosis Father          1 lung from collapse lung    Heart attack Father     Heart failure Father     Hyperlipidemia Father     Hypertension Father     Alcohol abuse Father         Functional alcoholic    Arthritis Father         Scoliosis, hip pain,    COPD Father     Depression Father     Learning disabilities Father         Dislexia    Asthma Brother         As a child only    Stroke Maternal Grandmother     Miscarriages / Stillbirths Maternal Grandmother     Heart disease Paternal Grandmother     Dementia Paternal Grandmother     Mental illness Paternal Grandmother         Dementia with Psychotic Depression    Asthma Maternal Aunt         Chronic    Hypertension Maternal Aunt        Social History     Socioeconomic History    Marital status:    Occupational History    Occupation: retired   Tobacco Use    Smoking status: Former     Current packs/day: 0.00     Average packs/day: 1 pack/day for 18.5 years (18.5 ttl pk-yrs)     Types: Cigarettes     Start date: 9/15/1969     Quit date: 3/20/1988     Years since quittin.4    Smokeless tobacco: Never    Tobacco comments:     Lesrned in 1/2 hr. 10 year to quit   Substance and Sexual Activity    Alcohol use: Not Currently     Alcohol/week: 3.0 standard drinks of alcohol     Types: 3 Glasses of wine per week    Drug use: No    Sexual activity: Yes     Partners: Male     Birth control/protection: Post-menopausal, None     Comment: Age   Social  History Narrative     to HEATH Montemayor    Nurse    Likes to garden     Social Determinants of Health     Financial Resource Strain: Low Risk  (2/13/2023)    Overall Financial Resource Strain (CARDIA)     Difficulty of Paying Living Expenses: Not very hard   Food Insecurity: No Food Insecurity (2/13/2023)    Hunger Vital Sign     Worried About Running Out of Food in the Last Year: Never true     Ran Out of Food in the Last Year: Never true   Transportation Needs: No Transportation Needs (2/13/2023)    PRAPARE - Transportation     Lack of Transportation (Medical): No     Lack of Transportation (Non-Medical): No   Physical Activity: Sufficiently Active (2/13/2023)    Exercise Vital Sign     Days of Exercise per Week: 5 days     Minutes of Exercise per Session: 60 min   Stress: Stress Concern Present (2/13/2023)    [place] [employer] - Occupational Stress Questionnaire     Feeling of Stress : To some extent   Social Connections: Socially Integrated (2/13/2023)    Social Connection and Isolation Panel [NHANES]     Frequency of Communication with Friends and Family: More than three times a week     Frequency of Social Gatherings with Friends and Family: More than three times a week     Attends Yazidism Services: More than 4 times per year     Active Member of Clubs or Organizations: Yes     Attends Club or Organization Meetings: More than 4 times per year     Marital Status:    Housing Stability: Unknown (2/13/2023)    Housing Stability Vital Sign     Unable to Pay for Housing in the Last Year: No     Unstable Housing in the Last Year: No       Current Outpatient Medications   Medication Sig Dispense Refill    acetaminophen (TYLENOL) 500 MG tablet Take 500 mg by mouth 2 (two) times daily.      alendronate (FOSAMAX) 70 MG tablet TAKE 1 TABLET BY MOUTH  WEEKLY WITH 8 OZ OF PLAIN  WATER 30 MINUTES BEFORE  FIRST FOOD, DRINK OR MEDS.  STAY UPRIGHT FOR 30 MINS 12 tablet 3    apixaban  (ELIQUIS) 5 mg Tab Take 1 tablet (5 mg total) by mouth 2 (two) times daily. 180 tablet 3    aspirin (ECOTRIN) 81 MG EC tablet Take 81 mg by mouth nightly.      atorvastatin (LIPITOR) 20 MG tablet TAKE 1 TABLET BY MOUTH EVERY DAY 90 tablet 3    calcium-vitamin D 250-100 mg-unit per tablet Take 1 tablet by mouth Daily.      cholecalciferol, vitamin D3, (VITAMIN D3 ORAL) Take 2,000 Units by mouth once daily.      cloNIDine (CATAPRES) 0.1 MG tablet Take 1 tablet (0.1 mg total) by mouth every 6 (six) hours as needed (). 60 tablet 11    diphenhydrAMINE (BENADRYL) 25 mg capsule Take 25 mg by mouth every 6 (six) hours as needed for Itching.      ferrous sulfate (FEOSOL) Tab tablet Take 1 tablet by mouth every evening. PATIENT TAKING 2 X WEEKLY IN THE EVENING (M-W-F)      lactase (LACTAID) 3,000 unit tablet Take 1 tablet by mouth 3 (three) times daily as needed.      loratadine (CLARITIN) 10 mg tablet Take 10 mg by mouth every morning.      LORazepam (ATIVAN) 1 MG tablet Take 1 mg by mouth every 8 (eight) hours as needed.      mupirocin (BACTROBAN) 2 % ointment Apply topically 3 (three) times daily. 1 g 0    spironolactone (ALDACTONE) 25 MG tablet Take 1 tablet (25 mg total) by mouth once daily. 90 tablet 3    traZODone (DESYREL) 50 MG tablet TAKE 1 TABLET(50 MG) BY MOUTH EVERY NIGHT AS NEEDED FOR INSOMNIA 90 tablet 3    triamcinolone acetonide 0.1% (KENALOG) 0.1 % cream Apply topically daily as needed (rash). 80 g 1    verapamiL (VERELAN) 180 MG C24P Take 1 capsule (180 mg total) by mouth once daily. Dose decrease. 90 capsule 3    amoxicillin-clavulanate 875-125mg (AUGMENTIN) 875-125 mg per tablet Take 1 tablet by mouth 2 (two) times daily. for 10 days 20 tablet 0    candesartan (ATACAND) 16 MG tablet Take 1 tablet (16 mg total) by mouth once daily. 90 tablet 3    flecainide (TAMBOCOR) 50 MG Tab Take 1 tablet (50 mg total) by mouth every 12 (twelve) hours. 180 tablet 3     No current facility-administered medications  for this visit.       Review of patient's allergies indicates:   Allergen Reactions    Prednisone      ELEVATED B/P FOR SEVERAL DAYS/WENT TO ER    Lactose        Objective:     Physical Exam   Constitutional: She is oriented to person, place, and time. She appears well-developed. She is cooperative.  Non-toxic appearance. She does not appear ill. No distress. normal  HENT:   Head: Normocephalic and atraumatic.   Ears:   Right Ear: Hearing, tympanic membrane, external ear and ear canal normal. impacted cerumen  Left Ear: Hearing, tympanic membrane, external ear and ear canal normal. impacted cerumen  Nose: Nose normal. No mucosal edema, rhinorrhea, nasal deformity or congestion. No epistaxis. Right sinus exhibits no maxillary sinus tenderness and no frontal sinus tenderness. Left sinus exhibits no maxillary sinus tenderness and no frontal sinus tenderness.   Mouth/Throat: Uvula is midline, oropharynx is clear and moist and mucous membranes are normal. Mucous membranes are moist. No trismus in the jaw. Dental abscesses and dental caries present. No uvula swelling. No oropharyngeal exudate, posterior oropharyngeal edema or posterior oropharyngeal erythema.       Eyes: Conjunctivae and lids are normal. Right eye exhibits no discharge. Left eye exhibits no discharge. No scleral icterus.   Neck: Trachea normal and phonation normal. Neck supple. No edema present. No erythema present. No neck rigidity present.   Cardiovascular: Normal rate, regular rhythm and normal heart sounds.   No murmur heard.Exam reveals no gallop and no friction rub.   Pulmonary/Chest: Effort normal and breath sounds normal. No stridor. No respiratory distress. She has no decreased breath sounds. She has no wheezes. She has no rhonchi. She has no rales.   Abdominal: Normal appearance and bowel sounds are normal. She exhibits no distension and no mass. Soft. flat abdomen There is no abdominal tenderness. There is no rebound, no guarding, no left CVA  tenderness and no right CVA tenderness. No hernia.   Musculoskeletal:      Cervical back: She exhibits tenderness.   Lymphadenopathy:     She has cervical adenopathy.   Neurological: She is alert and oriented to person, place, and time. She has normal strength. She displays no weakness. She exhibits normal muscle tone. Coordination normal.   Skin: Skin is warm, dry, intact, not diaphoretic and not pale.   Psychiatric: Her speech is normal and behavior is normal. Mood, judgment and thought content normal.   Nursing note and vitals reviewed.    Results for orders placed or performed in visit on 08/13/23   POCT Urinalysis, Dipstick, Automated, W/O Scope   Result Value Ref Range    POC Blood, Urine Positive (A) Negative    POC Bilirubin, Urine Negative Negative    POC Urobilinogen, Urine norm 0.1 - 1.1    POC Ketones, Urine Negative Negative    POC Protein, Urine Negative Negative    POC Nitrates, Urine Negative Negative    POC Glucose, Urine Negative Negative    pH, UA 7.5 5 - 8    POC Specific Gravity, Urine 1.010 1.003 - 1.029    POC Leukocytes, Urine Negative Negative         Assessment:     1. Dental abscess    2. Hematuria, unspecified type        Plan:       Dental abscess  -     amoxicillin-clavulanate 875-125mg (AUGMENTIN) 875-125 mg per tablet; Take 1 tablet by mouth 2 (two) times daily. for 10 days  Dispense: 20 tablet; Refill: 0    Hematuria, unspecified type  -     POCT Urinalysis, Dipstick, Automated, W/O Scope    Results reviewed, pt will f/u with pcp for hematuria.     Patient Instructions   If your condition worsens or fails to improve we recommend that you receive another evaluation at the ER immediately or contact your PCP to discuss your concerns or return here. You must understand that you've received an urgent care treatment only and that you may be released before all your medical problems are known or treated. You the patient will arrange for followup care as instructed.       Cranberry juice may  help. Get the 100% cranberry juice and mix 4 oz of juice with 4 oz of water and drink this 8 oz glass of liquid once a day.   Increase water intake to at least 8-10 glasses/day.    Avoid caffeine, alcohol, or spicy foods as they irritate the bladder.    Take antibiotic as prescribed with food.  Tylenol for pain relief.  Reach out to your dentist for an appointment.  Should your symptoms persist or worsen please return for evaluation

## 2023-08-14 ENCOUNTER — TELEPHONE (OUTPATIENT)
Dept: INTERNAL MEDICINE | Facility: CLINIC | Age: 78
End: 2023-08-14

## 2023-08-14 ENCOUNTER — NURSE TRIAGE (OUTPATIENT)
Dept: ADMINISTRATIVE | Facility: CLINIC | Age: 78
End: 2023-08-14
Payer: MEDICARE

## 2023-08-14 ENCOUNTER — OFFICE VISIT (OUTPATIENT)
Dept: INTERNAL MEDICINE | Facility: CLINIC | Age: 78
End: 2023-08-14
Payer: MEDICARE

## 2023-08-14 VITALS
TEMPERATURE: 99 F | HEIGHT: 59 IN | RESPIRATION RATE: 16 BRPM | HEART RATE: 66 BPM | SYSTOLIC BLOOD PRESSURE: 108 MMHG | WEIGHT: 124.56 LBS | DIASTOLIC BLOOD PRESSURE: 42 MMHG | OXYGEN SATURATION: 95 % | BODY MASS INDEX: 25.11 KG/M2

## 2023-08-14 DIAGNOSIS — K04.7 TOOTH ABSCESS: ICD-10-CM

## 2023-08-14 DIAGNOSIS — Z01.818 PREOPERATIVE CLEARANCE: ICD-10-CM

## 2023-08-14 PROCEDURE — 1157F PR ADVANCE CARE PLAN OR EQUIV PRESENT IN MEDICAL RECORD: ICD-10-PCS | Mod: CPTII,S$GLB,, | Performed by: STUDENT IN AN ORGANIZED HEALTH CARE EDUCATION/TRAINING PROGRAM

## 2023-08-14 PROCEDURE — 3288F FALL RISK ASSESSMENT DOCD: CPT | Mod: CPTII,S$GLB,, | Performed by: STUDENT IN AN ORGANIZED HEALTH CARE EDUCATION/TRAINING PROGRAM

## 2023-08-14 PROCEDURE — 3074F SYST BP LT 130 MM HG: CPT | Mod: CPTII,S$GLB,, | Performed by: STUDENT IN AN ORGANIZED HEALTH CARE EDUCATION/TRAINING PROGRAM

## 2023-08-14 PROCEDURE — 3078F PR MOST RECENT DIASTOLIC BLOOD PRESSURE < 80 MM HG: ICD-10-PCS | Mod: CPTII,S$GLB,, | Performed by: STUDENT IN AN ORGANIZED HEALTH CARE EDUCATION/TRAINING PROGRAM

## 2023-08-14 PROCEDURE — 99213 OFFICE O/P EST LOW 20 MIN: CPT | Mod: S$GLB,,, | Performed by: STUDENT IN AN ORGANIZED HEALTH CARE EDUCATION/TRAINING PROGRAM

## 2023-08-14 PROCEDURE — 3288F PR FALLS RISK ASSESSMENT DOCUMENTED: ICD-10-PCS | Mod: CPTII,S$GLB,, | Performed by: STUDENT IN AN ORGANIZED HEALTH CARE EDUCATION/TRAINING PROGRAM

## 2023-08-14 PROCEDURE — 3078F DIAST BP <80 MM HG: CPT | Mod: CPTII,S$GLB,, | Performed by: STUDENT IN AN ORGANIZED HEALTH CARE EDUCATION/TRAINING PROGRAM

## 2023-08-14 PROCEDURE — 1125F AMNT PAIN NOTED PAIN PRSNT: CPT | Mod: CPTII,S$GLB,, | Performed by: STUDENT IN AN ORGANIZED HEALTH CARE EDUCATION/TRAINING PROGRAM

## 2023-08-14 PROCEDURE — 99999 PR PBB SHADOW E&M-EST. PATIENT-LVL IV: ICD-10-PCS | Mod: PBBFAC,,, | Performed by: STUDENT IN AN ORGANIZED HEALTH CARE EDUCATION/TRAINING PROGRAM

## 2023-08-14 PROCEDURE — 1159F PR MEDICATION LIST DOCUMENTED IN MEDICAL RECORD: ICD-10-PCS | Mod: CPTII,S$GLB,, | Performed by: STUDENT IN AN ORGANIZED HEALTH CARE EDUCATION/TRAINING PROGRAM

## 2023-08-14 PROCEDURE — 1125F PR PAIN SEVERITY QUANTIFIED, PAIN PRESENT: ICD-10-PCS | Mod: CPTII,S$GLB,, | Performed by: STUDENT IN AN ORGANIZED HEALTH CARE EDUCATION/TRAINING PROGRAM

## 2023-08-14 PROCEDURE — 99999 PR PBB SHADOW E&M-EST. PATIENT-LVL IV: CPT | Mod: PBBFAC,,, | Performed by: STUDENT IN AN ORGANIZED HEALTH CARE EDUCATION/TRAINING PROGRAM

## 2023-08-14 PROCEDURE — 1157F ADVNC CARE PLAN IN RCRD: CPT | Mod: CPTII,S$GLB,, | Performed by: STUDENT IN AN ORGANIZED HEALTH CARE EDUCATION/TRAINING PROGRAM

## 2023-08-14 PROCEDURE — 1101F PT FALLS ASSESS-DOCD LE1/YR: CPT | Mod: CPTII,S$GLB,, | Performed by: STUDENT IN AN ORGANIZED HEALTH CARE EDUCATION/TRAINING PROGRAM

## 2023-08-14 PROCEDURE — 1159F MED LIST DOCD IN RCRD: CPT | Mod: CPTII,S$GLB,, | Performed by: STUDENT IN AN ORGANIZED HEALTH CARE EDUCATION/TRAINING PROGRAM

## 2023-08-14 PROCEDURE — 99213 PR OFFICE/OUTPT VISIT, EST, LEVL III, 20-29 MIN: ICD-10-PCS | Mod: S$GLB,,, | Performed by: STUDENT IN AN ORGANIZED HEALTH CARE EDUCATION/TRAINING PROGRAM

## 2023-08-14 PROCEDURE — 3074F PR MOST RECENT SYSTOLIC BLOOD PRESSURE < 130 MM HG: ICD-10-PCS | Mod: CPTII,S$GLB,, | Performed by: STUDENT IN AN ORGANIZED HEALTH CARE EDUCATION/TRAINING PROGRAM

## 2023-08-14 PROCEDURE — 1101F PR PT FALLS ASSESS DOC 0-1 FALLS W/OUT INJ PAST YR: ICD-10-PCS | Mod: CPTII,S$GLB,, | Performed by: STUDENT IN AN ORGANIZED HEALTH CARE EDUCATION/TRAINING PROGRAM

## 2023-08-14 NOTE — TELEPHONE ENCOUNTER
----- Message from Jen Segovia sent at 8/14/2023  4:24 PM CDT -----  Contact: [phone] Patient  Patient would like to get medical advice.  Symptoms (please be specific):   Pt states she needs guidelines for her temperature. Pt states her temp is now 100.5 and her b/p is 174/77  How long have you had these symptoms: today  Would you like a call back, or a response through your [facility] portal?:   both  Pharmacy name and phone # (copy from chart):   N/A  Comments:

## 2023-08-14 NOTE — ASSESSMENT & PLAN NOTE
Needs dental extraction on the right side. Has swelling and pain of that area on the right side. She Is on augmentin for the tooth. Low bleeding risk for this procedure so okay to stop asa 5-7 days prior to the dental extraction and eliquis should be held 1-2 days prior to the extraction and resumed the day after the procedure. She is in NSR. Last EKG 6/9/23 was sinus rhythm.

## 2023-08-14 NOTE — PROGRESS NOTES
Subjective     Patient ID: Magdalena Mane is a 78 y.o. female.    Chief Complaint: Urinary Tract Infection, Oral Swelling, Abscess, and Hypertension    Urinary Tract Infection   Pertinent negatives include no chills, hematuria, nausea, vomiting or constipation. Her past medical history is significant for hypertension.   Abscess  Associated Symptoms: no fever, no chills  Hypertension  Pertinent negatives include no chest pain, headaches, palpitations or shortness of breath.     Magdalena Mane is a 77 y.o.  female who presents with HTN, HLD, R CEA, afib, basal cell carcinoma on the nose, and iron def anemia is here for right facial swelling/tooth abscess and possible UTI. The right sided facial swelling started Saturday morning. She needed to get a tooth removed for some time on the same side but dentist was waiting to make sure okay to stop eliquis. She went to  where they gave her augmentin which should treat the tooth and UTI. Last saw EP dr. García on 6/9/23. Her ekg at that visit was NSR. Had temp of 100.3 day before  -Saw dentist today. She does not want to extract the tooth until swelling goes down. She wanted pt to see to see how long before she should stop the eliquis and aspirin. Takes asa due to R CEA which was done April 2022.     Review of Systems   Constitutional:  Negative for chills and fever.   HENT:  Positive for sore throat. Negative for rhinorrhea.    Respiratory:  Negative for cough, chest tightness and shortness of breath.    Cardiovascular:  Negative for chest pain and palpitations.   Gastrointestinal:  Negative for abdominal pain, blood in stool, constipation, diarrhea, nausea and vomiting.   Genitourinary:  Negative for dysuria and hematuria.   Neurological:  Negative for dizziness, light-headedness and headaches.          Objective     Physical Exam  Vitals and nursing note reviewed.   Constitutional:       Appearance: Normal appearance.   HENT:      Mouth/Throat:       Comments: Right sided jaw swelling noted. Warm to touch. Mild erythema present. No purulent drainage noted in her mouth.   Eyes:      Conjunctiva/sclera: Conjunctivae normal.      Pupils: Pupils are equal, round, and reactive to light.   Cardiovascular:      Rate and Rhythm: Normal rate and regular rhythm.      Heart sounds: Murmur heard.   Pulmonary:      Effort: Pulmonary effort is normal. No respiratory distress.   Musculoskeletal:      Cervical back: Normal range of motion.   Skin:     General: Skin is warm.   Neurological:      Mental Status: She is alert and oriented to person, place, and time.   Psychiatric:         Mood and Affect: Mood normal.         Behavior: Behavior normal.            Assessment and Plan     1. Preoperative clearance  Assessment & Plan:  Needs dental extraction on the right side. Has swelling and pain of that area on the right side. She Is on augmentin for the tooth. Low bleeding risk for this procedure so okay to stop asa 5-7 days prior to the dental extraction and eliquis should be held 1-2 days prior to the extraction and resumed the day after the procedure. She is in NSR. Last EKG 6/9/23 was sinus rhythm.      2. Tooth abscess  Assessment & Plan:  Which is causing the facial swelling on the right side. Saw dentist this morning. Went to  yesterday morning where she received augmentin which she is taking as prescribed. Swelling slightly better. Continue abx and f/u with dentist.        I spent a total of 20 minutes on the day of the visit.  This includes face to face time and non-face to face time preparing to see the patient (eg, review of tests), obtaining and/or reviewing separately obtained history, documenting clinical information in the electronic or other health record, independently interpreting results and communicating results to the patient/family/caregiver, or care coordinator.

## 2023-08-14 NOTE — PATIENT INSTRUCTIONS
stop aspirin 5-7 days prior to the dental extraction and eliquis should be held 1-2 days prior to the extraction and resumed the day after the procedure. So if procedure is on Thursday, last day for eliquis should be Tuesday or Wednesday.

## 2023-08-14 NOTE — ASSESSMENT & PLAN NOTE
Which is causing the facial swelling on the right side. Saw dentist this morning. Went to  yesterday morning where she received augmentin which she is taking as prescribed. Swelling slightly better. Continue abx and f/u with dentist.

## 2023-08-14 NOTE — TELEPHONE ENCOUNTER
----- Message from Jen Segovia sent at 8/14/2023  4:24 PM CDT -----  Contact: [phone] Patient  Patient would like to get medical advice.  Symptoms (please be specific):   Pt states she needs guidelines for her temperature. Pt states her temp is now 100.5 and her b/p is 174/77  How long have you had these symptoms: today  Would you like a call back, or a response through your [facility] portal?:   both  Pharmacy name and phone # (copy from chart):   N/A  Comments:

## 2023-08-14 NOTE — TELEPHONE ENCOUNTER
Called PT. Informed PT the DR stated to take tylenol 650 mg three times a day as needed for temp above 100.4.

## 2023-08-14 NOTE — TELEPHONE ENCOUNTER
PT stated she needs guidelines for her temperature. PT stated her temperature is now 100.5 and her blood pressure is 174/77.      *Please Advise.

## 2023-08-14 NOTE — TELEPHONE ENCOUNTER
Called PT. Informed PT the DR stated keep an eye on her BP since that is a lot higher than what it was in clinic.

## 2023-08-14 NOTE — TELEPHONE ENCOUNTER
----- Message from Jen Segovia sent at 8/14/2023  4:24 PM CDT -----  Contact: [phone] Patient  Patient would like to get medical advice.  Symptoms (please be specific):   Pt states she needs guidelines for her temperature. Pt states her temp is now 100.5 and her b/p is 174/77  How long have you had these symptoms: today  Would you like a call back, or a response through your [facility] portal?:   both  Pharmacy name and phone # (copy from chart):   N/A  Comments:

## 2023-08-15 ENCOUNTER — TELEPHONE (OUTPATIENT)
Dept: INTERNAL MEDICINE | Facility: CLINIC | Age: 78
End: 2023-08-15
Payer: MEDICARE

## 2023-08-15 NOTE — TELEPHONE ENCOUNTER
Patient states that she has been diagnosed with a dental abscess. She has been taking tylenol for a fever and pain. She has facial swelling with a hard area around it; not worse than when in the clinic this morning. Fever is currently 101.6 degrees. She is also struggling with elevated BP readings this evening. BP is currently 174/71 with a heart rate of 68 using the digital health monitor. She has been using a different monitor to check her BP, and the last reading was 174/77 with a heart rate of 56 (around 1600). She is due for a dose of Verapamil tonight. Instructed to go ahead and take the medication now. Contacted the on call provider, Dr. Jernigan, who advised for pt to recheck BP 30 minutes after taking Verapamil. If BP is still above 140/90, can take a clonidine. Recheck 30 minutes after that medication. If BP is still elevated, pt to go to the ED. If pt continues to run a temp over 100.4 throughout the night with the use of tylenol, will need to go to the ED for further evaluation. Relayed information to the patient. Offered to call back and check in within the hour. Pt accepted offer; scheduled nurse call back.     Reason for Disposition   [1] Taking antibiotics > 24 hours AND [2] symptoms WORSE    Additional Information   Negative: SEVERE difficulty breathing (e.g., struggling for each breath, speaks in single words)   Negative: Sounds like a life-threatening emergency to the triager   Negative: MODERATE difficulty breathing (e.g., speaks in phrases, SOB even at rest, pulse 100-120)   Negative: Fever > 103 F (39.4 C)   Negative: Patient sounds very sick or weak to the triager    Protocols used: Infection on Antibiotic Follow-up Call-A-

## 2023-08-15 NOTE — TELEPHONE ENCOUNTER
Attempted to call pt back about blood pressure but no response at this time. Left VM to call office when available.

## 2023-08-15 NOTE — TELEPHONE ENCOUNTER
Called pt back to check in. BP is now 154/63 using the digital health cuff. She only took the verapamil. She states that she will hold off on the clonidine for now and continue to monitor BP. She plans to take additional tylenol for the pain and rest. Reiterated ED parameters. Instructed to call back with further concerns.

## 2023-08-18 ENCOUNTER — TELEPHONE (OUTPATIENT)
Dept: CARDIOLOGY | Facility: CLINIC | Age: 78
End: 2023-08-18
Payer: MEDICARE

## 2023-08-18 NOTE — TELEPHONE ENCOUNTER
Notified pt letter has been sent to dental office regarding holding ASA and eliquis prior to procedure. Pt verbalized understanding.

## 2023-08-18 NOTE — TELEPHONE ENCOUNTER
----- Message from Eli Cutler RN sent at 8/17/2023  3:12 PM CDT -----    ----- Message -----  From: Anna Butler  Sent: 8/17/2023   3:02 PM CDT  To: [facility] Staff    Name of Who is Calling:EZRA Pérez [[id number]]                What is the request in detail: Her dentist Dr. Padmini Lovett is faxing over some paperwork for you to fill out.Please call back to further assist.                 Can the clinic reply by [facility]: No                What Number to Call Back if not in [facility]:[phone]

## 2023-08-22 ENCOUNTER — HOSPITAL ENCOUNTER (OUTPATIENT)
Dept: RADIOLOGY | Facility: HOSPITAL | Age: 78
Discharge: HOME | End: 2023-08-22
Attending: STUDENT IN AN ORGANIZED HEALTH CARE EDUCATION/TRAINING PROGRAM
Payer: MEDICARE

## 2023-08-22 DIAGNOSIS — Z12.31 SCREENING MAMMOGRAM FOR BREAST CANCER: ICD-10-CM

## 2023-08-22 PROCEDURE — 77067 SCR MAMMO BI INCL CAD: CPT | Mod: 26,,, | Performed by: RADIOLOGY

## 2023-08-22 PROCEDURE — 77063 MAMMO DIGITAL SCREENING BILAT WITH TOMO: ICD-10-PCS | Mod: 26,,, | Performed by: RADIOLOGY

## 2023-08-22 PROCEDURE — 77067 SCR MAMMO BI INCL CAD: CPT | Mod: TC,PO

## 2023-08-22 PROCEDURE — 77063 BREAST TOMOSYNTHESIS BI: CPT | Mod: 26,,, | Performed by: RADIOLOGY

## 2023-08-22 PROCEDURE — 77067 MAMMO DIGITAL SCREENING BILAT WITH TOMO: ICD-10-PCS | Mod: 26,,, | Performed by: RADIOLOGY

## 2023-08-28 ENCOUNTER — TELEPHONE (OUTPATIENT)
Dept: INTERNAL MEDICINE | Facility: CLINIC | Age: 78
End: 2023-08-28
Payer: MEDICARE

## 2023-08-28 NOTE — TELEPHONE ENCOUNTER
----- Message from Geneva Dsouza MD sent at 8/28/2023  4:53 PM CDT -----  Let patient know that mammogram is negative

## 2023-09-07 ENCOUNTER — OFFICE VISIT (OUTPATIENT)
Dept: CARDIOLOGY | Facility: CLINIC | Age: 78
End: 2023-09-07
Payer: MEDICARE

## 2023-09-07 VITALS
SYSTOLIC BLOOD PRESSURE: 134 MMHG | HEART RATE: 65 BPM | DIASTOLIC BLOOD PRESSURE: 56 MMHG | OXYGEN SATURATION: 98 % | WEIGHT: 123.88 LBS | BODY MASS INDEX: 25.02 KG/M2

## 2023-09-07 DIAGNOSIS — I10 PRIMARY HYPERTENSION: ICD-10-CM

## 2023-09-07 DIAGNOSIS — I48.0 PAROXYSMAL ATRIAL FIBRILLATION: ICD-10-CM

## 2023-09-07 DIAGNOSIS — E78.2 MIXED HYPERLIPIDEMIA: Primary | ICD-10-CM

## 2023-09-07 DIAGNOSIS — I65.23 CAROTID STENOSIS, BILATERAL: ICD-10-CM

## 2023-09-07 DIAGNOSIS — Z98.890 S/P CAROTID ENDARTERECTOMY: ICD-10-CM

## 2023-09-07 PROCEDURE — 3078F PR MOST RECENT DIASTOLIC BLOOD PRESSURE < 80 MM HG: ICD-10-PCS | Mod: CPTII,S$GLB,, | Performed by: INTERNAL MEDICINE

## 2023-09-07 PROCEDURE — 99999 PR PBB SHADOW E&M-EST. PATIENT-LVL III: ICD-10-PCS | Mod: PBBFAC,,, | Performed by: INTERNAL MEDICINE

## 2023-09-07 PROCEDURE — 99214 OFFICE O/P EST MOD 30 MIN: CPT | Mod: S$GLB,,, | Performed by: INTERNAL MEDICINE

## 2023-09-07 PROCEDURE — 1126F AMNT PAIN NOTED NONE PRSNT: CPT | Mod: CPTII,S$GLB,, | Performed by: INTERNAL MEDICINE

## 2023-09-07 PROCEDURE — 1157F PR ADVANCE CARE PLAN OR EQUIV PRESENT IN MEDICAL RECORD: ICD-10-PCS | Mod: CPTII,S$GLB,, | Performed by: INTERNAL MEDICINE

## 2023-09-07 PROCEDURE — 3075F SYST BP GE 130 - 139MM HG: CPT | Mod: CPTII,S$GLB,, | Performed by: INTERNAL MEDICINE

## 2023-09-07 PROCEDURE — 1126F PR PAIN SEVERITY QUANTIFIED, NO PAIN PRESENT: ICD-10-PCS | Mod: CPTII,S$GLB,, | Performed by: INTERNAL MEDICINE

## 2023-09-07 PROCEDURE — 3078F DIAST BP <80 MM HG: CPT | Mod: CPTII,S$GLB,, | Performed by: INTERNAL MEDICINE

## 2023-09-07 PROCEDURE — 1101F PR PT FALLS ASSESS DOC 0-1 FALLS W/OUT INJ PAST YR: ICD-10-PCS | Mod: CPTII,S$GLB,, | Performed by: INTERNAL MEDICINE

## 2023-09-07 PROCEDURE — 3288F PR FALLS RISK ASSESSMENT DOCUMENTED: ICD-10-PCS | Mod: CPTII,S$GLB,, | Performed by: INTERNAL MEDICINE

## 2023-09-07 PROCEDURE — 1101F PT FALLS ASSESS-DOCD LE1/YR: CPT | Mod: CPTII,S$GLB,, | Performed by: INTERNAL MEDICINE

## 2023-09-07 PROCEDURE — 3075F PR MOST RECENT SYSTOLIC BLOOD PRESS GE 130-139MM HG: ICD-10-PCS | Mod: CPTII,S$GLB,, | Performed by: INTERNAL MEDICINE

## 2023-09-07 PROCEDURE — 99999 PR PBB SHADOW E&M-EST. PATIENT-LVL III: CPT | Mod: PBBFAC,,, | Performed by: INTERNAL MEDICINE

## 2023-09-07 PROCEDURE — 99214 PR OFFICE/OUTPT VISIT, EST, LEVL IV, 30-39 MIN: ICD-10-PCS | Mod: S$GLB,,, | Performed by: INTERNAL MEDICINE

## 2023-09-07 PROCEDURE — 1157F ADVNC CARE PLAN IN RCRD: CPT | Mod: CPTII,S$GLB,, | Performed by: INTERNAL MEDICINE

## 2023-09-07 PROCEDURE — 3288F FALL RISK ASSESSMENT DOCD: CPT | Mod: CPTII,S$GLB,, | Performed by: INTERNAL MEDICINE

## 2023-09-07 NOTE — PROGRESS NOTES
Cardiology    9/7/2023  10:30 AM    Problem list  Patient Active Problem List   Diagnosis    DJD (degenerative joint disease)    HTN (hypertension)    Iron deficiency anemia    Vaginal atrophy    Overweight    Ganglion cyst    Balance problems    Unsteady gait    Paroxysmal tachycardia    Atrial fibrillation    S/P carotid endarterectomy    Sinus bradycardia    Heat syncope    Bilateral carotid bruits    Bilateral carotid artery stenosis    Compression fracture of thoracic vertebra with routine healing    Vitamin D insufficiency    Stress    Atherosclerosis of aorta    Poor posture    Decreased strength of trunk and back    Dyspnea    Acute heart failure, unspecified heart failure type    Hyperlipidemia    Osteoporosis    Insomnia    Chronic diastolic heart failure    B12 deficiency    Arthritis of knee    Preoperative clearance    Tooth abscess       CC:  Follow-up    HPI:  She is been doing well.  She denies any chest pain, shortness breath, palpitation or syncope.  She denies any TIA or CVA.  She remains active.  She is doing gardening.  She denies any bleeding.    Medications  Current Outpatient Medications   Medication Sig Dispense Refill    acetaminophen (TYLENOL) 500 MG tablet Take 500 mg by mouth 2 (two) times daily.      alendronate (FOSAMAX) 70 MG tablet TAKE 1 TABLET BY MOUTH  WEEKLY WITH 8 OZ OF PLAIN  WATER 30 MINUTES BEFORE  FIRST FOOD, DRINK OR MEDS.  STAY UPRIGHT FOR 30 MINS 12 tablet 3    apixaban (ELIQUIS) 5 mg Tab Take 1 tablet (5 mg total) by mouth 2 (two) times daily. 180 tablet 3    aspirin (ECOTRIN) 81 MG EC tablet Take 81 mg by mouth nightly.      atorvastatin (LIPITOR) 20 MG tablet TAKE 1 TABLET BY MOUTH EVERY DAY 90 tablet 3    calcium-vitamin D 250-100 mg-unit per tablet Take 1 tablet by mouth Daily.      cholecalciferol, vitamin D3, (VITAMIN D3 ORAL) Take 2,000 Units by mouth once daily.      cloNIDine (CATAPRES) 0.1 MG tablet Take 1 tablet (0.1 mg total) by mouth every 6 (six)  hours as needed (). 60 tablet 11    diphenhydrAMINE (BENADRYL) 25 mg capsule Take 25 mg by mouth every 6 (six) hours as needed for Itching.      lactase (LACTAID) 3,000 unit tablet Take 1 tablet by mouth 3 (three) times daily as needed.      loratadine (CLARITIN) 10 mg tablet Take 10 mg by mouth every morning.      mupirocin (BACTROBAN) 2 % ointment Apply topically 3 (three) times daily. 1 g 0    spironolactone (ALDACTONE) 25 MG tablet Take 1 tablet (25 mg total) by mouth once daily. 90 tablet 3    traZODone (DESYREL) 50 MG tablet TAKE 1 TABLET(50 MG) BY MOUTH EVERY NIGHT AS NEEDED FOR INSOMNIA 90 tablet 3    triamcinolone acetonide 0.1% (KENALOG) 0.1 % cream Apply topically daily as needed (rash). 80 g 1    verapamiL (VERELAN) 180 MG C24P Take 1 capsule (180 mg total) by mouth once daily. Dose decrease. 90 capsule 3    candesartan (ATACAND) 16 MG tablet Take 1 tablet (16 mg total) by mouth once daily. 90 tablet 3    ferrous sulfate (FEOSOL) Tab tablet Take 1 tablet by mouth every evening. PATIENT TAKING 2 X WEEKLY IN THE EVENING (M-W-F)      flecainide (TAMBOCOR) 50 MG Tab Take 1 tablet (50 mg total) by mouth every 12 (twelve) hours. 180 tablet 3    LORazepam (ATIVAN) 1 MG tablet Take 1 mg by mouth every 8 (eight) hours as needed.       No current facility-administered medications for this visit.      Prior to Admission medications    Medication Sig Start Date End Date Taking? Authorizing Provider   acetaminophen (TYLENOL) 500 MG tablet Take 500 mg by mouth 2 (two) times daily.   Yes Provider, Historical   alendronate (FOSAMAX) 70 MG tablet TAKE 1 TABLET BY MOUTH  WEEKLY WITH 8 OZ OF PLAIN  WATER 30 MINUTES BEFORE  FIRST FOOD, DRINK OR MEDS.  STAY UPRIGHT FOR 30 MINS 4/26/23  Yes Geneva Dsouza MD   apixaban (ELIQUIS) 5 mg Tab Take 1 tablet (5 mg total) by mouth 2 (two) times daily. 6/9/23  Yes Nigel García MD   aspirin (ECOTRIN) 81 MG EC tablet Take 81 mg by mouth nightly.   Yes Provider, Historical    atorvastatin (LIPITOR) 20 MG tablet TAKE 1 TABLET BY MOUTH EVERY DAY 5/26/23  Yes Geneva Dsouza MD   calcium-vitamin D 250-100 mg-unit per tablet Take 1 tablet by mouth Daily.   Yes Provider, Historical   cholecalciferol, vitamin D3, (VITAMIN D3 ORAL) Take 2,000 Units by mouth once daily.   Yes Provider, Historical   cloNIDine (CATAPRES) 0.1 MG tablet Take 1 tablet (0.1 mg total) by mouth every 6 (six) hours as needed (). 1/13/23 1/13/24 Yes Clem Mckeon MD   diphenhydrAMINE (BENADRYL) 25 mg capsule Take 25 mg by mouth every 6 (six) hours as needed for Itching.   Yes Provider, Historical   lactase (LACTAID) 3,000 unit tablet Take 1 tablet by mouth 3 (three) times daily as needed.   Yes Provider, Historical   loratadine (CLARITIN) 10 mg tablet Take 10 mg by mouth every morning.   Yes Provider, Historical   mupirocin (BACTROBAN) 2 % ointment Apply topically 3 (three) times daily. 6/18/23  Yes Elena Crocker, NP   spironolactone (ALDACTONE) 25 MG tablet Take 1 tablet (25 mg total) by mouth once daily. 2/22/23  Yes Maki Pratt MD   traZODone (DESYREL) 50 MG tablet TAKE 1 TABLET(50 MG) BY MOUTH EVERY NIGHT AS NEEDED FOR INSOMNIA 3/27/23  Yes Geneva Dsouza MD   triamcinolone acetonide 0.1% (KENALOG) 0.1 % cream Apply topically daily as needed (rash). 7/28/23 7/27/24 Yes Geneva Dsouza MD   verapamiL (VERELAN) 180 MG C24P Take 1 capsule (180 mg total) by mouth once daily. Dose decrease. 2/22/23 2/22/24 Yes Clem Mckeon MD   candesartan (ATACAND) 16 MG tablet Take 1 tablet (16 mg total) by mouth once daily. 1/13/23 4/13/23  Clem Mckeon MD   ferrous sulfate (FEOSOL) Tab tablet Take 1 tablet by mouth every evening. PATIENT TAKING 2 X WEEKLY IN THE EVENING (M-W-F)    Provider, Historical   flecainide (TAMBOCOR) 50 MG Tab Take 1 tablet (50 mg total) by mouth every 12 (twelve) hours. 1/13/23 5/13/23  Clem Mckeon MD   LORazepam (ATIVAN) 1 MG tablet Take 1 mg by mouth  every 8 (eight) hours as needed. 22   Provider, Historical         History  Past Medical History:   Diagnosis Date    Anemia     Atrial fibrillation     Basal cell carcinoma     DJD (degenerative joint disease) 2012    Obesity (BMI 30-39.9) 2015    Vaginal atrophy 2016     Past Surgical History:   Procedure Laterality Date    ADENOIDECTOMY      BREAST BIOPSY Left     Excisional bx, benign cyst    BREAST SURGERY      CARDIOVERSION  2021    CAROTID ENDARTERECTOMY Right 2022    Procedure: ENDARTERECTOMY-CAROTID;  Surgeon: TUCKER Brantley III, MD;  Location: [facility] OR 2ND FLR;  Service: Peripheral Vascular;  Laterality: Right;    COLONOSCOPY      normal    COLONOSCOPY N/A 2017    Procedure: COLONOSCOPY;  Surgeon: Markel Montemayor MD;  Location: [facility] ENDO (4TH FLR);  Service: Endoscopy;  Laterality: N/A;    EXCISION OF GANGLION OF WRIST Left 2018    Procedure: EXCISION, GANGLION CYST, WRIST - Left Volar wrist;  Surgeon: Mary Moore MD;  Location: [facility] OR 1ST FLR;  Service: Orthopedics;  Laterality: Left;    HIP SURGERY  2014    bilateral    JOINT REPLACEMENT Bilateral     MOLLY    TONSILLECTOMY      TREATMENT OF CARDIAC ARRHYTHMIA N/A 2020    Procedure: CARDIOVERSION;  Surgeon: Nigel García MD;  Location: [facility] EP LAB;  Service: Cardiology;  Laterality: N/A;  AF, LIGIA, DCCV, MAC, [place], 3 Prep    TREATMENT OF CARDIAC ARRHYTHMIA N/A 2020    Procedure: Cardioversion or Defibrillation;  Surgeon: Nigel García MD;  Location: [facility] EP LAB;  Service: Cardiology;  Laterality: N/A;  AF, LIGIA, DCCV, MAC, [place], 3 Prep     Social History     Socioeconomic History    Marital status:    Occupational History    Occupation: retired   Tobacco Use    Smoking status: Former     Current packs/day: 0.00     Average packs/day: 1 pack/day for 18.5 years (18.5 ttl pk-yrs)     Types: Cigarettes     Start date: 9/15/1969     Quit date: 3/20/1988     Years since quittin.4     Smokeless tobacco: Never    Tobacco comments:     Lesrned in 1/2 hr. 10 year to quit   Substance and Sexual Activity    Alcohol use: Not Currently     Alcohol/week: 3.0 standard drinks of alcohol     Types: 3 Glasses of wine per week    Drug use: No    Sexual activity: Yes     Partners: Male     Birth control/protection: Post-menopausal, None     Comment: Age   Social History Narrative     to HEATH Montemayor    Nurse    Likes to garden     Social Determinants of Health     Financial Resource Strain: Low Risk  (2/13/2023)    Overall Financial Resource Strain (CARDIA)     Difficulty of Paying Living Expenses: Not very hard   Food Insecurity: No Food Insecurity (2/13/2023)    Hunger Vital Sign     Worried About Running Out of Food in the Last Year: Never true     Ran Out of Food in the Last Year: Never true   Transportation Needs: No Transportation Needs (2/13/2023)    PRAPARE - Transportation     Lack of Transportation (Medical): No     Lack of Transportation (Non-Medical): No   Physical Activity: Sufficiently Active (2/13/2023)    Exercise Vital Sign     Days of Exercise per Week: 5 days     Minutes of Exercise per Session: 60 min   Stress: Stress Concern Present (2/13/2023)    [employer] [place] [employer] - Occupational Stress Questionnaire     Feeling of Stress : To some extent   Social Connections: Socially Integrated (2/13/2023)    Social Connection and Isolation Panel [NHANES]     Frequency of Communication with Friends and Family: More than three times a week     Frequency of Social Gatherings with Friends and Family: More than three times a week     Attends Buddhism Services: More than 4 times per year     Active Member of Clubs or Organizations: Yes     Attends Club or Organization Meetings: More than 4 times per year     Marital Status:    Housing Stability: Unknown (2/13/2023)    Housing Stability Vital Sign     Unable to Pay for Housing in the Last Year: No     Unstable  Housing in the Last Year: No         Allergies  Review of patient's allergies indicates:   Allergen Reactions    Prednisone      ELEVATED B/P FOR SEVERAL DAYS/WENT TO ER    Lactose          Review of Systems   Review of Systems   Cardiovascular: Negative.    Respiratory: Negative.     All other systems reviewed and are negative.        Physical Exam  Wt Readings from Last 1 Encounters:   09/07/23 56.2 kg (123 lb 14.4 oz)     BP Readings from Last 3 Encounters:   09/07/23 (!) 134/56   08/14/23 (!) 108/42   08/13/23 (!) 93/53     Pulse Readings from Last 1 Encounters:   09/07/23 65     Body mass index is 25.02 kg/m².    Physical Exam  Constitutional:       Appearance: She is well-developed.   HENT:      Head: Atraumatic.   Eyes:      General: No scleral icterus.  Neck:      Vascular: Normal carotid pulses. No carotid bruit, hepatojugular reflux or JVD.      Comments: R CEA  Cardiovascular:      Rate and Rhythm: Normal rate and regular rhythm.      Chest Wall: PMI is not displaced.      Pulses: Intact distal pulses.           Carotid pulses are 2+ on the right side and 2+ on the left side.       Radial pulses are 2+ on the right side and 2+ on the left side.        Dorsalis pedis pulses are 2+ on the right side and 2+ on the left side.      Heart sounds: Normal heart sounds, S1 normal and S2 normal. No murmur heard.     No friction rub.   Pulmonary:      Effort: Pulmonary effort is normal. No respiratory distress.      Breath sounds: Normal breath sounds. No stridor. No wheezing or rales.   Chest:      Chest wall: No tenderness.   Abdominal:      General: Bowel sounds are normal.      Palpations: Abdomen is soft.   Musculoskeletal:      Cervical back: Neck supple. No edema.   Skin:     General: Skin is warm and dry.      Nails: There is no clubbing.   Neurological:      Mental Status: She is alert and oriented to person, place, and time.   Psychiatric:         Behavior: Behavior normal.         Thought Content: Thought  content normal.             Assessment  1. Mixed hyperlipidemia  Stable on statin    2. Primary hypertension  Well controlled on current medications    3. Paroxysmal atrial fibrillation  In sinus, on Eliquis    4. S/P carotid endarterectomy  Stable  - CV Ultrasound Bilateral Doppler Carotid; Future    5. Carotid stenosis, bilateral  Stable  - CV Ultrasound Bilateral Doppler Carotid; Future        Plan and Discussion  Discussed her blood pressure is well controlled.  Continue recurrent blood pressure medications.  Will get carotid Doppler to assess for carotid stenosis status post R carotid endarterectomy    Follow Up  6 months      Clem Mckeon MD, F.A.C.C, F.S.C.A.I.        30 minutes were spent in chart review, documentation and review of results, and evaluation, treatment, and counseling of patient on the same day of service.    Disclaimer: This document was created using voice recognition software ([employer]). Although it may be edited, this document may contain errors related to incorrect recognition of the spoken word. Please call the physician if clarification is needed.

## 2023-09-20 ENCOUNTER — HOSPITAL ENCOUNTER (OUTPATIENT)
Dept: CARDIOLOGY | Facility: OTHER | Age: 78
Discharge: HOME | End: 2023-09-20
Attending: INTERNAL MEDICINE
Payer: MEDICARE

## 2023-09-20 DIAGNOSIS — Z98.890 S/P CAROTID ENDARTERECTOMY: ICD-10-CM

## 2023-09-20 DIAGNOSIS — I65.23 CAROTID STENOSIS, BILATERAL: ICD-10-CM

## 2023-09-20 LAB
LEFT ARM DIASTOLIC BLOOD PRESSURE: 91 MMHG
LEFT ARM SYSTOLIC BLOOD PRESSURE: 151 MMHG
LEFT CBA DIAS: 10 CM/S
LEFT CBA SYS: 45 CM/S
LEFT CCA DIST DIAS: 22 CM/S
LEFT CCA DIST SYS: 79 CM/S
LEFT CCA MID DIAS: 23 CM/S
LEFT CCA MID SYS: 78 CM/S
LEFT CCA PROX DIAS: 18 CM/S
LEFT CCA PROX SYS: 61 CM/S
LEFT ECA DIAS: 12 CM/S
LEFT ECA SYS: 78 CM/S
LEFT ICA DIST DIAS: 50 CM/S
LEFT ICA DIST SYS: 147 CM/S
LEFT ICA MID DIAS: 65 CM/S
LEFT ICA MID SYS: 136 CM/S
LEFT ICA PROX DIAS: 20 CM/S
LEFT ICA PROX SYS: 72 CM/S
LEFT VERTEBRAL DIAS: 21 CM/S
LEFT VERTEBRAL SYS: 44 CM/S
OHS CV CAROTID RIGHT ICA EDV HIGHEST: 50
OHS CV CAROTID ULTRASOUND LEFT ICA/CCA RATIO: 1.86
OHS CV CAROTID ULTRASOUND RIGHT ICA/CCA RATIO: 2.04
OHS CV PV CAROTID LEFT HIGHEST CCA: 79
OHS CV PV CAROTID LEFT HIGHEST ICA: 147
OHS CV PV CAROTID RIGHT HIGHEST CCA: 93
OHS CV PV CAROTID RIGHT HIGHEST ICA: 145
OHS CV US CAROTID LEFT HIGHEST EDV: 65
RIGHT ARM DIASTOLIC BLOOD PRESSURE: 60 MMHG
RIGHT ARM SYSTOLIC BLOOD PRESSURE: 135 MMHG
RIGHT CBA DIAS: 17 CM/S
RIGHT CBA SYS: 72 CM/S
RIGHT CCA DIST DIAS: 16 CM/S
RIGHT CCA DIST SYS: 71 CM/S
RIGHT CCA MID DIAS: 23 CM/S
RIGHT CCA MID SYS: 93 CM/S
RIGHT CCA PROX DIAS: 18 CM/S
RIGHT CCA PROX SYS: 76 CM/S
RIGHT ECA DIAS: 14 CM/S
RIGHT ECA SYS: 84 CM/S
RIGHT ICA DIST DIAS: 44 CM/S
RIGHT ICA DIST SYS: 144 CM/S
RIGHT ICA MID DIAS: 50 CM/S
RIGHT ICA MID SYS: 145 CM/S
RIGHT ICA PROX DIAS: 36 CM/S
RIGHT ICA PROX SYS: 101 CM/S
RIGHT VERTEBRAL DIAS: 28 CM/S
RIGHT VERTEBRAL SYS: 74 CM/S

## 2023-09-20 PROCEDURE — 93880 EXTRACRANIAL BILAT STUDY: CPT | Mod: 26,,, | Performed by: INTERNAL MEDICINE

## 2023-09-20 PROCEDURE — 93880 CV US DOPPLER CAROTID (CUPID ONLY): ICD-10-PCS | Mod: 26,,, | Performed by: INTERNAL MEDICINE

## 2023-09-20 PROCEDURE — 93880 EXTRACRANIAL BILAT STUDY: CPT

## 2023-11-02 ENCOUNTER — PATIENT MESSAGE (OUTPATIENT)
Dept: ADMINISTRATIVE | Facility: OTHER | Age: 78
End: 2023-11-02
Payer: MEDICARE

## 2023-11-14 DIAGNOSIS — I10 ESSENTIAL HYPERTENSION: ICD-10-CM

## 2023-11-14 RX ORDER — CANDESARTAN 16 MG/1
16 TABLET ORAL
Qty: 90 TABLET | Refills: 3 | Status: SHIPPED | OUTPATIENT
Start: 2023-11-14

## 2023-11-29 ENCOUNTER — IMMUNIZATION (OUTPATIENT)
Dept: INTERNAL MEDICINE | Facility: CLINIC | Age: 78
End: 2023-11-29
Payer: MEDICARE

## 2023-11-29 ENCOUNTER — OFFICE VISIT (OUTPATIENT)
Dept: INTERNAL MEDICINE | Facility: CLINIC | Age: 78
End: 2023-11-29
Payer: MEDICARE

## 2023-11-29 VITALS
DIASTOLIC BLOOD PRESSURE: 52 MMHG | BODY MASS INDEX: 25.34 KG/M2 | HEIGHT: 59 IN | OXYGEN SATURATION: 95 % | HEART RATE: 68 BPM | RESPIRATION RATE: 16 BRPM | SYSTOLIC BLOOD PRESSURE: 124 MMHG | WEIGHT: 125.69 LBS

## 2023-11-29 DIAGNOSIS — I48.0 PAROXYSMAL ATRIAL FIBRILLATION: ICD-10-CM

## 2023-11-29 DIAGNOSIS — I10 PRIMARY HYPERTENSION: Primary | ICD-10-CM

## 2023-11-29 DIAGNOSIS — E78.2 MIXED HYPERLIPIDEMIA: ICD-10-CM

## 2023-11-29 DIAGNOSIS — M81.0 OSTEOPOROSIS, UNSPECIFIED OSTEOPOROSIS TYPE, UNSPECIFIED PATHOLOGICAL FRACTURE PRESENCE: ICD-10-CM

## 2023-11-29 DIAGNOSIS — Z23 NEED FOR VACCINATION: Primary | ICD-10-CM

## 2023-11-29 PROBLEM — Z01.818 PREOPERATIVE CLEARANCE: Status: RESOLVED | Noted: 2023-08-14 | Resolved: 2023-11-29

## 2023-11-29 PROCEDURE — 90694 FLU VACCINE - QUADRIVALENT - ADJUVANTED: ICD-10-PCS | Mod: S$GLB,,, | Performed by: INTERNAL MEDICINE

## 2023-11-29 PROCEDURE — 1157F PR ADVANCE CARE PLAN OR EQUIV PRESENT IN MEDICAL RECORD: ICD-10-PCS | Mod: CPTII,S$GLB,, | Performed by: STUDENT IN AN ORGANIZED HEALTH CARE EDUCATION/TRAINING PROGRAM

## 2023-11-29 PROCEDURE — 1160F RVW MEDS BY RX/DR IN RCRD: CPT | Mod: CPTII,S$GLB,, | Performed by: STUDENT IN AN ORGANIZED HEALTH CARE EDUCATION/TRAINING PROGRAM

## 2023-11-29 PROCEDURE — 3078F PR MOST RECENT DIASTOLIC BLOOD PRESSURE < 80 MM HG: ICD-10-PCS | Mod: CPTII,S$GLB,, | Performed by: STUDENT IN AN ORGANIZED HEALTH CARE EDUCATION/TRAINING PROGRAM

## 2023-11-29 PROCEDURE — 3078F DIAST BP <80 MM HG: CPT | Mod: CPTII,S$GLB,, | Performed by: STUDENT IN AN ORGANIZED HEALTH CARE EDUCATION/TRAINING PROGRAM

## 2023-11-29 PROCEDURE — 99999 PR PBB SHADOW E&M-EST. PATIENT-LVL IV: ICD-10-PCS | Mod: PBBFAC,,, | Performed by: STUDENT IN AN ORGANIZED HEALTH CARE EDUCATION/TRAINING PROGRAM

## 2023-11-29 PROCEDURE — 1126F AMNT PAIN NOTED NONE PRSNT: CPT | Mod: CPTII,S$GLB,, | Performed by: STUDENT IN AN ORGANIZED HEALTH CARE EDUCATION/TRAINING PROGRAM

## 2023-11-29 PROCEDURE — G0008 FLU VACCINE - QUADRIVALENT - ADJUVANTED: ICD-10-PCS | Mod: S$GLB,,, | Performed by: INTERNAL MEDICINE

## 2023-11-29 PROCEDURE — G0008 ADMIN INFLUENZA VIRUS VAC: HCPCS | Mod: S$GLB,,, | Performed by: INTERNAL MEDICINE

## 2023-11-29 PROCEDURE — 1101F PR PT FALLS ASSESS DOC 0-1 FALLS W/OUT INJ PAST YR: ICD-10-PCS | Mod: CPTII,S$GLB,, | Performed by: STUDENT IN AN ORGANIZED HEALTH CARE EDUCATION/TRAINING PROGRAM

## 2023-11-29 PROCEDURE — 1101F PT FALLS ASSESS-DOCD LE1/YR: CPT | Mod: CPTII,S$GLB,, | Performed by: STUDENT IN AN ORGANIZED HEALTH CARE EDUCATION/TRAINING PROGRAM

## 2023-11-29 PROCEDURE — 99213 PR OFFICE/OUTPT VISIT, EST, LEVL III, 20-29 MIN: ICD-10-PCS | Mod: S$GLB,,, | Performed by: STUDENT IN AN ORGANIZED HEALTH CARE EDUCATION/TRAINING PROGRAM

## 2023-11-29 PROCEDURE — 3074F SYST BP LT 130 MM HG: CPT | Mod: CPTII,S$GLB,, | Performed by: STUDENT IN AN ORGANIZED HEALTH CARE EDUCATION/TRAINING PROGRAM

## 2023-11-29 PROCEDURE — 3288F PR FALLS RISK ASSESSMENT DOCUMENTED: ICD-10-PCS | Mod: CPTII,S$GLB,, | Performed by: STUDENT IN AN ORGANIZED HEALTH CARE EDUCATION/TRAINING PROGRAM

## 2023-11-29 PROCEDURE — 1159F PR MEDICATION LIST DOCUMENTED IN MEDICAL RECORD: ICD-10-PCS | Mod: CPTII,S$GLB,, | Performed by: STUDENT IN AN ORGANIZED HEALTH CARE EDUCATION/TRAINING PROGRAM

## 2023-11-29 PROCEDURE — 1157F ADVNC CARE PLAN IN RCRD: CPT | Mod: CPTII,S$GLB,, | Performed by: STUDENT IN AN ORGANIZED HEALTH CARE EDUCATION/TRAINING PROGRAM

## 2023-11-29 PROCEDURE — 99213 OFFICE O/P EST LOW 20 MIN: CPT | Mod: S$GLB,,, | Performed by: STUDENT IN AN ORGANIZED HEALTH CARE EDUCATION/TRAINING PROGRAM

## 2023-11-29 PROCEDURE — 3074F PR MOST RECENT SYSTOLIC BLOOD PRESSURE < 130 MM HG: ICD-10-PCS | Mod: CPTII,S$GLB,, | Performed by: STUDENT IN AN ORGANIZED HEALTH CARE EDUCATION/TRAINING PROGRAM

## 2023-11-29 PROCEDURE — 1160F PR REVIEW ALL MEDS BY PRESCRIBER/CLIN PHARMACIST DOCUMENTED: ICD-10-PCS | Mod: CPTII,S$GLB,, | Performed by: STUDENT IN AN ORGANIZED HEALTH CARE EDUCATION/TRAINING PROGRAM

## 2023-11-29 PROCEDURE — 1126F PR PAIN SEVERITY QUANTIFIED, NO PAIN PRESENT: ICD-10-PCS | Mod: CPTII,S$GLB,, | Performed by: STUDENT IN AN ORGANIZED HEALTH CARE EDUCATION/TRAINING PROGRAM

## 2023-11-29 PROCEDURE — 99999 PR PBB SHADOW E&M-EST. PATIENT-LVL IV: CPT | Mod: PBBFAC,,, | Performed by: STUDENT IN AN ORGANIZED HEALTH CARE EDUCATION/TRAINING PROGRAM

## 2023-11-29 PROCEDURE — 3288F FALL RISK ASSESSMENT DOCD: CPT | Mod: CPTII,S$GLB,, | Performed by: STUDENT IN AN ORGANIZED HEALTH CARE EDUCATION/TRAINING PROGRAM

## 2023-11-29 PROCEDURE — 90694 VACC AIIV4 NO PRSRV 0.5ML IM: CPT | Mod: S$GLB,,, | Performed by: INTERNAL MEDICINE

## 2023-11-29 PROCEDURE — 1159F MED LIST DOCD IN RCRD: CPT | Mod: CPTII,S$GLB,, | Performed by: STUDENT IN AN ORGANIZED HEALTH CARE EDUCATION/TRAINING PROGRAM

## 2023-11-29 NOTE — PROGRESS NOTES
Subjective     Patient ID: Magdalena Mane is a 78 y.o. female.    Chief Complaint: Follow-up    Follow-up  Pertinent negatives include no abdominal pain, chest pain, chills, coughing, fever, headaches or sore throat.     Magdalena Mane is a 77 y.o.  female who presents with HTN, HLD, R CEA, afib, basal cell carcinoma on the nose, and iron def anemia is here for a f/u visit.   -doing well. No issues.   -going to [place] to visit family this sameera.   -flu shot today. Discussed rsv. She will get that today.   Review of Systems   Constitutional:  Negative for chills and fever.   HENT:  Positive for rhinorrhea. Negative for sore throat.    Respiratory:  Negative for cough, chest tightness and shortness of breath.    Cardiovascular:  Negative for chest pain.   Gastrointestinal:  Negative for abdominal pain, blood in stool, constipation and diarrhea.   Genitourinary:  Negative for dysuria and hematuria.   Neurological:  Negative for dizziness, light-headedness and headaches.          Objective     Physical Exam  Vitals and nursing note reviewed.   Constitutional:       Appearance: Normal appearance.   Eyes:      Conjunctiva/sclera: Conjunctivae normal.   Cardiovascular:      Rate and Rhythm: Normal rate and regular rhythm.      Heart sounds: Normal heart sounds.   Pulmonary:      Effort: Pulmonary effort is normal. No respiratory distress.      Breath sounds: Normal breath sounds.   Abdominal:      General: Bowel sounds are normal.   Musculoskeletal:      Right lower leg: No edema.      Left lower leg: No edema.   Skin:     General: Skin is warm.   Neurological:      Mental Status: She is alert and oriented to person, place, and time.   Psychiatric:         Mood and Affect: Mood normal.         Behavior: Behavior normal.            Assessment and Plan     1. Primary hypertension  Assessment & Plan:  BP well controlled on current regimen, continue. Takes clonidine as needed.       2. Paroxysmal atrial  fibrillation  Overview:  F/u by Dr. García.    [id number] score 4    Assessment & Plan:  On eliquis, flecainide and verapamil. Continue. Rate controlled.       3. Mixed hyperlipidemia  Assessment & Plan:  Continue atorvastatin.       4. Osteoporosis, unspecified osteoporosis type, unspecified pathological fracture presence  Overview:  She was started on alendronate July 2022.     Assessment & Plan:  Taking alendronate after she had a thoracic fracture. Don't want her to be on it for longer than 5 yrs. Continue.        I spent a total of 20 minutes on the day of the visit.  This includes face to face time and non-face to face time preparing to see the patient (eg, review of tests), obtaining and/or reviewing separately obtained history, documenting clinical information in the electronic or other health record, independently interpreting results and communicating results to the patient/family/caregiver, or care coordinator.

## 2023-11-29 NOTE — ASSESSMENT & PLAN NOTE
Taking alendronate after she had a thoracic fracture. Don't want her to be on it for longer than 5 yrs. Continue.

## 2023-12-15 DIAGNOSIS — I48.0 PAROXYSMAL ATRIAL FIBRILLATION: ICD-10-CM

## 2023-12-15 DIAGNOSIS — I48.91 ATRIAL FIBRILLATION, UNSPECIFIED TYPE: ICD-10-CM

## 2023-12-16 RX ORDER — FLECAINIDE ACETATE 50 MG/1
50 TABLET ORAL EVERY 12 HOURS
Qty: 180 TABLET | Refills: 3 | Status: SHIPPED | OUTPATIENT
Start: 2023-12-16

## 2023-12-16 RX ORDER — APIXABAN 5 MG/1
5 TABLET, FILM COATED ORAL 2 TIMES DAILY
Qty: 180 TABLET | Refills: 3 | Status: SHIPPED | OUTPATIENT
Start: 2023-12-16

## 2024-01-04 ENCOUNTER — PATIENT MESSAGE (OUTPATIENT)
Dept: ADMINISTRATIVE | Facility: OTHER | Age: 79
End: 2024-01-04
Payer: MEDICARE

## 2024-02-06 ENCOUNTER — OFFICE VISIT (OUTPATIENT)
Dept: DERMATOLOGY | Facility: CLINIC | Age: 79
End: 2024-02-06
Payer: MEDICARE

## 2024-02-06 VITALS — WEIGHT: 125 LBS | BODY MASS INDEX: 25.25 KG/M2

## 2024-02-06 DIAGNOSIS — Z85.828 HISTORY OF SKIN CANCER: ICD-10-CM

## 2024-02-06 DIAGNOSIS — L82.1 SEBORRHEIC KERATOSES: Primary | ICD-10-CM

## 2024-02-06 DIAGNOSIS — L81.4 LENTIGINES: ICD-10-CM

## 2024-02-06 DIAGNOSIS — I78.1 SPIDER VEINS: ICD-10-CM

## 2024-02-06 PROCEDURE — 99213 OFFICE O/P EST LOW 20 MIN: CPT | Mod: S$GLB,,, | Performed by: DERMATOLOGY

## 2024-02-06 PROCEDURE — 99999 PR PBB SHADOW E&M-EST. PATIENT-LVL II: CPT | Mod: PBBFAC,,, | Performed by: DERMATOLOGY

## 2024-02-06 NOTE — PROGRESS NOTES
Subjective:      Patient ID:  Magdalena Mane is a 78 y.o. female who presents for   Chief Complaint   Patient presents with    Skin Check     UBSE     Would like skin check no lesions of concern, had the bcc removed from her nose mohs surgery.         Review of Systems   Constitutional:  Negative for fever, chills, weight loss, weight gain, fatigue, night sweats and malaise.   Skin:  Positive for daily sunscreen use, activity-related sunscreen use and wears hat.   Hematologic/Lymphatic: Does not bruise/bleed easily.       Objective:   Physical Exam   Constitutional: She appears well-developed and well-nourished. No distress.   Neurological: She is alert and oriented to person, place, and time. She is not disoriented.   Psychiatric: She has a normal mood and affect.   Skin:   Areas Examined (abnormalities noted in diagram):   Head / Face Inspection Performed  Neck Inspection Performed  Chest / Axilla Inspection Performed  Abdomen Inspection Performed  Back Inspection Performed  RUE Inspected  LUE Inspection Performed  LLE Inspection Performed                 Diagram Legend     Erythematous scaling macule/papule c/w actinic keratosis       Vascular papule c/w angioma      Pigmented verrucoid papule/plaque c/w seborrheic keratosis      Yellow umbilicated papule c/w sebaceous hyperplasia      Irregularly shaped tan macule c/w lentigo     1-2 mm smooth white papules consistent with Milia      Movable subcutaneous cyst with punctum c/w epidermal inclusion cyst      Subcutaneous movable cyst c/w pilar cyst      Firm pink to brown papule c/w dermatofibroma      Pedunculated fleshy papule(s) c/w skin tag(s)      Evenly pigmented macule c/w junctional nevus     Mildly variegated pigmented, slightly irregular-bordered macule c/w mildly atypical nevus      Flesh colored to evenly pigmented papule c/w intradermal nevus       Pink pearly papule/plaque c/w basal cell carcinoma      Erythematous hyperkeratotic cursted plaque  "c/w SCC      Surgical scar with no sign of skin cancer recurrence      Open and closed comedones      Inflammatory papules and pustules      Verrucoid papule consistent consistent with wart     Erythematous eczematous patches and plaques     Dystrophic onycholytic nail with subungual debris c/w onychomycosis     Umbilicated papule    Erythematous-base heme-crusted tan verrucoid plaque consistent with inflamed seborrheic keratosis     Erythematous Silvery Scaling Plaque c/w Psoriasis     See annotation      Assessment / Plan:        Seborrheic keratoses  Seborrheic keratosis scattered, told benign no treatment needed.        Lentigines  The "ABCD" rules to observe pigmented lesions were reviewed.      History of skin cancer  See hpi    Spider veins  reassurance               Follow up in about 1 year (around 2/6/2025).  "

## 2024-02-15 DIAGNOSIS — S22.000D COMPRESSION FRACTURE OF THORACIC VERTEBRA WITH ROUTINE HEALING, UNSPECIFIED THORACIC VERTEBRAL LEVEL, SUBSEQUENT ENCOUNTER: ICD-10-CM

## 2024-02-15 DIAGNOSIS — F43.9 STRESS: ICD-10-CM

## 2024-02-15 DIAGNOSIS — G47.00 INSOMNIA, UNSPECIFIED TYPE: ICD-10-CM

## 2024-02-15 RX ORDER — TRAZODONE HYDROCHLORIDE 50 MG/1
TABLET ORAL
Qty: 90 TABLET | Refills: 3 | Status: SHIPPED | OUTPATIENT
Start: 2024-02-15

## 2024-02-15 RX ORDER — ALENDRONATE SODIUM 70 MG/1
TABLET ORAL
Qty: 12 TABLET | Refills: 3 | Status: SHIPPED | OUTPATIENT
Start: 2024-02-15

## 2024-02-15 NOTE — TELEPHONE ENCOUNTER
Refill Decision Note   Magdalena Mane  is requesting a refill authorization.  Brief Assessment and Rationale for Refill:  Approve     Medication Therapy Plan:         Comments:     Note composed:10:17 AM 02/15/2024

## 2024-02-15 NOTE — TELEPHONE ENCOUNTER
No care due was identified.  Health [facility] Embedded Care Due Messages. Reference number: [id number].   2/15/2024 5:41:56 AM CST

## 2024-02-15 NOTE — TELEPHONE ENCOUNTER
Care Due:                  Date            Visit Type   Department     Provider  --------------------------------------------------------------------------------                                EP -                              PRIMARY      [facility] INTER[facility]  Last Visit: 11-      CARE ([facility])   MEDICINE       Geneva Dsouza                              EP -                              PRIMARY  Next Visit: 02-      CARE ([facility])   None Found     Geneva Dsouza                                                            Last  Test          Frequency    Reason                     Performed    Due Date  --------------------------------------------------------------------------------    CMP.........  12 months..  alendronate, atorvastatin  12- 11-    Mg Level....  12 months..  alendronate..............  12- 11-    Phosphate...  12 months..  alendronate..............  04- 04-    Vitamin D...  12 months..  alendronate..............  07- 07-    Health [facility] Embedded Care Due Messages. Reference number: [id number].   2/15/2024 4:36:58 AM CST

## 2024-02-20 ENCOUNTER — TELEPHONE (OUTPATIENT)
Dept: PRIMARY CARE CLINIC | Facility: CLINIC | Age: 79
End: 2024-02-20
Payer: MEDICARE

## 2024-02-20 NOTE — TELEPHONE ENCOUNTER
----- Message from Rachell Velásquez sent at 2/20/2024 11:42 AM CST -----  Type:  Same Day Appointment Request    Caller is requesting a same day appointment.  Caller declined first available appointment listed below.    Name of Caller:PT   When is the first available appointment?na/a   Symptoms:3 month f/u  Best Call Back Number:[phone]  Additional Information: pt has to go out of town and needs to reschedule

## 2024-02-26 RX ORDER — SPIRONOLACTONE 25 MG/1
25 TABLET ORAL DAILY
Qty: 90 TABLET | Refills: 3 | Status: SHIPPED | OUTPATIENT
Start: 2024-02-26

## 2024-02-27 DIAGNOSIS — Z00.00 ENCOUNTER FOR MEDICARE ANNUAL WELLNESS EXAM: ICD-10-CM

## 2024-02-29 DIAGNOSIS — I10 ESSENTIAL HYPERTENSION: ICD-10-CM

## 2024-02-29 RX ORDER — VERAPAMIL HYDROCHLORIDE 180 MG/1
180 CAPSULE, EXTENDED RELEASE ORAL DAILY
Qty: 90 CAPSULE | Refills: 3 | Status: SHIPPED | OUTPATIENT
Start: 2024-02-29 | End: 2025-02-28

## 2024-03-04 ENCOUNTER — TELEPHONE (OUTPATIENT)
Dept: ELECTROPHYSIOLOGY | Facility: CLINIC | Age: 79
End: 2024-03-04
Payer: MEDICARE

## 2024-03-04 NOTE — TELEPHONE ENCOUNTER
----- Message from Rashmi Scanlon MA sent at 3/1/2024  4:42 PM CST -----    ----- Message -----  From: Ivanna Carter  Sent: 2/29/2024  10:48 AM CST  To: Ricky QUIÑONEZ Staff    Type:  RX Refill Request    Who Called:  Pt  Refill or New Rx: Refill  RX Name and Strength:   verapamiL (VERELAN) 180 MG C24P [[id number]]   How is the patient currently taking it? (ex. 1XDay): Take 1 capsule (180 mg total) by mouth once daily. Dose decrease. - Oral  Is this a 30 day or 90 day RX: 90  Preferred Pharmacy with phone number: [email or web address] - [address]   Phone: [phone]  Fax: [phone]  Local or Mail Order: local  Ordering Provider: Ricky  Would the patient rather a call back or a response via [facility]?  call  Best Call Back Number:  [phone]  Additional Information:

## 2024-03-06 ENCOUNTER — TELEPHONE (OUTPATIENT)
Dept: ELECTROPHYSIOLOGY | Facility: CLINIC | Age: 79
End: 2024-03-06
Payer: MEDICARE

## 2024-03-06 DIAGNOSIS — I48.0 PAROXYSMAL ATRIAL FIBRILLATION: Primary | ICD-10-CM

## 2024-03-08 PROBLEM — I20.9 ANGINA PECTORIS, UNSPECIFIED: Status: ACTIVE | Noted: 2024-03-08

## 2024-03-18 ENCOUNTER — OFFICE VISIT (OUTPATIENT)
Dept: OPTOMETRY | Facility: CLINIC | Age: 79
End: 2024-03-18
Payer: MEDICARE

## 2024-03-18 DIAGNOSIS — H25.13 NS (NUCLEAR SCLEROSIS), BILATERAL: ICD-10-CM

## 2024-03-18 DIAGNOSIS — H04.123 DRY EYE SYNDROME, BILATERAL: ICD-10-CM

## 2024-03-18 DIAGNOSIS — H16.109 SUPERFICIAL KERATITIS, UNSPECIFIED LATERALITY: ICD-10-CM

## 2024-03-18 DIAGNOSIS — Z98.890 S/P CAROTID ENDARTERECTOMY: ICD-10-CM

## 2024-03-18 DIAGNOSIS — H52.4 PRESBYOPIA: Primary | ICD-10-CM

## 2024-03-18 DIAGNOSIS — I65.23 BILATERAL CAROTID ARTERY STENOSIS: ICD-10-CM

## 2024-03-18 DIAGNOSIS — I10 PRIMARY HYPERTENSION: ICD-10-CM

## 2024-03-18 PROCEDURE — 92015 DETERMINE REFRACTIVE STATE: CPT | Mod: S$GLB,,, | Performed by: OPTOMETRIST

## 2024-03-18 PROCEDURE — 92004 COMPRE OPH EXAM NEW PT 1/>: CPT | Mod: S$GLB,,, | Performed by: OPTOMETRIST

## 2024-03-18 PROCEDURE — 99999 PR PBB SHADOW E&M-EST. PATIENT-LVL III: CPT | Mod: PBBFAC,,, | Performed by: OPTOMETRIST

## 2024-03-18 RX ORDER — CYCLOSPORINE 0.5 MG/ML
1 EMULSION OPHTHALMIC 2 TIMES DAILY
Qty: 180 EACH | Refills: 3 | Status: SHIPPED | OUTPATIENT
Start: 2024-03-18 | End: 2024-03-19

## 2024-03-18 NOTE — PROGRESS NOTES
HPI    COLETTE: 1/24/2017  Chief complaint (CC): patient here for routine eye check up  Glasses? +  Contacts? -  H/o eye surgery, injections or laser: -  H/o eye injury: -  Known eye conditions? -  Family h/o eye conditions? -  Eye gtts? -      (-) Flashes (-)  Floaters (-) Mucous   (-)  Tearing (-) Itching (-) Burning   (-) Headaches (-) Eye Pain/discomfort (-) Irritation   (-)  Redness (-) Double vision (-) Blurry vision    Diabetic? -  A1c? -      Last edited by Garrett Raphael on 3/18/2024  1:31 PM.            Assessment /Plan     For exam results, see Encounter Report.    Presbyopia              Rx specs    Essential hypertension              No retinopathy, monitor yearly     NS (nuclear sclerosis), bilateral              MIld, monitor     Dry eye syndrome, bilateral  Superficial keratitis, unspecified laterality   Start  cycloSPORINE (RESTASIS) 0.05 % ophthalmic emulsion; Place 1 drop into both eyes 2 (two) times daily.  Dispense: 180 each; Refill: 3    Bilateral carotid artery stenosis  S/P carotid endarterectomy      -    RTC 1 year, sooner PRN

## 2024-03-19 ENCOUNTER — PATIENT MESSAGE (OUTPATIENT)
Dept: OPTOMETRY | Facility: CLINIC | Age: 79
End: 2024-03-19
Payer: MEDICARE

## 2024-03-19 RX ORDER — CYCLOSPORINE 0.5 MG/ML
1 EMULSION OPHTHALMIC EVERY 12 HOURS
Qty: 16.5 ML | Refills: 3 | Status: SHIPPED | OUTPATIENT
Start: 2024-03-19 | End: 2025-03-19

## 2024-03-22 ENCOUNTER — PATIENT MESSAGE (OUTPATIENT)
Dept: CARDIOLOGY | Facility: CLINIC | Age: 79
End: 2024-03-22
Payer: MEDICARE

## 2024-03-31 ENCOUNTER — OFFICE VISIT (OUTPATIENT)
Dept: URGENT CARE | Facility: CLINIC | Age: 79
End: 2024-03-31
Payer: MEDICARE

## 2024-03-31 VITALS
WEIGHT: 125 LBS | HEART RATE: 62 BPM | RESPIRATION RATE: 20 BRPM | HEIGHT: 59 IN | DIASTOLIC BLOOD PRESSURE: 73 MMHG | BODY MASS INDEX: 25.2 KG/M2 | SYSTOLIC BLOOD PRESSURE: 157 MMHG | TEMPERATURE: 103 F | OXYGEN SATURATION: 95 %

## 2024-03-31 DIAGNOSIS — R31.9 URINARY TRACT INFECTION WITH HEMATURIA, SITE UNSPECIFIED: Primary | ICD-10-CM

## 2024-03-31 DIAGNOSIS — N39.0 URINARY TRACT INFECTION WITH HEMATURIA, SITE UNSPECIFIED: Primary | ICD-10-CM

## 2024-03-31 LAB
BILIRUB UR QL STRIP: NEGATIVE
GLUCOSE UR QL STRIP: NEGATIVE
KETONES UR QL STRIP: NEGATIVE
LEUKOCYTE ESTERASE UR QL STRIP: POSITIVE
PH, POC UA: 6 (ref 5–8)
POC BLOOD, URINE: POSITIVE
POC NITRATES, URINE: NEGATIVE
PROT UR QL STRIP: POSITIVE
SP GR UR STRIP: 1.01 (ref 1–1.03)
UROBILINOGEN UR STRIP-ACNC: NORMAL (ref 0.1–1.1)

## 2024-03-31 PROCEDURE — 87088 URINE BACTERIA CULTURE: CPT | Performed by: NURSE PRACTITIONER

## 2024-03-31 PROCEDURE — 99213 OFFICE O/P EST LOW 20 MIN: CPT | Mod: S$GLB,,, | Performed by: NURSE PRACTITIONER

## 2024-03-31 PROCEDURE — 87077 CULTURE AEROBIC IDENTIFY: CPT | Performed by: NURSE PRACTITIONER

## 2024-03-31 PROCEDURE — 87086 URINE CULTURE/COLONY COUNT: CPT | Performed by: NURSE PRACTITIONER

## 2024-03-31 PROCEDURE — 87186 SC STD MICRODIL/AGAR DIL: CPT | Performed by: NURSE PRACTITIONER

## 2024-03-31 PROCEDURE — 81003 URINALYSIS AUTO W/O SCOPE: CPT | Mod: QW,S$GLB,, | Performed by: NURSE PRACTITIONER

## 2024-03-31 RX ORDER — AMOXICILLIN 500 MG/1
CAPSULE ORAL
COMMUNITY
Start: 2023-10-16 | End: 2024-03-31 | Stop reason: HOSPADM

## 2024-03-31 RX ORDER — ACETAMINOPHEN 500 MG
1000 TABLET ORAL
Status: COMPLETED | OUTPATIENT
Start: 2024-03-31 | End: 2024-03-31

## 2024-03-31 RX ORDER — AMOXICILLIN AND CLAVULANATE POTASSIUM 875; 125 MG/1; MG/1
1 TABLET, FILM COATED ORAL EVERY 12 HOURS
Qty: 20 TABLET | Refills: 0 | Status: SHIPPED | OUTPATIENT
Start: 2024-03-31 | End: 2024-04-18 | Stop reason: ALTCHOICE

## 2024-03-31 RX ORDER — COVID-19 VACCINE, MRNA 0.04 MG/.418ML
INJECTION, SUSPENSION INTRAMUSCULAR
COMMUNITY
Start: 2023-10-13

## 2024-03-31 RX ADMIN — Medication 1000 MG: at 02:03

## 2024-03-31 NOTE — PROGRESS NOTES
"Subjective:      Patient ID: Magdalena Mane is a 79 y.o. female.    Vitals:  height is 4' 11" (1.499 m) and weight is 56.7 kg (125 lb). Her temperature is 102.5 °F (39.2 °C) (abnormal). Her blood pressure is 157/73 (abnormal) and her pulse is 62. Her respiration is 20 and oxygen saturation is 95%.     Chief Complaint: Dysuria    This is a 79 y.o. female   who presents today with a chief complaint of dysuria that began two weeks ago and that has worsened. She's complaining of chills, back pain and burning urination. She's been taking tylenol to help relieve her symptoms.     Dysuria   This is a new problem. The current episode started 1 to 4 weeks ago. The problem has been gradually worsening. The quality of the pain is described as burning. The pain is at a severity of 5/10. The pain is moderate. There has been no fever. She is Not sexually active. There is No history of pyelonephritis. Associated symptoms include frequency and urgency. Pertinent negatives include no behavior changes, chills, discharge, flank pain, hematuria, hesitancy, nausea, possible pregnancy, sweats, vomiting, weight loss, bubble bath use, constipation, rash or withholding. She has tried acetaminophen for the symptoms. The treatment provided no relief.     Constitution: Positive for fever. Negative for chills.   Neck: neck negative.   Cardiovascular: Negative.    Gastrointestinal:  Negative for nausea, vomiting and constipation.   Genitourinary:  Positive for dysuria, frequency and urgency. Negative for flank pain and hematuria.   Skin:  Negative for rash.      Objective:     Physical Exam   HENT:   Head: Normocephalic.   Pulmonary/Chest: Effort normal and breath sounds normal.   Abdominal: Normal appearance and bowel sounds are normal. She exhibits no distension. Soft. There is no abdominal tenderness. There is no guarding.   Neurological: She is alert.   Skin: Skin is warm and dry.       Assessment:     1. Urinary tract infection with " hematuria, site unspecified        Plan:       Urinary tract infection with hematuria, site unspecified  -     POCT Urinalysis, Dipstick, Automated, W/O Scope  -     amoxicillin-clavulanate 875-125mg (AUGMENTIN) 875-125 mg per tablet; Take 1 tablet by mouth every 12 (twelve) hours. for 10 days  Dispense: 20 tablet; Refill: 0  -     CULTURE, URINE  -     acetaminophen tablet 1,000 mg      Results for orders placed or performed in visit on 03/31/24   POCT Urinalysis, Dipstick, Automated, W/O Scope   Result Value Ref Range    POC Blood, Urine Positive (A) Negative    POC Bilirubin, Urine Negative Negative    POC Urobilinogen, Urine normal 0.1 - 1.1    POC Ketones, Urine Negative Negative    POC Protein, Urine Positive (A) Negative    POC Nitrates, Urine Negative Negative    POC Glucose, Urine Negative Negative    pH, UA 6.0 5 - 8    POC Specific Gravity, Urine 1.015 1.003 - 1.029    POC Leukocytes, Urine Positive (A) Negative     *Note: Due to a large number of results and/or encounters for the requested time period, some results have not been displayed. A complete set of results can be found in Results Review.     Patient Instructions     UTI (Urinary Tract Infection)    Cranberry juice may help. Get the 100% cranberry juice and mix 4 oz of juice with 4 oz of water and drink this 8 oz glass of liquid once a day.     Increase water intake to at least 8-10 glasses/day.    Avoid caffeine, alcohol, or spicy foods as they irritate the bladder.      If you take OTC AZO/Pyridium/Phenazopyridine, follow instructions as directed.  Do NOT take for more than 2 days.  Do not wear contacts with Pyridium as it will stain them.  You may want to wear a panty liner with Pyridium as it may stain your underwear.    If you had cultures done it will take 3-5 days to result. We will call you with the result.    If you are are female and on birth control pills use additional methods (such as condom use) to prevent pregnancy while on the  antibiotics and for one cycle after.     If your condition worsens or fails to improve we recommend that you receive another evaluation at the ER immediately or contact your PCP to discuss your concerns or return here.

## 2024-04-04 ENCOUNTER — PATIENT MESSAGE (OUTPATIENT)
Dept: URGENT CARE | Facility: CLINIC | Age: 79
End: 2024-04-04
Payer: MEDICARE

## 2024-04-04 LAB — BACTERIA UR CULT: ABNORMAL

## 2024-04-16 ENCOUNTER — OFFICE VISIT (OUTPATIENT)
Dept: INTERNAL MEDICINE | Facility: CLINIC | Age: 79
End: 2024-04-16
Payer: MEDICARE

## 2024-04-16 VITALS
WEIGHT: 125.69 LBS | HEART RATE: 59 BPM | HEIGHT: 59 IN | SYSTOLIC BLOOD PRESSURE: 122 MMHG | DIASTOLIC BLOOD PRESSURE: 62 MMHG | BODY MASS INDEX: 25.34 KG/M2 | OXYGEN SATURATION: 98 %

## 2024-04-16 DIAGNOSIS — R26.89 BALANCE PROBLEMS: ICD-10-CM

## 2024-04-16 DIAGNOSIS — D50.9 IRON DEFICIENCY ANEMIA, UNSPECIFIED IRON DEFICIENCY ANEMIA TYPE: ICD-10-CM

## 2024-04-16 DIAGNOSIS — R29.3 POOR POSTURE: ICD-10-CM

## 2024-04-16 DIAGNOSIS — Z00.00 ENCOUNTER FOR MEDICARE ANNUAL WELLNESS EXAM: ICD-10-CM

## 2024-04-16 DIAGNOSIS — I20.9 ANGINA PECTORIS, UNSPECIFIED: ICD-10-CM

## 2024-04-16 DIAGNOSIS — D69.2 OTHER NONTHROMBOCYTOPENIC PURPURA: ICD-10-CM

## 2024-04-16 DIAGNOSIS — Z98.890 S/P CAROTID ENDARTERECTOMY: ICD-10-CM

## 2024-04-16 DIAGNOSIS — E53.8 B12 DEFICIENCY: ICD-10-CM

## 2024-04-16 DIAGNOSIS — M17.10 ARTHRITIS OF KNEE: ICD-10-CM

## 2024-04-16 DIAGNOSIS — F43.9 STRESS: ICD-10-CM

## 2024-04-16 DIAGNOSIS — R09.89 BILATERAL CAROTID BRUITS: ICD-10-CM

## 2024-04-16 DIAGNOSIS — R29.898 DECREASED STRENGTH OF TRUNK AND BACK: ICD-10-CM

## 2024-04-16 DIAGNOSIS — E55.9 VITAMIN D INSUFFICIENCY: ICD-10-CM

## 2024-04-16 DIAGNOSIS — K04.7 TOOTH ABSCESS: ICD-10-CM

## 2024-04-16 DIAGNOSIS — R26.81 UNSTEADY GAIT: ICD-10-CM

## 2024-04-16 DIAGNOSIS — G31.9 DEGENERATIVE DISEASE OF NERVOUS SYSTEM, UNSPECIFIED: ICD-10-CM

## 2024-04-16 DIAGNOSIS — I48.0 PAROXYSMAL ATRIAL FIBRILLATION: ICD-10-CM

## 2024-04-16 DIAGNOSIS — I50.9 ACUTE HEART FAILURE, UNSPECIFIED HEART FAILURE TYPE: ICD-10-CM

## 2024-04-16 DIAGNOSIS — M81.0 OSTEOPOROSIS, UNSPECIFIED OSTEOPOROSIS TYPE, UNSPECIFIED PATHOLOGICAL FRACTURE PRESENCE: ICD-10-CM

## 2024-04-16 DIAGNOSIS — T67.1XXD HEAT SYNCOPE, SUBSEQUENT ENCOUNTER: ICD-10-CM

## 2024-04-16 DIAGNOSIS — R00.1 SINUS BRADYCARDIA: ICD-10-CM

## 2024-04-16 DIAGNOSIS — M15.9 PRIMARY OSTEOARTHRITIS INVOLVING MULTIPLE JOINTS: ICD-10-CM

## 2024-04-16 DIAGNOSIS — I65.23 BILATERAL CAROTID ARTERY STENOSIS: ICD-10-CM

## 2024-04-16 DIAGNOSIS — I20.89 OTHER FORMS OF ANGINA PECTORIS: ICD-10-CM

## 2024-04-16 DIAGNOSIS — R06.00 DYSPNEA, UNSPECIFIED TYPE: ICD-10-CM

## 2024-04-16 DIAGNOSIS — M67.40 GANGLION CYST: ICD-10-CM

## 2024-04-16 DIAGNOSIS — I50.32 CHRONIC DIASTOLIC HEART FAILURE: ICD-10-CM

## 2024-04-16 DIAGNOSIS — Z00.00 ENCOUNTER FOR PREVENTIVE HEALTH EXAMINATION: Primary | ICD-10-CM

## 2024-04-16 DIAGNOSIS — E66.3 OVERWEIGHT: ICD-10-CM

## 2024-04-16 DIAGNOSIS — E78.2 MIXED HYPERLIPIDEMIA: ICD-10-CM

## 2024-04-16 DIAGNOSIS — G47.00 INSOMNIA, UNSPECIFIED TYPE: ICD-10-CM

## 2024-04-16 DIAGNOSIS — S22.000D COMPRESSION FRACTURE OF THORACIC VERTEBRA WITH ROUTINE HEALING, UNSPECIFIED THORACIC VERTEBRAL LEVEL, SUBSEQUENT ENCOUNTER: ICD-10-CM

## 2024-04-16 DIAGNOSIS — I47.9 PAROXYSMAL TACHYCARDIA: ICD-10-CM

## 2024-04-16 DIAGNOSIS — I10 PRIMARY HYPERTENSION: ICD-10-CM

## 2024-04-16 DIAGNOSIS — N95.2 VAGINAL ATROPHY: ICD-10-CM

## 2024-04-16 DIAGNOSIS — I70.0 ATHEROSCLEROSIS OF AORTA: ICD-10-CM

## 2024-04-16 PROCEDURE — 3074F SYST BP LT 130 MM HG: CPT | Mod: CPTII,S$GLB,, | Performed by: NURSE PRACTITIONER

## 2024-04-16 PROCEDURE — 1170F FXNL STATUS ASSESSED: CPT | Mod: CPTII,S$GLB,, | Performed by: NURSE PRACTITIONER

## 2024-04-16 PROCEDURE — 99999 PR PBB SHADOW E&M-EST. PATIENT-LVL V: CPT | Mod: PBBFAC,,, | Performed by: NURSE PRACTITIONER

## 2024-04-16 PROCEDURE — 1159F MED LIST DOCD IN RCRD: CPT | Mod: CPTII,S$GLB,, | Performed by: NURSE PRACTITIONER

## 2024-04-16 PROCEDURE — G0439 PPPS, SUBSEQ VISIT: HCPCS | Mod: S$GLB,,, | Performed by: NURSE PRACTITIONER

## 2024-04-16 PROCEDURE — 3078F DIAST BP <80 MM HG: CPT | Mod: CPTII,S$GLB,, | Performed by: NURSE PRACTITIONER

## 2024-04-16 PROCEDURE — 3288F FALL RISK ASSESSMENT DOCD: CPT | Mod: CPTII,S$GLB,, | Performed by: NURSE PRACTITIONER

## 2024-04-16 PROCEDURE — 1123F ACP DISCUSS/DSCN MKR DOCD: CPT | Mod: CPTII,S$GLB,, | Performed by: NURSE PRACTITIONER

## 2024-04-16 PROCEDURE — 1101F PT FALLS ASSESS-DOCD LE1/YR: CPT | Mod: CPTII,S$GLB,, | Performed by: NURSE PRACTITIONER

## 2024-04-16 PROCEDURE — 1160F RVW MEDS BY RX/DR IN RCRD: CPT | Mod: CPTII,S$GLB,, | Performed by: NURSE PRACTITIONER

## 2024-04-16 NOTE — PROGRESS NOTES
"  Magdalena Mane presented for a  Medicare AWV and comprehensive Health Risk Assessment today. The following components were reviewed and updated:    Medical history  Family History  Social history  Allergies and Current Medications  Health Risk Assessment  Health Maintenance  Care Team         ** See Completed Assessments for Annual Wellness Visit within the encounter summary.**         The following assessments were completed:  Living Situation  CAGE  Depression Screening  Timed Get Up and Go  Whisper Test  Cognitive Function Screening  Nutrition Screening  ADL Screening  PAQ Screening        Vitals:    04/16/24 1039 04/16/24 1122   BP: (!) 169/73 122/62   BP Location: Left arm    Patient Position: Sitting    Pulse: (!) 59    SpO2: 98%    Weight: 57 kg (125 lb 10.6 oz)    Height: 4' 11" (1.499 m)      Body mass index is 25.38 kg/m².    Physical Exam  Vitals reviewed.   Constitutional:       Appearance: She is well-developed.   HENT:      Head: Normocephalic and atraumatic. Not macrocephalic and not microcephalic. No raccoon eyes, Tavarez's sign, abrasion, contusion, right periorbital erythema, left periorbital erythema or laceration. Hair is normal.      Right Ear: No decreased hearing noted. No laceration, drainage, swelling or tenderness. No middle ear effusion. No foreign body. No mastoid tenderness. No hemotympanum. Tympanic membrane is not injected, scarred, perforated, erythematous, retracted or bulging. Tympanic membrane has normal mobility.      Left Ear: No decreased hearing noted. No laceration, drainage, swelling or tenderness.  No middle ear effusion. No foreign body. No mastoid tenderness. No hemotympanum. Tympanic membrane is not injected, scarred, perforated, erythematous, retracted or bulging. Tympanic membrane has normal mobility.      Nose: Nose normal. No nasal deformity, laceration or mucosal edema.      Mouth/Throat:      Pharynx: Uvula midline.   Eyes:      General: Lids are normal.      " Conjunctiva/sclera: Conjunctivae normal.   Neck:      Thyroid: No thyroid mass or thyromegaly.      Trachea: Trachea normal.   Cardiovascular:      Rate and Rhythm: Normal rate and regular rhythm.   Pulmonary:      Effort: Pulmonary effort is normal. No respiratory distress.      Breath sounds: Normal breath sounds.   Abdominal:      Palpations: Abdomen is soft.   Musculoskeletal:         General: Normal range of motion.      Cervical back: Neck supple. No edema or erythema. No spinous process tenderness or muscular tenderness. Normal range of motion.   Lymphadenopathy:      Head:      Right side of head: No submental, submandibular, tonsillar, preauricular or posterior auricular adenopathy.      Left side of head: No submental, submandibular, tonsillar, preauricular, posterior auricular or occipital adenopathy.   Skin:     General: Skin is warm and dry.   Neurological:      Mental Status: She is alert and oriented to person, place, and time.   Psychiatric:         Behavior: Behavior normal.         Thought Content: Thought content normal.         Judgment: Judgment normal.             Diagnoses and health risks identified today and associated recommendations/orders:    1. Encounter for Medicare annual wellness exam  Annual Health Risk Assessment (HRA) visit today.  Counseling and referral of health maintenance and preventative health measures performed.  Patient given annual wellness paperwork to take home.  Encouraged to return in 1 year for subsequent HRA visit.   - Ambulatory Referral/Consult to Enhanced Annual Wellness Visit (eAWV)    2. Encounter for preventive health examination  Annual Health Risk Assessment (HRA) visit today.  Counseling and referral of health maintenance and preventative health measures performed.  Patient given annual wellness paperwork to take home.  Encouraged to return in 1 year for subsequent HRA visit.     3. Other nonthrombocytopenic purpura  Chronic. Stable. Continue current  treatment plan as previously prescribed by PCP.    4. Degenerative disease of nervous system, unspecified  Chronic. Stable. Continue current treatment plan as previously prescribed by PCP.    5. Chronic diastolic heart failure  Chronic. Stable. Continue current treatment plan as previously prescribed by PCP.    6. Paroxysmal atrial fibrillation  Chronic. Stable. Continue current treatment plan as previously prescribed by PCP.    7. Other forms of angina pectoris  Chronic. Stable. Continue current treatment plan as previously prescribed by PCP.    8. Atherosclerosis of aorta  Chronic. Stable. Continue current treatment plan as previously prescribed by PCP.    9. Sinus bradycardia  Chronic. Stable. Continue current treatment plan as previously prescribed by PCP.    10. Paroxysmal tachycardia  Chronic. Stable. Continue current treatment plan as previously prescribed by PCP.    11. Mixed hyperlipidemia  Chronic. Stable. Continue current treatment plan as previously prescribed by PCP.    12. S/P carotid endarterectomy  Chronic. Stable. Continue current treatment plan as previously prescribed by PCP.    13. Primary hypertension  Chronic. Stable. Controlled. Encouraged to increase exercise as tolerated (moderate-intensity aerobic activity and muscle-strengthening activities) improve diet to heart healthy, low sodium diet.  Continue current treatment plan as previously prescribed by PCP.    14. Bilateral carotid bruits  Chronic. Stable. Continue current treatment plan as previously prescribed by PCP.    15. Bilateral carotid artery stenosis  Chronic. Stable. Continue current treatment plan as previously prescribed by PCP.    16. Angina pectoris, unspecified  Chronic. Stable. Continue current treatment plan as previously prescribed by PCP.    17. Acute heart failure, unspecified heart failure type  Chronic. Stable. Continue current treatment plan as previously prescribed by PCP.    18. Vaginal atrophy  Chronic. Stable.  Continue current treatment plan as previously prescribed by PCP.    19. Iron deficiency anemia, unspecified iron deficiency anemia type  Chronic. Stable. Continue current treatment plan as previously prescribed by PCP.    20. Overweight  Chronic. Stable. Continue current treatment plan as previously prescribed by PCP.    21. Vitamin D insufficiency  Chronic. Stable. Continue current treatment plan as previously prescribed by PCP.    22. Osteoporosis, unspecified osteoporosis type, unspecified pathological fracture presence  Chronic. Stable. Continue current treatment plan as previously prescribed by PCP.    23. B12 deficiency  Chronic. Stable. Continue current treatment plan as previously prescribed by PCP.    24. Heat syncope, subsequent encounter  Chronic. Stable. Continue current treatment plan as previously prescribed by PCP.    25. Ganglion cyst  Chronic. Stable. Continue current treatment plan as previously prescribed by PCP.    26. Primary osteoarthritis involving multiple joints  Chronic. Stable. Continue current treatment plan as previously prescribed by PCP.    27. Decreased strength of trunk and back  Chronic. Stable. Continue current treatment plan as previously prescribed by PCP.    28. Arthritis of knee  Chronic. Stable. Continue current treatment plan as previously prescribed by PCP.    29. Unsteady gait  Chronic. Stable. Continue current treatment plan as previously prescribed by PCP.    30. Poor posture  Chronic. Stable. Continue current treatment plan as previously prescribed by PCP.    31. Insomnia, unspecified type  Chronic. Stable. Continue current treatment plan as previously prescribed by PCP.    32. Balance problems  Chronic. Stable. Continue current treatment plan as previously prescribed by PCP.    33. Dyspnea, unspecified type  Chronic. Stable. Continue current treatment plan as previously prescribed by PCP.    34. Tooth abscess  Chronic. Stable. Continue current treatment plan as  previously prescribed by PCP.    35. Compression fracture of thoracic vertebra with routine healing, unspecified thoracic vertebral level, subsequent encounter  Chronic. Stable. Continue current treatment plan as previously prescribed by PCP.    36. Stress  Chronic. Stable. Continue current treatment plan as previously prescribed by PCP.      Provided Magdalena with a 5-10 year written screening schedule and personal prevention plan. Recommendations were developed using the USPSTF age appropriate recommendations. Education, counseling, and referrals were provided as needed. After Visit Summary printed and given to patient which includes a list of additional screenings\tests needed.      I offered to discuss end of life issues, including information on how to make advance directives that the patient could use to name someone who would make medical decisions on their behalf if they became too ill to make themselves.    ___Patient declined  _X_Patient is interested, I provided paper work and offered to discuss.    Follow up in about 1 year (around 4/16/2025).    MUNIR Call offered to discuss advanced care planning, including how to pick a person who would make decisions for you if you were unable to make them for yourself, called a health care power of , and what kind of decisions you might make such as use of life sustaining treatments such as ventilators and tube feeding when faced with a life limiting illness recorded on a living will that they will need to know. (How you want to be cared for as you near the end of your natural life)     X Patient is interested in learning more about how to make advanced directives.  I provided them paperwork and offered to discuss this with them.

## 2024-04-16 NOTE — PATIENT INSTRUCTIONS
Counseling and Referral of Other Preventative  (Italic type indicates deductible and co-insurance are waived)    Patient Name: Magdalena Mane  Today's Date: 4/16/2024    Health Maintenance       Date Due Completion Date    DEXA Scan 07/25/2024 7/25/2019    Lipid Panel 07/28/2024 7/28/2023    Mammogram 08/22/2024 8/22/2023    TETANUS VACCINE 06/18/2033 6/18/2023        No orders of the defined types were placed in this encounter.    The following information is provided to all patients.  This information is to help you find resources for any of the problems found today that may be affecting your health:                  Living healthy guide: www.[facility].[place].gov      Understanding Diabetes: www.diabetes.org      Eating healthy: www.cdc.gov/healthyweight      CDC home safety checklist: www.cdc.gov/steadi/patient.html      Agency on Aging: www.goea.[place].[facility]      Alcoholics anonymous (AA): www.aa.org      Physical Activity: www.daria.nih.gov/lg5jbkq      Tobacco use: www.quitwithusla.org

## 2024-04-18 ENCOUNTER — OFFICE VISIT (OUTPATIENT)
Dept: PRIMARY CARE CLINIC | Facility: CLINIC | Age: 79
End: 2024-04-18
Payer: MEDICARE

## 2024-04-18 VITALS
DIASTOLIC BLOOD PRESSURE: 56 MMHG | RESPIRATION RATE: 16 BRPM | WEIGHT: 125.69 LBS | HEART RATE: 52 BPM | SYSTOLIC BLOOD PRESSURE: 130 MMHG | OXYGEN SATURATION: 95 % | BODY MASS INDEX: 25.34 KG/M2 | HEIGHT: 59 IN

## 2024-04-18 DIAGNOSIS — I70.0 ATHEROSCLEROSIS OF AORTA: ICD-10-CM

## 2024-04-18 DIAGNOSIS — Z79.01 CHRONIC ANTICOAGULATION: ICD-10-CM

## 2024-04-18 DIAGNOSIS — I48.0 PAROXYSMAL ATRIAL FIBRILLATION: ICD-10-CM

## 2024-04-18 DIAGNOSIS — E78.2 MIXED HYPERLIPIDEMIA: ICD-10-CM

## 2024-04-18 DIAGNOSIS — I10 PRIMARY HYPERTENSION: Primary | ICD-10-CM

## 2024-04-18 PROCEDURE — 1126F AMNT PAIN NOTED NONE PRSNT: CPT | Mod: CPTII,S$GLB,, | Performed by: STUDENT IN AN ORGANIZED HEALTH CARE EDUCATION/TRAINING PROGRAM

## 2024-04-18 PROCEDURE — 99214 OFFICE O/P EST MOD 30 MIN: CPT | Mod: S$GLB,,, | Performed by: STUDENT IN AN ORGANIZED HEALTH CARE EDUCATION/TRAINING PROGRAM

## 2024-04-18 PROCEDURE — 1159F MED LIST DOCD IN RCRD: CPT | Mod: CPTII,S$GLB,, | Performed by: STUDENT IN AN ORGANIZED HEALTH CARE EDUCATION/TRAINING PROGRAM

## 2024-04-18 PROCEDURE — 99999 PR PBB SHADOW E&M-EST. PATIENT-LVL IV: CPT | Mod: PBBFAC,,, | Performed by: STUDENT IN AN ORGANIZED HEALTH CARE EDUCATION/TRAINING PROGRAM

## 2024-04-18 PROCEDURE — 1160F RVW MEDS BY RX/DR IN RCRD: CPT | Mod: CPTII,S$GLB,, | Performed by: STUDENT IN AN ORGANIZED HEALTH CARE EDUCATION/TRAINING PROGRAM

## 2024-04-18 PROCEDURE — 1123F ACP DISCUSS/DSCN MKR DOCD: CPT | Mod: CPTII,S$GLB,, | Performed by: STUDENT IN AN ORGANIZED HEALTH CARE EDUCATION/TRAINING PROGRAM

## 2024-04-18 PROCEDURE — 3288F FALL RISK ASSESSMENT DOCD: CPT | Mod: CPTII,S$GLB,, | Performed by: STUDENT IN AN ORGANIZED HEALTH CARE EDUCATION/TRAINING PROGRAM

## 2024-04-18 PROCEDURE — 1101F PT FALLS ASSESS-DOCD LE1/YR: CPT | Mod: CPTII,S$GLB,, | Performed by: STUDENT IN AN ORGANIZED HEALTH CARE EDUCATION/TRAINING PROGRAM

## 2024-04-18 PROCEDURE — 3075F SYST BP GE 130 - 139MM HG: CPT | Mod: CPTII,S$GLB,, | Performed by: STUDENT IN AN ORGANIZED HEALTH CARE EDUCATION/TRAINING PROGRAM

## 2024-04-18 PROCEDURE — 3078F DIAST BP <80 MM HG: CPT | Mod: CPTII,S$GLB,, | Performed by: STUDENT IN AN ORGANIZED HEALTH CARE EDUCATION/TRAINING PROGRAM

## 2024-04-18 RX ORDER — ATORVASTATIN CALCIUM 20 MG/1
20 TABLET, FILM COATED ORAL DAILY
Qty: 90 TABLET | Refills: 3 | Status: SHIPPED | OUTPATIENT
Start: 2024-04-18

## 2024-04-18 NOTE — ASSESSMENT & PLAN NOTE
BP well controlled on current regimen, continue. Not having to take clonidine. She is having some lows intermittently but not above 150. It fluctuates. Told her what to do when her BP Is on the low side. Drink a bottle of water, lay down and put her legs up on the wall. Will monitor.

## 2024-04-18 NOTE — PROGRESS NOTES
Subjective     Patient ID: Magdalena Mane is a 79 y.o. female.    Chief Complaint: Follow-up    HPI  Magdalena Mane is a 79 y.o.  female who presents with HTN, HLD, R CEA, afib, basal cell carcinoma on the nose, and iron def anemia is here for a f/u visit.   -doing well. No falls  -having nose bleeds. On eliquis and aspirin. She has been using flonase which she stopped doing recently so now has less nose bleeds  -sometimes pressure gets low and she has to lay down.   -has appt with EP on April 26th and cardiology on April 30th.   -ordered labs today  -next time order labs with lipid panel, dexa and mammo    Advance Care Planning     Date: 04/18/2024    Power of   I initiated the process of voluntary advance care planning today and explained the importance of this process to the patient.  I introduced the concept of advance directives to the patient, as well. Then the patient received detailed information about the importance of designating a Health Care Power of  (HCPOA). She was also instructed to communicate with this person about their wishes for future healthcare, should she become sick and lose decision-making capacity. The patient has previously appointed a HCPOA. After our discussion, the patient has decided to complete a HCPOA and has appointed her significant other, health care agent: jose manuel Mane & health care agent number: [phone]. I spent a total time of 5 minutes discussing this issue with the patient.    -living will and HCPOA in her chart.        Review of Systems   Constitutional:  Negative for chills and fever.   HENT:  Positive for rhinorrhea. Negative for sore throat.    Respiratory:  Negative for cough, chest tightness and shortness of breath.    Cardiovascular:  Negative for chest pain.   Gastrointestinal:  Negative for abdominal pain, blood in stool, constipation and diarrhea.   Genitourinary:  Negative for dysuria and hematuria.   Neurological:  Negative  for dizziness, light-headedness and headaches.          Objective     Physical Exam  Vitals and nursing note reviewed.   Constitutional:       Appearance: Normal appearance.   Eyes:      Conjunctiva/sclera: Conjunctivae normal.   Cardiovascular:      Rate and Rhythm: Normal rate and regular rhythm.      Heart sounds: Normal heart sounds.   Pulmonary:      Effort: Pulmonary effort is normal. No respiratory distress.      Breath sounds: Normal breath sounds.   Abdominal:      General: Bowel sounds are normal.   Musculoskeletal:      Right lower leg: No edema.      Left lower leg: No edema.   Skin:     General: Skin is warm.   Neurological:      Mental Status: She is alert and oriented to person, place, and time.   Psychiatric:         Mood and Affect: Mood normal.         Behavior: Behavior normal.            Assessment and Plan        1. Primary hypertension  Assessment & Plan:  BP well controlled on current regimen, continue. Not having to take clonidine. She is having some lows intermittently but not above 150. It fluctuates. Told her what to do when her BP Is on the low side. Drink a bottle of water, lay down and put her legs up on the wall. Will monitor.    Orders:  -     CBC Without Differential; Future; Expected date: 04/18/2024  -     TSH; Future; Expected date: 04/18/2024  -     Comprehensive Metabolic Panel; Future; Expected date: 04/18/2024    2. Atherosclerosis of aorta  Assessment & Plan:  Noted on previous imaging. On asa, statin, continue. BP well controlled      3. Chronic anticoagulation  Assessment & Plan:  S/t afib. On eliquis. She does bruise easily and also has nose bleeds but no signs of major bleeding. Will monitor       4. Mixed hyperlipidemia  Assessment & Plan:  LDL at goal. Will check next visit. Continue atorvastatin. Refilled today      5. Paroxysmal atrial fibrillation  Overview:  F/u by Dr. García.    [id number] score 4    Assessment & Plan:  On eliquis, flecainide and verapamil.  Continue. Rate controlled.       Other orders  -     atorvastatin (LIPITOR) 20 MG tablet; Take 1 tablet (20 mg total) by mouth once daily.  Dispense: 90 tablet; Refill: 3

## 2024-04-18 NOTE — ASSESSMENT & PLAN NOTE
S/t afib. On eliquis. She does bruise easily and also has nose bleeds but no signs of major bleeding. Will monitor

## 2024-04-22 ENCOUNTER — LAB VISIT (OUTPATIENT)
Dept: LAB | Facility: HOSPITAL | Age: 79
End: 2024-04-22
Attending: STUDENT IN AN ORGANIZED HEALTH CARE EDUCATION/TRAINING PROGRAM
Payer: MEDICARE

## 2024-04-22 DIAGNOSIS — I10 PRIMARY HYPERTENSION: ICD-10-CM

## 2024-04-22 LAB
ALBUMIN SERPL BCP-MCNC: 3.4 G/DL (ref 3.5–5.2)
ALP SERPL-CCNC: 79 U/L (ref 55–135)
ALT SERPL W/O P-5'-P-CCNC: 12 U/L (ref 10–44)
ANION GAP SERPL CALC-SCNC: 7 MMOL/L (ref 8–16)
AST SERPL-CCNC: 20 U/L (ref 10–40)
BILIRUB SERPL-MCNC: 0.3 MG/DL (ref 0.1–1)
BUN SERPL-MCNC: 20 MG/DL (ref 8–23)
CALCIUM SERPL-MCNC: 9.6 MG/DL (ref 8.7–10.5)
CHLORIDE SERPL-SCNC: 103 MMOL/L (ref 95–110)
CO2 SERPL-SCNC: 27 MMOL/L (ref 23–29)
CREAT SERPL-MCNC: 0.9 MG/DL (ref 0.5–1.4)
ERYTHROCYTE [DISTWIDTH] IN BLOOD BY AUTOMATED COUNT: 13.2 % (ref 11.5–14.5)
EST. GFR  (NO RACE VARIABLE): >60 ML/MIN/1.73 M^2
GLUCOSE SERPL-MCNC: 87 MG/DL (ref 70–110)
HCT VFR BLD AUTO: 34.2 % (ref 37–48.5)
HGB BLD-MCNC: 10.9 G/DL (ref 12–16)
MCH RBC QN AUTO: 32.4 PG (ref 27–31)
MCHC RBC AUTO-ENTMCNC: 31.9 G/DL (ref 32–36)
MCV RBC AUTO: 102 FL (ref 82–98)
PLATELET # BLD AUTO: 347 K/UL (ref 150–450)
PMV BLD AUTO: 11.2 FL (ref 9.2–12.9)
POTASSIUM SERPL-SCNC: 4.9 MMOL/L (ref 3.5–5.1)
PROT SERPL-MCNC: 6.6 G/DL (ref 6–8.4)
RBC # BLD AUTO: 3.36 M/UL (ref 4–5.4)
SODIUM SERPL-SCNC: 137 MMOL/L (ref 136–145)
TSH SERPL DL<=0.005 MIU/L-ACNC: 1.61 UIU/ML (ref 0.4–4)
WBC # BLD AUTO: 7.27 K/UL (ref 3.9–12.7)

## 2024-04-22 PROCEDURE — 80053 COMPREHEN METABOLIC PANEL: CPT | Performed by: STUDENT IN AN ORGANIZED HEALTH CARE EDUCATION/TRAINING PROGRAM

## 2024-04-22 PROCEDURE — 36415 COLL VENOUS BLD VENIPUNCTURE: CPT | Performed by: STUDENT IN AN ORGANIZED HEALTH CARE EDUCATION/TRAINING PROGRAM

## 2024-04-22 PROCEDURE — 84443 ASSAY THYROID STIM HORMONE: CPT | Performed by: STUDENT IN AN ORGANIZED HEALTH CARE EDUCATION/TRAINING PROGRAM

## 2024-04-22 PROCEDURE — 85027 COMPLETE CBC AUTOMATED: CPT | Performed by: STUDENT IN AN ORGANIZED HEALTH CARE EDUCATION/TRAINING PROGRAM

## 2024-04-23 ENCOUNTER — TELEPHONE (OUTPATIENT)
Dept: PRIMARY CARE CLINIC | Facility: CLINIC | Age: 79
End: 2024-04-23
Payer: MEDICARE

## 2024-04-23 NOTE — TELEPHONE ENCOUNTER
----- Message from Geneva Dsouza MD sent at 4/23/2024  9:05 AM CDT -----  Pls let patient know that her labs are stable.

## 2024-04-24 DIAGNOSIS — I48.0 PAROXYSMAL ATRIAL FIBRILLATION: Primary | ICD-10-CM

## 2024-04-26 ENCOUNTER — OFFICE VISIT (OUTPATIENT)
Dept: CARDIOLOGY | Facility: CLINIC | Age: 79
End: 2024-04-26
Payer: MEDICARE

## 2024-04-26 VITALS
DIASTOLIC BLOOD PRESSURE: 54 MMHG | SYSTOLIC BLOOD PRESSURE: 142 MMHG | WEIGHT: 125 LBS | HEART RATE: 51 BPM | BODY MASS INDEX: 25.2 KG/M2 | HEIGHT: 59 IN

## 2024-04-26 DIAGNOSIS — I50.32 CHRONIC DIASTOLIC HEART FAILURE: ICD-10-CM

## 2024-04-26 DIAGNOSIS — I48.0 PAROXYSMAL ATRIAL FIBRILLATION: ICD-10-CM

## 2024-04-26 DIAGNOSIS — I47.9 PAROXYSMAL TACHYCARDIA: ICD-10-CM

## 2024-04-26 DIAGNOSIS — R00.1 SINUS BRADYCARDIA: Primary | ICD-10-CM

## 2024-04-26 DIAGNOSIS — I70.0 ATHEROSCLEROSIS OF AORTA: ICD-10-CM

## 2024-04-26 DIAGNOSIS — I10 PRIMARY HYPERTENSION: ICD-10-CM

## 2024-04-26 PROCEDURE — 93005 ELECTROCARDIOGRAM TRACING: CPT | Mod: S$GLB,,, | Performed by: INTERNAL MEDICINE

## 2024-04-26 PROCEDURE — 1126F AMNT PAIN NOTED NONE PRSNT: CPT | Mod: CPTII,S$GLB,, | Performed by: INTERNAL MEDICINE

## 2024-04-26 PROCEDURE — 99214 OFFICE O/P EST MOD 30 MIN: CPT | Mod: S$GLB,,, | Performed by: INTERNAL MEDICINE

## 2024-04-26 PROCEDURE — 3288F FALL RISK ASSESSMENT DOCD: CPT | Mod: CPTII,S$GLB,, | Performed by: INTERNAL MEDICINE

## 2024-04-26 PROCEDURE — 3077F SYST BP >= 140 MM HG: CPT | Mod: CPTII,S$GLB,, | Performed by: INTERNAL MEDICINE

## 2024-04-26 PROCEDURE — 1101F PT FALLS ASSESS-DOCD LE1/YR: CPT | Mod: CPTII,S$GLB,, | Performed by: INTERNAL MEDICINE

## 2024-04-26 PROCEDURE — 93010 ELECTROCARDIOGRAM REPORT: CPT | Mod: S$GLB,,, | Performed by: INTERNAL MEDICINE

## 2024-04-26 PROCEDURE — 1159F MED LIST DOCD IN RCRD: CPT | Mod: CPTII,S$GLB,, | Performed by: INTERNAL MEDICINE

## 2024-04-26 PROCEDURE — 99999 PR PBB SHADOW E&M-EST. PATIENT-LVL IV: CPT | Mod: PBBFAC,,, | Performed by: INTERNAL MEDICINE

## 2024-04-26 PROCEDURE — 1157F ADVNC CARE PLAN IN RCRD: CPT | Mod: CPTII,S$GLB,, | Performed by: INTERNAL MEDICINE

## 2024-04-26 PROCEDURE — 1160F RVW MEDS BY RX/DR IN RCRD: CPT | Mod: CPTII,S$GLB,, | Performed by: INTERNAL MEDICINE

## 2024-04-26 PROCEDURE — 3078F DIAST BP <80 MM HG: CPT | Mod: CPTII,S$GLB,, | Performed by: INTERNAL MEDICINE

## 2024-04-26 NOTE — PROGRESS NOTES
Subjective:    Patient ID:  Magdalena Mane is a 79 y.o. female who presents for evaluation of Atrial Fibrillation    Primary Cardiologist: Clem Mckeon MD    HPI  Prior Hx:  I had the pleasure of seeing Mrs. Mane in our electrophysiology clinic in consultation for her atrial arrhythmia. As you are aware she is a pleasant 78 year-old woman with hypertension and recently diagnosed persistent atrial fibrillation. She was in her usual state of health until February of 2020 when she had a respiratory illness (COVID antibody test later was negative) and since that time has had dyspnea on exertion. Also notes her pulse runs in the 50s-60s however in February her heart rate began running in the 80s (participates in the Digital HTN clinic). In early July 2020 she developed palpitations. She saw Dr. Area 7/27/2020 and was diagnosed with atrial fibrillation and was referred to Dr. Pratt who started eliquis for stroke risk reduction ([id number] score 3). She followed up with Dr. Pratt on 9/16/2020 and remained in atrial fibrillation for which she presents for further evaluation. She notes her dyspnea on exertion and palpitations have largely resolved despite AF persistence however still has exercise intolerance.    She reports an episode of pAF post hip surgery in 2014 at [facility].    An echocardiogram from July of 2020 notes preserved LV function with moderate LA enlargement.     Mrs. Mane underwent DCCV on 9/29/2020 with NSR with symptomatic benefit but then had AF recurrence. She underwent repeat DCCV on 12/8/2020 and started on low dose flecainide (has resting sinus bradycardia). Her 1 week post-DCCV ECG noted AF recurrence. At her follow-up visit in December of 2020 she was in sinus rhythm and noted she is in and out of AF. She reported her breathing improved when she was in normal rhythm but feels fatigued when in normal rhythm correlating with a pulse rate in the 40s. We discussed option of PVI versus PPM +  AAD therapy versus dofetilide and stopping AV delma agents. She elected initially for dofetilide however had a change of mind and elected to stagger her metoprolol and flecainide and felt better.    A holter monitor 1/2021 noted no AF and an average sinus rate of 56 bpm.    8/2021: Mrs. Mane returned for follow-up. She was seen by Dr. Vazquez in June of 2021 following an urgent care clinic visit for an episode of syncope. She reported she was working in the yard in the heat and began to feel light-headed. She went inside and had transient loss of consciousness. She had persistent dizziness and went to an urgent care clinic. She was given IVFs. HR was 51 bpm. Metoprolol was discontinued. She feltmuch better off metoprolol. She reportedshe had libido issues with beta-blockers. Discussed she should not be on flecainide without an AV delma blocking agent. Unable to do dofetilide inpatient loading at that time due to COVID surge and would rather avoid a PPM. She may consider PVI at some point. Low dose sotalol (40mg bid) alone may be an option although she is concerned about it affecting her libido. We elected to try verapamil 120mg daily in lieu of amlodipine with the hope it had less SA delma effect than a beta-blocker.     9/2021: Holter noted average sinus rate of 56 bpm.    12/2022: Mrs. Mane returned for AF follow-up. No symptomatic AF recurrences. She feels well on verapamil/flecainide. She is having issues with hypertension. She is actively managed by digital HTN. She woke up with low back pain. She also is having balance issues when turning her head to the right. She is seeing an ENT soon for this and sinus issues. BP very high today and she reports she woke up feeling poorly and has a headache.    6/2023: Mrs. Mane returns for follow-up. No symptomatic AF. Doing ok with verapamil/flecainide. She is doing an exercise program and is gardening.    Interim Hx:  Mrs. Mane returns for follow-up. No  symptomatic AF. Having issues at times with low BP.      My interpretation of today's in clinic ECG is sinus rhythm with a rate of 51 bpm. QS is narrow.        Review of Systems   Constitutional: Negative for fever and malaise/fatigue.   HENT:  Negative for congestion and sore throat.    Eyes:  Negative for blurred vision and visual disturbance.   Cardiovascular:  Negative for chest pain, dyspnea on exertion, irregular heartbeat, near-syncope, palpitations and syncope.   Respiratory:  Negative for cough and shortness of breath.    Hematologic/Lymphatic: Negative for bleeding problem. Does not bruise/bleed easily.   Skin: Negative.    Musculoskeletal:  Positive for back pain.   Gastrointestinal:  Negative for bloating, abdominal pain, hematochezia and melena.   Neurological:  Positive for headaches and loss of balance. Negative for focal weakness and weakness.   Psychiatric/Behavioral: Negative.          Objective:    Physical Exam  Vitals reviewed.   Constitutional:       General: She is not in acute distress.     Appearance: She is well-developed. She is not diaphoretic.   HENT:      Head: Normocephalic and atraumatic.   Eyes:      General:         Right eye: No discharge.         Left eye: No discharge.      Conjunctiva/sclera: Conjunctivae normal.   Cardiovascular:      Rate and Rhythm: Regular rhythm. Bradycardia present.      Heart sounds: No murmur heard.     No friction rub. No gallop.   Pulmonary:      Effort: Pulmonary effort is normal. No respiratory distress.      Breath sounds: Normal breath sounds. No wheezing or rales.   Abdominal:      General: Bowel sounds are normal. There is no distension.      Palpations: Abdomen is soft.      Tenderness: There is no abdominal tenderness.   Musculoskeletal:      Cervical back: Neck supple.   Skin:     General: Skin is warm and dry.   Neurological:      Mental Status: She is alert and oriented to person, place, and time.   Psychiatric:         Behavior: Behavior  normal.         Thought Content: Thought content normal.         Judgment: Judgment normal.           Assessment:       1. Sinus bradycardia    2. Paroxysmal tachycardia    3. Primary hypertension    4. Atherosclerosis of aorta    5. Paroxysmal atrial fibrillation    6. Chronic diastolic heart failure         Plan:       In summary, Mrs. Mane is a pleasant 79 year-old woman with hypertension and with persistent atrial fibrillation who is now paroxysmal on low dose flecainide. She has shortness of breath with AF and then fatigue in sinus bradycardia. She is intolerant to flecainide and metoprolol together. She feels better on flecainide/verapamil with no symptomatic AF. [id number] score 4. Continue eliquis.    RTC in 6 months    Thank you for allowing me to participate in the care of this patient. Please do not hesitate to call me with any questions or concerns.    Nigel García MD, PhD  Cardiac Electrophysiology

## 2024-04-29 LAB
OHS QRS DURATION: 100 MS
OHS QTC CALCULATION: 405 MS

## 2024-04-30 ENCOUNTER — OFFICE VISIT (OUTPATIENT)
Dept: CARDIOLOGY | Facility: CLINIC | Age: 79
End: 2024-04-30
Payer: MEDICARE

## 2024-04-30 VITALS
WEIGHT: 123.69 LBS | OXYGEN SATURATION: 98 % | BODY MASS INDEX: 24.98 KG/M2 | SYSTOLIC BLOOD PRESSURE: 140 MMHG | DIASTOLIC BLOOD PRESSURE: 58 MMHG | HEART RATE: 50 BPM

## 2024-04-30 DIAGNOSIS — Z98.890 S/P CAROTID ENDARTERECTOMY: ICD-10-CM

## 2024-04-30 DIAGNOSIS — I48.0 PAROXYSMAL ATRIAL FIBRILLATION: ICD-10-CM

## 2024-04-30 DIAGNOSIS — I70.0 ATHEROSCLEROSIS OF AORTA: ICD-10-CM

## 2024-04-30 DIAGNOSIS — I10 PRIMARY HYPERTENSION: Primary | ICD-10-CM

## 2024-04-30 PROCEDURE — 3288F FALL RISK ASSESSMENT DOCD: CPT | Mod: CPTII,S$GLB,, | Performed by: INTERNAL MEDICINE

## 2024-04-30 PROCEDURE — 1159F MED LIST DOCD IN RCRD: CPT | Mod: CPTII,S$GLB,, | Performed by: INTERNAL MEDICINE

## 2024-04-30 PROCEDURE — 99999 PR PBB SHADOW E&M-EST. PATIENT-LVL III: CPT | Mod: PBBFAC,,, | Performed by: INTERNAL MEDICINE

## 2024-04-30 PROCEDURE — 3078F DIAST BP <80 MM HG: CPT | Mod: CPTII,S$GLB,, | Performed by: INTERNAL MEDICINE

## 2024-04-30 PROCEDURE — 99214 OFFICE O/P EST MOD 30 MIN: CPT | Mod: S$GLB,,, | Performed by: INTERNAL MEDICINE

## 2024-04-30 PROCEDURE — 1157F ADVNC CARE PLAN IN RCRD: CPT | Mod: CPTII,S$GLB,, | Performed by: INTERNAL MEDICINE

## 2024-04-30 PROCEDURE — 1101F PT FALLS ASSESS-DOCD LE1/YR: CPT | Mod: CPTII,S$GLB,, | Performed by: INTERNAL MEDICINE

## 2024-04-30 PROCEDURE — 1126F AMNT PAIN NOTED NONE PRSNT: CPT | Mod: CPTII,S$GLB,, | Performed by: INTERNAL MEDICINE

## 2024-04-30 PROCEDURE — 3077F SYST BP >= 140 MM HG: CPT | Mod: CPTII,S$GLB,, | Performed by: INTERNAL MEDICINE

## 2024-04-30 NOTE — PROGRESS NOTES
Cardiology    4/30/2024  11:36 AM    Problem list  Patient Active Problem List   Diagnosis    DJD (degenerative joint disease)    HTN (hypertension)    Iron deficiency anemia    Vaginal atrophy    Overweight    Ganglion cyst    Balance problems    Unsteady gait    Paroxysmal tachycardia    Atrial fibrillation    S/P carotid endarterectomy    Sinus bradycardia    Heat syncope    Bilateral carotid bruits    Bilateral carotid artery stenosis    Compression fracture of thoracic vertebra with routine healing    Vitamin D insufficiency    Stress    Atherosclerosis of aorta    Poor posture    Decreased strength of trunk and back    Dyspnea    Acute heart failure, unspecified heart failure type    Hyperlipidemia LDL goal <70    Osteoporosis    Insomnia    Chronic diastolic heart failure    B12 deficiency    Arthritis of knee    Tooth abscess    Angina pectoris, unspecified    Other nonthrombocytopenic purpura    Degenerative disease of nervous system, unspecified    Chronic anticoagulation       CC:  F/u    HPI:  She is doing well.  She was last seen 6 months ago.  She denies any chest or shortness of breath.  She denies any TIA or CVA.  She denies any melena hematochezia.  She was having issue nosebleed which stop once she stopped using her nasal spray.  She had labs done last week which showed hemoglobin 10.9 with platelet count of 347.  She saw Dr. García last week.      Medications  Current Outpatient Medications   Medication Sig Dispense Refill    acetaminophen (TYLENOL) 500 MG tablet Take 500 mg by mouth 2 (two) times daily.      alendronate (FOSAMAX) 70 MG tablet TAKE 1 TABLET BY MOUTH WEEKLY  WITH 8 OZ OF PLAIN WATER 30  MINUTES BEFORE FIRST FOOD, DRINK OR MEDS. STAY UPRIGHT FOR 30  MINS 12 tablet 3    aspirin (ECOTRIN) 81 MG EC tablet Take 81 mg by mouth nightly.      atorvastatin (LIPITOR) 20 MG tablet Take 1 tablet (20 mg total) by mouth once daily. 90 tablet 3    calcium-vitamin D 250-100 mg-unit per  tablet Take 1 tablet by mouth Daily.      candesartan (ATACAND) 16 MG tablet TAKE 1 TABLET BY MOUTH ONCE  DAILY 90 tablet 3    cholecalciferol, vitamin D3, (VITAMIN D3 ORAL) Take 2,000 Units by mouth once daily.      COMIRNATY 2023-24, 12Y UP,,PF, 30 mcg/0.3 mL inection       cycloSPORINE (RESTASIS MULTIDOSE) 0.05 % Drop Place 1 drop into both eyes every 12 (twelve) hours. 16.5 mL 3    diphenhydrAMINE (BENADRYL) 25 mg capsule Take 25 mg by mouth every 6 (six) hours as needed for Itching.      ELIQUIS 5 mg Tab TAKE 1 TABLET BY MOUTH TWICE  DAILY 180 tablet 3    flecainide (TAMBOCOR) 50 MG Tab TAKE 1 TABLET BY MOUTH EVERY 12  HOURS 180 tablet 3    lactase (LACTAID) 3,000 unit tablet Take 1 tablet by mouth 3 (three) times daily as needed.      loratadine (CLARITIN) 10 mg tablet Take 10 mg by mouth every morning.      spironolactone (ALDACTONE) 25 MG tablet Take 1 tablet (25 mg total) by mouth once daily. 90 tablet 3    traZODone (DESYREL) 50 MG tablet TAKE 1 TABLET(50 MG) BY MOUTH EVERY NIGHT AS NEEDED FOR INSOMNIA 90 tablet 3    triamcinolone acetonide 0.1% (KENALOG) 0.1 % cream Apply topically daily as needed (rash). 80 g 1    verapamiL (VERELAN) 180 MG C24P Take 1 capsule (180 mg total) by mouth once daily. Dose decrease. 90 capsule 3    cloNIDine (CATAPRES) 0.1 MG tablet Take 1 tablet (0.1 mg total) by mouth every 6 (six) hours as needed (). 60 tablet 11    mupirocin (BACTROBAN) 2 % ointment Apply topically 3 (three) times daily. 1 g 0     No current facility-administered medications for this visit.      Prior to Admission medications    Medication Sig Start Date End Date Taking? Authorizing Provider   acetaminophen (TYLENOL) 500 MG tablet Take 500 mg by mouth 2 (two) times daily.   Yes Provider, Historical   alendronate (FOSAMAX) 70 MG tablet TAKE 1 TABLET BY MOUTH WEEKLY  WITH 8 OZ OF PLAIN WATER 30  MINUTES BEFORE FIRST FOOD, DRINK OR MEDS. STAY UPRIGHT FOR 30  MINS 2/15/24  Yes Geneva Dsouza MD    aspirin (ECOTRIN) 81 MG EC tablet Take 81 mg by mouth nightly.   Yes Provider, Historical   atorvastatin (LIPITOR) 20 MG tablet Take 1 tablet (20 mg total) by mouth once daily. 4/18/24  Yes Geneva Dsouza MD   calcium-vitamin D 250-100 mg-unit per tablet Take 1 tablet by mouth Daily.   Yes Provider, Historical   candesartan (ATACAND) 16 MG tablet TAKE 1 TABLET BY MOUTH ONCE  DAILY 11/14/23  Yes Clem Mckeon MD   cholecalciferol, vitamin D3, (VITAMIN D3 ORAL) Take 2,000 Units by mouth once daily.   Yes Provider, Historical   COMIRNATY 2023-24, 12Y UP,,PF, 30 mcg/0.3 mL inection  10/13/23  Yes Provider, Historical   cycloSPORINE (RESTASIS MULTIDOSE) 0.05 % Drop Place 1 drop into both eyes every 12 (twelve) hours. 3/19/24 3/19/25 Yes Kristen Padgett OD   diphenhydrAMINE (BENADRYL) 25 mg capsule Take 25 mg by mouth every 6 (six) hours as needed for Itching.   Yes Provider, Historical   ELIQUIS 5 mg Tab TAKE 1 TABLET BY MOUTH TWICE  DAILY 12/16/23  Yes Clem Mckeon MD   flecainide (TAMBOCOR) 50 MG Tab TAKE 1 TABLET BY MOUTH EVERY 12  HOURS 12/16/23  Yes Clem Mckeon MD   lactase (LACTAID) 3,000 unit tablet Take 1 tablet by mouth 3 (three) times daily as needed.   Yes Provider, Historical   loratadine (CLARITIN) 10 mg tablet Take 10 mg by mouth every morning.   Yes Provider, Historical   spironolactone (ALDACTONE) 25 MG tablet Take 1 tablet (25 mg total) by mouth once daily. 2/26/24  Yes Maki Pratt MD   traZODone (DESYREL) 50 MG tablet TAKE 1 TABLET(50 MG) BY MOUTH EVERY NIGHT AS NEEDED FOR INSOMNIA 2/15/24  Yes Geneva sDouza MD   triamcinolone acetonide 0.1% (KENALOG) 0.1 % cream Apply topically daily as needed (rash). 7/28/23 7/27/24 Yes Geneva Dsouza MD   verapamiL (VERELAN) 180 MG C24P Take 1 capsule (180 mg total) by mouth once daily. Dose decrease. 2/29/24 2/28/25 Yes Clem Mckeno MD   cloNIDine (CATAPRES) 0.1 MG tablet Take 1 tablet (0.1 mg total) by mouth every  6 (six) hours as needed (). 1/13/23 1/13/24  Clem Mckeon MD   mupirocin (BACTROBAN) 2 % ointment Apply topically 3 (three) times daily. 6/18/23   Elena Crocker NP         History  Past Medical History:   Diagnosis Date    Anemia     Atrial fibrillation     Basal cell carcinoma     DJD (degenerative joint disease) 12/12/2012    Obesity (BMI 30-39.9) 11/27/2015    Vaginal atrophy 4/1/2016     Past Surgical History:   Procedure Laterality Date    ADENOIDECTOMY      BREAST BIOPSY Left 1999    Excisional bx, benign cyst    BREAST SURGERY      CARDIOVERSION  02/2021    CAROTID ENDARTERECTOMY Right 04/07/2022    Procedure: ENDARTERECTOMY-CAROTID;  Surgeon: TUCKER Brantley III, MD;  Location: [facility] OR 2ND FLR;  Service: Peripheral Vascular;  Laterality: Right;    COLONOSCOPY  2012    normal    COLONOSCOPY N/A 06/28/2017    Procedure: COLONOSCOPY;  Surgeon: Markel Montemayor MD;  Location: [facility] ENDO (4TH FLR);  Service: Endoscopy;  Laterality: N/A;    EXCISION OF GANGLION OF WRIST Left 06/27/2018    Procedure: EXCISION, GANGLION CYST, WRIST - Left Volar wrist;  Surgeon: Mary Moore MD;  Location: [facility] OR 1ST FLR;  Service: Orthopedics;  Laterality: Left;    HIP SURGERY  05/05/2014    bilateral    JOINT REPLACEMENT Bilateral     MOLLY    TONSILLECTOMY      TREATMENT OF CARDIAC ARRHYTHMIA N/A 09/29/2020    Procedure: CARDIOVERSION;  Surgeon: Nigel García MD;  Location: [facility] EP LAB;  Service: Cardiology;  Laterality: N/A;  AF, LIGIA, DCCV, MAC, [place], 3 Prep    TREATMENT OF CARDIAC ARRHYTHMIA N/A 12/08/2020    Procedure: Cardioversion or Defibrillation;  Surgeon: Nigel García MD;  Location: [facility] EP LAB;  Service: Cardiology;  Laterality: N/A;  AF, LIGIA, DCCV, MAC, [place], 3 Prep     Social History     Socioeconomic History    Marital status:    Occupational History    Occupation: retired   Tobacco Use    Smoking status: Former     Current packs/day: 0.00     Average packs/day: 1 pack/day for 18.5 years (18.5  ttl pk-yrs)     Types: Cigarettes     Start date: 9/15/1969     Quit date: 3/20/1988     Years since quittin.1    Smokeless tobacco: Never    Tobacco comments:     Lesrned in 1/2 hr. 10 year to quit   Substance and Sexual Activity    Alcohol use: Not Currently     Alcohol/week: 3.0 standard drinks of alcohol     Types: 3 Glasses of wine per week    Drug use: No    Sexual activity: Yes     Partners: Male     Birth control/protection: Post-menopausal, None     Comment: Age   Social History Narrative     to HEATH Montemayor    Nurse    Likes to garden     Social Determinants of Health     Financial Resource Strain: Low Risk  (2024)    Overall Financial Resource Strain (CARDIA)     Difficulty of Paying Living Expenses: Not very hard   Food Insecurity: No Food Insecurity (2024)    Hunger Vital Sign     Worried About Running Out of Food in the Last Year: Never true     Ran Out of Food in the Last Year: Never true   Transportation Needs: No Transportation Needs (2024)    PRAPARE - Transportation     Lack of Transportation (Medical): No     Lack of Transportation (Non-Medical): No   Physical Activity: Insufficiently Active (2024)    Exercise Vital Sign     Days of Exercise per Week: 1 day     Minutes of Exercise per Session: 20 min   Stress: No Stress Concern Present (2024)    [employer] - Occupational Stress Questionnaire     Feeling of Stress : Not at all   Housing Stability: Low Risk  (2024)    Housing Stability Vital Sign     Unable to Pay for Housing in the Last Year: No     Homeless in the Last Year: No         Allergies  Review of patient's allergies indicates:   Allergen Reactions    Prednisone      ELEVATED B/P FOR SEVERAL DAYS/WENT TO ER    Lactose          Review of Systems   Review of Systems   Cardiovascular: Negative.    Respiratory: Negative.     All other systems reviewed and are negative.        Physical Exam  Wt Readings from Last 1  Encounters:   04/30/24 56.1 kg (123 lb 11.2 oz)     BP Readings from Last 3 Encounters:   04/30/24 (!) 140/58   04/26/24 (!) 142/54   04/18/24 (!) 130/56     Pulse Readings from Last 1 Encounters:   04/30/24 (!) 50     Body mass index is 24.98 kg/m².    Physical Exam  Constitutional:       Appearance: She is well-developed.   HENT:      Head: Atraumatic.   Eyes:      General: No scleral icterus.  Neck:      Vascular: Normal carotid pulses. No carotid bruit, hepatojugular reflux or JVD.      Comments: R CEA  Cardiovascular:      Rate and Rhythm: Normal rate and regular rhythm.      Chest Wall: PMI is not displaced.      Pulses: Intact distal pulses.           Carotid pulses are 2+ on the right side and 2+ on the left side.       Radial pulses are 2+ on the right side and 2+ on the left side.        Dorsalis pedis pulses are 2+ on the right side and 2+ on the left side.      Heart sounds: Normal heart sounds, S1 normal and S2 normal. No murmur heard.     No friction rub.   Pulmonary:      Effort: Pulmonary effort is normal. No respiratory distress.      Breath sounds: Normal breath sounds. No stridor. No wheezing or rales.   Chest:      Chest wall: No tenderness.   Abdominal:      General: Bowel sounds are normal.      Palpations: Abdomen is soft.   Musculoskeletal:      Cervical back: Neck supple. No edema.   Skin:     General: Skin is warm and dry.      Nails: There is no clubbing.   Neurological:      Mental Status: She is alert and oriented to person, place, and time.   Psychiatric:         Behavior: Behavior normal.         Thought Content: Thought content normal.             Assessment  1. Primary hypertension  Stable, continue current treatment and monitor    2. S/P carotid endarterectomy  Stable, continue treatment and monitor, last carotid doppler in sep showed no significant stenosis    3. Atherosclerosis of aorta  stable    4. Paroxysmal atrial fibrillation  Stable, on Eliquis flecainide and verapamil,  followed by Dr García.        Plan and Discussion  Recommend to continue with current medications.  Discussed her blood pressure is stable.  Will not be too aggressive with her blood pressure as she has episodes of hypotension.    Follow Up  6 months      Clem Mckeon MD, F.A.C.C, F.S.C.A.I.      Total professional time spent for the encounter: 30 minutes  Time was spent preparing to see the patient, reviewing results of prior testing, obtaining and/or reviewing separately obtained history, performing a medically appropriate examination and interview, counseling and educating the patient/family, ordering medications/tests/procedures, referring and communicating with other health care professionals, documenting clinical information in the electronic health record, and independently interpreting results.    Disclaimer: This document was created using voice recognition software (M*Modal Fluency Direct). Although it may be edited, this document may contain errors related to incorrect recognition of the spoken word. Please call the physician if clarification is needed.

## 2024-07-07 ENCOUNTER — PATIENT MESSAGE (OUTPATIENT)
Dept: ADMINISTRATIVE | Facility: OTHER | Age: 79
End: 2024-07-07
Payer: MEDICARE

## 2024-09-18 ENCOUNTER — PATIENT MESSAGE (OUTPATIENT)
Dept: ADMINISTRATIVE | Facility: HOSPITAL | Age: 79
End: 2024-09-18
Payer: MEDICARE

## 2024-09-18 ENCOUNTER — OFFICE VISIT (OUTPATIENT)
Dept: PRIMARY CARE CLINIC | Facility: CLINIC | Age: 79
End: 2024-09-18
Payer: MEDICARE

## 2024-09-18 VITALS
RESPIRATION RATE: 16 BRPM | WEIGHT: 128.31 LBS | HEART RATE: 54 BPM | OXYGEN SATURATION: 95 % | DIASTOLIC BLOOD PRESSURE: 52 MMHG | HEIGHT: 59 IN | SYSTOLIC BLOOD PRESSURE: 124 MMHG | BODY MASS INDEX: 25.87 KG/M2

## 2024-09-18 DIAGNOSIS — E78.5 HYPERLIPIDEMIA LDL GOAL <70: ICD-10-CM

## 2024-09-18 DIAGNOSIS — I48.0 PAROXYSMAL ATRIAL FIBRILLATION: ICD-10-CM

## 2024-09-18 DIAGNOSIS — D50.9 IRON DEFICIENCY ANEMIA, UNSPECIFIED IRON DEFICIENCY ANEMIA TYPE: ICD-10-CM

## 2024-09-18 DIAGNOSIS — Z12.31 SCREENING MAMMOGRAM FOR BREAST CANCER: ICD-10-CM

## 2024-09-18 DIAGNOSIS — Z78.0 POSTMENOPAUSAL: ICD-10-CM

## 2024-09-18 DIAGNOSIS — I10 PRIMARY HYPERTENSION: ICD-10-CM

## 2024-09-18 DIAGNOSIS — B37.31 VAGINAL CANDIDIASIS: Primary | ICD-10-CM

## 2024-09-18 PROCEDURE — 3078F DIAST BP <80 MM HG: CPT | Mod: CPTII,S$GLB,, | Performed by: STUDENT IN AN ORGANIZED HEALTH CARE EDUCATION/TRAINING PROGRAM

## 2024-09-18 PROCEDURE — 1101F PT FALLS ASSESS-DOCD LE1/YR: CPT | Mod: CPTII,S$GLB,, | Performed by: STUDENT IN AN ORGANIZED HEALTH CARE EDUCATION/TRAINING PROGRAM

## 2024-09-18 PROCEDURE — 1157F ADVNC CARE PLAN IN RCRD: CPT | Mod: CPTII,S$GLB,, | Performed by: STUDENT IN AN ORGANIZED HEALTH CARE EDUCATION/TRAINING PROGRAM

## 2024-09-18 PROCEDURE — 1125F AMNT PAIN NOTED PAIN PRSNT: CPT | Mod: CPTII,S$GLB,, | Performed by: STUDENT IN AN ORGANIZED HEALTH CARE EDUCATION/TRAINING PROGRAM

## 2024-09-18 PROCEDURE — 3288F FALL RISK ASSESSMENT DOCD: CPT | Mod: CPTII,S$GLB,, | Performed by: STUDENT IN AN ORGANIZED HEALTH CARE EDUCATION/TRAINING PROGRAM

## 2024-09-18 PROCEDURE — 1159F MED LIST DOCD IN RCRD: CPT | Mod: CPTII,S$GLB,, | Performed by: STUDENT IN AN ORGANIZED HEALTH CARE EDUCATION/TRAINING PROGRAM

## 2024-09-18 PROCEDURE — 3074F SYST BP LT 130 MM HG: CPT | Mod: CPTII,S$GLB,, | Performed by: STUDENT IN AN ORGANIZED HEALTH CARE EDUCATION/TRAINING PROGRAM

## 2024-09-18 PROCEDURE — 99214 OFFICE O/P EST MOD 30 MIN: CPT | Mod: S$GLB,,, | Performed by: STUDENT IN AN ORGANIZED HEALTH CARE EDUCATION/TRAINING PROGRAM

## 2024-09-18 PROCEDURE — 99999 PR PBB SHADOW E&M-EST. PATIENT-LVL IV: CPT | Mod: PBBFAC,,, | Performed by: STUDENT IN AN ORGANIZED HEALTH CARE EDUCATION/TRAINING PROGRAM

## 2024-09-18 RX ORDER — FLUCONAZOLE 150 MG/1
150 TABLET ORAL ONCE
Qty: 1 TABLET | Refills: 0 | Status: SHIPPED | OUTPATIENT
Start: 2024-09-18 | End: 2024-09-18

## 2024-09-18 NOTE — PROGRESS NOTES
Subjective     Patient ID: Magdalena Mane is a 79 y.o. female.    Chief Complaint: Follow-up    HPI  Magdalena Mane is a 79 y.o.  female who presents with HTN, HLD, R CEA, afib, basal cell carcinoma on the nose, and iron def anemia (anemic since she was a child) is here for a f/u visit.   -doing well. No falls  -has a vaginal yeast infection since she took augmentin. Will prescribe fluconazole. She is on flecainide but her recent Qtc was 405 while on it so she should be fine with the one dose of fluconazole.  -ordered mammo and dexa today  -reminded her to get covid and flu vaccine early October.   -I removed wax from her left ear .      Review of Systems   Constitutional:  Negative for chills and fever.   HENT:  Negative for rhinorrhea and sore throat.    Respiratory:  Negative for cough, chest tightness and shortness of breath.    Cardiovascular:  Negative for chest pain.   Gastrointestinal:  Negative for abdominal pain, blood in stool, constipation and diarrhea.   Genitourinary:  Negative for dysuria and hematuria.   Neurological:  Negative for dizziness, light-headedness and headaches.          Objective     Physical Exam  Vitals and nursing note reviewed.   Constitutional:       Appearance: Normal appearance.   HENT:      Left Ear: There is impacted cerumen.   Eyes:      Conjunctiva/sclera: Conjunctivae normal.   Cardiovascular:      Rate and Rhythm: Normal rate and regular rhythm.      Heart sounds: Normal heart sounds.   Pulmonary:      Effort: Pulmonary effort is normal. No respiratory distress.      Breath sounds: Normal breath sounds.   Abdominal:      General: Bowel sounds are normal.   Musculoskeletal:      Comments: Mild lower ext edema    Skin:     General: Skin is warm.   Neurological:      Mental Status: She is alert and oriented to person, place, and time.   Psychiatric:         Mood and Affect: Mood normal.         Behavior: Behavior normal.            Assessment and Plan        1.  Vaginal candidiasis  Assessment & Plan:  has a vaginal yeast infection since she took augmentin. Will prescribe fluconazole. She is on flecainide but her recent Qtc was 405 while on it so she should be fine with the one dose of fluconazole.      2. Screening mammogram for breast cancer  -     Mammo Digital Screening Bilat w/ Chele; Future; Expected date: 09/18/2024    3. Postmenopausal  -     DXA Bone Density Axial Skeleton 1 or more sites; Future; Expected date: 09/18/2024    4. Iron deficiency anemia, unspecified iron deficiency anemia type  Assessment & Plan:  Reviewed recent iron panel, b12 and folate from 7/2023. Iron is wnl but sometimes her iron gets low. She is not taking it right now. Will check iron panel since its been over a year.     Orders:  -     IRON AND TIBC; Future; Expected date: 09/18/2024  -     FERRITIN; Future; Expected date: 09/18/2024    5. Primary hypertension  Assessment & Plan:  BP well controlled on candesartan, verapamil and spironolactone, continue. Did not have to take clonidine in over a year. She will just have it at home in case she needs it so will leave it on her med list    Orders:  -     Basic Metabolic Panel; Future; Expected date: 09/18/2024  -     CBC Without Differential; Future; Expected date: 09/18/2024    6. Paroxysmal atrial fibrillation  Overview:  F/u by Dr. García.    [id number] score 4    Assessment & Plan:  On eliquis, flecainide and verapamil. Continue. Rate controlled. HR is in the 50s but that's nml for her. She does have sob with exertion but no CP. Could be related to the low HR. Continue to F/u with cardiology       7. Hyperlipidemia LDL goal <70  Assessment & Plan:  On atorvastatin 20 mg daily, continue. Ordered lipid panel today.     Orders:  -     Lipid Panel; Future; Expected date: 09/18/2024    Other orders  -     fluconazole (DIFLUCAN) 150 MG Tab; Take 1 tablet (150 mg total) by mouth once. for 1 dose  Dispense: 1 tablet; Refill: 0

## 2024-09-18 NOTE — ASSESSMENT & PLAN NOTE
On eliquis, flecainide and verapamil. Continue. Rate controlled. HR is in the 50s but that's nml for her. She does have sob with exertion but no CP. Could be related to the low HR. Continue to F/u with cardiology

## 2024-09-18 NOTE — ASSESSMENT & PLAN NOTE
Requesting venlafaxine er 75mg and 150mg  LOV: 21/15/19  RTC:   Last Relevant Labs:   Filled: 11/29/18  #90 with 0 refills    Future Appointments   Date Time Provider Emma Plummer   3/18/2019  1:30 PM MARGARITA Mcdonald [facility] Reviewed recent iron panel, b12 and folate from 7/2023. Iron is wnl but sometimes her iron gets low. She is not taking it right now. Will check iron panel since its been over a year.

## 2024-09-18 NOTE — ASSESSMENT & PLAN NOTE
BP well controlled on candesartan, verapamil and spironolactone, continue. Did not have to take clonidine in over a year. She will just have it at home in case she needs it so will leave it on her med list

## 2024-09-18 NOTE — ASSESSMENT & PLAN NOTE
has a vaginal yeast infection since she took augmentin. Will prescribe fluconazole. She is on flecainide but her recent Qtc was 405 while on it so she should be fine with the one dose of fluconazole.

## 2024-09-20 ENCOUNTER — TELEPHONE (OUTPATIENT)
Dept: OPTOMETRY | Facility: CLINIC | Age: 79
End: 2024-09-20
Payer: MEDICARE

## 2024-09-20 NOTE — TELEPHONE ENCOUNTER
"Pt stated that restasis was unaffordable and wanted to try an OTC option. Informed pt to start using Ivizia BID-TID or more if needed. Pt was told to contact office if Ivizia doesn't help improve/manage symptoms.     ----- Message from Fernando Owen sent at 9/20/2024  2:44 PM CDT -----  Consult/Advisory    Name Of Caller: Self    Contact Preference?: [phone]     What is the nature of the call?: Calling to discuss "personal matters" regarding her ongoing symptoms      Additional Notes: Pt refused to provide any further info    "Thank you for all that you do for our patients"  "

## 2024-09-24 ENCOUNTER — HOSPITAL ENCOUNTER (OUTPATIENT)
Dept: RADIOLOGY | Facility: HOSPITAL | Age: 79
Discharge: HOME | End: 2024-09-24
Attending: STUDENT IN AN ORGANIZED HEALTH CARE EDUCATION/TRAINING PROGRAM
Payer: MEDICARE

## 2024-09-24 VITALS — BODY MASS INDEX: 25.8 KG/M2 | HEIGHT: 59 IN | WEIGHT: 128 LBS

## 2024-09-24 DIAGNOSIS — Z12.31 SCREENING MAMMOGRAM FOR BREAST CANCER: ICD-10-CM

## 2024-09-24 PROCEDURE — 77067 SCR MAMMO BI INCL CAD: CPT | Mod: 26,,, | Performed by: RADIOLOGY

## 2024-09-24 PROCEDURE — 77063 BREAST TOMOSYNTHESIS BI: CPT | Mod: 26,,, | Performed by: RADIOLOGY

## 2024-09-24 PROCEDURE — 77067 SCR MAMMO BI INCL CAD: CPT | Mod: TC

## 2024-09-25 ENCOUNTER — TELEPHONE (OUTPATIENT)
Dept: PRIMARY CARE CLINIC | Facility: CLINIC | Age: 79
End: 2024-09-25
Payer: MEDICARE

## 2024-09-25 NOTE — TELEPHONE ENCOUNTER
Called PT. Informed PT that there is no evidence of tumors on mammogram. Informed PT she Will repeat in 1 year.

## 2024-09-25 NOTE — TELEPHONE ENCOUNTER
----- Message from Geneva Dsouza MD sent at 9/25/2024  3:57 PM CDT -----  Pls let patient know that no evidence of tumors on mammogram. Will repeat in 1 yr

## 2024-10-07 ENCOUNTER — HOSPITAL ENCOUNTER (OUTPATIENT)
Dept: RADIOLOGY | Facility: HOSPITAL | Age: 79
Discharge: HOME | End: 2024-10-07
Attending: STUDENT IN AN ORGANIZED HEALTH CARE EDUCATION/TRAINING PROGRAM
Payer: MEDICARE

## 2024-10-07 DIAGNOSIS — Z78.0 POSTMENOPAUSAL: ICD-10-CM

## 2024-10-07 PROCEDURE — 77080 DXA BONE DENSITY AXIAL: CPT | Mod: 26,,, | Performed by: INTERNAL MEDICINE

## 2024-10-07 PROCEDURE — 77080 DXA BONE DENSITY AXIAL: CPT | Mod: TC

## 2024-10-14 DIAGNOSIS — I10 ESSENTIAL HYPERTENSION: ICD-10-CM

## 2024-10-15 ENCOUNTER — TELEPHONE (OUTPATIENT)
Dept: PRIMARY CARE CLINIC | Facility: CLINIC | Age: 79
End: 2024-10-15
Payer: MEDICARE

## 2024-10-15 RX ORDER — CANDESARTAN 16 MG/1
16 TABLET ORAL
Qty: 90 TABLET | Refills: 3 | Status: SHIPPED | OUTPATIENT
Start: 2024-10-15

## 2024-10-15 NOTE — TELEPHONE ENCOUNTER
----- Message from Geneva Dsouza MD sent at 10/15/2024  2:07 PM CDT -----  Pls let patient know that bone density is stable. Continue fosamax. Will repeat in 2 yrs

## 2024-10-15 NOTE — TELEPHONE ENCOUNTER
Called PT. Informed PT that her bone density is stable. Informed PT the  Stated, to Continue fosamax. Informed PT she Will repeat in 2 years.

## 2024-10-31 ENCOUNTER — OFFICE VISIT (OUTPATIENT)
Dept: CARDIOLOGY | Facility: CLINIC | Age: 79
End: 2024-10-31
Payer: MEDICARE

## 2024-10-31 VITALS
HEART RATE: 60 BPM | SYSTOLIC BLOOD PRESSURE: 136 MMHG | BODY MASS INDEX: 24.96 KG/M2 | WEIGHT: 123.63 LBS | OXYGEN SATURATION: 98 % | DIASTOLIC BLOOD PRESSURE: 60 MMHG

## 2024-10-31 DIAGNOSIS — I48.91 ATRIAL FIBRILLATION, UNSPECIFIED TYPE: ICD-10-CM

## 2024-10-31 DIAGNOSIS — E78.5 HYPERLIPIDEMIA LDL GOAL <70: ICD-10-CM

## 2024-10-31 DIAGNOSIS — Z98.890 S/P CAROTID ENDARTERECTOMY: ICD-10-CM

## 2024-10-31 DIAGNOSIS — I10 PRIMARY HYPERTENSION: Primary | ICD-10-CM

## 2024-10-31 DIAGNOSIS — I65.23 CAROTID STENOSIS, BILATERAL: ICD-10-CM

## 2024-10-31 DIAGNOSIS — I48.0 PAROXYSMAL ATRIAL FIBRILLATION: ICD-10-CM

## 2024-10-31 PROCEDURE — 3078F DIAST BP <80 MM HG: CPT | Mod: CPTII,S$GLB,, | Performed by: INTERNAL MEDICINE

## 2024-10-31 PROCEDURE — 99214 OFFICE O/P EST MOD 30 MIN: CPT | Mod: S$GLB,,, | Performed by: INTERNAL MEDICINE

## 2024-10-31 PROCEDURE — 1125F AMNT PAIN NOTED PAIN PRSNT: CPT | Mod: CPTII,S$GLB,, | Performed by: INTERNAL MEDICINE

## 2024-10-31 PROCEDURE — 1157F ADVNC CARE PLAN IN RCRD: CPT | Mod: CPTII,S$GLB,, | Performed by: INTERNAL MEDICINE

## 2024-10-31 PROCEDURE — 3075F SYST BP GE 130 - 139MM HG: CPT | Mod: CPTII,S$GLB,, | Performed by: INTERNAL MEDICINE

## 2024-10-31 PROCEDURE — 1159F MED LIST DOCD IN RCRD: CPT | Mod: CPTII,S$GLB,, | Performed by: INTERNAL MEDICINE

## 2024-10-31 PROCEDURE — 99999 PR PBB SHADOW E&M-EST. PATIENT-LVL III: CPT | Mod: PBBFAC,,, | Performed by: INTERNAL MEDICINE

## 2024-10-31 RX ORDER — MULTIVIT WITH MINERALS/HERBS
1 TABLET ORAL DAILY
COMMUNITY

## 2024-11-01 ENCOUNTER — TELEPHONE (OUTPATIENT)
Dept: PRIMARY CARE CLINIC | Facility: CLINIC | Age: 79
End: 2024-11-01
Payer: MEDICARE

## 2024-11-01 ENCOUNTER — LAB VISIT (OUTPATIENT)
Dept: LAB | Facility: HOSPITAL | Age: 79
End: 2024-11-01
Attending: STUDENT IN AN ORGANIZED HEALTH CARE EDUCATION/TRAINING PROGRAM
Payer: MEDICARE

## 2024-11-01 ENCOUNTER — OFFICE VISIT (OUTPATIENT)
Dept: CARDIOLOGY | Facility: CLINIC | Age: 79
End: 2024-11-01
Payer: MEDICARE

## 2024-11-01 VITALS
WEIGHT: 125 LBS | DIASTOLIC BLOOD PRESSURE: 52 MMHG | HEART RATE: 52 BPM | BODY MASS INDEX: 25.2 KG/M2 | HEIGHT: 59 IN | SYSTOLIC BLOOD PRESSURE: 91 MMHG

## 2024-11-01 DIAGNOSIS — D50.9 IRON DEFICIENCY ANEMIA, UNSPECIFIED IRON DEFICIENCY ANEMIA TYPE: ICD-10-CM

## 2024-11-01 DIAGNOSIS — E78.5 HYPERLIPIDEMIA LDL GOAL <70: ICD-10-CM

## 2024-11-01 DIAGNOSIS — I48.0 PAROXYSMAL ATRIAL FIBRILLATION: Primary | ICD-10-CM

## 2024-11-01 DIAGNOSIS — I10 PRIMARY HYPERTENSION: ICD-10-CM

## 2024-11-01 DIAGNOSIS — I70.0 ATHEROSCLEROSIS OF AORTA: ICD-10-CM

## 2024-11-01 DIAGNOSIS — I50.32 CHRONIC DIASTOLIC HEART FAILURE: ICD-10-CM

## 2024-11-01 DIAGNOSIS — I48.91 ATRIAL FIBRILLATION, UNSPECIFIED TYPE: ICD-10-CM

## 2024-11-01 LAB
ANION GAP SERPL CALC-SCNC: 6 MMOL/L (ref 8–16)
BUN SERPL-MCNC: 28 MG/DL (ref 8–23)
CALCIUM SERPL-MCNC: 9.2 MG/DL (ref 8.7–10.5)
CHLORIDE SERPL-SCNC: 104 MMOL/L (ref 95–110)
CHOLEST SERPL-MCNC: 125 MG/DL (ref 120–199)
CHOLEST/HDLC SERPL: 1.8 {RATIO} (ref 2–5)
CO2 SERPL-SCNC: 25 MMOL/L (ref 23–29)
CREAT SERPL-MCNC: 0.9 MG/DL (ref 0.5–1.4)
ERYTHROCYTE [DISTWIDTH] IN BLOOD BY AUTOMATED COUNT: 13.3 % (ref 11.5–14.5)
EST. GFR  (NO RACE VARIABLE): >60 ML/MIN/1.73 M^2
FERRITIN SERPL-MCNC: 57 NG/ML (ref 20–300)
GLUCOSE SERPL-MCNC: 92 MG/DL (ref 70–110)
HCT VFR BLD AUTO: 34.5 % (ref 37–48.5)
HDLC SERPL-MCNC: 68 MG/DL (ref 40–75)
HDLC SERPL: 54.4 % (ref 20–50)
HGB BLD-MCNC: 11.1 G/DL (ref 12–16)
IRON SERPL-MCNC: 74 UG/DL (ref 30–160)
LDLC SERPL CALC-MCNC: 49.2 MG/DL (ref 63–159)
MCH RBC QN AUTO: 32.4 PG (ref 27–31)
MCHC RBC AUTO-ENTMCNC: 32.2 G/DL (ref 32–36)
MCV RBC AUTO: 101 FL (ref 82–98)
NONHDLC SERPL-MCNC: 57 MG/DL
PLATELET # BLD AUTO: 252 K/UL (ref 150–450)
PMV BLD AUTO: 11.4 FL (ref 9.2–12.9)
POTASSIUM SERPL-SCNC: 4 MMOL/L (ref 3.5–5.1)
RBC # BLD AUTO: 3.43 M/UL (ref 4–5.4)
SATURATED IRON: 20 % (ref 20–50)
SODIUM SERPL-SCNC: 135 MMOL/L (ref 136–145)
TOTAL IRON BINDING CAPACITY: 373 UG/DL (ref 250–450)
TRANSFERRIN SERPL-MCNC: 252 MG/DL (ref 200–375)
TRIGL SERPL-MCNC: 39 MG/DL (ref 30–150)
WBC # BLD AUTO: 7.73 K/UL (ref 3.9–12.7)

## 2024-11-01 PROCEDURE — 85027 COMPLETE CBC AUTOMATED: CPT | Performed by: STUDENT IN AN ORGANIZED HEALTH CARE EDUCATION/TRAINING PROGRAM

## 2024-11-01 PROCEDURE — 36415 COLL VENOUS BLD VENIPUNCTURE: CPT | Performed by: STUDENT IN AN ORGANIZED HEALTH CARE EDUCATION/TRAINING PROGRAM

## 2024-11-01 PROCEDURE — 80061 LIPID PANEL: CPT | Performed by: STUDENT IN AN ORGANIZED HEALTH CARE EDUCATION/TRAINING PROGRAM

## 2024-11-01 PROCEDURE — 82728 ASSAY OF FERRITIN: CPT | Performed by: STUDENT IN AN ORGANIZED HEALTH CARE EDUCATION/TRAINING PROGRAM

## 2024-11-01 PROCEDURE — 80048 BASIC METABOLIC PNL TOTAL CA: CPT | Performed by: STUDENT IN AN ORGANIZED HEALTH CARE EDUCATION/TRAINING PROGRAM

## 2024-11-01 PROCEDURE — 84466 ASSAY OF TRANSFERRIN: CPT | Performed by: STUDENT IN AN ORGANIZED HEALTH CARE EDUCATION/TRAINING PROGRAM

## 2024-11-01 PROCEDURE — 99999 PR PBB SHADOW E&M-EST. PATIENT-LVL IV: CPT | Mod: PBBFAC,,, | Performed by: INTERNAL MEDICINE

## 2024-11-01 RX ORDER — FLECAINIDE ACETATE 50 MG/1
50 TABLET ORAL EVERY 12 HOURS
Qty: 180 TABLET | Refills: 3 | Status: SHIPPED | OUTPATIENT
Start: 2024-11-01

## 2024-11-01 RX ORDER — FLECAINIDE ACETATE 50 MG/1
50 TABLET ORAL EVERY 12 HOURS
Qty: 180 TABLET | Refills: 3 | OUTPATIENT
Start: 2024-11-01

## 2024-11-04 DIAGNOSIS — I48.0 PAROXYSMAL ATRIAL FIBRILLATION: Primary | ICD-10-CM

## 2024-11-27 RX ORDER — SPIRONOLACTONE 25 MG/1
25 TABLET ORAL
Qty: 90 TABLET | Refills: 3 | Status: SHIPPED | OUTPATIENT
Start: 2024-11-27

## 2024-12-27 ENCOUNTER — TELEPHONE (OUTPATIENT)
Dept: PRIMARY CARE CLINIC | Facility: CLINIC | Age: 79
End: 2024-12-27
Payer: MEDICARE

## 2024-12-27 RX ORDER — BENZONATATE 200 MG/1
200 CAPSULE ORAL 3 TIMES DAILY PRN
Qty: 30 CAPSULE | Refills: 0 | Status: SHIPPED | OUTPATIENT
Start: 2024-12-27 | End: 2025-01-06

## 2024-12-30 ENCOUNTER — TELEPHONE (OUTPATIENT)
Dept: PRIMARY CARE CLINIC | Facility: CLINIC | Age: 79
End: 2024-12-30
Payer: MEDICARE

## 2024-12-30 NOTE — TELEPHONE ENCOUNTER
Call to patient who reports that BP has been low since she has had the flu. BP today was 83/52, she lay down and BP came back up to 106. She only has complaints of weakness when the BP drops, no dizziness.     She is drinking fluids (64oz) and still has low grade temp from flu.     She retook BP while this nurse on phone -130/62. Report to PCP    Provided reported for patient to take BP before meds in AM, and if <110/60 to hold Candesartan and Spironolactone. Repeat daily.     Patient verbalized understanding. Advised to take BP daily.

## 2025-01-01 ENCOUNTER — PATIENT MESSAGE (OUTPATIENT)
Dept: ADMINISTRATIVE | Facility: OTHER | Age: 80
End: 2025-01-01
Payer: MEDICARE

## 2025-01-01 ENCOUNTER — PATIENT MESSAGE (OUTPATIENT)
Dept: PRIMARY CARE CLINIC | Facility: CLINIC | Age: 80
End: 2025-01-01
Payer: MEDICARE

## 2025-01-03 DIAGNOSIS — S22.000D COMPRESSION FRACTURE OF THORACIC VERTEBRA WITH ROUTINE HEALING, UNSPECIFIED THORACIC VERTEBRAL LEVEL, SUBSEQUENT ENCOUNTER: ICD-10-CM

## 2025-01-06 RX ORDER — ALENDRONATE SODIUM 70 MG/1
TABLET ORAL
Qty: 12 TABLET | Refills: 3 | Status: SHIPPED | OUTPATIENT
Start: 2025-01-06

## 2025-01-10 DIAGNOSIS — I10 ESSENTIAL HYPERTENSION: ICD-10-CM

## 2025-01-10 RX ORDER — VERAPAMIL HYDROCHLORIDE 180 MG/1
180 CAPSULE, EXTENDED RELEASE ORAL
Qty: 90 CAPSULE | Refills: 3 | Status: SHIPPED | OUTPATIENT
Start: 2025-01-10

## 2025-01-20 ENCOUNTER — PATIENT MESSAGE (OUTPATIENT)
Dept: PRIMARY CARE CLINIC | Facility: CLINIC | Age: 80
End: 2025-01-20
Payer: MEDICARE

## 2025-01-21 ENCOUNTER — OFFICE VISIT (OUTPATIENT)
Dept: PRIMARY CARE CLINIC | Facility: CLINIC | Age: 80
End: 2025-01-21
Payer: MEDICARE

## 2025-01-21 ENCOUNTER — TELEPHONE (OUTPATIENT)
Dept: PRIMARY CARE CLINIC | Facility: CLINIC | Age: 80
End: 2025-01-21

## 2025-01-21 DIAGNOSIS — I10 PRIMARY HYPERTENSION: ICD-10-CM

## 2025-01-21 DIAGNOSIS — I50.32 CHRONIC DIASTOLIC HEART FAILURE: ICD-10-CM

## 2025-01-21 DIAGNOSIS — I48.0 PAROXYSMAL ATRIAL FIBRILLATION: Primary | ICD-10-CM

## 2025-01-21 PROCEDURE — 1159F MED LIST DOCD IN RCRD: CPT | Mod: CPTII,95,, | Performed by: STUDENT IN AN ORGANIZED HEALTH CARE EDUCATION/TRAINING PROGRAM

## 2025-01-21 PROCEDURE — 1123F ACP DISCUSS/DSCN MKR DOCD: CPT | Mod: CPTII,95,, | Performed by: STUDENT IN AN ORGANIZED HEALTH CARE EDUCATION/TRAINING PROGRAM

## 2025-01-21 PROCEDURE — 1160F RVW MEDS BY RX/DR IN RCRD: CPT | Mod: CPTII,95,, | Performed by: STUDENT IN AN ORGANIZED HEALTH CARE EDUCATION/TRAINING PROGRAM

## 2025-01-21 PROCEDURE — 98006 SYNCH AUDIO-VIDEO EST MOD 30: CPT | Mod: 95,,, | Performed by: STUDENT IN AN ORGANIZED HEALTH CARE EDUCATION/TRAINING PROGRAM

## 2025-01-21 NOTE — PROGRESS NOTES
Subjective     Patient ID: Magdalena Mane is a 79 y.o. female.    Chief Complaint: No chief complaint on file.    HPI  Magdalena Mane is a 79 y.o.  female who presents with HTN, HLD, R CEA, afib, basal cell carcinoma on the nose, and iron def anemia (anemic since she was a child) is here for a virtual f/u visit. Did acp 2025  -doing well. No falls  -BP Is good per the patient.   -no urinary issues or trouble sleeping  -do not need labs today    Advance Care Planning     Date: 01/21/2025    Power of   I initiated the process of voluntary advance care planning today and explained the importance of this process to the patient.  I introduced the concept of advance directives to the patient, as well. Then the patient received detailed information about the importance of designating a Health Care Power of  (HCPOA). She was also instructed to communicate with this person about their wishes for future healthcare, should she become sick and lose decision-making capacity. The patient has previously appointed a HCPOA. After our discussion, the patient has decided to complete a HCPOA and has appointed her significant other, health care agent: jose manuel Mane  & health care agent number: [phone]. I encouraged her to communicate with this person about their wishes for future healthcare, should she become sick and lose decision-making capacity.      A total of 5 min was spent on advance care planning, goals of care discussion, emotional support, formulating and communicating prognosis and exploring burden/benefit of various approaches of treatment. This discussion occurred on a fully voluntary basis with the verbal consent of the patient and/or family.          Review of Systems   Constitutional:  Negative for chills and fever.   HENT:  Negative for rhinorrhea and sore throat.    Respiratory:  Negative for cough, chest tightness and shortness of breath.    Cardiovascular:  Negative for chest pain  and palpitations.   Gastrointestinal:  Negative for abdominal pain, blood in stool, constipation and diarrhea.   Genitourinary:  Negative for dysuria and hematuria.   Musculoskeletal:  Negative for neck pain.   Neurological:  Negative for dizziness, light-headedness and headaches.          Objective     Physical Exam  Vitals and nursing note reviewed.   Constitutional:       Appearance: Normal appearance.   Eyes:      Conjunctiva/sclera: Conjunctivae normal.   Pulmonary:      Effort: Pulmonary effort is normal. No respiratory distress.   Musculoskeletal:      Comments: Mild lower ext edema    Neurological:      General: No focal deficit present.      Mental Status: She is alert and oriented to person, place, and time.   Psychiatric:         Mood and Affect: Mood normal.         Behavior: Behavior normal.            Assessment and Plan     1. Paroxysmal atrial fibrillation  Overview:  F/u by Dr. García.    [id number] score 4    Assessment & Plan:  On eliquis, flecainide and verapamil. Continue. Rate controlled. HR is in the 50s but that's nml for her.  Continue to F/u with cardiology       2. Chronic diastolic heart failure  Assessment & Plan:  Grade II diastolic dysfunction noted on last echo. Weight is stable. Limiting sodium intake. continue      3. Primary hypertension  Assessment & Plan:  BP well controlled on candesartan, verapamil and spironolactone, continue. Will monitor        The patient location is: home  The chief complaint leading to consultation is: blood pressure and afib     Visit type: audiovisual    Face to Face time with patient: 12  12 minutes of total time spent on the encounter, which includes face to face time and non-face to face time preparing to see the patient (eg, review of tests), Obtaining and/or reviewing separately obtained history, Documenting clinical information in the electronic or other health record, Independently interpreting results (not separately reported) and communicating  results to the patient/family/caregiver, or Care coordination (not separately reported).         Each patient to whom he or she provides medical services by telemedicine is:  (1) informed of the relationship between the physician and patient and the respective role of any other health care provider with respect to management of the patient; and (2) notified that he or she may decline to receive medical services by telemedicine and may withdraw from such care at any time.

## 2025-01-21 NOTE — ASSESSMENT & PLAN NOTE
Grade II diastolic dysfunction noted on last echo. Weight is stable. Limiting sodium intake. continue

## 2025-01-21 NOTE — ASSESSMENT & PLAN NOTE
On eliquis, flecainide and verapamil. Continue. Rate controlled. HR is in the 50s but that's nml for her.  Continue to F/u with cardiology

## 2025-02-23 ENCOUNTER — PATIENT MESSAGE (OUTPATIENT)
Dept: ADMINISTRATIVE | Facility: OTHER | Age: 80
End: 2025-02-23
Payer: MEDICARE

## 2025-03-16 ENCOUNTER — PATIENT MESSAGE (OUTPATIENT)
Dept: ADMINISTRATIVE | Facility: OTHER | Age: 80
End: 2025-03-16
Payer: MEDICARE

## 2025-03-19 DIAGNOSIS — G47.00 INSOMNIA, UNSPECIFIED TYPE: ICD-10-CM

## 2025-03-19 DIAGNOSIS — F43.9 STRESS: ICD-10-CM

## 2025-03-19 RX ORDER — TRAZODONE HYDROCHLORIDE 50 MG/1
50 TABLET ORAL NIGHTLY PRN
Qty: 90 TABLET | Refills: 3 | Status: SHIPPED | OUTPATIENT
Start: 2025-03-19

## 2025-03-24 DIAGNOSIS — Z00.00 ENCOUNTER FOR MEDICARE ANNUAL WELLNESS EXAM: ICD-10-CM

## 2025-03-26 ENCOUNTER — OFFICE VISIT (OUTPATIENT)
Dept: DERMATOLOGY | Facility: CLINIC | Age: 80
End: 2025-03-26
Payer: MEDICARE

## 2025-03-26 DIAGNOSIS — L81.4 LENTIGINES: ICD-10-CM

## 2025-03-26 DIAGNOSIS — L73.8 SEBACEOUS GLAND HYPERPLASIA: Primary | ICD-10-CM

## 2025-03-26 DIAGNOSIS — L82.1 SEBORRHEIC KERATOSES: ICD-10-CM

## 2025-03-26 DIAGNOSIS — Z85.828 HISTORY OF SKIN CANCER: ICD-10-CM

## 2025-03-26 PROCEDURE — 99999 PR PBB SHADOW E&M-EST. PATIENT-LVL II: CPT | Mod: PBBFAC,,, | Performed by: DERMATOLOGY

## 2025-03-26 NOTE — PROGRESS NOTES
Subjective:      Patient ID:  Magdalena Mane is a 80 y.o. female who presents for   Chief Complaint   Patient presents with    Skin Check     UBSE     Would like skin check, spot on nose over a month which has reduced in size does not bleed.         Review of Systems   Constitutional:  Negative for fever, chills, weight loss, weight gain, fatigue, night sweats and malaise.   Skin:  Positive for daily sunscreen use, activity-related sunscreen use and wears hat. Negative for itching and rash.   Hematologic/Lymphatic: Does not bruise/bleed easily.       Objective:   Physical Exam   Constitutional: She appears well-developed and well-nourished. No distress.   Neurological: She is alert and oriented to person, place, and time. She is not disoriented.   Psychiatric: She has a normal mood and affect.   Skin:   Areas Examined (abnormalities noted in diagram):   Head / Face Inspection Performed  Neck Inspection Performed  Chest / Axilla Inspection Performed  Abdomen Inspection Performed  Back Inspection Performed  RUE Inspected  LUE Inspection Performed                 Diagram Legend     Erythematous scaling macule/papule c/w actinic keratosis       Vascular papule c/w angioma      Pigmented verrucoid papule/plaque c/w seborrheic keratosis      Yellow umbilicated papule c/w sebaceous hyperplasia      Irregularly shaped tan macule c/w lentigo     1-2 mm smooth white papules consistent with Milia      Movable subcutaneous cyst with punctum c/w epidermal inclusion cyst      Subcutaneous movable cyst c/w pilar cyst      Firm pink to brown papule c/w dermatofibroma      Pedunculated fleshy papule(s) c/w skin tag(s)      Evenly pigmented macule c/w junctional nevus     Mildly variegated pigmented, slightly irregular-bordered macule c/w mildly atypical nevus      Flesh colored to evenly pigmented papule c/w intradermal nevus       Pink pearly papule/plaque c/w basal cell carcinoma      Erythematous hyperkeratotic cursted plaque  "c/w SCC      Surgical scar with no sign of skin cancer recurrence      Open and closed comedones      Inflammatory papules and pustules      Verrucoid papule consistent consistent with wart     Erythematous eczematous patches and plaques     Dystrophic onycholytic nail with subungual debris c/w onychomycosis     Umbilicated papule    Erythematous-base heme-crusted tan verrucoid plaque consistent with inflamed seborrheic keratosis     Erythematous Silvery Scaling Plaque c/w Psoriasis     See annotation      Assessment / Plan:        Sebaceous gland hyperplasia  Lotion with retinol  Call if grows, bleeds  Seborrheic keratoses  Seborrheic keratosis scattered, told benign no treatment needed.  Brochure provided.     Lentigines  The "ABCD" rules to observe pigmented lesions were reviewed.      History of skin cancer  Comments:  bcc nose removed mohs surgery  Area(s) of previous NMSC evaluated with no signs of recurrence.    Upper body skin examination performed today including at least 6 points as noted in physical examination. No lesions suspicious for malignancy noted.    Recommend daily sun protection/avoidance and use of at least SPF 30, broad spectrum sunscreen (OTC drug).                Follow up in about 1 year (around 3/26/2026).  "

## 2025-04-17 RX ORDER — ATORVASTATIN CALCIUM 20 MG/1
20 TABLET, FILM COATED ORAL
Qty: 90 TABLET | Refills: 3 | Status: SHIPPED | OUTPATIENT
Start: 2025-04-17

## 2025-04-21 ENCOUNTER — OFFICE VISIT (OUTPATIENT)
Dept: PRIMARY CARE CLINIC | Facility: CLINIC | Age: 80
End: 2025-04-21
Payer: MEDICARE

## 2025-04-21 ENCOUNTER — PATIENT MESSAGE (OUTPATIENT)
Dept: PRIMARY CARE CLINIC | Facility: CLINIC | Age: 80
End: 2025-04-21

## 2025-04-21 VITALS
BODY MASS INDEX: 25.96 KG/M2 | SYSTOLIC BLOOD PRESSURE: 134 MMHG | HEART RATE: 59 BPM | OXYGEN SATURATION: 98 % | WEIGHT: 128.5 LBS | DIASTOLIC BLOOD PRESSURE: 68 MMHG

## 2025-04-21 DIAGNOSIS — D50.9 IRON DEFICIENCY ANEMIA, UNSPECIFIED IRON DEFICIENCY ANEMIA TYPE: ICD-10-CM

## 2025-04-21 DIAGNOSIS — I10 PRIMARY HYPERTENSION: Primary | ICD-10-CM

## 2025-04-21 DIAGNOSIS — E53.8 B12 DEFICIENCY: ICD-10-CM

## 2025-04-21 PROCEDURE — 99999 PR PBB SHADOW E&M-EST. PATIENT-LVL III: CPT | Mod: PBBFAC,,, | Performed by: STUDENT IN AN ORGANIZED HEALTH CARE EDUCATION/TRAINING PROGRAM

## 2025-04-21 PROCEDURE — 1125F AMNT PAIN NOTED PAIN PRSNT: CPT | Mod: CPTII,S$GLB,, | Performed by: STUDENT IN AN ORGANIZED HEALTH CARE EDUCATION/TRAINING PROGRAM

## 2025-04-21 PROCEDURE — 99214 OFFICE O/P EST MOD 30 MIN: CPT | Mod: S$GLB,,, | Performed by: STUDENT IN AN ORGANIZED HEALTH CARE EDUCATION/TRAINING PROGRAM

## 2025-04-21 PROCEDURE — 1100F PTFALLS ASSESS-DOCD GE2>/YR: CPT | Mod: CPTII,S$GLB,, | Performed by: STUDENT IN AN ORGANIZED HEALTH CARE EDUCATION/TRAINING PROGRAM

## 2025-04-21 PROCEDURE — 1159F MED LIST DOCD IN RCRD: CPT | Mod: CPTII,S$GLB,, | Performed by: STUDENT IN AN ORGANIZED HEALTH CARE EDUCATION/TRAINING PROGRAM

## 2025-04-21 PROCEDURE — 3075F SYST BP GE 130 - 139MM HG: CPT | Mod: CPTII,S$GLB,, | Performed by: STUDENT IN AN ORGANIZED HEALTH CARE EDUCATION/TRAINING PROGRAM

## 2025-04-21 PROCEDURE — 1160F RVW MEDS BY RX/DR IN RCRD: CPT | Mod: CPTII,S$GLB,, | Performed by: STUDENT IN AN ORGANIZED HEALTH CARE EDUCATION/TRAINING PROGRAM

## 2025-04-21 PROCEDURE — 1157F ADVNC CARE PLAN IN RCRD: CPT | Mod: CPTII,S$GLB,, | Performed by: STUDENT IN AN ORGANIZED HEALTH CARE EDUCATION/TRAINING PROGRAM

## 2025-04-21 PROCEDURE — 3288F FALL RISK ASSESSMENT DOCD: CPT | Mod: CPTII,S$GLB,, | Performed by: STUDENT IN AN ORGANIZED HEALTH CARE EDUCATION/TRAINING PROGRAM

## 2025-04-21 PROCEDURE — 3078F DIAST BP <80 MM HG: CPT | Mod: CPTII,S$GLB,, | Performed by: STUDENT IN AN ORGANIZED HEALTH CARE EDUCATION/TRAINING PROGRAM

## 2025-04-21 NOTE — ASSESSMENT & PLAN NOTE
BP well controlled on candesartan, verapamil and spironolactone, continue. Will monitor. Ordered labs today.

## 2025-04-21 NOTE — PROGRESS NOTES
Subjective     Patient ID: Magdalena Mane is a 80 y.o. female.    Chief Complaint: No chief complaint on file.    HPI  Magdalena Mane is a 80 y.o.  female who presents with HTN, HLD, R CEA (follows with cardiology, sees him every 6 months. having carotid US soon, April 2025), afib, basal cell carcinoma on the nose, and iron def anemia (anemic since she was a child, nml iron levels 11/2024) is here for a f/u visit. Did acp 2025  -doing well. Fell in a shopping mall in December. She tripped over uneven grown. Discussed how to prevent falls. She wears good shoes. She does exercises daily to keep her quads strong. Discussed always looking at the ground when walking.   -no urinary issues or trouble sleeping on trazodone  -ordered labs today. UTD on all vaccinations and screenings          Review of Systems   Constitutional:  Negative for chills, fever and unexpected weight change.   HENT:  Negative for hearing loss, rhinorrhea and sore throat.    Respiratory:  Negative for cough, chest tightness and shortness of breath.    Cardiovascular:  Negative for chest pain.   Gastrointestinal:  Negative for abdominal pain, blood in stool, constipation and diarrhea.   Genitourinary:  Negative for dysuria and hematuria.   Musculoskeletal:  Positive for arthralgias.   Neurological:  Negative for dizziness, light-headedness and headaches.          Objective     Physical Exam  Vitals and nursing note reviewed.   Constitutional:       Appearance: Normal appearance.   HENT:      Right Ear: There is no impacted cerumen.      Left Ear: Tympanic membrane normal. There is no impacted cerumen.      Mouth/Throat:      Mouth: Mucous membranes are moist.      Pharynx: Oropharynx is clear.   Eyes:      Conjunctiva/sclera: Conjunctivae normal.   Cardiovascular:      Rate and Rhythm: Normal rate. Rhythm irregular.      Heart sounds: Normal heart sounds.   Pulmonary:      Effort: Pulmonary effort is normal. No respiratory distress.       Breath sounds: Normal breath sounds.   Abdominal:      General: Bowel sounds are normal.   Musculoskeletal:      Comments: Mild lower ext edema    Lymphadenopathy:      Cervical: No cervical adenopathy.   Skin:     General: Skin is warm.   Neurological:      General: No focal deficit present.      Mental Status: She is alert and oriented to person, place, and time.   Psychiatric:         Mood and Affect: Mood normal.         Behavior: Behavior normal.            Assessment and Plan     1. Primary hypertension  Assessment & Plan:  BP well controlled on candesartan, verapamil and spironolactone, continue. Will monitor. Ordered labs today.     Orders:  -     CBC Auto Differential; Future; Expected date: 04/21/2025  -     Comprehensive Metabolic Panel; Future; Expected date: 04/21/2025  -     TSH; Future; Expected date: 04/21/2025    2. B12 deficiency  Assessment & Plan:  Takes b complex daily but has macrocytic anemia. Will check b12 and folate level today    Orders:  -     Vitamin B12; Future; Expected date: 04/21/2025  -     Folate; Future; Expected date: 04/21/2025    3. Iron deficiency anemia, unspecified iron deficiency anemia type  Assessment & Plan:  nml iron levels 11/2024. Its macrocytic anemia now. No recent folate or b12 levels. Pt takes b complex daily. Will check folate and b12 levels.

## 2025-04-21 NOTE — ASSESSMENT & PLAN NOTE
nml iron levels 11/2024. Its macrocytic anemia now. No recent folate or b12 levels. Pt takes b complex daily. Will check folate and b12 levels.

## 2025-04-28 ENCOUNTER — HOSPITAL ENCOUNTER (OUTPATIENT)
Dept: CARDIOLOGY | Facility: OTHER | Age: 80
Discharge: HOME | End: 2025-04-28
Attending: INTERNAL MEDICINE
Payer: MEDICARE

## 2025-04-28 DIAGNOSIS — Z98.890 S/P CAROTID ENDARTERECTOMY: ICD-10-CM

## 2025-04-28 DIAGNOSIS — I65.23 CAROTID STENOSIS, BILATERAL: ICD-10-CM

## 2025-04-28 LAB
LEFT ARM DIASTOLIC BLOOD PRESSURE: 81 MMHG
LEFT ARM SYSTOLIC BLOOD PRESSURE: 146 MMHG
LEFT CBA DIAS: 17 CM/S
LEFT CBA SYS: 65 CM/S
LEFT CCA DIST DIAS: 19 CM/S
LEFT CCA DIST SYS: 73 CM/S
LEFT CCA MID DIAS: 20 CM/S
LEFT CCA MID SYS: 83 CM/S
LEFT CCA PROX DIAS: 9 CM/S
LEFT CCA PROX SYS: 59 CM/S
LEFT ECA DIAS: 4 CM/S
LEFT ECA SYS: 42 CM/S
LEFT ICA DIST DIAS: 14 CM/S
LEFT ICA DIST SYS: 83 CM/S
LEFT ICA MID DIAS: 22 CM/S
LEFT ICA MID SYS: 91 CM/S
LEFT ICA PROX DIAS: 12 CM/S
LEFT ICA PROX SYS: 64 CM/S
LEFT VERTEBRAL DIAS: 14 CM/S
LEFT VERTEBRAL SYS: 41 CM/S
OHS CV CAROTID RIGHT ICA EDV HIGHEST: 44
OHS CV CAROTID ULTRASOUND LEFT ICA/CCA RATIO: 1.25
OHS CV CAROTID ULTRASOUND RIGHT ICA/CCA RATIO: 2.69
OHS CV PV CAROTID LEFT HIGHEST CCA: 83
OHS CV PV CAROTID LEFT HIGHEST ICA: 91
OHS CV PV CAROTID RIGHT HIGHEST CCA: 77
OHS CV PV CAROTID RIGHT HIGHEST ICA: 180
OHS CV US CAROTID LEFT HIGHEST EDV: 22
RIGHT ARM DIASTOLIC BLOOD PRESSURE: 63 MMHG
RIGHT ARM SYSTOLIC BLOOD PRESSURE: 136 MMHG
RIGHT CBA DIAS: 16 CM/S
RIGHT CBA SYS: 77 CM/S
RIGHT CCA DIST DIAS: 13 CM/S
RIGHT CCA DIST SYS: 67 CM/S
RIGHT CCA MID DIAS: 13 CM/S
RIGHT CCA MID SYS: 77 CM/S
RIGHT CCA PROX DIAS: 14 CM/S
RIGHT CCA PROX SYS: 67 CM/S
RIGHT ECA DIAS: 6 CM/S
RIGHT ECA SYS: 56 CM/S
RIGHT ICA DIST DIAS: 44 CM/S
RIGHT ICA DIST SYS: 180 CM/S
RIGHT ICA MID DIAS: 22 CM/S
RIGHT ICA MID SYS: 135 CM/S
RIGHT ICA PROX DIAS: 25 CM/S
RIGHT ICA PROX SYS: 126 CM/S
RIGHT VERTEBRAL DIAS: 10 CM/S
RIGHT VERTEBRAL SYS: 45 CM/S

## 2025-04-28 PROCEDURE — 93880 EXTRACRANIAL BILAT STUDY: CPT

## 2025-04-28 PROCEDURE — 93880 EXTRACRANIAL BILAT STUDY: CPT | Mod: 26,,, | Performed by: INTERNAL MEDICINE

## 2025-04-29 ENCOUNTER — RESULTS FOLLOW-UP (OUTPATIENT)
Dept: PRIMARY CARE CLINIC | Facility: CLINIC | Age: 80
End: 2025-04-29

## 2025-05-01 ENCOUNTER — OFFICE VISIT (OUTPATIENT)
Dept: CARDIOLOGY | Facility: CLINIC | Age: 80
End: 2025-05-01
Payer: MEDICARE

## 2025-05-01 VITALS
DIASTOLIC BLOOD PRESSURE: 62 MMHG | SYSTOLIC BLOOD PRESSURE: 142 MMHG | OXYGEN SATURATION: 99 % | WEIGHT: 131 LBS | BODY MASS INDEX: 26.46 KG/M2 | HEART RATE: 55 BPM

## 2025-05-01 DIAGNOSIS — I10 PRIMARY HYPERTENSION: ICD-10-CM

## 2025-05-01 DIAGNOSIS — I70.0 ATHEROSCLEROSIS OF AORTA: ICD-10-CM

## 2025-05-01 DIAGNOSIS — E78.5 HYPERLIPIDEMIA LDL GOAL <70: ICD-10-CM

## 2025-05-01 DIAGNOSIS — Z98.890 S/P CAROTID ENDARTERECTOMY: Primary | ICD-10-CM

## 2025-05-01 PROCEDURE — 99999 PR PBB SHADOW E&M-EST. PATIENT-LVL III: CPT | Mod: PBBFAC,,, | Performed by: INTERNAL MEDICINE

## 2025-05-01 NOTE — PROGRESS NOTES
Cardiology    5/1/2025  11:18 AM    Problem list  Problem List[1]    History of Present Illness    Patient presents today for follow up She has not been adherent with home BP monitoring. Carotid duplex showed moderate atherosclerosis, with no new findings noted compared to last doppler. She continues Eliquis but discontinued ASA 81 mg due to experiencing nosebleeds. She reports knee pain affecting ambulation.  She denies any chest pain or shortness breath.          Medications  Current Medications[2]   Prior to Admission medications    Medication Sig Start Date End Date Taking? Authorizing Provider   acetaminophen (TYLENOL) 500 MG tablet Take 500 mg by mouth 2 (two) times daily.   Yes Provider, Historical   alendronate (FOSAMAX) 70 MG tablet TAKE 1 TABLET BY MOUTH WEEKLY  WITH 8 OZ OF PLAIN WATER 30  MINUTES BEFORE FIRST FOOD, DRINK OR MEDS. STAY UPRIGHT FOR 30  MINS 1/6/25  Yes Geneva Dsouza MD   apixaban (ELIQUIS) 5 mg Tab Take 1 tablet (5 mg total) by mouth 2 (two) times daily. 11/1/24  Yes Nigel García MD   atorvastatin (LIPITOR) 20 MG tablet TAKE 1 TABLET BY MOUTH ONCE  DAILY 4/17/25  Yes Geneva Dsouza MD   b complex vitamins tablet Take 1 tablet by mouth once daily.   Yes Provider, Historical   candesartan (ATACAND) 16 MG tablet TAKE 1 TABLET BY MOUTH ONCE  DAILY 10/15/24  Yes Clem Mckeon MD   cholecalciferol, vitamin D3, (VITAMIN D3 ORAL) Take 2,000 Units by mouth once daily.   Yes Provider, Historical   COMIRNATY 2023-24, 12Y UP,,PF, 30 mcg/0.3 mL inection  10/13/23  Yes Provider, Historical   diphenhydrAMINE (BENADRYL) 25 mg capsule Take 25 mg by mouth every 6 (six) hours as needed for Itching.   Yes Provider, Historical   flecainide (TAMBOCOR) 50 MG Tab Take 1 tablet (50 mg total) by mouth every 12 (twelve) hours. 11/1/24  Yes Nigel García MD   lactase (LACTAID) 3,000 unit tablet Take 1 tablet by mouth 3 (three) times daily as needed.   Yes Provider, Historical   loratadine  (CLARITIN) 10 mg tablet Take 10 mg by mouth every morning.   Yes Provider, Historical   spironolactone (ALDACTONE) 25 MG tablet TAKE 1 TABLET BY MOUTH ONCE  DAILY 11/27/24  Yes Maki Pratt MD   traZODone (DESYREL) 50 MG tablet TAKE 1 TABLET(50 MG) BY MOUTH EVERY NIGHT AS NEEDED FOR INSOMNIA 3/19/25  Yes Geneva Dsouza MD   verapamiL (VERELAN) 180 MG C24P TAKE 1 CAPSULE BY MOUTH ONCE  DAILY 1/10/25  Yes Clem Mckeon MD   triamcinolone acetonide 0.1% (KENALOG) 0.1 % cream Apply topically daily as needed (rash). 7/28/23 7/27/24  Geneva Dsouza MD   aspirin (ECOTRIN) 81 MG EC tablet Take 81 mg by mouth nightly.  5/1/25  Provider, Historical         History  Past Medical History:   Diagnosis Date    Anemia     Atrial fibrillation     Basal cell carcinoma     DJD (degenerative joint disease) 12/12/2012    Obesity (BMI 30-39.9) 11/27/2015    Vaginal atrophy 4/1/2016     Past Surgical History:   Procedure Laterality Date    ADENOIDECTOMY      BREAST BIOPSY Left 1999    Excisional bx, benign cyst    BREAST SURGERY      CARDIOVERSION  02/2021    CAROTID ENDARTERECTOMY Right 04/07/2022    Procedure: ENDARTERECTOMY-CAROTID;  Surgeon: TUCKER Brantley III, MD;  Location: [facility] OR 2ND FLR;  Service: Peripheral Vascular;  Laterality: Right;    COLONOSCOPY  2012    normal    COLONOSCOPY N/A 06/28/2017    Procedure: COLONOSCOPY;  Surgeon: Markel Montemayor MD;  Location: [facility] ENDO (4TH FLR);  Service: Endoscopy;  Laterality: N/A;    EXCISION OF GANGLION OF WRIST Left 06/27/2018    Procedure: EXCISION, GANGLION CYST, WRIST - Left Volar wrist;  Surgeon: Mary Moore MD;  Location: [facility] OR 1ST FLR;  Service: Orthopedics;  Laterality: Left;    HIP SURGERY  05/05/2014    bilateral    JOINT REPLACEMENT Bilateral     MOLLY    TONSILLECTOMY      TREATMENT OF CARDIAC ARRHYTHMIA N/A 09/29/2020    Procedure: CARDIOVERSION;  Surgeon: Nigel García MD;  Location: [facility] EP LAB;  Service: Cardiology;  Laterality: N/A;  AF,  LIGIA, DCCV, MAC, CT, 3 Prep    TREATMENT OF CARDIAC ARRHYTHMIA N/A 12/08/2020    Procedure: Cardioversion or Defibrillation;  Surgeon: Nigel García MD;  Location: [facility] EP LAB;  Service: Cardiology;  Laterality: N/A;  AF, LIGIA, DCCV, MAC, CT, 3 Prep     Social History[3]      Allergies  Review of patient's allergies indicates:   Allergen Reactions    Prednisone      ELEVATED B/P FOR SEVERAL DAYS/WENT TO ER    Lactose          Review of Systems   Review of Systems   Cardiovascular: Negative.    Respiratory: Negative.     All other systems reviewed and are negative.        Physical Exam  Wt Readings from Last 1 Encounters:   05/01/25 59.4 kg (131 lb)     BP Readings from Last 3 Encounters:   05/01/25 (!) 142/62   04/21/25 134/68   11/01/24 (!) 91/52     Pulse Readings from Last 1 Encounters:   05/01/25 (!) 55     Body mass index is 26.46 kg/m².    Physical Exam  Constitutional:       Appearance: She is well-developed.   HENT:      Head: Atraumatic.   Eyes:      General: No scleral icterus.  Neck:      Vascular: Normal carotid pulses. No carotid bruit, hepatojugular reflux or JVD.      Comments: R CEA  Cardiovascular:      Rate and Rhythm: Normal rate and regular rhythm.      Chest Wall: PMI is not displaced.      Pulses: Intact distal pulses.           Carotid pulses are 2+ on the right side and 2+ on the left side.       Radial pulses are 2+ on the right side and 2+ on the left side.        Dorsalis pedis pulses are 2+ on the right side and 2+ on the left side.      Heart sounds: Normal heart sounds, S1 normal and S2 normal. No murmur heard.     No friction rub.   Pulmonary:      Effort: Pulmonary effort is normal. No respiratory distress.      Breath sounds: Normal breath sounds. No stridor. No wheezing or rales.   Chest:      Chest wall: No tenderness.   Abdominal:      General: Bowel sounds are normal.      Palpations: Abdomen is soft.   Musculoskeletal:      Cervical back: Neck supple. No edema.   Skin:      General: Skin is warm and dry.      Nails: There is no clubbing.   Neurological:      Mental Status: She is alert and oriented to person, place, and time.   Psychiatric:         Behavior: Behavior normal.         Thought Content: Thought content normal.           Assessment & Plan    Z98.890 S/P carotid endarterectomy  E78.5 Hyperlipidemia LDL goal <70  I10 Primary hypertension  I70.0 Atherosclerosis of aorta    S/P CAROTID ENDARTERECTOMY:  - Current treatment plan remains unchanged based on stable condition and mild carotid artery stenosis.  - Discontinued aspirin due to previous nosebleeds, deemed appropriate given moderate stenosis and continued Eliquis use.  - Continued Eliquis at current dose for stroke prevention.    PRIMARY HYPERTENSION:  - BP management continues with current approach.  - Continue monitoring blood pressure at home.  - Contact the office if blood pressure is consistently above 135 or if any problems occur.    LIFESTYLE CHANGES:  - Follow up in 6 months if no problems arise.             Clem Mckeon MD, F.A.C.C, F.S.C.A.I.      Total professional time spent for the encounter: 25 minutes  Time was spent preparing to see the patient, reviewing results of prior testing, obtaining and/or reviewing separately obtained history, performing a medically appropriate examination and interview, counseling and educating the patient/family, ordering medications/tests/procedures, referring and communicating with other health care professionals, documenting clinical information in the electronic health record, and independently interpreting results.    This note was generated with the assistance of ambient listening technology. Verbal consent was obtained by the patient and accompanying visitor(s) for the recording of patient appointment to facilitate this note. I attest to having reviewed and edited the generated note for accuracy, though some syntax or spelling errors may persist. Please contact the  author of this note for any clarification.           [1]   Patient Active Problem List  Diagnosis    DJD (degenerative joint disease)    HTN (hypertension)    Iron deficiency anemia    Vaginal atrophy    Overweight    Ganglion cyst    Balance problems    Unsteady gait    Paroxysmal tachycardia    Atrial fibrillation    S/P carotid endarterectomy    Sinus bradycardia    Heat syncope    Bilateral carotid bruits    Bilateral carotid artery stenosis    Compression fracture of thoracic vertebra with routine healing    Vitamin D insufficiency    Stress    Atherosclerosis of aorta    Poor posture    Decreased strength of trunk and back    Dyspnea    Acute heart failure, unspecified heart failure type    Hyperlipidemia LDL goal <70    Osteoporosis    Insomnia    Chronic diastolic heart failure    B12 deficiency    Arthritis of knee    Tooth abscess    Angina pectoris, unspecified    Other nonthrombocytopenic purpura    Degenerative disease of nervous system, unspecified    Chronic anticoagulation    Vaginal candidiasis   [2]   Current Outpatient Medications   Medication Sig Dispense Refill    acetaminophen (TYLENOL) 500 MG tablet Take 500 mg by mouth 2 (two) times daily.      alendronate (FOSAMAX) 70 MG tablet TAKE 1 TABLET BY MOUTH WEEKLY  WITH 8 OZ OF PLAIN WATER 30  MINUTES BEFORE FIRST FOOD, DRINK OR MEDS. STAY UPRIGHT FOR 30  MINS 12 tablet 3    apixaban (ELIQUIS) 5 mg Tab Take 1 tablet (5 mg total) by mouth 2 (two) times daily. 180 tablet 3    atorvastatin (LIPITOR) 20 MG tablet TAKE 1 TABLET BY MOUTH ONCE  DAILY 90 tablet 3    b complex vitamins tablet Take 1 tablet by mouth once daily.      candesartan (ATACAND) 16 MG tablet TAKE 1 TABLET BY MOUTH ONCE  DAILY 90 tablet 3    cholecalciferol, vitamin D3, (VITAMIN D3 ORAL) Take 2,000 Units by mouth once daily.      COMIRNATY 2023-24, 12Y UP,,PF, 30 mcg/0.3 mL inection       diphenhydrAMINE (BENADRYL) 25 mg capsule Take 25 mg by mouth every 6 (six) hours as needed for  Itching.      flecainide (TAMBOCOR) 50 MG Tab Take 1 tablet (50 mg total) by mouth every 12 (twelve) hours. 180 tablet 3    lactase (LACTAID) 3,000 unit tablet Take 1 tablet by mouth 3 (three) times daily as needed.      loratadine (CLARITIN) 10 mg tablet Take 10 mg by mouth every morning.      spironolactone (ALDACTONE) 25 MG tablet TAKE 1 TABLET BY MOUTH ONCE  DAILY 90 tablet 3    traZODone (DESYREL) 50 MG tablet TAKE 1 TABLET(50 MG) BY MOUTH EVERY NIGHT AS NEEDED FOR INSOMNIA 90 tablet 3    verapamiL (VERELAN) 180 MG C24P TAKE 1 CAPSULE BY MOUTH ONCE  DAILY 90 capsule 3    triamcinolone acetonide 0.1% (KENALOG) 0.1 % cream Apply topically daily as needed (rash). 80 g 1     No current facility-administered medications for this visit.   [3]   Social History  Socioeconomic History    Marital status:    Occupational History    Occupation: retired   Tobacco Use    Smoking status: Former     Current packs/day: 0.00     Average packs/day: 1 pack/day for 18.5 years (18.5 ttl pk-yrs)     Types: Cigarettes     Start date: 9/15/1969     Quit date: 3/20/1988     Years since quittin.1    Smokeless tobacco: Never    Tobacco comments:     Lesrned in 1/2 hr. 10 year to quit   Substance and Sexual Activity    Alcohol use: Not Currently     Alcohol/week: 3.0 standard drinks of alcohol     Types: 3 Glasses of wine per week    Drug use: No    Sexual activity: Yes     Partners: Male     Birth control/protection: Post-menopausal, None     Comment: Age   Social History Narrative     to [employer]    Nurse    Likes to garden     Social Drivers of Health     Financial Resource Strain: Low Risk  (2024)    Overall Financial Resource Strain (CARDIA)     Difficulty of Paying Living Expenses: Not very hard   Food Insecurity: No Food Insecurity (2024)    Hunger Vital Sign     Worried About Running Out of Food in the Last Year: Never true     Ran Out of Food in the Last Year: Never true   Transportation Needs:  No Transportation Needs (4/16/2024)    PRAPARE - Transportation     Lack of Transportation (Medical): No     Lack of Transportation (Non-Medical): No   Physical Activity: Insufficiently Active (4/16/2024)    Exercise Vital Sign     Days of Exercise per Week: 1 day     Minutes of Exercise per Session: 20 min   Stress: No Stress Concern Present (2/6/2024)    [employer] - Occupational Stress Questionnaire     Feeling of Stress : Not at all   Housing Stability: Low Risk  (4/16/2024)    Housing Stability Vital Sign     Unable to Pay for Housing in the Last Year: No     Homeless in the Last Year: No

## 2025-05-07 ENCOUNTER — TELEPHONE (OUTPATIENT)
Dept: PRIMARY CARE CLINIC | Facility: CLINIC | Age: 80
End: 2025-05-07
Payer: MEDICARE

## 2025-05-09 ENCOUNTER — TELEPHONE (OUTPATIENT)
Dept: PRIMARY CARE CLINIC | Facility: CLINIC | Age: 80
End: 2025-05-09
Payer: MEDICARE

## 2025-05-22 ENCOUNTER — OFFICE VISIT (OUTPATIENT)
Dept: OPTOMETRY | Facility: CLINIC | Age: 80
End: 2025-05-22
Payer: COMMERCIAL

## 2025-05-22 DIAGNOSIS — I65.23 BILATERAL CAROTID ARTERY STENOSIS: ICD-10-CM

## 2025-05-22 DIAGNOSIS — H25.13 NS (NUCLEAR SCLEROSIS), BILATERAL: ICD-10-CM

## 2025-05-22 DIAGNOSIS — I10 PRIMARY HYPERTENSION: ICD-10-CM

## 2025-05-22 DIAGNOSIS — H04.123 DRY EYE SYNDROME, BILATERAL: ICD-10-CM

## 2025-05-22 DIAGNOSIS — Z98.890 S/P CAROTID ENDARTERECTOMY: ICD-10-CM

## 2025-05-22 DIAGNOSIS — H52.4 PRESBYOPIA: Primary | ICD-10-CM

## 2025-05-22 PROCEDURE — 92015 DETERMINE REFRACTIVE STATE: CPT | Mod: S$GLB,,, | Performed by: OPTOMETRIST

## 2025-05-22 PROCEDURE — 99999 PR PBB SHADOW E&M-EST. PATIENT-LVL III: CPT | Mod: PBBFAC,,, | Performed by: OPTOMETRIST

## 2025-05-22 PROCEDURE — 92014 COMPRE OPH EXAM EST PT 1/>: CPT | Mod: S$GLB,,, | Performed by: OPTOMETRIST

## 2025-05-22 NOTE — PROGRESS NOTES
HPI    CC: 80 y.o. female presents for annual eye exam.  COLETTE: 3/18/24 w/ Dr. Padgett    (-) Changes in vision   (-) Pain  (-) Irritation   (+) Itching   (-) Flashes  (-) Floaters  (+) Glasses wearer  (-) CL wearer  (+) Uses eye gtts, Ivizia BID OU     Does patient want a refraction today? unsure    (-) Eye injury  (-) Eye surgery   (+) POHx - NS OU  (-) FOHx    (-)DM  Hemoglobin A1C       Date                     Value               Ref Range             Status                07/15/2022               5.2                 4.0 - 5.6 %           Final                       Last edited by Yarely Carmen on 5/22/2025  1:21 PM.            Assessment /Plan     For exam results, see Encounter Report.           Presbyopia              Rx specs     NS (nuclear sclerosis), bilateral              MIld, monitor     Dry eye syndrome, bilateral  Superficial keratitis, unspecified laterality  Failed  cycloSPORINE (RESTASIS) 0.05 % ophthalmic emulsion; Place 1 drop into both eyes 2 (two) times daily.  Dispense: 180 each; Refill: 3    Continue with IVIZIA PRN    Bilateral carotid artery stenosis  S/P carotid endarterectomy   Essential hypertension              No retinopathy, monitor yearly     -    RTC 1 year, sooner PRN

## 2025-06-17 ENCOUNTER — OFFICE VISIT (OUTPATIENT)
Dept: URGENT CARE | Facility: CLINIC | Age: 80
End: 2025-06-17
Payer: MEDICARE

## 2025-06-17 VITALS
HEIGHT: 59 IN | DIASTOLIC BLOOD PRESSURE: 70 MMHG | HEART RATE: 61 BPM | BODY MASS INDEX: 26.4 KG/M2 | WEIGHT: 130.94 LBS | OXYGEN SATURATION: 97 % | SYSTOLIC BLOOD PRESSURE: 178 MMHG | RESPIRATION RATE: 20 BRPM | TEMPERATURE: 98 F

## 2025-06-17 DIAGNOSIS — L03.011 PARONYCHIA OF FINGER, RIGHT: Primary | ICD-10-CM

## 2025-06-17 PROCEDURE — 99499 UNLISTED E&M SERVICE: CPT | Mod: S$GLB,,, | Performed by: NURSE PRACTITIONER

## 2025-06-17 RX ORDER — CEPHALEXIN 500 MG/1
500 CAPSULE ORAL EVERY 6 HOURS
Qty: 20 CAPSULE | Refills: 0 | Status: SHIPPED | OUTPATIENT
Start: 2025-06-17 | End: 2025-06-22

## 2025-06-17 RX ORDER — MUPIROCIN 20 MG/G
OINTMENT TOPICAL 3 TIMES DAILY
Qty: 30 G | Refills: 0 | Status: SHIPPED | OUTPATIENT
Start: 2025-06-17 | End: 2025-06-24

## 2025-06-17 NOTE — PROCEDURES
Incision & Drainage    Date/Time: 6/17/2025 1:30 PM    Performed by: Teresa Miller FNP-C  Authorized by: Teresa Miller FNP-C      Type:  Abscess  Body area:  Upper extremity  Local anesthetic: Lidocaine 2% without epinephrine  Anesthetic total (ml):  1  Scalpel size:  11  Incision type:  Single straight  Complexity:  Simple  Drainage:  Pus and bloody  Drainage amount:  Scant  Wound treatment:  Incision, drainage and expression of material  Patient tolerance:  Patient tolerated the procedure well with no immediate complications

## 2025-06-17 NOTE — PROGRESS NOTES
"Subjective:      Patient ID: Magdalena Mane is a 80 y.o. female.    Vitals:  height is 4' 11" (1.499 m) and weight is 59.4 kg (130 lb 15.3 oz). Her oral temperature is 98 °F (36.7 °C). Her blood pressure is 178/70 (abnormal) and her pulse is 61. Her respiration is 20 and oxygen saturation is 97%.     Chief Complaint: Hand Injury    This is a 80 y.o. female who presents today with a chief complaint of right hand injury sustained 5 days ago while in garden. Pt states that something hit nail and now finger is swollen.  Pt has taken OTC lidocaine ointment, ice on the finger to help with symptoms.     Hand Injury   Her dominant hand is their right hand. The incident occurred 5 to 7 days ago. The incident occurred at home. The injury mechanism was a direct blow. The pain is present in the left fingers. The quality of the pain is described as stabbing. The pain does not radiate. The pain is at a severity of 8/10. The pain is severe. The pain has been Constant since the incident. Associated symptoms include tingling. Pertinent negatives include no chest pain, muscle weakness or numbness. The symptoms are aggravated by palpation and movement. She has tried ice for the symptoms. The treatment provided mild relief.       Cardiovascular:  Negative for chest pain.   Skin:  Negative for erythema.   Neurological:  Negative for numbness.      Objective:     Physical Exam   Constitutional: She is oriented to person, place, and time. She appears well-developed.   HENT:   Head: Normocephalic and atraumatic. Head is without abrasion, without contusion and without laceration.   Ears:   Right Ear: External ear normal.   Left Ear: External ear normal.   Nose: Nose normal.   Mouth/Throat: Oropharynx is clear and moist and mucous membranes are normal.   Eyes: Conjunctivae, EOM and lids are normal. Pupils are equal, round, and reactive to light.   Neck: Trachea normal and phonation normal. Neck supple.   Cardiovascular: Normal rate, " regular rhythm and normal heart sounds.   Pulmonary/Chest: Effort normal and breath sounds normal. No stridor. No respiratory distress. She has no wheezes. She has no rhonchi. She has no rales. She exhibits no tenderness.   Musculoskeletal: Normal range of motion.         General: Normal range of motion.      Right hand: Right index finger: Exhibits swelling and tenderness.   Neurological: She is alert and oriented to person, place, and time.   Skin: Skin is warm, dry, intact and no rash. Capillary refill takes less than 2 seconds. No abrasion, No burn, No bruising, No erythema and No ecchymosis   Psychiatric: Her speech is normal and behavior is normal. Judgment and thought content normal.   Nursing note and vitals reviewed.      Assessment:     1. Paronychia of finger, right        Plan:       Paronychia of finger, right  -     cephALEXin (KEFLEX) 500 MG capsule; Take 1 capsule (500 mg total) by mouth every 6 (six) hours. for 5 days  Dispense: 20 capsule; Refill: 0  -     mupirocin (BACTROBAN) 2 % ointment; Apply topically 3 (three) times daily. for 7 days  Dispense: 30 g; Refill: 0  -     Incision & Drainage              Incision & Drainage    Date/Time: 6/17/2025 1:30 PM    Performed by: Teresa Miller FNP-C  Authorized by: Teresa Miller FNP-C      Type:  Abscess  Body area:  Upper extremity  Local anesthetic: Lidocaine 2% without epinephrine  Anesthetic total (ml):  1  Scalpel size:  11  Incision type:  Single straight  Complexity:  Simple  Drainage:  Pus and bloody  Drainage amount:  Scant  Wound treatment:  Incision, drainage and expression of material  Patient tolerance:  Patient tolerated the procedure well with no immediate complications       Patient Instructions   Keflex every 6 hours for 5 days    Soak your nail in warm water for 20 minutes, 3 times each day.  Please keep your wound covered as it heals. Use a thin layer of antibiotic ointment (mupirocin) to help keep the wound moist. This will  also keep the dressing from sticking to the wound.  you can gently wash the wound with soap and water. Pat dry and put on a clean dressing.   Change your dressing once a day.  Try not to bite your nails or cut your cuticles. Doing these things can increase your risk of getting another infection in the nail area.  Always wash your hands before and after touching the wound.  Each time you change the dressing, look closely at the wound to be sure it is healing the right way. The wound may have a yellowish discharge, and this is normal.  Avoid picking the scab or scratching the site which may cause more irritation.  Do not soak in water or swim with an open wound.  Do not use hydrogen peroxide; it will cause the wound to dry out or delay wound healing/new skin growth.  Please complete full course of oral antibiotics.     If not allergic, take Tylenol (Acetaminophen) 650 g every 6 to 8 hours as needed for pain    Please return here or go to the Emergency Department for any concerns or worsening of condition.Patient was educated on signs/symptoms that would warrant emergent medical attention. Patient verbalized understanding.  Signs of infection. These include a fever of 100.4°F (38°C) or higher, chills, or wound that will not heal.  The pain in and around the area gets much worse.  You notice a crunchy feeling or blisters in the skin around the wound.  The redness around your wound gets bigger.  You have worsening of the infection around your nail, like swelling, redness, warmth, pain, or fluid draining from the wound.

## 2025-06-17 NOTE — PATIENT INSTRUCTIONS
Keflex every 6 hours for 5 days    Soak your nail in warm water for 20 minutes, 3 times each day.  Please keep your wound covered as it heals. Use a thin layer of antibiotic ointment (mupirocin) to help keep the wound moist. This will also keep the dressing from sticking to the wound.  you can gently wash the wound with soap and water. Pat dry and put on a clean dressing.   Change your dressing once a day.  Try not to bite your nails or cut your cuticles. Doing these things can increase your risk of getting another infection in the nail area.  Always wash your hands before and after touching the wound.  Each time you change the dressing, look closely at the wound to be sure it is healing the right way. The wound may have a yellowish discharge, and this is normal.  Avoid picking the scab or scratching the site which may cause more irritation.  Do not soak in water or swim with an open wound.  Do not use hydrogen peroxide; it will cause the wound to dry out or delay wound healing/new skin growth.  Please complete full course of oral antibiotics.     If not allergic, take Tylenol (Acetaminophen) 650 g every 6 to 8 hours as needed for pain    Please return here or go to the Emergency Department for any concerns or worsening of condition.Patient was educated on signs/symptoms that would warrant emergent medical attention. Patient verbalized understanding.  Signs of infection. These include a fever of 100.4°F (38°C) or higher, chills, or wound that will not heal.  The pain in and around the area gets much worse.  You notice a crunchy feeling or blisters in the skin around the wound.  The redness around your wound gets bigger.  You have worsening of the infection around your nail, like swelling, redness, warmth, pain, or fluid draining from the wound.

## 2025-06-26 ENCOUNTER — TELEPHONE (OUTPATIENT)
Dept: ELECTROPHYSIOLOGY | Facility: CLINIC | Age: 80
End: 2025-06-26
Payer: MEDICARE

## 2025-06-27 ENCOUNTER — OFFICE VISIT (OUTPATIENT)
Dept: CARDIOLOGY | Facility: CLINIC | Age: 80
End: 2025-06-27
Payer: MEDICARE

## 2025-06-27 VITALS
BODY MASS INDEX: 25.8 KG/M2 | HEART RATE: 55 BPM | DIASTOLIC BLOOD PRESSURE: 73 MMHG | HEIGHT: 59 IN | WEIGHT: 128 LBS | SYSTOLIC BLOOD PRESSURE: 138 MMHG

## 2025-06-27 DIAGNOSIS — I47.9 PAROXYSMAL TACHYCARDIA: ICD-10-CM

## 2025-06-27 DIAGNOSIS — I48.19 PERSISTENT ATRIAL FIBRILLATION: Primary | ICD-10-CM

## 2025-06-27 DIAGNOSIS — I48.0 PAROXYSMAL ATRIAL FIBRILLATION: ICD-10-CM

## 2025-06-27 DIAGNOSIS — R00.1 SINUS BRADYCARDIA: ICD-10-CM

## 2025-06-27 LAB
OHS QRS DURATION: 96 MS
OHS QTC CALCULATION: 405 MS

## 2025-06-27 PROCEDURE — 99999 PR PBB SHADOW E&M-EST. PATIENT-LVL III: CPT | Mod: PBBFAC,,, | Performed by: INTERNAL MEDICINE

## 2025-06-27 NOTE — PROGRESS NOTES
Subjective:    Patient ID:  Magdalena Mane is a 80 y.o. female who presents for evaluation of Atrial Fibrillation    Primary Cardiologist: Clem Mckeon MD    HPI  Prior Hx:  I had the pleasure of seeing Mrs. Mane in our electrophysiology clinic in consultation for her atrial arrhythmia. As you are aware she is a pleasant 80 year-old woman with hypertension and recently diagnosed persistent atrial fibrillation. She was in her usual state of health until February of 2020 when she had a respiratory illness (COVID antibody test later was negative) and since that time has had dyspnea on exertion. Also notes her pulse runs in the 50s-60s however in February her heart rate began running in the 80s (participates in the Digital HTN clinic). In early July 2020 she developed palpitations. She saw Dr. Area 7/27/2020 and was diagnosed with atrial fibrillation and was referred to Dr. Pratt who started eliquis for stroke risk reduction ([id number] score 3). She followed up with Dr. Pratt on 9/16/2020 and remained in atrial fibrillation for which she presents for further evaluation. She notes her dyspnea on exertion and palpitations have largely resolved despite AF persistence however still has exercise intolerance.    She reports an episode of pAF post hip surgery in 2014 at [facility].    An echocardiogram from July of 2020 notes preserved LV function with moderate LA enlargement.     Mrs. Mane underwent DCCV on 9/29/2020 with NSR with symptomatic benefit but then had AF recurrence. She underwent repeat DCCV on 12/8/2020 and started on low dose flecainide (has resting sinus bradycardia). Her 1 week post-DCCV ECG noted AF recurrence. At her follow-up visit in December of 2020 she was in sinus rhythm and noted she is in and out of AF. She reported her breathing improved when she was in normal rhythm but feels fatigued when in normal rhythm correlating with a pulse rate in the 40s. We discussed option of PVI versus PPM +  AAD therapy versus dofetilide and stopping AV delma agents. She elected initially for dofetilide however had a change of mind and elected to stagger her metoprolol and flecainide and felt better.    A holter monitor 1/2021 noted no AF and an average sinus rate of 56 bpm.    8/2021: Mrs. Mane returned for follow-up. She was seen by Dr. Vazquez in June of 2021 following an urgent care clinic visit for an episode of syncope. She reported she was working in the yard in the heat and began to feel light-headed. She went inside and had transient loss of consciousness. She had persistent dizziness and went to an urgent care clinic. She was given IVFs. HR was 51 bpm. Metoprolol was discontinued. She feltmuch better off metoprolol. She reportedshe had libido issues with beta-blockers. Discussed she should not be on flecainide without an AV delma blocking agent. Unable to do dofetilide inpatient loading at that time due to COVID surge and would rather avoid a PPM. She may consider PVI at some point. Low dose sotalol (40mg bid) alone may be an option although she is concerned about it affecting her libido. We elected to try verapamil 120mg daily in lieu of amlodipine with the hope it had less SA delma effect than a beta-blocker.     9/2021: Holter noted average sinus rate of 56 bpm.    12/2022: Mrs. Mane returned for AF follow-up. No symptomatic AF recurrences. She feels well on verapamil/flecainide. She is having issues with hypertension. She is actively managed by digital HTN. She woke up with low back pain. She also is having balance issues when turning her head to the right. She is seeing an ENT soon for this and sinus issues. BP very high today and she reports she woke up feeling poorly and has a headache.    6/2023, 4/2024, and 11/2024: Mrs. Mane returned for follow-up. No symptomatic AF. Doing ok with verapamil/flecainide. She is doing an exercise program and is gardening.    Interim Hx:  Mrs. Mane returns  for follow-up. No symptomatic AF. Doing well. Notes fatigue in the afternoon. Takes a nap.    My interpretation of today's in clinic ECG is sinus rhythm with a rate of 55 bpm. OK interval 210ms, QRS is narrow.        Review of Systems   Constitutional: Negative for fever and malaise/fatigue.   HENT:  Negative for congestion and sore throat.    Eyes:  Negative for blurred vision and visual disturbance.   Cardiovascular:  Negative for chest pain, dyspnea on exertion, irregular heartbeat, near-syncope, palpitations and syncope.   Respiratory:  Negative for cough and shortness of breath.    Hematologic/Lymphatic: Negative for bleeding problem. Does not bruise/bleed easily.   Skin: Negative.    Musculoskeletal:  Positive for back pain.   Gastrointestinal:  Negative for bloating, abdominal pain, hematochezia and melena.   Neurological:  Positive for headaches and loss of balance. Negative for focal weakness and weakness.   Psychiatric/Behavioral: Negative.          Objective:    Physical Exam  Vitals reviewed.   Constitutional:       General: She is not in acute distress.     Appearance: She is well-developed. She is not diaphoretic.   HENT:      Head: Normocephalic and atraumatic.   Eyes:      General:         Right eye: No discharge.         Left eye: No discharge.      Conjunctiva/sclera: Conjunctivae normal.   Cardiovascular:      Rate and Rhythm: Regular rhythm. Bradycardia present.      Heart sounds: No murmur heard.     No friction rub. No gallop.   Pulmonary:      Effort: Pulmonary effort is normal. No respiratory distress.      Breath sounds: Normal breath sounds. No wheezing or rales.   Abdominal:      General: Bowel sounds are normal. There is no distension.      Palpations: Abdomen is soft.      Tenderness: There is no abdominal tenderness.   Musculoskeletal:      Cervical back: Neck supple.   Skin:     General: Skin is warm and dry.   Neurological:      Mental Status: She is alert and oriented to person,  place, and time.   Psychiatric:         Behavior: Behavior normal.         Thought Content: Thought content normal.         Judgment: Judgment normal.           Assessment:       1. Persistent atrial fibrillation    2. Paroxysmal tachycardia    3. Sinus bradycardia         Plan:       In summary, Mrs. Mane is a pleasant 80 year-old woman with hypertension and with persistent atrial fibrillation who is now paroxysmal on low dose flecainide. She has shortness of breath with AF and then fatigue in sinus bradycardia. She is intolerant to flecainide and metoprolol together. She feels better on flecainide/verapamil with no symptomatic AF. [id number] score 4. Continue eliquis.    RTC in 6 months    Thank you for allowing me to participate in the care of this patient. Please do not hesitate to call me with any questions or concerns.    Nigel García MD, PhD  Cardiac Electrophysiology

## 2025-07-01 ENCOUNTER — PATIENT MESSAGE (OUTPATIENT)
Dept: PRIMARY CARE CLINIC | Facility: CLINIC | Age: 80
End: 2025-07-01
Payer: MEDICARE

## 2025-07-01 NOTE — TELEPHONE ENCOUNTER
CRM # [id number]  Owner: None  Status: Unresolved  Open  Priority: Routine Created on: 07/01/2025 03:51 PM By: Zoila Hernandez     Primary Information    Source   Magdalena Mane (Patient)    Subject   Magdalena Mane (Patient)    Topic   General Inquiry - Patient Advice        Communication   .1MEDICALADVICE            Patient is calling for Medical Advice regarding:Question            How long has patient had these symptoms:            Pharmacy name and phone#:            Patient wants a call back or thru [facility], provide patient's call back phone number: call pt back at:[phone]            Comments:pt said that she is calling in regards to Dr Dsouza wanted to know abut her pulse pt stated that she needs to speak with the nurse to discuss her medication and blood pressure . Please advise            Please advise patient replies from provider may take up to 48 hours.

## 2025-07-17 ENCOUNTER — OFFICE VISIT (OUTPATIENT)
Dept: INTERNAL MEDICINE | Facility: CLINIC | Age: 80
End: 2025-07-17
Payer: MEDICARE

## 2025-07-17 VITALS
HEIGHT: 59 IN | OXYGEN SATURATION: 99 % | WEIGHT: 130.63 LBS | BODY MASS INDEX: 26.33 KG/M2 | HEART RATE: 53 BPM | DIASTOLIC BLOOD PRESSURE: 54 MMHG | SYSTOLIC BLOOD PRESSURE: 138 MMHG

## 2025-07-17 DIAGNOSIS — I10 PRIMARY HYPERTENSION: ICD-10-CM

## 2025-07-17 DIAGNOSIS — M81.0 AGE-RELATED OSTEOPOROSIS WITHOUT CURRENT PATHOLOGICAL FRACTURE: ICD-10-CM

## 2025-07-17 DIAGNOSIS — I48.19 PERSISTENT ATRIAL FIBRILLATION: ICD-10-CM

## 2025-07-17 DIAGNOSIS — D69.2 OTHER NONTHROMBOCYTOPENIC PURPURA: ICD-10-CM

## 2025-07-17 DIAGNOSIS — R26.9 ABNORMALITY OF GAIT AND MOBILITY: ICD-10-CM

## 2025-07-17 DIAGNOSIS — I70.0 ATHEROSCLEROSIS OF AORTA: ICD-10-CM

## 2025-07-17 DIAGNOSIS — I47.9 PAROXYSMAL TACHYCARDIA: ICD-10-CM

## 2025-07-17 DIAGNOSIS — S22.000D COMPRESSION FRACTURE OF THORACIC VERTEBRA WITH ROUTINE HEALING, UNSPECIFIED THORACIC VERTEBRAL LEVEL, SUBSEQUENT ENCOUNTER: ICD-10-CM

## 2025-07-17 DIAGNOSIS — Z79.01 CHRONIC ANTICOAGULATION: ICD-10-CM

## 2025-07-17 DIAGNOSIS — Z00.00 ENCOUNTER FOR MEDICARE ANNUAL WELLNESS EXAM: Primary | ICD-10-CM

## 2025-07-17 DIAGNOSIS — I20.9 ANGINA PECTORIS, UNSPECIFIED: ICD-10-CM

## 2025-07-17 DIAGNOSIS — E78.5 HYPERLIPIDEMIA LDL GOAL <70: ICD-10-CM

## 2025-07-17 DIAGNOSIS — I50.32 CHRONIC DIASTOLIC HEART FAILURE: ICD-10-CM

## 2025-07-17 PROBLEM — R29.3 POOR POSTURE: Status: RESOLVED | Noted: 2022-05-17 | Resolved: 2025-07-17

## 2025-07-17 PROBLEM — R26.81 UNSTEADY GAIT: Status: RESOLVED | Noted: 2020-02-19 | Resolved: 2025-07-17

## 2025-07-17 PROBLEM — R29.898 DECREASED STRENGTH OF TRUNK AND BACK: Status: RESOLVED | Noted: 2022-05-17 | Resolved: 2025-07-17

## 2025-07-17 PROCEDURE — 99999 PR PBB SHADOW E&M-EST. PATIENT-LVL V: CPT | Mod: PBBFAC,,, | Performed by: NURSE PRACTITIONER

## 2025-07-17 RX ORDER — CALCIUM CARBONATE 500(1250)
1 TABLET,CHEWABLE ORAL 2 TIMES DAILY
COMMUNITY

## 2025-07-17 NOTE — PATIENT INSTRUCTIONS
Counseling and Referral of Other Preventative  (Italic type indicates deductible and co-insurance are waived)    Patient Name: Magdalena Mane  Today's Date: 7/17/2025    Health Maintenance       Date Due Completion Date    Mammogram 09/24/2025 9/24/2024    Influenza Vaccine (1) 09/01/2025 12/4/2024    Lipid Panel 11/01/2025 11/1/2024    DEXA Scan 10/07/2026 10/7/2024    TETANUS VACCINE 06/18/2033 6/18/2023        No orders of the defined types were placed in this encounter.    The following information is provided to all patients.  This information is to help you find resources for any of the problems found today that may be affecting your health:                  Living healthy guide: www.[facility].[place].gov      Understanding Diabetes: www.diabetes.org      Eating healthy: www.cdc.gov/healthyweight      [facility] home safety checklist: www.cdc.gov/steadi/patient.html      Agency on Aging: www.goea.[place].[facility]      Alcoholics anonymous (AA): www.aa.org      Physical Activity: www.daria.nih.gov/am1deck      Tobacco use: www.quitwithusla.org

## 2025-07-17 NOTE — PROGRESS NOTES
"  Magdalena Mane presented for a follow-up Medicare AWV today. The following components were reviewed and updated:    Medical history  Family History  Social history  Allergies and Current Medications  Health Risk Assessment  Health Maintenance  Care Team    **See Completed Assessments for Annual Wellness visit with in the encounter summary    The following assessments were completed:  Depression Screening  Cognitive function Screening    Timed Get Up Test  Whisper Test      Opioid documentation:      Patient does not have a current opioid prescription.          Vitals:    07/17/25 1052   BP: (!) 138/54   BP Location: Right arm   Pulse: (!) 53   SpO2: 99%   Weight: 59.2 kg (130 lb 10 oz)   Height: 4' 11" (1.499 m)     Body mass index is 26.38 kg/m².       Physical Exam  Constitutional:       Appearance: Normal appearance.   Cardiovascular:      Rate and Rhythm: Normal rate and regular rhythm.   Pulmonary:      Effort: Pulmonary effort is normal.      Breath sounds: Normal breath sounds.   Musculoskeletal:         General: Normal range of motion.   Skin:     General: Skin is warm and dry.   Neurological:      General: No focal deficit present.      Mental Status: She is alert.   Psychiatric:         Mood and Affect: Mood normal.           Diagnoses and health risks identified today and associated recommendations/orders:    1. Encounter for Medicare annual wellness exam  Here for Health Risk Assessment/Annual Wellness Visit.  Health maintenance reviewed and updated. Follow up in one year.   - Referral to Enhanced Annual Wellness Visit (eAWV) M+1    2. Primary hypertension  Chronic, at goal with candesartan. Followed by PCP, Cardiology.    3. Atherosclerosis of aorta  Chronic, stable with statin.     4. Chronic diastolic heart failure  Chronic, stable with current medications. No C/O dyspnea, edema/weight gain. Followed by PCP, Cardiology.    5. Angina pectoris, unspecified  Chronic, stable with statin. No C/O " CP/Dyspnea. Followed by PCP, Cardiology.    6. Paroxysmal tachycardia  Chronic, stable with verapamil. Followed by PCP, Cardiology.    7. Persistent atrial fibrillation  Chronic, stable with apixaban, verapamil. Rate controlled. Followed by PCP, Cardiology.    8. Hyperlipidemia LDL goal <70  Chronic, at goal with atorvastatin. Followed by PCP, Cardiology.    9. Other nonthrombocytopenic purpura  Chronic, no noted at today's visit. Followed by PCP.    10. Chronic anticoagulation  Chronic apixaban. Followed by PCP, Cardiology.    11. Age-related osteoporosis without current pathological fracture  Chronic, stable with alendronate, calcium, vit d. Followed by PCP.    12. Compression fracture of thoracic vertebra with routine healing, unspecified thoracic vertebral level, subsequent encounter  Chronic, stable. No recent fx. Followed by PCP    13. Abnormality of gait and mobility  Chronic, reports multiple falls, due to balance, no reported injuries. Has completed balance program. Followed by PCP.      Provided Magdalena with a 5-10 year written screening schedule and personal prevention plan. Recommendations were developed using the USPSTF age appropriate recommendations. Education, counseling, and referrals were provided as needed.  After Visit Summary printed and given to patient which includes a list of additional screenings\tests needed.    Follow up in 8 days (on 7/25/2025).with PCP      Genoveva Abrams NP

## 2025-07-25 ENCOUNTER — OFFICE VISIT (OUTPATIENT)
Dept: PRIMARY CARE CLINIC | Facility: CLINIC | Age: 80
End: 2025-07-25
Payer: MEDICARE

## 2025-07-25 VITALS — DIASTOLIC BLOOD PRESSURE: 61 MMHG | SYSTOLIC BLOOD PRESSURE: 129 MMHG | HEART RATE: 72 BPM

## 2025-07-25 DIAGNOSIS — D50.9 IRON DEFICIENCY ANEMIA, UNSPECIFIED IRON DEFICIENCY ANEMIA TYPE: ICD-10-CM

## 2025-07-25 DIAGNOSIS — I10 PRIMARY HYPERTENSION: Primary | ICD-10-CM

## 2025-07-25 DIAGNOSIS — I48.19 PERSISTENT ATRIAL FIBRILLATION: ICD-10-CM

## 2025-07-25 DIAGNOSIS — I65.23 BILATERAL CAROTID ARTERY STENOSIS: ICD-10-CM

## 2025-07-25 DIAGNOSIS — Z12.31 SCREENING MAMMOGRAM FOR BREAST CANCER: ICD-10-CM

## 2025-07-25 NOTE — PROGRESS NOTES
Subjective     Patient ID: Magdalena Mane is a 80 y.o. female.    Chief Complaint: No chief complaint on file.    HPI  Magdalena Mane is a 80 y.o.  female who presents with HTN, HLD, R CEA (follows with cardiology, sees him every 6 months. had carotid US, April 2025), afib, basal cell carcinoma on the nose, and iron def anemia (anemic since she was a child, nml iron levels 11/2024) is here for a f/u visit. Did acp 2025  -doing well. Was out in the garden today and she just came In and checked her BP. BP was low and HR was elevated. After she rested and drank water. Her HR improved to 72 and BP improved too. Asked her to drink water the whole time   -no urinary issues or trouble sleeping on trazodone  -she is taking iron. She is feeling better. Anemic on her last labs. Will check iron panel and other labs at next visit.   -had covid early july          Review of Systems   Constitutional:  Negative for chills and fever.   HENT:  Negative for rhinorrhea and sore throat.    Respiratory:  Negative for cough, chest tightness and shortness of breath.    Cardiovascular:  Negative for chest pain.   Gastrointestinal:  Negative for abdominal pain, blood in stool, constipation and diarrhea.   Genitourinary:  Negative for dysuria and hematuria.   Neurological:  Negative for dizziness, light-headedness and headaches.          Objective     Physical Exam  Vitals and nursing note reviewed.   Constitutional:       Appearance: Normal appearance.   Eyes:      Conjunctiva/sclera: Conjunctivae normal.   Pulmonary:      Effort: Pulmonary effort is normal. No respiratory distress.   Musculoskeletal:      Comments: Mild lower ext edema    Neurological:      Mental Status: She is alert and oriented to person, place, and time.   Psychiatric:         Mood and Affect: Mood normal.         Behavior: Behavior normal.            Assessment and Plan     1. Primary hypertension  Assessment & Plan:  BP well controlled on candesartan,  verapamil and spironolactone, continue. Will monitor.       2. Persistent atrial fibrillation  Overview:  F/u by Dr. García.    [id number] score 4    Assessment & Plan:  On eliquis, flecainide and verapamil. Continue. Rate controlled. Continue to F/u with cardiology       3. Bilateral carotid artery stenosis  Overview:  4/2025: There is 40-49% right Internal Carotid Stenosis.    There is 0-19% left Internal Carotid Stenosis.    Assessment & Plan:  S/p right carotid endarterectomy. No issues. Follows with cardiology. Had carotid US 4/2025.       4. Screening mammogram for breast cancer  -     Mammo Digital Screening Bilat w/ Chele (XPD); Future; Expected date: 09/24/2025    5. Iron deficiency anemia, unspecified iron deficiency anemia type  Assessment & Plan:  she is taking iron. She is feeling better. Anemic on her last labs. Will check iron panel and other labs at next visit          The patient location is: [place]  The chief complaint leading to consultation is: follow up, HTN, afib, anemia    Visit type: audiovisual    Face to Face time with patient: 13  23 minutes of total time spent on the encounter, which includes face to face time and non-face to face time preparing to see the patient (eg, review of tests), Obtaining and/or reviewing separately obtained history, Documenting clinical information in the electronic or other health record, Independently interpreting results (not separately reported) and communicating results to the patient/family/caregiver, or Care coordination (not separately reported).         Each patient to whom he or she provides medical services by telemedicine is:  (1) informed of the relationship between the physician and patient and the respective role of any other health care provider with respect to management of the patient; and (2) notified that he or she may decline to receive medical services by telemedicine and may withdraw from such care at any time.

## 2025-07-25 NOTE — ASSESSMENT & PLAN NOTE
she is taking iron. She is feeling better. Anemic on her last labs. Will check iron panel and other labs at next visit

## 2025-08-26 ENCOUNTER — PATIENT MESSAGE (OUTPATIENT)
Dept: PRIMARY CARE CLINIC | Facility: CLINIC | Age: 80
End: 2025-08-26
Payer: MEDICARE

## 2025-08-26 ENCOUNTER — PATIENT MESSAGE (OUTPATIENT)
Dept: ADMINISTRATIVE | Facility: OTHER | Age: 80
End: 2025-08-26
Payer: MEDICARE

## 2025-09-03 DIAGNOSIS — I10 ESSENTIAL HYPERTENSION: ICD-10-CM

## 2025-09-04 RX ORDER — CANDESARTAN 16 MG/1
16 TABLET ORAL
Qty: 90 TABLET | Refills: 3 | Status: SHIPPED | OUTPATIENT
Start: 2025-09-04

## (undated) DEVICE — SPONGE DERMACEA 4X4IN 12PLY

## (undated) DEVICE — NDL 22GA X1 1/2 REG BEVEL

## (undated) DEVICE — CORD BIPOLAR 12 FOOT

## (undated) DEVICE — APPLICATOR CHLORAPREP ORN 26ML

## (undated) DEVICE — SUT 4-0 12-18IN SILK BLACK

## (undated) DEVICE — SEE MEDLINE ITEM 157173

## (undated) DEVICE — CATH ALL PUR URTHL RR 10FR

## (undated) DEVICE — SET DECANTER MEDICHOICE

## (undated) DEVICE — DRESSING TELFA N ADH 3X8

## (undated) DEVICE — DRESSING N ADH OIL EMUL 3X3

## (undated) DEVICE — SUT VICRYL 3-0 27 SH

## (undated) DEVICE — DRAPE STERI INSTRUMENT 1018

## (undated) DEVICE — SUT MONOCRYL 4-0 PS-2

## (undated) DEVICE — INSERTS STEALTH FIBRA SIZE 1.

## (undated) DEVICE — TOWEL OR DISP STRL BLUE 4/PK

## (undated) DEVICE — SPONGE GAUZE 16PLY 4X4

## (undated) DEVICE — BLADE SCALP OPHTL BEVEL STR

## (undated) DEVICE — HEMOSTAT SURGICEL 4X8IN

## (undated) DEVICE — CLOSURE SKIN STERI STRIP 1/2X4

## (undated) DEVICE — BANDAGE ELASTIC ACE 2IN 10/CA

## (undated) DEVICE — GAUZE DERMACEA LOW PLY 2X4YRDS

## (undated) DEVICE — COVER LIGHT HANDLE 80/CA

## (undated) DEVICE — DRAPE THYROID WITH ARMBOARD

## (undated) DEVICE — SUT CTD VICRYL VIL BR SH 27

## (undated) DEVICE — PAD DEFIB CADENCE ADULT R2

## (undated) DEVICE — SUT ETHILON 4-0 PS2 18 BLK

## (undated) DEVICE — SUT 3-0 12-18IN SILK

## (undated) DEVICE — SEE MEDLINE ITEM 152515

## (undated) DEVICE — TRAY FOLEY 16FR INFECTION CONT

## (undated) DEVICE — FORCEP STRAIGHT DISP

## (undated) DEVICE — PAD CAST SPECIALIST STRL 4

## (undated) DEVICE — DRAIN CHANNEL ROUND 15FR

## (undated) DEVICE — DRESSING TRANS 4X4 TEGADERM

## (undated) DEVICE — SEE MEDLINE ITEM 152522

## (undated) DEVICE — DRESSING TELFA STRL 4X3 LF

## (undated) DEVICE — SUT MCRYL PLUS 4-0 PS2 27IN

## (undated) DEVICE — TRAY MINOR ORTHO

## (undated) DEVICE — ELECTRODE REM PLYHSV RETURN 9

## (undated) DEVICE — IMMOBILIZER SHOULDER RAINBOW M

## (undated) DEVICE — SEE MEDLINE ITEM 153688

## (undated) DEVICE — SUT PROLENE 6-0 C-1 30IN BL

## (undated) DEVICE — SUT 2-0 12-18IN SILK

## (undated) DEVICE — SEE MEDLINE ITEM 157131

## (undated) DEVICE — PAD CAST 2 IN X 4YDS STERILE

## (undated) DEVICE — EVACUATOR WOUND BULB 100CC

## (undated) DEVICE — SEE MEDLINE ITEM 157194

## (undated) DEVICE — DRAPE STERI-DRAPE 1000 17X11IN

## (undated) DEVICE — SEE MEDLINE ITEM 156911

## (undated) DEVICE — BANDAGE ELASTIC 2X5 VELCRO ST

## (undated) DEVICE — LOOP VESSEL BLUE MAXI